# Patient Record
Sex: FEMALE | Race: WHITE | NOT HISPANIC OR LATINO | Employment: FULL TIME | URBAN - METROPOLITAN AREA
[De-identification: names, ages, dates, MRNs, and addresses within clinical notes are randomized per-mention and may not be internally consistent; named-entity substitution may affect disease eponyms.]

---

## 2017-02-10 ENCOUNTER — HOSPITAL ENCOUNTER (OUTPATIENT)
Dept: RADIOLOGY | Facility: MEDICAL CENTER | Age: 47
End: 2017-02-10
Attending: INTERNAL MEDICINE
Payer: COMMERCIAL

## 2017-02-10 DIAGNOSIS — Z13.9 SCREENING: ICD-10-CM

## 2017-02-10 PROCEDURE — G0202 SCR MAMMO BI INCL CAD: HCPCS

## 2017-03-17 ENCOUNTER — TELEPHONE (OUTPATIENT)
Dept: NEUROLOGY | Facility: MEDICAL CENTER | Age: 47
End: 2017-03-17

## 2017-03-17 NOTE — TELEPHONE ENCOUNTER
Pt is sending this message via [employer] e-mail. Please advise. Thank you. ADAM Rick,  I don't know who I should go through on this, but I need to get some Norco for my migraines.  I know Dr. Soria doesn't like me to use it as a rescue medicine but when my Imitrix doesn't work and I have to be at work I need to Norco to make it through the day.  This doesn't happen often but when I does I need it.  What should I do?  Please let me know?     Mari

## 2017-03-20 ENCOUNTER — TELEPHONE (OUTPATIENT)
Dept: NEUROLOGY | Facility: MEDICAL CENTER | Age: 47
End: 2017-03-20

## 2017-03-20 DIAGNOSIS — G43.111 INTRACTABLE MIGRAINE WITH AURA WITH STATUS MIGRAINOSUS: ICD-10-CM

## 2017-03-20 RX ORDER — HYDROCODONE BITARTRATE AND ACETAMINOPHEN 10; 325 MG/1; MG/1
1-2 TABLET ORAL
Qty: 5 TAB | Refills: 3 | Status: SHIPPED | OUTPATIENT
Start: 2017-03-20 | End: 2017-05-04 | Stop reason: SDUPTHER

## 2017-03-20 NOTE — TELEPHONE ENCOUNTER
Pt also called and is stating that she only takes half a tablet of the Norco. She has tried and failed Fioricet. It does not help the migraines at all when she is at work. She is also asking if she should increase the Topamax dose. She is currently taking 100 mg at bed time. Please advise. Thank you. ADAM

## 2017-03-21 NOTE — TELEPHONE ENCOUNTER
Norco 5# maximum per month is fine with me. YP    Called and spoke with pt. All information was given. She verbally understood and will  Rx. ADAM

## 2017-04-25 DIAGNOSIS — G43.119 INTRACTABLE MIGRAINE WITH AURA WITHOUT STATUS MIGRAINOSUS: ICD-10-CM

## 2017-04-25 RX ORDER — TOPIRAMATE 100 MG/1
100 TABLET, FILM COATED ORAL
Qty: 90 TAB | Refills: 1 | Status: SHIPPED | OUTPATIENT
Start: 2017-04-25 | End: 2017-12-17 | Stop reason: SDUPTHER

## 2017-04-25 NOTE — TELEPHONE ENCOUNTER
Was the patient seen in the last year in this department? Yes  6/8/17    Does patient have an active prescription for medications requested? Yes     Received Request Via: Pharmacy     No follow up appointment at this time.

## 2017-05-04 DIAGNOSIS — G43.111 INTRACTABLE MIGRAINE WITH AURA WITH STATUS MIGRAINOSUS: ICD-10-CM

## 2017-05-04 NOTE — TELEPHONE ENCOUNTER
Pt is calling and asking for a refill for the Norco 10/325 mg. Per ALEJANDRO there can be no refills on the Rx. It has to be written every month. She last had it written on 3/[id number] for # 5. She is in need of it to be written again and she will come to the office to pick it up. Please advise. Thank you. ADAM

## 2017-05-05 ENCOUNTER — TELEPHONE (OUTPATIENT)
Dept: NEUROLOGY | Facility: MEDICAL CENTER | Age: 47
End: 2017-05-05

## 2017-05-05 RX ORDER — HYDROCODONE BITARTRATE AND ACETAMINOPHEN 10; 325 MG/1; MG/1
1-2 TABLET ORAL
Qty: 5 TAB | Refills: 0 | Status: SHIPPED | OUTPATIENT
Start: 2017-05-05 | End: 2017-05-16

## 2017-05-16 ENCOUNTER — OFFICE VISIT (OUTPATIENT)
Dept: MEDICAL GROUP | Facility: MEDICAL CENTER | Age: 47
End: 2017-05-16
Payer: COMMERCIAL

## 2017-05-16 VITALS
SYSTOLIC BLOOD PRESSURE: 124 MMHG | DIASTOLIC BLOOD PRESSURE: 78 MMHG | RESPIRATION RATE: 16 BRPM | HEART RATE: 102 BPM | HEIGHT: 69 IN | WEIGHT: 150 LBS | BODY MASS INDEX: 22.22 KG/M2 | OXYGEN SATURATION: 94 % | TEMPERATURE: 98 F

## 2017-05-16 DIAGNOSIS — M25.511 PAIN IN JOINT OF RIGHT SHOULDER: ICD-10-CM

## 2017-05-16 DIAGNOSIS — G43.111 INTRACTABLE MIGRAINE WITH AURA WITH STATUS MIGRAINOSUS: ICD-10-CM

## 2017-05-16 PROCEDURE — 99213 OFFICE O/P EST LOW 20 MIN: CPT | Performed by: INTERNAL MEDICINE

## 2017-05-16 RX ORDER — HYDROCODONE BITARTRATE AND ACETAMINOPHEN 10; 325 MG/1; MG/1
1 TABLET ORAL
Qty: 5 TAB | Refills: 0 | Status: SHIPPED | OUTPATIENT
Start: 2017-05-16 | End: 2017-06-19 | Stop reason: SDUPTHER

## 2017-05-16 ASSESSMENT — PAIN SCALES - GENERAL: PAINLEVEL: 8=MODERATE-SEVERE PAIN

## 2017-05-16 NOTE — ASSESSMENT & PLAN NOTE
This patient reports that a couple of years ago she injured her right shoulder, she thinks her biceps tendon. Symptoms gradually resolved. She reports that recently she reinjured it when she was working out. She saw Dr. Garcia for this who has arranged for physical therapy. He however would not give her pain medication and told her to come to us. She is having quite a bit of discomfort. She is not on an anti-inflammatory medication. She does have Norco which she takes only for her migraine headaches. She generally only needs a half pill per day. She otherwise denies new complaints.

## 2017-05-16 NOTE — PROGRESS NOTES
Subjective:     Chief Complaint   Patient presents with   • Arm Pain     right arm/ pain medicine     Mari Dillon is a 46 y.o. female here today for pain medication for shoulder injury.    Pain in joint, shoulder region  This patient reports that a couple of years ago she injured her right shoulder, she thinks her biceps tendon. Symptoms gradually resolved. She reports that recently she reinjured it when she was working out. She saw Dr. Garcia for this who has arranged for physical therapy. He however would not give her pain medication and told her to come to us. She is having quite a bit of discomfort. She is not on an anti-inflammatory medication. She does have Norco which she takes only for her migraine headaches. She generally only needs a half pill per day. She otherwise denies new complaints.       Diagnoses of Intractable migraine with aura with status migrainosus and Pain in joint of right shoulder were pertinent to this visit.    Allergies: Review of patient's allergies indicates no known allergies.  Current medicines (including changes today)  Current Outpatient Prescriptions   Medication Sig Dispense Refill   • hydrocodone/acetaminophen (NORCO)  MG Tab Take 1 Tab by mouth 1 time daily as needed. 5 Tab 0   • topiramate (TOPAMAX) 100 MG Tab Take 1 Tab by mouth every bedtime. 90 Tab 1   • alprazolam (XANAX) 0.25 MG Tab Take 1 Tab by mouth at bedtime as needed for Sleep. 10 Tab 2   • duloxetine (CYMBALTA) 30 MG Cap DR Particles Take 1 Cap by mouth every day. 30 Cap 11   • sumatriptan (IMITREX) 100 MG tablet Take 1 Tab by mouth Once PRN for Migraine for up to 1 dose. 9 Tab 6   • Multiple Vitamins-Minerals (MULTIVITAMIN PO) Take  by mouth every day.     • levonorgestrel-ethinyl estradiol (INTROVALE) 0.15-0.03 MG per tablet Take 1 Tab by mouth every day.       No current facility-administered medications for this visit.       She  has a past medical history of Anxiety (6/9/2009). She also has no past  "medical history of Breast cancer ([facility]).  Health Maintenance: She was advised to get a TD vaccination but she refuses to get that today.  ROS    Patient denies significant change in strength, weight or appetite.  No significant lightheadedness or headaches. Migraine headaches are unchanged recently.  No change in vision, hearing, or swallowing.  No new dyspnea, coughing, chest pain, or palpitations.  No indigestion, abdominal pain, or change in bowel habits.  No change in urinating.  No new ankle swelling. Right shoulder pain as noted.       Objective:     PE:  /78 mmHg  Pulse 102  Temp(Src) 36.7 °C (98 °F)  Resp 16  Ht 1.753 m (5' 9\")  Wt 68.04 kg (150 lb)  BMI 22.14 kg/m2  SpO2 94%   Neck is supple without significant lymphadenopathy or masses.  Extremities are without significant edema, cyanosis or deformity. There is no significant tenderness to palpation over the shoulder but extreme motion in any direction causes discomfort. There is no crepitus. There is no obvious deformity.      Assessment and Plan:   The following treatment plan was discussed  1. Intractable migraine with aura with status migrainosus  hydrocodone/acetaminophen (NORCO)  MG Tab   2. Pain in joint of right shoulder      I gave her prescription for Narco 5 tablets, she thinks she only needs a half pill per day. I also advised that she start taking ibuprofen or Aleve in anti-inflammatory doses. we reviewed potential side effects. She will continue physical therapy as directed.        Followup: She will keep her next scheduled appoint with Dr. Prieto or otherwise contact us as needed sooner.           "

## 2017-05-16 NOTE — MR AVS SNAPSHOT
"        Mari Dillon   2017 8:20 AM   Office Visit   MRN: [id number]    Department:  [address] [place] [address]   Dept Phone:  [phone]    Description:  Female : 1970   Provider:  Brando Azevedo M.D.           Reason for Visit     Arm Pain right arm/ pain medicine      Allergies as of 2017     No Known Allergies      You were diagnosed with     Intractable migraine with aura with status migrainosus   [104695]       Pain in joint of right shoulder   [[id number]]   I gave her prescription for Narco 5 tablets, she thinks she only needs a half pill per day. I also advised that she start taking ibuprofen or Aleve in anti-infl      Vital Signs     Blood Pressure Pulse Temperature Respirations Height Weight    124/78 mmHg 102 36.7 °C (98 °F) 16 1.753 m (5' 9\") 68.04 kg (150 lb)    Body Mass Index Oxygen Saturation Smoking Status             22.14 kg/m2 94% Former Smoker         Basic Information     Date Of Birth Sex Race Ethnicity Preferred Language    1970 Female White Non- English      Problem List              ICD-10-CM Priority Class Noted - Resolved    Anxiety (Chronic) F41.9   2009 - Present    Pain in joint, shoulder region M25.519   6/15/2015 - Present    Intractable migraine with aura without status migrainosus G43.119   3/3/2016 - Present      Health Maintenance        Date Due Completion Dates    IMM DTaP/Tdap/Td Vaccine (1 - Tdap) 1989 ---    MAMMOGRAM 2/10/2018 2/10/2017, 2016, 2013, 2012, 2011, 8/10/2010, 8/10/2010, 2009, 2009    PAP SMEAR 10/13/2018 10/13/2015 (Done), 12/3/2012 (Done)    Override on 10/13/2015: Done    Override on 12/3/2012: Done (marycruz)            Current Immunizations     Influenza TIV (IM) 2015, 10/18/2013, 2012    Influenza Vaccine Quad Inj (Preserved) 10/24/2014  8:52 AM    Tetanus Vaccine 2012      Below and/or attached are the medications your provider expects you to take. Review all of your home medications " and newly ordered medications with your provider and/or pharmacist. Follow medication instructions as directed by your provider and/or pharmacist. Please keep your medication list with you and share with your provider. Update the information when medications are discontinued, doses are changed, or new medications (including over-the-counter products) are added; and carry medication information at all times in the event of emergency situations     Allergies:  No Known Allergies          Medications  Valid as of: May 16, 2017 -  8:56 AM    Generic Name Brand Name Tablet Size Instructions for use    ALPRAZolam (Tab) XANAX 0.25 MG Take 1 Tab by mouth at bedtime as needed for Sleep.        DULoxetine HCl (Cap DR Particles) CYMBALTA 30 MG Take 1 Cap by mouth every day.        Hydrocodone-Acetaminophen (Tab) NORCO  MG Take 1 Tab by mouth 1 time daily as needed.        Levonorgest-Eth Estrad 91-Day (Tab) SEASONALE 0.15-0.03 MG Take 1 Tab by mouth every day.        Multiple Vitamins-Minerals   Take  by mouth every day.        SUMAtriptan Succinate (Tab) IMITREX 100 MG Take 1 Tab by mouth Once PRN for Migraine for up to 1 dose.        Topiramate (Tab) TOPAMAX 100 MG Take 1 Tab by mouth every bedtime.        .                 Medicines prescribed today were sent to:     [id number] - [place] - [address]    [address] [place] [address]    Phone: [phone] Fax: [phone]    Open 24 Hours?: No      Medication refill instructions:       If your prescription bottle indicates you have medication refills left, it is not necessary to call your provider’s office. Please contact your pharmacy and they will refill your medication.    If your prescription bottle indicates you do not have any refills left, you may request refills at any time through one of the following ways: The online [employer] system (except Urgent Care), by calling your provider’s office, or by asking your pharmacy to contact your  provider’s office with a refill request. Medication refills are processed only during regular business hours and may not be available until the next business day. Your provider may request additional information or to have a follow-up visit with you prior to refilling your medication.   *Please Note: Medication refills are assigned a new Rx number when refilled electronically. Your pharmacy may indicate that no refills were authorized even though a new prescription for the same medication is available at the pharmacy. Please request the medicine by name with the pharmacy before contacting your provider for a refill.           [employer] Access Code: Activation code not generated  Current [id number] Status: Active

## 2017-06-07 DIAGNOSIS — F41.9 ANXIETY: Chronic | ICD-10-CM

## 2017-06-07 RX ORDER — ALPRAZOLAM 0.25 MG/1
0.25 TABLET ORAL NIGHTLY PRN
Qty: 10 TAB | Refills: 2 | Status: SHIPPED
Start: 2017-06-07 | End: 2017-08-28 | Stop reason: SDUPTHER

## 2017-06-13 ENCOUNTER — APPOINTMENT (OUTPATIENT)
Dept: MEDICAL GROUP | Facility: MEDICAL CENTER | Age: 47
End: 2017-06-13
Payer: COMMERCIAL

## 2017-06-19 ENCOUNTER — PATIENT MESSAGE (OUTPATIENT)
Dept: NEUROLOGY | Facility: MEDICAL CENTER | Age: 47
End: 2017-06-19

## 2017-06-19 DIAGNOSIS — G43.111 INTRACTABLE MIGRAINE WITH AURA WITH STATUS MIGRAINOSUS: ICD-10-CM

## 2017-06-19 RX ORDER — HYDROCODONE BITARTRATE AND ACETAMINOPHEN 10; 325 MG/1; MG/1
1 TABLET ORAL
Qty: 5 TAB | Refills: 0 | Status: SHIPPED | OUTPATIENT
Start: 2017-06-19 | End: 2017-06-23 | Stop reason: SDUPTHER

## 2017-06-23 DIAGNOSIS — G43.111 INTRACTABLE MIGRAINE WITH AURA WITH STATUS MIGRAINOSUS: ICD-10-CM

## 2017-06-23 RX ORDER — HYDROCODONE BITARTRATE AND ACETAMINOPHEN 10; 325 MG/1; MG/1
1 TABLET ORAL
Qty: 5 TAB | Refills: 0 | Status: SHIPPED | OUTPATIENT
Start: 2017-06-23 | End: 2017-07-11

## 2017-06-27 ENCOUNTER — OFFICE VISIT (OUTPATIENT)
Dept: URGENT CARE | Facility: CLINIC | Age: 47
End: 2017-06-27
Payer: COMMERCIAL

## 2017-06-27 VITALS
BODY MASS INDEX: 22.22 KG/M2 | WEIGHT: 150 LBS | TEMPERATURE: 97.7 F | SYSTOLIC BLOOD PRESSURE: 100 MMHG | DIASTOLIC BLOOD PRESSURE: 72 MMHG | RESPIRATION RATE: 17 BRPM | HEART RATE: 73 BPM | OXYGEN SATURATION: 100 % | HEIGHT: 69 IN

## 2017-06-27 DIAGNOSIS — S76.012A MUSCLE STRAIN OF LEFT HIP, INITIAL ENCOUNTER: ICD-10-CM

## 2017-06-27 PROCEDURE — 99999 PR NO CHARGE: CPT | Performed by: NURSE PRACTITIONER

## 2017-06-27 PROCEDURE — 99214 OFFICE O/P EST MOD 30 MIN: CPT | Performed by: NURSE PRACTITIONER

## 2017-06-27 RX ORDER — DICLOFENAC SODIUM 75 MG/1
75 TABLET, DELAYED RELEASE ORAL 2 TIMES DAILY
Qty: 30 TAB | Refills: 0 | Status: SHIPPED | OUTPATIENT
Start: 2017-06-27 | End: 2018-04-13

## 2017-06-27 RX ORDER — HYDROCODONE BITARTRATE AND ACETAMINOPHEN 5; 325 MG/1; MG/1
1-2 TABLET ORAL EVERY 6 HOURS PRN
Qty: 20 TAB | Refills: 0 | Status: SHIPPED | OUTPATIENT
Start: 2017-06-27 | End: 2017-07-11

## 2017-06-27 RX ORDER — IBUPROFEN 200 MG
800 TABLET ORAL EVERY 6 HOURS PRN
COMMUNITY
End: 2018-05-01

## 2017-06-27 RX ORDER — KETOROLAC TROMETHAMINE 30 MG/ML
30 INJECTION, SOLUTION INTRAMUSCULAR; INTRAVENOUS ONCE
Status: COMPLETED | OUTPATIENT
Start: 2017-06-27 | End: 2017-06-27

## 2017-06-27 RX ADMIN — KETOROLAC TROMETHAMINE 30 MG: 30 INJECTION, SOLUTION INTRAMUSCULAR; INTRAVENOUS at 08:50

## 2017-06-27 ASSESSMENT — ENCOUNTER SYMPTOMS
LEG PAIN: 1
CHILLS: 0
FEVER: 0

## 2017-06-27 NOTE — MR AVS SNAPSHOT
"        Mari Dillon   2017 8:15 AM   Office Visit   MRN: [id number]    Department:  [facility]   Dept Phone:  [phone]    Description:  Female : 1970   Provider:  Cathey J Hamman, A.P.N.           Reason for Visit     Leg Pain left leg, muscle and pain has increased, burning sensation, upper leg, hard to sit, just started last night, hard to sleep      Allergies as of 2017     No Known Allergies      You were diagnosed with     Muscle strain of left hip, initial encounter   [2331946]         Vital Signs     Blood Pressure Pulse Temperature Respirations Height Weight    100/72 mmHg 73 36.5 °C (97.7 °F) 17 1.753 m (5' 9.02\") 68.04 kg (150 lb)    Body Mass Index Oxygen Saturation Smoking Status             22.14 kg/m2 100% Former Smoker         Basic Information     Date Of Birth Sex Race Ethnicity Preferred Language    1970 Female White Non- English      Your appointments     2017  8:40 AM   Established Patient with Brandan Prieto M.D.   [facility] [address]    [address]  [address]  [facility] [address]   [phone]           You will be receiving a confirmation call a few days before your appointment from our automated call confirmation system.              Problem List              ICD-10-CM Priority Class Noted - Resolved    Anxiety (Chronic) F41.9   2009 - Present    Pain in joint, shoulder region M25.519   6/15/2015 - Present    Intractable migraine with aura without status migrainosus G43.119   3/3/2016 - Present      Health Maintenance        Date Due Completion Dates    IMM DTaP/Tdap/Td Vaccine (1 - Tdap) 1989 ---    MAMMOGRAM 2/10/2018 2/10/2017, 2016, 2013, 2012, 2011, 8/10/2010, 8/10/2010, 2009, 2009    PAP SMEAR 10/13/2018 10/13/2015 (Done), 12/3/2012 (Done)    Override on 10/13/2015: Done    Override on 12/3/2012: Done (marycruz)            Current Immunizations     Influenza TIV (IM) " 9/30/2015, 10/18/2013, 11/2/2012    Influenza Vaccine Quad Inj (Preserved) 10/24/2014  8:52 AM    Tetanus Vaccine 5/2/2012      Below and/or attached are the medications your provider expects you to take. Review all of your home medications and newly ordered medications with your provider and/or pharmacist. Follow medication instructions as directed by your provider and/or pharmacist. Please keep your medication list with you and share with your provider. Update the information when medications are discontinued, doses are changed, or new medications (including over-the-counter products) are added; and carry medication information at all times in the event of emergency situations     Allergies:  No Known Allergies          Medications  Valid as of: June 27, 2017 -  8:53 AM    Generic Name Brand Name Tablet Size Instructions for use    ALPRAZolam (Tab) XANAX 0.25 MG Take 1 Tab by mouth at bedtime as needed for Sleep.        Diclofenac Sodium (Tablet Delayed Response) VOLTAREN 75 MG Take 1 Tab by mouth 2 times a day.        DULoxetine HCl (Cap DR Particles) CYMBALTA 30 MG Take 1 Cap by mouth every day.        Hydrocodone-Acetaminophen (Tab) NORCO  MG Take 1 Tab by mouth 1 time daily as needed.        Hydrocodone-Acetaminophen (Tab) NORCO 5-325 MG Take 1-2 Tabs by mouth every 6 hours as needed.        Ibuprofen (Tab) MOTRIN 200 MG Take 800 mg by mouth every 6 hours as needed.        Levonorgest-Eth Estrad 91-Day (Tab) SEASONALE 0.15-0.03 MG Take 1 Tab by mouth every day.        Multiple Vitamins-Minerals   Take  by mouth every day.        SUMAtriptan Succinate (Tab) IMITREX 100 MG Take 1 Tab by mouth Once PRN for Migraine for up to 1 dose.        Topiramate (Tab) TOPAMAX 100 MG Take 1 Tab by mouth every bedtime.        .                 Medicines prescribed today were sent to:     [facility] #[id number] - [place] - [address]    [address] [place] [address]    Phone: [phone] Fax: [phone]       Open 24 Hours?: No      Medication refill instructions:       If your prescription bottle indicates you have medication refills left, it is not necessary to call your provider’s office. Please contact your pharmacy and they will refill your medication.    If your prescription bottle indicates you do not have any refills left, you may request refills at any time through one of the following ways: The online [employer] system (except Urgent Care), by calling your provider’s office, or by asking your pharmacy to contact your provider’s office with a refill request. Medication refills are processed only during regular business hours and may not be available until the next business day. Your provider may request additional information or to have a follow-up visit with you prior to refilling your medication.   *Please Note: Medication refills are assigned a new Rx number when refilled electronically. Your pharmacy may indicate that no refills were authorized even though a new prescription for the same medication is available at the pharmacy. Please request the medicine by name with the pharmacy before contacting your provider for a refill.           [employer] Access Code: Activation code not generated  Current [employer] Status: Active

## 2017-06-27 NOTE — PROGRESS NOTES
"Subjective:      Mari Dillon is a 47 y.o. female who presents with Leg Pain    Past Medical History   Diagnosis Date   • Anxiety 6/9/2009     Social History     Social History   • Marital Status:      Spouse Name: N/A   • Number of Children: N/A   • Years of Education: N/A     Occupational History   • Not on file.     Social History Main Topics   • Smoking status: Former Smoker -- 15 years     Quit date: 01/01/2005   • Smokeless tobacco: Never Used      Comment: 3-4 years ago   • Alcohol Use: No   • Drug Use: No   • Sexual Activity:     Partners: Male     Other Topics Concern   • Not on file     Social History Narrative    ** Merged History Encounter **          Family History   Problem Relation Age of Onset   • Hypertension Mother    • Diabetes Mother    • Cancer Maternal Grandmother      breast     Allergies: Review of patient's allergies indicates no known allergies.    This patient is a 47-year-old female who presents today with complaint of pain to the left hip. She states she woke up during the night with hip pain. She states that she had intercourse prior to this, and she believes that the pain is secondary to that. Patient denies any other strenuous activity yesterday or over the weekend. Patient denies any numbness, tingling, or weakness into her leg. States that movement of the left hip is painful.          Leg Pain  This is a new problem. The current episode started today. The problem occurs constantly. The problem has been unchanged. Pertinent negatives include no chills or fever. Exacerbated by: movement. She has tried nothing for the symptoms. The treatment provided no relief.       Review of Systems   Constitutional: Negative for fever and chills.   Musculoskeletal:        Left hip pain       All other systems reviewed and are negative      Objective:     /72 mmHg  Pulse 73  Temp(Src) 36.5 °C (97.7 °F)  Resp 17  Ht 1.753 m (5' 9.02\")  Wt 68.04 kg (150 lb)  BMI 22.14 kg/m2  SpO2 " 100%     Physical Exam   Constitutional: She is oriented to person, place, and time. She appears well-developed and well-nourished. No distress.   Musculoskeletal:        Left hip: She exhibits decreased range of motion and tenderness.        Legs:  Mild point tenderness over the left hip flexor. Pain is reproduced with lateral abduction and with hip flexion. There is no obvious swelling or deformity. There is no discoloration.   Neurological: She is alert and oriented to person, place, and time.   Skin: Skin is warm and dry. She is not diaphoretic.   Psychiatric: She has a normal mood and affect. Her behavior is normal.   Vitals reviewed.    Patient refused hip x-ray.          Assessment/Plan:     1. Muscle strain of left hip, initial encounter    - ketorolac (TORADOL) injection 30 mg; 1 mL by Intramuscular route Once.  - diclofenac EC (VOLTAREN) 75 MG Tablet Delayed Response; Take 1 Tab by mouth 2 times a day.  Dispense: 30 Tab; Refill: 0  - hydrocodone-acetaminophen (NORCO) 5-325 MG Tab per tablet; Take 1-2 Tabs by mouth every 6 hours as needed.  Dispense: 20 Tab; Refill: 0 patient's BMP and find no evidence of narcotic misuse. Clearly stated no driving or alcohol with this medication.  -rest  -ice intermittently  -follow up if symptosm persist or worsen

## 2017-07-07 ENCOUNTER — OFFICE VISIT (OUTPATIENT)
Dept: URGENT CARE | Facility: CLINIC | Age: 47
End: 2017-07-07
Payer: COMMERCIAL

## 2017-07-07 VITALS
TEMPERATURE: 98.8 F | OXYGEN SATURATION: 99 % | HEART RATE: 86 BPM | RESPIRATION RATE: 14 BRPM | BODY MASS INDEX: 22.07 KG/M2 | DIASTOLIC BLOOD PRESSURE: 70 MMHG | HEIGHT: 69 IN | WEIGHT: 149 LBS | SYSTOLIC BLOOD PRESSURE: 110 MMHG

## 2017-07-07 DIAGNOSIS — S76.012A HIP STRAIN, LEFT, INITIAL ENCOUNTER: ICD-10-CM

## 2017-07-07 PROCEDURE — 99214 OFFICE O/P EST MOD 30 MIN: CPT | Performed by: FAMILY MEDICINE

## 2017-07-07 ASSESSMENT — ENCOUNTER SYMPTOMS
NUMBNESS: 0
HIP PAIN: 1
FEVER: 0
WEAKNESS: 0

## 2017-07-08 NOTE — PATIENT INSTRUCTIONS
Hip Pain  Your hip is the joint between your upper legs and your lower pelvis. The bones, cartilage, tendons, and muscles of your hip joint perform a lot of work each day supporting your body weight and allowing you to move around.  Hip pain can range from a minor ache to severe pain in one or both of your hips. Pain may be felt on the inside of the hip joint near the groin, or the outside near the buttocks and upper thigh. You may have swelling or stiffness as well.   HOME CARE INSTRUCTIONS   · Take medicines only as directed by your health care provider.  · Apply ice to the injured area:  ¨ Put ice in a plastic bag.  ¨ Place a towel between your skin and the bag.  ¨ Leave the ice on for 15-20 minutes at a time, 3-4 times a day.  · Keep your leg raised (elevated) when possible to lessen swelling.  · Avoid activities that cause pain.  · Follow specific exercises as directed by your health care provider.  · Sleep with a pillow between your legs on your most comfortable side.  · Record how often you have hip pain, the location of the pain, and what it feels like.  SEEK MEDICAL CARE IF:   · You are unable to put weight on your leg.  · Your hip is red or swollen or very tender to touch.  · Your pain or swelling continues or worsens after 1 week.  · You have increasing difficulty walking.  · You have a fever.  SEEK IMMEDIATE MEDICAL CARE IF:   · You have fallen.  · You have a sudden increase in pain and swelling in your hip.  MAKE SURE YOU:   · Understand these instructions.  · Will watch your condition.  · Will get help right away if you are not doing well or get worse.     This information is not intended to replace advice given to you by your health care provider. Make sure you discuss any questions you have with your health care provider.     Document Released: 06/07/2011 Document Revised: 01/08/2016 Document Reviewed: 08/14/2014  [employer] Interactive Patient Education ©2016 [employer]

## 2017-07-08 NOTE — PROGRESS NOTES
"Subjective:      Mari Dillon is a 47 y.o. female who presents with Hip Pain            Hip Pain  This is a new problem. The current episode started 1 to 4 weeks ago. The problem occurs constantly. The problem has been gradually worsening. Pertinent negatives include no fever, numbness or weakness.       Review of Systems   Constitutional: Negative for fever.   Neurological: Negative for weakness and numbness.     No Known Allergies      Objective:     /70 mmHg  Pulse 86  Temp(Src) 37.1 °C (98.8 °F)  Resp 14  Ht 1.753 m (5' 9.02\")  Wt 67.586 kg (149 lb)  BMI 21.99 kg/m2  SpO2 99%     Physical Exam   Constitutional: She is oriented to person, place, and time. She appears well-developed and well-nourished. No distress.   Eyes: EOM are normal. Pupils are equal, round, and reactive to light.   Cardiovascular: Normal rate, regular rhythm, normal heart sounds and intact distal pulses.    Pulmonary/Chest: Effort normal and breath sounds normal. No respiratory distress.   Abdominal: Soft. Bowel sounds are normal. She exhibits no distension.   Musculoskeletal:        Left hip: She exhibits decreased range of motion (pain with hip flexion) and tenderness. She exhibits normal strength and no bony tenderness.   Neurological: She is alert and oriented to person, place, and time. She has normal reflexes.   Skin: Skin is warm and dry.   Psychiatric: She has a normal mood and affect. Her behavior is normal.               Assessment/Plan:     1. Hip strain, left, initial encounter  Use over-the-counter pain reliever, such as acetaminophen (Tylenol), ibuprofen (Advil, Motrin) or naproxen (Aleve) as needed; follow package directions for dosing. Regular stretching exercises.        "

## 2017-07-11 ENCOUNTER — OFFICE VISIT (OUTPATIENT)
Dept: MEDICAL GROUP | Facility: MEDICAL CENTER | Age: 47
End: 2017-07-11
Payer: COMMERCIAL

## 2017-07-11 VITALS
BODY MASS INDEX: 21.8 KG/M2 | DIASTOLIC BLOOD PRESSURE: 76 MMHG | HEIGHT: 69 IN | WEIGHT: 147.2 LBS | HEART RATE: 104 BPM | TEMPERATURE: 98.7 F | SYSTOLIC BLOOD PRESSURE: 116 MMHG | OXYGEN SATURATION: 94 % | RESPIRATION RATE: 16 BRPM

## 2017-07-11 DIAGNOSIS — G43.119 INTRACTABLE MIGRAINE WITH AURA WITHOUT STATUS MIGRAINOSUS: ICD-10-CM

## 2017-07-11 DIAGNOSIS — T14.8XXA MUSCLE STRAIN: ICD-10-CM

## 2017-07-11 DIAGNOSIS — F41.9 ANXIETY: Chronic | ICD-10-CM

## 2017-07-11 PROCEDURE — 99214 OFFICE O/P EST MOD 30 MIN: CPT | Performed by: INTERNAL MEDICINE

## 2017-07-11 ASSESSMENT — PATIENT HEALTH QUESTIONNAIRE - PHQ9: CLINICAL INTERPRETATION OF PHQ2 SCORE: 0

## 2017-07-11 NOTE — PROGRESS NOTES
CC: Follow-up multiple issues    HPI:   Mari presents today with the following.    1. Anxiety  Presents reporting her anxiety is doing fairly well. She is back together with her current relationship and they're doing well. She does examining some occasion however is more for episodic things with flying. Overall she reports she is doing well and is tolerating Cymbalta well without side effect.    2. Intractable migraine with aura without status migrainosus  Reports headaches are still somewhat of an issue but doing better on Topamax without side effect. She denies any blurred vision and slurred speech no other focal numbness or weakness.    3. Muscle strain  She was seen in urgent care recently for strain of her groin. She reports it is healing slowly taking anti-inflammatories without major stomach pain.      Patient Active Problem List    Diagnosis Date Noted   • Intractable migraine with aura without status migrainosus 03/03/2016   • Pain in joint, shoulder region 06/15/2015   • Anxiety 06/09/2009       Current Outpatient Prescriptions   Medication Sig Dispense Refill   • ibuprofen (MOTRIN) 200 MG Tab Take 800 mg by mouth every 6 hours as needed.     • diclofenac EC (VOLTAREN) 75 MG Tablet Delayed Response Take 1 Tab by mouth 2 times a day. 30 Tab 0   • alprazolam (XANAX) 0.25 MG Tab Take 1 Tab by mouth at bedtime as needed for Sleep. 10 Tab 2   • topiramate (TOPAMAX) 100 MG Tab Take 1 Tab by mouth every bedtime. 90 Tab 1   • duloxetine (CYMBALTA) 30 MG Cap DR Particles Take 1 Cap by mouth every day. 30 Cap 11   • sumatriptan (IMITREX) 100 MG tablet Take 1 Tab by mouth Once PRN for Migraine for up to 1 dose. 9 Tab 6   • Multiple Vitamins-Minerals (MULTIVITAMIN PO) Take  by mouth every day.     • levonorgestrel-ethinyl estradiol (INTROVALE) 0.15-0.03 MG per tablet Take 1 Tab by mouth every day.       No current facility-administered medications for this visit.         Allergies as of 07/11/2017   • (No Known  "Allergies)        ROS: As per HPI.    /76 mmHg  Pulse 104  Temp(Src) 37.1 °C (98.7 °F)  Resp 16  Ht 1.753 m (5' 9.02\")  Wt 66.769 kg (147 lb 3.2 oz)  BMI 21.73 kg/m2  SpO2 94%    Physical Exam:  Gen:         Alert and oriented, No apparent distress.  Neck:        No Lymphadenopathy or Bruits.  Lungs:     Clear to auscultation bilaterally  CV:          Regular rate and rhythm. No murmurs, rubs or gallops.               Ext:          No clubbing, cyanosis, edema.      Assessment and Plan.   47 y.o. female with the following issues.    1. Anxiety  Clinical stable continue current medications    2. Intractable migraine with aura without status migrainosus  Clinically stable no change in therapy    3. Muscle strain  Rest ice and anti-inflammatories if not improving will consider physical therapy.        "

## 2017-07-11 NOTE — MR AVS SNAPSHOT
"        Mari Dillon   2017 8:40 AM   Office Visit   MRN: [id number]    Department:  [address] [place] [address]   Dept Phone:  [phone]    Description:  Female : 1970   Provider:  Brandan Prieto M.D.           Reason for Visit     Follow-Up           Allergies as of 2017     No Known Allergies      Vital Signs     Blood Pressure Pulse Temperature Respirations Height Weight    116/76 mmHg 104 37.1 °C (98.7 °F) 16 1.753 m (5' 9.02\") 66.769 kg (147 lb 3.2 oz)    Body Mass Index Oxygen Saturation Smoking Status             21.73 kg/m2 94% Former Smoker         Basic Information     Date Of Birth Sex Race Ethnicity Preferred Language    1970 Female White Non- English      Problem List              ICD-10-CM Priority Class Noted - Resolved    Anxiety (Chronic) F41.9   2009 - Present    Pain in joint, shoulder region M25.519   6/15/2015 - Present    Intractable migraine with aura without status migrainosus G43.119   3/3/2016 - Present      Health Maintenance        Date Due Completion Dates    IMM DTaP/Tdap/Td Vaccine (1 - Tdap) 1989 ---    IMM INFLUENZA (1) 2017, 10/24/2014, 10/18/2013, 2012    MAMMOGRAM 2/10/2018 2/10/2017, 2016, 2013, 2012, 2011, 8/10/2010, 8/10/2010, 2009, 2009    PAP SMEAR 2020 (Done), 10/13/2015 (Done), 12/3/2012 (Done)    Override on 2017: Done    Override on 10/13/2015: Done    Override on 12/3/2012: Done (marycruz)            Current Immunizations     Influenza TIV (IM) 2015, 10/18/2013, 2012    Influenza Vaccine Quad Inj (Preserved) 10/24/2014  8:52 AM    Tetanus Vaccine 2012      Below and/or attached are the medications your provider expects you to take. Review all of your home medications and newly ordered medications with your provider and/or pharmacist. Follow medication instructions as directed by your provider and/or pharmacist. Please keep your medication list with you and " share with your provider. Update the information when medications are discontinued, doses are changed, or new medications (including over-the-counter products) are added; and carry medication information at all times in the event of emergency situations     Allergies:  No Known Allergies          Medications  Valid as of: July 11, 2017 -  8:58 AM    Generic Name Brand Name Tablet Size Instructions for use    ALPRAZolam (Tab) XANAX 0.25 MG Take 1 Tab by mouth at bedtime as needed for Sleep.        Diclofenac Sodium (Tablet Delayed Response) VOLTAREN 75 MG Take 1 Tab by mouth 2 times a day.        DULoxetine HCl (Cap DR Particles) CYMBALTA 30 MG Take 1 Cap by mouth every day.        Ibuprofen (Tab) MOTRIN 200 MG Take 800 mg by mouth every 6 hours as needed.        Levonorgest-Eth Estrad 91-Day (Tab) SEASONALE 0.15-0.03 MG Take 1 Tab by mouth every day.        Multiple Vitamins-Minerals   Take  by mouth every day.        SUMAtriptan Succinate (Tab) IMITREX 100 MG Take 1 Tab by mouth Once PRN for Migraine for up to 1 dose.        Topiramate (Tab) TOPAMAX 100 MG Take 1 Tab by mouth every bedtime.        .                 Medicines prescribed today were sent to:     [facility] #[id number] - [place] - [address]    [address] [place] [address]    Phone: [phone] Fax: [phone]    Open 24 Hours?: No      Medication refill instructions:       If your prescription bottle indicates you have medication refills left, it is not necessary to call your provider’s office. Please contact your pharmacy and they will refill your medication.    If your prescription bottle indicates you do not have any refills left, you may request refills at any time through one of the following ways: The online [employer] system (except Urgent Care), by calling your provider’s office, or by asking your pharmacy to contact your provider’s office with a refill request. Medication refills are processed only during regular business hours  and may not be available until the next business day. Your provider may request additional information or to have a follow-up visit with you prior to refilling your medication.   *Please Note: Medication refills are assigned a new Rx number when refilled electronically. Your pharmacy may indicate that no refills were authorized even though a new prescription for the same medication is available at the pharmacy. Please request the medicine by name with the pharmacy before contacting your provider for a refill.           [employer] Access Code: Activation code not generated  Current [employer] Status: Active

## 2017-08-09 ENCOUNTER — OFFICE VISIT (OUTPATIENT)
Dept: URGENT CARE | Facility: CLINIC | Age: 47
End: 2017-08-09
Payer: COMMERCIAL

## 2017-08-09 VITALS
BODY MASS INDEX: 21.85 KG/M2 | RESPIRATION RATE: 20 BRPM | HEART RATE: 68 BPM | WEIGHT: 148 LBS | SYSTOLIC BLOOD PRESSURE: 120 MMHG | OXYGEN SATURATION: 96 % | DIASTOLIC BLOOD PRESSURE: 80 MMHG | TEMPERATURE: 98 F

## 2017-08-09 DIAGNOSIS — H10.31 ACUTE BACTERIAL CONJUNCTIVITIS OF RIGHT EYE: Primary | ICD-10-CM

## 2017-08-09 PROCEDURE — 99214 OFFICE O/P EST MOD 30 MIN: CPT | Performed by: PHYSICIAN ASSISTANT

## 2017-08-09 RX ORDER — POLYMYXIN B SULFATE AND TRIMETHOPRIM 1; 10000 MG/ML; [USP'U]/ML
1 SOLUTION OPHTHALMIC EVERY 4 HOURS
Qty: 10 ML | Refills: 0 | Status: SHIPPED | OUTPATIENT
Start: 2017-08-09 | End: 2018-04-13

## 2017-08-09 NOTE — Clinical Note
August 9, 2017       Patient: Mari Dillon   YOB: 1970   Date of Visit: 8/9/2017         To Whom It May Concern:    It is my medical opinion that Mari Dillon may be excused from work for the dates of 8/9/17-8/11/17.      If you have any questions or concerns, please don't hesitate to call [phone]          Sincerely,          Channing Gonzales PA-C  Electronically Signed

## 2017-08-09 NOTE — PROGRESS NOTES
"Subjective:      Pt is a 47 y.o. female who presents with Eye Problem            Eye Problem   The right eye is affected. This is a new problem. The current episode started yesterday. The problem occurs constantly. The problem has been gradually worsening. The injury mechanism is unknown. The pain is at a severity of 2/10. The pain is mild. She has tried water for the symptoms. The treatment provided no relief.   Pt presents to the urgent care today with a CC of a watery, swollen, red right eye. Pt states this started this morning. Pt states the pain in the eye is 3/10, mostly feels like pressure and aching with redness and yellow matting. Admits to the eye feeling \"itchy\" and that vision is slightly blurred. Pt denies double vision. Pt denies CP, SOB, NVD, paresthesias, headaches, dizziness, hives, or joint pain. Pt has not taken any medications for this condition. The pt's medication list, problem list, and allergies have been evaluated and reviewed during today's visit.    PMH:  Past Medical History   Diagnosis Date   • Anxiety 2009       PSH:  Past Surgical History   Procedure Laterality Date   • Other       nose   • Appendectomy     • Pr  delivery only     • Shoulder arthroscopy w/ bicipital tenodesis repair Right 6/15/2015     Procedure: SHOULDER ARTHROSCOPY W/ BICIPITAL TENODESIS, SYNOVECTOMY;  Surgeon: Tristian Garcia M.D.;  Location: [facility];  Service:    • Clavicle distal excision Right 6/15/2015     Procedure: CLAVICLE DISTAL EXCISION;  Surgeon: Tristian Garcia M.D.;  Location: [facility];  Service:    • Shoulder decompression Right 6/15/2015     Procedure: SHOULDER DECOMPRESSION MINI INCISION ;  Surgeon: Tristian Garcia M.D.;  Location: [facility];  Service:        Fam Hx:    family history includes Cancer in her maternal grandmother; Diabetes in her mother; Hypertension in her mother.  Family Status   Relation Status Death Age "   • Mother Alive    • Father Alive        Soc HX:  Social History     Social History   • Marital Status:      Spouse Name: N/A   • Number of Children: N/A   • Years of Education: N/A     Occupational History   • Not on file.     Social History Main Topics   • Smoking status: Former Smoker -- 15 years     Quit date: 01/01/2005   • Smokeless tobacco: Never Used      Comment: 3-4 years ago   • Alcohol Use: No   • Drug Use: No   • Sexual Activity:     Partners: Male     Other Topics Concern   • Not on file     Social History Narrative    ** Merged History Encounter **              Medications:    Current outpatient prescriptions:   •  polymixin-trimethoprim (POLYTRIM) 67971-4.1 UNIT/ML-% Solution, Place 1 Drop in both eyes every 4 hours., Disp: 10 mL, Rfl: 0  •  ibuprofen (MOTRIN) 200 MG Tab, Take 800 mg by mouth every 6 hours as needed., Disp: , Rfl:   •  diclofenac EC (VOLTAREN) 75 MG Tablet Delayed Response, Take 1 Tab by mouth 2 times a day., Disp: 30 Tab, Rfl: 0  •  alprazolam (XANAX) 0.25 MG Tab, Take 1 Tab by mouth at bedtime as needed for Sleep., Disp: 10 Tab, Rfl: 2  •  topiramate (TOPAMAX) 100 MG Tab, Take 1 Tab by mouth every bedtime., Disp: 90 Tab, Rfl: 1  •  duloxetine (CYMBALTA) 30 MG Cap DR Particles, Take 1 Cap by mouth every day., Disp: 30 Cap, Rfl: 11  •  sumatriptan (IMITREX) 100 MG tablet, Take 1 Tab by mouth Once PRN for Migraine for up to 1 dose., Disp: 9 Tab, Rfl: 6  •  Multiple Vitamins-Minerals (MULTIVITAMIN PO), Take  by mouth every day., Disp: , Rfl:   •  levonorgestrel-ethinyl estradiol (INTROVALE) 0.15-0.03 MG per tablet, Take 1 Tab by mouth every day., Disp: , Rfl:       Allergies:  Review of patient's allergies indicates no known allergies.        ROS  Constitutional: Negative for fever, chills and malaise/fatigue.   HENT: Negative for congestion and sore throat.    Eyes: Positive for blurred vision, pain, discharge and redness. Negative for double vision and photophobia.    Respiratory: Negative for cough and shortness of breath.    Cardiovascular: Negative for chest pain and palpitations.   Gastrointestinal: Negative for nausea, vomiting, abdominal pain, diarrhea and constipation.   Genitourinary: Negative for dysuria and flank pain.   Musculoskeletal: Negative for joint pain and myalgias.   Skin: Negative for itching and rash.   Neurological: Negative for dizziness, tingling, weakness and headaches.   Endo/Heme/Allergies: Does not bruise/bleed easily.   Psychiatric/Behavioral: Negative for depression. The patient is not nervous/anxious.             Objective:     /80 mmHg  Pulse 68  Temp(Src) 36.7 °C (98 °F)  Resp 20  Wt 67.132 kg (148 lb)  SpO2 96%     Physical Exam      Constitutional: PT is oriented to person, place, and time. PT appears well-developed and well-nourished. No distress.   HENT:   Head: Normocephalic and atraumatic.   Nose: Nose normal.   Mouth/Throat: Oropharynx is clear and moist. No oropharyngeal exudate.   Eyes: EOM are normal. Conjunctiva on right is injected. Pupils are equal, round, and reactive to light. Right eye exhibits discharge. Left eye exhibits no discharge. No scleral icterus.   Neck: Normal range of motion. Neck supple. No thyromegaly present.   Cardiovascular: Normal rate, regular rhythm, normal heart sounds and intact distal pulses.  Exam reveals no gallop and no friction rub.    No murmur heard.  Pulmonary/Chest: Breath sounds normal. No respiratory distress. PT has no wheezes. PT has no rales. PT exhibits no tenderness.   Abdominal: Soft. Bowel sounds are normal. PT exhibits no distension and no mass. There is no tenderness. There is no rebound and no guarding.   Musculoskeletal: Normal range of motion. PT exhibits no edema and no tenderness.   Neurological: PT is alert and oriented to person, place, and time. No cranial nerve deficit.   Skin: Skin is warm and dry. No rash noted. No erythema.   Psychiatric: PT has a normal mood and  affect. PT behavior is normal. Judgment and thought content normal.          Assessment/Plan:     1. Acute bacterial conjunctivitis of right eye    - polymixin-trimethoprim (POLYTRIM) 90773-8.1 UNIT/ML-% Solution; Place 1 Drop in both eyes every 4 hours.  Dispense: 10 mL; Refill: 0    Rest, fluids encouraged.  Note given for work.  AVS with medical info given.  Pt was in full understanding and agreement with the plan.  Follow-up as needed if symptoms worsen or fail to improve.

## 2017-08-09 NOTE — PATIENT INSTRUCTIONS
Bacterial Conjunctivitis  Bacterial conjunctivitis, commonly called pink eye, is an inflammation of the clear membrane that covers the white part of the eye (conjunctiva). The inflammation can also happen on the underside of the eyelids. The blood vessels in the conjunctiva become inflamed, causing the eye to become red or pink. Bacterial conjunctivitis may spread easily from one eye to another and from person to person (contagious).   CAUSES   Bacterial conjunctivitis is caused by bacteria. The bacteria may come from your own skin, your upper respiratory tract, or from someone else with bacterial conjunctivitis.  SYMPTOMS   The normally white color of the eye or the underside of the eyelid is usually pink or red. The pink eye is usually associated with irritation, tearing, and some sensitivity to light. Bacterial conjunctivitis is often associated with a thick, yellowish discharge from the eye. The discharge may turn into a crust on the eyelids overnight, which causes your eyelids to stick together. If a discharge is present, there may also be some blurred vision in the affected eye.  DIAGNOSIS   Bacterial conjunctivitis is diagnosed by your caregiver through an eye exam and the symptoms that you report. Your caregiver looks for changes in the surface tissues of your eyes, which may point to the specific type of conjunctivitis. A sample of any discharge may be collected on a cotton-tip swab if you have a severe case of conjunctivitis, if your cornea is affected, or if you keep getting repeat infections that do not respond to treatment. The sample will be sent to a lab to see if the inflammation is caused by a bacterial infection and to see if the infection will respond to antibiotic medicines.  TREATMENT   · Bacterial conjunctivitis is treated with antibiotics. Antibiotic eyedrops are most often used. However, antibiotic ointments are also available. Antibiotics pills are sometimes used. Artificial tears or eye  washes may ease discomfort.  HOME CARE INSTRUCTIONS   · To ease discomfort, apply a cool, clean washcloth to your eye for 10-20 minutes, 3-4 times a day.  · Gently wipe away any drainage from your eye with a warm, wet washcloth or a cotton ball.  · Wash your hands often with soap and water. Use paper towels to dry your hands.  · Do not share towels or washcloths. This may spread the infection.  · Change or wash your pillowcase every day.  · You should not use eye makeup until the infection is gone.  · Do not operate machinery or drive if your vision is blurred.  · Stop using contact lenses. Ask your caregiver how to sterilize or replace your contacts before using them again. This depends on the type of contact lenses that you use.  · When applying medicine to the infected eye, do not touch the edge of your eyelid with the eyedrop bottle or ointment tube.  SEEK IMMEDIATE MEDICAL CARE IF:   · Your infection has not improved within 3 days after beginning treatment.  · You had yellow discharge from your eye and it returns.  · You have increased eye pain.  · Your eye redness is spreading.  · Your vision becomes blurred.  · You have a fever or persistent symptoms for more than 2-3 days.  · You have a fever and your symptoms suddenly get worse.  · You have facial pain, redness, or swelling.  MAKE SURE YOU:   · Understand these instructions.  · Will watch your condition.  · Will get help right away if you are not doing well or get worse.     This information is not intended to replace advice given to you by your health care provider. Make sure you discuss any questions you have with your health care provider.     Document Released: 12/18/2006 Document Revised: 01/08/2016 Document Reviewed: 05/20/2013  [employer] Interactive Patient Education ©2016 [employer]

## 2017-08-09 NOTE — MR AVS SNAPSHOT
Mari Dillon   2017 11:30 AM   Office Visit   MRN: [id number]    Department:  [facility]   Dept Phone:  [phone]    Description:  Female : 1970   Provider:  Channing Gonzales PA-C           Reason for Visit     Eye Problem Today reight redness and discharge      Allergies as of 2017     No Known Allergies      You were diagnosed with     Acute bacterial conjunctivitis of right eye   [[id number]]  -  Primary       Vital Signs     Blood Pressure Pulse Temperature Respirations Weight Oxygen Saturation    120/80 mmHg 68 36.7 °C (98 °F) 20 67.132 kg (148 lb) 96%    Smoking Status                   Former Smoker           Basic Information     Date Of Birth Sex Race Ethnicity Preferred Language    1970 Female White Non- English      Problem List              ICD-10-CM Priority Class Noted - Resolved    Anxiety (Chronic) F41.9   2009 - Present    Pain in joint, shoulder region M25.519   6/15/2015 - Present    Intractable migraine with aura without status migrainosus G43.119   3/3/2016 - Present      Health Maintenance        Date Due Completion Dates    IMM DTaP/Tdap/Td Vaccine (1 - Tdap) 1989 ---    IMM INFLUENZA (1) 2017, 10/24/2014, 10/18/2013, 2012    MAMMOGRAM 2/10/2018 2/10/2017, 2016, 2013, 2012, 2011, 8/10/2010, 8/10/2010, 2009, 2009    PAP SMEAR 2020 (Done), 10/13/2015 (Done), 12/3/2012 (Done)    Override on 2017: Done    Override on 10/13/2015: Done    Override on 12/3/2012: Done (marycruz)            Current Immunizations     Influenza TIV (IM) 2015, 10/18/2013, 2012    Influenza Vaccine Quad Inj (Preserved) 10/24/2014  8:52 AM    Tetanus Vaccine 2012      Below and/or attached are the medications your provider expects you to take. Review all of your home medications and newly ordered medications with your provider and/or pharmacist. Follow medication instructions as directed by  your provider and/or pharmacist. Please keep your medication list with you and share with your provider. Update the information when medications are discontinued, doses are changed, or new medications (including over-the-counter products) are added; and carry medication information at all times in the event of emergency situations     Allergies:  No Known Allergies          Medications  Valid as of: August 09, 2017 -  2:53 PM    Generic Name Brand Name Tablet Size Instructions for use    ALPRAZolam (Tab) XANAX 0.25 MG Take 1 Tab by mouth at bedtime as needed for Sleep.        Diclofenac Sodium (Tablet Delayed Response) VOLTAREN 75 MG Take 1 Tab by mouth 2 times a day.        DULoxetine HCl (Cap DR Particles) CYMBALTA 30 MG Take 1 Cap by mouth every day.        Ibuprofen (Tab) MOTRIN 200 MG Take 800 mg by mouth every 6 hours as needed.        Levonorgest-Eth Estrad 91-Day (Tab) SEASONALE 0.15-0.03 MG Take 1 Tab by mouth every day.        Multiple Vitamins-Minerals   Take  by mouth every day.        Polymyxin B-Trimethoprim (Solution) POLYTRIM 37451-5.1 UNIT/ML-% Place 1 Drop in both eyes every 4 hours.        SUMAtriptan Succinate (Tab) IMITREX 100 MG Take 1 Tab by mouth Once PRN for Migraine for up to 1 dose.        Topiramate (Tab) TOPAMAX 100 MG Take 1 Tab by mouth every bedtime.        .                 Medicines prescribed today were sent to:     [id number] - [place], [place] - [address]    [address] [place] [address]    Phone: [phone] Fax: [phone]    Open 24 Hours?: No      Medication refill instructions:       If your prescription bottle indicates you have medication refills left, it is not necessary to call your provider’s office. Please contact your pharmacy and they will refill your medication.    If your prescription bottle indicates you do not have any refills left, you may request refills at any time through one of the following ways: The online [employer] system (except Urgent  Care), by calling your provider’s office, or by asking your pharmacy to contact your provider’s office with a refill request. Medication refills are processed only during regular business hours and may not be available until the next business day. Your provider may request additional information or to have a follow-up visit with you prior to refilling your medication.   *Please Note: Medication refills are assigned a new Rx number when refilled electronically. Your pharmacy may indicate that no refills were authorized even though a new prescription for the same medication is available at the pharmacy. Please request the medicine by name with the pharmacy before contacting your provider for a refill.        Instructions    Bacterial Conjunctivitis  Bacterial conjunctivitis, commonly called pink eye, is an inflammation of the clear membrane that covers the white part of the eye (conjunctiva). The inflammation can also happen on the underside of the eyelids. The blood vessels in the conjunctiva become inflamed, causing the eye to become red or pink. Bacterial conjunctivitis may spread easily from one eye to another and from person to person (contagious).   CAUSES   Bacterial conjunctivitis is caused by bacteria. The bacteria may come from your own skin, your upper respiratory tract, or from someone else with bacterial conjunctivitis.  SYMPTOMS   The normally white color of the eye or the underside of the eyelid is usually pink or red. The pink eye is usually associated with irritation, tearing, and some sensitivity to light. Bacterial conjunctivitis is often associated with a thick, yellowish discharge from the eye. The discharge may turn into a crust on the eyelids overnight, which causes your eyelids to stick together. If a discharge is present, there may also be some blurred vision in the affected eye.  DIAGNOSIS   Bacterial conjunctivitis is diagnosed by your caregiver through an eye exam and the symptoms that you  report. Your caregiver looks for changes in the surface tissues of your eyes, which may point to the specific type of conjunctivitis. A sample of any discharge may be collected on a cotton-tip swab if you have a severe case of conjunctivitis, if your cornea is affected, or if you keep getting repeat infections that do not respond to treatment. The sample will be sent to a lab to see if the inflammation is caused by a bacterial infection and to see if the infection will respond to antibiotic medicines.  TREATMENT   · Bacterial conjunctivitis is treated with antibiotics. Antibiotic eyedrops are most often used. However, antibiotic ointments are also available. Antibiotics pills are sometimes used. Artificial tears or eye washes may ease discomfort.  HOME CARE INSTRUCTIONS   · To ease discomfort, apply a cool, clean washcloth to your eye for 10-20 minutes, 3-4 times a day.  · Gently wipe away any drainage from your eye with a warm, wet washcloth or a cotton ball.  · Wash your hands often with soap and water. Use paper towels to dry your hands.  · Do not share towels or washcloths. This may spread the infection.  · Change or wash your pillowcase every day.  · You should not use eye makeup until the infection is gone.  · Do not operate machinery or drive if your vision is blurred.  · Stop using contact lenses. Ask your caregiver how to sterilize or replace your contacts before using them again. This depends on the type of contact lenses that you use.  · When applying medicine to the infected eye, do not touch the edge of your eyelid with the eyedrop bottle or ointment tube.  SEEK IMMEDIATE MEDICAL CARE IF:   · Your infection has not improved within 3 days after beginning treatment.  · You had yellow discharge from your eye and it returns.  · You have increased eye pain.  · Your eye redness is spreading.  · Your vision becomes blurred.  · You have a fever or persistent symptoms for more than 2-3 days.  · You have a fever  and your symptoms suddenly get worse.  · You have facial pain, redness, or swelling.  MAKE SURE YOU:   · Understand these instructions.  · Will watch your condition.  · Will get help right away if you are not doing well or get worse.     This information is not intended to replace advice given to you by your health care provider. Make sure you discuss any questions you have with your health care provider.     Document Released: 12/18/2006 Document Revised: 01/08/2016 Document Reviewed: 05/20/2013  [employer] Interactive Patient Education ©2016 [employer]            [employer] Access Code: Activation code not generated  Current Project 2020 Status: Active

## 2017-08-31 DIAGNOSIS — F41.9 ANXIETY: Chronic | ICD-10-CM

## 2017-08-31 RX ORDER — ALPRAZOLAM 0.25 MG/1
0.25 TABLET ORAL NIGHTLY PRN
Qty: 10 TAB | Refills: 2 | OUTPATIENT
Start: 2017-08-31

## 2017-09-01 NOTE — TELEPHONE ENCOUNTER
Called spoke to patient informed she will need to make appt to see Dr. She hasn't been seen in over a year.

## 2017-09-26 ENCOUNTER — APPOINTMENT (OUTPATIENT)
Dept: MEDICAL GROUP | Facility: MEDICAL CENTER | Age: 47
End: 2017-09-26
Payer: COMMERCIAL

## 2017-10-11 ENCOUNTER — OFFICE VISIT (OUTPATIENT)
Dept: MEDICAL GROUP | Facility: MEDICAL CENTER | Age: 47
End: 2017-10-11
Payer: COMMERCIAL

## 2017-10-11 VITALS
OXYGEN SATURATION: 97 % | HEART RATE: 72 BPM | RESPIRATION RATE: 16 BRPM | SYSTOLIC BLOOD PRESSURE: 114 MMHG | TEMPERATURE: 98.7 F | DIASTOLIC BLOOD PRESSURE: 72 MMHG | WEIGHT: 150.3 LBS | BODY MASS INDEX: 22.26 KG/M2 | HEIGHT: 69 IN

## 2017-10-11 DIAGNOSIS — Z23 INFLUENZA VACCINE NEEDED: ICD-10-CM

## 2017-10-11 DIAGNOSIS — R10.32 LEFT INGUINAL PAIN: ICD-10-CM

## 2017-10-11 PROCEDURE — 90471 IMMUNIZATION ADMIN: CPT | Performed by: INTERNAL MEDICINE

## 2017-10-11 PROCEDURE — 90686 IIV4 VACC NO PRSV 0.5 ML IM: CPT | Performed by: INTERNAL MEDICINE

## 2017-10-11 PROCEDURE — 99213 OFFICE O/P EST LOW 20 MIN: CPT | Mod: 25 | Performed by: INTERNAL MEDICINE

## 2017-10-11 RX ORDER — TRAMADOL HYDROCHLORIDE 50 MG/1
50 TABLET ORAL EVERY 8 HOURS PRN
Qty: 20 TAB | Refills: 0 | Status: SHIPPED | OUTPATIENT
Start: 2017-10-11 | End: 2017-12-29

## 2017-10-11 RX ORDER — IBUPROFEN 800 MG/1
800 TABLET ORAL EVERY 8 HOURS PRN
Qty: 30 TAB | Refills: 0 | Status: SHIPPED | OUTPATIENT
Start: 2017-10-11 | End: 2017-10-18

## 2017-10-11 NOTE — PROGRESS NOTES
CC: Continued hip pain.    HPI:   Mari presents today with the following.    1. Influenza vaccine needed  Requesting vaccination    2. Left inguinal pain  Presents with an injury to her left groin thought to be muscle strain. It was improving gradually and one away only to be re-flared up 3 weeks ago. She denies specific injury and has been taking ibuprofen with only limited success in terms of controlling pain. No pain with urination no flank pain and no other associated symptoms. Painful with getting in and out of the car.      Patient Active Problem List    Diagnosis Date Noted   • Intractable migraine with aura without status migrainosus 03/03/2016   • Pain in joint, shoulder region 06/15/2015   • Anxiety 06/09/2009       Current Outpatient Prescriptions   Medication Sig Dispense Refill   • ibuprofen (MOTRIN) 800 MG Tab Take 1 Tab by mouth every 8 hours as needed for up to 7 days. 30 Tab 0   • tramadol (ULTRAM) 50 MG Tab Take 1 Tab by mouth every 8 hours as needed. 20 Tab 0   • alprazolam (XANAX) 0.25 MG Tab Take 1 Tab by mouth at bedtime as needed for Sleep. 10 Tab 2   • polymixin-trimethoprim (POLYTRIM) 34632-9.1 UNIT/ML-% Solution Place 1 Drop in both eyes every 4 hours. 10 mL 0   • ibuprofen (MOTRIN) 200 MG Tab Take 800 mg by mouth every 6 hours as needed.     • diclofenac EC (VOLTAREN) 75 MG Tablet Delayed Response Take 1 Tab by mouth 2 times a day. 30 Tab 0   • topiramate (TOPAMAX) 100 MG Tab Take 1 Tab by mouth every bedtime. 90 Tab 1   • duloxetine (CYMBALTA) 30 MG Cap DR Particles Take 1 Cap by mouth every day. 30 Cap 11   • sumatriptan (IMITREX) 100 MG tablet Take 1 Tab by mouth Once PRN for Migraine for up to 1 dose. 9 Tab 6   • Multiple Vitamins-Minerals (MULTIVITAMIN PO) Take  by mouth every day.     • levonorgestrel-ethinyl estradiol (INTROVALE) 0.15-0.03 MG per tablet Take 1 Tab by mouth every day.       No current facility-administered medications for this visit.          Allergies as of  "10/11/2017   • (No Known Allergies)        ROS: As per HPI.    /72   Pulse 72   Temp 37.1 °C (98.7 °F)   Resp 16   Ht 1.753 m (5' 9\")   Wt 68.2 kg (150 lb 4.8 oz)   SpO2 97%   BMI 22.20 kg/m²     Physical Exam:  Gen:         Alert and oriented, No apparent distress.  Neck:        No Lymphadenopathy or Bruits.  Lungs:     Clear to auscultation bilaterally  CV:          Regular rate and rhythm. No murmurs, rubs or gallops.               Ext:          No clubbing, cyanosis, edema.      Assessment and Plan.   47 y.o. female with the following issues.    1. Influenza vaccine needed    - INFLUENZA VACCINE QUAD INJ >3Y(PF)    2. Left inguinal pain  Believed to be muscular in nature but given the recurrence have written for x-rays to ensure not arthritic. Placing on anti-inflammatories recommending ice and refer to physical therapy. If showing arthritis may consider orthopedics.  - ibuprofen (MOTRIN) 800 MG Tab; Take 1 Tab by mouth every 8 hours as needed for up to 7 days.  Dispense: 30 Tab; Refill: 0  - REFERRAL TO PHYSICAL THERAPY Reason for Therapy: Eval/Treat/Report  - DX-HIP-COMPLETE - UNILATERAL 2+ LEFT      "

## 2017-12-12 ENCOUNTER — OFFICE VISIT (OUTPATIENT)
Dept: URGENT CARE | Facility: CLINIC | Age: 47
End: 2017-12-12
Payer: COMMERCIAL

## 2017-12-12 VITALS
RESPIRATION RATE: 20 BRPM | WEIGHT: 148 LBS | DIASTOLIC BLOOD PRESSURE: 80 MMHG | HEIGHT: 69 IN | OXYGEN SATURATION: 96 % | TEMPERATURE: 98 F | BODY MASS INDEX: 21.92 KG/M2 | SYSTOLIC BLOOD PRESSURE: 120 MMHG | HEART RATE: 78 BPM

## 2017-12-12 DIAGNOSIS — R19.7 DIARRHEA, UNSPECIFIED TYPE: ICD-10-CM

## 2017-12-12 DIAGNOSIS — R11.0 NAUSEA: ICD-10-CM

## 2017-12-12 PROCEDURE — 99214 OFFICE O/P EST MOD 30 MIN: CPT | Performed by: PHYSICIAN ASSISTANT

## 2017-12-12 RX ORDER — ONDANSETRON 4 MG/1
4 TABLET, FILM COATED ORAL EVERY 6 HOURS PRN
Qty: 20 TAB | Refills: 0 | Status: SHIPPED | OUTPATIENT
Start: 2017-12-12 | End: 2018-05-01

## 2017-12-12 ASSESSMENT — ENCOUNTER SYMPTOMS
NAUSEA: 1
HEADACHES: 0
PALPITATIONS: 0
SENSORY CHANGE: 0
WHEEZING: 0
SHORTNESS OF BREATH: 0
BLOATING: 0
DIZZINESS: 0
CHILLS: 0
SORE THROAT: 0
ARTHRALGIAS: 0
SPUTUM PRODUCTION: 0
FOCAL WEAKNESS: 0
VOMITING: 1
BACK PAIN: 0
FEVER: 0
SINUS PAIN: 0
FLATUS: 1
COUGH: 0
DIARRHEA: 1
WEIGHT LOSS: 1
SWEATS: 0
TINGLING: 0
MYALGIAS: 0
ABDOMINAL PAIN: 1
BLOOD IN STOOL: 0

## 2017-12-12 NOTE — LETTER
December 12, 2017         Patient: Mari Dillon   YOB: 1970   Date of Visit: 12/12/2017           To Whom it May Concern:    Mari Dillon was seen in my clinic on 12/12/2017. She may return to work on 12/14/17 if feeling better.    If you have any questions or concerns, please don't hesitate to call.        Sincerely,           Estefany Henry P.A.-C.  Electronically Signed

## 2017-12-13 NOTE — PROGRESS NOTES
Subjective:      Mari Dillon is a 47 y.o. female who presents with GI Problem (X 2 wks diarrhea)            Diarrhea    This is a new problem. Episode onset: over 2 weeks  The problem occurs 2 to 4 times per day. The problem has been unchanged. The stool consistency is described as watery. The patient states that diarrhea does not awaken her from sleep. Associated symptoms include abdominal pain (intermittent, generalized abdominal cramping ), increased flatus, vomiting (intermittent vomiting) and weight loss. Pertinent negatives include no arthralgias, bloating, chills, coughing, fever, headaches, myalgias, sweats or URI. Nothing aggravates the symptoms. There are no known risk factors (no recent antibiotic use, suspect food intake, recent travel, recent hospitalization, or ill contacts). She has tried bismuth subsalicylate for the symptoms. The treatment provided mild relief. There is no history of bowel resection, inflammatory bowel disease, irritable bowel syndrome, malabsorption, a recent abdominal surgery or short gut syndrome.   the patient states she recently increased her Cymbalta 3 weeks ago.   The patient is tolerating fluids.     Past Medical History:   Diagnosis Date   • Anxiety 2009     Past Surgical History:   Procedure Laterality Date   • SHOULDER ARTHROSCOPY W/ BICIPITAL TENODESIS REPAIR Right 6/15/2015    Procedure: SHOULDER ARTHROSCOPY W/ BICIPITAL TENODESIS, SYNOVECTOMY;  Surgeon: Tristian Garcia M.D.;  Location: [facility];  Service:    • CLAVICLE DISTAL EXCISION Right 6/15/2015    Procedure: CLAVICLE DISTAL EXCISION;  Surgeon: Tristian Garcia M.D.;  Location: [facility];  Service:    • SHOULDER DECOMPRESSION Right 6/15/2015    Procedure: SHOULDER DECOMPRESSION MINI INCISION ;  Surgeon: Tristian Garcia M.D.;  Location: [facility];  Service:    • OTHER      nose   • APPENDECTOMY     • PB  DELIVERY ONLY         Family History  "  Problem Relation Age of Onset   • Hypertension Mother    • Diabetes Mother    • Cancer Maternal Grandmother      breast       No Known Allergies    Medications, Allergies, and current problem list reviewed today in Epic;    Review of Systems   Constitutional: Positive for malaise/fatigue and weight loss. Negative for chills and fever.   HENT: Negative for congestion, ear discharge, ear pain, sinus pain and sore throat.    Respiratory: Negative for cough, sputum production, shortness of breath and wheezing.    Cardiovascular: Negative for chest pain, palpitations and leg swelling.   Gastrointestinal: Positive for abdominal pain (intermittent, generalized abdominal cramping ), diarrhea, flatus, nausea and vomiting (intermittent vomiting). Negative for bloating, blood in stool and melena.   Genitourinary: Negative for dysuria, frequency, hematuria and urgency.   Musculoskeletal: Negative for arthralgias, back pain and myalgias.   Neurological: Negative for dizziness, tingling, sensory change, focal weakness and headaches.     All other systems reviewed and are negative.        Objective:     /80   Pulse 78   Temp 36.7 °C (98 °F)   Resp 20   Ht 1.753 m (5' 9\")   Wt 67.1 kg (148 lb)   SpO2 96%   BMI 21.86 kg/m²      Physical Exam   Constitutional: She is oriented to person, place, and time. She appears well-developed and well-nourished. No distress.   HENT:   Head: Normocephalic and atraumatic.   Mouth/Throat: Uvula is midline, oropharynx is clear and moist and mucous membranes are normal.   Eyes: Conjunctivae are normal. No scleral icterus.   Neck: Neck supple.   Cardiovascular: Normal rate, regular rhythm and normal heart sounds.  Exam reveals no gallop and no friction rub.    No murmur heard.  Pulmonary/Chest: Effort normal and breath sounds normal. No respiratory distress. She has no wheezes. She has no rales.   Abdominal: Normal appearance and bowel sounds are normal. There is no hepatosplenomegaly. " There is generalized tenderness. There is no rigidity, no rebound, no guarding, no CVA tenderness, no tenderness at McBurney's point and negative Burgess's sign.   Lymphadenopathy:     She has no cervical adenopathy.   Neurological: She is alert and oriented to person, place, and time. No cranial nerve deficit.   Skin: Skin is warm and dry. No rash noted.   Psychiatric: She has a normal mood and affect. Her behavior is normal. Judgment and thought content normal.               Assessment/Plan:     1. Diarrhea, unspecified type  - unclear etiology   - CULTURE STOOL; Future  - COMPLETE O&P; Future  - CDIFF BY PCR; Future    Check stool studies.  Consider GI referral if negative and symptoms persist.  Encouraged BRAT diet.  Possible reaction to recent increase in Cymbalta.    2. Nausea  - ondansetron (ZOFRAN) 4 MG Tab tablet; Take 1 Tab by mouth every 6 hours as needed for Nausea/Vomiting.  Dispense: 20 Tab; Refill: 0    Encouraged fluids- Gatorade or pedialyte.     Differential diagnoses, Supportive care, and indications for immediate follow-up discussed with patient.   Instructed to return to clinic or nearest emergency department for any change in condition, further concerns, or worsening of symptoms.    The patient demonstrated a good understanding and agreed with the treatment plan.    Estefany Henry P.A.-C.

## 2017-12-17 DIAGNOSIS — G43.119 INTRACTABLE MIGRAINE WITH AURA WITHOUT STATUS MIGRAINOSUS: ICD-10-CM

## 2017-12-18 RX ORDER — TOPIRAMATE 100 MG/1
TABLET, FILM COATED ORAL
Qty: 90 TAB | Refills: 1 | Status: SHIPPED | OUTPATIENT
Start: 2017-12-18 | End: 2017-12-29 | Stop reason: SDUPTHER

## 2017-12-29 ENCOUNTER — OFFICE VISIT (OUTPATIENT)
Dept: NEUROLOGY | Facility: MEDICAL CENTER | Age: 47
End: 2017-12-29
Payer: COMMERCIAL

## 2017-12-29 VITALS
BODY MASS INDEX: 21.55 KG/M2 | DIASTOLIC BLOOD PRESSURE: 74 MMHG | OXYGEN SATURATION: 97 % | TEMPERATURE: 97.6 F | HEART RATE: 81 BPM | RESPIRATION RATE: 16 BRPM | HEIGHT: 69 IN | WEIGHT: 145.5 LBS | SYSTOLIC BLOOD PRESSURE: 118 MMHG

## 2017-12-29 DIAGNOSIS — G43.119 INTRACTABLE MIGRAINE WITH AURA WITHOUT STATUS MIGRAINOSUS: ICD-10-CM

## 2017-12-29 PROCEDURE — 99214 OFFICE O/P EST MOD 30 MIN: CPT | Performed by: PSYCHIATRY & NEUROLOGY

## 2017-12-29 RX ORDER — SUMATRIPTAN 100 MG/1
100 TABLET, FILM COATED ORAL
Qty: 9 TAB | Refills: 6 | Status: SHIPPED | OUTPATIENT
Start: 2017-12-29 | End: 2018-06-07 | Stop reason: SDUPTHER

## 2017-12-29 RX ORDER — TOPIRAMATE 100 MG/1
100 TABLET, FILM COATED ORAL DAILY
Qty: 90 TAB | Refills: 3 | Status: SHIPPED | OUTPATIENT
Start: 2017-12-29 | End: 2020-07-02 | Stop reason: SDUPTHER

## 2017-12-29 NOTE — PATIENT INSTRUCTIONS
IMPRESSION:    1. Migraine with visual aura--  responded to Topamax  2. Rebound Headache-- now in remission ( no pain pills)  3. Anxiety, Stress    PLAN/RECOMMENDATIONS:    A. Advise limit on rescue pain medicine-- - the less rescue medicine, the less likely to develop rebound headache  The goal is to limit all rescue medicine to less than 2# per week.   Will give Mari Imitrex 100mg for rescue to replace norco    B. Advise exercise regularly, avoid skipping meals, avoid sleep deprivation, avoid stress...avoid too much coffee/tea/soda ( one cup per day is the upper limit)    C. Continue preventive medicine, it would take two weeks( at least) to evaluate the effects of any preventive medicine-- please call us if any side effects, we could change to another preventive medicine on the phone      Continue Topamax 100mg before sleep   If headache remains bad, please call us    Please take folic acid 1mg daily in case of chance of pregnancy    Will see Mari in 12 months-- explain to the patient, minimum, we have to see her once per year to be capable to prescribe medication for her    SIGNATURE:  Pablo Soria      CC:  Brandan Prieto M.D.

## 2017-12-29 NOTE — PROGRESS NOTES
NEUROLOGY NOTE    Referring Physician  Brandan Prieto      CHIEF COMPLAINT:    Bad headache before topamax, now the headache improved with topamax    scintillating scotoma just started happen for months    Since last visit, headache improved with topamax  Only one headache once per week  Chief Complaint   Patient presents with   • Follow-Up     Migraines       PRESENT ILLNESS:   Bad headache before topamax, now the headache improved with topamax      Since last visit, headache improved with topamax  Only one headache once per week  Prior to topamax    scintillating scotoma just started happen for months    Headache  1. Location-- right temporal to fontal-- at times left temporal  2. Characteristics-- compression, throbbing  3. Associated--light intolerance, noise intolerance  4. Worsening-- stress, weather change  5. Relieving factors--   Rescue medicine 20#(Norco) per month for one year, excedrine, advil, tylenol did not help  6. Family History-N/A  7. Every other day- each headache lasted for one day or 2 days  8. Severity-- had to go home-- one day per week -- could not function  9. Sleep- sleep deprivated  10. Coffee intake-- not coffee addict    She started having headache 3 years ago--- on the process of divorce-- pre-menopausal-      PAST MEDICAL HISTORY:  Past Medical History:   asdfasdfads Date   • Anxiety 2009       PAST SURGICAL HISTORY:  Past Surgical History:   Procedure Laterality Date   • APPENDECTOMY     • CLAVICLE DISTAL EXCISION Right 6/15/2015    Procedure: CLAVICLE DISTAL EXCISION;  Surgeon: Tristian Garcia M.D.;  Location: SURGERY [facility];  Service:    • OTHER      nose   • PB  DELIVERY ONLY     • SHOULDER ARTHROSCOPY W/ BICIPITAL TENODESIS REPAIR Right 6/15/2015    Procedure: SHOULDER ARTHROSCOPY W/ BICIPITAL TENODESIS, SYNOVECTOMY;  Surgeon: Tristian Garcia M.D.;  Location: SURGERY [facility];  Service:    • SHOULDER DECOMPRESSION Right 6/15/2015     Procedure: SHOULDER DECOMPRESSION MINI INCISION ;  Surgeon: Tristian Garcia M.D.;  Location: SURGERY [facility];  Service:        FAMILY HISTORY:  Family History   Problem Relation Age of Onset   • Hypertension Mother    • Diabetes Mother    • Cancer Maternal Grandmother      breast       SOCIAL HISTORY:  Social History     Social History   • Marital status:      Spouse name: N/A   • Number of children: N/A   • Years of education: N/A     Occupational History   • Not on file.     Social History Main Topics   • Smoking status: Former Smoker     Years: 15.00     Quit date: 1/1/2005   • Smokeless tobacco: Never Used      Comment: 3-4 years ago   • Alcohol use No   • Drug use: No   • Sexual activity: Yes     Partners: Male     Other Topics Concern   • Not on file     Social History Narrative    ** Merged History Encounter **          ALLERGIES:  No Known Allergies  TOBHX  History   Smoking Status   • Former Smoker   • Years: 15.00   • Quit date: 1/1/2005   Smokeless Tobacco   • Never Used     Comment: 3-4 years ago     ALCHX  History   Alcohol Use No     DRUGHX  History   Drug Use No           MEDICATIONS:  Current Outpatient Prescriptions   Medication   • topiramate (TOPAMAX) 100 MG Tab   • duloxetine (CYMBALTA) 30 MG Cap DR Particles   • Multiple Vitamins-Minerals (MULTIVITAMIN PO)   • ondansetron (ZOFRAN) 4 MG Tab tablet   • alprazolam (XANAX) 0.25 MG Tab   • tramadol (ULTRAM) 50 MG Tab   • polymixin-trimethoprim (POLYTRIM) 85045-6.1 UNIT/ML-% Solution   • ibuprofen (MOTRIN) 200 MG Tab   • diclofenac EC (VOLTAREN) 75 MG Tablet Delayed Response   • sumatriptan (IMITREX) 100 MG tablet   • levonorgestrel-ethinyl estradiol (INTROVALE) 0.15-0.03 MG per tablet     No current facility-administered medications for this visit.        REVIEW OF SYSTEM:    Constitutional: Denies fevers, Denies weight changes   Eyes: Denies changes in vision, no eye pain   Ears/Nose/Throat/Mouth: Denies nasal congestion or sore  "throat   Cardiovascular: Denies chest pain or palpitations   Respiratory: Denies SOB.   Gastrointestinal/Hepatic: Denies abdominal pain, nausea, vomiting, diarrhea, constipation or GI bleeding   Genitourinary: Denies bladder dysfunction, dysuria or frequency   Musculoskeletal/Rheum: Denies joint pain and swelling   Skin/Breast: Denies rash, denies breast lumps or discharge   Neurological: Headache   Psychiatric: denies mood disorder   Endocrine: denies hx of diabetes or thyroid dysfunction   Heme/Oncology/Lymph Nodes: Denies enlarged lymph nodes, denies brusing or known bleeding disorder   Allergic/Immunologic: Denies hx of allergies         PHYSICAL AND NEUROLOGICAL EXMAINATIONS:  VITAL SIGNS: /74   Pulse 81   Temp 36.4 °C (97.6 °F)   Resp 16   Ht 1.753 m (5' 9\")   Wt 66 kg (145 lb 8 oz)   SpO2 97%   BMI 21.49 kg/m²   CURRENT WEIGHT:   BMI: Body mass index is 21.49 kg/m².  PREVIOUS WEIGHTS:  Wt Readings from Last 25 Encounters:   12/29/17 66 kg (145 lb 8 oz)   12/12/17 67.1 kg (148 lb)   10/11/17 68.2 kg (150 lb 4.8 oz)   08/09/17 67.1 kg (148 lb)   07/11/17 66.8 kg (147 lb 3.2 oz)   07/07/17 67.6 kg (149 lb)   06/27/17 68 kg (150 lb)   05/16/17 68 kg (150 lb)   08/29/16 65.3 kg (144 lb)   07/27/16 65.8 kg (145 lb)   07/21/16 65.8 kg (145 lb)   07/15/16 68.5 kg (151 lb)   06/08/16 68 kg (150 lb)   03/03/16 72.6 kg (160 lb)   02/12/16 73.5 kg (162 lb)   12/24/15 76.2 kg (168 lb)   12/04/15 76.2 kg (168 lb)   11/04/15 74.4 kg (164 lb)   10/17/15 72.6 kg (160 lb)   07/12/15 73.5 kg (162 lb)   06/09/15 72.6 kg (160 lb)   05/12/15 73 kg (161 lb)   09/08/14 70.8 kg (156 lb)   09/03/14 72.1 kg (159 lb)   04/29/14 71.2 kg (157 lb)       General appearance of patient: WDWN(+) NAD(+)    EYES  o Fundus : Papilledem(-) Exudates(-) Hemorrhage(-)  Nervous System  Orientation to time, place and person(+)  Memory normal(+)  Language: aphasia(-)  Knowledge: past(+) Current(+)  Attention(+)  Cranial Nerves  • Nerve 2: " intact  • Nerve 3,4,6: intact  • Nerve 5 : intact  • Nerve 7: intact  • Nerve 8: intact  • Nerve 9 & 10: intact  • Nerve 11: intact  • Nerve 12: intact  Muscle Power and muscle tone: symmetric, normal in upper and lower  Sensory System: Pin sensation intact(+)  Reflexes: symmetric throughout  Cerebellar Function FNP normal   Gait : Steady(+) TandemGait steady(+)  Heart and Vascular  Peripheral Vasucular system : Edema (-) Swelling(-)  RHB, Breathing sound clear  abdomen bowel sound normoactive  Extremities freely moveable  Joints no contracture           IMPRESSION:    1. Migraine with visual aura--  responded to Topamax  2. Rebound Headache-- now in remission ( no pain pills)  3. Anxiety, Stress    PLAN/RECOMMENDATIONS:    A. Advise limit on rescue pain medicine-- - the less rescue medicine, the less likely to develop rebound headache  The goal is to limit all rescue medicine to less than 2# per week.   Will give Mari Imitrex 100mg for rescue to replace norco    B. Advise exercise regularly, avoid skipping meals, avoid sleep deprivation, avoid stress...avoid too much coffee/tea/soda ( one cup per day is the upper limit)    C. Continue preventive medicine, it would take two weeks( at least) to evaluate the effects of any preventive medicine-- please call us if any side effects, we could change to another preventive medicine on the phone      Continue Topamax 100mg before sleep   If headache remains bad, please call us    Please take folic acid 1mg daily in case of chance of pregnancy    Will see Mari in 12 months-- explain to the patient, minimum, we have to see her once per year to be capable to prescribe medication for her    SIGNATURE:  Pablo Soria      CC:  Brandan Prieto M.D.

## 2018-02-13 ENCOUNTER — HOSPITAL ENCOUNTER (OUTPATIENT)
Dept: RADIOLOGY | Facility: MEDICAL CENTER | Age: 48
End: 2018-02-13
Attending: INTERNAL MEDICINE
Payer: COMMERCIAL

## 2018-02-13 DIAGNOSIS — Z12.31 SCREENING MAMMOGRAM, ENCOUNTER FOR: ICD-10-CM

## 2018-02-13 PROCEDURE — 77067 SCR MAMMO BI INCL CAD: CPT

## 2018-03-09 ENCOUNTER — APPOINTMENT (RX ONLY)
Dept: URBAN - METROPOLITAN AREA CLINIC 4 | Facility: CLINIC | Age: 48
Setting detail: DERMATOLOGY
End: 2018-03-09

## 2018-03-09 DIAGNOSIS — L82.1 OTHER SEBORRHEIC KERATOSIS: ICD-10-CM

## 2018-03-09 DIAGNOSIS — L81.4 OTHER MELANIN HYPERPIGMENTATION: ICD-10-CM

## 2018-03-09 DIAGNOSIS — D22 MELANOCYTIC NEVI: ICD-10-CM

## 2018-03-09 DIAGNOSIS — D18.0 HEMANGIOMA: ICD-10-CM

## 2018-03-09 PROBLEM — D22.5 MELANOCYTIC NEVI OF TRUNK: Status: ACTIVE | Noted: 2018-03-09

## 2018-03-09 PROBLEM — D22.72 MELANOCYTIC NEVI OF LEFT LOWER LIMB, INCLUDING HIP: Status: ACTIVE | Noted: 2018-03-09

## 2018-03-09 PROBLEM — D48.5 NEOPLASM OF UNCERTAIN BEHAVIOR OF SKIN: Status: ACTIVE | Noted: 2018-03-09

## 2018-03-09 PROBLEM — D22.71 MELANOCYTIC NEVI OF RIGHT LOWER LIMB, INCLUDING HIP: Status: ACTIVE | Noted: 2018-03-09

## 2018-03-09 PROBLEM — D22.62 MELANOCYTIC NEVI OF LEFT UPPER LIMB, INCLUDING SHOULDER: Status: ACTIVE | Noted: 2018-03-09

## 2018-03-09 PROBLEM — D22.61 MELANOCYTIC NEVI OF RIGHT UPPER LIMB, INCLUDING SHOULDER: Status: ACTIVE | Noted: 2018-03-09

## 2018-03-09 PROBLEM — D18.01 HEMANGIOMA OF SKIN AND SUBCUTANEOUS TISSUE: Status: ACTIVE | Noted: 2018-03-09

## 2018-03-09 PROCEDURE — ? COUNSELING

## 2018-03-09 PROCEDURE — ? LIQUID NITROGEN

## 2018-03-09 PROCEDURE — 99213 OFFICE O/P EST LOW 20 MIN: CPT | Mod: 25

## 2018-03-09 PROCEDURE — 11100: CPT

## 2018-03-09 PROCEDURE — ? BIOPSY BY SHAVE METHOD

## 2018-03-09 ASSESSMENT — LOCATION SIMPLE DESCRIPTION DERM
LOCATION SIMPLE: RIGHT UPPER BACK
LOCATION SIMPLE: UPPER BACK
LOCATION SIMPLE: RIGHT THIGH
LOCATION SIMPLE: LEFT PRETIBIAL REGION
LOCATION SIMPLE: RIGHT EYEBROW
LOCATION SIMPLE: RIGHT POPLITEAL SKIN
LOCATION SIMPLE: RIGHT BUTTOCK
LOCATION SIMPLE: LEFT POSTERIOR UPPER ARM
LOCATION SIMPLE: LEFT POPLITEAL SKIN
LOCATION SIMPLE: POSTERIOR NECK
LOCATION SIMPLE: RIGHT POSTERIOR UPPER ARM
LOCATION SIMPLE: LEFT ELBOW
LOCATION SIMPLE: LEFT FOREARM
LOCATION SIMPLE: RIGHT PRETIBIAL REGION
LOCATION SIMPLE: CHEST
LOCATION SIMPLE: RIGHT FOREARM
LOCATION SIMPLE: LEFT POSTERIOR THIGH
LOCATION SIMPLE: RIGHT POSTERIOR THIGH
LOCATION SIMPLE: ABDOMEN
LOCATION SIMPLE: RIGHT THIGH

## 2018-03-09 ASSESSMENT — LOCATION DETAILED DESCRIPTION DERM
LOCATION DETAILED: RIGHT BUTTOCK
LOCATION DETAILED: LEFT POPLITEAL SKIN
LOCATION DETAILED: INFERIOR THORACIC SPINE
LOCATION DETAILED: RIGHT CENTRAL EYEBROW
LOCATION DETAILED: LEFT MEDIAL SUPERIOR CHEST
LOCATION DETAILED: RIGHT DISTAL POSTERIOR THIGH
LOCATION DETAILED: RIGHT ANTERIOR PROXIMAL THIGH
LOCATION DETAILED: RIGHT POPLITEAL SKIN
LOCATION DETAILED: RIGHT PROXIMAL DORSAL FOREARM
LOCATION DETAILED: LEFT DISTAL DORSAL FOREARM
LOCATION DETAILED: LEFT DISTAL POSTERIOR UPPER ARM
LOCATION DETAILED: RIGHT PROXIMAL POSTERIOR UPPER ARM
LOCATION DETAILED: LEFT DISTAL POSTERIOR THIGH
LOCATION DETAILED: RIGHT DISTAL POSTERIOR UPPER ARM
LOCATION DETAILED: RIGHT PROXIMAL PRETIBIAL REGION
LOCATION DETAILED: RIGHT VENTRAL PROXIMAL FOREARM
LOCATION DETAILED: RIGHT MEDIAL SUPERIOR CHEST
LOCATION DETAILED: RIGHT MEDIAL TRAPEZIAL NECK
LOCATION DETAILED: LEFT VENTRAL LATERAL PROXIMAL FOREARM
LOCATION DETAILED: MIDDLE STERNUM
LOCATION DETAILED: LOWER STERNUM
LOCATION DETAILED: SUBXIPHOID
LOCATION DETAILED: RIGHT ANTERIOR DISTAL THIGH
LOCATION DETAILED: LEFT LATERAL ELBOW
LOCATION DETAILED: LEFT PROXIMAL PRETIBIAL REGION
LOCATION DETAILED: RIGHT SUPERIOR UPPER BACK
LOCATION DETAILED: RIGHT LATERAL SUPERIOR CHEST

## 2018-03-09 ASSESSMENT — LOCATION ZONE DERM
LOCATION ZONE: TRUNK
LOCATION ZONE: NECK
LOCATION ZONE: LEG
LOCATION ZONE: ARM
LOCATION ZONE: LEG
LOCATION ZONE: FACE

## 2018-03-09 NOTE — PROCEDURE: LIQUID NITROGEN
Duration Of Freeze Thaw-Cycle (Seconds): 0
Medical Necessity Clause: This procedure was medically necessary because the lesions that were treated were:
Bill Insurance (You Assume Risk Of Denial Or Audit By Selecting Yes): No
Detail Level: Detailed
Consent: The patient's consent was obtained including but not limited to risks of crusting, scabbing, blistering, scarring, darker or lighter pigmentary change, recurrence, incomplete removal and infection.
Post-Care Instructions: I reviewed with the patient in detail post-care instructions. Patient is to wear sunprotection, and avoid picking at any of the treated lesions. Pt may apply Vaseline to crusted or scabbing areas.
Medical Necessity Information: It is in your best interest to select a reason for this procedure from the list below. All of these items fulfill various CMS LCD requirements except the new and changing color options.

## 2018-04-13 ENCOUNTER — OFFICE VISIT (OUTPATIENT)
Dept: URGENT CARE | Facility: CLINIC | Age: 48
End: 2018-04-13
Payer: COMMERCIAL

## 2018-04-13 VITALS
BODY MASS INDEX: 22.51 KG/M2 | HEART RATE: 94 BPM | RESPIRATION RATE: 14 BRPM | WEIGHT: 152 LBS | DIASTOLIC BLOOD PRESSURE: 70 MMHG | OXYGEN SATURATION: 99 % | HEIGHT: 69 IN | SYSTOLIC BLOOD PRESSURE: 110 MMHG | TEMPERATURE: 98.7 F

## 2018-04-13 DIAGNOSIS — R10.32 SEVERE LEFT GROIN PAIN: ICD-10-CM

## 2018-04-13 DIAGNOSIS — S76.212A STRAIN OF GROIN, LEFT, INITIAL ENCOUNTER: ICD-10-CM

## 2018-04-13 PROCEDURE — 99214 OFFICE O/P EST MOD 30 MIN: CPT | Performed by: NURSE PRACTITIONER

## 2018-04-13 RX ORDER — HYDROCODONE BITARTRATE AND ACETAMINOPHEN 5; 325 MG/1; MG/1
1-2 TABLET ORAL EVERY 4 HOURS PRN
Qty: 12 TAB | Refills: 0 | Status: SHIPPED | OUTPATIENT
Start: 2018-04-13 | End: 2018-04-16

## 2018-04-13 RX ORDER — KETOROLAC TROMETHAMINE 30 MG/ML
60 INJECTION, SOLUTION INTRAMUSCULAR; INTRAVENOUS ONCE
Status: COMPLETED | OUTPATIENT
Start: 2018-04-13 | End: 2018-04-13

## 2018-04-13 RX ADMIN — KETOROLAC TROMETHAMINE 60 MG: 30 INJECTION, SOLUTION INTRAMUSCULAR; INTRAVENOUS at 11:06

## 2018-04-13 ASSESSMENT — ENCOUNTER SYMPTOMS
SHORTNESS OF BREATH: 0
ABDOMINAL PAIN: 0
MYALGIAS: 0
NUMBNESS: 0
CHILLS: 0
JOINT SWELLING: 0
SORE THROAT: 0
WEAKNESS: 0
VOMITING: 0
EYE PAIN: 0
DIZZINESS: 0
FEVER: 0
NAUSEA: 0

## 2018-04-13 ASSESSMENT — PAIN SCALES - GENERAL: PAINLEVEL: 9=SEVERE PAIN

## 2018-04-13 NOTE — PROGRESS NOTES
Subjective:     Mari Dillon is a 47 y.o. female who presents for Groin Injury  Patient presents clinic today with complaints of severe groin pain after she injured herself in the gym this morning. Patient previously 6 months ago for her left groin and has been rehabbing it since. Patient states that she had recovered up to this morning when she was running on the treadmill and felt her groin pull. Patient complaining of severe pain at 10 out of 10 unrelieved with NSAIDs and ice. Patient in the past has had physical therapy for her left groin strain.     Groin Injury   This is a new problem. The current episode started today (re-injured this morning ). The problem occurs constantly. The problem has been unchanged. Pertinent negatives include no abdominal pain, chest pain, chills, fever, joint swelling, myalgias, nausea, numbness, rash, sore throat, urinary symptoms, vomiting or weakness. Associated symptoms comments: Severe left groin pain . The symptoms are aggravated by walking, twisting, bending and standing. She has tried NSAIDs and ice for the symptoms. The treatment provided no relief.     Past Medical History:   Diagnosis Date   • Anxiety 2009     Past Surgical History:   Procedure Laterality Date   • SHOULDER ARTHROSCOPY W/ BICIPITAL TENODESIS REPAIR Right 6/15/2015    Procedure: SHOULDER ARTHROSCOPY W/ BICIPITAL TENODESIS, SYNOVECTOMY;  Surgeon: Tristian Garcia M.D.;  Location: [facility];  Service:    • CLAVICLE DISTAL EXCISION Right 6/15/2015    Procedure: CLAVICLE DISTAL EXCISION;  Surgeon: Tristian Garcia M.D.;  Location: [facility];  Service:    • SHOULDER DECOMPRESSION Right 6/15/2015    Procedure: SHOULDER DECOMPRESSION MINI INCISION ;  Surgeon: Tristian Garcia M.D.;  Location: [facility];  Service:    • OTHER      nose   • APPENDECTOMY     • PB  DELIVERY ONLY       Social History     Social History   • Marital status:   "    Spouse name: N/A   • Number of children: N/A   • Years of education: N/A     Occupational History   • Not on file.     Social History Main Topics   • Smoking status: Former Smoker     Years: 15.00     Quit date: 1/1/2005   • Smokeless tobacco: Never Used      Comment: 3-4 years ago   • Alcohol use No   • Drug use: No   • Sexual activity: Yes     Partners: Male     Other Topics Concern   • Not on file     Social History Narrative    ** Merged History Encounter **           Family History   Problem Relation Age of Onset   • Hypertension Mother    • Diabetes Mother    • Cancer Maternal Grandmother      breast    Review of Systems   Constitutional: Negative for chills and fever.   HENT: Negative for sore throat.    Eyes: Negative for pain.   Respiratory: Negative for shortness of breath.    Cardiovascular: Negative for chest pain.   Gastrointestinal: Negative for abdominal pain, nausea and vomiting.   Genitourinary: Negative for hematuria.   Musculoskeletal: Negative for joint swelling and myalgias.        Left groin pain     Skin: Negative for rash.   Neurological: Negative for dizziness, weakness and numbness.   No Known Allergies   Objective:   /70   Pulse 94   Temp 37.1 °C (98.7 °F)   Resp 14   Ht 1.753 m (5' 9\")   Wt 68.9 kg (152 lb)   LMP 03/26/2018   SpO2 99%   BMI 22.45 kg/m²   Physical Exam   Constitutional: She is oriented to person, place, and time. She appears well-developed and well-nourished. No distress.   HENT:   Head: Normocephalic and atraumatic.   Right Ear: Tympanic membrane normal.   Left Ear: Tympanic membrane normal.   Nose: Nose normal. Right sinus exhibits no maxillary sinus tenderness and no frontal sinus tenderness. Left sinus exhibits no maxillary sinus tenderness and no frontal sinus tenderness.   Mouth/Throat: Uvula is midline, oropharynx is clear and moist and mucous membranes are normal. No posterior oropharyngeal edema, posterior oropharyngeal erythema or tonsillar " abscesses. No tonsillar exudate.   Eyes: Conjunctivae and EOM are normal. Pupils are equal, round, and reactive to light. Right eye exhibits no discharge. Left eye exhibits no discharge.   Cardiovascular: Normal rate and regular rhythm.    No murmur heard.  Pulmonary/Chest: Effort normal and breath sounds normal. No respiratory distress.   Abdominal: Soft. She exhibits no distension. There is no tenderness.   Musculoskeletal:        Left hip: She exhibits tenderness.        Legs:  DROM with flexion abduction and adduction of hip due to pain. No swelling, no ecchymosis present. Gait analgic.   Neurological: She is alert and oriented to person, place, and time. She has normal reflexes. No sensory deficit.   Skin: Skin is warm, dry and intact.   Psychiatric: She has a normal mood and affect.         Assessment/Plan:   Assessment    1. Strain of groin, left, initial encounter  2. Severe left groin pain  - ketorolac (TORADOL) injection 60 mg; 2 mL by Intramuscular route Once.  - HYDROcodone-acetaminophen (NORCO) 5-325 MG Tab per tablet; Take 1-2 Tabs by mouth every four hours as needed for up to 3 days.  Dispense: 12 Tab; Refill: 0  - Consent for Opiate Prescription  - REFERRAL TO SPORTS MEDICINE    Patient significant amount of pain in exam room. Will provide small amount of pain medication per the patient's request.[employer] query was performed to review the . The patient appears appropriate to receive medication as prescribed.    Patient showing no concerns of abuse. Alternatives have been ineffective. Did discuss alternative treatments of ibuprofen, ice, stretching.Will place referral for follow-up for sports management further evaluation if symptoms not improved. Patient may benefit from physical therapy again for left groin strain.    Patient given precautionary s/sx that mandate immediate follow up and evaluation in the ED. Advised of risks of not doing so.    DDX, Supportive care, and indications for immediate  follow-up discussed with patient.    Instructed to return to clinic or nearest emergency department if we are not available for any change in condition, further concerns, or worsening of symptoms.    The patient demonstrated a good understanding and agreed with the treatment plan.

## 2018-04-27 ENCOUNTER — APPOINTMENT (OUTPATIENT)
Dept: MEDICAL GROUP | Facility: MEDICAL CENTER | Age: 48
End: 2018-04-27
Payer: COMMERCIAL

## 2018-05-01 ENCOUNTER — OFFICE VISIT (OUTPATIENT)
Dept: MEDICAL GROUP | Facility: MEDICAL CENTER | Age: 48
End: 2018-05-01
Payer: COMMERCIAL

## 2018-05-01 VITALS
WEIGHT: 151 LBS | DIASTOLIC BLOOD PRESSURE: 72 MMHG | OXYGEN SATURATION: 96 % | TEMPERATURE: 97 F | BODY MASS INDEX: 22.36 KG/M2 | SYSTOLIC BLOOD PRESSURE: 116 MMHG | HEIGHT: 69 IN | RESPIRATION RATE: 16 BRPM | HEART RATE: 83 BPM

## 2018-05-01 DIAGNOSIS — M25.562 ACUTE PAIN OF LEFT KNEE: ICD-10-CM

## 2018-05-01 DIAGNOSIS — F32.9 REACTIVE DEPRESSION: ICD-10-CM

## 2018-05-01 DIAGNOSIS — R45.851 SUICIDAL IDEATION: ICD-10-CM

## 2018-05-01 DIAGNOSIS — F41.9 ANXIETY: Chronic | ICD-10-CM

## 2018-05-01 PROCEDURE — 99215 OFFICE O/P EST HI 40 MIN: CPT | Performed by: INTERNAL MEDICINE

## 2018-05-01 RX ORDER — ALPRAZOLAM 0.25 MG/1
0.25 TABLET ORAL NIGHTLY PRN
Qty: 10 TAB | Refills: 0 | Status: SHIPPED | OUTPATIENT
Start: 2018-05-01 | End: 2018-05-09

## 2018-05-01 RX ORDER — DULOXETIN HYDROCHLORIDE 60 MG/1
60 CAPSULE, DELAYED RELEASE ORAL
Refills: 11 | COMMUNITY
Start: 2018-03-31 | End: 2020-07-02 | Stop reason: SDUPTHER

## 2018-05-01 NOTE — PROGRESS NOTES
"CC: Severe stress and anxiety.    HPI:   Mari presents today with the following.    1. Reactive depression/. Anxiety/Suicidal ideation  Presents reporting extreme stress.  She reports her marriage currently is going well.  The only concern is that her  is traveling frequently but does not have any specific concerns about infidelity.  She also reports work is quite stressful.  She is having depressive symptoms maintain on Cymbalta which she has tolerated well.  She reports she has been feeling this way for the last 2 weeks.  She is seeing a psychologist but felt unhelped by the last visit and was directed here for medications.  During the interview she does state that she may as well will jump off a building.  When pressed further she does not have active suicidal ideation or terms of a plan.        Patient Active Problem List    Diagnosis Date Noted   • Intractable migraine with aura without status migrainosus 03/03/2016   • Pain in joint, shoulder region 06/15/2015   • Anxiety 06/09/2009       Current Outpatient Prescriptions   Medication Sig Dispense Refill   • DULoxetine (CYMBALTA) 60 MG Cap DR Particles delayed-release capsule Take 60 mg by mouth every day. TAKE 1 CAP BY MOUTH EVERY DAY.  11   • ALPRAZolam (XANAX) 0.25 MG Tab Take 1 Tab by mouth at bedtime as needed for Anxiety for up to 10 days. 10 Tab 0   • diclofenac (SOLARAZE) 3 % gel Apply 1 g to affected area(s) 2 times a day. 100 g 6   • topiramate (TOPAMAX) 100 MG Tab Take 1 Tab by mouth every day. 90 Tab 3   • sumatriptan (IMITREX) 100 MG tablet Take 1 Tab by mouth Once PRN for Migraine for up to 1 dose. (Patient not taking: Reported on 5/1/2018) 9 Tab 6     No current facility-administered medications for this visit.          Allergies as of 05/01/2018   • (No Known Allergies)        ROS: Denies Chest pain, SOB, LE edema.    /72   Pulse 83   Temp 36.1 °C (97 °F)   Resp 16   Ht 1.753 m (5' 9\")   Wt 68.5 kg (151 lb)   SpO2 96%   BMI " 22.30 kg/m²     Physical Exam:  Gen:         Alert and oriented, No apparent distress.  Neck:        No Lymphadenopathy or Bruits.  Lungs:     Clear to auscultation bilaterally  CV:          Regular rate and rhythm. No murmurs, rubs or gallops.               Ext:          No clubbing, cyanosis, edema.      Assessment and Plan.   47 y.o. female with the following issues.    1. Reactive depression/. Anxiety/Suicidal ideation  No active suicidal ideation while in the office we did call her mother to inform her to keep an eye out for any particular behavior or suicidal ideation becomes more profound.  Have placed emergent referral in to psychiatry given 10 Xanax to have on hand at very low doses caution about side effects.  Will see back in 1 week to check on her progress.  Gave both her and her mother's suicide precautions with directions to go to the emergency room immediately if she is deemed an active threat to herself.  - REFERRAL TO PSYCHIATRY  - DULoxetine (CYMBALTA) 60 MG Cap DR Particles delayed-release capsule; Take 60 mg by mouth every day. TAKE 1 CAP BY MOUTH EVERY DAY.; Refill: 11  - ALPRAZolam (XANAX) 0.25 MG Tab; Take 1 Tab by mouth at bedtime as needed for Anxiety for up to 10 days.  Dispense: 10 Tab; Refill: 0    Over 40 minutes was spent with the patient of which over 50% of the time was spent with face-to-face patient education and care coordination.

## 2018-05-01 NOTE — LETTER
May 1, 2018        Mari Dillon      Please excuse work 5/1-5/4/18 may return as able.                       Brandan Prieto MD

## 2018-05-04 ENCOUNTER — TELEPHONE (OUTPATIENT)
Dept: MEDICAL GROUP | Facility: MEDICAL CENTER | Age: 48
End: 2018-05-04

## 2018-05-04 RX ORDER — HYDROXYZINE HYDROCHLORIDE 25 MG/1
25 TABLET, FILM COATED ORAL 3 TIMES DAILY PRN
Qty: 30 TAB | Refills: 0 | Status: SHIPPED | OUTPATIENT
Start: 2018-05-04 | End: 2018-06-11 | Stop reason: SDUPTHER

## 2018-05-04 NOTE — TELEPHONE ENCOUNTER
Spoke with patient and let her know medication was sent to the pharmacy per Dr. Prieto. She wanted to let you know that [facility] is not able to get her in until August. She is still insisting that you call her as well.

## 2018-05-04 NOTE — TELEPHONE ENCOUNTER
1. Caller Name: Mari                                         Call Back Number: [phone]      Patient approves a detailed voicemail message: N\A    Patient called and would like to talk to you over the phone. She did not want to tell me what she was calling about and is insisting on speaking with ASAP. She said you would know what it was about.

## 2018-05-09 ENCOUNTER — OFFICE VISIT (OUTPATIENT)
Dept: MEDICAL GROUP | Facility: MEDICAL CENTER | Age: 48
End: 2018-05-09
Payer: COMMERCIAL

## 2018-05-09 VITALS
HEIGHT: 69 IN | DIASTOLIC BLOOD PRESSURE: 76 MMHG | WEIGHT: 147 LBS | OXYGEN SATURATION: 98 % | HEART RATE: 105 BPM | BODY MASS INDEX: 21.77 KG/M2 | TEMPERATURE: 97.6 F | RESPIRATION RATE: 16 BRPM | SYSTOLIC BLOOD PRESSURE: 122 MMHG

## 2018-05-09 DIAGNOSIS — F41.9 ANXIETY: Chronic | ICD-10-CM

## 2018-05-09 PROCEDURE — 99213 OFFICE O/P EST LOW 20 MIN: CPT | Performed by: INTERNAL MEDICINE

## 2018-05-09 NOTE — PROGRESS NOTES
"CC: Follow-up anxiety    HPI:   Mari presents today with the following.    1. Anxiety  Patient was seen last week complaining of severe anxiety and possible suicidal ideation.  She was given a prescription of hydroxyzine.  She does have follow-up scheduled with psychiatry.  Denies further suicidal ideation her  is now back in town and she feels much more calm.      Patient Active Problem List    Diagnosis Date Noted   • Intractable migraine with aura without status migrainosus 03/03/2016   • Pain in joint, shoulder region 06/15/2015   • Anxiety 06/09/2009       Current Outpatient Prescriptions   Medication Sig Dispense Refill   • hydrOXYzine HCl (ATARAX) 25 MG Tab Take 1 Tab by mouth 3 times a day as needed for Anxiety. 30 Tab 0   • DULoxetine (CYMBALTA) 60 MG Cap DR Particles delayed-release capsule Take 60 mg by mouth every day. TAKE 1 CAP BY MOUTH EVERY DAY.  11   • diclofenac (SOLARAZE) 3 % gel Apply 1 g to affected area(s) 2 times a day. 100 g 6   • topiramate (TOPAMAX) 100 MG Tab Take 1 Tab by mouth every day. 90 Tab 3   • sumatriptan (IMITREX) 100 MG tablet Take 1 Tab by mouth Once PRN for Migraine for up to 1 dose. (Patient not taking: Reported on 5/1/2018) 9 Tab 6     No current facility-administered medications for this visit.          Allergies as of 05/09/2018   • (No Known Allergies)        ROS: Denies Chest pain, SOB, LE edema.    /76   Pulse (!) 105   Temp 36.4 °C (97.6 °F)   Resp 16   Ht 1.753 m (5' 9\")   Wt 66.7 kg (147 lb)   SpO2 98%   BMI 21.71 kg/m²     Physical Exam:  Gen:         Alert and oriented, No apparent distress.  Neck:        No Lymphadenopathy or Bruits.  Lungs:     Clear to auscultation bilaterally  CV:          Regular rate and rhythm. No murmurs, rubs or gallops.               Ext:          No clubbing, cyanosis, edema.      Assessment and Plan.   47 y.o. female with the following issues.    1. Anxiety  She will follow-up with psychiatry continue current " regimen.

## 2018-06-07 ENCOUNTER — OFFICE VISIT (OUTPATIENT)
Dept: NEUROLOGY | Facility: MEDICAL CENTER | Age: 48
End: 2018-06-07
Payer: COMMERCIAL

## 2018-06-07 VITALS
BODY MASS INDEX: 23.06 KG/M2 | HEART RATE: 109 BPM | HEIGHT: 69 IN | TEMPERATURE: 97.8 F | OXYGEN SATURATION: 98 % | WEIGHT: 155.7 LBS | DIASTOLIC BLOOD PRESSURE: 70 MMHG | RESPIRATION RATE: 16 BRPM | SYSTOLIC BLOOD PRESSURE: 118 MMHG

## 2018-06-07 DIAGNOSIS — G43.119 INTRACTABLE MIGRAINE WITH AURA WITHOUT STATUS MIGRAINOSUS: ICD-10-CM

## 2018-06-07 PROCEDURE — 99214 OFFICE O/P EST MOD 30 MIN: CPT | Performed by: PSYCHIATRY & NEUROLOGY

## 2018-06-07 RX ORDER — SUMATRIPTAN 100 MG/1
100 TABLET, FILM COATED ORAL
Qty: 9 TAB | Refills: 6 | Status: SHIPPED | OUTPATIENT
Start: 2018-06-07 | End: 2018-12-18

## 2018-06-07 RX ORDER — BUTALBITAL, ASPIRIN, AND CAFFEINE 50; 325; 40 MG/1; MG/1; MG/1
1 CAPSULE ORAL EVERY 6 HOURS PRN
Qty: 10 CAP | Refills: 6 | Status: SHIPPED | OUTPATIENT
Start: 2018-06-07 | End: 2018-07-07

## 2018-06-07 NOTE — PROGRESS NOTES
NEUROLOGY NOTE    Referring Physician  Brandan Prieto      CHIEF COMPLAINT:    The headache improved with topamax  Headache  scintillating scotoma just started happen for months    Since last visit, headache improved with topamax  Only one headache once per week  Chief Complaint   Patient presents with   • Follow-Up     Headaches       PRESENT ILLNESS:   Bad headache before topamax, now the headache improved with topamax      Since last visit, headache improved with topamax  Only one headache once per week  Prior to topamax    scintillating scotoma just started happen for months    Headache  1. Location-- right temporal to fontal-- at times left temporal  2. Characteristics-- compression, throbbing  3. Associated--light intolerance, noise intolerance  4. Worsening-- stress, weather change  5. Relieving factors--   Rescue medicine 20#(Norco) per month for one year, excedrine, advil, tylenol did not help  6. Family History-N/A  7. Every other day- each headache lasted for one day or 2 days  8. Severity-- had to go home-- one day per week -- could not function  9. Sleep- sleep deprivated  10. Coffee intake-- not coffee addict    She started having headache 3 years ago--- on the process of divorce-- pre-menopausal-      PAST MEDICAL HISTORY:  Past Medical History:   Diagnosis Date   • Anxiety 6/9/2009       PAST SURGICAL HISTORY:  Past Surgical History:   Procedure Laterality Date   • SHOULDER ARTHROSCOPY W/ BICIPITAL TENODESIS REPAIR Right 6/15/2015    Procedure: SHOULDER ARTHROSCOPY W/ BICIPITAL TENODESIS, SYNOVECTOMY;  Surgeon: Tristian Garcia M.D.;  Location: [facility];  Service:    • CLAVICLE DISTAL EXCISION Right 6/15/2015    Procedure: CLAVICLE DISTAL EXCISION;  Surgeon: Tristian Garcia M.D.;  Location: [facility];  Service:    • SHOULDER DECOMPRESSION Right 6/15/2015    Procedure: SHOULDER DECOMPRESSION MINI INCISION ;  Surgeon: Tristian Garcia M.D.;  Location: [facility];  Service:    • OTHER      nose   • APPENDECTOMY     • PB  DELIVERY ONLY         FAMILY HISTORY:  Family History   Problem Relation Age of Onset   • Hypertension Mother    • Diabetes Mother    • Cancer Maternal Grandmother      breast       SOCIAL HISTORY:  Social History     Social History   • Marital status:      Spouse name: N/A   • Number of children: N/A   • Years of education: N/A     Occupational History   • Not on file.     Social History Main Topics   • Smoking status: Former Smoker     Years: 15.00     Quit date: 2005   • Smokeless tobacco: Never Used      Comment: 3-4 years ago   • Alcohol use No   • Drug use: No   • Sexual activity: Yes     Partners: Male     Other Topics Concern   • Not on file     Social History Narrative    ** Merged History Encounter **          ALLERGIES:  No Known Allergies  TOBHX  History   Smoking Status   • Former Smoker   • Years: 15.00   • Quit date: 2005   Smokeless Tobacco   • Never Used     Comment: 3-4 years ago     ALCHX  History   Alcohol Use No     DRUGHX  History   Drug Use No           MEDICATIONS:  Current Outpatient Prescriptions   Medication   • DULoxetine (CYMBALTA) 60 MG Cap DR Particles delayed-release capsule   • topiramate (TOPAMAX) 100 MG Tab   • hydrOXYzine HCl (ATARAX) 25 MG Tab   • diclofenac (SOLARAZE) 3 % gel   • sumatriptan (IMITREX) 100 MG tablet     No current facility-administered medications for this visit.        REVIEW OF SYSTEM:    Constitutional: Denies fevers, Denies weight changes   Eyes: Denies changes in vision, no eye pain   Ears/Nose/Throat/Mouth: Denies nasal congestion or sore throat   Cardiovascular: Denies chest pain or palpitations   Respiratory: Denies SOB.   Gastrointestinal/Hepatic: Denies abdominal pain, nausea, vomiting, diarrhea, constipation or GI bleeding   Genitourinary: Denies bladder dysfunction, dysuria or frequency   Musculoskeletal/Rheum: Denies joint pain and swelling  "  Skin/Breast: Denies rash, denies breast lumps or discharge   Neurological: Headache   Psychiatric: denies mood disorder   Endocrine: denies hx of diabetes or thyroid dysfunction   Heme/Oncology/Lymph Nodes: Denies enlarged lymph nodes, denies brusing or known bleeding disorder   Allergic/Immunologic: Denies hx of allergies         PHYSICAL AND NEUROLOGICAL EXMAINATIONS:  VITAL SIGNS: /70   Pulse (!) 109   Temp 36.6 °C (97.8 °F)   Resp 16   Ht 1.753 m (5' 9\")   Wt 70.6 kg (155 lb 11.2 oz)   SpO2 98%   BMI 22.99 kg/m²   CURRENT WEIGHT:   BMI: Body mass index is 22.99 kg/m².  PREVIOUS WEIGHTS:  Wt Readings from Last 25 Encounters:   06/07/18 70.6 kg (155 lb 11.2 oz)   05/09/18 66.7 kg (147 lb)   05/01/18 68.5 kg (151 lb)   04/13/18 68.9 kg (152 lb)   12/29/17 66 kg (145 lb 8 oz)   12/12/17 67.1 kg (148 lb)   10/11/17 68.2 kg (150 lb 4.8 oz)   08/09/17 67.1 kg (148 lb)   07/11/17 66.8 kg (147 lb 3.2 oz)   07/07/17 67.6 kg (149 lb)   06/27/17 68 kg (150 lb)   05/16/17 68 kg (150 lb)   08/29/16 65.3 kg (144 lb)   07/27/16 65.8 kg (145 lb)   07/21/16 65.8 kg (145 lb)   07/15/16 68.5 kg (151 lb)   06/08/16 68 kg (150 lb)   03/03/16 72.6 kg (160 lb)   02/12/16 73.5 kg (162 lb)   12/24/15 76.2 kg (168 lb)   12/04/15 76.2 kg (168 lb)   11/04/15 74.4 kg (164 lb)   10/17/15 72.6 kg (160 lb)   07/12/15 73.5 kg (162 lb)   06/09/15 72.6 kg (160 lb)       General appearance of patient: WDWN(+) NAD(+)    EYES  o Fundus : Papilledem(-) Exudates(-) Hemorrhage(-)  Nervous System  Orientation to time, place and person(+)  Memory normal(+)  Language: aphasia(-)  Knowledge: past(+) Current(+)  Attention(+)  Cranial Nerves  • Nerve 2: intact  • Nerve 3,4,6: intact  • Nerve 5 : intact  • Nerve 7: intact  • Nerve 8: intact  • Nerve 9 & 10: intact  • Nerve 11: intact  • Nerve 12: intact  Muscle Power and muscle tone: symmetric, normal in upper and lower  Sensory System: Pin sensation intact(+)  Reflexes: symmetric " throughout  Cerebellar Function FNP normal   Gait : Steady(+) TandemGait steady(+)  Heart and Vascular  Peripheral Vasucular system : Edema (-) Swelling(-)  RHB, Breathing sound clear  abdomen bowel sound normoactive  Extremities freely moveable  Joints no contracture           IMPRESSION:    1. Migraine with visual aura--  responded to Topamax  2. Rebound Headache-- now in remission ( no more excessive pain pills)  3. Anxiety, Stress    PLAN/RECOMMENDATIONS:    A. Advise limit on rescue pain medicine-- - the less rescue medicine, the less likely to develop rebound headache  The goal is to limit all rescue medicine to less than 2# per week.   Imitrex 100mg with Maximum 9# per month  Fiorinal maximum 10# per month is also fine      B. Advise exercise regularly, avoid skipping meals, avoid sleep deprivation, avoid stress...avoid too much coffee/tea/soda ( one cup per day is the upper limit)    C. Continue preventive medicine, it would take two weeks( at least) to evaluate the effects of any preventive medicine-- please call us if any side effects, we could change to another preventive medicine on the phone      It is fine for the patient to try Topamax 200mg for her mood by her psychiatrist ( We used to give Mari Topamax 100mg Daily for headache prevention)      Please take folic acid 1mg daily in case of chance of pregnancy  Will see Mari in 6 months-- explain to the patient, minimum, we have to see her once per year to be capable to prescribe medication for her                SIGNATURE:  Pablo Soria      CC:  Brandan Prieto M.D.

## 2018-06-07 NOTE — PATIENT INSTRUCTIONS
IMPRESSION:    1. Migraine with visual aura--  responded to Topamax  2. Rebound Headache-- now in remission ( no more excessive pain pills)  3. Anxiety, Stress    PLAN/RECOMMENDATIONS:    A. Advise limit on rescue pain medicine-- - the less rescue medicine, the less likely to develop rebound headache  The goal is to limit all rescue medicine to less than 2# per week.   Imitrex 100mg with Maximum 9# per month  Fiorinal maximum 10# per month is also fine      B. Advise exercise regularly, avoid skipping meals, avoid sleep deprivation, avoid stress...avoid too much coffee/tea/soda ( one cup per day is the upper limit)    C. Continue preventive medicine, it would take two weeks( at least) to evaluate the effects of any preventive medicine-- please call us if any side effects, we could change to another preventive medicine on the phone      It is fine for the patient to try Topamax 200mg for her mood by her psychiatrist ( We used to give Mari Topamax 100mg Daily for headache prevention)      Please take folic acid 1mg daily in case of chance of pregnancy  Will see Mari in 6 months-- explain to the patient, minimum, we have to see her once per year to be capable to prescribe medication for her                SIGNATURE:  Pablo Soria      CC:  Brandan Prieto M.D.

## 2018-06-11 ENCOUNTER — TELEPHONE (OUTPATIENT)
Dept: NEUROLOGY | Facility: MEDICAL CENTER | Age: 48
End: 2018-06-11

## 2018-06-11 NOTE — TELEPHONE ENCOUNTER
[facility]   Phone Number: [phone]             Hi Dr. Soria,     that prescription that you gave me for my migraines does not work.  going on my headache for 10 days now not sure what to do.  need something stronger.  I cant  waste money on trying medicine to see if it works or not I don't have it.  I told you Norco works just limit the prescription like you did the other.  Its the only thing that works please advise.        Please advise    Sent via [employer]

## 2018-06-12 DIAGNOSIS — G43.019 INTRACTABLE MIGRAINE WITHOUT AURA AND WITHOUT STATUS MIGRAINOSUS: ICD-10-CM

## 2018-06-12 NOTE — TELEPHONE ENCOUNTER
Understanding norco works   But the new regulations made us unable to continue prescribing norco in this clinic   ( this clinic does not meet the requirements set up by the law-- check urine....etc)     apologize   I will refer the patient to a pain specialist in [facility]-- referral in the [facility]     If the patient would like to try Botox or ONB, feel free to call us in the future     Called left message to return call.

## 2018-06-14 ENCOUNTER — TELEPHONE (OUTPATIENT)
Dept: NEUROLOGY | Facility: MEDICAL CENTER | Age: 48
End: 2018-06-14

## 2018-08-15 ENCOUNTER — APPOINTMENT (OUTPATIENT)
Dept: RADIOLOGY | Facility: IMAGING CENTER | Age: 48
End: 2018-08-15
Attending: PHYSICIAN ASSISTANT
Payer: COMMERCIAL

## 2018-08-15 ENCOUNTER — OFFICE VISIT (OUTPATIENT)
Dept: URGENT CARE | Facility: CLINIC | Age: 48
End: 2018-08-15
Payer: COMMERCIAL

## 2018-08-15 VITALS
HEART RATE: 85 BPM | WEIGHT: 150 LBS | RESPIRATION RATE: 16 BRPM | HEIGHT: 69 IN | DIASTOLIC BLOOD PRESSURE: 74 MMHG | OXYGEN SATURATION: 98 % | TEMPERATURE: 97 F | SYSTOLIC BLOOD PRESSURE: 92 MMHG | BODY MASS INDEX: 22.22 KG/M2

## 2018-08-15 DIAGNOSIS — S63.502A SPRAIN OF LEFT WRIST, INITIAL ENCOUNTER: ICD-10-CM

## 2018-08-15 DIAGNOSIS — S69.91XA INJURY OF RIGHT WRIST, INITIAL ENCOUNTER: ICD-10-CM

## 2018-08-15 PROCEDURE — 73130 X-RAY EXAM OF HAND: CPT | Mod: TC,FY,LT | Performed by: PHYSICIAN ASSISTANT

## 2018-08-15 PROCEDURE — 99214 OFFICE O/P EST MOD 30 MIN: CPT | Performed by: PHYSICIAN ASSISTANT

## 2018-08-15 ASSESSMENT — ENCOUNTER SYMPTOMS
SENSORY CHANGE: 0
TREMORS: 0
PALPITATIONS: 0
SEIZURES: 0
TINGLING: 0
COUGH: 0
FOCAL WEAKNESS: 0
CHILLS: 0
DOUBLE VISION: 0
SPEECH CHANGE: 0
BLURRED VISION: 0
FEVER: 0
HEADACHES: 0
DIZZINESS: 0
SHORTNESS OF BREATH: 0
LOSS OF CONSCIOUSNESS: 0

## 2018-08-15 ASSESSMENT — PATIENT HEALTH QUESTIONNAIRE - PHQ9: CLINICAL INTERPRETATION OF PHQ2 SCORE: 0

## 2018-08-16 NOTE — PROGRESS NOTES
Subjective:      Mari Dillon is a 48 y.o. female who presents with Wrist Injury ( Fell down 08/06/18, left wrist became painful 08/12, and is not getting any better.)            Wrist Injury    The incident occurred more than 1 week ago. The incident occurred at home. The injury mechanism was a fall. Pain location: left hand/wrist. The pain does not radiate. The pain is moderate. The pain has been constant since the incident. Pertinent negatives include no chest pain or tingling. The symptoms are aggravated by movement. She has tried nothing for the symptoms.       Review of Systems   Constitutional: Negative for chills and fever.   Eyes: Negative for blurred vision and double vision.   Respiratory: Negative for cough and shortness of breath.    Cardiovascular: Negative for chest pain and palpitations.   Musculoskeletal:        Left hand/wrist pain   Skin: Negative for rash.   Neurological: Negative for dizziness, tingling, tremors, sensory change, speech change, focal weakness, seizures, loss of consciousness and headaches.   All other systems reviewed and are negative.      PMH:  has a past medical history of Anxiety (6/9/2009). She also has no past medical history of Breast cancer (HCC).  MEDS:   Current Outpatient Prescriptions:   •  DULoxetine (CYMBALTA) 60 MG Cap DR Particles delayed-release capsule, Take 60 mg by mouth every day. TAKE 1 CAP BY MOUTH EVERY DAY., Disp: , Rfl: 11  •  topiramate (TOPAMAX) 100 MG Tab, Take 1 Tab by mouth every day., Disp: 90 Tab, Rfl: 3  •  hydrOXYzine HCl (ATARAX) 25 MG Tab, Take 1 Tab by mouth 3 times a day as needed for Anxiety., Disp: 30 Tab, Rfl: 3  •  sumatriptan (IMITREX) 100 MG tablet, Take 1 Tab by mouth Once PRN for Migraine for up to 1 dose., Disp: 9 Tab, Rfl: 6  •  diclofenac (SOLARAZE) 3 % gel, Apply 1 g to affected area(s) 2 times a day., Disp: 100 g, Rfl: 6  ALLERGIES: No Known Allergies  SURGHX:   Past Surgical History:   Procedure Laterality Date   • SHOULDER  "ARTHROSCOPY W/ BICIPITAL TENODESIS REPAIR Right 6/15/2015    Procedure: SHOULDER ARTHROSCOPY W/ BICIPITAL TENODESIS, SYNOVECTOMY;  Surgeon: Tristian Garcia M.D.;  Location: [facility];  Service:    • CLAVICLE DISTAL EXCISION Right 6/15/2015    Procedure: CLAVICLE DISTAL EXCISION;  Surgeon: Tristian Garcia M.D.;  Location: [facility];  Service:    • SHOULDER DECOMPRESSION Right 6/15/2015    Procedure: SHOULDER DECOMPRESSION MINI INCISION ;  Surgeon: Tristian Garcia M.D.;  Location: [facility];  Service:    • OTHER      nose   • APPENDECTOMY     • PB  DELIVERY ONLY       SOCHX:  reports that she quit smoking about 13 years ago. She quit after 15.00 years of use. She has never used smokeless tobacco. She reports that she does not drink alcohol or use drugs.  FH: Family history was reviewed, no pertinent findings to report  Medications, Allergies, and current problem list reviewed today in Epic     Objective:     BP (!) 92/74   Pulse 85   Temp 36.1 °C (97 °F)   Resp 16   Ht 1.753 m (5' 9\")   Wt 68 kg (150 lb)   SpO2 98%   BMI 22.15 kg/m²      Physical Exam   Constitutional: She is oriented to person, place, and time. She appears well-developed and well-nourished.  Non-toxic appearance. She does not have a sickly appearance. She does not appear ill. No distress.   HENT:   Head: Normocephalic and atraumatic.   Right Ear: External ear normal.   Left Ear: External ear normal.   Eyes: Conjunctivae and EOM are normal.   Neck: Normal range of motion. Neck supple.   Cardiovascular: Normal rate, regular rhythm, normal heart sounds, intact distal pulses and normal pulses.    Pulmonary/Chest: Effort normal and breath sounds normal.   Musculoskeletal: Normal range of motion. She exhibits tenderness. She exhibits no edema or deformity.   PTP of the left wrist.   Neurological: She is alert and oriented to person, place, and time. She has normal reflexes. She " displays normal reflexes. She exhibits normal muscle tone. Coordination normal.   Skin: Skin is warm and dry. She is not diaphoretic.   Psychiatric: She has a normal mood and affect. Her behavior is normal. Judgment and thought content normal.   Vitals reviewed.              8/15/2018 6:28 PM    HISTORY/REASON FOR EXAM:  Pain/Deformity Following Trauma  Left hand/wrist pain at thumb x 1 week after foosh injury    TECHNIQUE/EXAM DESCRIPTION AND NUMBER OF VIEWS:  3 views of the LEFT hand.    COMPARISON: 5/8/2012    FINDINGS:    Diffuse osseous demineralization, limiting evaluation for nondisplaced fracture.    No acute fracture or dislocation.    No joint osteoarthritis.   Impression         No acute osseous abnormality.       Assessment/Plan:     1. Sprain of left wrist, initial encounter  DX-HAND 3+ LEFT    CANCELED: DX-HAND 3+ RIGHT   - RICE THERAPY  -SPLINT      Differential diagnosis, natural history, supportive care discussed. Follow-up with primary care provider within 7-10 days, emergency room precautions discussed.  Patient and/or family appears understanding of information.  Handout and review of patients diagnosis and treatment was discussed extensively.

## 2018-08-27 ENCOUNTER — OFFICE VISIT (OUTPATIENT)
Dept: URGENT CARE | Facility: CLINIC | Age: 48
End: 2018-08-27
Payer: COMMERCIAL

## 2018-08-27 VITALS
HEIGHT: 69 IN | DIASTOLIC BLOOD PRESSURE: 70 MMHG | TEMPERATURE: 98.9 F | SYSTOLIC BLOOD PRESSURE: 100 MMHG | WEIGHT: 150 LBS | OXYGEN SATURATION: 99 % | HEART RATE: 90 BPM | BODY MASS INDEX: 22.22 KG/M2 | RESPIRATION RATE: 16 BRPM

## 2018-08-27 DIAGNOSIS — M79.622 LEFT UPPER ARM PAIN: ICD-10-CM

## 2018-08-27 PROCEDURE — 99214 OFFICE O/P EST MOD 30 MIN: CPT | Performed by: NURSE PRACTITIONER

## 2018-08-27 RX ORDER — PREDNISONE 20 MG/1
TABLET ORAL
Qty: 10 TAB | Refills: 0 | Status: SHIPPED | OUTPATIENT
Start: 2018-08-27 | End: 2018-12-05

## 2018-08-27 ASSESSMENT — ENCOUNTER SYMPTOMS
TINGLING: 1
SENSORY CHANGE: 1
MYALGIAS: 1
BACK PAIN: 0
NECK PAIN: 0
WEAKNESS: 0

## 2018-08-27 NOTE — PROGRESS NOTES
Subjective:      aMri Dillon is a 48 y.o. female who presents with Arm Injury (x 3 weeks)            HPI This is a recurrent problem. 48 year old female with left arm pain since a fall at home 3 weeks ago. She believes this starts somewhere in upper arm and travels down to cause numbness and tingling in fingers and hand. She was seen and had left wrist x-ray'd with negative film. Pain did not start right after fall, started one week later and the upper arm pain within the past couple of weeks. Denies shoulder pain. She cannot pinpoint origin so history is vague.  Patient has no known allergies.  Current Outpatient Prescriptions on File Prior to Visit   Medication Sig Dispense Refill   • hydrOXYzine HCl (ATARAX) 25 MG Tab Take 1 Tab by mouth 3 times a day as needed for Anxiety. 30 Tab 3   • sumatriptan (IMITREX) 100 MG tablet Take 1 Tab by mouth Once PRN for Migraine for up to 1 dose. 9 Tab 6   • DULoxetine (CYMBALTA) 60 MG Cap DR Particles delayed-release capsule Take 60 mg by mouth every day. TAKE 1 CAP BY MOUTH EVERY DAY.  11   • diclofenac (SOLARAZE) 3 % gel Apply 1 g to affected area(s) 2 times a day. 100 g 6   • topiramate (TOPAMAX) 100 MG Tab Take 1 Tab by mouth every day. 90 Tab 3     No current facility-administered medications on file prior to visit.      Social History     Social History   • Marital status:      Spouse name: N/A   • Number of children: N/A   • Years of education: N/A     Occupational History   • Not on file.     Social History Main Topics   • Smoking status: Former Smoker     Years: 15.00     Quit date: 1/1/2005   • Smokeless tobacco: Never Used      Comment: 3-4 years ago   • Alcohol use No   • Drug use: No   • Sexual activity: Yes     Partners: Male     Other Topics Concern   • Not on file     Social History Narrative    ** Merged History Encounter **          family history includes Cancer in her maternal grandmother; Diabetes in her mother; Hypertension in her  "mother.      Review of Systems   Musculoskeletal: Positive for joint pain and myalgias. Negative for back pain and neck pain.   Neurological: Positive for tingling and sensory change. Negative for weakness.          Objective:     /70   Pulse 90   Temp 37.2 °C (98.9 °F)   Resp 16   Ht 1.753 m (5' 9\")   Wt 68 kg (150 lb)   SpO2 99%   BMI 22.15 kg/m²      Physical Exam   Constitutional: She is oriented to person, place, and time. She appears well-developed and well-nourished.   Musculoskeletal:        Left elbow: She exhibits normal range of motion, no swelling, no effusion and no deformity. Tenderness found.        Arms:  Neurological: She is alert and oriented to person, place, and time.   Skin: Skin is warm and dry.   Psychiatric: She has a normal mood and affect. Her behavior is normal. Thought content normal.   Nursing note and vitals reviewed.              Assessment/Plan:     1. Left upper arm pain  predniSONE (DELTASONE) 20 MG Tab     Sounds like an impingement of some sort.   Will trial on prednisone 40 mg daily for 5 days. Not to be used with nsaids.  Icing.  She will need follow up if not improving.  "

## 2018-10-19 ENCOUNTER — TELEPHONE (OUTPATIENT)
Dept: MEDICAL GROUP | Facility: LAB | Age: 48
End: 2018-10-19

## 2018-10-19 DIAGNOSIS — Z23 NEED FOR VACCINATION: Primary | ICD-10-CM

## 2018-10-19 NOTE — TELEPHONE ENCOUNTER
1. Caller Name: Mari Bryan is on the MA Schedule 10/22/18  for Flu vaccine/injection.    SPECIFIC Action To Be Taken: Orders pending, please sign.

## 2018-10-22 ENCOUNTER — NON-PROVIDER VISIT (OUTPATIENT)
Dept: MEDICAL GROUP | Facility: LAB | Age: 48
End: 2018-10-22
Payer: COMMERCIAL

## 2018-10-22 DIAGNOSIS — Z23 NEED FOR VACCINATION: ICD-10-CM

## 2018-10-22 PROCEDURE — 90471 IMMUNIZATION ADMIN: CPT | Performed by: INTERNAL MEDICINE

## 2018-10-22 PROCEDURE — 90686 IIV4 VACC NO PRSV 0.5 ML IM: CPT | Performed by: INTERNAL MEDICINE

## 2018-10-22 NOTE — PROGRESS NOTES
"Mari Dillon is a 48 y.o. female here for a non-provider visit for:   FLU    Reason for immunization: Annual Flu Vaccine  Immunization records indicate need for vaccine: Yes, confirmed with [facility]  Minimum interval has been met for this vaccine: Yes  ABN completed: Not Indicated    Order and dose verified by: QUINTEN  VIS Dated  8/7/15 was given to patient: Yes  All IAC Questionnaire questions were answered \"No.\"    Patient tolerated injection and no adverse effects were observed or reported: Yes    Pt scheduled for next dose in series: Not Indicated  "

## 2018-12-05 ENCOUNTER — OFFICE VISIT (OUTPATIENT)
Dept: MEDICAL GROUP | Facility: MEDICAL CENTER | Age: 48
End: 2018-12-05
Payer: COMMERCIAL

## 2018-12-05 VITALS
WEIGHT: 152 LBS | DIASTOLIC BLOOD PRESSURE: 70 MMHG | TEMPERATURE: 97.5 F | OXYGEN SATURATION: 97 % | HEIGHT: 69 IN | BODY MASS INDEX: 22.51 KG/M2 | HEART RATE: 96 BPM | SYSTOLIC BLOOD PRESSURE: 114 MMHG

## 2018-12-05 DIAGNOSIS — M25.511 ACUTE PAIN OF RIGHT SHOULDER: ICD-10-CM

## 2018-12-05 PROCEDURE — 99213 OFFICE O/P EST LOW 20 MIN: CPT | Performed by: INTERNAL MEDICINE

## 2018-12-05 RX ORDER — HYDROCODONE BITARTRATE AND ACETAMINOPHEN 10; 325 MG/1; MG/1
1 TABLET ORAL
Refills: 0 | Status: ON HOLD | COMMUNITY
Start: 2018-11-21 | End: 2021-06-18

## 2018-12-05 NOTE — PROGRESS NOTES
"CC: Shoulder pain    HPI:   Mari presents today with the following.    1. Acute pain of right shoulder  Presents complaining of several weeks of shoulder pain with planned arthroscopic procedure.  She is already on a pain contract with a pain specialist for her migraines.  She is asking for pain medication unsure of how to proceed.  Again surgery is in the next 20 days she is reporting eating 1 pill/day.      Patient Active Problem List    Diagnosis Date Noted   • Intractable migraine with aura without status migrainosus 03/03/2016   • Pain in joint, shoulder region 06/15/2015   • Anxiety 06/09/2009       Current Outpatient Prescriptions   Medication Sig Dispense Refill   • hydrOXYzine HCl (ATARAX) 25 MG Tab Take 1 Tab by mouth 3 times a day as needed for Anxiety. 30 Tab 3   • DULoxetine (CYMBALTA) 60 MG Cap DR Particles delayed-release capsule Take 60 mg by mouth every day. TAKE 1 CAP BY MOUTH EVERY DAY.  11   • diclofenac (SOLARAZE) 3 % gel Apply 1 g to affected area(s) 2 times a day. 100 g 6   • topiramate (TOPAMAX) 100 MG Tab Take 1 Tab by mouth every day. (Patient taking differently: Take 200 mg by mouth every day.) 90 Tab 3   • sumatriptan (IMITREX) 100 MG tablet Take 1 Tab by mouth Once PRN for Migraine for up to 1 dose. 9 Tab 6     No current facility-administered medications for this visit.          Allergies as of 12/05/2018   • (No Known Allergies)        Ra    /70 (BP Location: Right arm, Patient Position: Sitting)   Pulse 96   Temp 36.4 °C (97.5 °F)   Ht 1.753 m (5' 9\")   Wt 68.9 kg (152 lb)   SpO2 97%   BMI 22.45 kg/m²     Physical Exam:  Gen:         Alert and oriented, No apparent distress.  Neck:        No Lymphadenopathy or Bruits.  Lungs:     Clear to auscultation bilaterally  CV:          Regular rate and rhythm. No murmurs, rubs or gallops.               Ext:          No clubbing, cyanosis, edema.      Assessment and Plan.   48 y.o. female with the following issues.    1. Acute pain " of right shoulder  Informed her that she needs to receive pain medications for who ever she is on contract she will contact her pain specialist go from there.

## 2018-12-18 DIAGNOSIS — Z01.812 PRE-OPERATIVE LABORATORY EXAMINATION: ICD-10-CM

## 2018-12-18 NOTE — OR NURSING
"Reviewed \"preparing for your procedure\". Instructed to take pain meds DOS if needed. NPO orders per \"Preparing for your procedure\".    "

## 2018-12-24 ENCOUNTER — HOSPITAL ENCOUNTER (OUTPATIENT)
Facility: MEDICAL CENTER | Age: 48
End: 2018-12-24
Attending: ORTHOPAEDIC SURGERY | Admitting: ORTHOPAEDIC SURGERY
Payer: COMMERCIAL

## 2018-12-24 VITALS
TEMPERATURE: 97.7 F | OXYGEN SATURATION: 94 % | SYSTOLIC BLOOD PRESSURE: 116 MMHG | HEART RATE: 69 BPM | WEIGHT: 151.46 LBS | BODY MASS INDEX: 22.43 KG/M2 | DIASTOLIC BLOOD PRESSURE: 72 MMHG | RESPIRATION RATE: 16 BRPM | HEIGHT: 69 IN

## 2018-12-24 LAB
B-HCG FREE SERPL-ACNC: <5 MIU/ML
IHCGL IHCGL: NEGATIVE MIU/ML

## 2018-12-24 PROCEDURE — 160048 HCHG OR STATISTICAL LEVEL 1-5: Performed by: ORTHOPAEDIC SURGERY

## 2018-12-24 PROCEDURE — 700111 HCHG RX REV CODE 636 W/ 250 OVERRIDE (IP)

## 2018-12-24 PROCEDURE — 160036 HCHG PACU - EA ADDL 30 MINS PHASE I: Performed by: ORTHOPAEDIC SURGERY

## 2018-12-24 PROCEDURE — 501838 HCHG SUTURE GENERAL: Performed by: ORTHOPAEDIC SURGERY

## 2018-12-24 PROCEDURE — A9270 NON-COVERED ITEM OR SERVICE: HCPCS | Performed by: ANESTHESIOLOGY

## 2018-12-24 PROCEDURE — 502581 HCHG PACK, SHOULDER ARTHROSCOPY: Performed by: ORTHOPAEDIC SURGERY

## 2018-12-24 PROCEDURE — 160035 HCHG PACU - 1ST 60 MINS PHASE I: Performed by: ORTHOPAEDIC SURGERY

## 2018-12-24 PROCEDURE — 700102 HCHG RX REV CODE 250 W/ 637 OVERRIDE(OP): Performed by: ANESTHESIOLOGY

## 2018-12-24 PROCEDURE — A9270 NON-COVERED ITEM OR SERVICE: HCPCS

## 2018-12-24 PROCEDURE — 160009 HCHG ANES TIME/MIN: Performed by: ORTHOPAEDIC SURGERY

## 2018-12-24 PROCEDURE — 160029 HCHG SURGERY MINUTES - 1ST 30 MINS LEVEL 4: Performed by: ORTHOPAEDIC SURGERY

## 2018-12-24 PROCEDURE — 700102 HCHG RX REV CODE 250 W/ 637 OVERRIDE(OP)

## 2018-12-24 PROCEDURE — 160041 HCHG SURGERY MINUTES - EA ADDL 1 MIN LEVEL 4: Performed by: ORTHOPAEDIC SURGERY

## 2018-12-24 PROCEDURE — 500562 HCHG FIBERWIRE: Performed by: ORTHOPAEDIC SURGERY

## 2018-12-24 PROCEDURE — 500447 HCHG DRESSING, TEGADERM 8X12: Performed by: ORTHOPAEDIC SURGERY

## 2018-12-24 PROCEDURE — 501445 HCHG STAPLER, SKIN DISP: Performed by: ORTHOPAEDIC SURGERY

## 2018-12-24 PROCEDURE — 160046 HCHG PACU - 1ST 60 MINS PHASE II: Performed by: ORTHOPAEDIC SURGERY

## 2018-12-24 PROCEDURE — 700101 HCHG RX REV CODE 250

## 2018-12-24 PROCEDURE — 500144 HCHG CANNULA KIT, UNIV GREY-SHOULDER: Performed by: ORTHOPAEDIC SURGERY

## 2018-12-24 PROCEDURE — 84702 CHORIONIC GONADOTROPIN TEST: CPT

## 2018-12-24 PROCEDURE — 160002 HCHG RECOVERY MINUTES (STAT): Performed by: ORTHOPAEDIC SURGERY

## 2018-12-24 PROCEDURE — 160025 RECOVERY II MINUTES (STATS): Performed by: ORTHOPAEDIC SURGERY

## 2018-12-24 RX ORDER — KETOROLAC TROMETHAMINE 30 MG/ML
INJECTION, SOLUTION INTRAMUSCULAR; INTRAVENOUS
Status: DISCONTINUED | OUTPATIENT
Start: 2018-12-24 | End: 2018-12-24 | Stop reason: HOSPADM

## 2018-12-24 RX ORDER — MIDAZOLAM HYDROCHLORIDE 1 MG/ML
1 INJECTION INTRAMUSCULAR; INTRAVENOUS
Status: DISCONTINUED | OUTPATIENT
Start: 2018-12-24 | End: 2018-12-24 | Stop reason: HOSPADM

## 2018-12-24 RX ORDER — OXYCODONE HCL 5 MG/5 ML
5 SOLUTION, ORAL ORAL
Status: COMPLETED | OUTPATIENT
Start: 2018-12-24 | End: 2018-12-24

## 2018-12-24 RX ORDER — DIPHENHYDRAMINE HYDROCHLORIDE 50 MG/ML
12.5 INJECTION INTRAMUSCULAR; INTRAVENOUS
Status: DISCONTINUED | OUTPATIENT
Start: 2018-12-24 | End: 2018-12-24 | Stop reason: HOSPADM

## 2018-12-24 RX ORDER — HYDROMORPHONE HYDROCHLORIDE 1 MG/ML
0.2 INJECTION, SOLUTION INTRAMUSCULAR; INTRAVENOUS; SUBCUTANEOUS
Status: DISCONTINUED | OUTPATIENT
Start: 2018-12-24 | End: 2018-12-24 | Stop reason: HOSPADM

## 2018-12-24 RX ORDER — CELECOXIB 200 MG/1
400 CAPSULE ORAL ONCE
Status: COMPLETED | OUTPATIENT
Start: 2018-12-24 | End: 2018-12-24

## 2018-12-24 RX ORDER — HYDROMORPHONE HYDROCHLORIDE 1 MG/ML
0.1 INJECTION, SOLUTION INTRAMUSCULAR; INTRAVENOUS; SUBCUTANEOUS
Status: DISCONTINUED | OUTPATIENT
Start: 2018-12-24 | End: 2018-12-24 | Stop reason: HOSPADM

## 2018-12-24 RX ORDER — SODIUM CHLORIDE, SODIUM LACTATE, POTASSIUM CHLORIDE, CALCIUM CHLORIDE 600; 310; 30; 20 MG/100ML; MG/100ML; MG/100ML; MG/100ML
INJECTION, SOLUTION INTRAVENOUS CONTINUOUS
Status: DISCONTINUED | OUTPATIENT
Start: 2018-12-24 | End: 2018-12-24 | Stop reason: HOSPADM

## 2018-12-24 RX ORDER — GABAPENTIN 300 MG/1
300 CAPSULE ORAL ONCE
Status: COMPLETED | OUTPATIENT
Start: 2018-12-24 | End: 2018-12-24

## 2018-12-24 RX ORDER — SODIUM CHLORIDE, SODIUM LACTATE, POTASSIUM CHLORIDE, AND CALCIUM CHLORIDE .6; .31; .03; .02 G/100ML; G/100ML; G/100ML; G/100ML
500 INJECTION, SOLUTION INTRAVENOUS ONCE
Status: DISCONTINUED | OUTPATIENT
Start: 2018-12-24 | End: 2018-12-24 | Stop reason: HOSPADM

## 2018-12-24 RX ORDER — OXYCODONE HCL 5 MG/5 ML
SOLUTION, ORAL ORAL
Status: DISCONTINUED
Start: 2018-12-24 | End: 2018-12-24 | Stop reason: HOSPADM

## 2018-12-24 RX ORDER — BACITRACIN 50000 [IU]/1
INJECTION, POWDER, FOR SOLUTION INTRAMUSCULAR
Status: DISCONTINUED | OUTPATIENT
Start: 2018-12-24 | End: 2018-12-24 | Stop reason: HOSPADM

## 2018-12-24 RX ORDER — HYDROMORPHONE HYDROCHLORIDE 1 MG/ML
0.4 INJECTION, SOLUTION INTRAMUSCULAR; INTRAVENOUS; SUBCUTANEOUS
Status: DISCONTINUED | OUTPATIENT
Start: 2018-12-24 | End: 2018-12-24 | Stop reason: HOSPADM

## 2018-12-24 RX ORDER — MORPHINE SULFATE 10 MG/ML
INJECTION, SOLUTION INTRAMUSCULAR; INTRAVENOUS
Status: DISCONTINUED | OUTPATIENT
Start: 2018-12-24 | End: 2018-12-24 | Stop reason: HOSPADM

## 2018-12-24 RX ORDER — ROPIVACAINE HYDROCHLORIDE 5 MG/ML
INJECTION, SOLUTION EPIDURAL; INFILTRATION; PERINEURAL
Status: DISCONTINUED | OUTPATIENT
Start: 2018-12-24 | End: 2018-12-24 | Stop reason: HOSPADM

## 2018-12-24 RX ORDER — SODIUM CHLORIDE, SODIUM LACTATE, POTASSIUM CHLORIDE, CALCIUM CHLORIDE 600; 310; 30; 20 MG/100ML; MG/100ML; MG/100ML; MG/100ML
INJECTION, SOLUTION INTRAVENOUS ONCE
Status: COMPLETED | OUTPATIENT
Start: 2018-12-24 | End: 2018-12-24

## 2018-12-24 RX ORDER — MEPERIDINE HYDROCHLORIDE 25 MG/ML
12.5 INJECTION INTRAMUSCULAR; INTRAVENOUS; SUBCUTANEOUS
Status: DISCONTINUED | OUTPATIENT
Start: 2018-12-24 | End: 2018-12-24 | Stop reason: HOSPADM

## 2018-12-24 RX ORDER — OXYCODONE HCL 5 MG/5 ML
SOLUTION, ORAL ORAL
Status: COMPLETED
Start: 2018-12-24 | End: 2018-12-24

## 2018-12-24 RX ORDER — ACETAMINOPHEN 500 MG
1000 TABLET ORAL ONCE
Status: COMPLETED | OUTPATIENT
Start: 2018-12-24 | End: 2018-12-24

## 2018-12-24 RX ORDER — OXYCODONE HCL 5 MG/5 ML
10 SOLUTION, ORAL ORAL
Status: COMPLETED | OUTPATIENT
Start: 2018-12-24 | End: 2018-12-24

## 2018-12-24 RX ORDER — ONDANSETRON 2 MG/ML
4 INJECTION INTRAMUSCULAR; INTRAVENOUS
Status: DISCONTINUED | OUTPATIENT
Start: 2018-12-24 | End: 2018-12-24 | Stop reason: HOSPADM

## 2018-12-24 RX ADMIN — GABAPENTIN 300 MG: 300 CAPSULE ORAL at 07:59

## 2018-12-24 RX ADMIN — SODIUM CHLORIDE, SODIUM LACTATE, POTASSIUM CHLORIDE, CALCIUM CHLORIDE 1000 ML: 600; 310; 30; 20 INJECTION, SOLUTION INTRAVENOUS at 07:58

## 2018-12-24 RX ADMIN — Medication 10 MG: at 11:52

## 2018-12-24 RX ADMIN — OXYCODONE HYDROCHLORIDE 10 MG: 5 SOLUTION ORAL at 11:52

## 2018-12-24 RX ADMIN — ACETAMINOPHEN 1000 MG: 500 TABLET, COATED ORAL at 07:59

## 2018-12-24 RX ADMIN — FENTANYL CITRATE 50 MCG: 50 INJECTION, SOLUTION INTRAMUSCULAR; INTRAVENOUS at 11:52

## 2018-12-24 RX ADMIN — CELECOXIB 400 MG: 200 CAPSULE ORAL at 07:59

## 2018-12-24 ASSESSMENT — PAIN SCALES - GENERAL
PAINLEVEL_OUTOF10: 5
PAINLEVEL_OUTOF10: 4
PAINLEVEL_OUTOF10: 4
PAINLEVEL_OUTOF10: 6
PAINLEVEL_OUTOF10: 5
PAINLEVEL_OUTOF10: 0

## 2018-12-24 NOTE — OP REPORT
Date of Surgery:  12/24/2018    PREOPERATIVE DIAGNOSES   Left shoulder pain.  Left shoulder impingement.  Left shoulder acromioclavicular arthritis.  Left shoulder superior labral tear  Left shoulder adhesive capsulitis with preoperative marked limitations of range of motion     POSTOPERATIVE DIAGNOSES:  Left shoulder pain.  Left shoulder impingement.  Left shoulder acromioclavicular arthritis.  Left shoulder superior labral tear  Left shoulder adhesive capsulitis with preoperative marked limitations of range of motion  PROCEDURES:  Left shoulder arthroscopic debridement of the labrum.  Left shoulder arthroscopic partial synovectomy with partial lysis of adhesions.  Left shoulder open distal clavicle excision.  Left shoulder open subacromial decompression.  Left shoulder open biceps tenodesis.  Left shoulder manipulation    SURGEON:  Tristian Garcia MD.     ASSISTANT: Lynnette Sage     ANESTHESIA:  General.     ANESTHESIOLOGIST: Dr. Michael Driscoll        FINDINGS:  Stable subacromial decompression.  Stable distal clavicle excision.  Stable arthroscopic labral debridement.  Noncompetent insertional tendinopathy of the biceps on the superior glenoid.  Stable biceps tenodesis.  Stable manipulation of the left shoulder with marked improvement in flexion abduction and rotation     COMPLICATIONS:  None.       PROCEDURE IN DETAIL:  The patient was taken to the operating room where he    underwent general anesthetic and then was positioned on the beach-chair    apparatus.  All bony prominences padded.  Left upper extremity was isolated,    prepped using Hibiclens, alcohol, ChloraPrep, draped using sterile technique.     Perioperative antibiotics were given.  An appropriate time-out was    undertaken.  The spider was utilized for stabilization.  Surgery initiated    with instilling 30 mL of saline at the joint and then placement of a posterior   and an anterior portal under direct localization.     Diagnostic arthroscopy  showed intact articular surfaces.  Due to the marked arthrofibrosis was very difficult to obtain access to the joint.  2 posterior portals had to be created in the setting of the marked contracture and limitations of the shoulder mobility.     The labrum had significant degenerative changes and therefore was treated with   a shaver chondroplasty and Oratec/Arthrocare stabilization.The superior labrum had a deficiency in it and was hypermobile secondary to  insertional tendonopathy.  For this reason, a decision was undertaken to tenotomize the biceps intraarticularly.  This was done and stabilized with the shaver and Arthrocare.  Significant anterior capsular contractures were noted around the anterior and superior aspect of the shoulder.  This was mobilized with the ArthroCare.  The instruments were then removed.  The portals were closed using interrupted nylon and the shoulder addressed with a mini open approach.    Skin and subcutaneous tissue were incised.      Subperiosteal dissection of the distal clavicle was followed by resection of the distal 1 cm after elevation of the periosteum.  This was treated with hemostasis, Bovie, periarticular anesthetization and then the fascia over this repaired after distal clavicle    excision using 0 Monocryl.     The deltoid was split in line with the anterolateral corner and subacromial    bursa was identified and excised.  The subacromial decompression had been accomplished with the undersurface decompression of the CA ligament insertion and then an osteotomy of the acromion using an oscillating saw and hand rasp.  This was unusually difficult secondary to the marked contracture around the shoulder.  Very limited mobility and mobilization was able to be undertaken.    The rotator cuff was assessed and noted to be intact without evidence of tear.  The biceps tendon was addressed through the bicipital groove with incision and   creation of a nella hole proximally.  The  bicipital groove was burred as well.  The suture was passed over bone bridges anteriorly, stitched into the biceps   tendon with Mehran whipstitch and then subsequent repair of the biceps into    the keyhole with a tenodesis and external oversew with #1 FiberWire.  Following the intra-articular and extra-articular mobilization the subdeltoid bursal scar tissue was mobilized.  This still maintained a marked limitation of external rotation and abduction in the shoulder.  For this reason a gentle manipulation was undertaken with prominent give way of the scar tissue surrounding the shoulder.  This obtained full range of motion.  As previously noted the periarticular anesthetization had been undertaken and was repeated at this point.  Thorough irrigation was undertaken and anesthetization was undertaken using  ropivacaine, morphine and Toradol.  The patient was then placed into arm at    the side position and closure undertaken after hemostasis obtained.     The deltoid was repaired using 0 Monocryl.  The subcutaneous 0 and 3-0    Monocryl were utilized.  Steri-Strips were used on the skin.  The patient    placed into a sterile bandage, awoken from his anesthetic and taken to the    recovery room, tolerated the procedure very well with no signs of    complications.        ____________________________________     IVON ROSALES MD

## 2018-12-24 NOTE — DISCHARGE INSTRUCTIONS
ACTIVITY: Rest and take it easy for the first 24 hours.  A responsible adult is recommended to remain with you during that time.  It is normal to feel sleepy.  We encourage you to not do anything that requires balance, judgment or coordination.    MILD FLU-LIKE SYMPTOMS ARE NORMAL. YOU MAY EXPERIENCE GENERALIZED MUSCLE ACHES, THROAT IRRITATION, HEADACHE AND/OR SOME NAUSEA.    FOR 24 HOURS DO NOT:  Drive, operate machinery or run household appliances.  Drink beer or alcoholic beverages.   Make important decisions or sign legal documents.    SPECIAL INSTRUCTIONS: *     DC home on Percocet, aspirin, sling    Outpatient PT to begin in 2-4 days.    Follow up [facility] 10-14 days   . Call for Fevers, drainage, redness, shortness of breath, or increasing extremity swelling particularly in the calf.    Start Aspirin 325mg per day. Use for thirty days.   .Remove bandage in 5 days.  Replace sooner for drainage and notify office.     Wear LUZ (compressive stocking) for 2 weeks on both lower extremities.   Sling to left  upper extremity   Additional Information         **    DIET: To avoid nausea, slowly advance diet as tolerated, avoiding spicy or greasy foods for the first day.  Add more substantial food to your diet according to your physician's instructions.  Babies can be fed formula or breast milk as soon as they are hungry.  INCREASE FLUIDS AND FIBER TO AVOID CONSTIPATION.        FOLLOW-UP APPOINTMENT:  A follow-up appointment should be arranged with your doctor ; call to schedule.    You should CALL YOUR PHYSICIAN if you develop:  Fever greater than 101 degrees F.  Pain not relieved by medication, or persistent nausea or vomiting.  Excessive bleeding (blood soaking through dressing) or unexpected drainage from the wound.  Extreme redness or swelling around the incision site, drainage of pus or foul smelling drainage.  Inability to urinate or empty your bladder within 8 hours.  Problems with breathing or chest  pain.    You should call 911 if you develop problems with breathing or chest pain.  If you are unable to contact your doctor or surgical center, you should go to the nearest emergency room or urgent care center.  Physician's telephone #: *[phone]**    If any questions arise, call your doctor.  If your doctor is not available, please feel free to call the Surgical Center at [phone].  The Center is open Monday through Friday from 7AM to 7PM.  You can also call the HEALTH HOTLINE open 24 hours/day, 7 days/week and speak to a nurse at [phone], or toll free at [phone].    A registered nurse may call you a few days after your surgery to see how you are doing after your procedure.    MEDICATIONS: Resume taking daily medication.  Take prescribed pain medication with food.  If no medication is prescribed, you may take non-aspirin pain medication if needed.  PAIN MEDICATION CAN BE VERY CONSTIPATING.  Take a stool softener or laxative such as senokot, pericolace, or milk of magnesia if needed.    Prescription given for *patient has**.  Last pain medication given at *11:52 AM.  If your physician has prescribed pain medication that includes Acetaminophen (Tylenol), do not take additional Acetaminophen (Tylenol) while taking the prescribed medication.    Depression / Suicide Risk    As you are discharged from this [facility] facility, it is important to learn how to keep safe from harming yourself.    Recognize the warning signs:  · Abrupt changes in personality, positive or negative- including increase in energy   · Giving away possessions  · Change in eating patterns- significant weight changes-  positive or negative  · Change in sleeping patterns- unable to sleep or sleeping all the time   · Unwillingness or inability to communicate  · Depression  · Unusual sadness, discouragement and loneliness  · Talk of wanting to die  · Neglect of personal appearance   · Rebelliousness- reckless  behavior  · Withdrawal from people/activities they love  · Confusion- inability to concentrate     If you or a loved one observes any of these behaviors or has concerns about self-harm, here's what you can do:  · Talk about it- your feelings and reasons for harming yourself  · Remove any means that you might use to hurt yourself (examples: pills, rope, extension cords, firearm)  · Get professional help from the community (Mental Health, Substance Abuse, psychological counseling)  · Do not be alone:Call your Safe Contact- someone whom you trust who will be there for you.  · Call your local CRISIS HOTLINE 348-4812 or 919-360-8113  · Call your local Children's Mobile Crisis Response Team [place] (809) 926-8473 or www.[employer]  · Call the toll free National Suicide Prevention Hotlines   · National Suicide Prevention Lifeline 913-116-GYCL (9065)  · National Hope Line Network 800-SUICIDE (459-7896)

## 2018-12-24 NOTE — OR NURSING
1315- Pt tr to phase II, assist with dressing and short ambulation to recliner, steady gait.  at side,  7-up given, sleepy but eager to discharge.    1330- VSS, denies nausea and pain tolerable.    1340- instructions reviewed with understanding to  and Pt.  All questions answered.    1346- W/C with all belongings to car.

## 2018-12-24 NOTE — OR NURSING
Respirations easy.  Fingers warm, mobile and pink with brisk cap refill. Pain meds with good effect.  Denies nausea.  Sling on. HOB elevated. Dressing clean and dry.

## 2018-12-24 NOTE — OR NURSING
1159 report received from ERIC RUDOLPH. 1200 pt reports left shoulder pain 5/10, medicate for pain. See mar 1216 pt reports left shoulder pain 4/10 and tolerable. Pt resting on gurney with eyes closed, easy to rouse, back to rest.  Vss. 1218 reports given to ERIC RUDOLPH to resume care of this pt.

## 2018-12-28 ENCOUNTER — APPOINTMENT (OUTPATIENT)
Dept: NEUROLOGY | Facility: MEDICAL CENTER | Age: 48
End: 2018-12-28
Payer: COMMERCIAL

## 2019-01-05 ENCOUNTER — OFFICE VISIT (OUTPATIENT)
Dept: URGENT CARE | Facility: CLINIC | Age: 49
End: 2019-01-05
Payer: COMMERCIAL

## 2019-01-05 VITALS
RESPIRATION RATE: 16 BRPM | SYSTOLIC BLOOD PRESSURE: 118 MMHG | OXYGEN SATURATION: 96 % | TEMPERATURE: 98.6 F | DIASTOLIC BLOOD PRESSURE: 82 MMHG | HEIGHT: 69 IN | WEIGHT: 147 LBS | HEART RATE: 110 BPM | BODY MASS INDEX: 21.77 KG/M2

## 2019-01-05 DIAGNOSIS — R30.0 DYSURIA: ICD-10-CM

## 2019-01-05 DIAGNOSIS — N30.00 ACUTE CYSTITIS WITHOUT HEMATURIA: ICD-10-CM

## 2019-01-05 LAB
APPEARANCE UR: NORMAL
BILIRUB UR STRIP-MCNC: NEGATIVE MG/DL
COLOR UR AUTO: NORMAL
GLUCOSE UR STRIP.AUTO-MCNC: NEGATIVE MG/DL
KETONES UR STRIP.AUTO-MCNC: NEGATIVE MG/DL
LEUKOCYTE ESTERASE UR QL STRIP.AUTO: NORMAL
NITRITE UR QL STRIP.AUTO: NEGATIVE
PH UR STRIP.AUTO: 5.5 [PH] (ref 5–8)
PROT UR QL STRIP: 30 MG/DL
RBC UR QL AUTO: NORMAL
SP GR UR STRIP.AUTO: 1.03
UROBILINOGEN UR STRIP-MCNC: 0.2 MG/DL

## 2019-01-05 PROCEDURE — 81002 URINALYSIS NONAUTO W/O SCOPE: CPT | Performed by: PHYSICIAN ASSISTANT

## 2019-01-05 PROCEDURE — 99214 OFFICE O/P EST MOD 30 MIN: CPT | Performed by: PHYSICIAN ASSISTANT

## 2019-01-05 RX ORDER — NITROFURANTOIN 25; 75 MG/1; MG/1
100 CAPSULE ORAL EVERY 12 HOURS
Qty: 10 CAP | Refills: 0 | Status: SHIPPED | OUTPATIENT
Start: 2019-01-05 | End: 2019-01-10

## 2019-01-05 ASSESSMENT — ENCOUNTER SYMPTOMS
NAUSEA: 0
SHORTNESS OF BREATH: 0
MYALGIAS: 0
VOMITING: 0
FEVER: 0
CHILLS: 0
BACK PAIN: 0
FLANK PAIN: 0

## 2019-01-05 NOTE — PROGRESS NOTES
"Subjective:   Mari Dillon is a 48 y.o. female who presents for UTI (frequentcy, burning, x4days, taking pain pills for arm)        UTI   Pertinent negatives include no chills, fever, myalgias, nausea, rash or vomiting.     Notes last 3-4 d of dysuria, freq, urg, incomplete voiding, denies abd pain/back pain, notes has been on pain meds due to shoulder sx, denies fever/chills/nausea/vomiting, denies pMH of complicated UTI, PMH of pyelo.     Review of Systems   Constitutional: Negative for chills and fever.   Respiratory: Negative for shortness of breath.    Gastrointestinal: Negative for nausea and vomiting.   Genitourinary: Positive for dysuria, frequency and urgency. Negative for flank pain and hematuria.   Musculoskeletal: Negative for back pain and myalgias.   Skin: Negative for rash.     No Known Allergies   I have worn a mask for the entire encounter with this patient.    Objective:   /82 (BP Location: Right arm, Patient Position: Sitting, BP Cuff Size: Adult)   Pulse (!) 110   Temp 37 °C (98.6 °F) (Temporal)   Resp 16   Ht 1.753 m (5' 9\")   Wt 66.7 kg (147 lb)   SpO2 96%   BMI 21.71 kg/m²   Physical Exam   Constitutional: She is oriented to person, place, and time. She appears well-developed and well-nourished. No distress.   HENT:   Head: Normocephalic and atraumatic.   Right Ear: External ear normal.   Left Ear: External ear normal.   Nose: Nose normal.   Eyes: Conjunctivae and lids are normal. Right eye exhibits no discharge. Left eye exhibits no discharge. No scleral icterus.   Neck: Neck supple.   Pulmonary/Chest: Effort normal. No respiratory distress.   Abdominal: Soft. Normal appearance. There is no tenderness ( ). There is no rigidity, no guarding and no CVA tenderness.   Musculoskeletal: Normal range of motion.   Neurological: She is alert and oriented to person, place, and time. She is not disoriented.   Skin: Skin is warm and dry. She is not diaphoretic. No erythema. No pallor. "   Psychiatric: Her speech is normal and behavior is normal.   Nursing note and vitals reviewed.    POCT UA-   POC Color dark yellow  Negative Final   POC Appearance cloudy  Negative Final   POC Leukocyte Esterase small  Negative Final   POC Nitrites negative  Negative Final   POC Urobiligen 0.2  Negative (0.2) mg/dL Final   POC Protein 30  Negative mg/dL Final   POC Urine PH 5.5  5.0 - 8.0 Final   POC Blood large  Negative Final   POC Specific Gravity 1.030  <1.005 - >1.030 Final   POC Ketones negative  Negative mg/dL Final   POC Bilirubin negative  Negative mg/dL Final   POC Glucose negative  Negative mg/dL Final       Assessment/Plan:   1. Acute cystitis without hematuria  - POCT Urinalysis  - nitrofurantoin monohydr macro (MACROBID) 100 MG Cap; Take 1 Cap by mouth every 12 hours for 5 days.  Dispense: 10 Cap; Refill: 0    2. Dysuria  Supportive care is reviewed with patient/caregiver - recommend to push PO fluids and electrolytes, nsaids/tylenol, rest, full course of abx, probiotics w/ abx, azo/cran, observe for resolution  Return to clinic with lack of resolution or progression of symptoms.    Differential diagnosis, natural history, supportive care, and indications for immediate follow-up discussed.

## 2019-02-22 ENCOUNTER — HOSPITAL ENCOUNTER (OUTPATIENT)
Dept: RADIOLOGY | Facility: MEDICAL CENTER | Age: 49
End: 2019-02-22
Attending: INTERNAL MEDICINE
Payer: COMMERCIAL

## 2019-02-22 DIAGNOSIS — Z12.31 VISIT FOR SCREENING MAMMOGRAM: ICD-10-CM

## 2019-02-22 PROCEDURE — 77067 SCR MAMMO BI INCL CAD: CPT

## 2019-03-15 ENCOUNTER — APPOINTMENT (RX ONLY)
Dept: URBAN - METROPOLITAN AREA CLINIC 4 | Facility: CLINIC | Age: 49
Setting detail: DERMATOLOGY
End: 2019-03-15

## 2019-03-15 DIAGNOSIS — D22 MELANOCYTIC NEVI: ICD-10-CM

## 2019-03-15 DIAGNOSIS — L81.4 OTHER MELANIN HYPERPIGMENTATION: ICD-10-CM

## 2019-03-15 DIAGNOSIS — L73.8 OTHER SPECIFIED FOLLICULAR DISORDERS: ICD-10-CM

## 2019-03-15 DIAGNOSIS — D18.0 HEMANGIOMA: ICD-10-CM

## 2019-03-15 DIAGNOSIS — L82.1 OTHER SEBORRHEIC KERATOSIS: ICD-10-CM

## 2019-03-15 PROBLEM — D22.72 MELANOCYTIC NEVI OF LEFT LOWER LIMB, INCLUDING HIP: Status: ACTIVE | Noted: 2019-03-15

## 2019-03-15 PROBLEM — D22.61 MELANOCYTIC NEVI OF RIGHT UPPER LIMB, INCLUDING SHOULDER: Status: ACTIVE | Noted: 2019-03-15

## 2019-03-15 PROBLEM — D22.71 MELANOCYTIC NEVI OF RIGHT LOWER LIMB, INCLUDING HIP: Status: ACTIVE | Noted: 2019-03-15

## 2019-03-15 PROBLEM — D22.62 MELANOCYTIC NEVI OF LEFT UPPER LIMB, INCLUDING SHOULDER: Status: ACTIVE | Noted: 2019-03-15

## 2019-03-15 PROBLEM — D18.01 HEMANGIOMA OF SKIN AND SUBCUTANEOUS TISSUE: Status: ACTIVE | Noted: 2019-03-15

## 2019-03-15 PROBLEM — D22.5 MELANOCYTIC NEVI OF TRUNK: Status: ACTIVE | Noted: 2019-03-15

## 2019-03-15 PROCEDURE — 99214 OFFICE O/P EST MOD 30 MIN: CPT

## 2019-03-15 PROCEDURE — ? COUNSELING

## 2019-03-15 PROCEDURE — ? LIQUID NITROGEN

## 2019-03-15 ASSESSMENT — LOCATION DETAILED DESCRIPTION DERM
LOCATION DETAILED: LEFT LATERAL ELBOW
LOCATION DETAILED: LEFT DISTAL DORSAL FOREARM
LOCATION DETAILED: LEFT DISTAL POSTERIOR THIGH
LOCATION DETAILED: RIGHT ANTERIOR DISTAL THIGH
LOCATION DETAILED: LOWER STERNUM
LOCATION DETAILED: LEFT VENTRAL LATERAL PROXIMAL FOREARM
LOCATION DETAILED: LEFT MEDIAL BREAST 10-11:00 REGION
LOCATION DETAILED: LEFT CLAVICULAR NECK
LOCATION DETAILED: LEFT MEDIAL BUCCAL CHEEK
LOCATION DETAILED: RIGHT PROXIMAL POSTERIOR UPPER ARM
LOCATION DETAILED: LEFT POPLITEAL SKIN
LOCATION DETAILED: INFERIOR THORACIC SPINE
LOCATION DETAILED: LEFT DISTAL POSTERIOR UPPER ARM
LOCATION DETAILED: RIGHT LATERAL SUPERIOR CHEST
LOCATION DETAILED: LEFT PROXIMAL DORSAL FOREARM
LOCATION DETAILED: LEFT PROXIMAL PRETIBIAL REGION
LOCATION DETAILED: RIGHT DISTAL POSTERIOR UPPER ARM
LOCATION DETAILED: RIGHT INFERIOR LATERAL NECK
LOCATION DETAILED: EPIGASTRIC SKIN
LOCATION DETAILED: LEFT PROXIMAL CALF
LOCATION DETAILED: RIGHT DISTAL POSTERIOR THIGH
LOCATION DETAILED: LEFT MEDIAL SUPERIOR CHEST
LOCATION DETAILED: RIGHT PROXIMAL DORSAL FOREARM
LOCATION DETAILED: LEFT RADIAL DORSAL HAND
LOCATION DETAILED: LEFT LATERAL SUPERIOR CHEST
LOCATION DETAILED: RIGHT VENTRAL PROXIMAL FOREARM
LOCATION DETAILED: LEFT ANTERIOR SHOULDER
LOCATION DETAILED: RIGHT MEDIAL SUPERIOR CHEST
LOCATION DETAILED: RIGHT POPLITEAL SKIN
LOCATION DETAILED: MIDDLE STERNUM
LOCATION DETAILED: RIGHT SUPERIOR UPPER BACK
LOCATION DETAILED: RIGHT CENTRAL EYEBROW
LOCATION DETAILED: LEFT ULNAR DORSAL HAND
LOCATION DETAILED: SUBXIPHOID
LOCATION DETAILED: RIGHT PROXIMAL PRETIBIAL REGION

## 2019-03-15 ASSESSMENT — LOCATION SIMPLE DESCRIPTION DERM
LOCATION SIMPLE: RIGHT THIGH
LOCATION SIMPLE: LEFT POPLITEAL SKIN
LOCATION SIMPLE: LEFT PRETIBIAL REGION
LOCATION SIMPLE: RIGHT FOREARM
LOCATION SIMPLE: LEFT CHEEK
LOCATION SIMPLE: RIGHT ANTERIOR NECK
LOCATION SIMPLE: RIGHT PRETIBIAL REGION
LOCATION SIMPLE: UPPER BACK
LOCATION SIMPLE: LEFT ELBOW
LOCATION SIMPLE: CHEST
LOCATION SIMPLE: RIGHT POSTERIOR UPPER ARM
LOCATION SIMPLE: ABDOMEN
LOCATION SIMPLE: RIGHT POPLITEAL SKIN
LOCATION SIMPLE: LEFT ANTERIOR NECK
LOCATION SIMPLE: LEFT POSTERIOR THIGH
LOCATION SIMPLE: RIGHT UPPER BACK
LOCATION SIMPLE: LEFT POSTERIOR UPPER ARM
LOCATION SIMPLE: LEFT BREAST
LOCATION SIMPLE: RIGHT POSTERIOR THIGH
LOCATION SIMPLE: RIGHT EYEBROW
LOCATION SIMPLE: LEFT FOREARM
LOCATION SIMPLE: LEFT SHOULDER
LOCATION SIMPLE: LEFT HAND
LOCATION SIMPLE: LEFT CALF

## 2019-03-15 ASSESSMENT — LOCATION ZONE DERM
LOCATION ZONE: TRUNK
LOCATION ZONE: ARM
LOCATION ZONE: LEG
LOCATION ZONE: HAND
LOCATION ZONE: FACE
LOCATION ZONE: NECK

## 2019-03-15 NOTE — PROCEDURE: LIQUID NITROGEN
Include Z78.9 (Other Specified Conditions Influencing Health Status) As An Associated Diagnosis?: No
Duration Of Freeze Thaw-Cycle (Seconds): 0
Detail Level: Detailed
Medical Necessity Clause: This procedure was medically necessary because the lesions that were treated were:  If lesion does not resolve, bx is needed.
Medical Necessity Information: It is in your best interest to select a reason for this procedure from the list below. All of these items fulfill various CMS LCD requirements except the new and changing color options.
Post-Care Instructions: I reviewed with the patient in detail post-care instructions. Patient is to wear sunprotection, and avoid picking at any of the treated lesions. Pt may apply Vaseline to crusted or scabbing areas.
Consent: The patient's consent was obtained including but not limited to risks of crusting, scabbing, blistering, scarring, darker or lighter pigmentary change, recurrence, incomplete removal and infection.

## 2019-06-20 ENCOUNTER — APPOINTMENT (RX ONLY)
Dept: URBAN - METROPOLITAN AREA CLINIC 4 | Facility: CLINIC | Age: 49
Setting detail: DERMATOLOGY
End: 2019-06-20

## 2019-06-20 DIAGNOSIS — L57.0 ACTINIC KERATOSIS: ICD-10-CM

## 2019-06-20 PROCEDURE — 17000 DESTRUCT PREMALG LESION: CPT

## 2019-06-20 PROCEDURE — ? LIQUID NITROGEN

## 2019-06-20 PROCEDURE — ? COUNSELING

## 2019-06-20 ASSESSMENT — LOCATION SIMPLE DESCRIPTION DERM: LOCATION SIMPLE: CHEST

## 2019-06-20 ASSESSMENT — LOCATION DETAILED DESCRIPTION DERM: LOCATION DETAILED: RIGHT MEDIAL SUPERIOR CHEST

## 2019-06-20 ASSESSMENT — LOCATION ZONE DERM: LOCATION ZONE: TRUNK

## 2020-04-22 ENCOUNTER — APPOINTMENT (OUTPATIENT)
Dept: RADIOLOGY | Facility: MEDICAL CENTER | Age: 50
End: 2020-04-22
Attending: EMERGENCY MEDICINE
Payer: COMMERCIAL

## 2020-04-22 ENCOUNTER — HOSPITAL ENCOUNTER (EMERGENCY)
Facility: MEDICAL CENTER | Age: 50
End: 2020-04-22
Attending: EMERGENCY MEDICINE
Payer: COMMERCIAL

## 2020-04-22 VITALS
SYSTOLIC BLOOD PRESSURE: 124 MMHG | DIASTOLIC BLOOD PRESSURE: 73 MMHG | HEART RATE: 75 BPM | BODY MASS INDEX: 21.71 KG/M2 | TEMPERATURE: 96.6 F | HEIGHT: 69 IN | RESPIRATION RATE: 20 BRPM | OXYGEN SATURATION: 100 %

## 2020-04-22 DIAGNOSIS — N20.1 URETEROLITHIASIS: ICD-10-CM

## 2020-04-22 LAB
ALBUMIN SERPL BCP-MCNC: 4.3 G/DL (ref 3.2–4.9)
ALBUMIN/GLOB SERPL: 1.4 G/DL
ALP SERPL-CCNC: 65 U/L (ref 30–99)
ALT SERPL-CCNC: 8 U/L (ref 2–50)
ANION GAP SERPL CALC-SCNC: 19 MMOL/L (ref 7–16)
APPEARANCE UR: CLEAR
AST SERPL-CCNC: 18 U/L (ref 12–45)
BACTERIA #/AREA URNS HPF: ABNORMAL /HPF
BASOPHILS # BLD AUTO: 0.5 % (ref 0–1.8)
BASOPHILS # BLD: 0.05 K/UL (ref 0–0.12)
BILIRUB SERPL-MCNC: 0.7 MG/DL (ref 0.1–1.5)
BILIRUB UR QL STRIP.AUTO: NEGATIVE
BUN SERPL-MCNC: 15 MG/DL (ref 8–22)
CALCIUM SERPL-MCNC: 9.4 MG/DL (ref 8.4–10.2)
CHLORIDE SERPL-SCNC: 105 MMOL/L (ref 96–112)
CO2 SERPL-SCNC: 16 MMOL/L (ref 20–33)
COLOR UR: YELLOW
CREAT SERPL-MCNC: 1.05 MG/DL (ref 0.5–1.4)
EOSINOPHIL # BLD AUTO: 0.09 K/UL (ref 0–0.51)
EOSINOPHIL NFR BLD: 0.9 % (ref 0–6.9)
EPI CELLS #/AREA URNS HPF: ABNORMAL /HPF
ERYTHROCYTE [DISTWIDTH] IN BLOOD BY AUTOMATED COUNT: 44.1 FL (ref 35.9–50)
GLOBULIN SER CALC-MCNC: 3.1 G/DL (ref 1.9–3.5)
GLUCOSE SERPL-MCNC: 115 MG/DL (ref 65–99)
GLUCOSE UR STRIP.AUTO-MCNC: NEGATIVE MG/DL
HCG SERPL QL: NEGATIVE
HCT VFR BLD AUTO: 39.3 % (ref 37–47)
HGB BLD-MCNC: 13.1 G/DL (ref 12–16)
IMM GRANULOCYTES # BLD AUTO: 0.03 K/UL (ref 0–0.11)
IMM GRANULOCYTES NFR BLD AUTO: 0.3 % (ref 0–0.9)
KETONES UR STRIP.AUTO-MCNC: >=80 MG/DL
LEUKOCYTE ESTERASE UR QL STRIP.AUTO: NEGATIVE
LIPASE SERPL-CCNC: 29 U/L (ref 7–58)
LYMPHOCYTES # BLD AUTO: 1.68 K/UL (ref 1–4.8)
LYMPHOCYTES NFR BLD: 17.3 % (ref 22–41)
MCH RBC QN AUTO: 29.2 PG (ref 27–33)
MCHC RBC AUTO-ENTMCNC: 33.3 G/DL (ref 33.6–35)
MCV RBC AUTO: 87.5 FL (ref 81.4–97.8)
MICRO URNS: ABNORMAL
MONOCYTES # BLD AUTO: 1.05 K/UL (ref 0–0.85)
MONOCYTES NFR BLD AUTO: 10.8 % (ref 0–13.4)
MUCOUS THREADS #/AREA URNS HPF: ABNORMAL /HPF
NEUTROPHILS # BLD AUTO: 6.8 K/UL (ref 2–7.15)
NEUTROPHILS NFR BLD: 70.2 % (ref 44–72)
NITRITE UR QL STRIP.AUTO: NEGATIVE
NRBC # BLD AUTO: 0 K/UL
NRBC BLD-RTO: 0 /100 WBC
PH UR STRIP.AUTO: 7.5 [PH] (ref 5–8)
PLATELET # BLD AUTO: 354 K/UL (ref 164–446)
PMV BLD AUTO: 9.8 FL (ref 9–12.9)
POTASSIUM SERPL-SCNC: 3.6 MMOL/L (ref 3.6–5.5)
PROT SERPL-MCNC: 7.4 G/DL (ref 6–8.2)
PROT UR QL STRIP: NEGATIVE MG/DL
RBC # BLD AUTO: 4.49 M/UL (ref 4.2–5.4)
RBC # URNS HPF: ABNORMAL /HPF
RBC UR QL AUTO: ABNORMAL
SODIUM SERPL-SCNC: 140 MMOL/L (ref 135–145)
SP GR UR STRIP.AUTO: 1.02
WBC # BLD AUTO: 9.7 K/UL (ref 4.8–10.8)
WBC #/AREA URNS HPF: ABNORMAL /HPF

## 2020-04-22 PROCEDURE — 85025 COMPLETE CBC W/AUTO DIFF WBC: CPT

## 2020-04-22 PROCEDURE — 36415 COLL VENOUS BLD VENIPUNCTURE: CPT

## 2020-04-22 PROCEDURE — 83690 ASSAY OF LIPASE: CPT

## 2020-04-22 PROCEDURE — 81001 URINALYSIS AUTO W/SCOPE: CPT

## 2020-04-22 PROCEDURE — 700111 HCHG RX REV CODE 636 W/ 250 OVERRIDE (IP): Performed by: EMERGENCY MEDICINE

## 2020-04-22 PROCEDURE — 74176 CT ABD & PELVIS W/O CONTRAST: CPT

## 2020-04-22 PROCEDURE — 96374 THER/PROPH/DIAG INJ IV PUSH: CPT

## 2020-04-22 PROCEDURE — 96375 TX/PRO/DX INJ NEW DRUG ADDON: CPT

## 2020-04-22 PROCEDURE — 99284 EMERGENCY DEPT VISIT MOD MDM: CPT

## 2020-04-22 PROCEDURE — 84703 CHORIONIC GONADOTROPIN ASSAY: CPT

## 2020-04-22 PROCEDURE — 80053 COMPREHEN METABOLIC PANEL: CPT

## 2020-04-22 RX ORDER — IBUPROFEN 600 MG/1
600 TABLET ORAL
Qty: 20 TAB | Refills: 0 | Status: SHIPPED | OUTPATIENT
Start: 2020-04-22 | End: 2020-08-31

## 2020-04-22 RX ORDER — MORPHINE SULFATE 4 MG/ML
4 INJECTION, SOLUTION INTRAMUSCULAR; INTRAVENOUS ONCE
Status: COMPLETED | OUTPATIENT
Start: 2020-04-22 | End: 2020-04-22

## 2020-04-22 RX ORDER — KETOROLAC TROMETHAMINE 30 MG/ML
INJECTION, SOLUTION INTRAMUSCULAR; INTRAVENOUS
Status: DISCONTINUED
Start: 2020-04-22 | End: 2020-04-22 | Stop reason: HOSPADM

## 2020-04-22 RX ORDER — OXYCODONE HYDROCHLORIDE AND ACETAMINOPHEN 5; 325 MG/1; MG/1
1-2 TABLET ORAL EVERY 6 HOURS PRN
Qty: 15 TAB | Refills: 0 | Status: SHIPPED | OUTPATIENT
Start: 2020-04-22 | End: 2020-04-25

## 2020-04-22 RX ORDER — ONDANSETRON 2 MG/ML
4 INJECTION INTRAMUSCULAR; INTRAVENOUS ONCE
Status: COMPLETED | OUTPATIENT
Start: 2020-04-22 | End: 2020-04-22

## 2020-04-22 RX ORDER — KETOROLAC TROMETHAMINE 30 MG/ML
15 INJECTION, SOLUTION INTRAMUSCULAR; INTRAVENOUS ONCE
Status: COMPLETED | OUTPATIENT
Start: 2020-04-22 | End: 2020-04-22

## 2020-04-22 RX ORDER — ONDANSETRON 4 MG/1
4 TABLET, ORALLY DISINTEGRATING ORAL EVERY 8 HOURS PRN
Qty: 12 TAB | Refills: 0 | Status: SHIPPED | OUTPATIENT
Start: 2020-04-22 | End: 2020-06-03

## 2020-04-22 RX ADMIN — KETOROLAC TROMETHAMINE 15 MG: 30 INJECTION, SOLUTION INTRAMUSCULAR at 10:56

## 2020-04-22 RX ADMIN — MORPHINE SULFATE 4 MG: 4 INJECTION INTRAVENOUS at 12:40

## 2020-04-22 RX ADMIN — ONDANSETRON 4 MG: 2 INJECTION INTRAMUSCULAR; INTRAVENOUS at 10:56

## 2020-04-22 ASSESSMENT — PAIN DESCRIPTION - DESCRIPTORS: DESCRIPTORS: ACHING

## 2020-04-22 NOTE — ED TRIAGE NOTES
Chief Complaint   Patient presents with   • Flank Pain     left flank pain      pt complains of right flank pain, denies dysuria but complains of hesitancy and urgency. Pt states she has had direct contact with a  Positive covid pt.     Pt has not traveled   within 30 days internationally or domestic, or/ and  does not have fever or respiratory symptoms.

## 2020-04-22 NOTE — DISCHARGE INSTRUCTIONS
Ibuprofen for pain, take on full stomach.  Percocet or Vicodin for continued pain.  Zofran for nausea.  Plenty of fluids--goal is clear urine.  Return to ER for pain/nausea not controlled by meds, fever, or any turn for the worse.

## 2020-04-22 NOTE — ED NOTES
Pt appears to be uncomfortable, and complains of severe  flank pain, pt requesting to lie down. Charge RN made aware. Pt updated we will get her to a room as soon as possible. Ice pack provided, per pt request.

## 2020-04-22 NOTE — ED PROVIDER NOTES
"ED Provider Note    CHIEF COMPLAINT  Chief Complaint   Patient presents with   • Flank Pain     left flank pain       HPI  Mari Dillon is a 49 y.o. female who presents with a chief complaint to me of \"I need some help.\"  He is referring to the mount of pain that she is in.  She is had some off-and-on pain for the last 2 days but particular severe today.  It is in the left flank radiates around the left lower quadrant.  She denies any stool changes.  She denies any pain with urination but does feel like she has to go more often.  She is never had a kidney stone, but her dad had them and she wonders if this may be similar.  She is not had a fever.  No cough or cold symptoms.  No stool changes.  There is no other complaint.    PAST MEDICAL HISTORY  Past Medical History:   Diagnosis Date   • Anxiety 6/9/2009       FAMILY HISTORY  Family History   Problem Relation Age of Onset   • Hypertension Mother    • Diabetes Mother    • Cancer Maternal Grandmother         breast       SOCIAL HISTORY  Social History     Tobacco Use   • Smoking status: Former Smoker     Years: 15.00     Last attempt to quit: 1/1/2005     Years since quitting: 15.3   • Smokeless tobacco: Never Used   • Tobacco comment: 3-4 years ago   Substance Use Topics   • Alcohol use: No     Alcohol/week: 0.0 oz   • Drug use: Yes     Types: Inhaled     Comment: marijuana         SURGICAL HISTORY  Past Surgical History:   Procedure Laterality Date   • SHOULDER DECOMPRESSION ARTHROSCOPIC Right 12/24/2018    Procedure: SHOULDER DECOMPRESSION ARTHROSCOPIC - SUBACROMIAL;  Surgeon: Tristian Garcia M.D.;  Location: [facility];  Service: Orthopedics   • CLAVICLE DISTAL EXCISION Left 12/24/2018    Procedure: CLAVICLE DISTAL EXCISION;  Surgeon: Tristian Garcia M.D.;  Location: [facility];  Service: Orthopedics   • SHOULDER ARTHROSCOPY W/ BICIPITAL TENODESIS REPAIR Left 12/24/2018    Procedure: SHOULDER ARTHROSCOPY W/ BICIPITAL " "TENODESIS REPAIR - AND PACKER;  Surgeon: Tristian Garcia M.D.;  Location: [facility];  Service: Orthopedics   • SHOULDER MANIPULATION Left 2018    Procedure: SHOULDER MANIPULATION;  Surgeon: Tristian Garcia M.D.;  Location: [facility];  Service: Orthopedics   • SHOULDER ARTHROSCOPY W/ BICIPITAL TENODESIS REPAIR Right 6/15/2015    Procedure: SHOULDER ARTHROSCOPY W/ BICIPITAL TENODESIS, SYNOVECTOMY;  Surgeon: Tristian Garcia M.D.;  Location: [facility];  Service:    • CLAVICLE DISTAL EXCISION Right 6/15/2015    Procedure: CLAVICLE DISTAL EXCISION;  Surgeon: Tristian Garcia M.D.;  Location: [facility];  Service:    • SHOULDER DECOMPRESSION Right 6/15/2015    Procedure: SHOULDER DECOMPRESSION MINI INCISION ;  Surgeon: Tristian Garcia M.D.;  Location: [facility];  Service:    • OTHER      nose   • APPENDECTOMY     • PB  DELIVERY ONLY         CURRENT MEDICATIONS    I have reviewed the nurses notes and/or the list brought with the patient.    ALLERGIES  No Known Allergies    REVIEW OF SYSTEMS  See HPI for further details. Review of systems as above, otherwise all other systems are negative.     PHYSICAL EXAM  VITAL SIGNS: /63   Pulse 63   Temp 35.9 °C (96.6 °F) (Temporal)   Resp 20   Ht 1.753 m (5' 9\")   LMP 2020   SpO2 100%   BMI 21.71 kg/m²     Constitutional: Patient is moaning and writhing on the gurney.  HENT: Mucus membranes moist.  Oropharynx is clear.  Eyes: Pupils equally round.  No scleral icterus.   Neck: Full nontender range of motion.  Lymphatic: No cervical lymphadenopathy noted.   Cardiovascular: Regular heart rate and rhythm.  No murmurs, rubs, nor gallop appreciated.   Thorax & Lungs: Chest is nontender.  Lungs are clear to auscultation with good air movement bilaterally.  No wheeze, rhonchi, nor rales.   Abdomen: Soft, with no tenderness, rebound nor guarding.  No mass, pulsatile mass, " nor hepatosplenomegaly appreciated.  No CVA tenderness.  There is no rash.  Skin: No purpura nor petechia noted.  Extremities/Musculoskeletal: No sign of trauma.  Calves are nontender with no cords nor edema.  No Cady's sign.  Pulses are intact all around.   Neurologic: Alert & oriented.  Strength and sensation is intact all around.  Gait is normal.  Psychiatric: Normal affect appropriate for the clinical situation.    LABS  Labs Reviewed   CBC WITH DIFFERENTIAL - Abnormal; Notable for the following components:       Result Value    MCHC 33.3 (*)     Lymphocytes 17.30 (*)     Monos (Absolute) 1.05 (*)     All other components within normal limits   COMP METABOLIC PANEL - Abnormal; Notable for the following components:    Co2 16 (*)     Anion Gap 19.0 (*)     Glucose 115 (*)     All other components within normal limits   URINALYSIS,CULTURE IF INDICATED - Abnormal; Notable for the following components:    Ketones >=80 (*)     Occult Blood Trace (*)     All other components within normal limits   ESTIMATED GFR - Abnormal; Notable for the following components:    GFR If Non  56 (*)     All other components within normal limits   URINE MICROSCOPIC (W/UA) - Abnormal; Notable for the following components:    RBC 2-5 (*)     Bacteria Few (*)     All other components within normal limits   LIPASE   HCG QUAL SERUM         RADIOLOGY/PROCEDURES  I have reviewed the patient's film interpretations myself, and they are read out by the radiologist as:   CT-RENAL COLIC EVALUATION(A/P W/O)   Final Result         Left-sided hydronephrosis and hydroureter with apparent 2 mm distal left ureteral stone.      Bilateral nonobstructive renal stones.        .    MEDICAL RECORD  I have reviewed patient's medical record and pertinent results are listed above.    COURSE & MEDICAL DECISION MAKING  I have reviewed any medical record information, laboratory studies and radiographic results as noted above.  Patient presents with  acute colicky left flank pain.  This is a clinical appearance of a stone.  She is not had imaging previously.  Therefore obtain a CT scan which confirms a left-sided ureteral stone.  She has a few cells in her urine but no evidence of yessy infection, this is likely somewhat contaminant as was no nitrites or leukocyte Estrace.  There is no leukocytosis.  Urinalysis does show she is ketones but creatinine is normal.  Patient was given Zofran and Toradol.  Feeling better but her pain did recur, she is given a dose of morphine after this she is feeling better and was able to tolerate orals.  We will go ahead and discharge her home with follow-up with her personal doctor.  Given a urine strainer.  I talked her about return here should she have pain or nausea not controlled with medications, fever, or any turn for the worse.  Generic instructions on kidney stones.  In prescribing controlled substances to this patient, I certify that I have obtained and reviewed the medical history of Mari Dillon. I have also made a good cinthia effort to obtain applicable records from other providers who have treated the patient and records did not demonstrate any increased risk of substance abuse that would prevent me from prescribing controlled substances.     I have conducted a physical exam and documented it. I have reviewed Ms. Dillon’s prescription history as maintained by the [place] Prescription Monitoring Program.     I have assessed the patient’s risk for abuse, dependency, and addiction using the validated Opioid Risk Tool available at https://www.mdcalc.com/lnhjnu-risu-vzyz-ort-narcotic-abuse.     Given the above, I believe the benefits of controlled substance therapy outweigh the risks. The reasons for prescribing controlled substances include non-narcotic, oral analgesic alternatives have been inadequate for pain control. Accordingly, I have discussed the risk and benefits, treatment plan, and alternative therapies  with the patient.     Recommend ibuprofen with meals for pain.  Percocet should she have need for further pain medicine.  She is already on hydrocodone at home intermittently for headaches apparently.  And Zofran for nausea.    FINAL IMPRESSION  1. Ureterolithiasis           This dictation was created using voice recognition software.    Electronically signed by: Juan Jose Berkowitz M.D., 4/22/2020 10:54 AM

## 2020-06-03 ENCOUNTER — TELEMEDICINE (OUTPATIENT)
Dept: MEDICAL GROUP | Facility: MEDICAL CENTER | Age: 50
End: 2020-06-03
Payer: COMMERCIAL

## 2020-06-03 VITALS — RESPIRATION RATE: 14 BRPM

## 2020-06-03 DIAGNOSIS — F43.21 GRIEF: ICD-10-CM

## 2020-06-03 DIAGNOSIS — Z87.442 HISTORY OF KIDNEY STONES: ICD-10-CM

## 2020-06-03 DIAGNOSIS — R53.83 FATIGUE, UNSPECIFIED TYPE: ICD-10-CM

## 2020-06-03 PROCEDURE — 99213 OFFICE O/P EST LOW 20 MIN: CPT | Mod: 95,CR | Performed by: INTERNAL MEDICINE

## 2020-06-03 NOTE — PROGRESS NOTES
This encounter was conducted via Zoom meeting  Verbal consent was obtained. Patient's identity was verified.       CC: Depression, kidney stones, fatigue.                                                                                                                                      HPI:   Mari presents today with the following.    1. Grief  Presents reporting that she is going through divorce over the last 2 months and is currently stressed.  She is also fatigue.  Reports decreased motivation activity levels.  She is compliant with her Cymbalta.  She denies any suicidal ideation.    2. History of kidney stones  Patient was seen in ER for kidney stones 2 mm in size she reports pain resolved after 3 days.  No blood in her urine or persistent symptoms.    3. Fatigue, unspecified type  Is reporting excessive fatigue.  She sleeps roughly 12 hours/day.  She does feel rested when she wakes up but again is fatigued throughout the day does report it is time fairly well with her current brief episode.      Patient Active Problem List    Diagnosis Date Noted   • History of kidney stones 06/03/2020   • Intractable migraine with aura without status migrainosus 03/03/2016   • Pain in joint, shoulder region 06/15/2015   • Anxiety 06/09/2009       Current Outpatient Medications   Medication Sig Dispense Refill   • ibuprofen (MOTRIN) 600 MG Tab Take 1 Tab by mouth 3 times a day, with meals. 20 Tab 0   • HYDROcodone/acetaminophen (NORCO)  MG Tab TAKE 1 TABLET BY MOUTH DAILY AS NEEDED FOR 5 DAYS *G43.009* *11/21/18*  0   • hydrOXYzine HCl (ATARAX) 25 MG Tab Take 1 Tab by mouth 3 times a day as needed for Anxiety. 30 Tab 3   • DULoxetine (CYMBALTA) 60 MG Cap DR Particles delayed-release capsule Take 60 mg by mouth every day. TAKE 1 CAP BY MOUTH EVERY DAY.  11   • topiramate (TOPAMAX) 100 MG Tab Take 1 Tab by mouth every day. (Patient taking differently: Take 200 mg by mouth every day.) 90 Tab 3     No current  facility-administered medications for this visit.          Allergies as of 06/03/2020   • (No Known Allergies)        ROS:  Denies, chest pain, Shortness of breath, Edema.     Resp 14       Physical Exam:  Constitutional: Alert, no distress, well-groomed.  Skin: No rashes in visible areas.  Eye: Round. Conjunctiva clear, lNo icterus.   ENMT: Lips pink without lesions, good dentition, moist mucous membranes. Phonation normal.  Neck: No masses, no thyromegaly. Moves freely without pain.  CV: Pulse as reported by patient  Respiratory: Unlabored respiratory effort, no cough or audible wheeze  Psych: Alert and oriented x3, tearful        Assessment and Plan.   49 y.o. female with the following issues.    1. Grief  Discussion today about counseling she declines will see back in 1 month to check on her progress.  Have given her homework in terms of getting back into physical activity.    2. History of kidney stones  No further symptomatology contact office if she should have return of symptoms    3. Fatigue, unspecified type  Ending for blood work follow-up abnormalities  - TSH; Future  - CBC WITH DIFFERENTIAL; Future  - Basic Metabolic Panel; Future

## 2020-06-09 ENCOUNTER — HOSPITAL ENCOUNTER (OUTPATIENT)
Dept: RADIOLOGY | Facility: MEDICAL CENTER | Age: 50
End: 2020-06-09
Attending: INTERNAL MEDICINE
Payer: COMMERCIAL

## 2020-06-09 DIAGNOSIS — Z12.31 VISIT FOR SCREENING MAMMOGRAM: ICD-10-CM

## 2020-06-09 PROCEDURE — 77067 SCR MAMMO BI INCL CAD: CPT

## 2020-07-02 ENCOUNTER — TELEMEDICINE (OUTPATIENT)
Dept: MEDICAL GROUP | Facility: MEDICAL CENTER | Age: 50
End: 2020-07-02
Payer: COMMERCIAL

## 2020-07-02 VITALS — BODY MASS INDEX: 21.71 KG/M2 | TEMPERATURE: 97.2 F | RESPIRATION RATE: 12 BRPM | HEIGHT: 69 IN

## 2020-07-02 DIAGNOSIS — F32.9 REACTIVE DEPRESSION: ICD-10-CM

## 2020-07-02 DIAGNOSIS — G43.119 INTRACTABLE MIGRAINE WITH AURA WITHOUT STATUS MIGRAINOSUS: ICD-10-CM

## 2020-07-02 DIAGNOSIS — R50.9 FEVER, UNSPECIFIED FEVER CAUSE: ICD-10-CM

## 2020-07-02 PROCEDURE — 99214 OFFICE O/P EST MOD 30 MIN: CPT | Mod: 95,CR | Performed by: INTERNAL MEDICINE

## 2020-07-02 RX ORDER — HYDROXYZINE HYDROCHLORIDE 25 MG/1
25 TABLET, FILM COATED ORAL 3 TIMES DAILY PRN
Qty: 30 TAB | Refills: 3 | Status: SHIPPED | OUTPATIENT
Start: 2020-07-02 | End: 2020-08-31

## 2020-07-02 RX ORDER — TOPIRAMATE 100 MG/1
100 TABLET, FILM COATED ORAL DAILY
Qty: 90 TAB | Refills: 3 | Status: SHIPPED | OUTPATIENT
Start: 2020-07-02 | End: 2021-10-24

## 2020-07-02 RX ORDER — DULOXETIN HYDROCHLORIDE 60 MG/1
60 CAPSULE, DELAYED RELEASE ORAL
Qty: 90 CAP | Refills: 3 | Status: SHIPPED | OUTPATIENT
Start: 2020-07-02 | End: 2021-10-24

## 2020-07-02 NOTE — PROGRESS NOTES
This encounter was conducted via Zoom meeting  Verbal consent was obtained. Patient's identity was verified.                 CC: Follow-up mood, migraines, fever.                                                                                                                                      HPI:   Mari presents today with the following.    1. Reactive depression  Seen approximately 1 month ago going through divorce with severe grief.  She reports her mood is significantly improved she is doing quite well on multiple fronts.  Injuring herself she is spending time with friends.  No suicidal ideation    2. Intractable migraine with aura without status migrainosus  She is on Topamax was increased recently to 200 mg again medically she doing quite well and her migraines are almost nonexistent.    3. Fever, unspecified fever cause  Unfortunately she has developed a fever of over 100 with a very mild nonproductive cough son is also slightly febrile.  She is getting tested tomorrow and self isolating.      Patient Active Problem List    Diagnosis Date Noted   • History of kidney stones 06/03/2020   • Intractable migraine with aura without status migrainosus 03/03/2016   • Pain in joint, shoulder region 06/15/2015   • Anxiety 06/09/2009       Current Outpatient Medications   Medication Sig Dispense Refill   • DULoxetine (CYMBALTA) 60 MG Cap DR Particles delayed-release capsule Take 1 Cap by mouth every day. TAKE 1 CAP BY MOUTH EVERY DAY. 90 Cap 3   • hydrOXYzine HCl (ATARAX) 25 MG Tab Take 1 Tab by mouth 3 times a day as needed for Anxiety. 30 Tab 3   • topiramate (TOPAMAX) 100 MG Tab Take 1 Tab by mouth every day. 90 Tab 3   • ibuprofen (MOTRIN) 600 MG Tab Take 1 Tab by mouth 3 times a day, with meals. 20 Tab 0   • HYDROcodone/acetaminophen (NORCO)  MG Tab TAKE 1 TABLET BY MOUTH DAILY AS NEEDED FOR 5 DAYS *G43.009* *11/21/18*  0     No current facility-administered medications for this visit.          Allergies as  "of 07/02/2020   • (No Known Allergies)        ROS: Denies Chest pain, SOB, LE edema.    Temp 36.2 °C (97.2 °F)   Resp 12   Ht 1.753 m (5' 9\")   BMI 21.71 kg/m²       Physical Exam:  Constitutional: Alert, no distress, well-groomed.  Skin: No rashes in visible areas.  Eye: Round. Conjunctiva clear, lNo icterus.   ENMT: Lips pink without lesions, good dentition, moist mucous membranes. Phonation normal.  Neck: No masses, no thyromegaly. Moves freely without pain.  CV: Pulse as reported by patient  Respiratory: Unlabored respiratory effort, no cough or audible wheeze  Psych: Alert and oriented x3, normal affect and mood.      Assessment and Plan.   50 y.o. female with the following issues.    1. Reactive depression  Refilled medications doing quite well.  - DULoxetine (CYMBALTA) 60 MG Cap DR Particles delayed-release capsule; Take 1 Cap by mouth every day. TAKE 1 CAP BY MOUTH EVERY DAY.  Dispense: 90 Cap; Refill: 3  - hydrOXYzine HCl (ATARAX) 25 MG Tab; Take 1 Tab by mouth 3 times a day as needed for Anxiety.  Dispense: 30 Tab; Refill: 3    2. Intractable migraine with aura without status migrainosus  Decreasing Topamax to 100 mg nightly.  - topiramate (TOPAMAX) 100 MG Tab; Take 1 Tab by mouth every day.  Dispense: 90 Tab; Refill: 3    3. Fever, unspecified fever cause  She will get tested and guidance to the health department.  Discussions about self quarantining until results are available her symptoms are resolved.      "

## 2020-08-24 ENCOUNTER — OFFICE VISIT (OUTPATIENT)
Dept: URGENT CARE | Facility: CLINIC | Age: 50
End: 2020-08-24
Payer: COMMERCIAL

## 2020-08-24 ENCOUNTER — HOSPITAL ENCOUNTER (OUTPATIENT)
Facility: MEDICAL CENTER | Age: 50
End: 2020-08-24
Attending: PHYSICIAN ASSISTANT
Payer: COMMERCIAL

## 2020-08-24 VITALS
DIASTOLIC BLOOD PRESSURE: 65 MMHG | OXYGEN SATURATION: 99 % | HEIGHT: 69 IN | SYSTOLIC BLOOD PRESSURE: 100 MMHG | RESPIRATION RATE: 18 BRPM | HEART RATE: 100 BPM | WEIGHT: 138 LBS | BODY MASS INDEX: 20.44 KG/M2 | TEMPERATURE: 97.8 F

## 2020-08-24 DIAGNOSIS — L02.91 ABSCESS: ICD-10-CM

## 2020-08-24 PROCEDURE — 10061 I&D ABSCESS COMP/MULTIPLE: CPT | Performed by: PHYSICIAN ASSISTANT

## 2020-08-24 PROCEDURE — 87186 SC STD MICRODIL/AGAR DIL: CPT

## 2020-08-24 PROCEDURE — 87205 SMEAR GRAM STAIN: CPT

## 2020-08-24 PROCEDURE — 99000 SPECIMEN HANDLING OFFICE-LAB: CPT | Performed by: PHYSICIAN ASSISTANT

## 2020-08-24 PROCEDURE — 87077 CULTURE AEROBIC IDENTIFY: CPT

## 2020-08-24 PROCEDURE — 87070 CULTURE OTHR SPECIMN AEROBIC: CPT

## 2020-08-24 RX ORDER — SULFAMETHOXAZOLE AND TRIMETHOPRIM 800; 160 MG/1; MG/1
1 TABLET ORAL 2 TIMES DAILY
Qty: 14 TAB | Refills: 0 | Status: SHIPPED | OUTPATIENT
Start: 2020-08-24 | End: 2020-08-31

## 2020-08-24 ASSESSMENT — ENCOUNTER SYMPTOMS
SHORTNESS OF BREATH: 0
COUGH: 0
FOCAL WEAKNESS: 0
FEVER: 0
CHILLS: 0
SENSORY CHANGE: 0
VOMITING: 0
MYALGIAS: 0
TINGLING: 0
NAUSEA: 0

## 2020-08-24 ASSESSMENT — FIBROSIS 4 INDEX: FIB4 SCORE: 0.9

## 2020-08-25 LAB
GRAM STN SPEC: NORMAL
SIGNIFICANT IND 70042: NORMAL
SITE SITE: NORMAL
SOURCE SOURCE: NORMAL

## 2020-08-25 NOTE — PROGRESS NOTES
Subjective:      Mari Dillon is a 50 y.o. female who presents with Bug Bite (Bug bite on behind left lef, swollen and sore)            The patient is here with complaints of swelling and pain on left buttock. She states she noticed symptoms about 3 days ago. She has noticed gradual worsening in swelling and pain. No fever or chills. No nausea or vomiting.     Past Medical History:   Diagnosis Date   • Anxiety 2009         Past Surgical History:   Procedure Laterality Date   • SHOULDER DECOMPRESSION ARTHROSCOPIC Right 2018    Procedure: SHOULDER DECOMPRESSION ARTHROSCOPIC - SUBACROMIAL;  Surgeon: Tristian Garcia M.D.;  Location: [facility];  Service: Orthopedics   • CLAVICLE DISTAL EXCISION Left 2018    Procedure: CLAVICLE DISTAL EXCISION;  Surgeon: Tristian Garcia M.D.;  Location: [facility];  Service: Orthopedics   • SHOULDER ARTHROSCOPY W/ BICIPITAL TENODESIS REPAIR Left 2018    Procedure: SHOULDER ARTHROSCOPY W/ BICIPITAL TENODESIS REPAIR - AND PACKER;  Surgeon: Tristian Garcia M.D.;  Location: [facility];  Service: Orthopedics   • SHOULDER MANIPULATION Left 2018    Procedure: SHOULDER MANIPULATION;  Surgeon: Tristian Garcia M.D.;  Location: [facility];  Service: Orthopedics   • SHOULDER ARTHROSCOPY W/ BICIPITAL TENODESIS REPAIR Right 6/15/2015    Procedure: SHOULDER ARTHROSCOPY W/ BICIPITAL TENODESIS, SYNOVECTOMY;  Surgeon: Tristian Garcia M.D.;  Location: [facility];  Service:    • CLAVICLE DISTAL EXCISION Right 6/15/2015    Procedure: CLAVICLE DISTAL EXCISION;  Surgeon: Tristian Garcia M.D.;  Location: [facility];  Service:    • SHOULDER DECOMPRESSION Right 6/15/2015    Procedure: SHOULDER DECOMPRESSION MINI INCISION ;  Surgeon: Tristian Garcia M.D.;  Location: [facility];  Service:    • OTHER      nose   • APPENDECTOMY     • PB  DELIVERY ONLY    "      Family History   Problem Relation Age of Onset   • Hypertension Mother    • Diabetes Mother    • Cancer Maternal Grandmother         breast       .No Known Allergies    Medications, Allergies, and current problem list reviewed today in Epic    Review of Systems   Constitutional: Negative for chills, fever and malaise/fatigue.   Respiratory: Negative for cough and shortness of breath.    Gastrointestinal: Negative for nausea and vomiting.   Musculoskeletal: Negative for joint pain and myalgias.   Skin:        Abscess below left buttock   Neurological: Negative for tingling, sensory change and focal weakness.       All other systems reviewed and are negative.      Objective:     /65   Pulse 100   Temp 36.6 °C (97.8 °F)   Resp 18   Ht 1.753 m (5' 9\")   Wt 62.6 kg (138 lb)   SpO2 99%   BMI 20.38 kg/m²      Physical Exam  Constitutional:       General: She is not in acute distress.  HENT:      Head: Normocephalic and atraumatic.   Eyes:      Conjunctiva/sclera: Conjunctivae normal.   Cardiovascular:      Rate and Rhythm: Normal rate.   Pulmonary:      Effort: Pulmonary effort is normal. No respiratory distress.   Musculoskeletal: Normal range of motion.   Skin:         Neurological:      General: No focal deficit present.      Mental Status: She is alert and oriented to person, place, and time.   Psychiatric:         Mood and Affect: Mood normal.         Behavior: Behavior normal.         Thought Content: Thought content normal.         Judgment: Judgment normal.           Procedure: Incision and Drainage  -Risks, benefits, and alternatives discussed. Risks including infection, bleeding, nerve damage, and poor cosmetic outcome  -Sterile technique throughout  -Local anesthesia with 2% lidocaine with epinephrine  -Incision with #11 blade into fluctuant area with purulent material expressed  -Culture obtained and packaged for lab  -Cavity probed and any loculations bluntly taken down with " "hemostat  -Irrigated copiously with NS  -Packed with 1/4\" gauze  -Minimal bleeding with good hemostasis achieved  -The patient tolerated the procedure well           Assessment/Plan:        1. Abscess  sulfamethoxazole-trimethoprim (BACTRIM DS) 800-160 MG tablet    CULTURE WOUND W/ GRAM STAIN     I&D.  Wound care discussed.     Differential diagnoses, Supportive care, and indications for immediate follow-up discussed with patient.   Pathogenesis of diagnosis discussed including typical length and natural progression.   Instructed to return to clinic or nearest emergency department for any change in condition, further concerns, or worsening of symptoms.    The patient demonstrated a good understanding and agreed with the treatment plan.    Estefany Henry P.A.-C.      "

## 2020-08-26 ENCOUNTER — OFFICE VISIT (OUTPATIENT)
Dept: URGENT CARE | Facility: CLINIC | Age: 50
End: 2020-08-26
Payer: COMMERCIAL

## 2020-08-26 VITALS
DIASTOLIC BLOOD PRESSURE: 68 MMHG | SYSTOLIC BLOOD PRESSURE: 118 MMHG | TEMPERATURE: 98.7 F | BODY MASS INDEX: 20.73 KG/M2 | RESPIRATION RATE: 16 BRPM | WEIGHT: 140 LBS | HEIGHT: 69 IN | OXYGEN SATURATION: 100 % | HEART RATE: 97 BPM

## 2020-08-26 DIAGNOSIS — Z51.89 WOUND CHECK, ABSCESS: ICD-10-CM

## 2020-08-26 PROCEDURE — 99024 POSTOP FOLLOW-UP VISIT: CPT | Performed by: PHYSICIAN ASSISTANT

## 2020-08-26 ASSESSMENT — ENCOUNTER SYMPTOMS
CONSTITUTIONAL NEGATIVE: 1
RESPIRATORY NEGATIVE: 1
CARDIOVASCULAR NEGATIVE: 1

## 2020-08-26 ASSESSMENT — FIBROSIS 4 INDEX: FIB4 SCORE: 0.9

## 2020-08-26 ASSESSMENT — PAIN SCALES - GENERAL: PAINLEVEL: 8=MODERATE-SEVERE PAIN

## 2020-08-27 LAB
BACTERIA WND AEROBE CULT: ABNORMAL
BACTERIA WND AEROBE CULT: ABNORMAL
GRAM STN SPEC: ABNORMAL
SIGNIFICANT IND 70042: ABNORMAL
SITE SITE: ABNORMAL
SOURCE SOURCE: ABNORMAL

## 2020-08-27 NOTE — PROGRESS NOTES
Subjective:      Mari Dillon is a 50 y.o. female who presents with Wound Check (left buttocks since 21st of Aug)            Abscess left buttocks.  She reports no improvement.  She states she has not started her antibiotic yet.  Preliminary wound culture shows staph infection.  No sensitivity completed yet.    Wound Check  She was originally treated 2 to 3 days ago. Previous treatment included I&D of abscess and oral antibiotics. The maximum temperature noted was less than 100.4 F. The temperature was taken using an axillary reading. There has been colored discharge from the wound. The redness has not changed. The swelling has not changed. The pain has not changed. She has no difficulty moving the affected extremity or digit.       PMH:  has a past medical history of Anxiety (6/9/2009). She also has no past medical history of Breast cancer ([facility]).  MEDS:   Current Outpatient Medications:   •  sulfamethoxazole-trimethoprim (BACTRIM DS) 800-160 MG tablet, Take 1 Tab by mouth 2 times a day for 7 days., Disp: 14 Tab, Rfl: 0  •  DULoxetine (CYMBALTA) 60 MG Cap DR Particles delayed-release capsule, Take 1 Cap by mouth every day. TAKE 1 CAP BY MOUTH EVERY DAY., Disp: 90 Cap, Rfl: 3  •  hydrOXYzine HCl (ATARAX) 25 MG Tab, Take 1 Tab by mouth 3 times a day as needed for Anxiety. (Patient not taking: Reported on 8/24/2020), Disp: 30 Tab, Rfl: 3  •  topiramate (TOPAMAX) 100 MG Tab, Take 1 Tab by mouth every day., Disp: 90 Tab, Rfl: 3  •  ibuprofen (MOTRIN) 600 MG Tab, Take 1 Tab by mouth 3 times a day, with meals. (Patient not taking: Reported on 8/24/2020), Disp: 20 Tab, Rfl: 0  •  HYDROcodone/acetaminophen (NORCO)  MG Tab, TAKE 1 TABLET BY MOUTH DAILY AS NEEDED FOR 5 DAYS *G43.009* *11/21/18*, Disp: , Rfl: 0  ALLERGIES: No Known Allergies  SURGHX:   Past Surgical History:   Procedure Laterality Date   • SHOULDER DECOMPRESSION ARTHROSCOPIC Right 12/24/2018    Procedure: SHOULDER DECOMPRESSION ARTHROSCOPIC -  SUBACROMIAL;  Surgeon: Tristian Garcia M.D.;  Location: [facility];  Service: Orthopedics   • CLAVICLE DISTAL EXCISION Left 2018    Procedure: CLAVICLE DISTAL EXCISION;  Surgeon: Tristian Garcia M.D.;  Location: [facility];  Service: Orthopedics   • SHOULDER ARTHROSCOPY W/ BICIPITAL TENODESIS REPAIR Left 2018    Procedure: SHOULDER ARTHROSCOPY W/ BICIPITAL TENODESIS REPAIR - AND PACKER;  Surgeon: Tristian Garcia M.D.;  Location: [facility];  Service: Orthopedics   • SHOULDER MANIPULATION Left 2018    Procedure: SHOULDER MANIPULATION;  Surgeon: Tristian Garcia M.D.;  Location: [facility];  Service: Orthopedics   • SHOULDER ARTHROSCOPY W/ BICIPITAL TENODESIS REPAIR Right 6/15/2015    Procedure: SHOULDER ARTHROSCOPY W/ BICIPITAL TENODESIS, SYNOVECTOMY;  Surgeon: Tristian Garcia M.D.;  Location: [facility];  Service:    • CLAVICLE DISTAL EXCISION Right 6/15/2015    Procedure: CLAVICLE DISTAL EXCISION;  Surgeon: Tristian Garcia M.D.;  Location: [facility];  Service:    • SHOULDER DECOMPRESSION Right 6/15/2015    Procedure: SHOULDER DECOMPRESSION MINI INCISION ;  Surgeon: Tristian Garcia M.D.;  Location: [facility];  Service:    • OTHER      nose   • APPENDECTOMY     • PB  DELIVERY ONLY       SOCHX:  reports that she quit smoking about 15 years ago. She quit after 15.00 years of use. She has never used smokeless tobacco. She reports current drug use. Drug: Inhaled. She reports that she does not drink alcohol.  FH: family history includes Cancer in her maternal grandmother; Diabetes in her mother; Hypertension in her mother.    Review of Systems   Constitutional: Negative.    Respiratory: Negative.    Cardiovascular: Negative.    Skin:        Abscess wound check       Medications, Allergies, and current problem list reviewed today in Epic     Objective:     /68   Pulse 97  "  Temp 37.1 °C (98.7 °F)   Resp 16   Ht 1.753 m (5' 9\")   Wt 63.5 kg (140 lb)   SpO2 100%   BMI 20.67 kg/m²      Physical Exam  Vitals signs and nursing note reviewed.   Constitutional:       General: She is not in acute distress.     Appearance: She is well-developed. She is not diaphoretic.   Skin:     General: Skin is warm and dry.             Comments: Abscess with erythema, edema and tenderness noted.  Packing removed wound was flushed.  Some colored discharge noted.  It is a shallow wound therefore no further packing inserted.   Neurological:      Mental Status: She is alert and oriented to person, place, and time.   Psychiatric:         Behavior: Behavior normal.         Thought Content: Thought content normal.         Judgment: Judgment normal.                 Assessment/Plan:         1. Wound check, abscess       Wound check.  No interval improvement however patient has yet to start her antibiotics.  Preliminary wound culture shows a staph infection.  No sensitivity completed yet.  Follow-up in 2 days for wound check    Return to clinic or go to ED if symptoms worsen or persist. Indications for ED discussed at length. Patient voices understanding. Follow-up with your primary care provider in 3-5 days. Red flags discussed. All side effects of medication discussed including allergic response, GI upset, tendon injury, etc.    Please note that this dictation was created using voice recognition software. I have made every reasonable attempt to correct obvious errors, but I expect that there are errors of grammar and possibly content that I did not discover before finalizing the note.    "

## 2020-08-28 ENCOUNTER — TELEPHONE (OUTPATIENT)
Dept: URGENT CARE | Facility: CLINIC | Age: 50
End: 2020-08-28

## 2020-08-30 ENCOUNTER — SUPERVISING PHYSICIAN REVIEW (OUTPATIENT)
Dept: URGENT CARE | Facility: CLINIC | Age: 50
End: 2020-08-30

## 2020-08-30 NOTE — PROGRESS NOTES
Addendum 8/30/2020 .Encounter notes reviewed, I was not present to physically examine patient myself. No obvious and/or significant quality issues found solely from doing a chart review. CASSIDY Fontaine M.D.

## 2020-08-31 ENCOUNTER — OFFICE VISIT (OUTPATIENT)
Dept: URGENT CARE | Facility: CLINIC | Age: 50
End: 2020-08-31
Payer: COMMERCIAL

## 2020-08-31 VITALS
HEIGHT: 69 IN | HEART RATE: 88 BPM | SYSTOLIC BLOOD PRESSURE: 104 MMHG | DIASTOLIC BLOOD PRESSURE: 60 MMHG | RESPIRATION RATE: 16 BRPM | BODY MASS INDEX: 20.73 KG/M2 | OXYGEN SATURATION: 98 % | TEMPERATURE: 98.5 F | WEIGHT: 140 LBS

## 2020-08-31 DIAGNOSIS — L02.91 ABSCESS: ICD-10-CM

## 2020-08-31 PROCEDURE — 99024 POSTOP FOLLOW-UP VISIT: CPT | Performed by: PHYSICIAN ASSISTANT

## 2020-08-31 ASSESSMENT — ENCOUNTER SYMPTOMS
ROS SKIN COMMENTS: LEFT BUTTOCK ABSCESS
FEVER: 0
FATIGUE: 0
CHILLS: 0

## 2020-08-31 ASSESSMENT — FIBROSIS 4 INDEX: FIB4 SCORE: 0.9

## 2020-08-31 NOTE — PROGRESS NOTES
Subjective:   Mari Dillon is a 50 y.o. female who presents today with   Chief Complaint   Patient presents with   • Other     left buttocks, drainage and painful,Last visit: 08/26/20       Other  This is a new problem. The current episode started in the past 7 days. The problem occurs constantly. The problem has been unchanged. Pertinent negatives include no chills, fatigue or fever. Nothing aggravates the symptoms. Treatments tried: antibiotics. The treatment provided mild relief.     8/24/20 Patient seen and treated with Bactrim. Culture + for MRSA. Had I and D on 8/24 no packing placed on 8/26 as wound site was shallow.  Patient still has 4 days left of antibiotics at this time.  She is concerned about the wound site and wants someone else to have a look at it.  Patient states the area is very tender and constantly gives her discomfort to that area.  PMH:  has a past medical history of Anxiety (6/9/2009). She also has no past medical history of Breast cancer ([facility]).  MEDS:   Current Outpatient Medications:   •  sulfamethoxazole-trimethoprim (BACTRIM DS) 800-160 MG tablet, Take 1 Tab by mouth 2 times a day for 7 days., Disp: 14 Tab, Rfl: 0  •  DULoxetine (CYMBALTA) 60 MG Cap DR Particles delayed-release capsule, Take 1 Cap by mouth every day. TAKE 1 CAP BY MOUTH EVERY DAY., Disp: 90 Cap, Rfl: 3  •  topiramate (TOPAMAX) 100 MG Tab, Take 1 Tab by mouth every day., Disp: 90 Tab, Rfl: 3  •  HYDROcodone/acetaminophen (NORCO)  MG Tab, TAKE 1 TABLET BY MOUTH DAILY AS NEEDED FOR 5 DAYS *G43.009* *11/21/18*, Disp: , Rfl: 0  ALLERGIES: No Known Allergies  SURGHX:   Past Surgical History:   Procedure Laterality Date   • SHOULDER DECOMPRESSION ARTHROSCOPIC Right 12/24/2018    Procedure: SHOULDER DECOMPRESSION ARTHROSCOPIC - SUBACROMIAL;  Surgeon: Tristian Garcia M.D.;  Location: SURGERY [facility];  Service: Orthopedics   • CLAVICLE DISTAL EXCISION Left 12/24/2018    Procedure: CLAVICLE DISTAL EXCISION;   "Surgeon: Tristian Garcia M.D.;  Location: [facility];  Service: Orthopedics   • SHOULDER ARTHROSCOPY W/ BICIPITAL TENODESIS REPAIR Left 2018    Procedure: SHOULDER ARTHROSCOPY W/ BICIPITAL TENODESIS REPAIR - AND PACKER;  Surgeon: Tristian Garcia M.D.;  Location: [facility];  Service: Orthopedics   • SHOULDER MANIPULATION Left 2018    Procedure: SHOULDER MANIPULATION;  Surgeon: Tristian Garcia M.D.;  Location: [facility];  Service: Orthopedics   • SHOULDER ARTHROSCOPY W/ BICIPITAL TENODESIS REPAIR Right 6/15/2015    Procedure: SHOULDER ARTHROSCOPY W/ BICIPITAL TENODESIS, SYNOVECTOMY;  Surgeon: Tristian Garcia M.D.;  Location: [facility];  Service:    • CLAVICLE DISTAL EXCISION Right 6/15/2015    Procedure: CLAVICLE DISTAL EXCISION;  Surgeon: Tristian Garcia M.D.;  Location: [facility];  Service:    • SHOULDER DECOMPRESSION Right 6/15/2015    Procedure: SHOULDER DECOMPRESSION MINI INCISION ;  Surgeon: Tristian Garcia M.D.;  Location: [facility];  Service:    • OTHER      nose   • APPENDECTOMY     • PB  DELIVERY ONLY       SOCHX:  reports that she quit smoking about 15 years ago. She quit after 15.00 years of use. She has never used smokeless tobacco. She reports current drug use. Drug: Inhaled. She reports that she does not drink alcohol.  FH: Reviewed with patient, not pertinent to this visit.       Review of Systems   Constitutional: Negative for chills, fatigue and fever.   Skin:        Left buttock abscess   All other systems reviewed and are negative.       Objective:   /60 (BP Location: Right arm, Patient Position: Sitting, BP Cuff Size: Adult)   Pulse 88   Temp 36.9 °C (98.5 °F) (Temporal)   Resp 16   Ht 1.753 m (5' 9\")   Wt 63.5 kg (140 lb)   SpO2 98%   BMI 20.67 kg/m²   Physical Exam  Vitals signs and nursing note reviewed.   Constitutional:       General: She is not in " acute distress.     Appearance: Normal appearance. She is well-developed. She is not ill-appearing or toxic-appearing.   HENT:      Head: Normocephalic and atraumatic.      Right Ear: Hearing normal.      Left Ear: Hearing normal.   Eyes:      Pupils: Pupils are equal, round, and reactive to light.   Cardiovascular:      Rate and Rhythm: Normal rate and regular rhythm.      Heart sounds: Normal heart sounds.   Pulmonary:      Effort: Pulmonary effort is normal.      Breath sounds: Normal breath sounds.   Musculoskeletal:      Comments: Normal movement in all 4 extremities   Skin:     General: Skin is warm and dry.             Comments: Small 0.5 cm shallow wound site secondary to I and D on left buttock. Mild non-purulent discharge. No fluctuance or induration appreciated and no erythema to the surrounding area.    Neurological:      Mental Status: She is alert.      Coordination: Coordination normal.   Psychiatric:         Mood and Affect: Mood normal.        Area was cleaned with alcohol swab and saline bullets and dressing was replaced today. MA present for dressing change. No indication for need of repeat I and D.   Assessment/Plan:   Assessment    1. Abscess  - REFERRAL TO GENERAL SURGERY  General Surgery referral today for patient in case of recurrence or potential tunneling.   Differential diagnosis, natural history, supportive care, and indications for immediate follow-up discussed.  Finish out abx as prescribed.  Patient questions answered today related to MRSA and abx.   Patient given instructions and understanding of medications and treatment.    If not improving in 3-5 days, F/U with PCP or return to UC if symptoms worsen.    Patient agreeable to plan.      Please note that this dictation was created using voice recognition software. I have made every reasonable attempt to correct obvious errors, but I expect that there are errors of grammar and possibly content that I did not discover before finalizing  the note.    Ulisses Aguilar PA-C

## 2020-12-05 ENCOUNTER — OFFICE VISIT (OUTPATIENT)
Dept: URGENT CARE | Facility: CLINIC | Age: 50
End: 2020-12-05
Payer: COMMERCIAL

## 2020-12-05 VITALS
WEIGHT: 139 LBS | DIASTOLIC BLOOD PRESSURE: 80 MMHG | HEIGHT: 69 IN | HEART RATE: 103 BPM | SYSTOLIC BLOOD PRESSURE: 130 MMHG | OXYGEN SATURATION: 96 % | BODY MASS INDEX: 20.59 KG/M2 | RESPIRATION RATE: 12 BRPM | TEMPERATURE: 98.4 F

## 2020-12-05 DIAGNOSIS — J04.0 LARYNGITIS: ICD-10-CM

## 2020-12-05 DIAGNOSIS — J02.0 PHARYNGITIS DUE TO STREPTOCOCCUS SPECIES: ICD-10-CM

## 2020-12-05 LAB
INT CON NEG: NORMAL
INT CON POS: NORMAL
S PYO AG THROAT QL: NORMAL

## 2020-12-05 PROCEDURE — 87880 STREP A ASSAY W/OPTIC: CPT | Performed by: FAMILY MEDICINE

## 2020-12-05 PROCEDURE — 99204 OFFICE O/P NEW MOD 45 MIN: CPT | Performed by: FAMILY MEDICINE

## 2020-12-05 RX ORDER — NAPROXEN 500 MG/1
500 TABLET ORAL 2 TIMES DAILY WITH MEALS
Qty: 20 TAB | Refills: 0 | Status: SHIPPED | OUTPATIENT
Start: 2020-12-05 | End: 2020-12-15

## 2020-12-05 RX ORDER — AMOXICILLIN 500 MG/1
500 CAPSULE ORAL 2 TIMES DAILY
Qty: 20 CAP | Refills: 0 | Status: SHIPPED | OUTPATIENT
Start: 2020-12-05 | End: 2020-12-15

## 2020-12-05 ASSESSMENT — ENCOUNTER SYMPTOMS
SORE THROAT: 1
HOARSE VOICE: 1
MYALGIAS: 0
COUGH: 0
DIZZINESS: 0
SHORTNESS OF BREATH: 0
TROUBLE SWALLOWING: 1
CHILLS: 0
VOMITING: 0
SWOLLEN GLANDS: 0
NAUSEA: 0
FEVER: 0

## 2020-12-05 ASSESSMENT — FIBROSIS 4 INDEX: FIB4 SCORE: 0.9

## 2020-12-05 NOTE — PROGRESS NOTES
Subjective:   Mari Dillon is a 50 y.o. female who presents for Sore Throat (onset 6 days  loss of voice )        Pharyngitis   This is a new problem. The current episode started in the past 7 days. The problem has been unchanged. Neither side of throat is experiencing more pain than the other. There has been no fever. Associated symptoms include a hoarse voice and trouble swallowing (pain with swallowing). Pertinent negatives include no coughing, shortness of breath, swollen glands or vomiting. Associated symptoms comments: There has been community-wide COVID-19 exposure, the patient denies known direct COVID-19 exposure, patient defers SARS CoV2 testing  . She has had no exposure to strep. She has tried cool liquids for the symptoms. The treatment provided no relief.     PMH:  has a past medical history of Anxiety (6/9/2009). She also has no past medical history of Breast cancer ([facility]).  MEDS:   Current Outpatient Medications:   •  naproxen (NAPROSYN) 500 MG Tab, Take 1 Tab by mouth 2 times a day, with meals for 10 days., Disp: 20 Tab, Rfl: 0  •  DULoxetine (CYMBALTA) 60 MG Cap DR Particles delayed-release capsule, Take 1 Cap by mouth every day. TAKE 1 CAP BY MOUTH EVERY DAY., Disp: 90 Cap, Rfl: 3  •  topiramate (TOPAMAX) 100 MG Tab, Take 1 Tab by mouth every day., Disp: 90 Tab, Rfl: 3  •  HYDROcodone/acetaminophen (NORCO)  MG Tab, TAKE 1 TABLET BY MOUTH DAILY AS NEEDED FOR 5 DAYS *G43.009* *11/21/18*, Disp: , Rfl: 0  ALLERGIES: No Known Allergies  SURGHX:   Past Surgical History:   Procedure Laterality Date   • SHOULDER DECOMPRESSION ARTHROSCOPIC Right 12/24/2018    Procedure: SHOULDER DECOMPRESSION ARTHROSCOPIC - SUBACROMIAL;  Surgeon: Tristian Garcia M.D.;  Location: SURGERY [facility];  Service: Orthopedics   • CLAVICLE DISTAL EXCISION Left 12/24/2018    Procedure: CLAVICLE DISTAL EXCISION;  Surgeon: Tristian Garcia M.D.;  Location: SURGERY [facility];  Service: Orthopedics   •  SHOULDER ARTHROSCOPY W/ BICIPITAL TENODESIS REPAIR Left 2018    Procedure: SHOULDER ARTHROSCOPY W/ BICIPITAL TENODESIS REPAIR - AND PACKER;  Surgeon: Tristian Garcia M.D.;  Location: [facility];  Service: Orthopedics   • SHOULDER MANIPULATION Left 2018    Procedure: SHOULDER MANIPULATION;  Surgeon: Tristian Garcia M.D.;  Location: [facility];  Service: Orthopedics   • SHOULDER ARTHROSCOPY W/ BICIPITAL TENODESIS REPAIR Right 6/15/2015    Procedure: SHOULDER ARTHROSCOPY W/ BICIPITAL TENODESIS, SYNOVECTOMY;  Surgeon: Tristian Garcia M.D.;  Location: [facility];  Service:    • CLAVICLE DISTAL EXCISION Right 6/15/2015    Procedure: CLAVICLE DISTAL EXCISION;  Surgeon: Tristian Garcia M.D.;  Location: [facility];  Service:    • SHOULDER DECOMPRESSION Right 6/15/2015    Procedure: SHOULDER DECOMPRESSION MINI INCISION ;  Surgeon: Tristian Garcia M.D.;  Location: [facility];  Service:    • OTHER      nose   • APPENDECTOMY     • PB  DELIVERY ONLY       SOCHX:  reports that she quit smoking about 15 years ago. She quit after 15.00 years of use. She has never used smokeless tobacco. She reports current drug use. Drugs: Inhaled and Marijuana. She reports that she does not drink alcohol.  FH:   Family History   Problem Relation Age of Onset   • Hypertension Mother    • Diabetes Mother    • Cancer Maternal Grandmother         breast     Review of Systems   Constitutional: Negative for chills and fever.   HENT: Positive for hoarse voice, sore throat and trouble swallowing (pain with swallowing).    Respiratory: Negative for cough and shortness of breath.    Gastrointestinal: Negative for nausea and vomiting.   Musculoskeletal: Negative for myalgias.   Skin: Negative for rash.   Neurological: Negative for dizziness.   All other systems reviewed and are negative.       Objective:   /80   Pulse (!) 103   Temp 36.9 °C  "(98.4 °F) (Temporal)   Resp 12   Ht 1.753 m (5' 9\")   Wt 63 kg (139 lb)   SpO2 96%   BMI 20.53 kg/m²   Physical Exam  Vitals signs and nursing note reviewed.   Constitutional:       General: She is not in acute distress.     Appearance: She is well-developed.   HENT:      Head: Normocephalic and atraumatic.      Right Ear: External ear normal.      Left Ear: External ear normal.      Nose: Rhinorrhea present.      Mouth/Throat:      Mouth: Mucous membranes are moist.      Pharynx: Posterior oropharyngeal erythema present.   Eyes:      Conjunctiva/sclera: Conjunctivae normal.   Cardiovascular:      Rate and Rhythm: Normal rate.   Pulmonary:      Effort: Pulmonary effort is normal. No respiratory distress.      Breath sounds: Normal breath sounds. No wheezing or rhonchi.   Abdominal:      General: There is no distension.   Musculoskeletal: Normal range of motion.   Skin:     General: Skin is warm and dry.   Neurological:      General: No focal deficit present.      Mental Status: She is alert and oriented to person, place, and time. Mental status is at baseline.      Gait: Gait (gait at baseline) normal.   Psychiatric:         Judgment: Judgment normal.     POC rapid strept: positive      Assessment/Plan:   1. Pharyngitis, unspecified etiology  - naproxen (NAPROSYN) 500 MG Tab; Take 1 Tab by mouth 2 times a day, with meals for 10 days.  Dispense: 20 Tab; Refill: 0  - POCT Rapid Strep A    2. Laryngitis  - naproxen (NAPROSYN) 500 MG Tab; Take 1 Tab by mouth 2 times a day, with meals for 10 days.  Dispense: 20 Tab; Refill: 0      Discussed close monitoring, return precautions, and supportive measures of maintaining adequate fluid hydration and caloric intake, relative rest and symptom management as needed for pain and/or fever.    Differential diagnosis, natural history, supportive care, and indications for immediate follow-up discussed.     Advised the patient to follow-up with the primary care physician for " recheck, reevaluation, and consideration of further management.    Please note that this dictation was created using voice recognition software. I have worked with consultants from the vendor as well as technical experts from [facility] to optimize the interface. I have made every reasonable attempt to correct obvious errors, but I expect that there are errors of grammar and possibly content that I did not discover before finalizing the note.

## 2021-03-05 ENCOUNTER — APPOINTMENT (RX ONLY)
Dept: URBAN - METROPOLITAN AREA CLINIC 4 | Facility: CLINIC | Age: 51
Setting detail: DERMATOLOGY
End: 2021-03-05

## 2021-03-05 DIAGNOSIS — D22 MELANOCYTIC NEVI: ICD-10-CM

## 2021-03-05 DIAGNOSIS — L81.4 OTHER MELANIN HYPERPIGMENTATION: ICD-10-CM

## 2021-03-05 DIAGNOSIS — L82.1 OTHER SEBORRHEIC KERATOSIS: ICD-10-CM

## 2021-03-05 DIAGNOSIS — L57.0 ACTINIC KERATOSIS: ICD-10-CM

## 2021-03-05 DIAGNOSIS — D18.0 HEMANGIOMA: ICD-10-CM

## 2021-03-05 PROBLEM — D22.71 MELANOCYTIC NEVI OF RIGHT LOWER LIMB, INCLUDING HIP: Status: ACTIVE | Noted: 2021-03-05

## 2021-03-05 PROBLEM — D48.5 NEOPLASM OF UNCERTAIN BEHAVIOR OF SKIN: Status: ACTIVE | Noted: 2021-03-05

## 2021-03-05 PROBLEM — D22.5 MELANOCYTIC NEVI OF TRUNK: Status: ACTIVE | Noted: 2021-03-05

## 2021-03-05 PROBLEM — D18.01 HEMANGIOMA OF SKIN AND SUBCUTANEOUS TISSUE: Status: ACTIVE | Noted: 2021-03-05

## 2021-03-05 PROBLEM — D22.61 MELANOCYTIC NEVI OF RIGHT UPPER LIMB, INCLUDING SHOULDER: Status: ACTIVE | Noted: 2021-03-05

## 2021-03-05 PROBLEM — D22.72 MELANOCYTIC NEVI OF LEFT LOWER LIMB, INCLUDING HIP: Status: ACTIVE | Noted: 2021-03-05

## 2021-03-05 PROBLEM — D22.62 MELANOCYTIC NEVI OF LEFT UPPER LIMB, INCLUDING SHOULDER: Status: ACTIVE | Noted: 2021-03-05

## 2021-03-05 PROCEDURE — ? BIOPSY BY SHAVE METHOD

## 2021-03-05 PROCEDURE — 11102 TANGNTL BX SKIN SINGLE LES: CPT

## 2021-03-05 PROCEDURE — 99213 OFFICE O/P EST LOW 20 MIN: CPT | Mod: 25

## 2021-03-05 PROCEDURE — ? LIQUID NITROGEN

## 2021-03-05 PROCEDURE — ? COUNSELING

## 2021-03-05 ASSESSMENT — LOCATION DETAILED DESCRIPTION DERM
LOCATION DETAILED: RIGHT PROXIMAL DORSAL FOREARM
LOCATION DETAILED: RIGHT PROXIMAL POSTERIOR UPPER ARM
LOCATION DETAILED: INFERIOR THORACIC SPINE
LOCATION DETAILED: RIGHT DISTAL POSTERIOR THIGH
LOCATION DETAILED: MIDDLE STERNUM
LOCATION DETAILED: LEFT VENTRAL LATERAL PROXIMAL FOREARM
LOCATION DETAILED: RIGHT MEDIAL SUPERIOR CHEST
LOCATION DETAILED: LOWER STERNUM
LOCATION DETAILED: RIGHT CENTRAL EYEBROW
LOCATION DETAILED: LEFT PROXIMAL PRETIBIAL REGION
LOCATION DETAILED: RIGHT POPLITEAL SKIN
LOCATION DETAILED: RIGHT LATERAL SUPERIOR CHEST
LOCATION DETAILED: LEFT INFERIOR FOREHEAD
LOCATION DETAILED: LEFT DISTAL POSTERIOR UPPER ARM
LOCATION DETAILED: RIGHT RADIAL DORSAL HAND
LOCATION DETAILED: LEFT DISTAL DORSAL FOREARM
LOCATION DETAILED: RIGHT ANTERIOR DISTAL THIGH
LOCATION DETAILED: RIGHT SUPERIOR UPPER BACK
LOCATION DETAILED: RIGHT DISTAL POSTERIOR UPPER ARM
LOCATION DETAILED: LEFT LATERAL ELBOW
LOCATION DETAILED: RIGHT CENTRAL FRONTAL SCALP
LOCATION DETAILED: RIGHT VENTRAL PROXIMAL FOREARM
LOCATION DETAILED: RIGHT PROXIMAL PRETIBIAL REGION
LOCATION DETAILED: SUBXIPHOID
LOCATION DETAILED: LEFT POPLITEAL SKIN
LOCATION DETAILED: LEFT DISTAL POSTERIOR THIGH

## 2021-03-05 ASSESSMENT — LOCATION SIMPLE DESCRIPTION DERM
LOCATION SIMPLE: SCALP
LOCATION SIMPLE: LEFT POSTERIOR UPPER ARM
LOCATION SIMPLE: RIGHT POPLITEAL SKIN
LOCATION SIMPLE: CHEST
LOCATION SIMPLE: LEFT PRETIBIAL REGION
LOCATION SIMPLE: LEFT ELBOW
LOCATION SIMPLE: RIGHT HAND
LOCATION SIMPLE: LEFT POPLITEAL SKIN
LOCATION SIMPLE: RIGHT THIGH
LOCATION SIMPLE: UPPER BACK
LOCATION SIMPLE: RIGHT POSTERIOR THIGH
LOCATION SIMPLE: LEFT POSTERIOR THIGH
LOCATION SIMPLE: RIGHT POSTERIOR UPPER ARM
LOCATION SIMPLE: LEFT FOREHEAD
LOCATION SIMPLE: RIGHT EYEBROW
LOCATION SIMPLE: LEFT FOREARM
LOCATION SIMPLE: ABDOMEN
LOCATION SIMPLE: RIGHT PRETIBIAL REGION
LOCATION SIMPLE: RIGHT FOREARM
LOCATION SIMPLE: RIGHT UPPER BACK

## 2021-03-05 ASSESSMENT — LOCATION ZONE DERM
LOCATION ZONE: LEG
LOCATION ZONE: FACE
LOCATION ZONE: HAND
LOCATION ZONE: ARM
LOCATION ZONE: SCALP
LOCATION ZONE: TRUNK

## 2021-03-05 NOTE — PROCEDURE: LIQUID NITROGEN
Bill Insurance (You Assume Risk Of Denial Or Audit By Selecting Yes): No
Consent: The patient's consent was obtained including but not limited to risks of crusting, scabbing, blistering, scarring, darker or lighter pigmentary change, recurrence, incomplete removal and infection.
Detail Level: Detailed
Medical Necessity Information: It is in your best interest to select a reason for this procedure from the list below. All of these items fulfill various CMS LCD requirements except the new and changing color options.
Post-Care Instructions: I reviewed with the patient in detail post-care instructions. Patient is to wear sunprotection, and avoid picking at any of the treated lesions. Pt may apply Vaseline to crusted or scabbing areas.
Duration Of Freeze Thaw-Cycle (Seconds): 0
Medical Necessity Clause: This procedure was medically necessary because the lesions that were treated were:  If lesion does not resolve, bx is needed.

## 2021-03-11 ENCOUNTER — RX ONLY (OUTPATIENT)
Age: 51
Setting detail: RX ONLY
End: 2021-03-11

## 2021-03-11 RX ORDER — FLUOROURACIL 2 G/40G
CREAM TOPICAL
Qty: 1 | Refills: 1 | Status: ERX | COMMUNITY
Start: 2021-03-10

## 2021-04-08 ENCOUNTER — APPOINTMENT (RX ONLY)
Dept: URBAN - METROPOLITAN AREA CLINIC 4 | Facility: CLINIC | Age: 51
Setting detail: DERMATOLOGY
End: 2021-04-08

## 2021-04-08 DIAGNOSIS — Z09 ENCOUNTER FOR FOLLOW-UP EXAMINATION AFTER COMPLETED TREATMENT FOR CONDITIONS OTHER THAN MALIGNANT NEOPLASM: ICD-10-CM

## 2021-04-08 PROCEDURE — ? PATIENT SPECIFIC COUNSELING

## 2021-04-08 PROCEDURE — 99212 OFFICE O/P EST SF 10 MIN: CPT

## 2021-04-08 PROCEDURE — ? PHOTO-DOCUMENTATION

## 2021-04-08 PROCEDURE — ? MEDICATION COUNSELING

## 2021-04-08 ASSESSMENT — LOCATION SIMPLE DESCRIPTION DERM: LOCATION SIMPLE: CHEST

## 2021-04-08 ASSESSMENT — LOCATION ZONE DERM: LOCATION ZONE: TRUNK

## 2021-04-08 ASSESSMENT — LOCATION DETAILED DESCRIPTION DERM: LOCATION DETAILED: RIGHT MEDIAL SUPERIOR CHEST

## 2021-04-08 NOTE — PROCEDURE: MEDICATION COUNSELING
Wartpeel Pregnancy And Lactation Text: This medication is Pregnancy Category X and contraindicated in pregnancy and in women who may become pregnant. It is unknown if this medication is excreted in breast milk.
Mirvaso Pregnancy And Lactation Text: This medication has not been assigned a Pregnancy Risk Category. It is unknown if the medication is excreted in breast milk.
Finasteride Pregnancy And Lactation Text: This medication is absolutely contraindicated during pregnancy. It is unknown if it is excreted in breast milk.
Cimzia Pregnancy And Lactation Text: This medication crosses the placenta but can be considered safe in certain situations. Cimzia may be excreted in breast milk.
Stelara Pregnancy And Lactation Text: This medication is Pregnancy Category B and is considered safe during pregnancy. It is unknown if this medication is excreted in breast milk.
Propranolol Counseling:  I discussed with the patient the risks of propranolol including but not limited to low heart rate, low blood pressure, low blood sugar, restlessness and increased cold sensitivity. They should call the office if they experience any of these side effects.
Methotrexate Counseling:  Patient counseled regarding adverse effects of methotrexate including but not limited to nausea, vomiting, abnormalities in liver function tests. Patients may develop mouth sores, rash, diarrhea, and abnormalities in blood counts. The patient understands that monitoring is required including LFT's and blood counts.  There is a rare possibility of scarring of the liver and lung problems that can occur when taking methotrexate. Persistent nausea, loss of appetite, pale stools, dark urine, cough, and shortness of breath should be reported immediately. Patient advised to discontinue methotrexate treatment at least three months before attempting to become pregnant.  I discussed the need for folate supplements while taking methotrexate.  These supplements can decrease side effects during methotrexate treatment. The patient verbalized understanding of the proper use and possible adverse effects of methotrexate.  All of the patient's questions and concerns were addressed.
Minocycline Pregnancy And Lactation Text: This medication is Pregnancy Category D and not consider safe during pregnancy. It is also excreted in breast milk.
Taltz Counseling: I discussed with the patient the risks of ixekizumab including but not limited to immunosuppression, serious infections, worsening of inflammatory bowel disease and drug reactions.  The patient understands that monitoring is required including a PPD at baseline and must alert us or the primary physician if symptoms of infection or other concerning signs are noted.
Quinolones Counseling:  I discussed with the patient the risks of fluoroquinolones including but not limited to GI upset, allergic reaction, drug rash, diarrhea, dizziness, photosensitivity, yeast infections, liver function test abnormalities, tendonitis/tendon rupture.
Propranolol Pregnancy And Lactation Text: This medication is Pregnancy Category C and it isn't known if it is safe during pregnancy. It is excreted in breast milk.
Methotrexate Pregnancy And Lactation Text: This medication is Pregnancy Category X and is known to cause fetal harm. This medication is excreted in breast milk.
Terbinafine Counseling: Patient counseling regarding adverse effects of terbinafine including but not limited to headache, diarrhea, rash, upset stomach, liver function test abnormalities, itching, taste/smell disturbance, nausea, abdominal pain, and flatulence.  There is a rare possibility of liver failure that can occur when taking terbinafine.  The patient understands that a baseline LFT and kidney function test may be required. The patient verbalized understanding of the proper use and possible adverse effects of terbinafine.  All of the patient's questions and concerns were addressed.
Use Enhanced Medication Counseling?: No
Terbinafine Pregnancy And Lactation Text: This medication is Pregnancy Category B and is considered safe during pregnancy. It is also excreted in breast milk and breast feeding isn't recommended.
Picato Counseling:  I discussed with the patient the risks of Picato including but not limited to erythema, scaling, itching, weeping, crusting, and pain.
Calcipotriene Counseling:  I discussed with the patient the risks of calcipotriene including but not limited to erythema, scaling, itching, and irritation.
Cosentyx Counseling:  I discussed with the patient the risks of Cosentyx including but not limited to worsening of Crohn's disease, immunosuppression, allergic reactions and infections.  The patient understands that monitoring is required including a PPD at baseline and must alert us or the primary physician if symptoms of infection or other concerning signs are noted.
Zyclara Counseling:  I discussed with the patient the risks of imiquimod including but not limited to erythema, scaling, itching, weeping, crusting, and pain.  Patient understands that the inflammatory response to imiquimod is variable from person to person and was educated regarded proper titration schedule.  If flu-like symptoms develop, patient knows to discontinue the medication and contact us.
Prednisone Counseling:  I discussed with the patient the risks of prolonged use of prednisone including but not limited to weight gain, insomnia, osteoporosis, mood changes, diabetes, susceptibility to infection, glaucoma and high blood pressure.  In cases where prednisone use is prolonged, patients should be monitored with blood pressure checks, serum glucose levels and an eye exam.  Additionally, the patient may need to be placed on GI prophylaxis, PCP prophylaxis, and calcium and vitamin D supplementation and/or a bisphosphonate.  The patient verbalized understanding of the proper use and the possible adverse effects of prednisone.  All of the patient's questions and concerns were addressed.
Calcipotriene Pregnancy And Lactation Text: This medication has not been proven safe during pregnancy. It is unknown if this medication is excreted in breast milk.
Quinolones Pregnancy And Lactation Text: This medication is Pregnancy Category C and it isn't know if it is safe during pregnancy. It is also excreted in breast milk.
Picato Pregnancy And Lactation Text: This medication is Pregnancy Category C. It is unknown if this medication is excreted in breast milk.
Birth Control Pills Counseling: Birth Control Pill Counseling: I discussed with the patient the potential side effects of OCPs including but not limited to increased risk of stroke, heart attack, thrombophlebitis, deep venous thrombosis, hepatic adenomas, breast changes, GI upset, headaches, and depression.  The patient verbalized understanding of the proper use and possible adverse effects of OCPs. All of the patient's questions and concerns were addressed.
Taltz Pregnancy And Lactation Text: The risk during pregnancy and breastfeeding is uncertain with this medication.
Gabapentin Counseling: I discussed with the patient the risks of gabapentin including but not limited to dizziness, somnolence, fatigue and ataxia.
Gabapentin Pregnancy And Lactation Text: This medication is Pregnancy Category C and isn't considered safe during pregnancy. It is excreted in breast milk.
Azithromycin Counseling:  I discussed with the patient the risks of azithromycin including but not limited to GI upset, allergic reaction, drug rash, diarrhea, and yeast infections.
Protopic Counseling: Patient may experience a mild burning sensation during topical application. Protopic is not approved in children less than 2 years of age. There have been case reports of hematologic and skin malignancies in patients using topical calcineurin inhibitors although causality is questionable.
Prednisone Pregnancy And Lactation Text: This medication is Pregnancy Category C and it isn't know if it is safe during pregnancy. This medication is excreted in breast milk.
Tremfya Counseling: I discussed with the patient the risks of guselkumab including but not limited to immunosuppression, serious infections, worsening of inflammatory bowel disease and drug reactions.  The patient understands that monitoring is required including a PPD at baseline and must alert us or the primary physician if symptoms of infection or other concerning signs are noted.
Rifampin Counseling: I discussed with the patient the risks of rifampin including but not limited to liver damage, kidney damage, red-orange body fluids, nausea/vomiting and severe allergy.
Birth Control Pills Pregnancy And Lactation Text: This medication should be avoided if pregnant and for the first 30 days post-partum.
5-Fu Counseling: 5-Fluorouracil Counseling:  I discussed with the patient the risks of 5-fluorouracil including but not limited to erythema, scaling, itching, weeping, crusting, and pain.
Spironolactone Counseling: Patient advised regarding risks of diarrhea, abdominal pain, hyperkalemia, birth defects (for female patients), liver toxicity and renal toxicity. The patient may need blood work to monitor liver and kidney function and potassium levels while on therapy. The patient verbalized understanding of the proper use and possible adverse effects of spironolactone.  All of the patient's questions and concerns were addressed.
Glycopyrrolate Counseling:  I discussed with the patient the risks of glycopyrrolate including but not limited to skin rash, drowsiness, dry mouth, difficulty urinating, and blurred vision.
Dupixent Counseling: I discussed with the patient the risks of dupilumab including but not limited to eye infection and irritation, cold sores, injection site reactions, worsening of asthma, allergic reactions and increased risk of parasitic infection.  Live vaccines should be avoided while taking dupilumab. Dupilumab will also interact with certain medications such as warfarin and cyclosporine. The patient understands that monitoring is required and they must alert us or the primary physician if symptoms of infection or other concerning signs are noted.
Cimetidine Counseling:  I discussed with the patient the risks of Cimetidine including but not limited to gynecomastia, headache, diarrhea, nausea, drowsiness, arrhythmias, pancreatitis, skin rashes, psychosis, bone marrow suppression and kidney toxicity.
Dupixent Pregnancy And Lactation Text: This medication likely crosses the placenta but the risk for the fetus is uncertain. This medication is excreted in breast milk.
Protopic Pregnancy And Lactation Text: This medication is Pregnancy Category C. It is unknown if this medication is excreted in breast milk when applied topically.
Rifampin Pregnancy And Lactation Text: This medication is Pregnancy Category C and it isn't know if it is safe during pregnancy. It is also excreted in breast milk and should not be used if you are breast feeding.
Azithromycin Pregnancy And Lactation Text: This medication is considered safe during pregnancy and is also secreted in breast milk.
Opioid Counseling: I discussed with the patient the potential side effects of opioids including but not limited to addiction, altered mental status, and depression. I stressed avoiding alcohol, benzodiazepines, muscle relaxants and sleep aids unless specifically okayed by a physician. The patient verbalized understanding of the proper use and possible adverse effects of opioids. All of the patient's questions and concerns were addressed. They were instructed to flush the remaining pills down the toilet if they did not need them for pain.
Xeljanz Counseling: I discussed with the patient the risks of Xeljanz therapy including increased risk of infection, liver issues, headache, diarrhea, or cold symptoms. Live vaccines should be avoided. They were instructed to call if they have any problems.
Glycopyrrolate Pregnancy And Lactation Text: This medication is Pregnancy Category B and is considered safe during pregnancy. It is unknown if it is excreted breast milk.
Rhofade Counseling: Rhofade is a topical medication which can decrease superficial blood flow where applied. Side effects are uncommon and include stinging, redness and allergic reactions.
Hydroxychloroquine Counseling:  I discussed with the patient that a baseline ophthalmologic exam is needed at the start of therapy and every year thereafter while on therapy. A CBC may also be warranted for monitoring.  The side effects of this medication were discussed with the patient, including but not limited to agranulocytosis, aplastic anemia, seizures, rashes, retinopathy, and liver toxicity. Patient instructed to call the office should any adverse effect occur.  The patient verbalized understanding of the proper use and possible adverse effects of Plaquenil.  All the patient's questions and concerns were addressed.
Enbrel Counseling:  I discussed with the patient the risks of etanercept including but not limited to myelosuppression, immunosuppression, autoimmune hepatitis, demyelinating diseases, lymphoma, and infections.  The patient understands that monitoring is required including a PPD at baseline and must alert us or the primary physician if symptoms of infection or other concerning signs are noted.
Opioid Pregnancy And Lactation Text: These medications can lead to premature delivery and should be avoided during pregnancy. These medications are also present in breast milk in small amounts.
Doxepin Counseling:  Patient advised that the medication is sedating and not to drive a car after taking this medication. Patient informed of potential adverse effects including but not limited to dry mouth, urinary retention, and blurry vision.  The patient verbalized understanding of the proper use and possible adverse effects of doxepin.  All of the patient's questions and concerns were addressed.
Sarecycline Counseling: Patient advised regarding possible photosensitivity and discoloration of the teeth, skin, lips, tongue and gums.  Patient instructed to avoid sunlight, if possible.  When exposed to sunlight, patients should wear protective clothing, sunglasses, and sunscreen.  The patient was instructed to call the office immediately if the following severe adverse effects occur:  hearing changes, easy bruising/bleeding, severe headache, or vision changes.  The patient verbalized understanding of the proper use and possible adverse effects of sarecycline.  All of the patient's questions and concerns were addressed.
Bactrim Counseling:  I discussed with the patient the risks of sulfa antibiotics including but not limited to GI upset, allergic reaction, drug rash, diarrhea, dizziness, photosensitivity, and yeast infections.  Rarely, more serious reactions can occur including but not limited to aplastic anemia, agranulocytosis, methemoglobinemia, blood dyscrasias, liver or kidney failure, lung infiltrates or desquamative/blistering drug rashes.
Acitretin Counseling:  I discussed with the patient the risks of acitretin including but not limited to hair loss, dry lips/skin/eyes, liver damage, hyperlipidemia, depression/suicidal ideation, photosensitivity.  Serious rare side effects can include but are not limited to pancreatitis, pseudotumor cerebri, bony changes, clot formation/stroke/heart attack.  Patient understands that alcohol is contraindicated since it can result in liver toxicity and significantly prolong the elimination of the drug by many years.
Drysol Counseling:  I discussed with the patient the risks of drysol/aluminum chloride including but not limited to skin rash, itching, irritation, burning.
Spironolactone Pregnancy And Lactation Text: This medication can cause feminization of the male fetus and should be avoided during pregnancy. The active metabolite is also found in breast milk.
Bactrim Pregnancy And Lactation Text: This medication is Pregnancy Category D and is known to cause fetal risk.  It is also excreted in breast milk.
SSKI Counseling:  I discussed with the patient the risks of SSKI including but not limited to thyroid abnormalities, metallic taste, GI upset, fever, headache, acne, arthralgias, paraesthesias, lymphadenopathy, easy bleeding, arrhythmias, and allergic reaction.
Xelchadwickz Pregnancy And Lactation Text: This medication is Pregnancy Category D and is not considered safe during pregnancy.  The risk during breast feeding is also uncertain.
Acitretin Pregnancy And Lactation Text: This medication is Pregnancy Category X and should not be given to women who are pregnant or may become pregnant in the future. This medication is excreted in breast milk.
Drysol Pregnancy And Lactation Text: This medication is considered safe during pregnancy and breast feeding.
Doxepin Pregnancy And Lactation Text: This medication is Pregnancy Category C and it isn't known if it is safe during pregnancy. It is also excreted in breast milk and breast feeding isn't recommended.
Bexarotene Counseling:  I discussed with the patient the risks of bexarotene including but not limited to hair loss, dry lips/skin/eyes, liver abnormalities, hyperlipidemia, pancreatitis, depression/suicidal ideation, photosensitivity, drug rash/allergic reactions, hypothyroidism, anemia, leukopenia, infection, cataracts, and teratogenicity.  Patient understands that they will need regular blood tests to check lipid profile, liver function tests, white blood cell count, thyroid function tests and pregnancy test if applicable.
Humira Counseling:  I discussed with the patient the risks of adalimumab including but not limited to myelosuppression, immunosuppression, autoimmune hepatitis, demyelinating diseases, lymphoma, and serious infections.  The patient understands that monitoring is required including a PPD at baseline and must alert us or the primary physician if symptoms of infection or other concerning signs are noted.
Solaraze Counseling:  I discussed with the patient the risks of Solaraze including but not limited to erythema, scaling, itching, weeping, crusting, and pain.
Sski Pregnancy And Lactation Text: This medication is Pregnancy Category D and isn't considered safe during pregnancy. It is excreted in breast milk.
Elidel Counseling: Patient may experience a mild burning sensation during topical application. Elidel is not approved in children less than 2 years of age. There have been case reports of hematologic and skin malignancies in patients using topical calcineurin inhibitors although causality is questionable.
Tetracycline Counseling: Patient counseled regarding possible photosensitivity and increased risk for sunburn.  Patient instructed to avoid sunlight, if possible.  When exposed to sunlight, patients should wear protective clothing, sunglasses, and sunscreen.  The patient was instructed to call the office immediately if the following severe adverse effects occur:  hearing changes, easy bruising/bleeding, severe headache, or vision changes.  The patient verbalized understanding of the proper use and possible adverse effects of tetracycline.  All of the patient's questions and concerns were addressed. Patient understands to avoid pregnancy while on therapy due to potential birth defects.
Cephalexin Counseling: I counseled the patient regarding use of cephalexin as an antibiotic for prophylactic and/or therapeutic purposes. Cephalexin (commonly prescribed under brand name Keflex) is a cephalosporin antibiotic which is active against numerous classes of bacteria, including most skin bacteria. Side effects may include nausea, diarrhea, gastrointestinal upset, rash, hives, yeast infections, and in rare cases, hepatitis, kidney disease, seizures, fever, confusion, neurologic symptoms, and others. Patients with severe allergies to penicillin medications are cautioned that there is about a 10% incidence of cross-reactivity with cephalosporins. When possible, patients with penicillin allergies should use alternatives to cephalosporins for antibiotic therapy.
Infliximab Counseling:  I discussed with the patient the risks of infliximab including but not limited to myelosuppression, immunosuppression, autoimmune hepatitis, demyelinating diseases, lymphoma, and serious infections.  The patient understands that monitoring is required including a PPD at baseline and must alert us or the primary physician if symptoms of infection or other concerning signs are noted.
Hydroxychloroquine Pregnancy And Lactation Text: This medication has been shown to cause fetal harm but it isn't assigned a Pregnancy Risk Category. There are small amounts excreted in breast milk.
Xolair Counseling:  Patient informed of potential adverse effects including but not limited to fever, muscle aches, rash and allergic reactions.  The patient verbalized understanding of the proper use and possible adverse effects of Xolair.  All of the patient's questions and concerns were addressed.
Hydroxyzine Counseling: Patient advised that the medication is sedating and not to drive a car after taking this medication.  Patient informed of potential adverse effects including but not limited to dry mouth, urinary retention, and blurry vision.  The patient verbalized understanding of the proper use and possible adverse effects of hydroxyzine.  All of the patient's questions and concerns were addressed.
Arava Counseling:  Patient counseled regarding adverse effects of Arava including but not limited to nausea, vomiting, abnormalities in liver function tests. Patients may develop mouth sores, rash, diarrhea, and abnormalities in blood counts. The patient understands that monitoring is required including LFTs and blood counts.  There is a rare possibility of scarring of the liver and lung problems that can occur when taking methotrexate. Persistent nausea, loss of appetite, pale stools, dark urine, cough, and shortness of breath should be reported immediately. Patient advised to discontinue Arava treatment and consult with a physician prior to attempting conception. The patient will have to undergo a treatment to eliminate Arava from the body prior to conception.
Arava Pregnancy And Lactation Text: This medication is Pregnancy Category X and is absolutely contraindicated during pregnancy. It is unknown if it is excreted in breast milk.
Solaraze Pregnancy And Lactation Text: This medication is Pregnancy Category B and is considered safe. There is some data to suggest avoiding during the third trimester. It is unknown if this medication is excreted in breast milk.
Xolair Pregnancy And Lactation Text: This medication is Pregnancy Category B and is considered safe during pregnancy. This medication is excreted in breast milk.
Niacinamide Counseling: I recommended taking niacin or niacinamide, also know as vitamin B3, twice daily. Recent evidence suggests that taking vitamin B3 (500 mg twice daily) can reduce the risk of actinic keratoses and non-melanoma skin cancers. Side effects of vitamin B3 include flushing and headache.
Cephalexin Pregnancy And Lactation Text: This medication is Pregnancy Category B and considered safe during pregnancy.  It is also excreted in breast milk but can be used safely for shorter doses.
Bexarotene Pregnancy And Lactation Text: This medication is Pregnancy Category X and should not be given to women who are pregnant or may become pregnant. This medication should not be used if you are breast feeding.
Thalidomide Counseling: I discussed with the patient the risks of thalidomide including but not limited to birth defects, anxiety, weakness, chest pain, dizziness, cough and severe allergy.
Rituxan Counseling:  I discussed with the patient the risks of Rituxan infusions. Side effects can include infusion reactions, severe drug rashes including mucocutaneous reactions, reactivation of latent hepatitis and other infections and rarely progressive multifocal leukoencephalopathy.  All of the patient's questions and concerns were addressed.
Niacinamide Pregnancy And Lactation Text: These medications are considered safe during pregnancy.
Hydroxyzine Pregnancy And Lactation Text: This medication is not safe during pregnancy and should not be taken. It is also excreted in breast milk and breast feeding isn't recommended.
Isotretinoin Counseling: Patient should get monthly blood tests, not donate blood, not drive at night if vision affected, not share medication, and not undergo elective surgery for 6 months after tx completed. Side effects reviewed, pt to contact office should one occur.
Clindamycin Counseling: I counseled the patient regarding use of clindamycin as an antibiotic for prophylactic and/or therapeutic purposes. Clindamycin is active against numerous classes of bacteria, including skin bacteria. Side effects may include nausea, diarrhea, gastrointestinal upset, rash, hives, yeast infections, and in rare cases, colitis.
Eucrisa Counseling: Patient may experience a mild burning sensation during topical application. Eucrisa is not approved in children less than 2 years of age.
Ilumya Counseling: I discussed with the patient the risks of tildrakizumab including but not limited to immunosuppression, malignancy, posterior leukoencephalopathy syndrome, and serious infections.  The patient understands that monitoring is required including a PPD at baseline and must alert us or the primary physician if symptoms of infection or other concerning signs are noted.
Clofazimine Counseling:  I discussed with the patient the risks of clofazimine including but not limited to skin and eye pigmentation, liver damage, nausea/vomiting, gastrointestinal bleeding and allergy.
Eucrisa Pregnancy And Lactation Text: This medication has not been assigned a Pregnancy Risk Category but animal studies failed to show danger with the topical medication. It is unknown if the medication is excreted in breast milk.
Topical Retinoid counseling:  Patient advised to apply a pea-sized amount only at bedtime and wait 30 minutes after washing their face before applying.  If too drying, patient may add a non-comedogenic moisturizer. The patient verbalized understanding of the proper use and possible adverse effects of retinoids.  All of the patient's questions and concerns were addressed.
Rituxan Pregnancy And Lactation Text: This medication is Pregnancy Category C and it isn't know if it is safe during pregnancy. It is unknown if this medication is excreted in breast milk but similar antibodies are known to be excreted.
Nsaids Counseling: NSAID Counseling: I discussed with the patient that NSAIDs should be taken with food. Prolonged use of NSAIDs can result in the development of stomach ulcers.  Patient advised to stop taking NSAIDs if abdominal pain occurs.  The patient verbalized understanding of the proper use and possible adverse effects of NSAIDs.  All of the patient's questions and concerns were addressed.
Azathioprine Counseling:  I discussed with the patient the risks of azathioprine including but not limited to myelosuppression, immunosuppression, hepatotoxicity, lymphoma, and infections.  The patient understands that monitoring is required including baseline LFTs, Creatinine, possible TPMP genotyping and weekly CBCs for the first month and then every 2 weeks thereafter.  The patient verbalized understanding of the proper use and possible adverse effects of azathioprine.  All of the patient's questions and concerns were addressed.
Nsaids Pregnancy And Lactation Text: These medications are considered safe up to 30 weeks gestation. It is excreted in breast milk.
Siliq Counseling:  I discussed with the patient the risks of Siliq including but not limited to new or worsening depression, suicidal thoughts and behavior, immunosuppression, malignancy, posterior leukoencephalopathy syndrome, and serious infections.  The patient understands that monitoring is required including a PPD at baseline and must alert us or the primary physician if symptoms of infection or other concerning signs are noted. There is also a special program designed to monitor depression which is required with Siliq.
Albendazole Counseling:  I discussed with the patient the risks of albendazole including but not limited to cytopenia, kidney damage, nausea/vomiting and severe allergy.  The patient understands that this medication is being used in an off-label manner.
Fluconazole Counseling:  Patient counseled regarding adverse effects of fluconazole including but not limited to headache, diarrhea, nausea, upset stomach, liver function test abnormalities, taste disturbance, and stomach pain.  There is a rare possibility of liver failure that can occur when taking fluconazole.  The patient understands that monitoring of LFTs and kidney function test may be required, especially at baseline. The patient verbalized understanding of the proper use and possible adverse effects of fluconazole.  All of the patient's questions and concerns were addressed.
Clindamycin Pregnancy And Lactation Text: This medication can be used in pregnancy if certain situations. Clindamycin is also present in breast milk.
Isotretinoin Pregnancy And Lactation Text: This medication is Pregnancy Category X and is considered extremely dangerous during pregnancy. It is unknown if it is excreted in breast milk.
Doxycycline Counseling:  Patient counseled regarding possible photosensitivity and increased risk for sunburn.  Patient instructed to avoid sunlight, if possible.  When exposed to sunlight, patients should wear protective clothing, sunglasses, and sunscreen.  The patient was instructed to call the office immediately if the following severe adverse effects occur:  hearing changes, easy bruising/bleeding, severe headache, or vision changes.  The patient verbalized understanding of the proper use and possible adverse effects of doxycycline.  All of the patient's questions and concerns were addressed.
Azathioprine Pregnancy And Lactation Text: This medication is Pregnancy Category D and isn't considered safe during pregnancy. It is unknown if this medication is excreted in breast milk.
Hydroquinone Counseling:  Patient advised that medication may result in skin irritation, lightening (hypopigmentation), dryness, and burning.  In the event of skin irritation, the patient was advised to reduce the amount of the drug applied or use it less frequently.  Rarely, spots that are treated with hydroquinone can become darker (pseudoochronosis).  Should this occur, patient instructed to stop medication and call the office. The patient verbalized understanding of the proper use and possible adverse effects of hydroquinone.  All of the patient's questions and concerns were addressed.
High Dose Vitamin A Counseling: Side effects reviewed, pt to contact office should one occur.
Albendazole Pregnancy And Lactation Text: This medication is Pregnancy Category C and it isn't known if it is safe during pregnancy. It is also excreted in breast milk.
Colchicine Counseling:  Patient counseled regarding adverse effects including but not limited to stomach upset (nausea, vomiting, stomach pain, or diarrhea).  Patient instructed to limit alcohol consumption while taking this medication.  Colchicine may reduce blood counts especially with prolonged use.  The patient understands that monitoring of kidney function and blood counts may be required, especially at baseline. The patient verbalized understanding of the proper use and possible adverse effects of colchicine.  All of the patient's questions and concerns were addressed.
Tazorac Counseling:  Patient advised that medication is irritating and drying.  Patient may need to apply sparingly and wash off after an hour before eventually leaving it on overnight.  The patient verbalized understanding of the proper use and possible adverse effects of tazorac.  All of the patient's questions and concerns were addressed.
High Dose Vitamin A Pregnancy And Lactation Text: High dose vitamin A therapy is contraindicated during pregnancy and breast feeding.
Cellcept Counseling:  I discussed with the patient the risks of mycophenolate mofetil including but not limited to infection/immunosuppression, GI upset, hypokalemia, hypercholesterolemia, bone marrow suppression, lymphoproliferative disorders, malignancy, GI ulceration/bleed/perforation, colitis, interstitial lung disease, kidney failure, progressive multifocal leukoencephalopathy, and birth defects.  The patient understands that monitoring is required including a baseline creatinine and regular CBC testing. In addition, patient must alert us immediately if symptoms of infection or other concerning signs are noted.
Ivermectin Counseling:  Patient instructed to take medication on an empty stomach with a full glass of water.  Patient informed of potential adverse effects including but not limited to nausea, diarrhea, dizziness, itching, and swelling of the extremities or lymph nodes.  The patient verbalized understanding of the proper use and possible adverse effects of ivermectin.  All of the patient's questions and concerns were addressed.
Tazorac Pregnancy And Lactation Text: This medication is not safe during pregnancy. It is unknown if this medication is excreted in breast milk.
Doxycycline Pregnancy And Lactation Text: This medication is Pregnancy Category D and not consider safe during pregnancy. It is also excreted in breast milk but is considered safe for shorter treatment courses.
Odomzo Counseling- I discussed with the patient the risks of Odomzo including but not limited to nausea, vomiting, diarrhea, constipation, weight loss, changes in the sense of taste, decreased appetite, muscle spasms, and hair loss.  The patient verbalized understanding of the proper use and possible adverse effects of Odomzo.  All of the patient's questions and concerns were addressed.
Griseofulvin Counseling:  I discussed with the patient the risks of griseofulvin including but not limited to photosensitivity, cytopenia, liver damage, nausea/vomiting and severe allergy.  The patient understands that this medication is best absorbed when taken with a fatty meal (e.g., ice cream or french fries).
Topical Clindamycin Counseling: Patient counseled that this medication may cause skin irritation or allergic reactions.  In the event of skin irritation, the patient was advised to reduce the amount of the drug applied or use it less frequently.   The patient verbalized understanding of the proper use and possible adverse effects of clindamycin.  All of the patient's questions and concerns were addressed.
Dapsone Counseling: I discussed with the patient the risks of dapsone including but not limited to hemolytic anemia, agranulocytosis, rashes, methemoglobinemia, kidney failure, peripheral neuropathy, headaches, GI upset, and liver toxicity.  Patients who start dapsone require monitoring including baseline LFTs and weekly CBCs for the first month, then every month thereafter.  The patient verbalized understanding of the proper use and possible adverse effects of dapsone.  All of the patient's questions and concerns were addressed.
Simponi Counseling:  I discussed with the patient the risks of golimumab including but not limited to myelosuppression, immunosuppression, autoimmune hepatitis, demyelinating diseases, lymphoma, and serious infections.  The patient understands that monitoring is required including a PPD at baseline and must alert us or the primary physician if symptoms of infection or other concerning signs are noted.
Erythromycin Counseling:  I discussed with the patient the risks of erythromycin including but not limited to GI upset, allergic reaction, drug rash, diarrhea, increase in liver enzymes, and yeast infections.
Imiquimod Counseling:  I discussed with the patient the risks of imiquimod including but not limited to erythema, scaling, itching, weeping, crusting, and pain.  Patient understands that the inflammatory response to imiquimod is variable from person to person and was educated regarded proper titration schedule.  If flu-like symptoms develop, patient knows to discontinue the medication and contact us.
Otezla Counseling: The side effects of Otezla were discussed with the patient, including but not limited to worsening or new depression, weight loss, diarrhea, nausea, upper respiratory tract infection, and headache. Patient instructed to call the office should any adverse effect occur.  The patient verbalized understanding of the proper use and possible adverse effects of Otezla.  All the patient's questions and concerns were addressed.
Erythromycin Pregnancy And Lactation Text: This medication is Pregnancy Category B and is considered safe during pregnancy. It is also excreted in breast milk.
Cyclophosphamide Counseling:  I discussed with the patient the risks of cyclophosphamide including but not limited to hair loss, hormonal abnormalities, decreased fertility, abdominal pain, diarrhea, nausea and vomiting, bone marrow suppression and infection. The patient understands that monitoring is required while taking this medication.
Griseofulvin Pregnancy And Lactation Text: This medication is Pregnancy Category X and is known to cause serious birth defects. It is unknown if this medication is excreted in breast milk but breast feeding should be avoided.
Tranexamic Acid Counseling:  Patient advised of the small risk of bleeding problems with tranexamic acid. They were also instructed to call if they developed any nausea, vomiting or diarrhea. All of the patient's questions and concerns were addressed.
Itraconazole Counseling:  I discussed with the patient the risks of itraconazole including but not limited to liver damage, nausea/vomiting, neuropathy, and severe allergy.  The patient understands that this medication is best absorbed when taken with acidic beverages such as non-diet cola or ginger ale.  The patient understands that monitoring is required including baseline LFTs and repeat LFTs at intervals.  The patient understands that they are to contact us or the primary physician if concerning signs are noted.
Dapsone Pregnancy And Lactation Text: This medication is Pregnancy Category C and is not considered safe during pregnancy or breast feeding.
Topical Clindamycin Pregnancy And Lactation Text: This medication is Pregnancy Category B and is considered safe during pregnancy. It is unknown if it is excreted in breast milk.
Benzoyl Peroxide Counseling: Patient counseled that medicine may cause skin irritation and bleach clothing.  In the event of skin irritation, the patient was advised to reduce the amount of the drug applied or use it less frequently.   The patient verbalized understanding of the proper use and possible adverse effects of benzoyl peroxide.  All of the patient's questions and concerns were addressed.
Minoxidil Counseling: Minoxidil is a topical medication which can increase blood flow where it is applied. It is uncertain how this medication increases hair growth. Side effects are uncommon and include stinging and allergic reactions.
Detail Level: Simple
Metronidazole Counseling:  I discussed with the patient the risks of metronidazole including but not limited to seizures, nausea/vomiting, a metallic taste in the mouth, nausea/vomiting and severe allergy.
Skyrizi Counseling: I discussed with the patient the risks of risankizumab-rzaa including but not limited to immunosuppression, and serious infections.  The patient understands that monitoring is required including a PPD at baseline and must alert us or the primary physician if symptoms of infection or other concerning signs are noted.
Otezla Pregnancy And Lactation Text: This medication is Pregnancy Category C and it isn't known if it is safe during pregnancy. It is unknown if it is excreted in breast milk.
Topical Sulfur Applications Counseling: Topical Sulfur Counseling: Patient counseled that this medication may cause skin irritation or allergic reactions.  In the event of skin irritation, the patient was advised to reduce the amount of the drug applied or use it less frequently.   The patient verbalized understanding of the proper use and possible adverse effects of topical sulfur application.  All of the patient's questions and concerns were addressed.
Erivedge Counseling- I discussed with the patient the risks of Erivedge including but not limited to nausea, vomiting, diarrhea, constipation, weight loss, changes in the sense of taste, decreased appetite, muscle spasms, and hair loss.  The patient verbalized understanding of the proper use and possible adverse effects of Erivedge.  All of the patient's questions and concerns were addressed.
Cyclophosphamide Pregnancy And Lactation Text: This medication is Pregnancy Category D and it isn't considered safe during pregnancy. This medication is excreted in breast milk.
Metronidazole Pregnancy And Lactation Text: This medication is Pregnancy Category B and considered safe during pregnancy.  It is also excreted in breast milk.
Cyclosporine Counseling:  I discussed with the patient the risks of cyclosporine including but not limited to hypertension, gingival hyperplasia,myelosuppression, immunosuppression, liver damage, kidney damage, neurotoxicity, lymphoma, and serious infections. The patient understands that monitoring is required including baseline blood pressure, CBC, CMP, lipid panel and uric acid, and then 1-2 times monthly CMP and blood pressure.
Oxybutynin Counseling:  I discussed with the patient the risks of oxybutynin including but not limited to skin rash, drowsiness, dry mouth, difficulty urinating, and blurred vision.
Tranexamic Acid Pregnancy And Lactation Text: It is unknown if this medication is safe during pregnancy or breast feeding.
Valtrex Counseling: I discussed with the patient the risks of valacyclovir including but not limited to kidney damage, nausea, vomiting and severe allergy.  The patient understands that if the infection seems to be worsening or is not improving, they are to call.
Benzoyl Peroxide Pregnancy And Lactation Text: This medication is Pregnancy Category C. It is unknown if benzoyl peroxide is excreted in breast milk.
Topical Sulfur Applications Pregnancy And Lactation Text: This medication is Pregnancy Category C and has an unknown safety profile during pregnancy. It is unknown if this topical medication is excreted in breast milk.
Ketoconazole Counseling:   Patient counseled regarding improving absorption with orange juice.  Adverse effects include but are not limited to breast enlargement, headache, diarrhea, nausea, upset stomach, liver function test abnormalities, taste disturbance, and stomach pain.  There is a rare possibility of liver failure that can occur when taking ketoconazole. The patient understands that monitoring of LFTs may be required, especially at baseline. The patient verbalized understanding of the proper use and possible adverse effects of ketoconazole.  All of the patient's questions and concerns were addressed.
Carac Counseling:  I discussed with the patient the risks of Carac including but not limited to erythema, scaling, itching, weeping, crusting, and pain.
Cimzia Counseling:  I discussed with the patient the risks of Cimzia including but not limited to immunosuppression, allergic reactions and infections.  The patient understands that monitoring is required including a PPD at baseline and must alert us or the primary physician if symptoms of infection or other concerning signs are noted.
Ketoconazole Pregnancy And Lactation Text: This medication is Pregnancy Category C and it isn't know if it is safe during pregnancy. It is also excreted in breast milk and breast feeding isn't recommended.
Minocycline Counseling: Patient advised regarding possible photosensitivity and discoloration of the teeth, skin, lips, tongue and gums.  Patient instructed to avoid sunlight, if possible.  When exposed to sunlight, patients should wear protective clothing, sunglasses, and sunscreen.  The patient was instructed to call the office immediately if the following severe adverse effects occur:  hearing changes, easy bruising/bleeding, severe headache, or vision changes.  The patient verbalized understanding of the proper use and possible adverse effects of minocycline.  All of the patient's questions and concerns were addressed.
Mirvaso Counseling: Mirvaso is a topical medication which can decrease superficial blood flow where applied. Side effects are uncommon and include stinging, redness and allergic reactions.
Stelara Counseling:  I discussed with the patient the risks of ustekinumab including but not limited to immunosuppression, malignancy, posterior leukoencephalopathy syndrome, and serious infections.  The patient understands that monitoring is required including a PPD at baseline and must alert us or the primary physician if symptoms of infection or other concerning signs are noted.
Valtrex Pregnancy And Lactation Text: this medication is Pregnancy Category B and is considered safe during pregnancy. This medication is not directly found in breast milk but it's metabolite acyclovir is present.
Finasteride Male Counseling: Finasteride Counseling:  I discussed with the patient the risks of use of finasteride including but not limited to decreased libido, decreased ejaculate volume, gynecomastia, and depression. Women should not handle medication.  All of the patient's questions and concerns were addressed.
Wartpeel Counseling:  I discussed with the patient the risks of Wartpeel including but not limited to erythema, scaling, itching, weeping, crusting, and pain.

## 2021-05-22 ENCOUNTER — APPOINTMENT (OUTPATIENT)
Dept: RADIOLOGY | Facility: MEDICAL CENTER | Age: 51
DRG: 329 | End: 2021-05-22
Attending: EMERGENCY MEDICINE
Payer: COMMERCIAL

## 2021-05-22 ENCOUNTER — ANESTHESIA (OUTPATIENT)
Dept: SURGERY | Facility: MEDICAL CENTER | Age: 51
DRG: 329 | End: 2021-05-22
Payer: COMMERCIAL

## 2021-05-22 ENCOUNTER — HOSPITAL ENCOUNTER (INPATIENT)
Facility: MEDICAL CENTER | Age: 51
LOS: 30 days | DRG: 329 | End: 2021-06-21
Attending: EMERGENCY MEDICINE | Admitting: SURGERY
Payer: COMMERCIAL

## 2021-05-22 ENCOUNTER — ANESTHESIA EVENT (OUTPATIENT)
Dept: SURGERY | Facility: MEDICAL CENTER | Age: 51
DRG: 329 | End: 2021-05-22
Payer: COMMERCIAL

## 2021-05-22 DIAGNOSIS — K65.9 BACTERIAL PERITONITIS (HCC): ICD-10-CM

## 2021-05-22 DIAGNOSIS — K56.2 CECAL VOLVULUS (HCC): ICD-10-CM

## 2021-05-22 LAB
ALBUMIN SERPL BCP-MCNC: 4 G/DL (ref 3.2–4.9)
ALBUMIN/GLOB SERPL: 1.4 G/DL
ALP SERPL-CCNC: 60 U/L (ref 30–99)
ALT SERPL-CCNC: 9 U/L (ref 2–50)
ANION GAP SERPL CALC-SCNC: 12 MMOL/L (ref 7–16)
APPEARANCE UR: ABNORMAL
AST SERPL-CCNC: 14 U/L (ref 12–45)
BACTERIA #/AREA URNS HPF: ABNORMAL /HPF
BASOPHILS # BLD AUTO: 0.5 % (ref 0–1.8)
BASOPHILS # BLD: 0.04 K/UL (ref 0–0.12)
BILIRUB SERPL-MCNC: 0.3 MG/DL (ref 0.1–1.5)
BILIRUB UR QL STRIP.AUTO: NEGATIVE
BUN SERPL-MCNC: 10 MG/DL (ref 8–22)
CALCIUM SERPL-MCNC: 9 MG/DL (ref 8.4–10.2)
CHLORIDE SERPL-SCNC: 107 MMOL/L (ref 96–112)
CO2 SERPL-SCNC: 19 MMOL/L (ref 20–33)
COLOR UR: YELLOW
CREAT SERPL-MCNC: 0.81 MG/DL (ref 0.5–1.4)
EOSINOPHIL # BLD AUTO: 0.12 K/UL (ref 0–0.51)
EOSINOPHIL NFR BLD: 1.4 % (ref 0–6.9)
EPI CELLS #/AREA URNS HPF: ABNORMAL /HPF
ERYTHROCYTE [DISTWIDTH] IN BLOOD BY AUTOMATED COUNT: 45.4 FL (ref 35.9–50)
GLOBULIN SER CALC-MCNC: 2.8 G/DL (ref 1.9–3.5)
GLUCOSE SERPL-MCNC: 84 MG/DL (ref 65–99)
GLUCOSE UR STRIP.AUTO-MCNC: NEGATIVE MG/DL
HCG SERPL QL: NEGATIVE
HCT VFR BLD AUTO: 38.3 % (ref 37–47)
HGB BLD-MCNC: 12.5 G/DL (ref 12–16)
IMM GRANULOCYTES # BLD AUTO: 0.05 K/UL (ref 0–0.11)
IMM GRANULOCYTES NFR BLD AUTO: 0.6 % (ref 0–0.9)
KETONES UR STRIP.AUTO-MCNC: NEGATIVE MG/DL
LEUKOCYTE ESTERASE UR QL STRIP.AUTO: NEGATIVE
LIPASE SERPL-CCNC: 42 U/L (ref 7–58)
LYMPHOCYTES # BLD AUTO: 0.93 K/UL (ref 1–4.8)
LYMPHOCYTES NFR BLD: 11 % (ref 22–41)
MCH RBC QN AUTO: 29.3 PG (ref 27–33)
MCHC RBC AUTO-ENTMCNC: 32.6 G/DL (ref 33.6–35)
MCV RBC AUTO: 89.9 FL (ref 81.4–97.8)
MICRO URNS: ABNORMAL
MONOCYTES # BLD AUTO: 0.59 K/UL (ref 0–0.85)
MONOCYTES NFR BLD AUTO: 7 % (ref 0–13.4)
MUCOUS THREADS #/AREA URNS HPF: ABNORMAL /HPF
NEUTROPHILS # BLD AUTO: 6.69 K/UL (ref 2–7.15)
NEUTROPHILS NFR BLD: 79.5 % (ref 44–72)
NITRITE UR QL STRIP.AUTO: NEGATIVE
NRBC # BLD AUTO: 0 K/UL
NRBC BLD-RTO: 0 /100 WBC
PATHOLOGY CONSULT NOTE: NORMAL
PH UR STRIP.AUTO: 7 [PH] (ref 5–8)
PLATELET # BLD AUTO: 290 K/UL (ref 164–446)
PMV BLD AUTO: 9.6 FL (ref 9–12.9)
POTASSIUM SERPL-SCNC: 3.7 MMOL/L (ref 3.6–5.5)
PROT SERPL-MCNC: 6.8 G/DL (ref 6–8.2)
PROT UR QL STRIP: NEGATIVE MG/DL
RBC # BLD AUTO: 4.26 M/UL (ref 4.2–5.4)
RBC # URNS HPF: ABNORMAL /HPF
RBC UR QL AUTO: ABNORMAL
SARS-COV+SARS-COV-2 AG RESP QL IA.RAPID: NOTDETECTED
SODIUM SERPL-SCNC: 138 MMOL/L (ref 135–145)
SP GR UR STRIP.AUTO: 1.01
SPECIMEN SOURCE: NORMAL
WBC # BLD AUTO: 8.4 K/UL (ref 4.8–10.8)
WBC #/AREA URNS HPF: ABNORMAL /HPF

## 2021-05-22 PROCEDURE — 160031 HCHG SURGERY MINUTES - 1ST 30 MINS LEVEL 5: Performed by: SURGERY

## 2021-05-22 PROCEDURE — 501433 HCHG STAPLER, GIA MULTIFIRE 60/80: Performed by: SURGERY

## 2021-05-22 PROCEDURE — 94760 N-INVAS EAR/PLS OXIMETRY 1: CPT

## 2021-05-22 PROCEDURE — 84703 CHORIONIC GONADOTROPIN ASSAY: CPT

## 2021-05-22 PROCEDURE — 502704 HCHG DEVICE, LIGASURE IMPACT: Performed by: SURGERY

## 2021-05-22 PROCEDURE — 88307 TISSUE EXAM BY PATHOLOGIST: CPT

## 2021-05-22 PROCEDURE — 36415 COLL VENOUS BLD VENIPUNCTURE: CPT

## 2021-05-22 PROCEDURE — 160009 HCHG ANES TIME/MIN: Performed by: SURGERY

## 2021-05-22 PROCEDURE — 0DTH0ZZ RESECTION OF CECUM, OPEN APPROACH: ICD-10-PCS | Performed by: SURGERY

## 2021-05-22 PROCEDURE — 160035 HCHG PACU - 1ST 60 MINS PHASE I: Performed by: SURGERY

## 2021-05-22 PROCEDURE — 501452 HCHG STAPLES, GIA MULTIFIRE 60/80: Performed by: SURGERY

## 2021-05-22 PROCEDURE — 64488 TAP BLOCK BI INJECTION: CPT | Performed by: SURGERY

## 2021-05-22 PROCEDURE — 160048 HCHG OR STATISTICAL LEVEL 1-5: Performed by: SURGERY

## 2021-05-22 PROCEDURE — 700105 HCHG RX REV CODE 258: Performed by: SURGERY

## 2021-05-22 PROCEDURE — 81001 URINALYSIS AUTO W/SCOPE: CPT

## 2021-05-22 PROCEDURE — U0003 INFECTIOUS AGENT DETECTION BY NUCLEIC ACID (DNA OR RNA); SEVERE ACUTE RESPIRATORY SYNDROME CORONAVIRUS 2 (SARS-COV-2) (CORONAVIRUS DISEASE [COVID-19]), AMPLIFIED PROBE TECHNIQUE, MAKING USE OF HIGH THROUGHPUT TECHNOLOGIES AS DESCRIBED BY CMS-2020-01-R: HCPCS

## 2021-05-22 PROCEDURE — 700111 HCHG RX REV CODE 636 W/ 250 OVERRIDE (IP): Performed by: ANESTHESIOLOGY

## 2021-05-22 PROCEDURE — 770006 HCHG ROOM/CARE - MED/SURG/GYN SEMI*

## 2021-05-22 PROCEDURE — 160042 HCHG SURGERY MINUTES - EA ADDL 1 MIN LEVEL 5: Performed by: SURGERY

## 2021-05-22 PROCEDURE — 501838 HCHG SUTURE GENERAL: Performed by: SURGERY

## 2021-05-22 PROCEDURE — C9803 HOPD COVID-19 SPEC COLLECT: HCPCS | Performed by: EMERGENCY MEDICINE

## 2021-05-22 PROCEDURE — 83690 ASSAY OF LIPASE: CPT

## 2021-05-22 PROCEDURE — A9270 NON-COVERED ITEM OR SERVICE: HCPCS | Performed by: SURGERY

## 2021-05-22 PROCEDURE — 3E0M05Z INTRODUCTION OF ADHESION BARRIER INTO PERITONEAL CAVITY, OPEN APPROACH: ICD-10-PCS | Performed by: SURGERY

## 2021-05-22 PROCEDURE — 96375 TX/PRO/DX INJ NEW DRUG ADDON: CPT

## 2021-05-22 PROCEDURE — 96374 THER/PROPH/DIAG INJ IV PUSH: CPT

## 2021-05-22 PROCEDURE — 80053 COMPREHEN METABOLIC PANEL: CPT

## 2021-05-22 PROCEDURE — U0005 INFEC AGEN DETEC AMPLI PROBE: HCPCS

## 2021-05-22 PROCEDURE — 85025 COMPLETE CBC W/AUTO DIFF WBC: CPT

## 2021-05-22 PROCEDURE — 74177 CT ABD & PELVIS W/CONTRAST: CPT

## 2021-05-22 PROCEDURE — 87426 SARSCOV CORONAVIRUS AG IA: CPT

## 2021-05-22 PROCEDURE — 700101 HCHG RX REV CODE 250: Performed by: SURGERY

## 2021-05-22 PROCEDURE — 700111 HCHG RX REV CODE 636 W/ 250 OVERRIDE (IP): Performed by: EMERGENCY MEDICINE

## 2021-05-22 PROCEDURE — 160002 HCHG RECOVERY MINUTES (STAT): Performed by: SURGERY

## 2021-05-22 PROCEDURE — 700101 HCHG RX REV CODE 250: Performed by: ANESTHESIOLOGY

## 2021-05-22 PROCEDURE — 94669 MECHANICAL CHEST WALL OSCILL: CPT

## 2021-05-22 PROCEDURE — 700117 HCHG RX CONTRAST REV CODE 255: Performed by: EMERGENCY MEDICINE

## 2021-05-22 PROCEDURE — 99291 CRITICAL CARE FIRST HOUR: CPT

## 2021-05-22 PROCEDURE — 96376 TX/PRO/DX INJ SAME DRUG ADON: CPT

## 2021-05-22 PROCEDURE — 700111 HCHG RX REV CODE 636 W/ 250 OVERRIDE (IP): Performed by: SURGERY

## 2021-05-22 RX ORDER — OXYCODONE HCL 5 MG/5 ML
5 SOLUTION, ORAL ORAL
Status: DISCONTINUED | OUTPATIENT
Start: 2021-05-22 | End: 2021-05-22 | Stop reason: HOSPADM

## 2021-05-22 RX ORDER — SODIUM CHLORIDE, SODIUM LACTATE, POTASSIUM CHLORIDE, CALCIUM CHLORIDE 600; 310; 30; 20 MG/100ML; MG/100ML; MG/100ML; MG/100ML
INJECTION, SOLUTION INTRAVENOUS CONTINUOUS
Status: DISCONTINUED | OUTPATIENT
Start: 2021-05-22 | End: 2021-05-26

## 2021-05-22 RX ORDER — KETOROLAC TROMETHAMINE 30 MG/ML
30 INJECTION, SOLUTION INTRAMUSCULAR; INTRAVENOUS EVERY 6 HOURS
Status: DISPENSED | OUTPATIENT
Start: 2021-05-22 | End: 2021-05-25

## 2021-05-22 RX ORDER — ENEMA 19; 7 G/133ML; G/133ML
1 ENEMA RECTAL
Status: DISCONTINUED | OUTPATIENT
Start: 2021-05-22 | End: 2021-06-12

## 2021-05-22 RX ORDER — FAMOTIDINE 20 MG/1
20 TABLET, FILM COATED ORAL 2 TIMES DAILY
Status: DISCONTINUED | OUTPATIENT
Start: 2021-05-22 | End: 2021-06-11

## 2021-05-22 RX ORDER — KETOROLAC TROMETHAMINE 30 MG/ML
INJECTION, SOLUTION INTRAMUSCULAR; INTRAVENOUS PRN
Status: DISCONTINUED | OUTPATIENT
Start: 2021-05-22 | End: 2021-05-22 | Stop reason: SURG

## 2021-05-22 RX ORDER — ACETAMINOPHEN AND CODEINE PHOSPHATE 120; 12 MG/5ML; MG/5ML
1 SOLUTION ORAL DAILY
Status: ON HOLD | COMMUNITY
End: 2021-06-18

## 2021-05-22 RX ORDER — ONDANSETRON 2 MG/ML
4 INJECTION INTRAMUSCULAR; INTRAVENOUS EVERY 4 HOURS PRN
Status: DISCONTINUED | OUTPATIENT
Start: 2021-05-22 | End: 2021-06-21 | Stop reason: HOSPADM

## 2021-05-22 RX ORDER — HYDROMORPHONE HYDROCHLORIDE 1 MG/ML
0.4 INJECTION, SOLUTION INTRAMUSCULAR; INTRAVENOUS; SUBCUTANEOUS
Status: DISCONTINUED | OUTPATIENT
Start: 2021-05-22 | End: 2021-05-22 | Stop reason: HOSPADM

## 2021-05-22 RX ORDER — SUCCINYLCHOLINE/SOD CL,ISO/PF 200MG/10ML
SYRINGE (ML) INTRAVENOUS PRN
Status: DISCONTINUED | OUTPATIENT
Start: 2021-05-22 | End: 2021-05-22 | Stop reason: SURG

## 2021-05-22 RX ORDER — HYDRALAZINE HYDROCHLORIDE 25 MG/1
25 TABLET, FILM COATED ORAL
Status: ON HOLD | COMMUNITY
End: 2021-05-23 | Stop reason: CLARIF

## 2021-05-22 RX ORDER — ONDANSETRON 2 MG/ML
4 INJECTION INTRAMUSCULAR; INTRAVENOUS ONCE
Status: COMPLETED | OUTPATIENT
Start: 2021-05-22 | End: 2021-05-22

## 2021-05-22 RX ORDER — CEFOTETAN DISODIUM 2 G/20ML
INJECTION, POWDER, FOR SOLUTION INTRAMUSCULAR; INTRAVENOUS PRN
Status: DISCONTINUED | OUTPATIENT
Start: 2021-05-22 | End: 2021-05-22 | Stop reason: SURG

## 2021-05-22 RX ORDER — SODIUM CHLORIDE, SODIUM LACTATE, POTASSIUM CHLORIDE, CALCIUM CHLORIDE 600; 310; 30; 20 MG/100ML; MG/100ML; MG/100ML; MG/100ML
INJECTION, SOLUTION INTRAVENOUS CONTINUOUS
Status: ACTIVE | OUTPATIENT
Start: 2021-05-22 | End: 2021-05-22

## 2021-05-22 RX ORDER — ONDANSETRON 2 MG/ML
INJECTION INTRAMUSCULAR; INTRAVENOUS PRN
Status: DISCONTINUED | OUTPATIENT
Start: 2021-05-22 | End: 2021-05-22 | Stop reason: SURG

## 2021-05-22 RX ORDER — BUPIVACAINE HYDROCHLORIDE 2.5 MG/ML
INJECTION, SOLUTION EPIDURAL; INFILTRATION; INTRACAUDAL PRN
Status: DISCONTINUED | OUTPATIENT
Start: 2021-05-22 | End: 2021-05-22 | Stop reason: SURG

## 2021-05-22 RX ORDER — MIDAZOLAM HYDROCHLORIDE 1 MG/ML
INJECTION INTRAMUSCULAR; INTRAVENOUS PRN
Status: DISCONTINUED | OUTPATIENT
Start: 2021-05-22 | End: 2021-05-22 | Stop reason: SURG

## 2021-05-22 RX ORDER — PHENYLEPHRINE HCL IN 0.9% NACL 0.5 MG/5ML
SYRINGE (ML) INTRAVENOUS PRN
Status: DISCONTINUED | OUTPATIENT
Start: 2021-05-22 | End: 2021-05-22 | Stop reason: SURG

## 2021-05-22 RX ORDER — ONDANSETRON 2 MG/ML
4 INJECTION INTRAMUSCULAR; INTRAVENOUS
Status: DISCONTINUED | OUTPATIENT
Start: 2021-05-22 | End: 2021-05-22 | Stop reason: HOSPADM

## 2021-05-22 RX ORDER — HALOPERIDOL 5 MG/ML
1 INJECTION INTRAMUSCULAR
Status: DISCONTINUED | OUTPATIENT
Start: 2021-05-22 | End: 2021-05-22 | Stop reason: HOSPADM

## 2021-05-22 RX ORDER — MEPERIDINE HYDROCHLORIDE 25 MG/ML
12.5 INJECTION INTRAMUSCULAR; INTRAVENOUS; SUBCUTANEOUS
Status: DISCONTINUED | OUTPATIENT
Start: 2021-05-22 | End: 2021-05-22 | Stop reason: HOSPADM

## 2021-05-22 RX ORDER — OXYCODONE HYDROCHLORIDE 5 MG/1
5 TABLET ORAL
Status: DISCONTINUED | OUTPATIENT
Start: 2021-05-22 | End: 2021-05-28

## 2021-05-22 RX ORDER — ROCURONIUM BROMIDE 10 MG/ML
INJECTION, SOLUTION INTRAVENOUS PRN
Status: DISCONTINUED | OUTPATIENT
Start: 2021-05-22 | End: 2021-05-22 | Stop reason: SURG

## 2021-05-22 RX ORDER — HYDROMORPHONE HYDROCHLORIDE 1 MG/ML
.5-1 INJECTION, SOLUTION INTRAMUSCULAR; INTRAVENOUS; SUBCUTANEOUS
Status: COMPLETED | OUTPATIENT
Start: 2021-05-22 | End: 2021-05-25

## 2021-05-22 RX ORDER — HYDROMORPHONE HYDROCHLORIDE 2 MG/ML
INJECTION, SOLUTION INTRAMUSCULAR; INTRAVENOUS; SUBCUTANEOUS PRN
Status: DISCONTINUED | OUTPATIENT
Start: 2021-05-22 | End: 2021-05-22 | Stop reason: SURG

## 2021-05-22 RX ORDER — POLYETHYLENE GLYCOL 3350 17 G/17G
1 POWDER, FOR SOLUTION ORAL 2 TIMES DAILY
Status: DISCONTINUED | OUTPATIENT
Start: 2021-05-22 | End: 2021-06-16

## 2021-05-22 RX ORDER — DEXAMETHASONE SODIUM PHOSPHATE 4 MG/ML
INJECTION, SOLUTION INTRA-ARTICULAR; INTRALESIONAL; INTRAMUSCULAR; INTRAVENOUS; SOFT TISSUE PRN
Status: DISCONTINUED | OUTPATIENT
Start: 2021-05-22 | End: 2021-05-22 | Stop reason: SURG

## 2021-05-22 RX ORDER — SODIUM CHLORIDE, SODIUM LACTATE, POTASSIUM CHLORIDE, CALCIUM CHLORIDE 600; 310; 30; 20 MG/100ML; MG/100ML; MG/100ML; MG/100ML
INJECTION, SOLUTION INTRAVENOUS CONTINUOUS
Status: DISCONTINUED | OUTPATIENT
Start: 2021-05-22 | End: 2021-05-22 | Stop reason: HOSPADM

## 2021-05-22 RX ORDER — DIPHENHYDRAMINE HYDROCHLORIDE 50 MG/ML
12.5 INJECTION INTRAMUSCULAR; INTRAVENOUS
Status: DISCONTINUED | OUTPATIENT
Start: 2021-05-22 | End: 2021-05-22 | Stop reason: HOSPADM

## 2021-05-22 RX ORDER — HYDROMORPHONE HYDROCHLORIDE 1 MG/ML
0.5 INJECTION, SOLUTION INTRAMUSCULAR; INTRAVENOUS; SUBCUTANEOUS
Status: DISCONTINUED | OUTPATIENT
Start: 2021-05-22 | End: 2021-05-22 | Stop reason: HOSPADM

## 2021-05-22 RX ORDER — OXYCODONE HCL 5 MG/5 ML
10 SOLUTION, ORAL ORAL
Status: DISCONTINUED | OUTPATIENT
Start: 2021-05-22 | End: 2021-05-22 | Stop reason: HOSPADM

## 2021-05-22 RX ORDER — DOCUSATE SODIUM 100 MG/1
100 CAPSULE, LIQUID FILLED ORAL 2 TIMES DAILY
Status: DISCONTINUED | OUTPATIENT
Start: 2021-05-22 | End: 2021-06-16

## 2021-05-22 RX ORDER — HYDROMORPHONE HYDROCHLORIDE 1 MG/ML
0.2 INJECTION, SOLUTION INTRAMUSCULAR; INTRAVENOUS; SUBCUTANEOUS
Status: DISCONTINUED | OUTPATIENT
Start: 2021-05-22 | End: 2021-05-22 | Stop reason: HOSPADM

## 2021-05-22 RX ORDER — OXYCODONE HYDROCHLORIDE 10 MG/1
10 TABLET ORAL
Status: DISCONTINUED | OUTPATIENT
Start: 2021-05-22 | End: 2021-05-28

## 2021-05-22 RX ORDER — AMOXICILLIN 250 MG
1 CAPSULE ORAL NIGHTLY
Status: DISCONTINUED | OUTPATIENT
Start: 2021-05-22 | End: 2021-06-16

## 2021-05-22 RX ORDER — BISACODYL 10 MG
10 SUPPOSITORY, RECTAL RECTAL
Status: DISCONTINUED | OUTPATIENT
Start: 2021-05-22 | End: 2021-06-19

## 2021-05-22 RX ORDER — LIDOCAINE HYDROCHLORIDE 20 MG/ML
INJECTION, SOLUTION EPIDURAL; INFILTRATION; INTRACAUDAL; PERINEURAL PRN
Status: DISCONTINUED | OUTPATIENT
Start: 2021-05-22 | End: 2021-05-22 | Stop reason: SURG

## 2021-05-22 RX ORDER — MORPHINE SULFATE 10 MG/ML
4 INJECTION, SOLUTION INTRAMUSCULAR; INTRAVENOUS
Status: DISCONTINUED | OUTPATIENT
Start: 2021-05-22 | End: 2021-05-23

## 2021-05-22 RX ORDER — AMOXICILLIN 250 MG
1 CAPSULE ORAL
Status: DISCONTINUED | OUTPATIENT
Start: 2021-05-22 | End: 2021-06-19

## 2021-05-22 RX ORDER — IBUPROFEN 400 MG/1
800 TABLET ORAL 3 TIMES DAILY PRN
Status: DISCONTINUED | OUTPATIENT
Start: 2021-05-25 | End: 2021-06-01

## 2021-05-22 RX ADMIN — MIDAZOLAM HYDROCHLORIDE 2 MG: 1 INJECTION, SOLUTION INTRAMUSCULAR; INTRAVENOUS at 13:32

## 2021-05-22 RX ADMIN — Medication 100 MCG: at 13:37

## 2021-05-22 RX ADMIN — SODIUM CHLORIDE, POTASSIUM CHLORIDE, SODIUM LACTATE AND CALCIUM CHLORIDE: 600; 310; 30; 20 INJECTION, SOLUTION INTRAVENOUS at 14:09

## 2021-05-22 RX ADMIN — SODIUM CHLORIDE, POTASSIUM CHLORIDE, SODIUM LACTATE AND CALCIUM CHLORIDE: 600; 310; 30; 20 INJECTION, SOLUTION INTRAVENOUS at 13:32

## 2021-05-22 RX ADMIN — SODIUM CHLORIDE, POTASSIUM CHLORIDE, SODIUM LACTATE AND CALCIUM CHLORIDE: 600; 310; 30; 20 INJECTION, SOLUTION INTRAVENOUS at 13:15

## 2021-05-22 RX ADMIN — POVIDONE IODINE 15 ML: 100 SOLUTION TOPICAL at 13:15

## 2021-05-22 RX ADMIN — PROPOFOL 150 MG: 10 INJECTION, EMULSION INTRAVENOUS at 13:36

## 2021-05-22 RX ADMIN — DEXAMETHASONE SODIUM PHOSPHATE 2 MG: 4 INJECTION, SOLUTION INTRAMUSCULAR; INTRAVENOUS at 13:40

## 2021-05-22 RX ADMIN — IOHEXOL 100 ML: 350 INJECTION, SOLUTION INTRAVENOUS at 11:30

## 2021-05-22 RX ADMIN — HYDROMORPHONE HYDROCHLORIDE 0.5 MG: 1 INJECTION, SOLUTION INTRAMUSCULAR; INTRAVENOUS; SUBCUTANEOUS at 11:04

## 2021-05-22 RX ADMIN — ONDANSETRON 4 MG: 2 INJECTION INTRAMUSCULAR; INTRAVENOUS at 11:04

## 2021-05-22 RX ADMIN — SODIUM CHLORIDE, POTASSIUM CHLORIDE, SODIUM LACTATE AND CALCIUM CHLORIDE: 600; 310; 30; 20 INJECTION, SOLUTION INTRAVENOUS at 19:00

## 2021-05-22 RX ADMIN — LIDOCAINE HYDROCHLORIDE 60 MG: 20 INJECTION, SOLUTION EPIDURAL; INFILTRATION; INTRACAUDAL; PERINEURAL at 13:36

## 2021-05-22 RX ADMIN — SUGAMMADEX 200 MG: 100 INJECTION, SOLUTION INTRAVENOUS at 14:19

## 2021-05-22 RX ADMIN — MORPHINE SULFATE 4 MG: 10 INJECTION INTRAVENOUS at 20:57

## 2021-05-22 RX ADMIN — HYDROMORPHONE HYDROCHLORIDE 1 MG: 1 INJECTION, SOLUTION INTRAMUSCULAR; INTRAVENOUS; SUBCUTANEOUS at 12:08

## 2021-05-22 RX ADMIN — Medication 100 MG: at 13:36

## 2021-05-22 RX ADMIN — CEFOTETAN DISODIUM 2 G: 2 INJECTION, POWDER, FOR SOLUTION INTRAMUSCULAR; INTRAVENOUS at 13:32

## 2021-05-22 RX ADMIN — KETOROLAC TROMETHAMINE 30 MG: 30 INJECTION, SOLUTION INTRAMUSCULAR; INTRAVENOUS at 18:03

## 2021-05-22 RX ADMIN — BUPIVACAINE HYDROCHLORIDE 30 ML: 2.5 INJECTION, SOLUTION EPIDURAL; INFILTRATION; INTRACAUDAL; PERINEURAL at 13:40

## 2021-05-22 RX ADMIN — KETOROLAC TROMETHAMINE 30 MG: 30 INJECTION, SOLUTION INTRAMUSCULAR at 14:10

## 2021-05-22 RX ADMIN — HYDROMORPHONE HYDROCHLORIDE 0.5 MG: 2 INJECTION, SOLUTION INTRAMUSCULAR; INTRAVENOUS; SUBCUTANEOUS at 14:05

## 2021-05-22 RX ADMIN — BUPIVACAINE HYDROCHLORIDE 30 ML: 2.5 INJECTION, SOLUTION EPIDURAL; INFILTRATION; INTRACAUDAL; PERINEURAL at 13:42

## 2021-05-22 RX ADMIN — ONDANSETRON 4 MG: 2 INJECTION INTRAMUSCULAR; INTRAVENOUS at 14:05

## 2021-05-22 RX ADMIN — HYDROMORPHONE HYDROCHLORIDE 0.5 MG: 2 INJECTION, SOLUTION INTRAMUSCULAR; INTRAVENOUS; SUBCUTANEOUS at 13:32

## 2021-05-22 RX ADMIN — ROCURONIUM BROMIDE 50 MG: 10 INJECTION, SOLUTION INTRAVENOUS at 13:47

## 2021-05-22 RX ADMIN — DEXAMETHASONE SODIUM PHOSPHATE 8 MG: 4 INJECTION, SOLUTION INTRAMUSCULAR; INTRAVENOUS at 13:48

## 2021-05-22 RX ADMIN — DEXAMETHASONE SODIUM PHOSPHATE 2 MG: 4 INJECTION, SOLUTION INTRAMUSCULAR; INTRAVENOUS at 13:42

## 2021-05-22 RX ADMIN — MORPHINE SULFATE 4 MG: 10 INJECTION INTRAVENOUS at 17:49

## 2021-05-22 ASSESSMENT — COPD QUESTIONNAIRES
COPD SCREENING SCORE: 3
DO YOU EVER COUGH UP ANY MUCUS OR PHLEGM?: NO/ONLY WITH OCCASIONAL COLDS OR INFECTIONS
HAVE YOU SMOKED AT LEAST 100 CIGARETTES IN YOUR ENTIRE LIFE: YES
DURING THE PAST 4 WEEKS HOW MUCH DID YOU FEEL SHORT OF BREATH: NONE/LITTLE OF THE TIME

## 2021-05-22 ASSESSMENT — PAIN SCALES - GENERAL: PAIN_LEVEL: 0

## 2021-05-22 ASSESSMENT — LIFESTYLE VARIABLES
AVERAGE NUMBER OF DAYS PER WEEK YOU HAVE A DRINK CONTAINING ALCOHOL: 0
TOTAL SCORE: 0
HAVE YOU EVER FELT YOU SHOULD CUT DOWN ON YOUR DRINKING: NO
TOTAL SCORE: 0
EVER FELT BAD OR GUILTY ABOUT YOUR DRINKING: NO
EVER HAD A DRINK FIRST THING IN THE MORNING TO STEADY YOUR NERVES TO GET RID OF A HANGOVER: NO
TOTAL SCORE: 0
ON A TYPICAL DAY WHEN YOU DRINK ALCOHOL HOW MANY DRINKS DO YOU HAVE: 2
HAVE PEOPLE ANNOYED YOU BY CRITICIZING YOUR DRINKING: NO
CONSUMPTION TOTAL: POSITIVE
ALCOHOL_USE: YES
HOW MANY TIMES IN THE PAST YEAR HAVE YOU HAD 5 OR MORE DRINKS IN A DAY: 5

## 2021-05-22 ASSESSMENT — FIBROSIS 4 INDEX: FIB4 SCORE: 0.9

## 2021-05-22 ASSESSMENT — PATIENT HEALTH QUESTIONNAIRE - PHQ9
2. FEELING DOWN, DEPRESSED, IRRITABLE, OR HOPELESS: MORE THAN HALF THE DAYS
5. POOR APPETITE OR OVEREATING: MORE THAN HALF THE DAYS
7. TROUBLE CONCENTRATING ON THINGS, SUCH AS READING THE NEWSPAPER OR WATCHING TELEVISION: NOT AT ALL
8. MOVING OR SPEAKING SO SLOWLY THAT OTHER PEOPLE COULD HAVE NOTICED. OR THE OPPOSITE, BEING SO FIGETY OR RESTLESS THAT YOU HAVE BEEN MOVING AROUND A LOT MORE THAN USUAL: NOT AT ALL
SUM OF ALL RESPONSES TO PHQ9 QUESTIONS 1 AND 2: 4
9. THOUGHTS THAT YOU WOULD BE BETTER OFF DEAD, OR OF HURTING YOURSELF: NOT AT ALL
SUM OF ALL RESPONSES TO PHQ QUESTIONS 1-9: 12
4. FEELING TIRED OR HAVING LITTLE ENERGY: NEARLY EVERY DAY
3. TROUBLE FALLING OR STAYING ASLEEP OR SLEEPING TOO MUCH: SEVERAL DAYS
1. LITTLE INTEREST OR PLEASURE IN DOING THINGS: MORE THAN HALF THE DAYS
6. FEELING BAD ABOUT YOURSELF - OR THAT YOU ARE A FAILURE OR HAVE LET YOURSELF OR YOUR FAMILY DOWN: MORE THAN HALF THE DAYS

## 2021-05-22 ASSESSMENT — COGNITIVE AND FUNCTIONAL STATUS - GENERAL
SUGGESTED CMS G CODE MODIFIER DAILY ACTIVITY: CH
SUGGESTED CMS G CODE MODIFIER MOBILITY: CH
DAILY ACTIVITIY SCORE: 24
MOBILITY SCORE: 24

## 2021-05-22 ASSESSMENT — PAIN DESCRIPTION - PAIN TYPE
TYPE: SURGICAL PAIN
TYPE: SURGICAL PAIN
TYPE: ACUTE PAIN
TYPE: SURGICAL PAIN
TYPE: ACUTE PAIN
TYPE: ACUTE PAIN

## 2021-05-22 ASSESSMENT — PAIN DESCRIPTION - DESCRIPTORS: DESCRIPTORS: ACHING

## 2021-05-22 NOTE — ANESTHESIA POSTPROCEDURE EVALUATION
Patient: Mari Dillon    Procedure Summary     Date: 05/22/21 Room / Location:  OR  / SURGERY [facility]    Anesthesia Start: 1332 Anesthesia Stop: 1430    Procedures:       LAPAROTOMY, EXPLORATORY      HEMICOLECTOMY, RIGHT, SIDE TO SIDE ANASTOMOSIS (Abdomen) Diagnosis:     Surgeons: Maryam Wood M.D. Responsible Provider: Miguel Ángel Ennis M.D.    Anesthesia Type: general, peripheral nerve block ASA Status: 2 - Emergent          Final Anesthesia Type: general, peripheral nerve block  Last vitals  BP   Blood Pressure: 111/66, NIBP: 123/62    Temp   36.3 °C (97.3 °F)    Pulse   60   Resp   14    SpO2   98 %      Anesthesia Post Evaluation    Patient location during evaluation: PACU  Patient participation: complete - patient participated  Level of consciousness: awake and alert  Pain score: 0    Airway patency: patent  Anesthetic complications: no  Cardiovascular status: hemodynamically stable  Respiratory status: acceptable  Hydration status: euvolemic    PONV: none          No complications documented.     Nurse Pain Score: 0 (NPRS)

## 2021-05-22 NOTE — OR NURSING
1430: To PACU from OR via bed, sleeping, respirations spontaneous and non-labored via OPA. Abdo soft, wound vac dressing to portable device.  1432: Rouses spontaneously, denies pain  1445: Dozes unless stimulated  1450: More awake, O2 d/lissette  1500: No change.  1515: No change in surgical site assessment.Meets criteria to transfer to floor.

## 2021-05-22 NOTE — ASSESSMENT & PLAN NOTE
Acute abd pain  CT shows cecal volvulus  5/22 ex lap, r hemicolectomy  Await GI function  5/24 trend procalcitonin and CRP   Abd xray- ileus  5/25 procalcitonin and CRP increasing, check lactic acid add reglan  5/26 Labs improving. Passing flatus - start clears  5/27 - Stooled - will adv to full liquids  5/27 - thrombocytopenia, bandemia, PM CT with ascites no freeair  5/28 - Exploratory celiotomy, resection of ileocolic anastomosis with re-do  side-to-side ileocolic anastomosis.  6/3  Stooling, advance to regular diet  6/4 Small wound dehiscence - returned to OR for washout and reclosure  6/9 Repeat CT showed two fluid collections - IR drains placed  6/11 BE shows leak - Ex lap, drain  6/16 - plan CT in AM  6/21 - doing well, minimal drain output, normal WBC, awaiting placement  Remains on unasyn, diflucan

## 2021-05-22 NOTE — CONSULTS
DATE OF CONSULTATION:  2021     REFERRING PHYSICIAN:   Neno Osuna M.D.     CONSULTING PHYSICIAN:  Maryam Wood M.D.     REASON FOR CONSULTATION:  Cecal volvulus   I have been asked by  to see the patient in surgical consultation for evaluation of cecal volvulus.    HISTORY OF PRESENT ILLNESS: The patient is a 50 year-old White woman who presents to the Emergency Department with a 5 hour history of severe generalized abdominal pain. The pain is associated with constipation. The patient denies any recent or intercurrent illness. The patient has undergone C section and open appendectomy.      PAST MEDICAL HISTORY:  has a past medical history of Anxiety (2009).    PAST SURGICAL HISTORY:  has a past surgical history that includes other (); appendectomy ();  delivery only (); shoulder arthroscopy w/ bicipital tenodesis repair (Right, 6/15/2015); clavicle distal excision (Right, 6/15/2015); shoulder decompression (Right, 6/15/2015); shoulder decompression arthroscopic (Right, 2018); clavicle distal excision (Left, 2018); shoulder arthroscopy w/ bicipital tenodesis repair (Left, 2018); and shoulder manipulation (Left, 2018).     ALLERGIES: No Known Allergies     CURRENT MEDICATIONS:   Home Medications     Reviewed by Telma Izquierdo (Pharmacy Tech) on 21 at 1117  Med List Status: Complete   Medication Last Dose Status   DULoxetine (CYMBALTA) 60 MG Cap DR Particles delayed-release capsule 2021 Active   hydrALAZINE (APRESOLINE) 25 MG Tab 2021 Active   HYDROcodone/acetaminophen (NORCO)  MG Tab 2021 Active   norethindrone (SHIELA) 0.35 MG tablet 2021 Active   topiramate (TOPAMAX) 100 MG Tab 2021 Active                FAMILY HISTORY:   Family History   Problem Relation Age of Onset   • Hypertension Mother    • Diabetes Mother    • Cancer Maternal Grandmother         breast       SOCIAL HISTORY:   Social History     "    Tobacco Use   • Smoking status: Former Smoker     Years: 15.00     Quit date: 2005     Years since quittin.3   • Smokeless tobacco: Never Used   • Tobacco comment: 3-4 years ago   Vaping Use   • Vaping Use: Never used   Substance and Sexual Activity   • Alcohol use: No     Alcohol/week: 0.0 oz   • Drug use: Yes     Types: Inhaled, Marijuana     Comment: Marijuana daily   • Sexual activity: Yes     Partners: Male       REVIEW OF SYSTEMS: Comprehensive review of systems is negative with the exception of the aforementioned HPI, PMH, and PSH bullets in accordance with CMS guidelines.     PHYSICAL EXAMINATION:   General Appearance: The patient is a pleasant  woman in no critical distress.  VITAL SIGNS: /64   Pulse 82   Temp 36 °C (96.8 °F) (Temporal)   Resp 13   Ht 1.753 m (5' 9\")   Wt 63 kg (138 lb 14.2 oz)   SpO2 100%   HEAD AND NECK: Demonstrates symmetric, reactive pupils.  Nares and oropharynx are clear.   NECK: Supple.    CHEST:    Inspection: Unlabored respirations, no intercostal retractions, paradoxical motion, or accessory muscle use.     CARDIOVASCULAR:   Inspection: The skin is warm.   ABDOMEN:   Inspection: Abdominal inspection reveals old appendectomy scar.   Palpation: Palpation is remarkable for severe tenderness in the left upper quadrant region. Markedly distended.  EXTREMITIES:   Examination of the upper and lower extremities demonstrates No cyanosis, edema, or clubbing of the nails.  NEUROLOGIC:   Neurologic examination reveals no focal deficits noted.       LABORATORY VALUES:   Recent Labs     21  1046   WBC 8.4   RBC 4.26   HEMOGLOBIN 12.5   HEMATOCRIT 38.3   MCV 89.9   MCH 29.3   MCHC 32.6*   RDW 45.4   PLATELETCT 290   MPV 9.6     Recent Labs     21  1046   SODIUM 138   POTASSIUM 3.7   CHLORIDE 107   CO2 19*   GLUCOSE 84   BUN 10   CREATININE 0.81   CALCIUM 9.0     Recent Labs     21  1046   ASTSGOT 14   ALTSGPT 9   TBILIRUBIN 0.3   ALKPHOSPHAT 60 "   GLOBULIN 2.8            IMAGING:   CT-ABDOMEN-PELVIS WITH   Final Result         1.  Findings keeping with cecal volvulus.   2.  Mildly fluid distended distal small bowel.   3.  No free air.   4.  Small nonobstructing renal stones.   These findings were discussed with ESHA STERLING on 5/22/2021 11:45 AM.                      IMPRESSION AND PLAN:  Cecal volvulus (HCC)  Assessment & Plan  Acute abd pain  CT shows cecal volvulus  Plan ex lap    Procedure described as well as the risks including bleeding, infection, need for bowel resection and anesthetic risks. They understand and wish to proceed.           ____________________________________     Maryam Wood M.D.    DD: 5/22/2021  12:07 PM

## 2021-05-22 NOTE — ANESTHESIA TIME REPORT
Anesthesia Start and Stop Event Times     Date Time Event    5/22/2021 1230 Ready for Procedure     1332 Anesthesia Start     1430 Anesthesia Stop        Responsible Staff  05/22/21    Name Role Begin End    Miguel Ángel Ennis M.D. Anesth 1332 1430        Preop Diagnosis (Free Text):  Pre-op Diagnosis             Preop Diagnosis (Codes):    Post op Diagnosis  Cecal volvulus (HCC)  cecal volvulus with bowel obstruction    Premium Reason  E. Weekend    Comments:

## 2021-05-22 NOTE — ANESTHESIA PREPROCEDURE EVALUATION
Anxiety, abdominal pain with evidence of cecal volvulus. Prior GA without issues. Denies: MI/CHF/smoking/CVA/DM/CKD      Relevant Problems   NEURO   (positive) History of kidney stones      CARDIAC   (positive) Intractable migraine with aura without status migrainosus       Physical Exam    Airway   Mallampati: II  TM distance: >3 FB  Neck ROM: full       Cardiovascular - normal exam  Rhythm: regular  Rate: normal  (-) murmur     Dental - normal exam           Pulmonary - normal exam  Breath sounds clear to auscultation     Abdominal    Neurological - normal exam                 Anesthesia Plan    ASA 2- EMERGENT (cecal volvulus with compromised bowel)   ASA physical status emergent criteria: compromised vital organ, limb or tissue    Plan - general and peripheral nerve block     Peripheral nerve block will be post-op pain control  Airway plan will be ETT          Induction: intravenous    Postoperative Plan: Postoperative administration of opioids is intended.    Pertinent diagnostic labs and testing reviewed    Informed Consent:    Anesthetic plan and risks discussed with patient.    Use of blood products discussed with: patient whom consented to blood products.

## 2021-05-22 NOTE — OP REPORT
DATE OF SERVICE:  05/22/2021     PREOPERATIVE DIAGNOSIS:  Cecal volvulus.     POSTOPERATIVE DIAGNOSIS:  Cecal volvulus.     PROCEDURES:  Exploratory celiotomy and right hemicolectomy with a side-to-side   ileocolic anastomosis.     SURGEON:  Maryam Wood MD     ASSISTANT:  GLADYS Siegel     ANESTHESIA:  General endotracheal.     ANESTHESIOLOGIST:  Miguel Ángel Ennis MD     INDICATIONS:  The patient is a 50-year-old female who presented with severe   onset of abdominal pain this morning, she was brought to the emergency room.    She has had a history of constipation and issues with the GI tract.  She   underwent a CT scan that shows cecal volvulus of the colon measuring 10 cm,   being brought at this time urgently for exploration. The indications for a surgical assistant in this surgery were indicated due to complexity of the procedure. Their role included aiding in incision, retraction, holding devices  and closure of the wound.        FINDINGS:  A standard cecal volvulus with two twists along the mesentery.    This was derotated and a right hemicolectomy was performed.  A side-to-side   ileocolic anastomosis was completed as well.     DESCRIPTION OF PROCEDURE:  After the patient was identified and consented, she   was brought to the operating room and placed in supine position.  The patient   underwent general endotracheal anesthetic clearance.  The patient's abdomen   was prepped and draped in a sterile fashion.  Periumbilical incision was   carried out.  Upon entering the abdominal cavity, the aforementioned findings   were noted.  The bowel was eviscerated and derotated.  Once that was   completed, the bowel was transected at approximately 5 cm distal to the   terminal ileum and at the hepatic flexure with a NICKI stapler.  Mesentery was   taken down sequentially with LigaSure device.  Once that was completed, a   side-to-side ileocolic anastomosis was completed ____ started with staples and    reinforced with 3-0 silks.  Mesenteric rent was also closed with 3-0 silks.    Bowel was returned to abdominal cavity.  Abdomen was irrigated.  Hemostasis   was noted.  Seprafilm was placed.  A closing tray was used and the fascia was   closed with running #1 Vicryls.  Subcutaneous tissue was reapproximated with   3-0 Vicryl.  Skin was closed with running 4-0 Vicryl ____.  A Prevena dressing   was placed.  The patient was extubated and taken to recovery room in stable   condition.  All sponge and needle counts were correct.        ______________________________  MD ABBY ALTAMIRANO/BAYLEE/ERNA    DD:  05/22/2021 14:23  DT:  05/22/2021 15:55    Job#:  [id number]    CC:MD MADELINE Schwarz MD

## 2021-05-22 NOTE — PROGRESS NOTES
Pt arrived to floor, A&O x4, reports minimal, tolerable pain. Denies nausea. No signs of distress. Bed low and locked, call light in reach.

## 2021-05-22 NOTE — ED TRIAGE NOTES
"C/O acute onset of diffuse abdominal pain without episodic N/V/D recurring since approximately 0700 this AM.  She reports eating greasy pizza, and onion rings last night, and she consumed a bowel of cereals with milk this AM.  States a hx of appendectomy, and she still has her gallbladder.   Chief Complaint   Patient presents with   • Abdominal Pain     /76   Pulse 99   Temp 36 °C (96.8 °F) (Temporal)   Resp 20   Ht 1.753 m (5' 9\")   Wt 63 kg (138 lb 14.2 oz)   LMP 05/20/2021   SpO2 100%   BMI 20.51 kg/m²      "

## 2021-05-22 NOTE — ANESTHESIA PROCEDURE NOTES
Peripheral Block    Date/Time: 5/22/2021 1:38 PM  Performed by: Miguel Ángel Ennis M.D.  Authorized by: Miguel Ángel Ennis M.D.     Patient Location:  OR  Start Time:  5/22/2021 1:38 PM  End Time:  5/22/2021 1:43 PM  Reason for Block: at surgeon's request and post-op pain management ONLY    patient identified, IV checked, site marked, risks and benefits discussed, surgical consent, monitors and equipment checked, pre-op evaluation and timeout performed    Patient Position:  Supine  Prep: ChloraPrep    Monitoring:  Heart rate, continuous pulse ox and cardiac monitor  Block Region:  Trunk  Trunk - Block Type:  Rectus sheath plane block - TAP block    Laterality:  Bilateral  Procedures: ultrasound guided  Image captured, interpreted and electronically stored.  Local Infiltration:  Lidocaine  Strength:  1 %  Dose:  3 ml  Block Type:  Single-shot  Needle Length:  100mm  Needle Gauge:  21 G  Needle Localization:  Ultrasound guidance  Injection Assessment:  Negative aspiration for heme, incremental injection and local visualized surrounding nerve on ultrasound  Evidence of intravascular injection: No     US Guided Rectus Sheath Abdominis Plane/(TAP) Block   US probe placed at the anterior midclavicular line at level of umbilicus in an axial plane. Rectus Abdominus, External Oblique, Internal Oblique (IO) and Transversis Abdominis (TA) muscles identified.  Needle inserted anteromedial to probe in an in plane approach and advanced under direct visualization to posterior border of rectus sheath. After negative aspiration LA injected with ease and visualized  the rectus muscle from the posterior rectus sheath/fascia.

## 2021-05-22 NOTE — ED PROVIDER NOTES
ED Provider Note    CHIEF COMPLAINT  Chief Complaint   Patient presents with   • Abdominal Pain         HPI  Mari Dillon is a 50 y.o. female who presents to the ED with diffuse abdominal pain.  The patient started having diffuse abdominal pain at 7:00 this morning.  It sharp, severe, comes in waves, diffuse throughout the abdomen.  Patient also complains of bloatedness.  The patient states that she has been constipated over the last 2 weeks while she has been on her..  She took a Norco this morning without any improvement of the pain.  No nausea or vomiting.  She is passing gas.  She does have a history of previous appendectomy.    REVIEW OF SYSTEMS  See HPI for further details. All other systems are negative.     PAST MEDICAL HISTORY  Past Medical History:   Diagnosis Date   • Anxiety 2009       FAMILY HISTORY  Family History   Problem Relation Age of Onset   • Hypertension Mother    • Diabetes Mother    • Cancer Maternal Grandmother         breast       SOCIAL HISTORY  Social History     Socioeconomic History   • Marital status: Single     Spouse name: Not on file   • Number of children: Not on file   • Years of education: Not on file   • Highest education level: Not on file   Occupational History   • Not on file   Tobacco Use   • Smoking status: Former Smoker     Years: 15.00     Quit date: 2005     Years since quittin.3   • Smokeless tobacco: Never Used   • Tobacco comment: 3-4 years ago   Vaping Use   • Vaping Use: Never used   Substance and Sexual Activity   • Alcohol use: No     Alcohol/week: 0.0 oz   • Drug use: Yes     Types: Inhaled, Marijuana     Comment: Marijuana daily   • Sexual activity: Yes     Partners: Male   Other Topics Concern   • Not on file   Social History Narrative    ** Merged History Encounter **          Social Determinants of Health     Financial Resource Strain:    • Difficulty of Paying Living Expenses:    Food Insecurity:    • Worried About Running Out of Food in  the Last Year:    • Ran Out of Food in the Last Year:    Transportation Needs:    • Lack of Transportation (Medical):    • Lack of Transportation (Non-Medical):    Physical Activity:    • Days of Exercise per Week:    • Minutes of Exercise per Session:    Stress:    • Feeling of Stress :    Social Connections:    • Frequency of Communication with Friends and Family:    • Frequency of Social Gatherings with Friends and Family:    • Attends [employer] Services:    • Active Member of Clubs or Organizations:    • Attends Club or Organization Meetings:    • Marital Status:    Intimate Partner Violence:    • Fear of Current or Ex-Partner:    • Emotionally Abused:    • Physically Abused:    • Sexually Abused:        SURGICAL HISTORY  Past Surgical History:   Procedure Laterality Date   • SHOULDER DECOMPRESSION ARTHROSCOPIC Right 12/24/2018    Procedure: SHOULDER DECOMPRESSION ARTHROSCOPIC - SUBACROMIAL;  Surgeon: Tristian Garcia M.D.;  Location: [facility];  Service: Orthopedics   • CLAVICLE DISTAL EXCISION Left 12/24/2018    Procedure: CLAVICLE DISTAL EXCISION;  Surgeon: Tristian Garcia M.D.;  Location: [facility];  Service: Orthopedics   • SHOULDER ARTHROSCOPY W/ BICIPITAL TENODESIS REPAIR Left 12/24/2018    Procedure: SHOULDER ARTHROSCOPY W/ BICIPITAL TENODESIS REPAIR - AND PACKER;  Surgeon: Tristian Garcia M.D.;  Location: [facility];  Service: Orthopedics   • SHOULDER MANIPULATION Left 12/24/2018    Procedure: SHOULDER MANIPULATION;  Surgeon: Tristian Garcia M.D.;  Location: [facility];  Service: Orthopedics   • SHOULDER ARTHROSCOPY W/ BICIPITAL TENODESIS REPAIR Right 6/15/2015    Procedure: SHOULDER ARTHROSCOPY W/ BICIPITAL TENODESIS, SYNOVECTOMY;  Surgeon: Tristian Garcia M.D.;  Location: [facility];  Service:    • CLAVICLE DISTAL EXCISION Right 6/15/2015    Procedure: CLAVICLE DISTAL EXCISION;  Surgeon: Tristian Garcia M.D.;  Location:  [facility];  Service:    • SHOULDER DECOMPRESSION Right 6/15/2015    Procedure: SHOULDER DECOMPRESSION MINI INCISION ;  Surgeon: Tristian Garcia M.D.;  Location: SURGERY [facility];  Service:    • OTHER      nose   • APPENDECTOMY     • PB  DELIVERY ONLY         CURRENT MEDICATIONS  Home Medications     Reviewed by Christin Johnson R.N. (Registered Nurse) on 21 at 1404  Med List Status: Complete   Medication Last Dose Status   bisacodyl (DULCOLAX) suppository 10 mg  Active   bupivacaine 0.25% (SENSORCAINE-MARCAINE) pf injection  Active   cefoTEtan (CEFOTAN) injection  Active   dexamethasone (DECADRON) injection  Active   docusate sodium (COLACE) capsule 100 mg  Active   DULoxetine (CYMBALTA) 60 MG Cap DR Particles delayed-release capsule 2021 Active   enoxaparin (LOVENOX) inj 40 mg  Active   famotidine (PEPCID) injection 20 mg  Active   famotidine (PEPCID) tablet 20 mg  Active   fleet enema 133 mL  Active   hydrALAZINE (APRESOLINE) 25 MG Tab 2021 Active   HYDROcodone/acetaminophen (NORCO)  MG Tab 2021 Active   HYDROmorphone (DILAUDID) injection  Active   HYDROmorphone (Dilaudid) injection 0.5-1 mg  Active   ibuprofen (MOTRIN) tablet 800 mg  Active   ketorolac (TORADOL) injection 30 mg  Active   lactated ringers infusion  Active   lidocaine PF (XYLOCAINE-MPF) 2 % injection PF  Active   LR infusion  Active   magnesium hydroxide (MILK OF MAGNESIA) suspension 30 mL  Active   midazolam (Versed) 2 MG/2ML injection  Active   morphine (pf) 10 mg/mL injection 4 mg  Active   norethindrone (SHIELA) 0.35 MG tablet 2021 Active   ondansetron (ZOFRAN) syringe/vial injection 4 mg  Active   oxyCODONE immediate release (ROXICODONE) tablet 10 mg  Active   oxyCODONE immediate-release (ROXICODONE) tablet 5 mg  Active   Pharmacy Consult Request ...Pain Management Review 1 Each  Active   phenylephrine (ZAC-SYNEPHRINE) 100 mcg/mL inj (IV Push Syringe)  Active  "  polyethylene glycol/lytes (MIRALAX) PACKET 1 Packet  Active   propofol (DIPRIVAN) injection  Active   Respiratory Therapy Consult  Active   rocuronium (ZEMURON) injection  Active   senna-docusate (PERICOLACE or SENOKOT S) 8.6-50 MG per tablet 1 tablet  Active   senna-docusate (PERICOLACE or SENOKOT S) 8.6-50 MG per tablet 1 tablet  Active   succinylcholine injection  Active   topiramate (TOPAMAX) 100 MG Tab 5/21/2021 Active                ALLERGIES  No Known Allergies    PHYSICAL EXAM  VITAL SIGNS: /66   Pulse 60   Temp 36.3 °C (97.3 °F) (Temporal)   Resp 14   Ht 1.753 m (5' 9\")   Wt 63 kg (138 lb 14.2 oz)   LMP 05/20/2021   SpO2 98%   BMI 20.51 kg/m²   Constitutional: Well developed, Well nourished, moderate to severe distress, Non-toxic appearance.   HENT: Normocephalic, Atraumatic, Bilateral external ears normal, Oropharynx clear, No oral exudates, Nose normal.   Eyes: PERRLA, EOMI, Conjunctiva normal, No discharge.   Neck: Normal range of motion, No tenderness, Supple, No stridor.   Lymphatic: No lymphadenopathy noted.   Cardiovascular: Normal heart rate, Normal rhythm.   Thorax & Lungs: Normal breath sounds, No respiratory distress, No wheezing, No chest tenderness.  Hyperventilating  Abdomen: Diffuse distention and tenderness palpation, decreased bowel sounds throughout  Skin: Warm, Dry, No erythema, No rash.   Back: No tenderness, No CVA tenderness.   Extremities: Intact distal pulses, No edema, No tenderness.   Neurologic: Alert & oriented x 3, moves all extremities spontaneously.     RADIOLOGY/PROCEDURES  CT-ABDOMEN-PELVIS WITH   Final Result         1.  Findings keeping with cecal volvulus.   2.  Mildly fluid distended distal small bowel.   3.  No free air.   4.  Small nonobstructing renal stones.   These findings were discussed with ESHA STERLING on 5/22/2021 11:45 AM.                    Results for orders placed or performed during the hospital encounter of 05/22/21   CBC WITH " DIFFERENTIAL   Result Value Ref Range    WBC 8.4 4.8 - 10.8 K/uL    RBC 4.26 4.20 - 5.40 M/uL    Hemoglobin 12.5 12.0 - 16.0 g/dL    Hematocrit 38.3 37.0 - 47.0 %    MCV 89.9 81.4 - 97.8 fL    MCH 29.3 27.0 - 33.0 pg    MCHC 32.6 (L) 33.6 - 35.0 g/dL    RDW 45.4 35.9 - 50.0 fL    Platelet Count 290 164 - 446 K/uL    MPV 9.6 9.0 - 12.9 fL    Neutrophils-Polys 79.50 (H) 44.00 - 72.00 %    Lymphocytes 11.00 (L) 22.00 - 41.00 %    Monocytes 7.00 0.00 - 13.40 %    Eosinophils 1.40 0.00 - 6.90 %    Basophils 0.50 0.00 - 1.80 %    Immature Granulocytes 0.60 0.00 - 0.90 %    Nucleated RBC 0.00 /100 WBC    Neutrophils (Absolute) 6.69 2.00 - 7.15 K/uL    Lymphs (Absolute) 0.93 (L) 1.00 - 4.80 K/uL    Monos (Absolute) 0.59 0.00 - 0.85 K/uL    Eos (Absolute) 0.12 0.00 - 0.51 K/uL    Baso (Absolute) 0.04 0.00 - 0.12 K/uL    Immature Granulocytes (abs) 0.05 0.00 - 0.11 K/uL    NRBC (Absolute) 0.00 K/uL   COMP METABOLIC PANEL   Result Value Ref Range    Sodium 138 135 - 145 mmol/L    Potassium 3.7 3.6 - 5.5 mmol/L    Chloride 107 96 - 112 mmol/L    Co2 19 (L) 20 - 33 mmol/L    Anion Gap 12.0 7.0 - 16.0    Glucose 84 65 - 99 mg/dL    Bun 10 8 - 22 mg/dL    Creatinine 0.81 0.50 - 1.40 mg/dL    Calcium 9.0 8.4 - 10.2 mg/dL    AST(SGOT) 14 12 - 45 U/L    ALT(SGPT) 9 2 - 50 U/L    Alkaline Phosphatase 60 30 - 99 U/L    Total Bilirubin 0.3 0.1 - 1.5 mg/dL    Albumin 4.0 3.2 - 4.9 g/dL    Total Protein 6.8 6.0 - 8.2 g/dL    Globulin 2.8 1.9 - 3.5 g/dL    A-G Ratio 1.4 g/dL   LIPASE   Result Value Ref Range    Lipase 42 7 - 58 U/L   URINALYSIS (UA)    Specimen: Urine   Result Value Ref Range    Color Yellow     Character Hazy (A)     Specific Gravity 1.015 <1.035    Ph 7.0 5.0 - 8.0    Glucose Negative Negative mg/dL    Ketones Negative Negative mg/dL    Protein Negative Negative mg/dL    Bilirubin Negative Negative    Nitrite Negative Negative    Leukocyte Esterase Negative Negative    Occult Blood Large (A) Negative    Micro Urine Req  Microscopic    HCG QUAL SERUM   Result Value Ref Range    Beta-Hcg Qualitative Serum Negative Negative   URINE MICROSCOPIC (W/UA)   Result Value Ref Range    WBC 0-2 /hpf    RBC 2-5 (A) /hpf    Bacteria Moderate (A) None /hpf    Epithelial Cells Moderate (A) Few /hpf    Mucous Threads Moderate /hpf   ESTIMATED GFR   Result Value Ref Range    GFR If African American >60 >60 mL/min/1.73 m 2    GFR If Non African American >60 >60 mL/min/1.73 m 2   SARS-COV Antigen ARPIT: Collect dry nasal swab AND NP swab in VTM   Result Value Ref Range    SARS-CoV-2 Source NP Swab     SARS-COV ANTIGEN ARPIT NotDetected Not-Detected   SARS-CoV-2 PCR (24 hour In-House): Collect NP swab in VTM    Specimen: Respirate   Result Value Ref Range    SARS-CoV-2 Source NP Swab    Histology Request   Result Value Ref Range    Pathology Request Sent to Histo         COURSE & MEDICAL DECISION MAKING  Pertinent Labs & Imaging studies reviewed. (See chart for details)  Patient with diffuse abdominal pain and bloating, likely constipation versus  obstipation versus ileus versus intra-abdominal infection.  We will get a CT scan of the abdomen pelvis, check basic laboratory tests, give the patient some Dilaudid and Zofran.    CT scan shows a cecal volvulus, discussed the case with Dr. Wood who will take the patient to the operating room.    CRITICAL CARE  The very real possibilty of a deterioration of this patient's condition required the highest level of my preparedness for sudden, emergent intervention.  I provided critical care services, which included medication orders, frequent reevaluations of the patient's condition and response to treatment, ordering and reviewing test results, and discussing the case with various consultants.  The critical care time associated with the care of the patient was 35 minutes. Review chart for interventions. This time is exclusive of any other billable procedures.       FINAL IMPRESSION  1. Cecal volvulus (HCC)         Patient referred to primary care provider for blood pressure management    This dictation was created using voice recognition software. The accuracy of the dictation is limited to the abilities of the software. I expect there may be some errors of grammar and possibly content. The nursing notes were reviewed and certain aspects of this information were incorporated into this note.    Electronically signed by: Vinicio Osuna M.D., 5/22/2021 10:39 AM

## 2021-05-23 LAB
ALBUMIN SERPL BCP-MCNC: 3.3 G/DL (ref 3.2–4.9)
ALBUMIN/GLOB SERPL: 1.4 G/DL
ALP SERPL-CCNC: 48 U/L (ref 30–99)
ALT SERPL-CCNC: 10 U/L (ref 2–50)
ANION GAP SERPL CALC-SCNC: 13 MMOL/L (ref 7–16)
AST SERPL-CCNC: 15 U/L (ref 12–45)
BASOPHILS # BLD AUTO: 0 % (ref 0–1.8)
BASOPHILS # BLD: 0 K/UL (ref 0–0.12)
BILIRUB SERPL-MCNC: 0.5 MG/DL (ref 0.1–1.5)
BUN SERPL-MCNC: 10 MG/DL (ref 8–22)
CALCIUM SERPL-MCNC: 8.2 MG/DL (ref 8.4–10.2)
CHLORIDE SERPL-SCNC: 107 MMOL/L (ref 96–112)
CO2 SERPL-SCNC: 18 MMOL/L (ref 20–33)
CREAT SERPL-MCNC: 0.79 MG/DL (ref 0.5–1.4)
EOSINOPHIL # BLD AUTO: 0 K/UL (ref 0–0.51)
EOSINOPHIL NFR BLD: 0 % (ref 0–6.9)
ERYTHROCYTE [DISTWIDTH] IN BLOOD BY AUTOMATED COUNT: 46.1 FL (ref 35.9–50)
GLOBULIN SER CALC-MCNC: 2.4 G/DL (ref 1.9–3.5)
GLUCOSE SERPL-MCNC: 126 MG/DL (ref 65–99)
HCT VFR BLD AUTO: 34.8 % (ref 37–47)
HGB BLD-MCNC: 11.2 G/DL (ref 12–16)
LYMPHOCYTES # BLD AUTO: 0.32 K/UL (ref 1–4.8)
LYMPHOCYTES NFR BLD: 3 % (ref 22–41)
MANUAL DIFF BLD: NORMAL
MCH RBC QN AUTO: 28.6 PG (ref 27–33)
MCHC RBC AUTO-ENTMCNC: 32.2 G/DL (ref 33.6–35)
MCV RBC AUTO: 88.8 FL (ref 81.4–97.8)
MONOCYTES # BLD AUTO: 0.54 K/UL (ref 0–0.85)
MONOCYTES NFR BLD AUTO: 5 % (ref 0–13.4)
NEUTROPHILS # BLD AUTO: 9.84 K/UL (ref 2–7.15)
NEUTROPHILS NFR BLD: 87 % (ref 44–72)
NEUTS BAND NFR BLD MANUAL: 5 % (ref 0–10)
NRBC # BLD AUTO: 0 K/UL
NRBC BLD-RTO: 0 /100 WBC
PLATELET # BLD AUTO: 240 K/UL (ref 164–446)
PLATELET BLD QL SMEAR: NORMAL
PMV BLD AUTO: 10 FL (ref 9–12.9)
POTASSIUM SERPL-SCNC: 3.9 MMOL/L (ref 3.6–5.5)
PROT SERPL-MCNC: 5.7 G/DL (ref 6–8.2)
RBC # BLD AUTO: 3.92 M/UL (ref 4.2–5.4)
RBC BLD AUTO: NORMAL
SARS-COV-2 RNA RESP QL NAA+PROBE: NOTDETECTED
SODIUM SERPL-SCNC: 138 MMOL/L (ref 135–145)
SPECIMEN SOURCE: NORMAL
WBC # BLD AUTO: 10.7 K/UL (ref 4.8–10.8)

## 2021-05-23 PROCEDURE — 80053 COMPREHEN METABOLIC PANEL: CPT

## 2021-05-23 PROCEDURE — 36415 COLL VENOUS BLD VENIPUNCTURE: CPT

## 2021-05-23 PROCEDURE — 770006 HCHG ROOM/CARE - MED/SURG/GYN SEMI*

## 2021-05-23 PROCEDURE — 700111 HCHG RX REV CODE 636 W/ 250 OVERRIDE (IP): Performed by: SURGERY

## 2021-05-23 PROCEDURE — 700105 HCHG RX REV CODE 258: Performed by: SURGERY

## 2021-05-23 PROCEDURE — 85027 COMPLETE CBC AUTOMATED: CPT

## 2021-05-23 PROCEDURE — 700111 HCHG RX REV CODE 636 W/ 250 OVERRIDE (IP): Performed by: HOSPITALIST

## 2021-05-23 PROCEDURE — 85007 BL SMEAR W/DIFF WBC COUNT: CPT

## 2021-05-23 RX ORDER — MORPHINE SULFATE 4 MG/ML
4 INJECTION, SOLUTION INTRAMUSCULAR; INTRAVENOUS
Status: DISCONTINUED | OUTPATIENT
Start: 2021-05-23 | End: 2021-06-03

## 2021-05-23 RX ORDER — DIAZEPAM 5 MG/ML
5 INJECTION, SOLUTION INTRAMUSCULAR; INTRAVENOUS EVERY 6 HOURS PRN
Status: DISCONTINUED | OUTPATIENT
Start: 2021-05-23 | End: 2021-06-21 | Stop reason: HOSPADM

## 2021-05-23 RX ORDER — HYDROXYZINE HYDROCHLORIDE 25 MG/1
25 TABLET, FILM COATED ORAL 3 TIMES DAILY
Status: ON HOLD | COMMUNITY
End: 2021-06-18

## 2021-05-23 RX ADMIN — FAMOTIDINE 20 MG: 10 INJECTION INTRAVENOUS at 17:16

## 2021-05-23 RX ADMIN — MORPHINE SULFATE 4 MG: 4 INJECTION INTRAVENOUS at 08:47

## 2021-05-23 RX ADMIN — FAMOTIDINE 20 MG: 10 INJECTION INTRAVENOUS at 06:00

## 2021-05-23 RX ADMIN — KETOROLAC TROMETHAMINE 30 MG: 30 INJECTION, SOLUTION INTRAMUSCULAR; INTRAVENOUS at 00:21

## 2021-05-23 RX ADMIN — SODIUM CHLORIDE, POTASSIUM CHLORIDE, SODIUM LACTATE AND CALCIUM CHLORIDE: 600; 310; 30; 20 INJECTION, SOLUTION INTRAVENOUS at 02:49

## 2021-05-23 RX ADMIN — KETOROLAC TROMETHAMINE 30 MG: 30 INJECTION, SOLUTION INTRAMUSCULAR; INTRAVENOUS at 11:33

## 2021-05-23 RX ADMIN — KETOROLAC TROMETHAMINE 30 MG: 30 INJECTION, SOLUTION INTRAMUSCULAR; INTRAVENOUS at 23:54

## 2021-05-23 RX ADMIN — KETOROLAC TROMETHAMINE 30 MG: 30 INJECTION, SOLUTION INTRAMUSCULAR; INTRAVENOUS at 17:16

## 2021-05-23 RX ADMIN — DIAZEPAM 5 MG: 5 INJECTION, SOLUTION INTRAMUSCULAR; INTRAVENOUS at 11:13

## 2021-05-23 RX ADMIN — MORPHINE SULFATE 4 MG: 4 INJECTION INTRAVENOUS at 02:48

## 2021-05-23 RX ADMIN — MORPHINE SULFATE 4 MG: 4 INJECTION INTRAVENOUS at 20:50

## 2021-05-23 RX ADMIN — ENOXAPARIN SODIUM 40 MG: 40 INJECTION SUBCUTANEOUS at 05:54

## 2021-05-23 RX ADMIN — KETOROLAC TROMETHAMINE 30 MG: 30 INJECTION, SOLUTION INTRAMUSCULAR; INTRAVENOUS at 05:53

## 2021-05-23 RX ADMIN — SODIUM CHLORIDE, POTASSIUM CHLORIDE, SODIUM LACTATE AND CALCIUM CHLORIDE: 600; 310; 30; 20 INJECTION, SOLUTION INTRAVENOUS at 13:24

## 2021-05-23 RX ADMIN — MORPHINE SULFATE 4 MG: 4 INJECTION INTRAVENOUS at 15:42

## 2021-05-23 ASSESSMENT — PAIN DESCRIPTION - PAIN TYPE
TYPE: SURGICAL PAIN
TYPE: SURGICAL PAIN
TYPE: ACUTE PAIN
TYPE: SURGICAL PAIN
TYPE: SURGICAL PAIN

## 2021-05-23 ASSESSMENT — ENCOUNTER SYMPTOMS: ABDOMINAL PAIN: 1

## 2021-05-23 NOTE — CARE PLAN
The patient is Stable - Low risk of patient condition declining or worsening    Shift Goals  Clinical Goals: pain management    Progress made toward(s) clinical / shift goals:  pain controlled with scheduled and PRN meds    Patient is not progressing towards the following goals: anxiety

## 2021-05-23 NOTE — PROGRESS NOTES
Trauma / Surgical Daily Progress Note    Date of Service  5/23/2021    Chief Complaint  50 y.o. female admitted 5/22/2021 with cecal volvulus    Interval Events  SP R hemicolectomy. Pain issues. Will DC david. Await GI function.    Review of Systems  Review of Systems   Gastrointestinal: Positive for abdominal pain.        Vital Signs for last 24 hours  Temp:  [36 °C (96.8 °F)-37.1 °C (98.8 °F)] 37.1 °C (98.8 °F)  Pulse:  [60-99] 98  Resp:  [12-20] 16  BP: (101-122)/(45-91) 103/57  SpO2:  [96 %-100 %] 96 %    Hemodynamic parameters for last 24 hours       Respiratory Data     Respiration: 16, Pulse Oximetry: 96 %             Physical Exam  Physical Exam  Cardiovascular:      Rate and Rhythm: Normal rate.   Pulmonary:      Effort: Pulmonary effort is normal.   Abdominal:      Palpations: Abdomen is soft.      Tenderness: There is abdominal tenderness.      Comments: Provena in place   Genitourinary:     Comments: David in place  Musculoskeletal:         General: Normal range of motion.      Cervical back: Neck supple.   Skin:     General: Skin is warm.   Neurological:      General: No focal deficit present.      Mental Status: She is alert.         Laboratory  Recent Results (from the past 24 hour(s))   CBC WITH DIFFERENTIAL    Collection Time: 05/22/21 10:46 AM   Result Value Ref Range    WBC 8.4 4.8 - 10.8 K/uL    RBC 4.26 4.20 - 5.40 M/uL    Hemoglobin 12.5 12.0 - 16.0 g/dL    Hematocrit 38.3 37.0 - 47.0 %    MCV 89.9 81.4 - 97.8 fL    MCH 29.3 27.0 - 33.0 pg    MCHC 32.6 (L) 33.6 - 35.0 g/dL    RDW 45.4 35.9 - 50.0 fL    Platelet Count 290 164 - 446 K/uL    MPV 9.6 9.0 - 12.9 fL    Neutrophils-Polys 79.50 (H) 44.00 - 72.00 %    Lymphocytes 11.00 (L) 22.00 - 41.00 %    Monocytes 7.00 0.00 - 13.40 %    Eosinophils 1.40 0.00 - 6.90 %    Basophils 0.50 0.00 - 1.80 %    Immature Granulocytes 0.60 0.00 - 0.90 %    Nucleated RBC 0.00 /100 WBC    Neutrophils (Absolute) 6.69 2.00 - 7.15 K/uL    Lymphs (Absolute) 0.93 (L)  1.00 - 4.80 K/uL    Monos (Absolute) 0.59 0.00 - 0.85 K/uL    Eos (Absolute) 0.12 0.00 - 0.51 K/uL    Baso (Absolute) 0.04 0.00 - 0.12 K/uL    Immature Granulocytes (abs) 0.05 0.00 - 0.11 K/uL    NRBC (Absolute) 0.00 K/uL   COMP METABOLIC PANEL    Collection Time: 05/22/21 10:46 AM   Result Value Ref Range    Sodium 138 135 - 145 mmol/L    Potassium 3.7 3.6 - 5.5 mmol/L    Chloride 107 96 - 112 mmol/L    Co2 19 (L) 20 - 33 mmol/L    Anion Gap 12.0 7.0 - 16.0    Glucose 84 65 - 99 mg/dL    Bun 10 8 - 22 mg/dL    Creatinine 0.81 0.50 - 1.40 mg/dL    Calcium 9.0 8.4 - 10.2 mg/dL    AST(SGOT) 14 12 - 45 U/L    ALT(SGPT) 9 2 - 50 U/L    Alkaline Phosphatase 60 30 - 99 U/L    Total Bilirubin 0.3 0.1 - 1.5 mg/dL    Albumin 4.0 3.2 - 4.9 g/dL    Total Protein 6.8 6.0 - 8.2 g/dL    Globulin 2.8 1.9 - 3.5 g/dL    A-G Ratio 1.4 g/dL   LIPASE    Collection Time: 05/22/21 10:46 AM   Result Value Ref Range    Lipase 42 7 - 58 U/L   URINALYSIS (UA)    Collection Time: 05/22/21 10:46 AM    Specimen: Urine   Result Value Ref Range    Color Yellow     Character Hazy (A)     Specific Gravity 1.015 <1.035    Ph 7.0 5.0 - 8.0    Glucose Negative Negative mg/dL    Ketones Negative Negative mg/dL    Protein Negative Negative mg/dL    Bilirubin Negative Negative    Nitrite Negative Negative    Leukocyte Esterase Negative Negative    Occult Blood Large (A) Negative    Micro Urine Req Microscopic    HCG QUAL SERUM    Collection Time: 05/22/21 10:46 AM   Result Value Ref Range    Beta-Hcg Qualitative Serum Negative Negative   URINE MICROSCOPIC (W/UA)    Collection Time: 05/22/21 10:46 AM   Result Value Ref Range    WBC 0-2 /hpf    RBC 2-5 (A) /hpf    Bacteria Moderate (A) None /hpf    Epithelial Cells Moderate (A) Few /hpf    Mucous Threads Moderate /hpf   ESTIMATED GFR    Collection Time: 05/22/21 10:46 AM   Result Value Ref Range    GFR If African American >60 >60 mL/min/1.73 m 2    GFR If Non African American >60 >60 mL/min/1.73 m 2    SARS-COV Antigen ARPIT: Collect dry nasal swab AND NP swab in VTM    Collection Time: 05/22/21 12:11 PM   Result Value Ref Range    SARS-CoV-2 Source NP Swab     SARS-COV ANTIGEN ARPIT NotDetected Not-Detected   SARS-CoV-2 PCR (24 hour In-House): Collect NP swab in VTM    Collection Time: 05/22/21 12:11 PM    Specimen: Respirate   Result Value Ref Range    SARS-CoV-2 Source NP Swab    Histology Request    Collection Time: 05/22/21  2:43 PM   Result Value Ref Range    Pathology Request Sent to Histo    CBC with Differential: Tomorrow AM    Collection Time: 05/23/21  2:34 AM   Result Value Ref Range    WBC 10.7 4.8 - 10.8 K/uL    RBC 3.92 (L) 4.20 - 5.40 M/uL    Hemoglobin 11.2 (L) 12.0 - 16.0 g/dL    Hematocrit 34.8 (L) 37.0 - 47.0 %    MCV 88.8 81.4 - 97.8 fL    MCH 28.6 27.0 - 33.0 pg    MCHC 32.2 (L) 33.6 - 35.0 g/dL    RDW 46.1 35.9 - 50.0 fL    Platelet Count 240 164 - 446 K/uL    MPV 10.0 9.0 - 12.9 fL    Neutrophils-Polys 87.00 (H) 44.00 - 72.00 %    Lymphocytes 3.00 (L) 22.00 - 41.00 %    Monocytes 5.00 0.00 - 13.40 %    Eosinophils 0.00 0.00 - 6.90 %    Basophils 0.00 0.00 - 1.80 %    Nucleated RBC 0.00 /100 WBC    Neutrophils (Absolute) 9.84 (H) 2.00 - 7.15 K/uL    Lymphs (Absolute) 0.32 (L) 1.00 - 4.80 K/uL    Monos (Absolute) 0.54 0.00 - 0.85 K/uL    Eos (Absolute) 0.00 0.00 - 0.51 K/uL    Baso (Absolute) 0.00 0.00 - 0.12 K/uL    NRBC (Absolute) 0.00 K/uL   Comp Metabolic Panel (CMP): Tomorrow AM    Collection Time: 05/23/21  2:34 AM   Result Value Ref Range    Sodium 138 135 - 145 mmol/L    Potassium 3.9 3.6 - 5.5 mmol/L    Chloride 107 96 - 112 mmol/L    Co2 18 (L) 20 - 33 mmol/L    Anion Gap 13.0 7.0 - 16.0    Glucose 126 (H) 65 - 99 mg/dL    Bun 10 8 - 22 mg/dL    Creatinine 0.79 0.50 - 1.40 mg/dL    Calcium 8.2 (L) 8.4 - 10.2 mg/dL    AST(SGOT) 15 12 - 45 U/L    ALT(SGPT) 10 2 - 50 U/L    Alkaline Phosphatase 48 30 - 99 U/L    Total Bilirubin 0.5 0.1 - 1.5 mg/dL    Albumin 3.3 3.2 - 4.9 g/dL    Total  Protein 5.7 (L) 6.0 - 8.2 g/dL    Globulin 2.4 1.9 - 3.5 g/dL    A-G Ratio 1.4 g/dL   ESTIMATED GFR    Collection Time: 05/23/21  2:34 AM   Result Value Ref Range    GFR If African American >60 >60 mL/min/1.73 m 2    GFR If Non African American >60 >60 mL/min/1.73 m 2   DIFFERENTIAL MANUAL    Collection Time: 05/23/21  2:34 AM   Result Value Ref Range    Bands-Stabs 5.00 0.00 - 10.00 %    Manual Diff Status PERFORMED    PLATELET ESTIMATE    Collection Time: 05/23/21  2:34 AM   Result Value Ref Range    Plt Estimation Normal    MORPHOLOGY    Collection Time: 05/23/21  2:34 AM   Result Value Ref Range    RBC Morphology Normal        Fluids    Intake/Output Summary (Last 24 hours) at 5/23/2021 0750  Last data filed at 5/23/2021 0700  Gross per 24 hour   Intake 1627.5 ml   Output 700 ml   Net 927.5 ml       Core Measures & Quality Metrics  Labs reviewed and Medications reviewed  Young catheter: One or Two Days Post Surgery (Day of Surgery being Day 0)      DVT Prophylaxis: Enoxaparin (Lovenox)  DVT prophylaxis - mechanical: SCDs  Ulcer prophylaxis: Yes    Assessed for rehab: Patient returned to prior level of function, rehabilitation not indicated at this time    ALICIA Score  ETOH Screening    Assessment/Plan  Cecal volvulus (HCC)- (present on admission)  Assessment & Plan  Acute abd pain  CT shows cecal volvulus  5/22 ex lap, r hemicolectomy  Await GI function        Discussed patient condition with RN and Patient.  CRITICAL CARE TIME EXCLUDING PROCEDURES: 20  minutes

## 2021-05-23 NOTE — CARE PLAN
Problem: Pain - Standard  Goal: Alleviation of pain or a reduction in pain to the patient’s comfort goal  Outcome: Progressing     Problem: Depression  Goal: Patient and family/caregiver will verbalize accurate information about at least two of the possible causes of depression, three-four of the signs and symptoms of depression  Outcome: Progressing   The patient is Stable - Low risk of patient condition declining or worsening         Progress made toward(s) clinical / shift goals:  Medicated per MAR for pain; therapeutic communication for anxiousness.    Patient is not progressing towards the following goals: Pt tearful

## 2021-05-23 NOTE — PROGRESS NOTES
Pt A&O x4, no signs of distress. Pt tearful and upset about room mate. Encouraged pt to express feelings. Pt had 3 home meds brought to her last night: topiramate, norethindrone, hydroxyzine (taken into safe keeping). Pharmacy notified.

## 2021-05-23 NOTE — PROGRESS NOTES
Report received. Assessment complete.  AAOx4. Patient calls appropriately.    Pt c/o pain to ABD, 8/10. PRN morphine and scheduled Toradol given with good relief.     No further interventions needed at this time. Will continue to monitor.     Pt can be emotional at times. Pt has had some conflict with roommate related to the room temp.    Pt denies N/V, SOB, or chest pain.    BARKLEY, ambulatory 1x standby.    LBM 5/22    Young in place, draining clear mehreen urine.    Pt is NPO.     Wound vac drain in place. Draining sanguinous fluid.      Plan of care discussed with patient. Fall precautions in place. Belongings and call light within reach. Educated patient to call for assistance as needed. All needs met at this time.

## 2021-05-24 ENCOUNTER — APPOINTMENT (OUTPATIENT)
Dept: RADIOLOGY | Facility: MEDICAL CENTER | Age: 51
DRG: 329 | End: 2021-05-24
Attending: NURSE PRACTITIONER
Payer: COMMERCIAL

## 2021-05-24 LAB
ANION GAP SERPL CALC-SCNC: 13 MMOL/L (ref 7–16)
BASOPHILS # BLD AUTO: 0 % (ref 0–1.8)
BASOPHILS # BLD: 0 K/UL (ref 0–0.12)
BUN SERPL-MCNC: 13 MG/DL (ref 8–22)
CALCIUM SERPL-MCNC: 7.9 MG/DL (ref 8.4–10.2)
CHLORIDE SERPL-SCNC: 104 MMOL/L (ref 96–112)
CO2 SERPL-SCNC: 18 MMOL/L (ref 20–33)
CREAT SERPL-MCNC: 0.68 MG/DL (ref 0.5–1.4)
CRP SERPL HS-MCNC: 33 MG/DL (ref 0–0.75)
EKG IMPRESSION: NORMAL
EOSINOPHIL # BLD AUTO: 0 K/UL (ref 0–0.51)
EOSINOPHIL NFR BLD: 0 % (ref 0–6.9)
ERYTHROCYTE [DISTWIDTH] IN BLOOD BY AUTOMATED COUNT: 48.6 FL (ref 35.9–50)
GLUCOSE SERPL-MCNC: 92 MG/DL (ref 65–99)
HCT VFR BLD AUTO: 32.3 % (ref 37–47)
HGB BLD-MCNC: 10.5 G/DL (ref 12–16)
LYMPHOCYTES # BLD AUTO: 0.43 K/UL (ref 1–4.8)
LYMPHOCYTES NFR BLD: 4 % (ref 22–41)
MAGNESIUM SERPL-MCNC: 1.4 MG/DL (ref 1.5–2.5)
MANUAL DIFF BLD: NORMAL
MCH RBC QN AUTO: 29.6 PG (ref 27–33)
MCHC RBC AUTO-ENTMCNC: 32.5 G/DL (ref 33.6–35)
MCV RBC AUTO: 91 FL (ref 81.4–97.8)
MONOCYTES # BLD AUTO: 0.32 K/UL (ref 0–0.85)
MONOCYTES NFR BLD AUTO: 3 % (ref 0–13.4)
NEUTROPHILS # BLD AUTO: 9.95 K/UL (ref 2–7.15)
NEUTROPHILS NFR BLD: 88 % (ref 44–72)
NEUTS BAND NFR BLD MANUAL: 5 % (ref 0–10)
NRBC # BLD AUTO: 0 K/UL
NRBC BLD-RTO: 0 /100 WBC
PHOSPHATE SERPL-MCNC: 2.1 MG/DL (ref 2.5–4.5)
PLATELET # BLD AUTO: 176 K/UL (ref 164–446)
PMV BLD AUTO: 10.1 FL (ref 9–12.9)
POTASSIUM SERPL-SCNC: 3.6 MMOL/L (ref 3.6–5.5)
PROCALCITONIN SERPL-MCNC: 4.74 NG/ML
RBC # BLD AUTO: 3.55 M/UL (ref 4.2–5.4)
SODIUM SERPL-SCNC: 135 MMOL/L (ref 135–145)
WBC # BLD AUTO: 10.7 K/UL (ref 4.8–10.8)

## 2021-05-24 PROCEDURE — 700101 HCHG RX REV CODE 250: Performed by: SURGERY

## 2021-05-24 PROCEDURE — 86140 C-REACTIVE PROTEIN: CPT

## 2021-05-24 PROCEDURE — 85027 COMPLETE CBC AUTOMATED: CPT

## 2021-05-24 PROCEDURE — 85007 BL SMEAR W/DIFF WBC COUNT: CPT

## 2021-05-24 PROCEDURE — 93010 ELECTROCARDIOGRAM REPORT: CPT | Performed by: INTERNAL MEDICINE

## 2021-05-24 PROCEDURE — 93005 ELECTROCARDIOGRAM TRACING: CPT | Performed by: SURGERY

## 2021-05-24 PROCEDURE — 74018 RADEX ABDOMEN 1 VIEW: CPT

## 2021-05-24 PROCEDURE — 700102 HCHG RX REV CODE 250 W/ 637 OVERRIDE(OP): Performed by: SURGERY

## 2021-05-24 PROCEDURE — 83735 ASSAY OF MAGNESIUM: CPT

## 2021-05-24 PROCEDURE — 700105 HCHG RX REV CODE 258: Performed by: SURGERY

## 2021-05-24 PROCEDURE — 770006 HCHG ROOM/CARE - MED/SURG/GYN SEMI*

## 2021-05-24 PROCEDURE — A9270 NON-COVERED ITEM OR SERVICE: HCPCS | Performed by: SURGERY

## 2021-05-24 PROCEDURE — 700111 HCHG RX REV CODE 636 W/ 250 OVERRIDE (IP): Performed by: SURGERY

## 2021-05-24 PROCEDURE — 84100 ASSAY OF PHOSPHORUS: CPT

## 2021-05-24 PROCEDURE — 700111 HCHG RX REV CODE 636 W/ 250 OVERRIDE (IP): Performed by: HOSPITALIST

## 2021-05-24 PROCEDURE — 84145 PROCALCITONIN (PCT): CPT

## 2021-05-24 PROCEDURE — 36415 COLL VENOUS BLD VENIPUNCTURE: CPT

## 2021-05-24 PROCEDURE — 80048 BASIC METABOLIC PNL TOTAL CA: CPT

## 2021-05-24 RX ORDER — MAGNESIUM SULFATE HEPTAHYDRATE 40 MG/ML
4 INJECTION, SOLUTION INTRAVENOUS ONCE
Status: COMPLETED | OUTPATIENT
Start: 2021-05-24 | End: 2021-05-24

## 2021-05-24 RX ORDER — SODIUM CHLORIDE 9 MG/ML
INJECTION, SOLUTION INTRAVENOUS ONCE
Status: COMPLETED | OUTPATIENT
Start: 2021-05-24 | End: 2021-05-24

## 2021-05-24 RX ADMIN — POTASSIUM PHOSPHATE, MONOBASIC AND POTASSIUM PHOSPHATE, DIBASIC 30 MMOL: 224; 236 INJECTION, SOLUTION, CONCENTRATE INTRAVENOUS at 09:32

## 2021-05-24 RX ADMIN — KETOROLAC TROMETHAMINE 30 MG: 30 INJECTION, SOLUTION INTRAMUSCULAR; INTRAVENOUS at 17:51

## 2021-05-24 RX ADMIN — KETOROLAC TROMETHAMINE 30 MG: 30 INJECTION, SOLUTION INTRAMUSCULAR; INTRAVENOUS at 11:48

## 2021-05-24 RX ADMIN — MORPHINE SULFATE 4 MG: 4 INJECTION INTRAVENOUS at 18:47

## 2021-05-24 RX ADMIN — MORPHINE SULFATE 4 MG: 4 INJECTION INTRAVENOUS at 22:02

## 2021-05-24 RX ADMIN — MORPHINE SULFATE 4 MG: 4 INJECTION INTRAVENOUS at 12:22

## 2021-05-24 RX ADMIN — FAMOTIDINE 20 MG: 10 INJECTION INTRAVENOUS at 06:44

## 2021-05-24 RX ADMIN — MORPHINE SULFATE 4 MG: 4 INJECTION INTRAVENOUS at 02:14

## 2021-05-24 RX ADMIN — ENOXAPARIN SODIUM 40 MG: 40 INJECTION SUBCUTANEOUS at 06:44

## 2021-05-24 RX ADMIN — MORPHINE SULFATE 4 MG: 4 INJECTION INTRAVENOUS at 09:14

## 2021-05-24 RX ADMIN — SODIUM CHLORIDE, POTASSIUM CHLORIDE, SODIUM LACTATE AND CALCIUM CHLORIDE: 600; 310; 30; 20 INJECTION, SOLUTION INTRAVENOUS at 17:57

## 2021-05-24 RX ADMIN — SODIUM CHLORIDE, POTASSIUM CHLORIDE, SODIUM LACTATE AND CALCIUM CHLORIDE: 600; 310; 30; 20 INJECTION, SOLUTION INTRAVENOUS at 02:18

## 2021-05-24 RX ADMIN — DOCUSATE SODIUM 50 MG AND SENNOSIDES 8.6 MG 1 TABLET: 8.6; 5 TABLET, FILM COATED ORAL at 20:30

## 2021-05-24 RX ADMIN — MAGNESIUM SULFATE IN WATER 4 G: 40 INJECTION, SOLUTION INTRAVENOUS at 07:08

## 2021-05-24 RX ADMIN — KETOROLAC TROMETHAMINE 30 MG: 30 INJECTION, SOLUTION INTRAMUSCULAR; INTRAVENOUS at 23:34

## 2021-05-24 RX ADMIN — KETOROLAC TROMETHAMINE 30 MG: 30 INJECTION, SOLUTION INTRAMUSCULAR; INTRAVENOUS at 06:44

## 2021-05-24 RX ADMIN — MORPHINE SULFATE 4 MG: 4 INJECTION INTRAVENOUS at 05:19

## 2021-05-24 RX ADMIN — MORPHINE SULFATE 4 MG: 4 INJECTION INTRAVENOUS at 15:33

## 2021-05-24 RX ADMIN — SODIUM CHLORIDE: 9 INJECTION, SOLUTION INTRAVENOUS at 08:10

## 2021-05-24 RX ADMIN — FAMOTIDINE 20 MG: 10 INJECTION INTRAVENOUS at 17:51

## 2021-05-24 ASSESSMENT — PAIN DESCRIPTION - PAIN TYPE
TYPE: SURGICAL PAIN

## 2021-05-24 ASSESSMENT — ENCOUNTER SYMPTOMS
ABDOMINAL PAIN: 1
SHORTNESS OF BREATH: 0
ROS GI COMMENTS: PASSING SOME FLATUS

## 2021-05-24 NOTE — PROGRESS NOTES
Received report from night shift RN. Pt is awake and alert. No signs of distress. Pt denies any nausea. Pt denies any numbness or tingling. fall precautions in place. prevena in place to midline. Bed in lowest and locked position. Call light within reach. Pt educated to call when in need of assistance. Pt verbalized understanding.

## 2021-05-24 NOTE — DIETARY
"Nutrition services: Day 2 of admit.  Mari Dillon is a 50 y.o. female with admitting DX of Cecal volvulus.  Consult received for MST score of 2 per nutrition screen for unplanned weight loss of 2-13 lb x 3 months with poor PO intake.    Assessment:  Height: 175.3 cm (5' 9\")  Weight: 63 kg (138 lb 14.2 oz)  Body mass index is 20.51 kg/m²., BMI classification: Normal  Diet/Intake: NPO    Evaluation:   1. Pt is S/P right hemicolectomy on 5/22. Pt remains NPO pending return of GI function per Surgery note.  2. Pt states she was told she lost weight, but does not know her usual weight. Per chart review, weight on 12/5/20 = 63 kg, consistent with current weight.  3. Skin: abdominal incision, No pressure injuries noted per chart review.  4. Labs 5/24 include Phos 2.1 (L), Mg 1.4 (L)  5. Medications include Magnesium sulfate, Potassium phosphate.    Malnutrition Risk: Criteria not met.    Recommendations/Plan:  1. Diet advancement per MD.   2. Once diet advanced, encourage intake of meals >50%.  3. Document intake of all meals as % taken in ADL's to provide interdisciplinary communication across all shifts.   4. Monitor weight.  5. Nutrition rep will continue to see patient for ongoing meal and snack preferences.     RD following            "

## 2021-05-24 NOTE — CARE PLAN
The patient is Stable - Low risk of patient condition declining or worsening    Shift Goals  Clinical Goals: pt pain will be at a tolerable level  Patient Goals: Find a comfortably position     Progress made toward(s) clinical / shift goals:  Pt was able to find a comfortably position to sleep and required less pain medication at night than during the day    Patient is not progressing towards the following goals: Pt continues to have pain through out the night that is not at a tolerable level      Problem: Pain - Standard  Goal: Alleviation of pain or a reduction in pain to the patient’s comfort goal  Outcome: Not Progressing     Problem: Knowledge Deficit - Standard  Goal: Patient and family/care givers will demonstrate understanding of plan of care, disease process/condition, diagnostic tests and medications  Outcome: Progressing

## 2021-05-24 NOTE — CARE PLAN
Problem: Nutritional:  Goal: Achieve adequate nutritional intake  Description: Advance diet as tolerated. Patient will consume >50% of meals.  Outcome: Not Met

## 2021-05-24 NOTE — PROGRESS NOTES
Dr. Wood paged at 0611  Updates given to Dr. Wood at 0617 regarding pts increased HR, see flow sheets. New orders given.

## 2021-05-24 NOTE — CARE PLAN
The patient is Watcher - Medium risk of patient condition declining or worsening    Shift Goals  Clinical Goals: Pain control to tolerable level, maintain regular heart rate  Patient Goals: Pain control    Progress made toward(s) clinical / shift goals:  IV Morphine administered Q3 hours for pain at 9/10.  Patient is able to rest comfortably inbetween doses.        Patient is not progressing towards the following goals:  Maintenance fluids are running. HR remains above 120.      Problem: Pain - Standard  Goal: Alleviation of pain or a reduction in pain to the patient’s comfort goal  Outcome: Progressing  Flowsheets (Taken 5/24/2021 8003)  OB Pain Intervention: Medication - See MAR  Note: Medicating with IV Morphine Q3 hours for pain rated 9/10.

## 2021-05-24 NOTE — PROGRESS NOTES
2030: Received report from Day shift RN. Pt is laying in bed, A+OX4 , showing no signs of distress. Pt c/o 7/10 pain, see MAR. CMS intact. Pt aware of NPO status, ice chips only. Pt educated on POC. Wound vac in place. Call light and belongings at bedside and within reach. All questions answered at this time. Rounding in place

## 2021-05-24 NOTE — CARE PLAN
Problem: Knowledge Deficit - Standard  Goal: Patient and family/care givers will demonstrate understanding of plan of care, disease process/condition, diagnostic tests and medications  Outcome: Not Progressing   The patient is Watcher - Medium risk of patient condition declining or worsening    Shift Goals  Clinical Goals: pain management    Progress made toward(s) clinical / shift goals:  pain assessed and medicated per MAR. Educated on non pharmacologic pain management.    Patient is not progressing towards the following goals:       Problem: Knowledge Deficit - Standard  Goal: Patient and family/care givers will demonstrate understanding of plan of care, disease process/condition, diagnostic tests and medications  Outcome: Not Progressing

## 2021-05-24 NOTE — PROGRESS NOTES
Pt transferred to room 201-2.   Rating pain 8/10, Prevena wound vac in place to abdomen.    HR above 120 at rest.   IV mag complete, LR infusing.  K phosphate also running.

## 2021-05-24 NOTE — PROGRESS NOTES
Trauma / Surgical Daily Progress Note    Date of Service  5/24/2021    Chief Complaint  50 y.o. female admitted 5/22/2021 with cecal volvulus    Interval Events  5/22 Exploratory celiotomy and right hemicolectomy with a side-to-side   ileocolic anastomosis    POD #2 SP R hemicolectomy. Pain issues. Sinus tachycardia.  Increased distention.  Passing some flatus, no stool.   Await GI function. Procalcitonin and CRP pending, will trend.  Willl check abd xray    Review of Systems  Review of Systems   Respiratory: Negative for shortness of breath.    Cardiovascular: Negative for chest pain.   Gastrointestinal: Positive for abdominal pain.        Passing some flatus        Vital Signs for last 24 hours  Temp:  [36.8 °C (98.2 °F)-37.7 °C (99.8 °F)] 37.3 °C (99.1 °F)  Pulse:  [109-156] 124  Resp:  [16-20] 19  BP: (101-145)/(51-73) 113/65  SpO2:  [94 %-99 %] 96 %    Hemodynamic parameters for last 24 hours       Respiratory Data     Respiration: 19, Pulse Oximetry: 96 %             Physical Exam  Physical Exam  Cardiovascular:      Rate and Rhythm: Tachycardia present.   Pulmonary:      Effort: Pulmonary effort is normal.   Abdominal:      Palpations: Abdomen is soft.      Tenderness: There is abdominal tenderness.      Comments: Provena in place    Mod distention  Pain upon palpation, but not peritoneal   Genitourinary:     Comments: Young in place  Musculoskeletal:         General: Normal range of motion.      Cervical back: Neck supple.   Skin:     General: Skin is warm.   Neurological:      General: No focal deficit present.      Mental Status: She is alert.         Laboratory  Recent Results (from the past 24 hour(s))   Magnesium: Every Monday and Thursday AM    Collection Time: 05/24/21  3:06 AM   Result Value Ref Range    Magnesium 1.4 (L) 1.5 - 2.5 mg/dL   Phosphorus: Every Monday and Thursday AM    Collection Time: 05/24/21  3:06 AM   Result Value Ref Range    Phosphorus 2.1 (L) 2.5 - 4.5 mg/dL   CBC WITH  DIFFERENTIAL    Collection Time: 21  3:06 AM   Result Value Ref Range    WBC 10.7 4.8 - 10.8 K/uL    RBC 3.55 (L) 4.20 - 5.40 M/uL    Hemoglobin 10.5 (L) 12.0 - 16.0 g/dL    Hematocrit 32.3 (L) 37.0 - 47.0 %    MCV 91.0 81.4 - 97.8 fL    MCH 29.6 27.0 - 33.0 pg    MCHC 32.5 (L) 33.6 - 35.0 g/dL    RDW 48.6 35.9 - 50.0 fL    Platelet Count 176 164 - 446 K/uL    MPV 10.1 9.0 - 12.9 fL    Neutrophils-Polys 88.00 (H) 44.00 - 72.00 %    Lymphocytes 4.00 (L) 22.00 - 41.00 %    Monocytes 3.00 0.00 - 13.40 %    Eosinophils 0.00 0.00 - 6.90 %    Basophils 0.00 0.00 - 1.80 %    Nucleated RBC 0.00 /100 WBC    Neutrophils (Absolute) 9.95 (H) 2.00 - 7.15 K/uL    Lymphs (Absolute) 0.43 (L) 1.00 - 4.80 K/uL    Monos (Absolute) 0.32 0.00 - 0.85 K/uL    Eos (Absolute) 0.00 0.00 - 0.51 K/uL    Baso (Absolute) 0.00 0.00 - 0.12 K/uL    NRBC (Absolute) 0.00 K/uL   Basic Metabolic Panel    Collection Time: 21  3:06 AM   Result Value Ref Range    Sodium 135 135 - 145 mmol/L    Potassium 3.6 3.6 - 5.5 mmol/L    Chloride 104 96 - 112 mmol/L    Co2 18 (L) 20 - 33 mmol/L    Glucose 92 65 - 99 mg/dL    Bun 13 8 - 22 mg/dL    Creatinine 0.68 0.50 - 1.40 mg/dL    Calcium 7.9 (L) 8.4 - 10.2 mg/dL    Anion Gap 13.0 7.0 - 16.0   ESTIMATED GFR    Collection Time: 21  3:06 AM   Result Value Ref Range    GFR If African American >60 >60 mL/min/1.73 m 2    GFR If Non African American >60 >60 mL/min/1.73 m 2   DIFFERENTIAL MANUAL    Collection Time: 21  3:06 AM   Result Value Ref Range    Bands-Stabs 5.00 0.00 - 10.00 %    Manual Diff Status PERFORMED    EKG    Collection Time: 21  6:23 AM   Result Value Ref Range    Report       [facility]    Test Date:  2021  Pt Name:    HOANG EMERY                 Department: [facility] [place]  MRN:        [id number]                      Room:       [facility]  Gender:     Female                       Technician: [id number]  :        1970                   Requested By:NEVA KAY  Order #:     [id number]                    Reading MD: Frankie Duron MD    Measurements  Intervals                                Axis  Rate:       134                          P:          63  ND:         142                          QRS:        36  QRSD:       82                           T:          -51  QT:         319  QTc:        477    Interpretive Statements  Sinus tachycardia  Ventricular premature complex  Nonspecific repol abnormality, diffuse leads  No previous ECG available for comparison  Electronically Signed On 5- 8:33:48 PDT by Frankie Duron MD         Fluids    Intake/Output Summary (Last 24 hours) at 5/24/2021 1103  Last data filed at 5/24/2021 0900  Gross per 24 hour   Intake 2100 ml   Output 500 ml   Net 1600 ml       Core Measures & Quality Metrics  Labs reviewed and Medications reviewed  Young catheter: One or Two Days Post Surgery (Day of Surgery being Day 0)      DVT Prophylaxis: Enoxaparin (Lovenox)  DVT prophylaxis - mechanical: SCDs  Ulcer prophylaxis: Yes    Assessed for rehab: Patient returned to prior level of function, rehabilitation not indicated at this time    ALICIA Score  ETOH Screening    Assessment/Plan  Cecal volvulus (HCC)- (present on admission)  Assessment & Plan  Acute abd pain  CT shows cecal volvulus  5/22 ex lap, r hemicolectomy  Await GI function      Discussed patient condition with RN and Patient. Dr. Wood  CRITICAL CARE TIME EXCLUDING PROCEDURES: 20  minutes

## 2021-05-25 LAB
BASOPHILS # BLD AUTO: 0 % (ref 0–1.8)
BASOPHILS # BLD: 0 K/UL (ref 0–0.12)
CRP SERPL HS-MCNC: 40.74 MG/DL (ref 0–0.75)
EOSINOPHIL # BLD AUTO: 0 K/UL (ref 0–0.51)
EOSINOPHIL NFR BLD: 0 % (ref 0–6.9)
ERYTHROCYTE [DISTWIDTH] IN BLOOD BY AUTOMATED COUNT: 46.9 FL (ref 35.9–50)
HCT VFR BLD AUTO: 32.5 % (ref 37–47)
HGB BLD-MCNC: 10.4 G/DL (ref 12–16)
LACTATE BLD-SCNC: 1.8 MMOL/L (ref 0.5–2)
LACTATE BLD-SCNC: 2.2 MMOL/L (ref 0.5–2)
LYMPHOCYTES # BLD AUTO: 0.27 K/UL (ref 1–4.8)
LYMPHOCYTES NFR BLD: 4 % (ref 22–41)
MANUAL DIFF BLD: NORMAL
MCH RBC QN AUTO: 28.5 PG (ref 27–33)
MCHC RBC AUTO-ENTMCNC: 32 G/DL (ref 33.6–35)
MCV RBC AUTO: 89 FL (ref 81.4–97.8)
MONOCYTES # BLD AUTO: 0.2 K/UL (ref 0–0.85)
MONOCYTES NFR BLD AUTO: 3 % (ref 0–13.4)
NEUTROPHILS # BLD AUTO: 6.32 K/UL (ref 2–7.15)
NEUTROPHILS NFR BLD: 89 % (ref 44–72)
NEUTS BAND NFR BLD MANUAL: 4 % (ref 0–10)
NRBC # BLD AUTO: 0 K/UL
NRBC BLD-RTO: 0 /100 WBC
PLATELET # BLD AUTO: 128 K/UL (ref 164–446)
PLATELET BLD QL SMEAR: NORMAL
PMV BLD AUTO: 10.1 FL (ref 9–12.9)
PROCALCITONIN SERPL-MCNC: 8.39 NG/ML
RBC # BLD AUTO: 3.65 M/UL (ref 4.2–5.4)
RBC BLD AUTO: NORMAL
WBC # BLD AUTO: 6.8 K/UL (ref 4.8–10.8)

## 2021-05-25 PROCEDURE — 86140 C-REACTIVE PROTEIN: CPT

## 2021-05-25 PROCEDURE — 85027 COMPLETE CBC AUTOMATED: CPT

## 2021-05-25 PROCEDURE — 85007 BL SMEAR W/DIFF WBC COUNT: CPT

## 2021-05-25 PROCEDURE — 83605 ASSAY OF LACTIC ACID: CPT

## 2021-05-25 PROCEDURE — 700111 HCHG RX REV CODE 636 W/ 250 OVERRIDE (IP): Performed by: HOSPITALIST

## 2021-05-25 PROCEDURE — 700111 HCHG RX REV CODE 636 W/ 250 OVERRIDE (IP): Performed by: EMERGENCY MEDICINE

## 2021-05-25 PROCEDURE — 84145 PROCALCITONIN (PCT): CPT

## 2021-05-25 PROCEDURE — 700105 HCHG RX REV CODE 258: Performed by: SURGERY

## 2021-05-25 PROCEDURE — 36415 COLL VENOUS BLD VENIPUNCTURE: CPT

## 2021-05-25 PROCEDURE — 700111 HCHG RX REV CODE 636 W/ 250 OVERRIDE (IP): Performed by: SURGERY

## 2021-05-25 PROCEDURE — 770006 HCHG ROOM/CARE - MED/SURG/GYN SEMI*

## 2021-05-25 RX ORDER — SODIUM CHLORIDE 9 MG/ML
INJECTION, SOLUTION INTRAVENOUS ONCE
Status: COMPLETED | OUTPATIENT
Start: 2021-05-25 | End: 2021-05-25

## 2021-05-25 RX ORDER — LORAZEPAM 2 MG/ML
0.5 INJECTION INTRAMUSCULAR EVERY 4 HOURS PRN
Status: DISCONTINUED | OUTPATIENT
Start: 2021-05-25 | End: 2021-06-21 | Stop reason: HOSPADM

## 2021-05-25 RX ORDER — METOCLOPRAMIDE HYDROCHLORIDE 5 MG/ML
10 INJECTION INTRAMUSCULAR; INTRAVENOUS EVERY 6 HOURS
Status: DISCONTINUED | OUTPATIENT
Start: 2021-05-25 | End: 2021-05-28

## 2021-05-25 RX ADMIN — FAMOTIDINE 20 MG: 10 INJECTION INTRAVENOUS at 06:07

## 2021-05-25 RX ADMIN — ENOXAPARIN SODIUM 40 MG: 40 INJECTION SUBCUTANEOUS at 06:08

## 2021-05-25 RX ADMIN — METOCLOPRAMIDE 10 MG: 5 INJECTION, SOLUTION INTRAMUSCULAR; INTRAVENOUS at 12:12

## 2021-05-25 RX ADMIN — MORPHINE SULFATE 4 MG: 4 INJECTION INTRAVENOUS at 15:44

## 2021-05-25 RX ADMIN — KETOROLAC TROMETHAMINE 30 MG: 30 INJECTION, SOLUTION INTRAMUSCULAR; INTRAVENOUS at 06:08

## 2021-05-25 RX ADMIN — SODIUM CHLORIDE: 9 INJECTION, SOLUTION INTRAVENOUS at 08:56

## 2021-05-25 RX ADMIN — MORPHINE SULFATE 4 MG: 4 INJECTION INTRAVENOUS at 18:50

## 2021-05-25 RX ADMIN — SODIUM CHLORIDE, POTASSIUM CHLORIDE, SODIUM LACTATE AND CALCIUM CHLORIDE: 600; 310; 30; 20 INJECTION, SOLUTION INTRAVENOUS at 18:44

## 2021-05-25 RX ADMIN — METOCLOPRAMIDE 10 MG: 5 INJECTION, SOLUTION INTRAMUSCULAR; INTRAVENOUS at 18:23

## 2021-05-25 RX ADMIN — HYDROMORPHONE HYDROCHLORIDE 1 MG: 1 INJECTION, SOLUTION INTRAMUSCULAR; INTRAVENOUS; SUBCUTANEOUS at 09:04

## 2021-05-25 RX ADMIN — MORPHINE SULFATE 4 MG: 4 INJECTION INTRAVENOUS at 03:46

## 2021-05-25 RX ADMIN — MORPHINE SULFATE 4 MG: 4 INJECTION INTRAVENOUS at 12:36

## 2021-05-25 RX ADMIN — SODIUM CHLORIDE, POTASSIUM CHLORIDE, SODIUM LACTATE AND CALCIUM CHLORIDE: 600; 310; 30; 20 INJECTION, SOLUTION INTRAVENOUS at 06:07

## 2021-05-25 RX ADMIN — METOCLOPRAMIDE 10 MG: 5 INJECTION, SOLUTION INTRAMUSCULAR; INTRAVENOUS at 23:16

## 2021-05-25 RX ADMIN — MORPHINE SULFATE 4 MG: 4 INJECTION INTRAVENOUS at 21:57

## 2021-05-25 ASSESSMENT — ENCOUNTER SYMPTOMS
ROS GI COMMENTS: PASSING SOME FLATUS
ABDOMINAL PAIN: 1
SHORTNESS OF BREATH: 0

## 2021-05-25 ASSESSMENT — PAIN DESCRIPTION - PAIN TYPE
TYPE: SURGICAL PAIN

## 2021-05-25 NOTE — CARE PLAN
The patient is Watcher - Medium risk of patient condition declining or worsening    Shift Goals  Clinical Goals: Pain Control, maintain regular heart rate  Patient Goals: Pain Control    Progress made toward(s) clinical / shift goals:  Pain controlled w/ IV morphine and dilaudid as pt is strict NPO. 1L NS bolus given, pt's heart rate has improved from the 130s last night to 115s today.    Patient is not progressing towards the following goals: Pt ambulating in the halls 3x today but still no bowel movement. Abdomen is distended and pt is passing gas.       Problem: Knowledge Deficit - Standard  Goal: Patient and family/care givers will demonstrate understanding of plan of care, disease process/condition, diagnostic tests and medications  Outcome: Progressing     Problem: Pain - Standard  Goal: Alleviation of pain or a reduction in pain to the patient’s comfort goal  Outcome: Progressing

## 2021-05-25 NOTE — PROGRESS NOTES
Received report from day shift nurse.   Assumed pt care   Pt is A&Ox4, resting comfortably in bed.   Pt on r.a.. No signs of SOB/respiratory distress.   Labs noted, VSS.   Pt c/o 7/10 pain at this moment.   Assessment completed, prevena wound vac present to midline abdomen, dressing C/D/I. Continues to be tachycardic, appears to be anxious and diaphoretic. Bowel sounds normoactive in all four quadrants, patient states she has been passing gas.    Fall precautions in place.   Bed at lowest position.   Call light and personal belongings within reach. Continue to monitor

## 2021-05-25 NOTE — PROGRESS NOTES
Trauma / Surgical Daily Progress Note    Date of Service  5/25/2021    Chief Complaint  50 y.o. female admitted 5/22/2021 with cecal volvulus    Interval Events  5/22 Exploratory celiotomy and right hemicolectomy with a side-to-side   ileocolic anastomosis    POD #3 SP R hemicolectomy. Pain issues, but improving. Anxious. Still with sinus tachycardia.  Stable distention.  Passing some flatus, no stool.   Await GI function. Procalcitonin and CRP increasing, will continue to trend.   Abd xray ok, ileus. Will check lactic acid.  Needs to mobilize.     Review of Systems  Review of Systems   Respiratory: Negative for shortness of breath.    Cardiovascular: Negative for chest pain.   Gastrointestinal: Positive for abdominal pain.        Passing some flatus        Vital Signs for last 24 hours  Temp:  [36.2 °C (97.2 °F)-37.4 °C (99.4 °F)] 36.8 °C (98.3 °F)  Pulse:  [115-130] 115  Resp:  [16-17] 16  BP: ()/(54-66) 96/57  SpO2:  [93 %-99 %] 93 %    Hemodynamic parameters for last 24 hours       Respiratory Data     Respiration: 16, Pulse Oximetry: 93 %             Physical Exam  Physical Exam  Cardiovascular:      Rate and Rhythm: Tachycardia present.   Pulmonary:      Effort: Pulmonary effort is normal.   Abdominal:      Palpations: Abdomen is soft.      Tenderness: There is abdominal tenderness.      Comments: Provena in place    Mod distention  Pain upon palpation, but not peritoneal   Genitourinary:     Comments: Young in place  Musculoskeletal:         General: Normal range of motion.      Cervical back: Neck supple.   Skin:     General: Skin is warm.   Neurological:      General: No focal deficit present.      Mental Status: She is alert.         Laboratory  Recent Results (from the past 24 hour(s))   CBC WITH DIFFERENTIAL    Collection Time: 05/25/21  3:06 AM   Result Value Ref Range    WBC 6.8 4.8 - 10.8 K/uL    RBC 3.65 (L) 4.20 - 5.40 M/uL    Hemoglobin 10.4 (L) 12.0 - 16.0 g/dL    Hematocrit 32.5 (L) 37.0 -  47.0 %    MCV 89.0 81.4 - 97.8 fL    MCH 28.5 27.0 - 33.0 pg    MCHC 32.0 (L) 33.6 - 35.0 g/dL    RDW 46.9 35.9 - 50.0 fL    Platelet Count 128 (L) 164 - 446 K/uL    MPV 10.1 9.0 - 12.9 fL    Neutrophils-Polys 89.00 (H) 44.00 - 72.00 %    Lymphocytes 4.00 (L) 22.00 - 41.00 %    Monocytes 3.00 0.00 - 13.40 %    Eosinophils 0.00 0.00 - 6.90 %    Basophils 0.00 0.00 - 1.80 %    Nucleated RBC 0.00 /100 WBC    Neutrophils (Absolute) 6.32 2.00 - 7.15 K/uL    Lymphs (Absolute) 0.27 (L) 1.00 - 4.80 K/uL    Monos (Absolute) 0.20 0.00 - 0.85 K/uL    Eos (Absolute) 0.00 0.00 - 0.51 K/uL    Baso (Absolute) 0.00 0.00 - 0.12 K/uL    NRBC (Absolute) 0.00 K/uL   PROCALCITONIN    Collection Time: 05/25/21  3:06 AM   Result Value Ref Range    Procalcitonin 8.39 (H) <0.25 ng/mL   CRP QUANTITIVE (NON-CARDIAC)    Collection Time: 05/25/21  3:06 AM   Result Value Ref Range    Stat C-Reactive Protein 40.74 (H) 0.00 - 0.75 mg/dL   DIFFERENTIAL MANUAL    Collection Time: 05/25/21  3:06 AM   Result Value Ref Range    Bands-Stabs 4.00 0.00 - 10.00 %    Manual Diff Status PERFORMED    PLATELET ESTIMATE    Collection Time: 05/25/21  3:06 AM   Result Value Ref Range    Plt Estimation Decreased    MORPHOLOGY    Collection Time: 05/25/21  3:06 AM   Result Value Ref Range    RBC Morphology Normal        Fluids    Intake/Output Summary (Last 24 hours) at 5/25/2021 0655  Last data filed at 5/25/2021 0400  Gross per 24 hour   Intake 4518.64 ml   Output 700 ml   Net 3818.64 ml       Core Measures & Quality Metrics  Labs reviewed and Medications reviewed  Young catheter: One or Two Days Post Surgery (Day of Surgery being Day 0)      DVT Prophylaxis: Enoxaparin (Lovenox)  DVT prophylaxis - mechanical: SCDs  Ulcer prophylaxis: Yes    Assessed for rehab: Patient returned to prior level of function, rehabilitation not indicated at this time    ALICIA Score  ETOH Screening    Assessment/Plan  Cecal volvulus (HCC)- (present on admission)  Assessment &  Plan  Acute abd pain  CT shows cecal volvulus  5/22 ex lap, r hemicolectomy  Await GI function  5/24 trend procalcitonin and CRP   Abd xray- ileus  5/23 procalcitonin and CRP increasing, check lactic acid add reglan      Discussed patient condition with RN and Patient. Dr. Wood  CRITICAL CARE TIME EXCLUDING PROCEDURES: 20  minutes

## 2021-05-25 NOTE — CARE PLAN
The patient is Watcher - Medium risk of patient condition declining or worsening    Shift Goals  Clinical Goals: Pain Control, maintain regular heart rate  Patient Goals: Pain Control    Progress made toward(s) clinical / shift goals:  Pain has been fairly well controlled by morphine, sleeping well. Still tachycardic last heart rate was 121.    Patient is not progressing towards the following goals:

## 2021-05-26 LAB
ANION GAP SERPL CALC-SCNC: 14 MMOL/L (ref 7–16)
BASOPHILS # BLD AUTO: 0 % (ref 0–1.8)
BASOPHILS # BLD: 0 K/UL (ref 0–0.12)
BUN SERPL-MCNC: 14 MG/DL (ref 8–22)
CALCIUM SERPL-MCNC: 7.6 MG/DL (ref 8.4–10.2)
CHLORIDE SERPL-SCNC: 103 MMOL/L (ref 96–112)
CO2 SERPL-SCNC: 17 MMOL/L (ref 20–33)
CREAT SERPL-MCNC: 0.72 MG/DL (ref 0.5–1.4)
CRP SERPL HS-MCNC: 34.5 MG/DL (ref 0–0.75)
EOSINOPHIL # BLD AUTO: 0 K/UL (ref 0–0.51)
EOSINOPHIL NFR BLD: 0 % (ref 0–6.9)
ERYTHROCYTE [DISTWIDTH] IN BLOOD BY AUTOMATED COUNT: 47.6 FL (ref 35.9–50)
GLUCOSE SERPL-MCNC: 63 MG/DL (ref 65–99)
HCT VFR BLD AUTO: 29.4 % (ref 37–47)
HGB BLD-MCNC: 9.6 G/DL (ref 12–16)
LYMPHOCYTES # BLD AUTO: 0.21 K/UL (ref 1–4.8)
LYMPHOCYTES NFR BLD: 5 % (ref 22–41)
MANUAL DIFF BLD: NORMAL
MCH RBC QN AUTO: 28.7 PG (ref 27–33)
MCHC RBC AUTO-ENTMCNC: 32.7 G/DL (ref 33.6–35)
MCV RBC AUTO: 87.8 FL (ref 81.4–97.8)
MONOCYTES # BLD AUTO: 0.17 K/UL (ref 0–0.85)
MONOCYTES NFR BLD AUTO: 4 % (ref 0–13.4)
NEUTROPHILS # BLD AUTO: 3.82 K/UL (ref 2–7.15)
NEUTROPHILS NFR BLD: 86 % (ref 44–72)
NEUTS BAND NFR BLD MANUAL: 5 % (ref 0–10)
NRBC # BLD AUTO: 0.02 K/UL
NRBC BLD-RTO: 0.5 /100 WBC
PLATELET # BLD AUTO: 68 K/UL (ref 164–446)
PLATELET BLD QL SMEAR: NORMAL
PMV BLD AUTO: 9.8 FL (ref 9–12.9)
POTASSIUM SERPL-SCNC: 3.2 MMOL/L (ref 3.6–5.5)
PROCALCITONIN SERPL-MCNC: 6.66 NG/ML
RBC # BLD AUTO: 3.35 M/UL (ref 4.2–5.4)
RBC BLD AUTO: NORMAL
SODIUM SERPL-SCNC: 134 MMOL/L (ref 135–145)
WBC # BLD AUTO: 4.2 K/UL (ref 4.8–10.8)

## 2021-05-26 PROCEDURE — 770006 HCHG ROOM/CARE - MED/SURG/GYN SEMI*

## 2021-05-26 PROCEDURE — 700102 HCHG RX REV CODE 250 W/ 637 OVERRIDE(OP): Performed by: SURGERY

## 2021-05-26 PROCEDURE — 86140 C-REACTIVE PROTEIN: CPT

## 2021-05-26 PROCEDURE — 700111 HCHG RX REV CODE 636 W/ 250 OVERRIDE (IP): Performed by: SURGERY

## 2021-05-26 PROCEDURE — 80048 BASIC METABOLIC PNL TOTAL CA: CPT

## 2021-05-26 PROCEDURE — 85007 BL SMEAR W/DIFF WBC COUNT: CPT

## 2021-05-26 PROCEDURE — 85027 COMPLETE CBC AUTOMATED: CPT

## 2021-05-26 PROCEDURE — 36415 COLL VENOUS BLD VENIPUNCTURE: CPT

## 2021-05-26 PROCEDURE — A9270 NON-COVERED ITEM OR SERVICE: HCPCS | Performed by: SURGERY

## 2021-05-26 PROCEDURE — 700111 HCHG RX REV CODE 636 W/ 250 OVERRIDE (IP): Performed by: HOSPITALIST

## 2021-05-26 PROCEDURE — 700101 HCHG RX REV CODE 250: Performed by: SURGERY

## 2021-05-26 PROCEDURE — 84145 PROCALCITONIN (PCT): CPT

## 2021-05-26 RX ORDER — SODIUM CHLORIDE AND POTASSIUM CHLORIDE 150; 900 MG/100ML; MG/100ML
INJECTION, SOLUTION INTRAVENOUS CONTINUOUS
Status: DISCONTINUED | OUTPATIENT
Start: 2021-05-26 | End: 2021-05-27

## 2021-05-26 RX ADMIN — OXYCODONE HYDROCHLORIDE 10 MG: 10 TABLET ORAL at 17:31

## 2021-05-26 RX ADMIN — METOCLOPRAMIDE 10 MG: 5 INJECTION, SOLUTION INTRAMUSCULAR; INTRAVENOUS at 11:51

## 2021-05-26 RX ADMIN — MORPHINE SULFATE 4 MG: 4 INJECTION INTRAVENOUS at 06:07

## 2021-05-26 RX ADMIN — ENOXAPARIN SODIUM 40 MG: 40 INJECTION SUBCUTANEOUS at 06:07

## 2021-05-26 RX ADMIN — METOCLOPRAMIDE 10 MG: 5 INJECTION, SOLUTION INTRAMUSCULAR; INTRAVENOUS at 06:07

## 2021-05-26 RX ADMIN — MORPHINE SULFATE 4 MG: 4 INJECTION INTRAVENOUS at 01:20

## 2021-05-26 RX ADMIN — POTASSIUM CHLORIDE AND SODIUM CHLORIDE: 900; 150 INJECTION, SOLUTION INTRAVENOUS at 17:31

## 2021-05-26 RX ADMIN — FAMOTIDINE 20 MG: 20 TABLET ORAL at 17:18

## 2021-05-26 RX ADMIN — DOCUSATE SODIUM 100 MG: 100 CAPSULE, LIQUID FILLED ORAL at 17:18

## 2021-05-26 RX ADMIN — IBUPROFEN 800 MG: 400 TABLET ORAL at 17:18

## 2021-05-26 RX ADMIN — POTASSIUM CHLORIDE AND SODIUM CHLORIDE: 900; 150 INJECTION, SOLUTION INTRAVENOUS at 06:37

## 2021-05-26 RX ADMIN — MORPHINE SULFATE 4 MG: 4 INJECTION INTRAVENOUS at 15:25

## 2021-05-26 RX ADMIN — METOCLOPRAMIDE 10 MG: 5 INJECTION, SOLUTION INTRAMUSCULAR; INTRAVENOUS at 17:17

## 2021-05-26 RX ADMIN — POLYETHYLENE GLYCOL 3350 1 PACKET: 17 POWDER, FOR SOLUTION ORAL at 17:17

## 2021-05-26 RX ADMIN — FAMOTIDINE 20 MG: 10 INJECTION INTRAVENOUS at 06:07

## 2021-05-26 RX ADMIN — OXYCODONE HYDROCHLORIDE 10 MG: 10 TABLET ORAL at 11:51

## 2021-05-26 ASSESSMENT — ENCOUNTER SYMPTOMS
ABDOMINAL PAIN: 1
ROS GI COMMENTS: PASSING SOME FLATUS
SHORTNESS OF BREATH: 0

## 2021-05-26 ASSESSMENT — PAIN DESCRIPTION - PAIN TYPE
TYPE: SURGICAL PAIN
TYPE: ACUTE PAIN;SURGICAL PAIN
TYPE: SURGICAL PAIN

## 2021-05-26 NOTE — PROGRESS NOTES
Trauma / Surgical Daily Progress Note    Date of Service  5/26/2021    Chief Complaint  50 y.o. female admitted 5/22/2021 with cecal volvulus    Interval Events  5/22 Exploratory celiotomy and right hemicolectomy with a side-to-side   ileocolic anastomosis    POD #4 Labs look better. Passing flatus. Will start clears.     Review of Systems  Review of Systems   Respiratory: Negative for shortness of breath.    Cardiovascular: Negative for chest pain.   Gastrointestinal: Positive for abdominal pain.        Passing some flatus        Vital Signs for last 24 hours  Temp:  [37 °C (98.6 °F)-37.6 °C (99.6 °F)] 37.4 °C (99.4 °F)  Pulse:  [110-124] 117  Resp:  [16-18] 16  BP: ()/(60-66) 101/66  SpO2:  [90 %-94 %] 90 %    Hemodynamic parameters for last 24 hours       Respiratory Data     Respiration: 16, Pulse Oximetry: 90 %             Physical Exam  Physical Exam  Cardiovascular:      Rate and Rhythm: Tachycardia present.   Pulmonary:      Effort: Pulmonary effort is normal.   Abdominal:      General: There is no distension.      Palpations: Abdomen is soft.      Tenderness: There is abdominal tenderness.      Comments: Provena in place    Less distention      Genitourinary:     Comments: Young in place  Musculoskeletal:         General: Normal range of motion.      Cervical back: Neck supple.   Skin:     General: Skin is warm.   Neurological:      General: No focal deficit present.      Mental Status: She is alert.         Laboratory  Recent Results (from the past 24 hour(s))   LACTIC ACID    Collection Time: 05/25/21  7:22 AM   Result Value Ref Range    Lactic Acid 2.2 (H) 0.5 - 2.0 mmol/L   LACTIC ACID    Collection Time: 05/25/21 11:12 AM   Result Value Ref Range    Lactic Acid 1.8 0.5 - 2.0 mmol/L   CRP QUANTITIVE (NON-CARDIAC)    Collection Time: 05/26/21  4:03 AM   Result Value Ref Range    Stat C-Reactive Protein 34.50 (H) 0.00 - 0.75 mg/dL   PROCALCITONIN    Collection Time: 05/26/21  4:03 AM   Result Value  Ref Range    Procalcitonin 6.66 (H) <0.25 ng/mL   CBC WITH DIFFERENTIAL    Collection Time: 05/26/21  4:03 AM   Result Value Ref Range    WBC 4.2 (L) 4.8 - 10.8 K/uL    RBC 3.35 (L) 4.20 - 5.40 M/uL    Hemoglobin 9.6 (L) 12.0 - 16.0 g/dL    Hematocrit 29.4 (L) 37.0 - 47.0 %    MCV 87.8 81.4 - 97.8 fL    MCH 28.7 27.0 - 33.0 pg    MCHC 32.7 (L) 33.6 - 35.0 g/dL    RDW 47.6 35.9 - 50.0 fL    Platelet Count 68 (L) 164 - 446 K/uL    MPV 9.8 9.0 - 12.9 fL    Neutrophils-Polys 86.00 (H) 44.00 - 72.00 %    Lymphocytes 5.00 (L) 22.00 - 41.00 %    Monocytes 4.00 0.00 - 13.40 %    Eosinophils 0.00 0.00 - 6.90 %    Basophils 0.00 0.00 - 1.80 %    Nucleated RBC 0.50 /100 WBC    Neutrophils (Absolute) 3.82 2.00 - 7.15 K/uL    Lymphs (Absolute) 0.21 (L) 1.00 - 4.80 K/uL    Monos (Absolute) 0.17 0.00 - 0.85 K/uL    Eos (Absolute) 0.00 0.00 - 0.51 K/uL    Baso (Absolute) 0.00 0.00 - 0.12 K/uL    NRBC (Absolute) 0.02 K/uL   Basic Metabolic Panel    Collection Time: 05/26/21  4:03 AM   Result Value Ref Range    Sodium 134 (L) 135 - 145 mmol/L    Potassium 3.2 (L) 3.6 - 5.5 mmol/L    Chloride 103 96 - 112 mmol/L    Co2 17 (L) 20 - 33 mmol/L    Glucose 63 (L) 65 - 99 mg/dL    Bun 14 8 - 22 mg/dL    Creatinine 0.72 0.50 - 1.40 mg/dL    Calcium 7.6 (L) 8.4 - 10.2 mg/dL    Anion Gap 14.0 7.0 - 16.0   DIFFERENTIAL MANUAL    Collection Time: 05/26/21  4:03 AM   Result Value Ref Range    Bands-Stabs 5.00 0.00 - 10.00 %    Manual Diff Status PERFORMED    PLATELET ESTIMATE    Collection Time: 05/26/21  4:03 AM   Result Value Ref Range    Plt Estimation Decreased    MORPHOLOGY    Collection Time: 05/26/21  4:03 AM   Result Value Ref Range    RBC Morphology Normal    ESTIMATED GFR    Collection Time: 05/26/21  4:03 AM   Result Value Ref Range    GFR If African American >60 >60 mL/min/1.73 m 2    GFR If Non African American >60 >60 mL/min/1.73 m 2       Fluids    Intake/Output Summary (Last 24 hours) at 5/26/2021 0616  Last data filed at  5/26/2021 0321  Gross per 24 hour   Intake 2375 ml   Output --   Net 2375 ml       Core Measures & Quality Metrics  Labs reviewed and Medications reviewed  Young catheter: One or Two Days Post Surgery (Day of Surgery being Day 0)      DVT Prophylaxis: Enoxaparin (Lovenox)  DVT prophylaxis - mechanical: SCDs  Ulcer prophylaxis: Yes    Assessed for rehab: Patient returned to prior level of function, rehabilitation not indicated at this time    ALICIA Score  ETOH Screening    Assessment/Plan  Cecal volvulus (HCC)- (present on admission)  Assessment & Plan  Acute abd pain  CT shows cecal volvulus  5/22 ex lap, r hemicolectomy  Await GI function  5/24 trend procalcitonin and CRP   Abd xray- ileus  5/25 procalcitonin and CRP increasing, check lactic acid add reglan  5/26 Labs improving. Passing flatus - start clears        Discussed patient condition with RN and Patient.    CRITICAL CARE TIME EXCLUDING PROCEDURES: 20  minutes

## 2021-05-26 NOTE — DISCHARGE PLANNING
Care Transition Team Assessment  The information provided for this assessment was provided by pt and chart review. Pt confirmed the information on the facesheet to be accurate. Pt confirmed that PCP is Dr. Prieto. Pt lives in a one story house with her parents. Prior to admit pt independent with ADL's/IADL's. Pt's insurance covers medications. Pt used the [facility] pharmacy off of [facility]. Pt states to have transportation available upon d/c.     No known d/c needs at this time.       Information Source  Orientation Level: Oriented X4  Information Given By: Patient  Who is responsible for making decisions for patient? : Patient    Elopement Risk  Legal Hold: No  Ambulatory or Self Mobile in Wheelchair: Yes  Disoriented: No  Psychiatric Symptoms: None  History of Wandering: No  Elopement this Admit: No  Vocalizing Wanting to Leave: No  Displays Behaviors, Body Language Wanting to Leave: No-Not at Risk for Elopement  Elopement Risk: Not at Risk for Elopement    Interdisciplinary Discharge Planning  Patient or legal guardian wants to designate a caregiver: No    Discharge Preparedness  What is your plan after discharge?: Home with help  What are your discharge supports?: Parent, Other (comment)  Prior Functional Level: Independent with Activities of Daily Living, Independent with Medication Management  Difficulity with ADLs: None    Functional Assesment  Prior Functional Level: Independent with Activities of Daily Living, Independent with Medication Management    Finances  Financial Barriers to Discharge: No  Prescription Coverage: Yes    Vision / Hearing Impairment  Vision Impairment : Yes  Right Eye Vision: Impaired, Wears Glasses  Left Eye Vision: Impaired, Wears Glasses  Hearing Impairment : No    Domestic Abuse  Have you ever been the victim of abuse or violence?: No  Physical Abuse or Sexual Abuse: No  Verbal Abuse or Emotional Abuse: No  Possible Abuse/Neglect Reported to:: Not Applicable    Discharge Risks or  Barriers  Discharge risks or barriers?: No    Anticipated Discharge Information  Discharge Disposition: Discharged to home/self care (01)

## 2021-05-26 NOTE — CARE PLAN
The patient is Watcher - Medium risk of patient condition declining or worsening    Shift Goals  Clinical Goals: Pain control, maintain regular heart rate  Patient Goals: Pain control     Progress made toward(s) clinical / shift goals:  Patient has been sleeping comfortably, morphine appears to be working through the night. Last heart rate 124.    Patient is not progressing towards the following goals:      Problem: Pain - Standard  Goal: Alleviation of pain or a reduction in pain to the patient’s comfort goal  Outcome: Progressing

## 2021-05-26 NOTE — PROGRESS NOTES
Received report from day shift nurse.   Assumed pt care   Pt is A&Ox4, resting comfortably in bed.   Pt on r.a.. No signs of SOB/respiratory distress.   Labs noted, VSS.   Pt c/o 7/10 pain at this moment, medicated by previous shift RN.    Assessment completed, passing gas still no bowel movement. Abdomen is distended and firm, bowel sounds hypoactive in all four quadrants. Report from day shift RN saying pain not well controlled during day shift with current pain medication of morphine 4mg.    Fall precautions in place.   Bed at lowest position.   Call light and personal belongings within reach. Continue to monitor

## 2021-05-27 ENCOUNTER — APPOINTMENT (OUTPATIENT)
Dept: RADIOLOGY | Facility: MEDICAL CENTER | Age: 51
DRG: 329 | End: 2021-05-27
Attending: SURGERY
Payer: COMMERCIAL

## 2021-05-27 LAB
BASOPHILS # BLD AUTO: 0 % (ref 0–1.8)
BASOPHILS # BLD: 0 K/UL (ref 0–0.12)
CRP SERPL HS-MCNC: 23.74 MG/DL (ref 0–0.75)
DOHLE BOD BLD QL SMEAR: NORMAL
EOSINOPHIL # BLD AUTO: 0.08 K/UL (ref 0–0.51)
EOSINOPHIL NFR BLD: 1 % (ref 0–6.9)
ERYTHROCYTE [DISTWIDTH] IN BLOOD BY AUTOMATED COUNT: 49 FL (ref 35.9–50)
HCT VFR BLD AUTO: 29.8 % (ref 37–47)
HGB BLD-MCNC: 10 G/DL (ref 12–16)
LYMPHOCYTES # BLD AUTO: 0.32 K/UL (ref 1–4.8)
LYMPHOCYTES NFR BLD: 4 % (ref 22–41)
MAGNESIUM SERPL-MCNC: 2.2 MG/DL (ref 1.5–2.5)
MANUAL DIFF BLD: ABNORMAL
MCH RBC QN AUTO: 29.6 PG (ref 27–33)
MCHC RBC AUTO-ENTMCNC: 33.6 G/DL (ref 33.6–35)
MCV RBC AUTO: 88.2 FL (ref 81.4–97.8)
METAMYELOCYTES NFR BLD MANUAL: 7 %
MONOCYTES # BLD AUTO: 0.32 K/UL (ref 0–0.85)
MONOCYTES NFR BLD AUTO: 4 % (ref 0–13.4)
NEUTROPHILS # BLD AUTO: 6.72 K/UL (ref 2–7.15)
NEUTROPHILS NFR BLD: 63 % (ref 44–72)
NEUTS BAND NFR BLD MANUAL: 21 % (ref 0–10)
NRBC # BLD AUTO: 0.03 K/UL
NRBC BLD-RTO: 0.4 /100 WBC
PHOSPHATE SERPL-MCNC: 2.4 MG/DL (ref 2.5–4.5)
PLATELET # BLD AUTO: 22 K/UL (ref 164–446)
PLATELET BLD QL SMEAR: NORMAL
PLATELETS.RETICULATED NFR BLD AUTO: 7.7 K/UL (ref 0.6–13.1)
PMV BLD AUTO: 11.3 FL (ref 9–12.9)
PROCALCITONIN SERPL-MCNC: 4.83 NG/ML
RBC # BLD AUTO: 3.38 M/UL (ref 4.2–5.4)
RBC BLD AUTO: NORMAL
WBC # BLD AUTO: 8 K/UL (ref 4.8–10.8)

## 2021-05-27 PROCEDURE — 83735 ASSAY OF MAGNESIUM: CPT

## 2021-05-27 PROCEDURE — 84145 PROCALCITONIN (PCT): CPT

## 2021-05-27 PROCEDURE — A9270 NON-COVERED ITEM OR SERVICE: HCPCS | Performed by: SURGERY

## 2021-05-27 PROCEDURE — 74177 CT ABD & PELVIS W/CONTRAST: CPT

## 2021-05-27 PROCEDURE — 700111 HCHG RX REV CODE 636 W/ 250 OVERRIDE (IP): Performed by: HOSPITALIST

## 2021-05-27 PROCEDURE — 700117 HCHG RX CONTRAST REV CODE 255: Performed by: SURGERY

## 2021-05-27 PROCEDURE — 85027 COMPLETE CBC AUTOMATED: CPT

## 2021-05-27 PROCEDURE — 700102 HCHG RX REV CODE 250 W/ 637 OVERRIDE(OP): Performed by: SURGERY

## 2021-05-27 PROCEDURE — 85055 RETICULATED PLATELET ASSAY: CPT

## 2021-05-27 PROCEDURE — 700111 HCHG RX REV CODE 636 W/ 250 OVERRIDE (IP): Performed by: SURGERY

## 2021-05-27 PROCEDURE — 86140 C-REACTIVE PROTEIN: CPT

## 2021-05-27 PROCEDURE — 85007 BL SMEAR W/DIFF WBC COUNT: CPT

## 2021-05-27 PROCEDURE — 700101 HCHG RX REV CODE 250: Performed by: SURGERY

## 2021-05-27 PROCEDURE — 84100 ASSAY OF PHOSPHORUS: CPT

## 2021-05-27 PROCEDURE — 770006 HCHG ROOM/CARE - MED/SURG/GYN SEMI*

## 2021-05-27 PROCEDURE — 74018 RADEX ABDOMEN 1 VIEW: CPT

## 2021-05-27 RX ADMIN — FAMOTIDINE 20 MG: 20 TABLET ORAL at 05:07

## 2021-05-27 RX ADMIN — IOHEXOL 25 ML: 240 INJECTION, SOLUTION INTRATHECAL; INTRAVASCULAR; INTRAVENOUS; ORAL at 18:01

## 2021-05-27 RX ADMIN — IOHEXOL 100 ML: 350 INJECTION, SOLUTION INTRAVENOUS at 18:00

## 2021-05-27 RX ADMIN — METOCLOPRAMIDE 10 MG: 5 INJECTION, SOLUTION INTRAMUSCULAR; INTRAVENOUS at 20:14

## 2021-05-27 RX ADMIN — OXYCODONE HYDROCHLORIDE 10 MG: 10 TABLET ORAL at 21:37

## 2021-05-27 RX ADMIN — DOCUSATE SODIUM 100 MG: 100 CAPSULE, LIQUID FILLED ORAL at 05:08

## 2021-05-27 RX ADMIN — ENOXAPARIN SODIUM 40 MG: 40 INJECTION SUBCUTANEOUS at 05:07

## 2021-05-27 RX ADMIN — POTASSIUM CHLORIDE AND SODIUM CHLORIDE: 900; 150 INJECTION, SOLUTION INTRAVENOUS at 04:53

## 2021-05-27 RX ADMIN — ONDANSETRON 4 MG: 2 INJECTION INTRAMUSCULAR; INTRAVENOUS at 18:12

## 2021-05-27 RX ADMIN — OXYCODONE HYDROCHLORIDE 10 MG: 10 TABLET ORAL at 00:39

## 2021-05-27 RX ADMIN — OXYCODONE HYDROCHLORIDE 10 MG: 10 TABLET ORAL at 05:07

## 2021-05-27 RX ADMIN — DOCUSATE SODIUM 100 MG: 100 CAPSULE, LIQUID FILLED ORAL at 20:14

## 2021-05-27 RX ADMIN — DIAZEPAM 5 MG: 5 INJECTION, SOLUTION INTRAMUSCULAR; INTRAVENOUS at 20:15

## 2021-05-27 RX ADMIN — MORPHINE SULFATE 4 MG: 4 INJECTION INTRAVENOUS at 18:09

## 2021-05-27 RX ADMIN — IBUPROFEN 800 MG: 400 TABLET ORAL at 14:05

## 2021-05-27 RX ADMIN — FAMOTIDINE 20 MG: 20 TABLET ORAL at 20:15

## 2021-05-27 ASSESSMENT — ENCOUNTER SYMPTOMS
ROS GI COMMENTS: PASSING SOME FLATUS
ABDOMINAL PAIN: 1
SHORTNESS OF BREATH: 0

## 2021-05-27 ASSESSMENT — PATIENT HEALTH QUESTIONNAIRE - PHQ9
SUM OF ALL RESPONSES TO PHQ QUESTIONS 1-9: 13
1. LITTLE INTEREST OR PLEASURE IN DOING THINGS: MORE THAN HALF THE DAYS
8. MOVING OR SPEAKING SO SLOWLY THAT OTHER PEOPLE COULD HAVE NOTICED. OR THE OPPOSITE, BEING SO FIGETY OR RESTLESS THAT YOU HAVE BEEN MOVING AROUND A LOT MORE THAN USUAL: NOT AT ALL
SUM OF ALL RESPONSES TO PHQ9 QUESTIONS 1 AND 2: 4
5. POOR APPETITE OR OVEREATING: MORE THAN HALF THE DAYS
9. THOUGHTS THAT YOU WOULD BE BETTER OFF DEAD, OR OF HURTING YOURSELF: NOT AT ALL
3. TROUBLE FALLING OR STAYING ASLEEP OR SLEEPING TOO MUCH: MORE THAN HALF THE DAYS
6. FEELING BAD ABOUT YOURSELF - OR THAT YOU ARE A FAILURE OR HAVE LET YOURSELF OR YOUR FAMILY DOWN: MORE THAN HALF THE DAYS
4. FEELING TIRED OR HAVING LITTLE ENERGY: NEARLY EVERY DAY
7. TROUBLE CONCENTRATING ON THINGS, SUCH AS READING THE NEWSPAPER OR WATCHING TELEVISION: NOT AT ALL
2. FEELING DOWN, DEPRESSED, IRRITABLE, OR HOPELESS: MORE THAN HALF THE DAYS

## 2021-05-27 ASSESSMENT — PAIN SCALES - WONG BAKER: WONGBAKER_NUMERICALRESPONSE: DOESN'T HURT AT ALL

## 2021-05-27 ASSESSMENT — PAIN DESCRIPTION - PAIN TYPE
TYPE: ACUTE PAIN
TYPE: ACUTE PAIN

## 2021-05-27 NOTE — PROGRESS NOTES
Trauma / Surgical Daily Progress Note    Date of Service  5/27/2021  Chief Complaint  50 y.o. female admitted 5/22/2021 with cecal volvulus    Interval Events  5/22 Exploratory celiotomy and right hemicolectomy with a side-to-side   ileocolic anastomosis  5/28 Exploratory celiotomy, resection of ileocolic anastomosis with re-do  side-to-side ileocolic anastomosis    POD #6 Labs Normalized.  Passing flatus and stool. Tolerating full liquid diet. On Unasyn per ID. Will d/c IV pain medication, switch to PO.  Pt needs to mobilize.  Pt diuresing well.     Review of Systems  Review of Systems   Respiratory: Negative for shortness of breath.    Cardiovascular: Negative for chest pain.   Gastrointestinal: Positive for abdominal pain.        Passing some flatus        Vital Signs for last 24 hours  Temp:  [36.2 °C (97.1 °F)-37.1 °C (98.8 °F)] 37 °C (98.6 °F)  Pulse:  [] 89  Resp:  [17-18] 18  BP: (116-134)/(51-76) 130/51  SpO2:  [95 %-98 %] 95 %    Hemodynamic parameters for last 24 hours       Respiratory Data     Respiration: 18, Pulse Oximetry: 95 %        RUL Breath Sounds: Clear, RML Breath Sounds: Clear, RLL Breath Sounds: Clear, LUCITA Breath Sounds: Clear, LLL Breath Sounds: Clear    Physical Exam  Physical Exam  Cardiovascular:      Rate and Rhythm: Normal rate.      Pulses: Normal pulses.   Pulmonary:      Effort: Pulmonary effort is normal.   Abdominal:      General: There is no distension.      Palpations: Abdomen is soft.      Tenderness: There is abdominal tenderness.      Comments: Wound Vac in place    Less distention, abdomen softer      Genitourinary:     Comments: diuresing  Musculoskeletal:         General: Normal range of motion.      Cervical back: Neck supple.      Comments: Generalized edema   Skin:     General: Skin is warm and dry.   Neurological:      General: No focal deficit present.      Mental Status: She is alert.   Psychiatric:      Comments: Teary today         Laboratory  Recent Results  (from the past 24 hour(s))   POCT glucose device results    Collection Time: 06/02/21 11:49 AM   Result Value Ref Range    Glucose - Accu-Ck 87 65 - 99 mg/dL   BLOOD CULTURE    Collection Time: 06/02/21  1:07 PM    Specimen: Peripheral; Blood   Result Value Ref Range    Significant Indicator NEG     Source BLD     Site PERIPHERAL     Culture Result       No Growth  Note: Blood cultures are incubated for 5 days and  are monitored continuously.Positive blood cultures  are called to the RN and reported as soon as  they are identified.  Blood culture testing and Gram stain, if indicated, are  performed at [facility], [address] [place] [address].  Positive blood cultures are  sent to [facility], [address] [place] [address], for organism identification and  susceptibility testing.     BLOOD CULTURE    Collection Time: 06/02/21  5:30 PM    Specimen: Peripheral; Blood   Result Value Ref Range    Significant Indicator NEG     Source BLD     Site PERIPHERAL     Culture Result       No Growth  Note: Blood cultures are incubated for 5 days and  are monitored continuously.Positive blood cultures  are called to the RN and reported as soon as  they are identified.  Blood culture testing and Gram stain, if indicated, are  performed at [facility], [address] [place] [address].  Positive blood cultures are  sent to [facility], [address] [place] [address], for organism identification and  susceptibility testing.     POCT glucose device results    Collection Time: 06/02/21  5:31 PM   Result Value Ref Range    Glucose - Accu-Ck 105 (H) 65 - 99 mg/dL   POCT glucose device results    Collection Time: 06/02/21  9:48 PM   Result Value Ref Range    Glucose - Accu-Ck 109 (H) 65 - 99 mg/dL   Comp Metabolic Panel    Collection Time: 06/03/21  3:35 AM   Result Value Ref Range    Sodium 135 135 - 145 mmol/L    Potassium 3.5 (L) 3.6 -  5.5 mmol/L    Chloride 102 96 - 112 mmol/L    Co2 23 20 - 33 mmol/L    Anion Gap 10.0 7.0 - 16.0    Glucose 107 (H) 65 - 99 mg/dL    Bun 4 (L) 8 - 22 mg/dL    Creatinine 0.48 (L) 0.50 - 1.40 mg/dL    Calcium 7.1 (L) 8.4 - 10.2 mg/dL    AST(SGOT) 21 12 - 45 U/L    ALT(SGPT) 9 2 - 50 U/L    Alkaline Phosphatase 59 30 - 99 U/L    Total Bilirubin 0.4 0.1 - 1.5 mg/dL    Albumin 1.9 (L) 3.2 - 4.9 g/dL    Total Protein 4.4 (L) 6.0 - 8.2 g/dL    Globulin 2.5 1.9 - 3.5 g/dL    A-G Ratio 0.8 g/dL   IONIZED CALCIUM    Collection Time: 06/03/21  3:35 AM   Result Value Ref Range    Ionized Calcium 1.03 (L) 1.10 - 1.30 mmol/L   CBC WITH DIFFERENTIAL    Collection Time: 06/03/21  3:35 AM   Result Value Ref Range    WBC 7.8 4.8 - 10.8 K/uL    RBC 3.87 (L) 4.20 - 5.40 M/uL    Hemoglobin 11.2 (L) 12.0 - 16.0 g/dL    Hematocrit 34.4 (L) 37.0 - 47.0 %    MCV 88.9 81.4 - 97.8 fL    MCH 28.9 27.0 - 33.0 pg    MCHC 32.6 (L) 33.6 - 35.0 g/dL    RDW 49.4 35.9 - 50.0 fL    Platelet Count 165 164 - 446 K/uL    MPV 11.1 9.0 - 12.9 fL    Neutrophils-Polys 76.30 (H) 44.00 - 72.00 %    Lymphocytes 9.40 (L) 22.00 - 41.00 %    Monocytes 9.00 0.00 - 13.40 %    Eosinophils 1.20 0.00 - 6.90 %    Basophils 0.40 0.00 - 1.80 %    Immature Granulocytes 3.70 (H) 0.00 - 0.90 %    Nucleated RBC 0.40 /100 WBC    Neutrophils (Absolute) 5.92 2.00 - 7.15 K/uL    Lymphs (Absolute) 0.73 (L) 1.00 - 4.80 K/uL    Monos (Absolute) 0.70 0.00 - 0.85 K/uL    Eos (Absolute) 0.09 0.00 - 0.51 K/uL    Baso (Absolute) 0.03 0.00 - 0.12 K/uL    Immature Granulocytes (abs) 0.29 (H) 0.00 - 0.11 K/uL    NRBC (Absolute) 0.03 K/uL   MAGNESIUM    Collection Time: 06/03/21  3:35 AM   Result Value Ref Range    Magnesium 1.6 1.5 - 2.5 mg/dL   PHOSPHORUS    Collection Time: 06/03/21  3:35 AM   Result Value Ref Range    Phosphorus 2.6 2.5 - 4.5 mg/dL   ESTIMATED GFR    Collection Time: 06/03/21  3:35 AM   Result Value Ref Range    GFR If African American >60 >60 mL/min/1.73 m 2    GFR If  Non African American >60 >60 mL/min/1.73 m 2   POCT glucose device results    Collection Time: 06/03/21  3:35 AM   Result Value Ref Range    Glucose - Accu-Ck 107 (H) 65 - 99 mg/dL       Fluids    Intake/Output Summary (Last 24 hours) at 6/3/2021 0915  Last data filed at 6/3/2021 0500  Gross per 24 hour   Intake 2740 ml   Output --   Net 2740 ml       Core Measures & Quality Metrics  Labs reviewed and Medications reviewed  Young catheter: One or Two Days Post Surgery (Day of Surgery being Day 0)      DVT Prophylaxis: Enoxaparin (Lovenox)  DVT prophylaxis - mechanical: SCDs  Ulcer prophylaxis: Yes    Assessed for rehab: Patient returned to prior level of function, rehabilitation not indicated at this time    ALICIA Score  ETOH Screening    Assessment/Plan  * Bacterial peritonitis (HCC)  Assessment & Plan  6/2 Zosyn stopped.  ID Consult  6/3 Day #2 Unasyn per ID    Cecal volvulus (HCC)- (present on admission)  Assessment & Plan  Acute abd pain  CT shows cecal volvulus  5/22 ex lap, r hemicolectomy  Await GI function  5/24 trend procalcitonin and CRP   Abd xray- ileus  5/25 procalcitonin and CRP increasing, check lactic acid add reglan  5/26 Labs improving. Passing flatus - start clears  5/27 - Stooled - will adv to full liquids  5/27 - thrombocytopenia, bandemia, PM CT with ascites no freeair  5/28 - Exploratory celiotomy, resection of ileocolic anastomosis with re-do  side-to-side ileocolic anastomosis.  6/3  Stooling, advance to regular diet      Discussed patient condition with RN and Patient.  Dr. Wood  CRITICAL CARE TIME EXCLUDING PROCEDURES: 20  minutes

## 2021-05-27 NOTE — CARE PLAN
Problem: Knowledge Deficit - Standard  Goal: Patient and family/care givers will demonstrate understanding of plan of care, disease process/condition, diagnostic tests and medications  Outcome: Progressing     Problem: Pain - Standard  Goal: Alleviation of pain or a reduction in pain to the patient’s comfort goal  Outcome: Progressing     Problem: Depression  Goal: Patient and family/caregiver will verbalize accurate information about at least two of the possible causes of depression, three-four of the signs and symptoms of depression  Outcome: Progressing   The patient is Stable - Low risk of patient condition declining or worsening    Shift Goals  Clinical Goals: pain management and tolerate clear liquids  Patient Goals: pain control    Progress made toward(s) clinical / shift goals:  pain managed food tolerated    Patient is not progressing towards the following goals:

## 2021-05-27 NOTE — CARE PLAN
Problem: Nutritional:  Goal: Achieve adequate nutritional intake  Description: Advance diet as tolerated. Patient will consume >50% of meals.  Outcome: Progressing   PO intake % on Clear liquids yesterday. Diet advanced to Full liquids today. Encourage PO intake.

## 2021-05-27 NOTE — PROGRESS NOTES
Lab called with critical result of platelets at 22,000 at 0847. Critical lab result read back to Lab. Primary RNZachary notified.

## 2021-05-27 NOTE — CARE PLAN
The patient is Watcher - Medium risk of patient condition declining or worsening    Shift Goals  Clinical Goals: Pain control, tolerate PO, have a BM  Patient Goals: pain control    Progress made toward(s) clinical / shift goals:  Pt had 2 small loose Bms today. Tolerating clears and pain improving.       Problem: Knowledge Deficit - Standard  Goal: Patient and family/care givers will demonstrate understanding of plan of care, disease process/condition, diagnostic tests and medications  Outcome: Progressing     Problem: Pain - Standard  Goal: Alleviation of pain or a reduction in pain to the patient’s comfort goal  Outcome: Progressing

## 2021-05-28 ENCOUNTER — ANESTHESIA EVENT (OUTPATIENT)
Dept: SURGERY | Facility: MEDICAL CENTER | Age: 51
DRG: 329 | End: 2021-05-28
Payer: COMMERCIAL

## 2021-05-28 ENCOUNTER — APPOINTMENT (OUTPATIENT)
Dept: RADIOLOGY | Facility: MEDICAL CENTER | Age: 51
DRG: 329 | End: 2021-05-28
Attending: ANESTHESIOLOGY
Payer: COMMERCIAL

## 2021-05-28 ENCOUNTER — ANESTHESIA (OUTPATIENT)
Dept: SURGERY | Facility: MEDICAL CENTER | Age: 51
DRG: 329 | End: 2021-05-28
Payer: COMMERCIAL

## 2021-05-28 ENCOUNTER — APPOINTMENT (OUTPATIENT)
Dept: RADIOLOGY | Facility: MEDICAL CENTER | Age: 51
DRG: 329 | End: 2021-05-28
Attending: SURGERY
Payer: COMMERCIAL

## 2021-05-28 PROBLEM — D69.6 THROMBOCYTOPENIA (HCC): Status: ACTIVE | Noted: 2021-05-28

## 2021-05-28 PROBLEM — K65.9 BACTERIAL PERITONITIS (HCC): Status: ACTIVE | Noted: 2021-05-28

## 2021-05-28 LAB
ABO + RH BLD: NORMAL
ABO GROUP BLD: NORMAL
ALBUMIN SERPL BCP-MCNC: 1.7 G/DL (ref 3.2–4.9)
ALBUMIN SERPL BCP-MCNC: 1.8 G/DL (ref 3.2–4.9)
ALBUMIN/GLOB SERPL: 0.5 G/DL
ALBUMIN/GLOB SERPL: 0.8 G/DL
ALP SERPL-CCNC: 51 U/L (ref 30–99)
ALP SERPL-CCNC: 67 U/L (ref 30–99)
ALT SERPL-CCNC: 8 U/L (ref 2–50)
ALT SERPL-CCNC: 8 U/L (ref 2–50)
ANION GAP SERPL CALC-SCNC: 13 MMOL/L (ref 7–16)
ANION GAP SERPL CALC-SCNC: 14 MMOL/L (ref 7–16)
AST SERPL-CCNC: 22 U/L (ref 12–45)
AST SERPL-CCNC: 26 U/L (ref 12–45)
BARCODED ABORH UBTYP: 5100
BARCODED ABORH UBTYP: 6200
BARCODED PRD CODE UBPRD: NORMAL
BARCODED UNIT NUM UBUNT: NORMAL
BASOPHILS # BLD AUTO: 0 % (ref 0–1.8)
BASOPHILS # BLD AUTO: 0.4 % (ref 0–1.8)
BASOPHILS # BLD AUTO: 0.4 % (ref 0–1.8)
BASOPHILS # BLD AUTO: 0.6 % (ref 0–1.8)
BASOPHILS # BLD: 0 K/UL (ref 0–0.12)
BASOPHILS # BLD: 0.03 K/UL (ref 0–0.12)
BASOPHILS # BLD: 0.05 K/UL (ref 0–0.12)
BASOPHILS # BLD: 0.08 K/UL (ref 0–0.12)
BILIRUB SERPL-MCNC: 0.4 MG/DL (ref 0.1–1.5)
BILIRUB SERPL-MCNC: 0.5 MG/DL (ref 0.1–1.5)
BLD GP AB SCN SERPL QL: NORMAL
BUN SERPL-MCNC: 12 MG/DL (ref 8–22)
BUN SERPL-MCNC: 12 MG/DL (ref 8–22)
CALCIUM SERPL-MCNC: 7.1 MG/DL (ref 8.4–10.2)
CALCIUM SERPL-MCNC: 7.8 MG/DL (ref 8.4–10.2)
CHLORIDE SERPL-SCNC: 100 MMOL/L (ref 96–112)
CHLORIDE SERPL-SCNC: 98 MMOL/L (ref 96–112)
CO2 SERPL-SCNC: 18 MMOL/L (ref 20–33)
CO2 SERPL-SCNC: 20 MMOL/L (ref 20–33)
COMMENT 1642: NORMAL
COMPONENT P 8504P: NORMAL
COMPONENT P 8504P: NORMAL
COMPONENT R 8504R: NORMAL
CREAT SERPL-MCNC: 0.62 MG/DL (ref 0.5–1.4)
CREAT SERPL-MCNC: 0.75 MG/DL (ref 0.5–1.4)
CRP SERPL HS-MCNC: 24.76 MG/DL (ref 0–0.75)
DOHLE BOD BLD QL SMEAR: NORMAL
DOHLE BOD BLD QL SMEAR: NORMAL
EOSINOPHIL # BLD AUTO: 0 K/UL (ref 0–0.51)
EOSINOPHIL # BLD AUTO: 0.01 K/UL (ref 0–0.51)
EOSINOPHIL # BLD AUTO: 0.07 K/UL (ref 0–0.51)
EOSINOPHIL # BLD AUTO: 0.11 K/UL (ref 0–0.51)
EOSINOPHIL NFR BLD: 0 % (ref 0–6.9)
EOSINOPHIL NFR BLD: 0.1 % (ref 0–6.9)
EOSINOPHIL NFR BLD: 0.9 % (ref 0–6.9)
EOSINOPHIL NFR BLD: 1 % (ref 0–6.9)
ERYTHROCYTE [DISTWIDTH] IN BLOOD BY AUTOMATED COUNT: 46.3 FL (ref 35.9–50)
ERYTHROCYTE [DISTWIDTH] IN BLOOD BY AUTOMATED COUNT: 48.1 FL (ref 35.9–50)
ERYTHROCYTE [DISTWIDTH] IN BLOOD BY AUTOMATED COUNT: 48.5 FL (ref 35.9–50)
ERYTHROCYTE [DISTWIDTH] IN BLOOD BY AUTOMATED COUNT: 49.1 FL (ref 35.9–50)
GLOBULIN SER CALC-MCNC: 2.4 G/DL (ref 1.9–3.5)
GLOBULIN SER CALC-MCNC: 3.2 G/DL (ref 1.9–3.5)
GLUCOSE BLD-MCNC: 83 MG/DL (ref 65–99)
GLUCOSE SERPL-MCNC: 89 MG/DL (ref 65–99)
GLUCOSE SERPL-MCNC: 91 MG/DL (ref 65–99)
HCT VFR BLD AUTO: 23.5 % (ref 37–47)
HCT VFR BLD AUTO: 24.2 % (ref 37–47)
HCT VFR BLD AUTO: 24.6 % (ref 37–47)
HCT VFR BLD AUTO: 28.4 % (ref 37–47)
HGB BLD-MCNC: 7.7 G/DL (ref 12–16)
HGB BLD-MCNC: 8.2 G/DL (ref 12–16)
HGB BLD-MCNC: 8.3 G/DL (ref 12–16)
HGB BLD-MCNC: 9.5 G/DL (ref 12–16)
IMM GRANULOCYTES # BLD AUTO: 0.09 K/UL (ref 0–0.11)
IMM GRANULOCYTES # BLD AUTO: 0.14 K/UL (ref 0–0.11)
IMM GRANULOCYTES # BLD AUTO: 0.27 K/UL (ref 0–0.11)
IMM GRANULOCYTES NFR BLD AUTO: 1 % (ref 0–0.9)
IMM GRANULOCYTES NFR BLD AUTO: 1.1 % (ref 0–0.9)
IMM GRANULOCYTES NFR BLD AUTO: 1.9 % (ref 0–0.9)
LG PLATELETS BLD QL SMEAR: NORMAL
LYMPHOCYTES # BLD AUTO: 0.21 K/UL (ref 1–4.8)
LYMPHOCYTES # BLD AUTO: 0.21 K/UL (ref 1–4.8)
LYMPHOCYTES # BLD AUTO: 0.22 K/UL (ref 1–4.8)
LYMPHOCYTES # BLD AUTO: 0.4 K/UL (ref 1–4.8)
LYMPHOCYTES NFR BLD: 1.6 % (ref 22–41)
LYMPHOCYTES NFR BLD: 2 % (ref 22–41)
LYMPHOCYTES NFR BLD: 2.7 % (ref 22–41)
LYMPHOCYTES NFR BLD: 2.9 % (ref 22–41)
MAGNESIUM SERPL-MCNC: 1.9 MG/DL (ref 1.5–2.5)
MANUAL DIFF BLD: ABNORMAL
MCH RBC QN AUTO: 28.3 PG (ref 27–33)
MCH RBC QN AUTO: 28.6 PG (ref 27–33)
MCH RBC QN AUTO: 28.6 PG (ref 27–33)
MCH RBC QN AUTO: 29.4 PG (ref 27–33)
MCHC RBC AUTO-ENTMCNC: 32.8 G/DL (ref 33.6–35)
MCHC RBC AUTO-ENTMCNC: 33.3 G/DL (ref 33.6–35)
MCHC RBC AUTO-ENTMCNC: 33.5 G/DL (ref 33.6–35)
MCHC RBC AUTO-ENTMCNC: 34.3 G/DL (ref 33.6–35)
MCV RBC AUTO: 84.5 FL (ref 81.4–97.8)
MCV RBC AUTO: 85.7 FL (ref 81.4–97.8)
MCV RBC AUTO: 85.8 FL (ref 81.4–97.8)
MCV RBC AUTO: 87.4 FL (ref 81.4–97.8)
METAMYELOCYTES NFR BLD MANUAL: 3 %
MONOCYTES # BLD AUTO: 0.32 K/UL (ref 0–0.85)
MONOCYTES # BLD AUTO: 0.45 K/UL (ref 0–0.85)
MONOCYTES # BLD AUTO: 0.53 K/UL (ref 0–0.85)
MONOCYTES # BLD AUTO: 0.64 K/UL (ref 0–0.85)
MONOCYTES NFR BLD AUTO: 3 % (ref 0–13.4)
MONOCYTES NFR BLD AUTO: 4 % (ref 0–13.4)
MONOCYTES NFR BLD AUTO: 4.6 % (ref 0–13.4)
MONOCYTES NFR BLD AUTO: 5.5 % (ref 0–13.4)
NEUTROPHILS # BLD AUTO: 12.47 K/UL (ref 2–7.15)
NEUTROPHILS # BLD AUTO: 12.61 K/UL (ref 2–7.15)
NEUTROPHILS # BLD AUTO: 7.3 K/UL (ref 2–7.15)
NEUTROPHILS # BLD AUTO: 9.56 K/UL (ref 2–7.15)
NEUTROPHILS NFR BLD: 71 % (ref 44–72)
NEUTROPHILS NFR BLD: 89.4 % (ref 44–72)
NEUTROPHILS NFR BLD: 90 % (ref 44–72)
NEUTROPHILS NFR BLD: 92.9 % (ref 44–72)
NEUTS BAND NFR BLD MANUAL: 20 % (ref 0–10)
NRBC # BLD AUTO: 0 K/UL
NRBC BLD-RTO: 0 /100 WBC
PATHOLOGY CONSULT NOTE: NORMAL
PHOSPHATE SERPL-MCNC: 3.7 MG/DL (ref 2.5–4.5)
PLATELET # BLD AUTO: 12 K/UL (ref 164–446)
PLATELET # BLD AUTO: 44 K/UL (ref 164–446)
PLATELET # BLD AUTO: 52 K/UL (ref 164–446)
PLATELET # BLD AUTO: 73 K/UL (ref 164–446)
PLATELET BLD QL SMEAR: NORMAL
PLATELET BLD QL SMEAR: NORMAL
PMV BLD AUTO: 10.4 FL (ref 9–12.9)
PMV BLD AUTO: 10.7 FL (ref 9–12.9)
PMV BLD AUTO: 11 FL (ref 9–12.9)
POTASSIUM SERPL-SCNC: 2.8 MMOL/L (ref 3.6–5.5)
POTASSIUM SERPL-SCNC: 3.5 MMOL/L (ref 3.6–5.5)
PRODUCT TYPE UPROD: NORMAL
PROT SERPL-MCNC: 4.2 G/DL (ref 6–8.2)
PROT SERPL-MCNC: 4.9 G/DL (ref 6–8.2)
RBC # BLD AUTO: 2.69 M/UL (ref 4.2–5.4)
RBC # BLD AUTO: 2.82 M/UL (ref 4.2–5.4)
RBC # BLD AUTO: 2.87 M/UL (ref 4.2–5.4)
RBC # BLD AUTO: 3.36 M/UL (ref 4.2–5.4)
RBC BLD AUTO: NORMAL
RBC BLD AUTO: NORMAL
RH BLD: NORMAL
SODIUM SERPL-SCNC: 130 MMOL/L (ref 135–145)
SODIUM SERPL-SCNC: 133 MMOL/L (ref 135–145)
TOXIC GRANULES BLD QL SMEAR: NORMAL
TOXIC GRANULES BLD QL SMEAR: NORMAL
UNIT STATUS USTAT: NORMAL
WBC # BLD AUTO: 10.5 K/UL (ref 4.8–10.8)
WBC # BLD AUTO: 13.4 K/UL (ref 4.8–10.8)
WBC # BLD AUTO: 14 K/UL (ref 4.8–10.8)
WBC # BLD AUTO: 8.2 K/UL (ref 4.8–10.8)

## 2021-05-28 PROCEDURE — 700101 HCHG RX REV CODE 250: Performed by: ANESTHESIOLOGY

## 2021-05-28 PROCEDURE — 88307 TISSUE EXAM BY PATHOLOGIST: CPT

## 2021-05-28 PROCEDURE — 80053 COMPREHEN METABOLIC PANEL: CPT | Mod: 91

## 2021-05-28 PROCEDURE — 86022 PLATELET ANTIBODIES: CPT

## 2021-05-28 PROCEDURE — 83735 ASSAY OF MAGNESIUM: CPT

## 2021-05-28 PROCEDURE — 700105 HCHG RX REV CODE 258: Performed by: SURGERY

## 2021-05-28 PROCEDURE — 0DBB0ZZ EXCISION OF ILEUM, OPEN APPROACH: ICD-10-PCS | Performed by: SURGERY

## 2021-05-28 PROCEDURE — 93005 ELECTROCARDIOGRAM TRACING: CPT | Performed by: INTERNAL MEDICINE

## 2021-05-28 PROCEDURE — 87076 CULTURE ANAEROBE IDENT EACH: CPT | Mod: 91

## 2021-05-28 PROCEDURE — 700111 HCHG RX REV CODE 636 W/ 250 OVERRIDE (IP): Performed by: ANESTHESIOLOGY

## 2021-05-28 PROCEDURE — 86140 C-REACTIVE PROTEIN: CPT

## 2021-05-28 PROCEDURE — 160031 HCHG SURGERY MINUTES - 1ST 30 MINS LEVEL 5: Performed by: SURGERY

## 2021-05-28 PROCEDURE — 86923 COMPATIBILITY TEST ELECTRIC: CPT

## 2021-05-28 PROCEDURE — 700111 HCHG RX REV CODE 636 W/ 250 OVERRIDE (IP): Performed by: INTERNAL MEDICINE

## 2021-05-28 PROCEDURE — 86901 BLOOD TYPING SEROLOGIC RH(D): CPT

## 2021-05-28 PROCEDURE — 501433 HCHG STAPLER, GIA MULTIFIRE 60/80: Performed by: SURGERY

## 2021-05-28 PROCEDURE — 87186 SC STD MICRODIL/AGAR DIL: CPT

## 2021-05-28 PROCEDURE — 87205 SMEAR GRAM STAIN: CPT

## 2021-05-28 PROCEDURE — 85027 COMPLETE CBC AUTOMATED: CPT

## 2021-05-28 PROCEDURE — A9270 NON-COVERED ITEM OR SERVICE: HCPCS | Performed by: SURGERY

## 2021-05-28 PROCEDURE — 700101 HCHG RX REV CODE 250: Performed by: SURGERY

## 2021-05-28 PROCEDURE — 501452 HCHG STAPLES, GIA MULTIFIRE 60/80: Performed by: SURGERY

## 2021-05-28 PROCEDURE — 85007 BL SMEAR W/DIFF WBC COUNT: CPT

## 2021-05-28 PROCEDURE — 36430 TRANSFUSION BLD/BLD COMPNT: CPT

## 2021-05-28 PROCEDURE — 86900 BLOOD TYPING SEROLOGIC ABO: CPT

## 2021-05-28 PROCEDURE — 0DBK0ZZ EXCISION OF ASCENDING COLON, OPEN APPROACH: ICD-10-PCS | Performed by: SURGERY

## 2021-05-28 PROCEDURE — 87075 CULTR BACTERIA EXCEPT BLOOD: CPT

## 2021-05-28 PROCEDURE — 160009 HCHG ANES TIME/MIN: Performed by: SURGERY

## 2021-05-28 PROCEDURE — 700111 HCHG RX REV CODE 636 W/ 250 OVERRIDE (IP): Performed by: SURGERY

## 2021-05-28 PROCEDURE — 501838 HCHG SUTURE GENERAL: Performed by: SURGERY

## 2021-05-28 PROCEDURE — 700105 HCHG RX REV CODE 258: Performed by: ANESTHESIOLOGY

## 2021-05-28 PROCEDURE — 160048 HCHG OR STATISTICAL LEVEL 1-5: Performed by: SURGERY

## 2021-05-28 PROCEDURE — 85025 COMPLETE CBC W/AUTO DIFF WBC: CPT | Mod: 91

## 2021-05-28 PROCEDURE — 160035 HCHG PACU - 1ST 60 MINS PHASE I: Performed by: SURGERY

## 2021-05-28 PROCEDURE — C1765 ADHESION BARRIER: HCPCS | Performed by: SURGERY

## 2021-05-28 PROCEDURE — 700105 HCHG RX REV CODE 258: Performed by: INTERNAL MEDICINE

## 2021-05-28 PROCEDURE — 770022 HCHG ROOM/CARE - ICU (200)

## 2021-05-28 PROCEDURE — 87077 CULTURE AEROBIC IDENTIFY: CPT | Mod: 91

## 2021-05-28 PROCEDURE — P9034 PLATELETS, PHERESIS: HCPCS | Mod: 91

## 2021-05-28 PROCEDURE — 82962 GLUCOSE BLOOD TEST: CPT | Mod: 91

## 2021-05-28 PROCEDURE — 84100 ASSAY OF PHOSPHORUS: CPT

## 2021-05-28 PROCEDURE — 30233R1 TRANSFUSION OF NONAUTOLOGOUS PLATELETS INTO PERIPHERAL VEIN, PERCUTANEOUS APPROACH: ICD-10-PCS | Performed by: INTERNAL MEDICINE

## 2021-05-28 PROCEDURE — 502704 HCHG DEVICE, LIGASURE IMPACT: Performed by: SURGERY

## 2021-05-28 PROCEDURE — 99291 CRITICAL CARE FIRST HOUR: CPT | Performed by: INTERNAL MEDICINE

## 2021-05-28 PROCEDURE — 160042 HCHG SURGERY MINUTES - EA ADDL 1 MIN LEVEL 5: Performed by: SURGERY

## 2021-05-28 PROCEDURE — 86850 RBC ANTIBODY SCREEN: CPT

## 2021-05-28 PROCEDURE — 87070 CULTURE OTHR SPECIMN AEROBIC: CPT

## 2021-05-28 PROCEDURE — 160002 HCHG RECOVERY MINUTES (STAT): Performed by: SURGERY

## 2021-05-28 PROCEDURE — 700102 HCHG RX REV CODE 250 W/ 637 OVERRIDE(OP): Performed by: SURGERY

## 2021-05-28 RX ORDER — POTASSIUM CHLORIDE 14.9 MG/ML
20 INJECTION INTRAVENOUS ONCE
Status: COMPLETED | OUTPATIENT
Start: 2021-05-28 | End: 2021-05-28

## 2021-05-28 RX ORDER — SODIUM CHLORIDE, SODIUM LACTATE, POTASSIUM CHLORIDE, CALCIUM CHLORIDE 600; 310; 30; 20 MG/100ML; MG/100ML; MG/100ML; MG/100ML
INJECTION, SOLUTION INTRAVENOUS CONTINUOUS
Status: ACTIVE | OUTPATIENT
Start: 2021-05-28 | End: 2021-05-28

## 2021-05-28 RX ORDER — HALOPERIDOL 5 MG/ML
1 INJECTION INTRAMUSCULAR
Status: DISCONTINUED | OUTPATIENT
Start: 2021-05-28 | End: 2021-05-28 | Stop reason: HOSPADM

## 2021-05-28 RX ORDER — ONDANSETRON 2 MG/ML
INJECTION INTRAMUSCULAR; INTRAVENOUS PRN
Status: DISCONTINUED | OUTPATIENT
Start: 2021-05-28 | End: 2021-05-28 | Stop reason: SURG

## 2021-05-28 RX ORDER — ONDANSETRON 2 MG/ML
4 INJECTION INTRAMUSCULAR; INTRAVENOUS
Status: DISCONTINUED | OUTPATIENT
Start: 2021-05-28 | End: 2021-05-28 | Stop reason: HOSPADM

## 2021-05-28 RX ORDER — POTASSIUM CHLORIDE 29.8 MG/ML
INJECTION INTRAVENOUS PRN
Status: DISCONTINUED | OUTPATIENT
Start: 2021-05-28 | End: 2021-05-28 | Stop reason: SURG

## 2021-05-28 RX ORDER — CEFOTETAN DISODIUM 2 G/20ML
INJECTION, POWDER, FOR SOLUTION INTRAMUSCULAR; INTRAVENOUS PRN
Status: DISCONTINUED | OUTPATIENT
Start: 2021-05-28 | End: 2021-05-28 | Stop reason: SURG

## 2021-05-28 RX ORDER — POTASSIUM CHLORIDE 7.45 MG/ML
10 INJECTION INTRAVENOUS ONCE
Status: DISCONTINUED | OUTPATIENT
Start: 2021-05-28 | End: 2021-05-28

## 2021-05-28 RX ORDER — SODIUM CHLORIDE, SODIUM LACTATE, POTASSIUM CHLORIDE, CALCIUM CHLORIDE 600; 310; 30; 20 MG/100ML; MG/100ML; MG/100ML; MG/100ML
INJECTION, SOLUTION INTRAVENOUS CONTINUOUS
Status: DISCONTINUED | OUTPATIENT
Start: 2021-05-28 | End: 2021-05-30

## 2021-05-28 RX ORDER — SUCCINYLCHOLINE/SOD CL,ISO/PF 200MG/10ML
SYRINGE (ML) INTRAVENOUS PRN
Status: DISCONTINUED | OUTPATIENT
Start: 2021-05-28 | End: 2021-05-28 | Stop reason: SURG

## 2021-05-28 RX ORDER — DEXAMETHASONE SODIUM PHOSPHATE 4 MG/ML
INJECTION, SOLUTION INTRA-ARTICULAR; INTRALESIONAL; INTRAMUSCULAR; INTRAVENOUS; SOFT TISSUE PRN
Status: DISCONTINUED | OUTPATIENT
Start: 2021-05-28 | End: 2021-05-28 | Stop reason: SURG

## 2021-05-28 RX ORDER — HYDROMORPHONE HYDROCHLORIDE 1 MG/ML
0.4 INJECTION, SOLUTION INTRAMUSCULAR; INTRAVENOUS; SUBCUTANEOUS
Status: DISCONTINUED | OUTPATIENT
Start: 2021-05-28 | End: 2021-05-28 | Stop reason: HOSPADM

## 2021-05-28 RX ORDER — PHENYLEPHRINE HCL IN 0.9% NACL 0.5 MG/5ML
SYRINGE (ML) INTRAVENOUS PRN
Status: DISCONTINUED | OUTPATIENT
Start: 2021-05-28 | End: 2021-05-28 | Stop reason: SURG

## 2021-05-28 RX ORDER — SODIUM CHLORIDE, SODIUM LACTATE, POTASSIUM CHLORIDE, CALCIUM CHLORIDE 600; 310; 30; 20 MG/100ML; MG/100ML; MG/100ML; MG/100ML
INJECTION, SOLUTION INTRAVENOUS CONTINUOUS
Status: DISCONTINUED | OUTPATIENT
Start: 2021-05-28 | End: 2021-05-28 | Stop reason: HOSPADM

## 2021-05-28 RX ORDER — HYDROMORPHONE HYDROCHLORIDE 1 MG/ML
0.2 INJECTION, SOLUTION INTRAMUSCULAR; INTRAVENOUS; SUBCUTANEOUS
Status: DISCONTINUED | OUTPATIENT
Start: 2021-05-28 | End: 2021-05-28 | Stop reason: HOSPADM

## 2021-05-28 RX ORDER — ROCURONIUM BROMIDE 10 MG/ML
INJECTION, SOLUTION INTRAVENOUS PRN
Status: DISCONTINUED | OUTPATIENT
Start: 2021-05-28 | End: 2021-05-28 | Stop reason: SURG

## 2021-05-28 RX ORDER — MIDAZOLAM HYDROCHLORIDE 1 MG/ML
INJECTION INTRAMUSCULAR; INTRAVENOUS PRN
Status: DISCONTINUED | OUTPATIENT
Start: 2021-05-28 | End: 2021-05-28 | Stop reason: SURG

## 2021-05-28 RX ORDER — DEXTROSE MONOHYDRATE 25 G/50ML
50 INJECTION, SOLUTION INTRAVENOUS
Status: DISCONTINUED | OUTPATIENT
Start: 2021-05-28 | End: 2021-06-01

## 2021-05-28 RX ORDER — LIDOCAINE HYDROCHLORIDE 20 MG/ML
INJECTION, SOLUTION EPIDURAL; INFILTRATION; INTRACAUDAL; PERINEURAL PRN
Status: DISCONTINUED | OUTPATIENT
Start: 2021-05-28 | End: 2021-05-28 | Stop reason: SURG

## 2021-05-28 RX ORDER — MEPERIDINE HYDROCHLORIDE 25 MG/ML
12.5 INJECTION INTRAMUSCULAR; INTRAVENOUS; SUBCUTANEOUS
Status: DISCONTINUED | OUTPATIENT
Start: 2021-05-28 | End: 2021-05-28 | Stop reason: HOSPADM

## 2021-05-28 RX ORDER — HYDROMORPHONE HYDROCHLORIDE 1 MG/ML
0.1 INJECTION, SOLUTION INTRAMUSCULAR; INTRAVENOUS; SUBCUTANEOUS
Status: DISCONTINUED | OUTPATIENT
Start: 2021-05-28 | End: 2021-05-28 | Stop reason: HOSPADM

## 2021-05-28 RX ORDER — POTASSIUM CHLORIDE 7.45 MG/ML
10 INJECTION INTRAVENOUS
Status: DISPENSED | OUTPATIENT
Start: 2021-05-28 | End: 2021-05-28

## 2021-05-28 RX ORDER — SODIUM CHLORIDE, SODIUM GLUCONATE, SODIUM ACETATE, POTASSIUM CHLORIDE AND MAGNESIUM CHLORIDE 526; 502; 368; 37; 30 MG/100ML; MG/100ML; MG/100ML; MG/100ML; MG/100ML
INJECTION, SOLUTION INTRAVENOUS
Status: DISCONTINUED | OUTPATIENT
Start: 2021-05-28 | End: 2021-05-28 | Stop reason: SURG

## 2021-05-28 RX ORDER — HYDROMORPHONE HYDROCHLORIDE 1 MG/ML
1 INJECTION, SOLUTION INTRAMUSCULAR; INTRAVENOUS; SUBCUTANEOUS
Status: DISCONTINUED | OUTPATIENT
Start: 2021-05-28 | End: 2021-06-03

## 2021-05-28 RX ORDER — SODIUM CHLORIDE 9 MG/ML
INJECTION, SOLUTION INTRAVENOUS
Status: DISCONTINUED | OUTPATIENT
Start: 2021-05-28 | End: 2021-05-28 | Stop reason: SURG

## 2021-05-28 RX ORDER — METOPROLOL TARTRATE 1 MG/ML
5 INJECTION, SOLUTION INTRAVENOUS
Status: DISCONTINUED | OUTPATIENT
Start: 2021-05-28 | End: 2021-05-29

## 2021-05-28 RX ADMIN — FENTANYL CITRATE 50 MCG: 50 INJECTION, SOLUTION INTRAMUSCULAR; INTRAVENOUS at 09:52

## 2021-05-28 RX ADMIN — ROCURONIUM BROMIDE 20 MG: 10 INJECTION, SOLUTION INTRAVENOUS at 08:35

## 2021-05-28 RX ADMIN — SODIUM CHLORIDE: 9 INJECTION, SOLUTION INTRAVENOUS at 08:15

## 2021-05-28 RX ADMIN — FAMOTIDINE 20 MG: 10 INJECTION INTRAVENOUS at 17:10

## 2021-05-28 RX ADMIN — POVIDONE IODINE 15 ML: 100 SOLUTION TOPICAL at 07:30

## 2021-05-28 RX ADMIN — ONDANSETRON 4 MG: 2 INJECTION INTRAMUSCULAR; INTRAVENOUS at 09:03

## 2021-05-28 RX ADMIN — FENTANYL CITRATE 50 MCG: 50 INJECTION, SOLUTION INTRAMUSCULAR; INTRAVENOUS at 09:59

## 2021-05-28 RX ADMIN — FENTANYL CITRATE 100 MCG: 50 INJECTION, SOLUTION INTRAMUSCULAR; INTRAVENOUS at 08:19

## 2021-05-28 RX ADMIN — CEFOTETAN DISODIUM 2 G: 2 INJECTION, POWDER, FOR SOLUTION INTRAMUSCULAR; INTRAVENOUS at 08:19

## 2021-05-28 RX ADMIN — MIDAZOLAM HYDROCHLORIDE 1 MG: 1 INJECTION, SOLUTION INTRAMUSCULAR; INTRAVENOUS at 08:15

## 2021-05-28 RX ADMIN — DOCUSATE SODIUM 100 MG: 100 CAPSULE, LIQUID FILLED ORAL at 05:07

## 2021-05-28 RX ADMIN — POTASSIUM CHLORIDE 20 MEQ: 14.9 INJECTION, SOLUTION INTRAVENOUS at 12:25

## 2021-05-28 RX ADMIN — Medication 100 MCG: at 08:43

## 2021-05-28 RX ADMIN — SUGAMMADEX 200 MG: 100 INJECTION, SOLUTION INTRAVENOUS at 09:46

## 2021-05-28 RX ADMIN — POTASSIUM CHLORIDE 10 MEQ: 7.46 INJECTION, SOLUTION INTRAVENOUS at 07:45

## 2021-05-28 RX ADMIN — PIPERACILLIN AND TAZOBACTAM 3.38 G: 3; .375 INJECTION, POWDER, LYOPHILIZED, FOR SOLUTION INTRAVENOUS; PARENTERAL at 12:24

## 2021-05-28 RX ADMIN — PIPERACILLIN AND TAZOBACTAM 3.38 G: 3; .375 INJECTION, POWDER, LYOPHILIZED, FOR SOLUTION INTRAVENOUS; PARENTERAL at 22:31

## 2021-05-28 RX ADMIN — PIPERACILLIN AND TAZOBACTAM 3.38 G: 3; .375 INJECTION, POWDER, LYOPHILIZED, FOR SOLUTION INTRAVENOUS; PARENTERAL at 15:49

## 2021-05-28 RX ADMIN — Medication 200 MCG: at 08:21

## 2021-05-28 RX ADMIN — SODIUM CHLORIDE, POTASSIUM CHLORIDE, SODIUM LACTATE AND CALCIUM CHLORIDE: 600; 310; 30; 20 INJECTION, SOLUTION INTRAVENOUS at 12:25

## 2021-05-28 RX ADMIN — FAMOTIDINE 20 MG: 20 TABLET ORAL at 05:07

## 2021-05-28 RX ADMIN — MAGNESIUM HYDROXIDE 30 ML: 400 SUSPENSION ORAL at 05:07

## 2021-05-28 RX ADMIN — Medication 100 MCG: at 08:25

## 2021-05-28 RX ADMIN — HYDROMORPHONE HYDROCHLORIDE 1 MG: 1 INJECTION, SOLUTION INTRAMUSCULAR; INTRAVENOUS; SUBCUTANEOUS at 19:55

## 2021-05-28 RX ADMIN — SODIUM CHLORIDE, POTASSIUM CHLORIDE, SODIUM LACTATE AND CALCIUM CHLORIDE: 600; 310; 30; 20 INJECTION, SOLUTION INTRAVENOUS at 07:42

## 2021-05-28 RX ADMIN — POTASSIUM CHLORIDE 10 MEQ: 149 INJECTION, SOLUTION, CONCENTRATE INTRAVENOUS at 08:53

## 2021-05-28 RX ADMIN — SODIUM CHLORIDE, POTASSIUM CHLORIDE, SODIUM LACTATE AND CALCIUM CHLORIDE: 600; 310; 30; 20 INJECTION, SOLUTION INTRAVENOUS at 09:47

## 2021-05-28 RX ADMIN — HYDROMORPHONE HYDROCHLORIDE 1 MG: 1 INJECTION, SOLUTION INTRAMUSCULAR; INTRAVENOUS; SUBCUTANEOUS at 12:23

## 2021-05-28 RX ADMIN — Medication 200 MCG: at 08:29

## 2021-05-28 RX ADMIN — DEXAMETHASONE SODIUM PHOSPHATE 4 MG: 4 INJECTION, SOLUTION INTRAMUSCULAR; INTRAVENOUS at 08:25

## 2021-05-28 RX ADMIN — PROPOFOL 100 MG: 10 INJECTION, EMULSION INTRAVENOUS at 08:19

## 2021-05-28 RX ADMIN — OXYCODONE HYDROCHLORIDE 10 MG: 10 TABLET ORAL at 04:16

## 2021-05-28 RX ADMIN — SODIUM CHLORIDE, SODIUM GLUCONATE, SODIUM ACETATE, POTASSIUM CHLORIDE AND MAGNESIUM CHLORIDE: 526; 502; 368; 37; 30 INJECTION, SOLUTION INTRAVENOUS at 09:03

## 2021-05-28 RX ADMIN — Medication 100 MCG: at 09:04

## 2021-05-28 RX ADMIN — Medication 70 MG: at 08:19

## 2021-05-28 RX ADMIN — HYDROMORPHONE HYDROCHLORIDE 1 MG: 1 INJECTION, SOLUTION INTRAMUSCULAR; INTRAVENOUS; SUBCUTANEOUS at 23:05

## 2021-05-28 RX ADMIN — LIDOCAINE HYDROCHLORIDE 60 MG: 20 INJECTION, SOLUTION EPIDURAL; INFILTRATION; INTRACAUDAL; PERINEURAL at 08:19

## 2021-05-28 RX ADMIN — POTASSIUM CHLORIDE 10 MEQ: 149 INJECTION, SOLUTION, CONCENTRATE INTRAVENOUS at 07:50

## 2021-05-28 RX ADMIN — ROCURONIUM BROMIDE 10 MG: 10 INJECTION, SOLUTION INTRAVENOUS at 08:19

## 2021-05-28 ASSESSMENT — PAIN DESCRIPTION - PAIN TYPE
TYPE: ACUTE PAIN
TYPE: ACUTE PAIN
TYPE: ACUTE PAIN;SURGICAL PAIN
TYPE: ACUTE PAIN;SURGICAL PAIN
TYPE: ACUTE PAIN
TYPE: ACUTE PAIN;SURGICAL PAIN
TYPE: ACUTE PAIN;SURGICAL PAIN
TYPE: ACUTE PAIN
TYPE: ACUTE PAIN;SURGICAL PAIN
TYPE: ACUTE PAIN;SURGICAL PAIN
TYPE: ACUTE PAIN
TYPE: SURGICAL PAIN;ACUTE PAIN

## 2021-05-28 ASSESSMENT — PAIN SCALES - GENERAL: PAIN_LEVEL: 3

## 2021-05-28 ASSESSMENT — FIBROSIS 4 INDEX: FIB4 SCORE: 6.3

## 2021-05-28 ASSESSMENT — ENCOUNTER SYMPTOMS: ABDOMINAL PAIN: 1

## 2021-05-28 NOTE — ANESTHESIA PREPROCEDURE EVALUATION
49 yo s/p exlap w/increasing lactic acid, thrombocytopenia and increasing ascites concerning for anastamosis leak.    Relevant Problems   ANESTHESIA (within normal limits)      NEURO   (positive) History of kidney stones      CARDIAC   (positive) Intractable migraine with aura without status migrainosus      Other   (positive) Cecal volvulus (HCC)     Denies CAD, CP/SOB, HTN, DM, CVA, LUNG/LIVER/KIDNEY DZ, URI    Physical Exam    Airway   Mallampati: II  TM distance: >3 FB  Neck ROM: full       Cardiovascular   Rhythm: regular  Rate: abnormal     Dental - normal exam           Pulmonary   Breath sounds clear to auscultation     Abdominal     Comments: distended   Neurological - normal exam                 Anesthesia Plan    ASA 4 (acute peritonitis w/worsening labs including severe thrombocytopenia)- EMERGENT   ASA physical status emergent criteria: acute peritonitis    Plan - general       Airway plan will be ETT    (Unit of platelets infused prior to OR and one unit on hold.  Blood on hold.  Replace potassium.)      Induction: intravenous    Postoperative Plan: Postoperative administration of opioids is intended.    Pertinent diagnostic labs and testing reviewed    Informed Consent:    Anesthetic plan and risks discussed with patient.    Use of blood products discussed with: patient whom consented to blood products.

## 2021-05-28 NOTE — CARE PLAN
Problem: Knowledge Deficit - Standard  Goal: Patient and family/care givers will demonstrate understanding of plan of care, disease process/condition, diagnostic tests and medications  Outcome: Progressing     Problem: Pain - Standard  Goal: Alleviation of pain or a reduction in pain to the patient’s comfort goal  Outcome: Progressing     Problem: Depression  Goal: Patient and family/caregiver will verbalize accurate information about at least two of the possible causes of depression, three-four of the signs and symptoms of depression  Outcome: Progressing   The patient is Watcher - Medium risk of patient condition declining or worsening    Shift Goals  Clinical Goals: Pain management monitor digestive system  Patient Goals: pain control    Progress made toward(s) clinical / shift goals:  pain managed pt tolerated diet    Patient is not progressing towards the following goals:

## 2021-05-28 NOTE — ANESTHESIA TIME REPORT
Anesthesia Start and Stop Event Times     Date Time Event    5/28/2021 0736 Ready for Procedure     0814 Anesthesia Start     1002 Anesthesia Stop        Responsible Staff  05/28/21    Name Role Begin End    Lizzie Hernandez M.D. Anesth 0814 1002        Preop Diagnosis (Free Text):  Pre-op Diagnosis     EXPLORATORY LAPAROTOMY FOR BOWEL RESECTION         Preop Diagnosis (Codes):    Post op Diagnosis  Anastomotic leak of intestine      Premium Reason  Non-Premium    Comments:

## 2021-05-28 NOTE — PROGRESS NOTES
Skin not WDL. Surgical incision to abdomen with wound vac placement. All bony prominences intact. Patient encouraged to turn frequently.

## 2021-05-28 NOTE — ANESTHESIA PROCEDURE NOTES
Airway    Date/Time: 5/28/2021 8:20 AM  Performed by: Lizzie Hernandez M.D.  Authorized by: Lizzie Hernandez M.D.     Location:  OR  Urgency:  Elective  Indications for Airway Management:  Anesthesia      Spontaneous Ventilation: absent    Sedation Level:  Deep  Preoxygenated: Yes    Patient Position:  Sniffing  Mask Difficulty Assessment:  0 - not attempted  Final Airway Type:  Endotracheal airway  Final Endotracheal Airway:  ETT  Cuffed: Yes    Technique Used for Successful ETT Placement:  Direct laryngoscopy  Devices/Methods Used in Placement:  Cricoid pressure and intubating stylet    Insertion Site:  Oral  Blade Type:  Mor  Laryngoscope Blade/Videolaryngoscope Blade Size:  3  ETT Size (mm):  7.0  Measured from:  Teeth  ETT to Teeth (cm):  21  Placement Verified by: auscultation and capnometry    Cormack-Lehane Classification:  Grade I - full view of glottis  Number of Attempts at Approach:  1

## 2021-05-28 NOTE — ASSESSMENT & PLAN NOTE
5/22 anastamosis but developed bacterial peritonitis and repeat exlap on 5/28 with redo of anastomosis due to impending leak at staple line  ----  Wound care: Wound vac dc'ed 6/1, Wound care consult ordered  Abx: Zosyn day 5, difficult IV access, PICC order placed when inserted will remove central line. Plan for 14 day total abx given repeat exlap and complicated surgical course  Pain control: dc ivf, fentanyl, start ketorolac, cont dilaudid and morphine PRN  Other: S/p 1 unit PRBC 5/30, responded appropriately  Nutrition: NGT removed, Tolerating CLD, dc mIVF, change ISS to AC/QHS  Bowel: + BS, No stool

## 2021-05-28 NOTE — CARE PLAN
The patient is Watcher - Medium risk of patient condition declining or worsening    Shift Goals  Clinical Goals: Pain management, No nausea, hgb to remain stable  Patient Goals: Pain control, reposition self every 2 hrs    Progress made toward(s) clinical / shift goals:  Patient oxygen titrated down to 1L.    Patient is not progressing towards the following goals:      Problem: Early Mobilization - Post Surgery  Goal: Early mobilization post surgery  Outcome: Not Progressing     Problem: Wound/ / Incision Healing  Goal: Patient's wound/surgical incision will decrease in size and heals properly  Outcome: Not Progressing   Wound vac in place, C/D/I  Problem: Bowel Elimination - Post Surgical  Goal: Patient will resume regular bowel sounds and function with no discomfort or distention  Outcome: Not Progressing   BS absent, Dr. Wood aware. Patient denies nausea, NG to LIWS  Problem: Early Mobilization - Post Surgery  Goal: Early mobilization post surgery  Outcome: Not Progressing     Problem: Wound/ / Incision Healing  Goal: Patient's wound/surgical incision will decrease in size and heals properly  Outcome: Not Progressing   POD 1   Problem: Bowel Elimination - Post Surgical  Goal: Patient will resume regular bowel sounds and function with no discomfort or distention  Outcome: Not Progressing     Problem: Respiratory/Oxygenation Function Post-Surgical  Goal: Patient will achieve/maintain normal respiratory rate/effort  Outcome: Progressing   Patient encouraged to use IS Q 10x/ hr  Problem: Pain - Post Surgery  Goal: Alleviation or reduction of pain post surgery  Outcome: Progressing     Problem: Respiratory  Goal: Patient will achieve/maintain optimum respiratory ventilation and gas exchange  Outcome: Progressing

## 2021-05-28 NOTE — PROGRESS NOTES
Patient arrived from PACU on 10 L mask, no acute respiratory distress noted. NG in right nare to LIWS. Patient awakens to voice, A&O x4, moves all extremities. Young in place draining clear yellow. POC discussed, all safety measures in place including hourly rounding. Wound vac in place, dressing clean, dry, intact. Right IJ where previous attempts were made for central line placement assessed no hematoma noted.  All ICU monitoring in place.

## 2021-05-28 NOTE — ASSESSMENT & PLAN NOTE
From sepsis secondary to the bacterial peritonitis  Platelets remain low  HIT Ab panel negative  SCDs only

## 2021-05-28 NOTE — OP REPORT
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     POSTOPERATIVE DIAGNOSES:  Bacterial peritonitis with impending anastomotic   leak.     PROCEDURES:  Exploratory celiotomy, resection of ileocolic anastomosis with re-do  side-to-side ileocolic anastomosis.     SURGEON:  Maryam Wood MD     ASSISTANT:  GLADYS Siegel     ANESTHESIA:  General endotracheal.     ANESTHESIOLOGIST:  Lizzie Hernandez MD     INDICATIONS:  The patient is a 50-year-old female who presented 5 days ago   with a cecal volvulus.  She underwent a right hemicolectomy and a side-to-side   ileocolic anastomosis.  Over the last 36 hours, she has become increasingly   thrombocytopenic.  A CT scan done last night demonstrated ascites, but no   yessy leak.  She has had ongoing thrombocytopenia this morning.  She is being   returned to the operating room for exploration.The indications for a surgical assistant in this surgery were indicated due to complexity of the procedure. Their role included aiding in incision, retraction, holding devices   and closure of the wound.        FINDINGS:  Infected peritoneal fluid and mobilization anastomosis.  There was   no obvious leak; however, as it was mobilized, there was evidence that there   was impending separation of the staple line along the anastomosis.  In light   of this, it was resected and a new ileocolic anastomosis was performed in   side-to-side fashion.  Cultures were sent.     DESCRIPTION OF PROCEDURE:  After the patient was identified and consented, she   was brought to the operating room and placed in supine position.  The patient   underwent general endotracheal anesthetic clearance.  Her midline incision   was reopened.  Upon entering the abdominal cavity, the aforementioned findings   were noted.  The cultures were sent.  The bowel was eviscerated.  As the   bowel was being mobilized in the right  upper quadrant, it was evident that the   staple line was going to start  and most likely was feeding the   abdomen with bacteria. Because of this, it was elected to resect that and redo   the anastomosis.  It was transected proximally and distally with a NICKI   stapler.  Mesentery was taken down sequentially with LigaSure device.  A new   side-to-side ileocolic anastomosis was performed approximately just distal to   the mid transverse colon.  This was done in a side-to-side fashion with   staples for the ____ anastomosis as well as serosal anastomosis that was   reinforced with 3-0 silks.  Mesenteric rent was also closed with 3-0 silks.    The bowel was returned to abdominal cavity.  Abdomen was copiously irrigated.    Seprafilm was placed.  Incision was closed with #1 Vicryl for the fascia.    Skin was closed with staples.  The patient was extubated and taken to recovery   room in stable condition.  All sponge and needle counts were correct.        ______________________________  NEVA KAY MD    [facility]    DD:  05/28/2021 09:08  DT:  05/28/2021 09:58    Job#:  [id number]    CC:LOUIE TERAN MD

## 2021-05-28 NOTE — PROGRESS NOTES
0984 Patient to PACU from OR on hospital bed with side rails up for safety, accompanied by RN and anesthesia. Identity and allergies verified. Bed locked and in low position. OPA in place 6L 02 mask on, respirations spontaneous and unlabored. VSS. SCD's on and functioning appropriately. 2+ pulses BLE. Surgical dressing to midline abdomen CDI, Pravana wound vac in place. Young in place with clear yellow urine. Central line placed to left jugular, dressing CDI. Labs drawn off central line per dr. Hernandez. XRAY at bedside to confirm placement. NG tube to right nare, to suction.    1010 Patient resting comfortably denies pain or nausea    1025 Patient resting    1040 Patient denies pain or nausea.    1050 Patient transferred to ICU with RN. Report to helio mane

## 2021-05-28 NOTE — CONSULTS
Pulmonary Consultation    Date of consult: 2021    Referring Physician  Maryam Wood M.D.    Reason for Consultation  sepsis    History of Presenting Illness  50 y.o. female who presented 2021 with abdominal pain found to have a cecal volvulous.  She underwent a right hemicolectomy  and was doing well, passing flatus and taking po but then developed marked thrombocytopenia which went down to 12k. On  a CT scan was done that showed ascites. Underwent an exploratory lap and found to have Bacterial peritonitis with impending anastomotic   Leak. Due to the impending separation of the staple line, a new ileocolic anastamosis was performed.  Pt has a wound vac. Extubated without event and transferred to icu for monitoring.  She received two packs platelets and 20 meq of kcl  Repeat lytes done and replaced  1 l NC  On zosyn for the bacterial peritonitis    Code Status  Full Code    Review of Systems  Review of Systems   Constitutional: Positive for malaise/fatigue.   Gastrointestinal: Positive for abdominal pain.   Pt still quite sleepy as post op so unable to get full ROS    Past Medical History   has a past medical history of Anxiety (2009). She also has no past medical history of Breast cancer (HCC).    Surgical History   has a past surgical history that includes other (); appendectomy (); pr  delivery only (); shoulder arthroscopy w/ bicipital tenodesis repair (Right, 6/15/2015); clavicle distal excision (Right, 6/15/2015); shoulder decompression (Right, 6/15/2015); shoulder decompression arthroscopic (Right, 2018); clavicle distal excision (Left, 2018); shoulder arthroscopy w/ bicipital tenodesis repair (Left, 2018); shoulder manipulation (Left, 2018); pr exploratory of abdomen (2021); hemicolectomy, right (2021); and pr exploratory of abdomen (N/A, 2021).    Family History  family history includes Cancer in her maternal grandmother;  Diabetes in her mother; Hypertension in her mother.    Social History   reports that she quit smoking about 16 years ago. She quit after 15.00 years of use. She has never used smokeless tobacco. She reports current drug use. Drugs: Inhaled and Marijuana. She reports that she does not drink alcohol.    Medications  Home Medications     Reviewed by Piedad Taveras R.N. (Registered Nurse) on 05/28/21 at 0726  Med List Status: Complete   Medication Last Dose Status   bisacodyl (DULCOLAX) suppository 10 mg  Active   diazePAM (VALIUM) injection 5 mg  Active   docusate sodium (COLACE) capsule 100 mg  Active   DULoxetine (CYMBALTA) 60 MG Cap DR Particles delayed-release capsule 5/21/2021 Active   enoxaparin (LOVENOX) inj 40 mg  Active   famotidine (PEPCID) injection 20 mg  Active   famotidine (PEPCID) tablet 20 mg  Active   fleet enema 133 mL  Active   HYDROcodone/acetaminophen (NORCO)  MG Tab 5/22/2021 Active   hydrOXYzine HCl (ATARAX) 25 MG Tab  Active   ibuprofen (MOTRIN) tablet 800 mg  Active   lactated ringers infusion  Active   LORazepam (ATIVAN) injection 0.5 mg  Active   magnesium hydroxide (MILK OF MAGNESIA) suspension 30 mL  Active   metoclopramide (REGLAN) injection 10 mg  Active   morphine (pf) 4 mg/mL injection 4 mg  Active   norethindrone (SHIELA) 0.35 MG tablet 5/21/2021 Active   ondansetron (ZOFRAN) syringe/vial injection 4 mg  Active   oxyCODONE immediate release (ROXICODONE) tablet 10 mg  Active   oxyCODONE immediate-release (ROXICODONE) tablet 5 mg  Active   Pharmacy Consult Request ...Pain Management Review 1 Each  Active   polyethylene glycol/lytes (MIRALAX) PACKET 1 Packet  Active   potassium chloride (KCL) ivpb 10 mEq  Active   povidone-iodine (Betadine) 0.23% oral solution 15 mL  Active   Respiratory Therapy Consult  Active   senna-docusate (PERICOLACE or SENOKOT S) 8.6-50 MG per tablet 1 tablet  Active   senna-docusate (PERICOLACE or SENOKOT S) 8.6-50 MG per tablet 1 tablet  Active    topiramate (TOPAMAX) 100 MG Tab 5/21/2021 Active              Current Facility-Administered Medications   Medication Dose Route Frequency Provider Last Rate Last Admin   • piperacillin-tazobactam (ZOSYN) 3.375 g in  mL IVPB  3.375 g Intravenous Q8HRS Maryam Wood M.D. 25 mL/hr at 05/28/21 1549 3.375 g at 05/28/21 1549   • MD Alert...ICU Electrolyte Replacement per Pharmacy   Other PHARMACY TO DOSE Jeanette Nolasco M.D.       • HYDROmorphone (Dilaudid) injection 1 mg  1 mg Intravenous Q3HRS PRN Jeanette Nolasco M.D.   1 mg at 05/28/21 1223   • fentaNYL (SUBLIMAZE) injection 50 mcg  50 mcg Intravenous Q2HRS PRN Jeanette Nolasco M.D.       • lactated ringers infusion   Intravenous Continuous Jeanette Nolasco M.D. 75 mL/hr at 05/28/21 1225 New Bag at 05/28/21 1225   • Pharmacy Consult: HIT Monitoring   Other PRN Jeanette Nolasco M.D.       • insulin regular (HumuLIN R,NovoLIN R) injection  1-6 Units Subcutaneous Q6HRS Jeanette Nolasco M.D.        And   • glucose 4 g chewable tablet 16 g  16 g Oral Q15 MIN PRN Jeanette Nolasco M.D.        And   • dextrose 50% (D50W) injection 50 mL  50 mL Intravenous Q15 MIN PRN Jeanette Nolasco M.D.       • LORazepam (ATIVAN) injection 0.5 mg  0.5 mg Intravenous Q4HRS PRN Christina Herman, A.P.N.       • morphine (pf) 4 mg/mL injection 4 mg  4 mg Intravenous Q3HRS PRN Alda Doe M.D.   4 mg at 05/27/21 1809   • diazePAM (VALIUM) injection 5 mg  5 mg Intravenous Q6HRS PRN Maryam Wood M.D.   5 mg at 05/27/21 2015   • Respiratory Therapy Consult   Nebulization Continuous RT Maryam Wood M.D.       • Pharmacy Consult Request ...Pain Management Review 1 Each  1 Each Other PHARMACY TO DOSE Maryam Wood M.D.       • docusate sodium (COLACE) capsule 100 mg  100 mg Oral BID Maryam Wood M.D.   100 mg at 05/28/21 0507   • senna-docusate (PERICOLACE or SENOKOT S) 8.6-50 MG per tablet 1 tablet  1 tablet Oral Nightly  Maryam Wood M.D.   1 tablet at 05/24/21 2030   • senna-docusate (PERICOLACE or SENOKOT S) 8.6-50 MG per tablet 1 tablet  1 tablet Oral Q24HRS PRN Maryam Wood M.D.       • polyethylene glycol/lytes (MIRALAX) PACKET 1 Packet  1 Packet Oral BID Maryam Wood M.D.   1 Packet at 05/26/21 1717   • magnesium hydroxide (MILK OF MAGNESIA) suspension 30 mL  30 mL Oral DAILY Maryam Wood M.D.   30 mL at 05/28/21 0507   • bisacodyl (DULCOLAX) suppository 10 mg  10 mg Rectal Q24HRS PRN Maryam Wood M.D.       • fleet enema 133 mL  1 Each Rectal Once PRN Mrayam Wood M.D.       • ibuprofen (MOTRIN) tablet 800 mg  800 mg Oral TID PRN Maryam Wood M.D.   800 mg at 05/27/21 1405   • famotidine (PEPCID) tablet 20 mg  20 mg Oral BID Maryam Wood M.D.   20 mg at 05/28/21 0507    Or   • famotidine (PEPCID) injection 20 mg  20 mg Intravenous BID Maryam Wood M.D.   20 mg at 05/26/21 0607   • ondansetron (ZOFRAN) syringe/vial injection 4 mg  4 mg Intravenous Q4HRS PRN Maryam Wood M.D.   4 mg at 05/27/21 1812       Allergies  No Known Allergies    Vital Signs last 24 hours  Temp:  [36.1 °C (97 °F)-36.8 °C (98.2 °F)] 36.2 °C (97.2 °F)  Pulse:  [] 108  Resp:  [12-24] 16  BP: ()/() 128/69  SpO2:  [92 %-100 %] 98 %    Physical Exam  Physical Exam  Constitutional:       Appearance: She is ill-appearing.   HENT:      Mouth/Throat:      Mouth: Mucous membranes are moist.   Eyes:      Extraocular Movements: Extraocular movements intact.      Conjunctiva/sclera: Conjunctivae normal.      Pupils: Pupils are equal, round, and reactive to light.   Neck:      Comments: Right sided central line  Cardiovascular:      Rate and Rhythm: Normal rate.   Pulmonary:      Effort: Pulmonary effort is normal.      Breath sounds: Normal breath sounds.   Abdominal:      Palpations: Abdomen is soft.      Comments: Distended with wound vac   Musculoskeletal:      Right lower leg: No edema.      Left lower leg: No  edema.   Skin:     General: Skin is warm and dry.   Neurological:      General: No focal deficit present.      Mental Status: She is alert and oriented to person, place, and time.   Psychiatric:      Comments: Unable to assess         Fluids    Intake/Output Summary (Last 24 hours) at 5/28/2021 1653  Last data filed at 5/28/2021 1600  Gross per 24 hour   Intake 3445.83 ml   Output 1025 ml   Net 2420.83 ml       Laboratory  Recent Results (from the past 48 hour(s))   Magnesium: Every Monday and Thursday AM    Collection Time: 05/27/21  4:14 AM   Result Value Ref Range    Magnesium 2.2 1.5 - 2.5 mg/dL   Phosphorus: Every Monday and Thursday AM    Collection Time: 05/27/21  4:14 AM   Result Value Ref Range    Phosphorus 2.4 (L) 2.5 - 4.5 mg/dL   CRP QUANTITIVE (NON-CARDIAC)    Collection Time: 05/27/21  4:14 AM   Result Value Ref Range    Stat C-Reactive Protein 23.74 (H) 0.00 - 0.75 mg/dL   CBC WITH DIFFERENTIAL    Collection Time: 05/27/21  4:14 AM   Result Value Ref Range    WBC 8.0 4.8 - 10.8 K/uL    RBC 3.38 (L) 4.20 - 5.40 M/uL    Hemoglobin 10.0 (L) 12.0 - 16.0 g/dL    Hematocrit 29.8 (L) 37.0 - 47.0 %    MCV 88.2 81.4 - 97.8 fL    MCH 29.6 27.0 - 33.0 pg    MCHC 33.6 33.6 - 35.0 g/dL    RDW 49.0 35.9 - 50.0 fL    Platelet Count 22 (LL) 164 - 446 K/uL    MPV 11.3 9.0 - 12.9 fL    Neutrophils-Polys 63.00 44.00 - 72.00 %    Lymphocytes 4.00 (L) 22.00 - 41.00 %    Monocytes 4.00 0.00 - 13.40 %    Eosinophils 1.00 0.00 - 6.90 %    Basophils 0.00 0.00 - 1.80 %    Nucleated RBC 0.40 /100 WBC    Neutrophils (Absolute) 6.72 2.00 - 7.15 K/uL    Lymphs (Absolute) 0.32 (L) 1.00 - 4.80 K/uL    Monos (Absolute) 0.32 0.00 - 0.85 K/uL    Eos (Absolute) 0.08 0.00 - 0.51 K/uL    Baso (Absolute) 0.00 0.00 - 0.12 K/uL    NRBC (Absolute) 0.03 K/uL   PROCALCITONIN    Collection Time: 05/27/21  4:14 AM   Result Value Ref Range    Procalcitonin 4.83 (H) <0.25 ng/mL   PLATELET ESTIMATE    Collection Time: 05/27/21  4:14 AM   Result  Value Ref Range    Plt Estimation Marked Decrease    MORPHOLOGY    Collection Time: 05/27/21  4:14 AM   Result Value Ref Range    RBC Morphology Normal     Dohle Bodies Few    IMMATURE PLT FRACTION    Collection Time: 05/27/21  4:14 AM   Result Value Ref Range    Imm. Plt Fraction 7.7 0.6 - 13.1 K/uL   DIFFERENTIAL MANUAL    Collection Time: 05/27/21  4:14 AM   Result Value Ref Range    Bands-Stabs 21.00 (H) 0.00 - 10.00 %    Metamyelocytes 7.00 %    Manual Diff Status PERFORMED    ABO Rh Confirm    Collection Time: 05/27/21 11:14 AM   Result Value Ref Range    ABO Rh Confirm O POS    CRP QUANTITIVE (NON-CARDIAC)    Collection Time: 05/28/21  4:07 AM   Result Value Ref Range    Stat C-Reactive Protein 24.76 (H) 0.00 - 0.75 mg/dL   CBC WITH DIFFERENTIAL    Collection Time: 05/28/21  4:07 AM   Result Value Ref Range    WBC 8.2 4.8 - 10.8 K/uL    RBC 3.36 (L) 4.20 - 5.40 M/uL    Hemoglobin 9.5 (L) 12.0 - 16.0 g/dL    Hematocrit 28.4 (L) 37.0 - 47.0 %    MCV 84.5 81.4 - 97.8 fL    MCH 28.3 27.0 - 33.0 pg    MCHC 33.5 (L) 33.6 - 35.0 g/dL    RDW 46.3 35.9 - 50.0 fL    Platelet Count 12 (LL) 164 - 446 K/uL    Neutrophils-Polys 89.40 (H) 44.00 - 72.00 %    Lymphocytes 2.70 (L) 22.00 - 41.00 %    Monocytes 5.50 0.00 - 13.40 %    Eosinophils 0.90 0.00 - 6.90 %    Basophils 0.40 0.00 - 1.80 %    Immature Granulocytes 1.10 (H) 0.00 - 0.90 %    Nucleated RBC 0.00 /100 WBC    Neutrophils (Absolute) 7.30 (H) 2.00 - 7.15 K/uL    Lymphs (Absolute) 0.22 (L) 1.00 - 4.80 K/uL    Monos (Absolute) 0.45 0.00 - 0.85 K/uL    Eos (Absolute) 0.07 0.00 - 0.51 K/uL    Baso (Absolute) 0.03 0.00 - 0.12 K/uL    Immature Granulocytes (abs) 0.09 0.00 - 0.11 K/uL    NRBC (Absolute) 0.00 K/uL   Comp Metabolic Panel    Collection Time: 05/28/21  4:07 AM   Result Value Ref Range    Sodium 130 (L) 135 - 145 mmol/L    Potassium 2.8 (L) 3.6 - 5.5 mmol/L    Chloride 98 96 - 112 mmol/L    Co2 18 (L) 20 - 33 mmol/L    Anion Gap 14.0 7.0 - 16.0    Glucose 91  65 - 99 mg/dL    Bun 12 8 - 22 mg/dL    Creatinine 0.62 0.50 - 1.40 mg/dL    Calcium 7.8 (L) 8.4 - 10.2 mg/dL    AST(SGOT) 26 12 - 45 U/L    ALT(SGPT) 8 2 - 50 U/L    Alkaline Phosphatase 67 30 - 99 U/L    Total Bilirubin 0.5 0.1 - 1.5 mg/dL    Albumin 1.7 (L) 3.2 - 4.9 g/dL    Total Protein 4.9 (L) 6.0 - 8.2 g/dL    Globulin 3.2 1.9 - 3.5 g/dL    A-G Ratio 0.5 g/dL   COD - Adult (Type and Screen)    Collection Time: 05/28/21  4:07 AM   Result Value Ref Range    ABO Grouping Only O     Rh Grouping Only POS     Antibody Screen-Cod NEG     Component R       R99                 Red Cells, LR       [id number]   selected     05/28/21   07:43      Product Type R99     Dispense Status selected     Unit Number (Barcoded) [id number]     Product Code (Barcoded) V8806Z64     Blood Type (Barcoded) 5100     Component R       R66                 Red Blood Cells6    [id number]   selected     05/28/21   07:43      Product Type Red Blood Cells  LR Pheresis     Dispense Status selected     Unit Number (Barcoded) [id number]     Product Code (Barcoded) O0760L96     Blood Type (Barcoded) 5100    ESTIMATED GFR    Collection Time: 05/28/21  4:07 AM   Result Value Ref Range    GFR If African American >60 >60 mL/min/1.73 m 2    GFR If Non African American >60 >60 mL/min/1.73 m 2   PLATELETS REQUEST    Collection Time: 05/28/21  7:07 AM   Result Value Ref Range    Component P       PTP                 Plts,Pheresis       [id number]   issued       05/28/21   07:39      Product Type Platelets  Pheresis LR     Dispense Status issued     Unit Number (Barcoded) [id number]     Product Code (Barcoded) B7157Q28     Blood Type (Barcoded) 5100     Component P       P1                  Plt Pheresis        [id number]   issued       05/28/21   08:55      Product Type P1     Dispense Status issued     Unit Number (Barcoded) [id number]     Product Code (Barcoded) X7306P96     Blood Type (Barcoded) 6200    CULTURE WOUND W/ GRAM STAIN     Collection Time: 05/28/21  8:41 AM    Specimen: Tissue   Result Value Ref Range    Significant Indicator NEG     Source TISS     Site abdominal wound     Culture Result -     Gram Stain Result -    Histology Request    Collection Time: 05/28/21  9:41 AM   Result Value Ref Range    Pathology Request Sent to Histo    CBC WITH DIFFERENTIAL    Collection Time: 05/28/21 10:18 AM   Result Value Ref Range    WBC 10.5 4.8 - 10.8 K/uL    RBC 2.69 (L) 4.20 - 5.40 M/uL    Hemoglobin 7.7 (L) 12.0 - 16.0 g/dL    Hematocrit 23.5 (L) 37.0 - 47.0 %    MCV 87.4 81.4 - 97.8 fL    MCH 28.6 27.0 - 33.0 pg    MCHC 32.8 (L) 33.6 - 35.0 g/dL    RDW 49.1 35.9 - 50.0 fL    Platelet Count 73 (L) 164 - 446 K/uL    MPV 10.4 9.0 - 12.9 fL    Neutrophils-Polys 71.00 44.00 - 72.00 %    Lymphocytes 2.00 (L) 22.00 - 41.00 %    Monocytes 3.00 0.00 - 13.40 %    Eosinophils 1.00 0.00 - 6.90 %    Basophils 0.00 0.00 - 1.80 %    Nucleated RBC 0.00 /100 WBC    Neutrophils (Absolute) 9.56 (H) 2.00 - 7.15 K/uL    Lymphs (Absolute) 0.21 (L) 1.00 - 4.80 K/uL    Monos (Absolute) 0.32 0.00 - 0.85 K/uL    Eos (Absolute) 0.11 0.00 - 0.51 K/uL    Baso (Absolute) 0.00 0.00 - 0.12 K/uL    NRBC (Absolute) 0.00 K/uL   Comp Metabolic Panel    Collection Time: 05/28/21 10:18 AM   Result Value Ref Range    Sodium 133 (L) 135 - 145 mmol/L    Potassium 3.5 (L) 3.6 - 5.5 mmol/L    Chloride 100 96 - 112 mmol/L    Co2 20 20 - 33 mmol/L    Anion Gap 13.0 7.0 - 16.0    Glucose 89 65 - 99 mg/dL    Bun 12 8 - 22 mg/dL    Creatinine 0.75 0.50 - 1.40 mg/dL    Calcium 7.1 (L) 8.4 - 10.2 mg/dL    AST(SGOT) 22 12 - 45 U/L    ALT(SGPT) 8 2 - 50 U/L    Alkaline Phosphatase 51 30 - 99 U/L    Total Bilirubin 0.4 0.1 - 1.5 mg/dL    Albumin 1.8 (L) 3.2 - 4.9 g/dL    Total Protein 4.2 (L) 6.0 - 8.2 g/dL    Globulin 2.4 1.9 - 3.5 g/dL    A-G Ratio 0.8 g/dL   ESTIMATED GFR    Collection Time: 05/28/21 10:18 AM   Result Value Ref Range    GFR If African American >60 >60 mL/min/1.73 m 2     GFR If Non African American >60 >60 mL/min/1.73 m 2   DIFFERENTIAL MANUAL    Collection Time: 05/28/21 10:18 AM   Result Value Ref Range    Bands-Stabs 20.00 (H) 0.00 - 10.00 %    Metamyelocytes 3.00 %    Manual Diff Status PERFORMED    PLATELET ESTIMATE    Collection Time: 05/28/21 10:18 AM   Result Value Ref Range    Plt Estimation Decreased    MORPHOLOGY    Collection Time: 05/28/21 10:18 AM   Result Value Ref Range    RBC Morphology Normal     Toxic Gran Moderate     Dohle Bodies Few    MAGNESIUM    Collection Time: 05/28/21 10:18 AM   Result Value Ref Range    Magnesium 1.9 1.5 - 2.5 mg/dL   PHOSPHORUS    Collection Time: 05/28/21 10:18 AM   Result Value Ref Range    Phosphorus 3.7 2.5 - 4.5 mg/dL   POCT glucose device results    Collection Time: 05/28/21  4:12 PM   Result Value Ref Range    Glucose - Accu-Ck 83 65 - 99 mg/dL       Imaging  cxr personally viewed left IJ crosses midline, b/l infiltartes    Assessment/Plan  Cecal volvulus (HCC)- (present on admission)  Assessment & Plan  5/22 anastamosis but developed bacterial peritonitis and repeat exlap on 5/28 with redo of anastomosis due to impending leak at staple line  Wound vac  Trend CB q 8 * 4 following platelets  NPO  LR at 75 CC/hr    Thrombocytopenia (HCC)  Assessment & Plan  From sepsis secondary to the bacterial peritonitis  Will check platelets q 8  Check HIT Ab as she was on lovenox      Bacterial peritonitis (HCC)  Assessment & Plan  S/p exlap  Zosyn day 1  Follow up cultures      Discussed patient condition and risk of morbidity and/or mortality with Charge nurse / hot rounds and and DR. Wood.    The patient remains critically ill.  Critical care time = 33 minutes in directly providing and coordinating critical care and extensive data review.  No time overlap and excludes procedures.

## 2021-05-28 NOTE — ANESTHESIA PROCEDURE NOTES
Peripheral IV    Date/Time: 5/28/2021 7:45 AM  Performed by: Lizzie Hernandez M.D.  Authorized by: Lizzie Hernandez M.D.     Size:  20 G  Laterality:  Right  Local Anesthetic:  Lidocaine 1%  Site Prep:  Alcohol and chlorhexidine  Technique:  Direct puncture  Attempts:  1

## 2021-05-28 NOTE — ANESTHESIA PROCEDURE NOTES
Central Venous Line  Performed by: Lizzie Hernandez M.D.  Authorized by: Lizzie Hernandez M.D.     Start Time:  5/28/2021 9:10 AM  End Time:  5/28/2021 9:35 AM  Patient Location:  OR  Indication: central venous access        provider hand hygiene performed prior to central venous catheter insertion, all 5 sterile barriers used (gloves, gown, cap, mask, large sterile drape) during central venous catheter insertion and skin prep agent completely dried prior to procedure    Patient Position:  Trendelenburg  Laterality:  Left  Site:  Internal jugular  Prep:  Chlorhexidine  Number of Lumens:  Triple lumen  target vein identified, needle advanced into vein and blood aspirated and guidewire advanced into vein    Seldinger Technique?: Yes    Ultrasound-Guided: ultrasound-guided  Image captured, interpreted and electronically stored.  Sterile Gel and Probe Cover Used for Ultrasound?: Yes    Intravenous Verification: verified by ultrasound, venous blood return and chest x-ray pending    all ports aspirated, all ports flushed easily, guidewire was removed intact, biopatch was applied, line was sutured in place and dressing was applied    Events: patient tolerated procedure well with no complications     Initial attempt on right IJ.  Although able to access vein multiple times, was unable to thread catheter.

## 2021-05-28 NOTE — ASSESSMENT & PLAN NOTE
S/p exlap  Zosyn day 5, plan for 14 day course  Polymicrobial- B fragilis, actinomyces odontolyticus and enterecoccus faecalis  Wbc normal, no fever  DC central line once PICC placed

## 2021-05-28 NOTE — OR NURSING
0702 Patient allergies and NPO status verified. Patient verbalizes understanding of pain scale, expected course of stay and plan of care. Surgical site verified with patient. IV assessed for patency, sequentials placed on BLE.   0745 Pt ambulates to restroom with CNA present voids successful.   0755 Pt diaphoretic and pale post ambulation cool cloth provided.     0800 Per verbal MD order pt to receive 1 unit platelets stat, platelets given by 2 RN verification and MD present.       0813 Pt to surgery prior to VS re-check post blood administration, per MD will re-check once in OR suite.

## 2021-05-28 NOTE — ANESTHESIA POSTPROCEDURE EVALUATION
Patient: Mari Dillon    Procedure Summary     Date: 05/28/21 Room / Location:  OR  / SURGERY [facility]    Anesthesia Start: 0814 Anesthesia Stop: 1002    Procedure: LAPAROTOMY, EXPLORATORY- RESECTION OF ILEOCECAL ANASTATMOSIS. (N/A Abdomen) Diagnosis: (EXPLORATORY LAPAROTOMY FOR BOWEL RESECTION )    Surgeons: Maryam Wood M.D. Responsible Provider: Lizzie Hernandez M.D.    Anesthesia Type: general ASA Status: 4 - Emergent          Final Anesthesia Type: general  Last vitals  BP   Blood Pressure: 105/70    Temp   36.2 °C (97.2 °F)    Pulse   (!) 107   Resp   17    SpO2   99 %      Anesthesia Post Evaluation    Patient location during evaluation: PACU  Patient participation: complete - patient participated  Level of consciousness: awake  Pain score: 3    Airway patency: patent  Anesthetic complications: no  Cardiovascular status: adequate  Respiratory status: acceptable  Hydration status: acceptable    PONV: none          No complications documented.     Nurse Pain Score: 8 (NPRS)

## 2021-05-28 NOTE — PROGRESS NOTES
PT with thrombocytopenia yesterday  Was stooling and passing flatus.  CT finally completed last PM showed ascites (discussed with radiology) fluid with low density but unclear source  Ongoing thrombocytopenia this am  Plan ex lap to evaluate bowel.

## 2021-05-29 PROBLEM — I48.91 ATRIAL FIBRILLATION WITH RVR (HCC): Status: ACTIVE | Noted: 2021-05-29

## 2021-05-29 LAB
ALBUMIN SERPL BCP-MCNC: 1.7 G/DL (ref 3.2–4.9)
ALBUMIN SERPL BCP-MCNC: 1.9 G/DL (ref 3.2–4.9)
ALBUMIN/GLOB SERPL: 0.7 G/DL
ALBUMIN/GLOB SERPL: 0.7 G/DL
ALP SERPL-CCNC: 54 U/L (ref 30–99)
ALP SERPL-CCNC: 57 U/L (ref 30–99)
ALT SERPL-CCNC: 11 U/L (ref 2–50)
ALT SERPL-CCNC: 11 U/L (ref 2–50)
ANION GAP SERPL CALC-SCNC: 12 MMOL/L (ref 7–16)
ANION GAP SERPL CALC-SCNC: 13 MMOL/L (ref 7–16)
AST SERPL-CCNC: 31 U/L (ref 12–45)
AST SERPL-CCNC: 32 U/L (ref 12–45)
BASOPHILS # BLD AUTO: 0 % (ref 0–1.8)
BASOPHILS # BLD: 0 K/UL (ref 0–0.12)
BILIRUB SERPL-MCNC: 0.5 MG/DL (ref 0.1–1.5)
BILIRUB SERPL-MCNC: 0.5 MG/DL (ref 0.1–1.5)
BUN SERPL-MCNC: 12 MG/DL (ref 8–22)
BUN SERPL-MCNC: 14 MG/DL (ref 8–22)
CA-I SERPL-SCNC: 1.11 MMOL/L (ref 1.1–1.3)
CALCIUM SERPL-MCNC: 7.3 MG/DL (ref 8.4–10.2)
CALCIUM SERPL-MCNC: 7.5 MG/DL (ref 8.4–10.2)
CHLORIDE SERPL-SCNC: 104 MMOL/L (ref 96–112)
CHLORIDE SERPL-SCNC: 106 MMOL/L (ref 96–112)
CO2 SERPL-SCNC: 20 MMOL/L (ref 20–33)
CO2 SERPL-SCNC: 21 MMOL/L (ref 20–33)
CREAT SERPL-MCNC: 0.58 MG/DL (ref 0.5–1.4)
CREAT SERPL-MCNC: 0.61 MG/DL (ref 0.5–1.4)
DOHLE BOD BLD QL SMEAR: NORMAL
EKG IMPRESSION: NORMAL
EOSINOPHIL # BLD AUTO: 0 K/UL (ref 0–0.51)
EOSINOPHIL NFR BLD: 0 % (ref 0–6.9)
ERYTHROCYTE [DISTWIDTH] IN BLOOD BY AUTOMATED COUNT: 47.8 FL (ref 35.9–50)
GLOBULIN SER CALC-MCNC: 2.6 G/DL (ref 1.9–3.5)
GLOBULIN SER CALC-MCNC: 2.7 G/DL (ref 1.9–3.5)
GLUCOSE BLD-MCNC: 74 MG/DL (ref 65–99)
GLUCOSE BLD-MCNC: 79 MG/DL (ref 65–99)
GLUCOSE BLD-MCNC: 80 MG/DL (ref 65–99)
GLUCOSE BLD-MCNC: 87 MG/DL (ref 65–99)
GLUCOSE BLD-MCNC: 88 MG/DL (ref 65–99)
GLUCOSE SERPL-MCNC: 93 MG/DL (ref 65–99)
GLUCOSE SERPL-MCNC: 96 MG/DL (ref 65–99)
GRAM STN SPEC: NORMAL
HCT VFR BLD AUTO: 24 % (ref 37–47)
HGB BLD-MCNC: 8.2 G/DL (ref 12–16)
LACTATE BLD-SCNC: 1.4 MMOL/L (ref 0.5–2)
LYMPHOCYTES # BLD AUTO: 0.83 K/UL (ref 1–4.8)
LYMPHOCYTES NFR BLD: 7 % (ref 22–41)
MAGNESIUM SERPL-MCNC: 2.4 MG/DL (ref 1.5–2.5)
MANUAL DIFF BLD: ABNORMAL
MCH RBC QN AUTO: 29.4 PG (ref 27–33)
MCHC RBC AUTO-ENTMCNC: 34.2 G/DL (ref 33.6–35)
MCV RBC AUTO: 86 FL (ref 81.4–97.8)
MONOCYTES # BLD AUTO: 0.36 K/UL (ref 0–0.85)
MONOCYTES NFR BLD AUTO: 3 % (ref 0–13.4)
NEUTROPHILS # BLD AUTO: 10.71 K/UL (ref 2–7.15)
NEUTROPHILS NFR BLD: 62 % (ref 44–72)
NEUTS BAND NFR BLD MANUAL: 28 % (ref 0–10)
NRBC # BLD AUTO: 0 K/UL
NRBC BLD-RTO: 0 /100 WBC
PF4 HEPARIN CMPLX IGG SER-IMP: NEGATIVE
PF4 HEPARIN CMPLX IGG SERPL IA: 0.25 OD
PHOSPHATE SERPL-MCNC: 4.3 MG/DL (ref 2.5–4.5)
PLATELET # BLD AUTO: 37 K/UL (ref 164–446)
PLATELET BLD QL SMEAR: NORMAL
PLATELETS.RETICULATED NFR BLD AUTO: 8.2 K/UL (ref 0.6–13.1)
PMV BLD AUTO: 11 FL (ref 9–12.9)
POLYCHROMASIA BLD QL SMEAR: NORMAL
POTASSIUM SERPL-SCNC: 3.2 MMOL/L (ref 3.6–5.5)
POTASSIUM SERPL-SCNC: 3.6 MMOL/L (ref 3.6–5.5)
PROT SERPL-MCNC: 4.3 G/DL (ref 6–8.2)
PROT SERPL-MCNC: 4.6 G/DL (ref 6–8.2)
RBC # BLD AUTO: 2.79 M/UL (ref 4.2–5.4)
RBC BLD AUTO: PRESENT
SIGNIFICANT IND 70042: NORMAL
SITE SITE: NORMAL
SODIUM SERPL-SCNC: 137 MMOL/L (ref 135–145)
SODIUM SERPL-SCNC: 139 MMOL/L (ref 135–145)
SOURCE SOURCE: NORMAL
TOXIC GRANULES BLD QL SMEAR: NORMAL
WBC # BLD AUTO: 11.9 K/UL (ref 4.8–10.8)

## 2021-05-29 PROCEDURE — 700111 HCHG RX REV CODE 636 W/ 250 OVERRIDE (IP): Performed by: SURGERY

## 2021-05-29 PROCEDURE — 99291 CRITICAL CARE FIRST HOUR: CPT | Performed by: INTERNAL MEDICINE

## 2021-05-29 PROCEDURE — 85055 RETICULATED PLATELET ASSAY: CPT

## 2021-05-29 PROCEDURE — 700111 HCHG RX REV CODE 636 W/ 250 OVERRIDE (IP): Performed by: INTERNAL MEDICINE

## 2021-05-29 PROCEDURE — 85007 BL SMEAR W/DIFF WBC COUNT: CPT

## 2021-05-29 PROCEDURE — 82962 GLUCOSE BLOOD TEST: CPT | Mod: 91

## 2021-05-29 PROCEDURE — 36592 COLLECT BLOOD FROM PICC: CPT

## 2021-05-29 PROCEDURE — 85027 COMPLETE CBC AUTOMATED: CPT

## 2021-05-29 PROCEDURE — 80053 COMPREHEN METABOLIC PANEL: CPT

## 2021-05-29 PROCEDURE — 700105 HCHG RX REV CODE 258: Performed by: INTERNAL MEDICINE

## 2021-05-29 PROCEDURE — 82330 ASSAY OF CALCIUM: CPT

## 2021-05-29 PROCEDURE — 700105 HCHG RX REV CODE 258: Performed by: SURGERY

## 2021-05-29 PROCEDURE — 83605 ASSAY OF LACTIC ACID: CPT

## 2021-05-29 PROCEDURE — 770022 HCHG ROOM/CARE - ICU (200)

## 2021-05-29 PROCEDURE — 700101 HCHG RX REV CODE 250: Performed by: INTERNAL MEDICINE

## 2021-05-29 PROCEDURE — 93010 ELECTROCARDIOGRAM REPORT: CPT | Performed by: STUDENT IN AN ORGANIZED HEALTH CARE EDUCATION/TRAINING PROGRAM

## 2021-05-29 RX ORDER — POTASSIUM CHLORIDE 29.8 MG/ML
40 INJECTION INTRAVENOUS ONCE
Status: COMPLETED | OUTPATIENT
Start: 2021-05-29 | End: 2021-05-29

## 2021-05-29 RX ORDER — POTASSIUM CHLORIDE 14.9 MG/ML
20 INJECTION INTRAVENOUS ONCE
Status: COMPLETED | OUTPATIENT
Start: 2021-05-29 | End: 2021-05-29

## 2021-05-29 RX ORDER — POTASSIUM CHLORIDE 7.45 MG/ML
10 INJECTION INTRAVENOUS
Status: DISCONTINUED | OUTPATIENT
Start: 2021-05-29 | End: 2021-05-29

## 2021-05-29 RX ORDER — POTASSIUM CHLORIDE 14.9 MG/ML
20 INJECTION INTRAVENOUS ONCE
Status: DISCONTINUED | OUTPATIENT
Start: 2021-05-29 | End: 2021-05-29

## 2021-05-29 RX ORDER — METOPROLOL TARTRATE 1 MG/ML
5 INJECTION, SOLUTION INTRAVENOUS
Status: DISCONTINUED | OUTPATIENT
Start: 2021-05-29 | End: 2021-06-21 | Stop reason: HOSPADM

## 2021-05-29 RX ADMIN — FAMOTIDINE 20 MG: 10 INJECTION INTRAVENOUS at 05:16

## 2021-05-29 RX ADMIN — PIPERACILLIN AND TAZOBACTAM 3.38 G: 3; .375 INJECTION, POWDER, LYOPHILIZED, FOR SOLUTION INTRAVENOUS; PARENTERAL at 12:00

## 2021-05-29 RX ADMIN — PIPERACILLIN AND TAZOBACTAM 3.38 G: 3; .375 INJECTION, POWDER, LYOPHILIZED, FOR SOLUTION INTRAVENOUS; PARENTERAL at 05:17

## 2021-05-29 RX ADMIN — HYDROMORPHONE HYDROCHLORIDE 1 MG: 1 INJECTION, SOLUTION INTRAMUSCULAR; INTRAVENOUS; SUBCUTANEOUS at 05:38

## 2021-05-29 RX ADMIN — FAMOTIDINE 20 MG: 10 INJECTION INTRAVENOUS at 17:13

## 2021-05-29 RX ADMIN — PIPERACILLIN AND TAZOBACTAM 3.38 G: 3; .375 INJECTION, POWDER, LYOPHILIZED, FOR SOLUTION INTRAVENOUS; PARENTERAL at 21:33

## 2021-05-29 RX ADMIN — HYDROMORPHONE HYDROCHLORIDE 1 MG: 1 INJECTION, SOLUTION INTRAMUSCULAR; INTRAVENOUS; SUBCUTANEOUS at 12:00

## 2021-05-29 RX ADMIN — METOPROLOL TARTRATE 5 MG: 5 INJECTION, SOLUTION INTRAVENOUS at 00:03

## 2021-05-29 RX ADMIN — HYDROMORPHONE HYDROCHLORIDE 1 MG: 1 INJECTION, SOLUTION INTRAMUSCULAR; INTRAVENOUS; SUBCUTANEOUS at 23:27

## 2021-05-29 RX ADMIN — SODIUM CHLORIDE, POTASSIUM CHLORIDE, SODIUM LACTATE AND CALCIUM CHLORIDE: 600; 310; 30; 20 INJECTION, SOLUTION INTRAVENOUS at 12:03

## 2021-05-29 RX ADMIN — HYDROMORPHONE HYDROCHLORIDE 1 MG: 1 INJECTION, SOLUTION INTRAMUSCULAR; INTRAVENOUS; SUBCUTANEOUS at 20:04

## 2021-05-29 RX ADMIN — SODIUM CHLORIDE, POTASSIUM CHLORIDE, SODIUM LACTATE AND CALCIUM CHLORIDE: 600; 310; 30; 20 INJECTION, SOLUTION INTRAVENOUS at 00:59

## 2021-05-29 RX ADMIN — HYDROMORPHONE HYDROCHLORIDE 1 MG: 1 INJECTION, SOLUTION INTRAMUSCULAR; INTRAVENOUS; SUBCUTANEOUS at 08:41

## 2021-05-29 RX ADMIN — POTASSIUM CHLORIDE 20 MEQ: 14.9 INJECTION, SOLUTION INTRAVENOUS at 07:25

## 2021-05-29 RX ADMIN — POTASSIUM CHLORIDE 40 MEQ: 400 INJECTION, SOLUTION INTRAVENOUS at 01:49

## 2021-05-29 RX ADMIN — METOPROLOL TARTRATE 5 MG: 5 INJECTION, SOLUTION INTRAVENOUS at 14:12

## 2021-05-29 ASSESSMENT — COGNITIVE AND FUNCTIONAL STATUS - GENERAL
MOBILITY SCORE: 17
DRESSING REGULAR LOWER BODY CLOTHING: A LOT
SUGGESTED CMS G CODE MODIFIER MOBILITY: CK
WALKING IN HOSPITAL ROOM: A LITTLE
TURNING FROM BACK TO SIDE WHILE IN FLAT BAD: A LITTLE
HELP NEEDED FOR BATHING: A LOT
TOILETING: A LITTLE
DRESSING REGULAR UPPER BODY CLOTHING: A LITTLE
DAILY ACTIVITIY SCORE: 18
MOVING TO AND FROM BED TO CHAIR: A LITTLE
SUGGESTED CMS G CODE MODIFIER DAILY ACTIVITY: CK
STANDING UP FROM CHAIR USING ARMS: A LITTLE
CLIMB 3 TO 5 STEPS WITH RAILING: A LOT
MOVING FROM LYING ON BACK TO SITTING ON SIDE OF FLAT BED: A LITTLE

## 2021-05-29 ASSESSMENT — ENCOUNTER SYMPTOMS
MUSCULOSKELETAL NEGATIVE: 1
NEUROLOGICAL NEGATIVE: 1
RESPIRATORY NEGATIVE: 1
CARDIOVASCULAR NEGATIVE: 1
EYES NEGATIVE: 1
PSYCHIATRIC NEGATIVE: 1
ABDOMINAL PAIN: 1

## 2021-05-29 ASSESSMENT — PAIN DESCRIPTION - PAIN TYPE
TYPE: ACUTE PAIN;SURGICAL PAIN
TYPE: ACUTE PAIN
TYPE: ACUTE PAIN;SURGICAL PAIN
TYPE: ACUTE PAIN
TYPE: ACUTE PAIN;SURGICAL PAIN

## 2021-05-29 ASSESSMENT — FIBROSIS 4 INDEX: FIB4 SCORE: 12.63

## 2021-05-29 NOTE — PROGRESS NOTES
Patient appears to be in sinus tachycardia as of 0112. Discussed potassium and platelet levels with Dr. Jurado.

## 2021-05-29 NOTE — CARE PLAN
Problem: Nutritional:  Goal: Achieve adequate nutritional intake  Description: Advance diet as tolerated. Patient will consume >50% of meals.  Outcome: Not Met     Currently NPO, see RD note.

## 2021-05-29 NOTE — DIETARY
Nutrition Services: Update   Day 7 of admit.  Mari Dillon is a 50 y.o. female with admitting DX of Cecal volvulus     Revision of ileocolic anastomosis yesterday due to infection/separation of the staple line along the anastomosis. NPO pending return of bowel function. No flatus. Pt with good intake of clear liquids (5/26), good intake of full liquids (5/27).     Malnutrition Risk: increased risk due to multiple bowel surgeries, NPO on/off x1 week.     Recommendations/Plan:  1. Advance diet as medically able  2. High protein nutrition supplements as needed  3. Consider TPN if unable to advance diet next 48-72 hours   4. Monitor weight.    RD following

## 2021-05-29 NOTE — PROGRESS NOTES
Received report form Zara RUDOLPH. Pt awakens to voice. Has continued pain 6/10 previously medicated by prior shift. NG remains to LIWS. Pt denies any flatulence. Pt requesting to rinse mouth oral care provided. Pt ST at this time with PAC. Pt denies any SOB or palpitations. Dressing C/D/I remains to prevena wound vac. All lines and drips verified. Pt sat at edge of bed. Denies dizziness. Pt able to march in place next to bed. Pt OOB to chair at this time. Updated MD on overnight events. All safety measures in place. Call bell and bedside table within reach. Hourly rounding in place.

## 2021-05-29 NOTE — PROGRESS NOTES
LATE ENTRY    5/28/21  1900-Report from Estefany RUDOLPH. Plan reviewed and beside report given. Patient drowsy but responds to voice. Complaints of abdominal pain 6/10. Mouth swab given with supervision from RN.     2200-Bed bath given. Patient had brief episode of HR up to 150s. HR back to 1-teens with frequent PACs.     2330-Pt sustaining HR 130s-150s. EKG and labs completed, appears to be in a fib. Dr. Jurado notified.     5/29/21  0000-IV metoprolol given. HR a fib 1-teens to 120s.

## 2021-05-29 NOTE — CARE PLAN
Problem: Pain - Standard  Goal: Alleviation of pain or a reduction in pain to the patient’s comfort goal  Outcome: Not Progressing   Pt continues to have discomfort. Ice packs provided.   Problem: Bowel Elimination - Post Surgical  Goal: Patient will resume regular bowel sounds and function with no discomfort or distention  Outcome: Not Progressing   Denies passing gas. Remains absent to minimal BS activity  Problem: Fall Risk  Goal: Patient will remain free from falls  Outcome: Progressing   Pt uses call bell.   Problem: Respiratory/Oxygenation Function Post-Surgical  Goal: Patient will achieve/maintain normal respiratory rate/effort  Outcome: Progressing   Pt self motivated with IS. Remains on 1L NC  Problem: Early Mobilization - Post Surgery  Goal: Early mobilization post surgery  Outcome: Progressing   The patient is Watcher - Medium risk of patient condition declining or worsening    Shift Goals  Clinical Goals: Heart rate less than 120, remain in ST. Increase in BS  Patient Goals: OOB to chair    Progress made toward(s) clinical / shift goals:  Pt oob to chair twice. Pt ambulating in room. Pt on RA    Patient is not progressing towards the following goals:      Problem: Pain - Standard  Goal: Alleviation of pain or a reduction in pain to the patient’s comfort goal  Outcome: Not Progressing     Problem: Bowel Elimination - Post Surgical  Goal: Patient will resume regular bowel sounds and function with no discomfort or distention  Outcome: Not Progressing

## 2021-05-29 NOTE — CARE PLAN
Problem: Pain - Post Surgery  Goal: Alleviation or reduction of pain post surgery  Outcome: Progressing     Problem: Bowel Elimination - Post Surgical  Goal: Patient will resume regular bowel sounds and function with no discomfort or distention  Outcome: Progressing   The patient is Watcher - Medium risk of patient condition declining or worsening    Shift Goals  Clinical Goals: keep HR < 120  Patient Goals: rest    Progress made toward(s) clinical / shift goals:  patient given IV dilaudid for pain control. Given IV metoprolol to keep HR , 120    Patient is not progressing towards the following goals:

## 2021-05-29 NOTE — PROGRESS NOTES
Pulmonary Progress Note    Date of admission  5/22/2021    Chief Complaint  50 y.o. female admitted 5/22/2021 with abdominal pain    Hospital Course  50 y.o. female who presented 5/22/2021 with abdominal pain found to have a cecal volvulous.  She underwent a right hemicolectomy 5/22 and was doing well, passing flatus and taking po but then developed marked thrombocytopenia which went down to 12k. On 5/27 a CT scan was done that showed ascites. Underwent an exploratory lap and found to have Bacterial peritonitis with impending anastomotic   Leak. Due to the impending separation of the staple line, a new ileocolic anastamosis was performed.  Pt has a wound vac. Extubated without event and transferred to icu for monitoring.  She received two packs platelets and 20 meq of kcl  Repeat lytes done and replaced  1 l NC  On zosyn for the bacterial peritonitis    Interval Problem Update  Reviewed last 24 hour events:  Chart review from the past 24 hours includes imaging, laboratory studies, vital signs and notes available.  Pertinent data for today    Cardiac:  RVR overnight, lytes replaced  Pulm:  1-2 l Nc  Neuro:  intact  Heme:  Platelets 37 k  I/O:  16 liters positive  ID: zosyn awaiting cxs stillso far negative ; wbc down  GI/endo:   cc  Labs/Imaging:  reviewed  Lines:  Left IJ  Mobility:  OOB to chair  Symptoms:abd pain better      Review of Systems  Review of Systems   Constitutional: Positive for malaise/fatigue.   HENT: Negative.    Eyes: Negative.    Respiratory: Negative.    Cardiovascular: Negative.    Gastrointestinal: Positive for abdominal pain.   Genitourinary: Negative.    Musculoskeletal: Negative.    Skin: Negative.    Neurological: Negative.    Endo/Heme/Allergies: Negative.    Psychiatric/Behavioral: Negative.         Vital Signs for last 24 hours   Temp:  [35.7 °C (96.3 °F)-36.4 °C (97.6 °F)] 36.4 °C (97.6 °F)  Pulse:  [] 123  Resp:  [11-31] 20  BP: ()/() 131/59  SpO2:  [91 %-100  %] 96 %    Hemodynamic parameters for last 24 hours       Respiratory Information for the last 24 hours       Physical Exam   Physical Exam    Medications  Current Facility-Administered Medications   Medication Dose Route Frequency Provider Last Rate Last Admin   • piperacillin-tazobactam (ZOSYN) 3.375 g in  mL IVPB  3.375 g Intravenous Q8HRS Maryam Wood M.D. 25 mL/hr at 05/29/21 1200 3.375 g at 05/29/21 1200   • MD Alert...ICU Electrolyte Replacement per Pharmacy   Other PHARMACY TO DOSE Jeanette Nolasco M.D.       • HYDROmorphone (Dilaudid) injection 1 mg  1 mg Intravenous Q3HRS PRN Jeanette Nolasco M.D.   1 mg at 05/29/21 1200   • fentaNYL (SUBLIMAZE) injection 50 mcg  50 mcg Intravenous Q2HRS PRN Jeanette Nolasco M.D.       • lactated ringers infusion   Intravenous Continuous Jeanette Nolasco M.D. 75 mL/hr at 05/29/21 1203 New Bag at 05/29/21 1203   • Pharmacy Consult: HIT Monitoring   Other PRN Jeanette Nolasco M.D.       • insulin regular (HumuLIN R,NovoLIN R) injection  1-6 Units Subcutaneous Q6HRS Jeanette Nolasco M.D.        And   • glucose 4 g chewable tablet 16 g  16 g Oral Q15 MIN PRN Jeanette Nolasco M.D.        And   • dextrose 50% (D50W) injection 50 mL  50 mL Intravenous Q15 MIN PRN Jeanette Nolasco M.D.       • Metoprolol Tartrate (LOPRESSOR) injection 5 mg  5 mg Intravenous Q5 MIN PRN Juve Jurado D.O.   5 mg at 05/29/21 0003   • LORazepam (ATIVAN) injection 0.5 mg  0.5 mg Intravenous Q4HRS PRN Christina Herman, A.P.N.       • morphine (pf) 4 mg/mL injection 4 mg  4 mg Intravenous Q3HRS PRN Alda Doe M.D.   4 mg at 05/27/21 1809   • diazePAM (VALIUM) injection 5 mg  5 mg Intravenous Q6HRS PRN Maryam Wood M.D.   5 mg at 05/27/21 2015   • Respiratory Therapy Consult   Nebulization Continuous RT Maryam Wood M.D.       • Pharmacy Consult Request ...Pain Management Review 1 Each  1 Each Other PHARMACY TO DOSE Maryam Wood,  M.D.       • docusate sodium (COLACE) capsule 100 mg  100 mg Oral BID Maryam Wood M.D.   100 mg at 05/28/21 0507   • senna-docusate (PERICOLACE or SENOKOT S) 8.6-50 MG per tablet 1 tablet  1 tablet Oral Nightly Maryam Wood M.D.   1 tablet at 05/24/21 2030   • senna-docusate (PERICOLACE or SENOKOT S) 8.6-50 MG per tablet 1 tablet  1 tablet Oral Q24HRS PRN Maryam Wood M.D.       • polyethylene glycol/lytes (MIRALAX) PACKET 1 Packet  1 Packet Oral BID Maryam Wood M.D.   1 Packet at 05/26/21 1717   • magnesium hydroxide (MILK OF MAGNESIA) suspension 30 mL  30 mL Oral DAILY Maryam Wood M.D.   30 mL at 05/28/21 0507   • bisacodyl (DULCOLAX) suppository 10 mg  10 mg Rectal Q24HRS PRN Maryam Wood M.D.       • fleet enema 133 mL  1 Each Rectal Once PRN Maryam Wood M.D.       • ibuprofen (MOTRIN) tablet 800 mg  800 mg Oral TID PRN Maryam Wood M.D.   800 mg at 05/27/21 1405   • famotidine (PEPCID) tablet 20 mg  20 mg Oral BID Maryam Wood M.D.   20 mg at 05/28/21 0507    Or   • famotidine (PEPCID) injection 20 mg  20 mg Intravenous BID Maryam Wood M.D.   20 mg at 05/29/21 0516   • ondansetron (ZOFRAN) syringe/vial injection 4 mg  4 mg Intravenous Q4HRS PRN Maryam Wood M.D.   4 mg at 05/27/21 1812       Fluids    Intake/Output Summary (Last 24 hours) at 5/29/2021 1306  Last data filed at 5/29/2021 1200  Gross per 24 hour   Intake 2436.67 ml   Output 2490 ml   Net -53.33 ml       Laboratory          Recent Labs     05/27/21  0414 05/28/21  0407 05/28/21  1018 05/28/21  2329 05/29/21  0509   SODIUM  --    < > 133* 137 139   POTASSIUM  --    < > 3.5* 3.2* 3.6   CHLORIDE  --    < > 100 104 106   CO2  --    < > 20 20 21   BUN  --    < > 12 12 14   CREATININE  --    < > 0.75 0.58 0.61   MAGNESIUM 2.2  --  1.9 2.4  --    PHOSPHORUS 2.4*  --  3.7 4.3  --    CALCIUM  --    < > 7.1* 7.5* 7.3*    < > = values in this interval not displayed.     Recent Labs     05/28/21  1018 05/28/21  4160  05/29/21  0509   ALTSGPT 8 11 11   ASTSGOT 22 32 31   ALKPHOSPHAT 51 54 57   TBILIRUBIN 0.4 0.5 0.5   GLUCOSE 89 93 96     Recent Labs     05/28/21  1018 05/28/21  1018 05/28/21  1602 05/28/21 2217 05/28/21  2329 05/29/21  0509   WBC 10.5   < > 13.4* 14.0*  --  11.9*   NEUTSPOLYS 71.00   < > 92.90* 90.00*  --  62.00   LYMPHOCYTES 2.00*   < > 1.60* 2.90*  --  7.00*   MONOCYTES 3.00   < > 4.00 4.60  --  3.00   EOSINOPHILS 1.00   < > 0.10 0.00  --  0.00   BASOPHILS 0.00   < > 0.40 0.60  --  0.00   ASTSGOT 22  --   --   --  32 31   ALTSGPT 8  --   --   --  11 11   ALKPHOSPHAT 51  --   --   --  54 57   TBILIRUBIN 0.4  --   --   --  0.5 0.5    < > = values in this interval not displayed.     Recent Labs     05/28/21  1602 05/28/21 2217 05/29/21  0509   RBC 2.87* 2.82* 2.79*   HEMOGLOBIN 8.2* 8.3* 8.2*   HEMATOCRIT 24.6* 24.2* 24.0*   PLATELETCT 52* 44* 37*       Imaging  No new imaging    Assessment/Plan  Cecal volvulus (HCC)- (present on admission)  Assessment & Plan  5/22 anastamosis but developed bacterial peritonitis and repeat exlap on 5/28 with redo of anastomosis due to impending leak at staple line  Wound vac  Trend CB q 8 * 4 following platelets  NPO  LR at 75 CC/hr    Atrial fibrillation with RVR ([facility])  Assessment & Plan  Received metoprolol   Resolved now and is sinus tach but less than 110  monitor    Thrombocytopenia (HCC)  Assessment & Plan  From sepsis secondary to the bacterial peritonitis  Platelets at 37 k  Awaiting HIT ab      Bacterial peritonitis ([facility])  Assessment & Plan  S/p exlap  Zosyn day 2  Follow up cultures - so far negative  Wbc decreasing       VTE:  Contraindicated  Ulcer: H2 Antagonist  Lines: Central Line  Ongoing indication addressed    I have performed a physical exam and reviewed and updated ROS and Plan today (5/29/2021). In review of yesterday's note (5/28/2021), there are no changes except as documented above.     Discussed patient condition and risk of morbidity and/or mortality  with Charge nurse / hot rounds and Dr. Grier  The patient remains critically ill.  Critical care time = 32 minutes in directly providing and coordinating critical care and extensive data review.  No time overlap and excludes procedures.

## 2021-05-29 NOTE — PROGRESS NOTES
"Progress Note:  5/29/2021, 10:07 AM    S: No acute events.  Out of bed in chair.  Reports being more comfortable than yesterday but still weak and tired.  Denies fever.  Had episode of A. fib/RVR    O:  /65   Pulse 98   Temp 36.1 °C (96.9 °F) (Oral)   Resp (!) 21   Ht 1.753 m (5' 9\")   Wt 72.1 kg (158 lb 15.2 oz)   SpO2 97%     Fatigued appearing but NAD  Breathing is nonlabored  Abdomen soft, appropriately tender, Prevena wound management system in place      A:   Active Hospital Problems    Diagnosis    • Cecal volvulus (HCC) [K56.2]      Priority: High   • Bacterial peritonitis (HCC) [K65.9]    • Thrombocytopenia (HCC) [D69.6]      Progressing as expected    P:   -Continue all tubes and lines  -Awaiting return of bowel function  -Continue to mobilize/out of bed to chair  -Appreciate ICU management    Riki Grier M.D.  [facility]  [phone]    "

## 2021-05-29 NOTE — PROGRESS NOTES
Patient has remained sinus rhythm/sinus tachycardia for the rest of the night. Slept most of night but awoke this morning with increased abdominal pain. Medicated per mar.     KLAUDIA Nagy took critical lab result-platelets of 37. Will discuss with Dr. Price when she arrives to unit.

## 2021-05-30 LAB
ALBUMIN SERPL BCP-MCNC: 1.9 G/DL (ref 3.2–4.9)
ALBUMIN/GLOB SERPL: 0.7 G/DL
ALP SERPL-CCNC: 78 U/L (ref 30–99)
ALT SERPL-CCNC: 12 U/L (ref 2–50)
ANION GAP SERPL CALC-SCNC: 11 MMOL/L (ref 7–16)
AST SERPL-CCNC: 37 U/L (ref 12–45)
BASOPHILS # BLD AUTO: 0 % (ref 0–1.8)
BASOPHILS # BLD AUTO: 0 % (ref 0–1.8)
BASOPHILS # BLD: 0 K/UL (ref 0–0.12)
BASOPHILS # BLD: 0 K/UL (ref 0–0.12)
BILIRUB SERPL-MCNC: 0.4 MG/DL (ref 0.1–1.5)
BUN SERPL-MCNC: 19 MG/DL (ref 8–22)
CA-I SERPL-SCNC: 1.06 MMOL/L (ref 1.1–1.3)
CA-I SERPL-SCNC: 1.09 MMOL/L (ref 1.1–1.3)
CA-I SERPL-SCNC: 1.09 MMOL/L (ref 1.1–1.3)
CALCIUM SERPL-MCNC: 7.3 MG/DL (ref 8.4–10.2)
CHLORIDE SERPL-SCNC: 106 MMOL/L (ref 96–112)
CO2 SERPL-SCNC: 24 MMOL/L (ref 20–33)
CREAT SERPL-MCNC: 0.57 MG/DL (ref 0.5–1.4)
DOHLE BOD BLD QL SMEAR: NORMAL
DOHLE BOD BLD QL SMEAR: NORMAL
EOSINOPHIL # BLD AUTO: 0 K/UL (ref 0–0.51)
EOSINOPHIL # BLD AUTO: 0 K/UL (ref 0–0.51)
EOSINOPHIL NFR BLD: 0 % (ref 0–6.9)
EOSINOPHIL NFR BLD: 0 % (ref 0–6.9)
ERYTHROCYTE [DISTWIDTH] IN BLOOD BY AUTOMATED COUNT: 46.7 FL (ref 35.9–50)
ERYTHROCYTE [DISTWIDTH] IN BLOOD BY AUTOMATED COUNT: 48.8 FL (ref 35.9–50)
GLOBULIN SER CALC-MCNC: 2.7 G/DL (ref 1.9–3.5)
GLUCOSE BLD-MCNC: 144 MG/DL (ref 65–99)
GLUCOSE BLD-MCNC: 66 MG/DL (ref 65–99)
GLUCOSE BLD-MCNC: 78 MG/DL (ref 65–99)
GLUCOSE BLD-MCNC: 79 MG/DL (ref 65–99)
GLUCOSE BLD-MCNC: 92 MG/DL (ref 65–99)
GLUCOSE SERPL-MCNC: 81 MG/DL (ref 65–99)
HCT VFR BLD AUTO: 21.3 % (ref 37–47)
HCT VFR BLD AUTO: 25.7 % (ref 37–47)
HGB BLD-MCNC: 6.9 G/DL (ref 12–16)
HGB BLD-MCNC: 8.8 G/DL (ref 12–16)
LYMPHOCYTES # BLD AUTO: 0.58 K/UL (ref 1–4.8)
LYMPHOCYTES # BLD AUTO: 0.59 K/UL (ref 1–4.8)
LYMPHOCYTES NFR BLD: 5 % (ref 22–41)
LYMPHOCYTES NFR BLD: 6 % (ref 22–41)
MAGNESIUM SERPL-MCNC: 2.3 MG/DL (ref 1.5–2.5)
MANUAL DIFF BLD: NORMAL
MANUAL DIFF BLD: NORMAL
MCH RBC QN AUTO: 27.7 PG (ref 27–33)
MCH RBC QN AUTO: 28.9 PG (ref 27–33)
MCHC RBC AUTO-ENTMCNC: 32.4 G/DL (ref 33.6–35)
MCHC RBC AUTO-ENTMCNC: 34.2 G/DL (ref 33.6–35)
MCV RBC AUTO: 84.3 FL (ref 81.4–97.8)
MCV RBC AUTO: 85.5 FL (ref 81.4–97.8)
METAMYELOCYTES NFR BLD MANUAL: 1 %
METAMYELOCYTES NFR BLD MANUAL: 2 %
MONOCYTES # BLD AUTO: 0.23 K/UL (ref 0–0.85)
MONOCYTES # BLD AUTO: 0.58 K/UL (ref 0–0.85)
MONOCYTES NFR BLD AUTO: 2 % (ref 0–13.4)
MONOCYTES NFR BLD AUTO: 6 % (ref 0–13.4)
NEUTROPHILS # BLD AUTO: 10.65 K/UL (ref 2–7.15)
NEUTROPHILS # BLD AUTO: 8.35 K/UL (ref 2–7.15)
NEUTROPHILS NFR BLD: 86 % (ref 44–72)
NEUTROPHILS NFR BLD: 91 % (ref 44–72)
NEUTS BAND NFR BLD MANUAL: 1 % (ref 0–10)
NRBC # BLD AUTO: 0.04 K/UL
NRBC # BLD AUTO: 0.07 K/UL
NRBC BLD-RTO: 0.4 /100 WBC
NRBC BLD-RTO: 0.6 /100 WBC
PHOSPHATE SERPL-MCNC: 4.1 MG/DL (ref 2.5–4.5)
PLATELET # BLD AUTO: 49 K/UL (ref 164–446)
PLATELET # BLD AUTO: 62 K/UL (ref 164–446)
PLATELET BLD QL SMEAR: NORMAL
PLATELET BLD QL SMEAR: NORMAL
PLATELETS.RETICULATED NFR BLD AUTO: 9.8 K/UL (ref 0.6–13.1)
PMV BLD AUTO: 11.1 FL (ref 9–12.9)
PMV BLD AUTO: 11.3 FL (ref 9–12.9)
POLYCHROMASIA BLD QL SMEAR: NORMAL
POLYCHROMASIA BLD QL SMEAR: NORMAL
POTASSIUM SERPL-SCNC: 3.1 MMOL/L (ref 3.6–5.5)
POTASSIUM SERPL-SCNC: 3.6 MMOL/L (ref 3.6–5.5)
POTASSIUM SERPL-SCNC: 3.6 MMOL/L (ref 3.6–5.5)
POTASSIUM SERPL-SCNC: 3.9 MMOL/L (ref 3.6–5.5)
PROT SERPL-MCNC: 4.6 G/DL (ref 6–8.2)
RBC # BLD AUTO: 2.49 M/UL (ref 4.2–5.4)
RBC # BLD AUTO: 3.05 M/UL (ref 4.2–5.4)
RBC BLD AUTO: PRESENT
RBC BLD AUTO: PRESENT
SODIUM SERPL-SCNC: 141 MMOL/L (ref 135–145)
TOXIC GRANULES BLD QL SMEAR: SLIGHT
TOXIC GRANULES BLD QL SMEAR: SLIGHT
WBC # BLD AUTO: 11.7 K/UL (ref 4.8–10.8)
WBC # BLD AUTO: 9.6 K/UL (ref 4.8–10.8)

## 2021-05-30 PROCEDURE — 84100 ASSAY OF PHOSPHORUS: CPT

## 2021-05-30 PROCEDURE — 86923 COMPATIBILITY TEST ELECTRIC: CPT

## 2021-05-30 PROCEDURE — 700105 HCHG RX REV CODE 258: Performed by: SURGERY

## 2021-05-30 PROCEDURE — 85027 COMPLETE CBC AUTOMATED: CPT | Mod: 91

## 2021-05-30 PROCEDURE — 30233N1 TRANSFUSION OF NONAUTOLOGOUS RED BLOOD CELLS INTO PERIPHERAL VEIN, PERCUTANEOUS APPROACH: ICD-10-PCS | Performed by: INTERNAL MEDICINE

## 2021-05-30 PROCEDURE — 83735 ASSAY OF MAGNESIUM: CPT

## 2021-05-30 PROCEDURE — 80053 COMPREHEN METABOLIC PANEL: CPT

## 2021-05-30 PROCEDURE — 82330 ASSAY OF CALCIUM: CPT | Mod: 91

## 2021-05-30 PROCEDURE — 700111 HCHG RX REV CODE 636 W/ 250 OVERRIDE (IP): Performed by: SURGERY

## 2021-05-30 PROCEDURE — 700101 HCHG RX REV CODE 250: Performed by: INTERNAL MEDICINE

## 2021-05-30 PROCEDURE — 700105 HCHG RX REV CODE 258: Performed by: INTERNAL MEDICINE

## 2021-05-30 PROCEDURE — 85007 BL SMEAR W/DIFF WBC COUNT: CPT

## 2021-05-30 PROCEDURE — 82962 GLUCOSE BLOOD TEST: CPT | Mod: 91

## 2021-05-30 PROCEDURE — 99291 CRITICAL CARE FIRST HOUR: CPT | Performed by: INTERNAL MEDICINE

## 2021-05-30 PROCEDURE — 84132 ASSAY OF SERUM POTASSIUM: CPT | Mod: 91

## 2021-05-30 PROCEDURE — 700111 HCHG RX REV CODE 636 W/ 250 OVERRIDE (IP): Performed by: INTERNAL MEDICINE

## 2021-05-30 PROCEDURE — 36430 TRANSFUSION BLD/BLD COMPNT: CPT

## 2021-05-30 PROCEDURE — P9016 RBC LEUKOCYTES REDUCED: HCPCS

## 2021-05-30 PROCEDURE — 770022 HCHG ROOM/CARE - ICU (200)

## 2021-05-30 PROCEDURE — 85055 RETICULATED PLATELET ASSAY: CPT

## 2021-05-30 RX ORDER — SODIUM CHLORIDE 9 MG/ML
INJECTION, SOLUTION INTRAVENOUS CONTINUOUS
Status: ACTIVE | OUTPATIENT
Start: 2021-05-30 | End: 2021-05-30

## 2021-05-30 RX ORDER — POTASSIUM CHLORIDE 29.8 MG/ML
40 INJECTION INTRAVENOUS ONCE
Status: COMPLETED | OUTPATIENT
Start: 2021-05-30 | End: 2021-05-30

## 2021-05-30 RX ORDER — POTASSIUM CHLORIDE 14.9 MG/ML
20 INJECTION INTRAVENOUS ONCE
Status: COMPLETED | OUTPATIENT
Start: 2021-05-30 | End: 2021-05-30

## 2021-05-30 RX ORDER — DEXTROSE AND SODIUM CHLORIDE 5; .9 G/100ML; G/100ML
INJECTION, SOLUTION INTRAVENOUS CONTINUOUS
Status: DISCONTINUED | OUTPATIENT
Start: 2021-05-30 | End: 2021-06-01

## 2021-05-30 RX ADMIN — POTASSIUM CHLORIDE 40 MEQ: 400 INJECTION, SOLUTION INTRAVENOUS at 08:58

## 2021-05-30 RX ADMIN — POTASSIUM CHLORIDE 20 MEQ: 14.9 INJECTION, SOLUTION INTRAVENOUS at 13:11

## 2021-05-30 RX ADMIN — SODIUM CHLORIDE, POTASSIUM CHLORIDE, SODIUM LACTATE AND CALCIUM CHLORIDE 1000 ML: 600; 310; 30; 20 INJECTION, SOLUTION INTRAVENOUS at 02:41

## 2021-05-30 RX ADMIN — DEXTROSE AND SODIUM CHLORIDE: 5; 900 INJECTION, SOLUTION INTRAVENOUS at 12:51

## 2021-05-30 RX ADMIN — ONDANSETRON 4 MG: 2 INJECTION INTRAMUSCULAR; INTRAVENOUS at 21:43

## 2021-05-30 RX ADMIN — HYDROMORPHONE HYDROCHLORIDE 1 MG: 1 INJECTION, SOLUTION INTRAMUSCULAR; INTRAVENOUS; SUBCUTANEOUS at 06:43

## 2021-05-30 RX ADMIN — PIPERACILLIN AND TAZOBACTAM 3.38 G: 3; .375 INJECTION, POWDER, LYOPHILIZED, FOR SOLUTION INTRAVENOUS; PARENTERAL at 05:07

## 2021-05-30 RX ADMIN — HYDROMORPHONE HYDROCHLORIDE 1 MG: 1 INJECTION, SOLUTION INTRAMUSCULAR; INTRAVENOUS; SUBCUTANEOUS at 02:40

## 2021-05-30 RX ADMIN — POTASSIUM CHLORIDE 40 MEQ: 400 INJECTION, SOLUTION INTRAVENOUS at 17:48

## 2021-05-30 RX ADMIN — DEXTROSE MONOHYDRATE 50 ML: 25 INJECTION, SOLUTION INTRAVENOUS at 00:39

## 2021-05-30 RX ADMIN — PIPERACILLIN AND TAZOBACTAM 3.38 G: 3; .375 INJECTION, POWDER, LYOPHILIZED, FOR SOLUTION INTRAVENOUS; PARENTERAL at 20:33

## 2021-05-30 RX ADMIN — SODIUM CHLORIDE: 9 INJECTION, SOLUTION INTRAVENOUS at 11:36

## 2021-05-30 RX ADMIN — HYDROMORPHONE HYDROCHLORIDE 1 MG: 1 INJECTION, SOLUTION INTRAMUSCULAR; INTRAVENOUS; SUBCUTANEOUS at 17:37

## 2021-05-30 RX ADMIN — FAMOTIDINE 20 MG: 10 INJECTION INTRAVENOUS at 17:45

## 2021-05-30 RX ADMIN — HYDROMORPHONE HYDROCHLORIDE 1 MG: 1 INJECTION, SOLUTION INTRAMUSCULAR; INTRAVENOUS; SUBCUTANEOUS at 13:41

## 2021-05-30 RX ADMIN — PIPERACILLIN AND TAZOBACTAM 3.38 G: 3; .375 INJECTION, POWDER, LYOPHILIZED, FOR SOLUTION INTRAVENOUS; PARENTERAL at 13:00

## 2021-05-30 RX ADMIN — HYDROMORPHONE HYDROCHLORIDE 1 MG: 1 INJECTION, SOLUTION INTRAMUSCULAR; INTRAVENOUS; SUBCUTANEOUS at 21:15

## 2021-05-30 RX ADMIN — FAMOTIDINE 20 MG: 10 INJECTION INTRAVENOUS at 05:07

## 2021-05-30 RX ADMIN — HYDROMORPHONE HYDROCHLORIDE 1 MG: 1 INJECTION, SOLUTION INTRAMUSCULAR; INTRAVENOUS; SUBCUTANEOUS at 09:52

## 2021-05-30 RX ADMIN — CALCIUM GLUCONATE 1 G: 98 INJECTION, SOLUTION INTRAVENOUS at 17:43

## 2021-05-30 RX ADMIN — CALCIUM GLUCONATE 1 G: 98 INJECTION, SOLUTION INTRAVENOUS at 13:06

## 2021-05-30 ASSESSMENT — PAIN DESCRIPTION - PAIN TYPE
TYPE: ACUTE PAIN;SURGICAL PAIN
TYPE: ACUTE PAIN;SURGICAL PAIN
TYPE: ACUTE PAIN
TYPE: ACUTE PAIN;SURGICAL PAIN
TYPE: ACUTE PAIN;SURGICAL PAIN
TYPE: ACUTE PAIN

## 2021-05-30 ASSESSMENT — ENCOUNTER SYMPTOMS
MUSCULOSKELETAL NEGATIVE: 1
RESPIRATORY NEGATIVE: 1
EYES NEGATIVE: 1
ABDOMINAL PAIN: 1
NEUROLOGICAL NEGATIVE: 1
PSYCHIATRIC NEGATIVE: 1
CARDIOVASCULAR NEGATIVE: 1

## 2021-05-30 NOTE — PROGRESS NOTES
"Progress Note:  5/30/2021, 8:43 AM    S: No acute events.  In bed.  Still no flatus or bowel movement. (Recorded BM in computer was in error).  Thrombocytopenia improving, but hemoglobin down to 6.9.  Transfusion ordered by ICU team.  Pain is improving.  Tachycardia has resolved    O:  /77   Pulse 89   Temp 36.8 °C (98.2 °F) (Temporal)   Resp 16   Ht 1.753 m (5' 9\")   Wt 72.3 kg (159 lb 6.3 oz)   SpO2 98%     NAD, awake, alert  Breathing is nonlabored  Abdomen is appropriately tender with Faith in place, functioning      A:   Active Hospital Problems    Diagnosis    • Cecal volvulus (HCC) [K56.2]      Priority: High   • Atrial fibrillation with RVR (HCC) [I48.91]    • Bacterial peritonitis (HCC) [K65.9]    • Thrombocytopenia (HCC) [D69.6]          P:   -Slow improvement as anticipated.  Still awaiting return of bowel function  -Management of anemia per ICU team  -No indication for acute surgical intervention at this time  -We will defer decision for transfer to floor to ICU service, but would hesitate to do that on the day of transfusion.  Would like to see notable improvement prior    Riki Grier M.D.  [facility]  [phone]    "

## 2021-05-30 NOTE — PROGRESS NOTES
Jameson from Lab called with critical result of Platelets 49 at 0734. Critical lab result read back to Jameson.   Dr. Price notified of critical lab result at 0735.  Critical lab result read back by Dr. Price.

## 2021-05-30 NOTE — PROGRESS NOTES
Pulmonary Progress Note    Date of admission  5/22/2021    Chief Complaint  50 y.o. female admitted 5/22/2021 with abdominal pain    Hospital Course  50 y.o. female who presented 5/22/2021 with abdominal pain found to have a cecal volvulous.  She underwent a right hemicolectomy 5/22 and was doing well, passing flatus and taking po but then developed marked thrombocytopenia which went down to 12k. On 5/27 a CT scan was done that showed ascites. Underwent an exploratory lap and found to have Bacterial peritonitis with impending anastomotic   Leak. Due to the impending separation of the staple line, a new ileocolic anastamosis was performed.  Pt has a wound vac. Extubated without event and transferred to icu for monitoring.  She received two packs platelets and 20 meq of kcl in the OR    1 PRBC 5/30/21. HIT panel negative     On zosyn for the bacterial peritonitis (awaiting sensitivity actinomyces odontolyticus and enterecoccus faecalis)    Interval Problem Update  Reviewed last 24 hour events:  Chart review from the past 24 hours includes imaging, laboratory studies, vital signs and notes available.  Pertinent data for today    Cardiac:   No events lytes replaced  Pulm:  3 l Nc  Neuro:  intact  Heme:  Platelets 49 k (HIT panel negative) and hbg 6.9 -- no bleed one unit of blood today  I/O:   2008/2190  ID: zosyn day 3  Cultures actinomyces odontolyticus and enterecoccus faecalis - wbc normal no fever  GI/endo:  NG to clamp; no stool; NG output 30 ml  Labs/Imaging:  reviewed  Lines:  Left IJ  Mobility:  OOB to chair  Symptoms:abd pain better      Review of Systems  Review of Systems   Constitutional: Positive for malaise/fatigue.   HENT: Negative.    Eyes: Negative.    Respiratory: Negative.    Cardiovascular: Negative.    Gastrointestinal: Positive for abdominal pain.   Genitourinary: Negative.    Musculoskeletal: Negative.    Skin: Negative.    Neurological: Negative.    Endo/Heme/Allergies: Negative.     Psychiatric/Behavioral: Negative.         Vital Signs for last 24 hours   Temp:  [36 °C (96.8 °F)-37.1 °C (98.7 °F)] 37.1 °C (98.7 °F)  Pulse:  [] 79  Resp:  [11-26] 13  BP: (108-135)/(63-99) 133/73  SpO2:  [44 %-100 %] 99 %     Physical Exam   Physical Exam  Constitutional:       Appearance: Normal appearance.   HENT:      Head: Normocephalic and atraumatic.      Mouth/Throat:      Mouth: Mucous membranes are moist.   Eyes:      Pupils: Pupils are equal, round, and reactive to light.   Cardiovascular:      Rate and Rhythm: Normal rate.   Pulmonary:      Effort: Pulmonary effort is normal.      Breath sounds: Normal breath sounds.   Abdominal:      General: Bowel sounds are normal.      Palpations: Abdomen is soft.      Comments: Wound vac   Musculoskeletal:         General: Normal range of motion.   Skin:     General: Skin is warm and dry.   Neurological:      General: No focal deficit present.      Mental Status: She is alert and oriented to person, place, and time.   Psychiatric:         Mood and Affect: Mood normal.         Behavior: Behavior normal.         Thought Content: Thought content normal.         Judgment: Judgment normal.         Medications  Current Facility-Administered Medications   Medication Dose Route Frequency Provider Last Rate Last Admin   • NS infusion   Intravenous Continuous Jeanette Nolasco M.D.   Stopped at 05/30/21 1200   • D5 NS infusion   Intravenous Continuous Jeanette Nolasco M.D.       • Metoprolol Tartrate (LOPRESSOR) injection 5 mg  5 mg Intravenous Q5 MIN PRN Jeanette Nolasco M.D.       • piperacillin-tazobactam (ZOSYN) 3.375 g in  mL IVPB  3.375 g Intravenous Q8HRS Maryam Wood M.D.   Stopped at 05/30/21 0907   • MD Alert...ICU Electrolyte Replacement per Pharmacy   Other PHARMACY TO DOSE Jeanette Nolasco M.D.       • HYDROmorphone (Dilaudid) injection 1 mg  1 mg Intravenous Q3HRS PRN Jeanette Nolasco M.D.   1 mg at 05/30/21 0952   •  fentaNYL (SUBLIMAZE) injection 50 mcg  50 mcg Intravenous Q2HRS PRN Jeanette Nolasco M.D.       • Pharmacy Consult: HIT Monitoring   Other PRN Jeanette Nolasco M.D.       • insulin regular (HumuLIN R,NovoLIN R) injection  1-6 Units Subcutaneous Q6HRS Jeanette Nolasco M.D.        And   • glucose 4 g chewable tablet 16 g  16 g Oral Q15 MIN PRN Jeanette Nolasco M.D.        And   • dextrose 50% (D50W) injection 50 mL  50 mL Intravenous Q15 MIN PRN Jeanette Nolasco M.D.   50 mL at 05/30/21 0039   • LORazepam (ATIVAN) injection 0.5 mg  0.5 mg Intravenous Q4HRS PRN Christina Herman, A.P.N.       • morphine (pf) 4 mg/mL injection 4 mg  4 mg Intravenous Q3HRS PRN Alda Doe M.D.   4 mg at 05/27/21 1809   • diazePAM (VALIUM) injection 5 mg  5 mg Intravenous Q6HRS PRN Maryam Wood M.D.   5 mg at 05/27/21 2015   • Respiratory Therapy Consult   Nebulization Continuous RT Maryam Wood M.D.       • Pharmacy Consult Request ...Pain Management Review 1 Each  1 Each Other PHARMACY TO DOSE Maryam Wood M.D.       • docusate sodium (COLACE) capsule 100 mg  100 mg Oral BID Maryam Wood M.D.   100 mg at 05/28/21 0507   • senna-docusate (PERICOLACE or SENOKOT S) 8.6-50 MG per tablet 1 tablet  1 tablet Oral Nightly Maryam Wood M.D.   1 tablet at 05/24/21 2030   • senna-docusate (PERICOLACE or SENOKOT S) 8.6-50 MG per tablet 1 tablet  1 tablet Oral Q24HRS PRN Maryam Wood M.D.       • polyethylene glycol/lytes (MIRALAX) PACKET 1 Packet  1 Packet Oral BID Maryam Wood M.D.   1 Packet at 05/26/21 1717   • magnesium hydroxide (MILK OF MAGNESIA) suspension 30 mL  30 mL Oral DAILY Maryam Wood M.D.   30 mL at 05/28/21 0507   • bisacodyl (DULCOLAX) suppository 10 mg  10 mg Rectal Q24HRS PRN Maryam Wood M.D.       • fleet enema 133 mL  1 Each Rectal Once PRN Maryam Wood M.D.       • ibuprofen (MOTRIN) tablet 800 mg  800 mg Oral TID PRN Maryam Wood M.D.   800 mg at 05/27/21  1405   • famotidine (PEPCID) tablet 20 mg  20 mg Oral BID Maryam Wood M.D.   20 mg at 05/28/21 0507    Or   • famotidine (PEPCID) injection 20 mg  20 mg Intravenous BID Maryam Wood M.D.   20 mg at 05/30/21 0507   • ondansetron (ZOFRAN) syringe/vial injection 4 mg  4 mg Intravenous Q4HRS PRN Maryam Wood M.D.   4 mg at 05/27/21 1812       Fluids    Intake/Output Summary (Last 24 hours) at 5/30/2021 1231  Last data filed at 5/30/2021 1200  Gross per 24 hour   Intake 2091.75 ml   Output 1890 ml   Net 201.75 ml       Laboratory          Recent Labs     05/28/21  1018 05/28/21  1018 05/28/21 2329 05/29/21  0509 05/30/21  0605   SODIUM 133*   < > 137 139 141   POTASSIUM 3.5*   < > 3.2* 3.6 3.1*   CHLORIDE 100   < > 104 106 106   CO2 20   < > 20 21 24   BUN 12   < > 12 14 19   CREATININE 0.75   < > 0.58 0.61 0.57   MAGNESIUM 1.9  --  2.4  --  2.3   PHOSPHORUS 3.7  --  4.3  --  4.1   CALCIUM 7.1*   < > 7.5* 7.3* 7.3*    < > = values in this interval not displayed.     Recent Labs     05/28/21 2329 05/29/21 0509 05/30/21  0605   ALTSGPT 11 11 12   ASTSGOT 32 31 37   ALKPHOSPHAT 54 57 78   TBILIRUBIN 0.5 0.5 0.4   GLUCOSE 93 96 81     Recent Labs     05/28/21  1018 05/28/21  2217 05/28/21 2329 05/29/21  0509 05/30/21  0605   WBC  --  14.0*  --  11.9* 9.6   NEUTSPOLYS  --  90.00*  --  62.00 86.00*   LYMPHOCYTES  --  2.90*  --  7.00* 6.00*   MONOCYTES  --  4.60  --  3.00 6.00   EOSINOPHILS  --  0.00  --  0.00 0.00   BASOPHILS  --  0.60  --  0.00 0.00   ASTSGOT   < >  --  32 31 37   ALTSGPT   < >  --  11 11 12   ALKPHOSPHAT   < >  --  54 57 78   TBILIRUBIN   < >  --  0.5 0.5 0.4    < > = values in this interval not displayed.     Recent Labs     05/28/21  2217 05/29/21  0509 05/30/21  0605   RBC 2.82* 2.79* 2.49*   HEMOGLOBIN 8.3* 8.2* 6.9*   HEMATOCRIT 24.2* 24.0* 21.3*   PLATELETCT 44* 37* 49*       Imaging  No new imaging    Assessment/Plan  Cecal volvulus (HCC)- (present on admission)  Assessment & Plan  5/22  anastamosis but developed bacterial peritonitis and repeat exlap on 5/28 with redo of anastomosis due to impending leak at staple line  Wound vac  Required 1 unit RBC today 5/30  NPO, NG clamped  Change to D5 NS as FS 78  + BS  No stool    Atrial fibrillation with RVR (HCC)  Assessment & Plan  Episode 5/29 -- no further episodes    Thrombocytopenia (HCC)  Assessment & Plan  From sepsis secondary to the bacterial peritonitis  Platelets at 49k  HIT Ab panel negative      Bacterial peritonitis ([facility])  Assessment & Plan  S/p exlap  Zosyn day 3   Follow up sensitivities - actinomyces odontolyticus and enterecoccus faecalis  Wbc normal, no fever     Will stay in ICU step down status    VTE:  Contraindicated  Ulcer: H2 Antagonist  Lines: Central Line  Ongoing indication addressed    I have performed a physical exam and reviewed and updated ROS and Plan today (5/30/2021). In review of yesterday's note (5/29/2021), there are no changes except as documented above.     Discussed patient condition and risk of morbidity and/or mortality with Charge nurse / hot rounds and Dr. Grier  The patient remains critically ill.  Critical care time = 31 minutes in directly providing and coordinating critical care and extensive data review.  No time overlap and excludes procedures.

## 2021-05-30 NOTE — PROGRESS NOTES
Patient up in recliner with son at side. In no apparent distress. Decides that she is ready to go back to bed. Has been up in the recliner for a while. Up from chair and pivoted, tolerated well.  Swishing ice water around in mouth and spitting it out. NG remains to low intermittent suction. Young is patent of clear, yellow urine. No SOB just mild dyspnea on exertion. Provena wound vac in place. Dsg. Is dry and intact.

## 2021-05-30 NOTE — CARE PLAN
The patient is Watcher - Medium risk of patient condition declining or worsening    Shift Goals  Clinical Goals: Better control of pain.  Patient Goals: OOB to chair    Progress made toward(s) clinical / shift goals:  Patient is not progressing towards the following goals:

## 2021-05-30 NOTE — PROGRESS NOTES
PRBC's completed, replacing electrolytes per MD orders, Pt resting calmly in no acute distress, pain managed with IV Dilaudid see MAR. Pt on 1 L NC, BP and VS stable, HR SR/, Wound VAC in place and intact, Closely Monitoring Pt.

## 2021-05-30 NOTE — CARE PLAN
Problem: Knowledge Deficit - Standard  Goal: Patient and family/care givers will demonstrate understanding of plan of care, disease process/condition, diagnostic tests and medications  5/30/2021 0415 by Amirah Rizo R.N.  Outcome: Progressing  5/30/2021 0412 by Amirah Rizo R.N.  Outcome: Progressing     Problem: Depression  Goal: Patient and family/caregiver will verbalize accurate information about at least two of the possible causes of depression, three-four of the signs and symptoms of depression  5/30/2021 0415 by Amirah Rizo R.N.  Outcome: Progressing  5/30/2021 0412 by Amirah Rizo R.N.  Outcome: Progressing     Problem: Fall Risk  Goal: Patient will remain free from falls  5/30/2021 0415 by Amirah Rizo R.N.  Outcome: Progressing  5/30/2021 0412 by Amirah Rizo R.N.  Outcome: Progressing     Problem: Skin Integrity  Goal: Skin integrity is maintained or improved  5/30/2021 0415 by Amirah Rizo R.N.  Outcome: Progressing  5/30/2021 0412 by Amirah Rizo R.N.  Outcome: Progressing     Problem: Respiratory/Oxygenation Function Post-Surgical  Goal: Patient will achieve/maintain normal respiratory rate/effort  5/30/2021 0415 by Amirah Rizo R.N.  Outcome: Progressing  5/30/2021 0412 by Amirah Rizo R.N.  Outcome: Progressing     Problem: Early Mobilization - Post Surgery  Goal: Early mobilization post surgery  5/30/2021 0415 by Amirah Rizo R.N.  Outcome: Progressing  5/30/2021 0412 by Amirah Rizo R.N.  Outcome: Progressing     Problem: Pain - Post Surgery  Goal: Alleviation or reduction of pain post surgery  5/30/2021 0415 by MORGAN GamingN.  Outcome: Progressing  5/30/2021 0412 by Amirah Rizo R.N.  Outcome: Progressing     Problem: Wound/ / Incision Healing  Goal: Patient's wound/surgical incision will decrease in size and heals properly  5/30/2021 0415 by MORGAN GamingN.  Outcome: Progressing  5/30/2021 0412 by MORGAN GamingN.  Outcome: Progressing      Problem: Bowel Elimination - Post Surgical  Goal: Patient will resume regular bowel sounds and function with no discomfort or distention  5/30/2021 0415 by Amirah Rizo R.N.  Outcome: Progressing  5/30/2021 0412 by Amirah Rizo R.N.  Outcome: Progressing     Problem: Respiratory  Goal: Patient will achieve/maintain optimum respiratory ventilation and gas exchange  5/30/2021 0415 by Amirah Rizo R.N.  Outcome: Progressing  5/30/2021 0412 by Amirah Rizo R.N.  Outcome: Progressing   The patient is Watcher - Medium risk of patient condition declining or worsening    Shift Goals  Clinical Goals: Better control of pain.  Patient Goals: OOB to chair    Progress made toward(s) clinical / shift goals:      Patient is not progressing towards the following goals:

## 2021-05-30 NOTE — CARE PLAN
The patient is Watcher - Medium risk of patient condition declining or worsening    Shift Goals  Clinical Goals: Better control of pain.  Patient Goals: OOB to chair    Progress made toward(s) clinical / shift goals: Yes, pain level well managed, 02 at 1 L NC, VSS.    Patient is not progressing towards the following goals:

## 2021-05-30 NOTE — PROGRESS NOTES
Refilling once - Please schedule a follow up appt.   Slept well in between administration of pain medications. Patient would always wake up easily and always asked for her pain medication. I encouraged her to slow down on her ice water swish and swallow because I was afraid that she was swallowing more than she realized.  States understanding. Output adequate.

## 2021-05-31 LAB
ALBUMIN SERPL BCP-MCNC: 2 G/DL (ref 3.2–4.9)
ALBUMIN/GLOB SERPL: 0.7 G/DL
ALP SERPL-CCNC: 51 U/L (ref 30–99)
ALT SERPL-CCNC: 11 U/L (ref 2–50)
ANION GAP SERPL CALC-SCNC: 8 MMOL/L (ref 7–16)
AST SERPL-CCNC: 28 U/L (ref 12–45)
BACTERIA SPEC ANAEROBE CULT: ABNORMAL
BACTERIA WND AEROBE CULT: ABNORMAL
BASOPHILS # BLD AUTO: 0 % (ref 0–1.8)
BASOPHILS # BLD: 0 K/UL (ref 0–0.12)
BILIRUB SERPL-MCNC: 0.5 MG/DL (ref 0.1–1.5)
BUN SERPL-MCNC: 15 MG/DL (ref 8–22)
CA-I SERPL-SCNC: 1.07 MMOL/L (ref 1.1–1.3)
CALCIUM SERPL-MCNC: 7.4 MG/DL (ref 8.4–10.2)
CHLORIDE SERPL-SCNC: 111 MMOL/L (ref 96–112)
CO2 SERPL-SCNC: 24 MMOL/L (ref 20–33)
CREAT SERPL-MCNC: 0.52 MG/DL (ref 0.5–1.4)
DOHLE BOD BLD QL SMEAR: NORMAL
EOSINOPHIL # BLD AUTO: 0 K/UL (ref 0–0.51)
EOSINOPHIL NFR BLD: 0 % (ref 0–6.9)
ERYTHROCYTE [DISTWIDTH] IN BLOOD BY AUTOMATED COUNT: 49.1 FL (ref 35.9–50)
GLOBULIN SER CALC-MCNC: 2.7 G/DL (ref 1.9–3.5)
GLUCOSE BLD-MCNC: 107 MG/DL (ref 65–99)
GLUCOSE BLD-MCNC: 113 MG/DL (ref 65–99)
GLUCOSE BLD-MCNC: 114 MG/DL (ref 65–99)
GLUCOSE BLD-MCNC: 115 MG/DL (ref 65–99)
GLUCOSE SERPL-MCNC: 126 MG/DL (ref 65–99)
GRAM STN SPEC: ABNORMAL
HCT VFR BLD AUTO: 24.3 % (ref 37–47)
HCT VFR BLD AUTO: 24.3 % (ref 37–47)
HGB BLD-MCNC: 8 G/DL (ref 12–16)
HGB BLD-MCNC: 8.1 G/DL (ref 12–16)
LYMPHOCYTES # BLD AUTO: 1.01 K/UL (ref 1–4.8)
LYMPHOCYTES NFR BLD: 8 % (ref 22–41)
MAGNESIUM SERPL-MCNC: 2 MG/DL (ref 1.5–2.5)
MANUAL DIFF BLD: NORMAL
MCH RBC QN AUTO: 29.1 PG (ref 27–33)
MCHC RBC AUTO-ENTMCNC: 32.9 G/DL (ref 33.6–35)
MCV RBC AUTO: 88.4 FL (ref 81.4–97.8)
METAMYELOCYTES NFR BLD MANUAL: 2 %
MONOCYTES # BLD AUTO: 0.38 K/UL (ref 0–0.85)
MONOCYTES NFR BLD AUTO: 3 % (ref 0–13.4)
MYELOCYTES NFR BLD MANUAL: 1 %
NEUTROPHILS # BLD AUTO: 10.84 K/UL (ref 2–7.15)
NEUTROPHILS NFR BLD: 86 % (ref 44–72)
NRBC # BLD AUTO: 0.05 K/UL
NRBC BLD-RTO: 0.4 /100 WBC
PHOSPHATE SERPL-MCNC: 4.3 MG/DL (ref 2.5–4.5)
PLATELET # BLD AUTO: 77 K/UL (ref 164–446)
PLATELET BLD QL SMEAR: NORMAL
PMV BLD AUTO: 11.6 FL (ref 9–12.9)
POLYCHROMASIA BLD QL SMEAR: NORMAL
POTASSIUM SERPL-SCNC: 3.9 MMOL/L (ref 3.6–5.5)
PROT SERPL-MCNC: 4.7 G/DL (ref 6–8.2)
RBC # BLD AUTO: 2.75 M/UL (ref 4.2–5.4)
RBC BLD AUTO: PRESENT
SIGNIFICANT IND 70042: ABNORMAL
SIGNIFICANT IND 70042: ABNORMAL
SITE SITE: ABNORMAL
SITE SITE: ABNORMAL
SODIUM SERPL-SCNC: 143 MMOL/L (ref 135–145)
SOURCE SOURCE: ABNORMAL
SOURCE SOURCE: ABNORMAL
TOXIC GRANULES BLD QL SMEAR: SLIGHT
WBC # BLD AUTO: 12.6 K/UL (ref 4.8–10.8)

## 2021-05-31 PROCEDURE — 700105 HCHG RX REV CODE 258: Performed by: SURGERY

## 2021-05-31 PROCEDURE — 84100 ASSAY OF PHOSPHORUS: CPT

## 2021-05-31 PROCEDURE — 700111 HCHG RX REV CODE 636 W/ 250 OVERRIDE (IP): Performed by: INTERNAL MEDICINE

## 2021-05-31 PROCEDURE — A9270 NON-COVERED ITEM OR SERVICE: HCPCS | Performed by: SURGERY

## 2021-05-31 PROCEDURE — 82962 GLUCOSE BLOOD TEST: CPT

## 2021-05-31 PROCEDURE — 700102 HCHG RX REV CODE 250 W/ 637 OVERRIDE(OP): Performed by: SURGERY

## 2021-05-31 PROCEDURE — 85027 COMPLETE CBC AUTOMATED: CPT

## 2021-05-31 PROCEDURE — 83735 ASSAY OF MAGNESIUM: CPT

## 2021-05-31 PROCEDURE — 99233 SBSQ HOSP IP/OBS HIGH 50: CPT | Performed by: INTERNAL MEDICINE

## 2021-05-31 PROCEDURE — 700105 HCHG RX REV CODE 258: Performed by: INTERNAL MEDICINE

## 2021-05-31 PROCEDURE — 80053 COMPREHEN METABOLIC PANEL: CPT

## 2021-05-31 PROCEDURE — 770006 HCHG ROOM/CARE - MED/SURG/GYN SEMI*

## 2021-05-31 PROCEDURE — 85018 HEMOGLOBIN: CPT

## 2021-05-31 PROCEDURE — 700111 HCHG RX REV CODE 636 W/ 250 OVERRIDE (IP): Performed by: SURGERY

## 2021-05-31 PROCEDURE — 85007 BL SMEAR W/DIFF WBC COUNT: CPT

## 2021-05-31 PROCEDURE — 82330 ASSAY OF CALCIUM: CPT

## 2021-05-31 PROCEDURE — 85014 HEMATOCRIT: CPT

## 2021-05-31 RX ADMIN — HYDROMORPHONE HYDROCHLORIDE 1 MG: 1 INJECTION, SOLUTION INTRAMUSCULAR; INTRAVENOUS; SUBCUTANEOUS at 09:04

## 2021-05-31 RX ADMIN — PIPERACILLIN AND TAZOBACTAM 3.38 G: 3; .375 INJECTION, POWDER, LYOPHILIZED, FOR SOLUTION INTRAVENOUS; PARENTERAL at 05:25

## 2021-05-31 RX ADMIN — HYDROMORPHONE HYDROCHLORIDE 1 MG: 1 INJECTION, SOLUTION INTRAMUSCULAR; INTRAVENOUS; SUBCUTANEOUS at 17:30

## 2021-05-31 RX ADMIN — DOCUSATE SODIUM 50 MG AND SENNOSIDES 8.6 MG 1 TABLET: 8.6; 5 TABLET, FILM COATED ORAL at 21:00

## 2021-05-31 RX ADMIN — HYDROMORPHONE HYDROCHLORIDE 1 MG: 1 INJECTION, SOLUTION INTRAMUSCULAR; INTRAVENOUS; SUBCUTANEOUS at 21:39

## 2021-05-31 RX ADMIN — DEXTROSE AND SODIUM CHLORIDE: 5; 900 INJECTION, SOLUTION INTRAVENOUS at 17:22

## 2021-05-31 RX ADMIN — POLYETHYLENE GLYCOL 3350 1 PACKET: 17 POWDER, FOR SOLUTION ORAL at 17:31

## 2021-05-31 RX ADMIN — ONDANSETRON 4 MG: 2 INJECTION INTRAMUSCULAR; INTRAVENOUS at 22:01

## 2021-05-31 RX ADMIN — FAMOTIDINE 20 MG: 20 TABLET ORAL at 17:31

## 2021-05-31 RX ADMIN — FAMOTIDINE 20 MG: 10 INJECTION INTRAVENOUS at 05:26

## 2021-05-31 RX ADMIN — CALCIUM GLUCONATE 1 G: 98 INJECTION, SOLUTION INTRAVENOUS at 13:34

## 2021-05-31 RX ADMIN — HYDROMORPHONE HYDROCHLORIDE 1 MG: 1 INJECTION, SOLUTION INTRAMUSCULAR; INTRAVENOUS; SUBCUTANEOUS at 05:25

## 2021-05-31 RX ADMIN — PIPERACILLIN AND TAZOBACTAM 3.38 G: 3; .375 INJECTION, POWDER, LYOPHILIZED, FOR SOLUTION INTRAVENOUS; PARENTERAL at 21:44

## 2021-05-31 RX ADMIN — DOCUSATE SODIUM 100 MG: 100 CAPSULE, LIQUID FILLED ORAL at 17:31

## 2021-05-31 RX ADMIN — HYDROMORPHONE HYDROCHLORIDE 1 MG: 1 INJECTION, SOLUTION INTRAMUSCULAR; INTRAVENOUS; SUBCUTANEOUS at 01:34

## 2021-05-31 RX ADMIN — PIPERACILLIN AND TAZOBACTAM 3.38 G: 3; .375 INJECTION, POWDER, LYOPHILIZED, FOR SOLUTION INTRAVENOUS; PARENTERAL at 13:34

## 2021-05-31 RX ADMIN — IBUPROFEN 800 MG: 400 TABLET ORAL at 14:41

## 2021-05-31 RX ADMIN — HYDROMORPHONE HYDROCHLORIDE 1 MG: 1 INJECTION, SOLUTION INTRAMUSCULAR; INTRAVENOUS; SUBCUTANEOUS at 13:06

## 2021-05-31 RX ADMIN — DEXTROSE AND SODIUM CHLORIDE 1000 ML: 5; 900 INJECTION, SOLUTION INTRAVENOUS at 01:41

## 2021-05-31 ASSESSMENT — PAIN DESCRIPTION - PAIN TYPE
TYPE: SURGICAL PAIN
TYPE: ACUTE PAIN;SURGICAL PAIN
TYPE: ACUTE PAIN;SURGICAL PAIN
TYPE: SURGICAL PAIN
TYPE: SURGICAL PAIN

## 2021-05-31 ASSESSMENT — ENCOUNTER SYMPTOMS
EYES NEGATIVE: 1
PSYCHIATRIC NEGATIVE: 1
RESPIRATORY NEGATIVE: 1
CARDIOVASCULAR NEGATIVE: 1
MUSCULOSKELETAL NEGATIVE: 1
NEUROLOGICAL NEGATIVE: 1
ABDOMINAL PAIN: 1

## 2021-05-31 NOTE — PROGRESS NOTES
Encouraged patient to be up walking today. She has belched some during the night and states that she has passed a little gas. Extremely faint bowel sounds heard in the upper quadrants.

## 2021-05-31 NOTE — PROGRESS NOTES
Report given to Amirah RUDOLPH, Pt is sitting up in chair, Pt states she is comfortable but drowsy. VSS, Call light placed within reach.

## 2021-05-31 NOTE — CARE PLAN
Problem: Knowledge Deficit - Standard  Goal: Patient and family/care givers will demonstrate understanding of plan of care, disease process/condition, diagnostic tests and medications  Outcome: Progressing  Note: Poc to be reviewed with pt daily.  Pt encouraged to call with any questions/concerns.  Pt will continue to vu and participate in POC     Problem: Pain - Standard  Goal: Alleviation of pain or a reduction in pain to the patient’s comfort goal  Outcome: Not Met  Note: Pt encouraged to call with any pain management concerns.  See mar for medication management.  Pt will advance towards increased mobilization with pain management.     Problem: Depression  Goal: Patient and family/caregiver will verbalize accurate information about at least two of the possible causes of depression, three-four of the signs and symptoms of depression  Note: Pt encouraged to verbalize concerns, ways to alleviate anxiety, increased involvement in ADL's, up oob, to advance towards goal of discharge home.     Problem: Fall Risk  Goal: Patient will remain free from falls  Outcome: Progressing  Note:  Pt encouraged to call for asst oob/chair.      Problem: Respiratory/Oxygenation Function Post-Surgical  Goal: Patient will achieve/maintain normal respiratory rate/effort  Outcome: Progressing     Problem: Early Mobilization - Post Surgery  Goal: Early mobilization post surgery  Outcome: Progressing     Problem: Pain - Post Surgery  Goal: Alleviation or reduction of pain post surgery  Outcome: Not Met     Problem: Bowel Elimination - Post Surgical  Goal: Patient will resume regular bowel sounds and function with no discomfort or distention  Outcome: Not Progressing   The patient is     Shift Goals  Clinical Goals: up to chair, decreased pain  Patient Goals: decreased pain    Progress made toward(s) clinical / shift goals:      Patient is not progressing towards the following goals:      Problem: Pain - Standard  Goal: Alleviation of pain  or a reduction in pain to the patient’s comfort goal  Outcome: Not Met  Note: Pt encouraged to call with any pain management concerns.  See mar for medication management.  Pt will advance towards increased mobilization with pain management.     Problem: Pain - Post Surgery  Goal: Alleviation or reduction of pain post surgery  Outcome: Not Met     Problem: Bowel Elimination - Post Surgical  Goal: Patient will resume regular bowel sounds and function with no discomfort or distention  Outcome: Not Progressing

## 2021-05-31 NOTE — PROGRESS NOTES
Critical Care Progress Note    Date of admission  5/22/2021    Chief Complaint  50 y.o. female admitted 5/22/2021 with abdominal pain    Hospital Course  50 y.o. female who presented 5/22/2021 with abdominal pain found to have a cecal volvulous.  She underwent a right hemicolectomy 5/22 and was doing well, passing flatus and taking po but then developed marked thrombocytopenia which went down to 12k. On 5/27 a CT scan was done that showed ascites. Underwent an exploratory lap and found to have Bacterial peritonitis with impending anastomotic   Leak. Due to the impending separation of the staple line, a new ileocolic anastamosis was performed.  Pt has a wound vac. Extubated without event and transferred to icu for monitoring.  She received two packs platelets and 20 meq of kcl in the OR    1 PRBC 5/30/21. HIT panel negative     On zosyn for the bacterial peritonitis (awaiting sensitivity actinomyces odontolyticus and enterecoccus faecalis)    Interval Problem Update  Reviewed last 24 hour events:  Chart review from the past 24 hours includes imaging, laboratory studies, vital signs and notes available.  Pertinent data for today    First day rounding on patient    Cardiac:   No events  Pulm:  RA  Neuro:  intact  Heme:  Platelets 77 (HIT panel negative) and hbg 8.0 -- no bleed one unit of blood 5/30  I/O:   +16L net, UOP 1500cc  ID: zosyn day 4, cultures growing B. [place], E faecalis, Actinomyces odontolyticus, remains afebrile  GI/endo:  NG removed; no stool; NG output 30 ml  Labs/Imaging:  reviewed  Lines:  Left IJ  Mobility:  OOB to chair  Symptoms:abd pain better    Review of Systems  Review of Systems   Constitutional: Positive for malaise/fatigue.   HENT: Negative.    Eyes: Negative.    Respiratory: Negative.    Cardiovascular: Negative.    Gastrointestinal: Positive for abdominal pain.   Genitourinary: Negative.    Musculoskeletal: Negative.    Skin: Negative.    Neurological: Negative.    Endo/Heme/Allergies:  Negative.    Psychiatric/Behavioral: Negative.       Vital Signs for last 24 hours   Temp:  [36.1 °C (97 °F)-37.4 °C (99.3 °F)] 36.6 °C (97.8 °F)  Pulse:  [68-99] 68  Resp:  [11-32] 30  BP: ()/(58-83) 93/58  SpO2:  [90 %-100 %] 97 %     Physical Exam   Physical Exam  Constitutional:       Appearance: Normal appearance.   HENT:      Head: Normocephalic and atraumatic.      Mouth/Throat:      Mouth: Mucous membranes are moist.   Eyes:      Pupils: Pupils are equal, round, and reactive to light.   Cardiovascular:      Rate and Rhythm: Normal rate.   Pulmonary:      Effort: Pulmonary effort is normal.      Breath sounds: Normal breath sounds.   Abdominal:      General: Bowel sounds are normal.      Palpations: Abdomen is soft.      Comments: Wound vac   Musculoskeletal:         General: Normal range of motion.   Skin:     General: Skin is warm and dry.   Neurological:      General: No focal deficit present.      Mental Status: She is alert and oriented to person, place, and time.   Psychiatric:         Mood and Affect: Mood normal.         Behavior: Behavior normal.         Thought Content: Thought content normal.         Judgment: Judgment normal.       Medications  Current Facility-Administered Medications   Medication Dose Route Frequency Provider Last Rate Last Admin   • D5 NS infusion   Intravenous Continuous Jeanette Nolasco M.D. 75 mL/hr at 05/31/21 0141 1,000 mL at 05/31/21 0141   • Metoprolol Tartrate (LOPRESSOR) injection 5 mg  5 mg Intravenous Q5 MIN PRN Jeanette Nolasco M.D.       • piperacillin-tazobactam (ZOSYN) 3.375 g in  mL IVPB  3.375 g Intravenous Q8HRS Maryam Wood M.D. 25 mL/hr at 05/31/21 1334 3.375 g at 05/31/21 1334   • HYDROmorphone (Dilaudid) injection 1 mg  1 mg Intravenous Q3HRS PRNIKKI Nolasco M.D.   1 mg at 05/31/21 1306   • fentaNYL (SUBLIMAZE) injection 50 mcg  50 mcg Intravenous Q2HRS PRN Jeanette Nolasco M.D.       • insulin regular (HumuLIN  R,NovoLIN R) injection  1-6 Units Subcutaneous Q6HRS Jeanette Nolasco M.D.        And   • glucose 4 g chewable tablet 16 g  16 g Oral Q15 MIN PRN Jeanette Nolasco M.D.        And   • dextrose 50% (D50W) injection 50 mL  50 mL Intravenous Q15 MIN PRN Jeanette Nolasco M.D.   50 mL at 05/30/21 0039   • LORazepam (ATIVAN) injection 0.5 mg  0.5 mg Intravenous Q4HRS PRN Christina Herman A.P.N.       • morphine (pf) 4 mg/mL injection 4 mg  4 mg Intravenous Q3HRS PRN Alda Doe M.D.   4 mg at 05/27/21 1809   • diazePAM (VALIUM) injection 5 mg  5 mg Intravenous Q6HRS PRN Maryam Wood M.D.   5 mg at 05/27/21 2015   • Respiratory Therapy Consult   Nebulization Continuous RT Maryam Wood M.D.       • Pharmacy Consult Request ...Pain Management Review 1 Each  1 Each Other PHARMACY TO DOSE Maryam Wood M.D.       • docusate sodium (COLACE) capsule 100 mg  100 mg Oral BID Maryam Wood M.D.   100 mg at 05/28/21 0507   • senna-docusate (PERICOLACE or SENOKOT S) 8.6-50 MG per tablet 1 tablet  1 tablet Oral Nightly Maryam Wood M.D.   1 tablet at 05/24/21 2030   • senna-docusate (PERICOLACE or SENOKOT S) 8.6-50 MG per tablet 1 tablet  1 tablet Oral Q24HRS PRN Maryam Wood M.D.       • polyethylene glycol/lytes (MIRALAX) PACKET 1 Packet  1 Packet Oral BID Maryam Wood M.D.   1 Packet at 05/26/21 1717   • magnesium hydroxide (MILK OF MAGNESIA) suspension 30 mL  30 mL Oral DAILY Maryam Wood M.D.   30 mL at 05/28/21 0507   • bisacodyl (DULCOLAX) suppository 10 mg  10 mg Rectal Q24HRS PRN Maryam Wood M.D.       • fleet enema 133 mL  1 Each Rectal Once PRN Maryam Wood M.D.       • ibuprofen (MOTRIN) tablet 800 mg  800 mg Oral TID PRN Maryam Wood M.D.   800 mg at 05/27/21 1405   • famotidine (PEPCID) tablet 20 mg  20 mg Oral BID Maryam Wood M.D.   20 mg at 05/28/21 0507    Or   • famotidine (PEPCID) injection 20 mg  20 mg Intravenous BID Maryam Wood M.D.   20 mg at  05/31/21 0526   • ondansetron (ZOFRAN) syringe/vial injection 4 mg  4 mg Intravenous Q4HRS PRN Maryam Wood M.D.   4 mg at 05/30/21 2143       Fluids    Intake/Output Summary (Last 24 hours) at 5/31/2021 1436  Last data filed at 5/31/2021 1400  Gross per 24 hour   Intake 2671.25 ml   Output 1500 ml   Net 1171.25 ml       Laboratory          Recent Labs     05/28/21 2329 05/28/21 2329 05/29/21 0509 05/29/21 0509 05/30/21 0605 05/30/21  1200 05/30/21 1600 05/30/21 2125 05/31/21  0540   SODIUM 137   < > 139  --  141  --   --   --  143   POTASSIUM 3.2*   < > 3.6   < > 3.1*   < > 3.6 3.9 3.9   CHLORIDE 104   < > 106  --  106  --   --   --  111   CO2 20   < > 21  --  24  --   --   --  24   BUN 12   < > 14  --  19  --   --   --  15   CREATININE 0.58   < > 0.61  --  0.57  --   --   --  0.52   MAGNESIUM 2.4  --   --   --  2.3  --   --   --  2.0   PHOSPHORUS 4.3  --   --   --  4.1  --   --   --  4.3   CALCIUM 7.5*   < > 7.3*  --  7.3*  --   --   --  7.4*    < > = values in this interval not displayed.     Recent Labs     05/29/21 0509 05/30/21 0605 05/31/21  0540   ALTSGPT 11 12 11   ASTSGOT 31 37 28   ALKPHOSPHAT 57 78 51   TBILIRUBIN 0.5 0.4 0.5   GLUCOSE 96 81 126*     Recent Labs     05/29/21 0509 05/29/21 0509 05/30/21 0605 05/30/21 1600 05/31/21  0540   WBC 11.9*   < > 9.6 11.7* 12.6*   NEUTSPOLYS 62.00   < > 86.00* 91.00* 86.00*   LYMPHOCYTES 7.00*   < > 6.00* 5.00* 8.00*   MONOCYTES 3.00   < > 6.00 2.00 3.00   EOSINOPHILS 0.00   < > 0.00 0.00 0.00   BASOPHILS 0.00   < > 0.00 0.00 0.00   ASTSGOT 31  --  37  --  28   ALTSGPT 11  --  12  --  11   ALKPHOSPHAT 57  --  78  --  51   TBILIRUBIN 0.5  --  0.4  --  0.5    < > = values in this interval not displayed.     Recent Labs     05/30/21  0605 05/30/21  1600 05/31/21  0540   RBC 2.49* 3.05* 2.75*   HEMOGLOBIN 6.9* 8.8* 8.0*   HEMATOCRIT 21.3* 25.7* 24.3*   PLATELETCT 49* 62* 77*       Imaging  No new imaging    Assessment/Plan    * Cecal volvulus (HCC)-  (present on admission)  Assessment & Plan  5/22 anastamosis but developed bacterial peritonitis and repeat exlap on 5/28 with redo of anastomosis due to impending leak at staple line  Wound vac  S/p 1 unit PRBC 5/30, responded appropriately  NGT removed  Tolerating CLD, cont D5NS mIVF  + BS  No stool    Bacterial peritonitis (HCC)  Assessment & Plan  S/p exlap  Zosyn day 4  Polymicrobial- B fragilis, actinomyces odontolyticus and enterecoccus faecalis  Wbc normal, no fever  DC central line, anticipate need for PICC    Thrombocytopenia (HCC)  Assessment & Plan  From sepsis secondary to the bacterial peritonitis  Platelets remain low  HIT Ab panel negative  SCDs only    Atrial fibrillation with RVR (HCC)  Assessment & Plan  Episode 5/29, post-operative, resolved     Transfer to general medical floor    VTE:  Contraindicated  Ulcer: H2 Antagonist  Lines: Central Line  ordered for removal    I have performed a physical exam and reviewed and updated ROS and Plan today (5/31/2021). In review of yesterday's note (5/30/2021), there are no changes except as documented above.     Discussed patient condition and risk of morbidity and/or mortality with Family, RN, Therapies, Charge nurse / hot rounds and Patient and Dr. Grier    This note was generated using voice recognition software which has a chance of producing errors of grammar and content.  I have made every reasonable attempt to find and correct any errors, but it should be expected that some may not be found prior to finalization of this note.  __________  Efrain Ya MD  Pulmonary and Critical Care Medicine  [facility]

## 2021-05-31 NOTE — PROGRESS NOTES
Patient up in recliner and assisted back to bed with some difficulty. Fairly drowsy, she shuffles when she walks. Encouraged her to be up tomorrow walking in the halls. States understanding. NG remains clamped. Does c/o a little nausea. Output is adequate. Skin remains taut and pale.

## 2021-05-31 NOTE — CARE PLAN
Problem: Knowledge Deficit - Standard  Goal: Patient and family/care givers will demonstrate understanding of plan of care, disease process/condition, diagnostic tests and medications  Outcome: Progressing     Problem: Pain - Standard  Goal: Alleviation of pain or a reduction in pain to the patient’s comfort goal  Outcome: Progressing     Problem: Depression  Goal: Patient and family/caregiver will verbalize accurate information about at least two of the possible causes of depression, three-four of the signs and symptoms of depression  Outcome: Progressing     Problem: Fall Risk  Goal: Patient will remain free from falls  Outcome: Progressing     Problem: Skin Integrity  Goal: Skin integrity is maintained or improved  Outcome: Progressing     Problem: Respiratory/Oxygenation Function Post-Surgical  Goal: Patient will achieve/maintain normal respiratory rate/effort  Outcome: Progressing     Problem: Early Mobilization - Post Surgery  Goal: Early mobilization post surgery  Outcome: Progressing     Problem: Pain - Post Surgery  Goal: Alleviation or reduction of pain post surgery  Outcome: Progressing     Problem: Wound/ / Incision Healing  Goal: Patient's wound/surgical incision will decrease in size and heals properly  Outcome: Progressing     Problem: Bowel Elimination - Post Surgical  Goal: Patient will resume regular bowel sounds and function with no discomfort or distention  Outcome: Progressing     Problem: Respiratory  Goal: Patient will achieve/maintain optimum respiratory ventilation and gas exchange  Outcome: Progressing   The patient is Watcher - Medium risk of patient condition declining or worsening    Shift Goals  Clinical Goals:  (Up walking in [place].)  Patient Goals: OOB to chair    Progress made toward(s) clinical / shift goals:  Patient is not progressing towards the following goals:

## 2021-05-31 NOTE — PROGRESS NOTES
"[id number] - report from Amirah RUDOLPH.  Pt resting comfortably in bed.  A&O x 4, anxious with statements of \"I can't do this anymore\".  Pt vu wanting this pain to go away, wanting to be outside enjoying activities. Encouragement for ADL's, up oob to chair, IS use, for advancement towards these goals. BARKLEY, repositions self in bed.  Fall precautions in effect.  Pt calls approp for asst.  C/o ongoing surgical pain, see mar for medication interventions. Denies other pain, sob, dizziness, nausea. See flowsheet for assess.  poc reviewed with pt, pt vu, all questions answered.  Pt remains npo, water at bs for strict swish and spit only, pt vu. Tele = sr,  Vss.      1400 - pt transferred to  213-1 with all belongings.  Report to Joslyn RUDOLPH who assumes care.  "

## 2021-05-31 NOTE — CARE PLAN
Problem: Nutritional:  Goal: Achieve adequate nutritional intake  Description: Advance diet as tolerated. Patient will consume >50% of meals.  Outcome: Not Progressing     Pt continues to be NPO. NPO x 3 days. Faint bowel sounds noted by nursing. If pt is not able to tolerate diet within next 24 hours, MD may want to consider initiation of TPN. RD will follow.

## 2021-05-31 NOTE — PROGRESS NOTES
Pt transferred from ICU to room 213-1. Report received from KLAUDIA Waller. Pt alert and in no acute distress. Rating abd pain 3/10. Young discontinued, ambulating to the bathroom to void. Tolerating small amounts of clear liquid diet. No BM. VSS on 1L O2. Educated pt on unit protocols and call light.

## 2021-05-31 NOTE — PROGRESS NOTES
"Progress Note:  5/31/2021, 10:00 AM    S: No acute events.  Minimal NG tube output.  No nausea.  No BM or flatus yet but she feels like it is close.  Pain controlled.  Normal sinus rhythm.  Reasonable response to transfusion    O:  /77   Pulse 83   Temp 36.1 °C (97 °F) (Temporal)   Resp 16   Ht 1.753 m (5' 9\")   Wt 72.3 kg (159 lb 6.3 oz)   SpO2 95%     NAD, awake, alert  Breathing is nonlabored  Abdomen is soft, appropriately tender, Prevena in place      A:   Active Hospital Problems    Diagnosis    • Cecal volvulus (HCC) [K56.2]      Priority: High   • Atrial fibrillation with RVR (HCC) [I48.91]    • Bacterial peritonitis (HCC) [K65.9]    • Thrombocytopenia (HCC) [D69.6]          P:   -Minimal NG tube output.  Remove tube.  Discussed with nurse  -Okay for sips with clears as tolerated  -Ambulate  -Okay with transfer to telemetry from surgical standpoint on hospitalist service per ICU recommendation    Riki Grier M.D.  [facility]  [phone]    "

## 2021-06-01 ENCOUNTER — APPOINTMENT (OUTPATIENT)
Dept: RADIOLOGY | Facility: MEDICAL CENTER | Age: 51
DRG: 329 | End: 2021-06-01
Attending: INTERNAL MEDICINE
Payer: COMMERCIAL

## 2021-06-01 PROBLEM — I48.91 ATRIAL FIBRILLATION WITH RVR (HCC): Status: RESOLVED | Noted: 2021-05-29 | Resolved: 2021-06-01

## 2021-06-01 LAB
ALBUMIN SERPL BCP-MCNC: 2 G/DL (ref 3.2–4.9)
ALBUMIN/GLOB SERPL: 0.7 G/DL
ALP SERPL-CCNC: 75 U/L (ref 30–99)
ALT SERPL-CCNC: 11 U/L (ref 2–50)
ANION GAP SERPL CALC-SCNC: 10 MMOL/L (ref 7–16)
AST SERPL-CCNC: 26 U/L (ref 12–45)
BASOPHILS # BLD AUTO: 0 % (ref 0–1.8)
BASOPHILS # BLD: 0 K/UL (ref 0–0.12)
BILIRUB SERPL-MCNC: 0.6 MG/DL (ref 0.1–1.5)
BUN SERPL-MCNC: 10 MG/DL (ref 8–22)
CA-I SERPL-SCNC: 1.08 MMOL/L (ref 1.1–1.3)
CALCIUM SERPL-MCNC: 7.3 MG/DL (ref 8.4–10.2)
CHLORIDE SERPL-SCNC: 110 MMOL/L (ref 96–112)
CO2 SERPL-SCNC: 23 MMOL/L (ref 20–33)
CREAT SERPL-MCNC: 0.47 MG/DL (ref 0.5–1.4)
EOSINOPHIL # BLD AUTO: 0.16 K/UL (ref 0–0.51)
EOSINOPHIL NFR BLD: 1 % (ref 0–6.9)
ERYTHROCYTE [DISTWIDTH] IN BLOOD BY AUTOMATED COUNT: 51.2 FL (ref 35.9–50)
GLOBULIN SER CALC-MCNC: 2.7 G/DL (ref 1.9–3.5)
GLUCOSE BLD-MCNC: 110 MG/DL (ref 65–99)
GLUCOSE BLD-MCNC: 112 MG/DL (ref 65–99)
GLUCOSE BLD-MCNC: 77 MG/DL (ref 65–99)
GLUCOSE BLD-MCNC: 81 MG/DL (ref 65–99)
GLUCOSE SERPL-MCNC: 111 MG/DL (ref 65–99)
HCT VFR BLD AUTO: 24 % (ref 37–47)
HGB BLD-MCNC: 7.8 G/DL (ref 12–16)
LYMPHOCYTES # BLD AUTO: 1.55 K/UL (ref 1–4.8)
LYMPHOCYTES NFR BLD: 10 % (ref 22–41)
MANUAL DIFF BLD: NORMAL
MCH RBC QN AUTO: 29.1 PG (ref 27–33)
MCHC RBC AUTO-ENTMCNC: 32.5 G/DL (ref 33.6–35)
MCV RBC AUTO: 89.6 FL (ref 81.4–97.8)
METAMYELOCYTES NFR BLD MANUAL: 2 %
MONOCYTES # BLD AUTO: 1.09 K/UL (ref 0–0.85)
MONOCYTES NFR BLD AUTO: 7 % (ref 0–13.4)
NEUTROPHILS # BLD AUTO: 12.4 K/UL (ref 2–7.15)
NEUTROPHILS NFR BLD: 79 % (ref 44–72)
NEUTS BAND NFR BLD MANUAL: 1 % (ref 0–10)
NRBC # BLD AUTO: 0.03 K/UL
NRBC BLD-RTO: 0.2 /100 WBC
PLATELET # BLD AUTO: 113 K/UL (ref 164–446)
PLATELET BLD QL SMEAR: NORMAL
PMV BLD AUTO: 11.4 FL (ref 9–12.9)
POTASSIUM SERPL-SCNC: 3.4 MMOL/L (ref 3.6–5.5)
PROT SERPL-MCNC: 4.7 G/DL (ref 6–8.2)
RBC # BLD AUTO: 2.68 M/UL (ref 4.2–5.4)
RBC BLD AUTO: NORMAL
SODIUM SERPL-SCNC: 143 MMOL/L (ref 135–145)
WBC # BLD AUTO: 15.5 K/UL (ref 4.8–10.8)

## 2021-06-01 PROCEDURE — 700111 HCHG RX REV CODE 636 W/ 250 OVERRIDE (IP): Performed by: SURGERY

## 2021-06-01 PROCEDURE — 97605 NEG PRS WND THER DME<=50SQCM: CPT

## 2021-06-01 PROCEDURE — C1751 CATH, INF, PER/CENT/MIDLINE: HCPCS

## 2021-06-01 PROCEDURE — 82330 ASSAY OF CALCIUM: CPT

## 2021-06-01 PROCEDURE — 700105 HCHG RX REV CODE 258: Performed by: INTERNAL MEDICINE

## 2021-06-01 PROCEDURE — 80053 COMPREHEN METABOLIC PANEL: CPT

## 2021-06-01 PROCEDURE — 700111 HCHG RX REV CODE 636 W/ 250 OVERRIDE (IP): Performed by: INTERNAL MEDICINE

## 2021-06-01 PROCEDURE — 85007 BL SMEAR W/DIFF WBC COUNT: CPT

## 2021-06-01 PROCEDURE — 770006 HCHG ROOM/CARE - MED/SURG/GYN SEMI*

## 2021-06-01 PROCEDURE — A9270 NON-COVERED ITEM OR SERVICE: HCPCS | Performed by: SURGERY

## 2021-06-01 PROCEDURE — A9270 NON-COVERED ITEM OR SERVICE: HCPCS | Performed by: INTERNAL MEDICINE

## 2021-06-01 PROCEDURE — 05HB33Z INSERTION OF INFUSION DEVICE INTO RIGHT BASILIC VEIN, PERCUTANEOUS APPROACH: ICD-10-PCS | Performed by: INTERNAL MEDICINE

## 2021-06-01 PROCEDURE — 99233 SBSQ HOSP IP/OBS HIGH 50: CPT | Performed by: INTERNAL MEDICINE

## 2021-06-01 PROCEDURE — 82962 GLUCOSE BLOOD TEST: CPT | Mod: 91

## 2021-06-01 PROCEDURE — 700105 HCHG RX REV CODE 258: Performed by: SURGERY

## 2021-06-01 PROCEDURE — B54MZZA ULTRASONOGRAPHY OF RIGHT UPPER EXTREMITY VEINS, GUIDANCE: ICD-10-PCS | Performed by: INTERNAL MEDICINE

## 2021-06-01 PROCEDURE — 85027 COMPLETE CBC AUTOMATED: CPT

## 2021-06-01 PROCEDURE — 700111 HCHG RX REV CODE 636 W/ 250 OVERRIDE (IP): Performed by: HOSPITALIST

## 2021-06-01 PROCEDURE — 700102 HCHG RX REV CODE 250 W/ 637 OVERRIDE(OP): Performed by: SURGERY

## 2021-06-01 PROCEDURE — 700102 HCHG RX REV CODE 250 W/ 637 OVERRIDE(OP): Performed by: INTERNAL MEDICINE

## 2021-06-01 RX ORDER — KETOROLAC TROMETHAMINE 30 MG/ML
15 INJECTION, SOLUTION INTRAMUSCULAR; INTRAVENOUS EVERY 6 HOURS PRN
Status: DISCONTINUED | OUTPATIENT
Start: 2021-06-01 | End: 2021-06-03

## 2021-06-01 RX ORDER — DEXTROSE MONOHYDRATE 25 G/50ML
50 INJECTION, SOLUTION INTRAVENOUS
Status: DISCONTINUED | OUTPATIENT
Start: 2021-06-01 | End: 2021-06-21 | Stop reason: HOSPADM

## 2021-06-01 RX ORDER — POTASSIUM CHLORIDE 20 MEQ/1
40 TABLET, EXTENDED RELEASE ORAL ONCE
Status: COMPLETED | OUTPATIENT
Start: 2021-06-01 | End: 2021-06-01

## 2021-06-01 RX ADMIN — HYDROMORPHONE HYDROCHLORIDE 1 MG: 1 INJECTION, SOLUTION INTRAMUSCULAR; INTRAVENOUS; SUBCUTANEOUS at 13:38

## 2021-06-01 RX ADMIN — CALCIUM GLUCONATE 1 G: 98 INJECTION, SOLUTION INTRAVENOUS at 13:29

## 2021-06-01 RX ADMIN — HYDROMORPHONE HYDROCHLORIDE 1 MG: 1 INJECTION, SOLUTION INTRAMUSCULAR; INTRAVENOUS; SUBCUTANEOUS at 00:50

## 2021-06-01 RX ADMIN — DOCUSATE SODIUM 100 MG: 100 CAPSULE, LIQUID FILLED ORAL at 18:05

## 2021-06-01 RX ADMIN — DOCUSATE SODIUM 100 MG: 100 CAPSULE, LIQUID FILLED ORAL at 08:12

## 2021-06-01 RX ADMIN — ONDANSETRON 4 MG: 2 INJECTION INTRAMUSCULAR; INTRAVENOUS at 03:47

## 2021-06-01 RX ADMIN — PIPERACILLIN AND TAZOBACTAM 3.38 G: 3; .375 INJECTION, POWDER, LYOPHILIZED, FOR SOLUTION INTRAVENOUS; PARENTERAL at 20:26

## 2021-06-01 RX ADMIN — FAMOTIDINE 20 MG: 20 TABLET ORAL at 08:12

## 2021-06-01 RX ADMIN — HYDROMORPHONE HYDROCHLORIDE 1 MG: 1 INJECTION, SOLUTION INTRAMUSCULAR; INTRAVENOUS; SUBCUTANEOUS at 03:54

## 2021-06-01 RX ADMIN — ONDANSETRON 4 MG: 2 INJECTION INTRAMUSCULAR; INTRAVENOUS at 13:38

## 2021-06-01 RX ADMIN — MORPHINE SULFATE 4 MG: 4 INJECTION INTRAVENOUS at 15:51

## 2021-06-01 RX ADMIN — MORPHINE SULFATE 4 MG: 4 INJECTION INTRAVENOUS at 06:27

## 2021-06-01 RX ADMIN — POLYETHYLENE GLYCOL 3350 1 PACKET: 17 POWDER, FOR SOLUTION ORAL at 08:13

## 2021-06-01 RX ADMIN — FAMOTIDINE 20 MG: 20 TABLET ORAL at 18:05

## 2021-06-01 RX ADMIN — MAGNESIUM HYDROXIDE 30 ML: 400 SUSPENSION ORAL at 08:09

## 2021-06-01 RX ADMIN — IBUPROFEN 800 MG: 400 TABLET ORAL at 02:28

## 2021-06-01 RX ADMIN — PIPERACILLIN AND TAZOBACTAM 3.38 G: 3; .375 INJECTION, POWDER, LYOPHILIZED, FOR SOLUTION INTRAVENOUS; PARENTERAL at 13:29

## 2021-06-01 RX ADMIN — MORPHINE SULFATE 4 MG: 4 INJECTION INTRAVENOUS at 11:30

## 2021-06-01 RX ADMIN — MORPHINE SULFATE 4 MG: 4 INJECTION INTRAVENOUS at 22:10

## 2021-06-01 RX ADMIN — KETOROLAC TROMETHAMINE 15 MG: 30 INJECTION, SOLUTION INTRAMUSCULAR; INTRAVENOUS at 18:04

## 2021-06-01 RX ADMIN — PIPERACILLIN AND TAZOBACTAM 3.38 G: 3; .375 INJECTION, POWDER, LYOPHILIZED, FOR SOLUTION INTRAVENOUS; PARENTERAL at 06:16

## 2021-06-01 RX ADMIN — HYDROMORPHONE HYDROCHLORIDE 1 MG: 1 INJECTION, SOLUTION INTRAMUSCULAR; INTRAVENOUS; SUBCUTANEOUS at 08:09

## 2021-06-01 RX ADMIN — ONDANSETRON 4 MG: 2 INJECTION INTRAMUSCULAR; INTRAVENOUS at 18:05

## 2021-06-01 RX ADMIN — POTASSIUM CHLORIDE 40 MEQ: 1500 TABLET, EXTENDED RELEASE ORAL at 11:29

## 2021-06-01 RX ADMIN — POLYETHYLENE GLYCOL 3350 1 PACKET: 17 POWDER, FOR SOLUTION ORAL at 18:05

## 2021-06-01 ASSESSMENT — ENCOUNTER SYMPTOMS
PSYCHIATRIC NEGATIVE: 1
EYES NEGATIVE: 1
NEUROLOGICAL NEGATIVE: 1
RESPIRATORY NEGATIVE: 1
ABDOMINAL PAIN: 1
CARDIOVASCULAR NEGATIVE: 1
MUSCULOSKELETAL NEGATIVE: 1

## 2021-06-01 ASSESSMENT — PAIN DESCRIPTION - PAIN TYPE
TYPE: ACUTE PAIN
TYPE: SURGICAL PAIN

## 2021-06-01 NOTE — PROCEDURES
Vascular Access Team    Date of Insertion: 06/01/2021  Internal length: 9  External Length: 0  Vein Occupancy %: 28  Reason for Midline: IV ACCESS/REMOVAL OF CL  Labs: WBC 15.5, , BUN 10, Cr 0.47, GFR >60    Orders confirmed, vessel patency confirmed with ultrasound. Risks and benefits of procedure explained to patient and education regarding line associated bloodstream infections provided. Questions answered.     Power Midline placed in RUE per licensed provider order with ultrasound guidance. 4 Fr, 1 lumen Power Midline placed in BASILIC vein after 1 attempt(s). 2 mL of 1% lidocaine injected intradermally, 21 gauge microintroducer needle and modified Seldinger technique used. 9 cm catheter inserted with good blood return. Secured at 0 cm marker. Each lumen flushed without resistance with 10 mL 0.9% normal saline. Midline secured with Biopatch and Tegaderm.     Midline placement is confirmed by nurse using ultrasound and ability to flush and draw blood. Midline is appropriate for use at this time.  Patient tolerated procedure well, without complications.  No X-ray is needed for placement confirmation.  Patient condition relayed to unit RN or ordering physician via this post procedure note in the EMR.     Ultrasound images uploaded to PACS and viewable in the EMR - yes  Ultrasound imaged printed and placed in paper chart - no     BARD Power Midline ref # [id number], Lot # [id number], Expiration Date 2022-05-31

## 2021-06-01 NOTE — PROGRESS NOTES
Hospital Medicine Progress Note    Date of admission  5/22/2021    Chief Complaint  50 y.o. female admitted 5/22/2021 with abdominal pain    Hospital Course  50 y.o. female who presented 5/22/2021 with abdominal pain found to have a cecal volvulous.  She underwent a right hemicolectomy 5/22 and was doing well, passing flatus and taking po but then developed marked thrombocytopenia which went down to 12k. On 5/27 a CT scan was done that showed ascites. Underwent an exploratory lap and found to have Bacterial peritonitis with impending anastomotic   Leak. Due to the impending separation of the staple line, a new ileocolic anastamosis was performed.  Pt has a wound vac. Extubated without event and transferred to icu for monitoring.  She received two packs platelets and 20 meq of kcl in the OR    1 PRBC 5/30/21. HIT panel negative     On zosyn for the bacterial peritonitis (awaiting sensitivity actinomyces odontolyticus and enterecoccus faecalis)    Interval Problem Update  Reviewed last 24 hour events:  Chart review from the past 24 hours includes imaging, laboratory studies, vital signs and notes available.  Pertinent data for today    Transferred to floor yesterday  Wound care today at bedside by Dr Grier, Prevena removed, some leakage from incision, noted underlying fat necrosis    Cardiac:   No events  Pulm:  2LPM  Neuro:  intact  Heme:  Platelets 113 (HIT panel negative) and hbg 7.8 -- no bleed one unit of blood 5/30  I/O:   +18L net, UOP 430cc  ID: zosyn day 5, cultures growing B. [place], E faecalis, Actinomyces odontolyticus, remains afebrile  GI/endo:  NG removed; no stool; NG output 30 ml  Labs/Imaging:  reviewed  Lines:  Left IJ  Mobility:  OOB to chair  Symptoms: abd pain after wound change    Review of Systems  Review of Systems   Constitutional: Positive for malaise/fatigue.   HENT: Negative.    Eyes: Negative.    Respiratory: Negative.    Cardiovascular: Negative.    Gastrointestinal: Positive for  abdominal pain.   Genitourinary: Negative.    Musculoskeletal: Negative.    Skin: Negative.    Neurological: Negative.    Endo/Heme/Allergies: Negative.    Psychiatric/Behavioral: Negative.       Vital Signs for last 24 hours   Temp:  [36.3 °C (97.3 °F)-36.6 °C (97.8 °F)] 36.3 °C (97.3 °F)  Pulse:  [68-87] 86  Resp:  [14-30] 16  BP: ()/(58-80) 132/78  SpO2:  [91 %-98 %] 95 %     Physical Exam   Physical Exam  Constitutional:       General: She is in acute distress (wound care change).      Appearance: Normal appearance.   HENT:      Head: Normocephalic and atraumatic.      Mouth/Throat:      Mouth: Mucous membranes are moist.   Eyes:      Pupils: Pupils are equal, round, and reactive to light.   Cardiovascular:      Rate and Rhythm: Normal rate.   Pulmonary:      Effort: Pulmonary effort is normal.      Breath sounds: Normal breath sounds.   Abdominal:      General: Bowel sounds are normal.      Palpations: Abdomen is soft.      Comments: Wound vac removed  Midline staples   Musculoskeletal:         General: Normal range of motion.   Skin:     General: Skin is warm and dry.      Comments: anasarca   Neurological:      General: No focal deficit present.      Mental Status: She is alert and oriented to person, place, and time.   Psychiatric:         Mood and Affect: Mood normal.         Behavior: Behavior normal.         Thought Content: Thought content normal.         Judgment: Judgment normal.       Medications  Current Facility-Administered Medications   Medication Dose Route Frequency Provider Last Rate Last Admin   • potassium chloride SA (Kdur) tablet 40 mEq  40 mEq Oral Once Efrain Ya M.D.       • calcium GLUConate 1 g in  mL IVPB  1 g Intravenous Once Efrain Ya M.D.       • ketorolac (TORADOL) injection 15 mg  15 mg Intravenous Q6HRS PRN Efrain Ya M.D.       • insulin regular (HumuLIN R,NovoLIN R) injection  2-9 Units Subcutaneous 4X/DAY ACHS Efrain Ya M.D.         And   • glucose 4 g chewable tablet 16 g  16 g Oral Q15 MIN PRN Efrian Ya M.D.        And   • dextrose 50% (D50W) injection 50 mL  50 mL Intravenous Q15 MIN PRN Efrain Ya M.D.       • Metoprolol Tartrate (LOPRESSOR) injection 5 mg  5 mg Intravenous Q5 MIN PRN Jeanette Nolasco M.D.       • piperacillin-tazobactam (ZOSYN) 3.375 g in  mL IVPB  3.375 g Intravenous Q8HRS Maryam Wood M.D. 25 mL/hr at 06/01/21 0616 3.375 g at 06/01/21 0616   • HYDROmorphone (Dilaudid) injection 1 mg  1 mg Intravenous Q3HRS PRN Jeanette Nolasco M.D.   1 mg at 06/01/21 0809   • LORazepam (ATIVAN) injection 0.5 mg  0.5 mg Intravenous Q4HRS PRN Christina Herman, A.P.NDarlyn       • morphine (pf) 4 mg/mL injection 4 mg  4 mg Intravenous Q3HRS PRN Alda Doe M.D.   4 mg at 06/01/21 0627   • diazePAM (VALIUM) injection 5 mg  5 mg Intravenous Q6HRS PRN Maryam Wood M.D.   5 mg at 05/27/21 2015   • Respiratory Therapy Consult   Nebulization Continuous RT Maryam Wood M.D.       • Pharmacy Consult Request ...Pain Management Review 1 Each  1 Each Other PHARMACY TO DOSE Maryam Wood M.D.       • docusate sodium (COLACE) capsule 100 mg  100 mg Oral BID Maryam Wood M.D.   100 mg at 06/01/21 0812   • senna-docusate (PERICOLACE or SENOKOT S) 8.6-50 MG per tablet 1 tablet  1 tablet Oral Nightly Maryam Wood M.D.   1 tablet at 05/31/21 2100   • senna-docusate (PERICOLACE or SENOKOT S) 8.6-50 MG per tablet 1 tablet  1 tablet Oral Q24HRS PRN Maryam Wood M.D.       • polyethylene glycol/lytes (MIRALAX) PACKET 1 Packet  1 Packet Oral BID Maryam Wood M.D.   1 Packet at 06/01/21 0813   • magnesium hydroxide (MILK OF MAGNESIA) suspension 30 mL  30 mL Oral DAILY Maryam Wood M.D.   30 mL at 06/01/21 0809   • bisacodyl (DULCOLAX) suppository 10 mg  10 mg Rectal Q24HRS PRN Maryam Wood M.D.       • fleet enema 133 mL  1 Each Rectal Once PRN Maryam Wood M.D.       • famotidine (PEPCID)  tablet 20 mg  20 mg Oral BID Maryam Wood M.D.   20 mg at 06/01/21 0812    Or   • famotidine (PEPCID) injection 20 mg  20 mg Intravenous BID Maryam Wood M.D.   20 mg at 05/31/21 0526   • ondansetron (ZOFRAN) syringe/vial injection 4 mg  4 mg Intravenous Q4HRS PRN Maryam Wood M.D.   4 mg at 06/01/21 0347       Fluids    Intake/Output Summary (Last 24 hours) at 6/1/2021 1108  Last data filed at 6/1/2021 0700  Gross per 24 hour   Intake 2163.33 ml   Output 195 ml   Net 1968.33 ml       Laboratory          Recent Labs     05/30/21  0605 05/30/21  1200 05/30/21  2125 05/31/21  0540 06/01/21  0257   SODIUM 141  --   --  143 143   POTASSIUM 3.1*   < > 3.9 3.9 3.4*   CHLORIDE 106  --   --  111 110   CO2 24  --   --  24 23   BUN 19  --   --  15 10   CREATININE 0.57  --   --  0.52 0.47*   MAGNESIUM 2.3  --   --  2.0  --    PHOSPHORUS 4.1  --   --  4.3  --    CALCIUM 7.3*  --   --  7.4* 7.3*    < > = values in this interval not displayed.     Recent Labs     05/30/21  0605 05/31/21  0540 06/01/21  0257   ALTSGPT 12 11 11   ASTSGOT 37 28 26   ALKPHOSPHAT 78 51 75   TBILIRUBIN 0.4 0.5 0.6   GLUCOSE 81 126* 111*     Recent Labs     05/30/21  0605 05/30/21  0605 05/30/21  1600 05/31/21  0540 06/01/21  0257   WBC 9.6   < > 11.7* 12.6* 15.5*   NEUTSPOLYS 86.00*   < > 91.00* 86.00* 79.00*   LYMPHOCYTES 6.00*   < > 5.00* 8.00* 10.00*   MONOCYTES 6.00   < > 2.00 3.00 7.00   EOSINOPHILS 0.00   < > 0.00 0.00 1.00   BASOPHILS 0.00   < > 0.00 0.00 0.00   ASTSGOT 37  --   --  28 26   ALTSGPT 12  --   --  11 11   ALKPHOSPHAT 78  --   --  51 75   TBILIRUBIN 0.4  --   --  0.5 0.6    < > = values in this interval not displayed.     Recent Labs     05/30/21  1600 05/30/21  1600 05/31/21  0540 05/31/21  1520 06/01/21  0257   RBC 3.05*  --  2.75*  --  2.68*   HEMOGLOBIN 8.8*   < > 8.0* 8.1* 7.8*   HEMATOCRIT 25.7*   < > 24.3* 24.3* 24.0*   PLATELETCT 62*  --  77*  --  113*    < > = values in this interval not displayed.        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 Plan for 14 day total abx given repeat exlap and complicated surgical course  Pain control: dc ivf, fentanyl, start ketorolac, cont dilaudid and morphine PRN  Other: S/p 1 unit PRBC 5/30, responded appropriately  Nutrition: NGT removed, Tolerating CLD, dc mIVF, change ISS to AC/QHS  Bowel: + BS, No stool    Bacterial peritonitis (HCC)  Assessment & Plan  S/p exlap  Zosyn day 5, plan for 14 day course  Polymicrobial- B fragilis, actinomyces odontolyticus and enterecoccus faecalis  Wbc normal, no fever  DC central line once PICC placed    Thrombocytopenia (HCC)  Assessment & Plan  From sepsis secondary to the bacterial peritonitis  Platelets remain low  HIT Ab panel negative  SCDs only     VTE:  Contraindicated  Ulcer: H2 Antagonist  Lines: Central Line  ordered for removal once PICC placed    I have performed a physical exam and reviewed and updated ROS and Plan today (6/1/2021). In review of yesterday's note (5/31/2021), there are no changes except as documented above.     Discussed patient condition and risk of morbidity and/or mortality with RN and Patient and Dr. Grier    This note was generated using voice recognition software which has a chance of producing errors of grammar and content.  I have made every reasonable attempt to find and correct any errors, but it should be expected that some may not be found prior to finalization of this note.  __________  Efrain Ya MD  Pulmonary and Critical Care Medicine  [facility]

## 2021-06-01 NOTE — CARE PLAN
Problem: Nutritional:  Goal: Achieve adequate nutritional intake  Description: Advance diet as tolerated. Patient will consume >50% of meals.  Outcome: Progressing

## 2021-06-01 NOTE — PROGRESS NOTES
2 RN skin check.  Pt has prevena wound vac to abd. Dressing is dry and intact with small old drainage.  Skin otherwise intact.

## 2021-06-01 NOTE — PROGRESS NOTES
"Progress Note:  6/1/2021, 11:00 AM    S: No acute events.  Transferred from ICU to floor.  Feeling better today.  Passing gas and tolerating sips of clear liquids.  NG tube removed.  Total body edema and difficult IV access.  Medicine team planning to place PICC line and remove central line.    Prevena was still functioning, but there was more than expected output in it.  It was removed and there was leakage from the incision.  Several staples removed due to underlying fat necrosis.  No evidence of infection or fascial dehiscence    O:  /78   Pulse 71   Temp 36.4 °C (97.6 °F) (Oral)   Resp 16   Ht 1.753 m (5' 9\")   Wt 72.3 kg (159 lb 6.3 oz)   SpO2 92%     NAD, awake and alert  Breathing is nonlabored  Abdomen is soft, appropriately tender.  Faith removed.  See above      A:   Active Hospital Problems    Diagnosis    • Cecal volvulus (HCC) [K56.2]      Priority: High   • Atrial fibrillation with RVR (HCC) [I48.91]    • Bacterial peritonitis (HCC) [K65.9]    • Thrombocytopenia (HCC) [D69.6]          P:   -Wet-to-dry packing for now initiated to midline wound  -Recommend wound care consult  -DC IV fluids.  Will defer to medicine for management of this  -Appreciate hospitalist staying on board after transfer out of ICU  -Continue to await full return of bowel function.  If the patient has a bowel movement can advance diet as tolerated  -Ambulate    Riki Grier M.D.  [facility]  [phone]    "

## 2021-06-01 NOTE — PROGRESS NOTES
Pt c/o nausea and abd pain throughout shift.  Pt medicated with dilaudid throughout shift as well as one dose of ibuprofen and 1 dose of morphine. Ice packs were also utilized during shift.  Pt's dressing dry and intact with small old drainage. Small serosanguineous drainage to prevena.  Pt has 1+ edema to BUE and BLE.  Pt tolerating RA.  No BM this shift but pt has +bowel sounds, +flatus, and is burping.  Pt ambulating to restroom with x1 with FWW.  BGs this shift were 112 in the evening and 110 this morning.

## 2021-06-01 NOTE — DIETARY
Nutrition Services: Update   Day 10 of admit.  Mari Dillon is a 50 y.o. female with admitting DX of Cecal volvulus (HCC) [K56.2]    Pt is currently on clear liquid diet. Recorded PO was 0 at breakfast this morning. Pt tolerating sips of clears per Surgeon's progress note. Plan is to advance diet after pt has a BM.    GI: LBM on 5/27/21    Malnutrition Risk: criteria not met. Risk noted due  NPO/clear liquid diet x 4 days.    Recommendations/Plan:  1. Advance diet beyond clears when medically feasible   2. Encourage intake of meals  3. Document intake of all meals as % taken in ADL's to provide interdisciplinary communication across all shifts.   4. Monitor weight.  5. Nutrition rep will continue to see patient for ongoing meal and snack preferences.  6. Obtain supplement order per RD as needed.    RD following

## 2021-06-02 ENCOUNTER — APPOINTMENT (OUTPATIENT)
Dept: RADIOLOGY | Facility: MEDICAL CENTER | Age: 51
DRG: 329 | End: 2021-06-02
Attending: INTERNAL MEDICINE
Payer: COMMERCIAL

## 2021-06-02 PROBLEM — R60.9 EDEMA: Status: ACTIVE | Noted: 2021-06-02

## 2021-06-02 LAB
ALBUMIN SERPL BCP-MCNC: 2.2 G/DL (ref 3.2–4.9)
ALBUMIN/GLOB SERPL: 0.7 G/DL
ALP SERPL-CCNC: 84 U/L (ref 30–99)
ALT SERPL-CCNC: 10 U/L (ref 2–50)
ANION GAP SERPL CALC-SCNC: 9 MMOL/L (ref 7–16)
AST SERPL-CCNC: 27 U/L (ref 12–45)
BASOPHILS # BLD AUTO: 0.4 % (ref 0–1.8)
BASOPHILS # BLD: 0.05 K/UL (ref 0–0.12)
BILIRUB SERPL-MCNC: 0.6 MG/DL (ref 0.1–1.5)
BUN SERPL-MCNC: 8 MG/DL (ref 8–22)
CA-I SERPL-SCNC: 1.05 MMOL/L (ref 1.1–1.3)
CALCIUM SERPL-MCNC: 7.5 MG/DL (ref 8.4–10.2)
CHLORIDE SERPL-SCNC: 103 MMOL/L (ref 96–112)
CO2 SERPL-SCNC: 22 MMOL/L (ref 20–33)
CREAT SERPL-MCNC: 0.49 MG/DL (ref 0.5–1.4)
EOSINOPHIL # BLD AUTO: 0.2 K/UL (ref 0–0.51)
EOSINOPHIL NFR BLD: 1.5 % (ref 0–6.9)
ERYTHROCYTE [DISTWIDTH] IN BLOOD BY AUTOMATED COUNT: 49.9 FL (ref 35.9–50)
GLOBULIN SER CALC-MCNC: 3 G/DL (ref 1.9–3.5)
GLUCOSE BLD-MCNC: 105 MG/DL (ref 65–99)
GLUCOSE BLD-MCNC: 109 MG/DL (ref 65–99)
GLUCOSE BLD-MCNC: 77 MG/DL (ref 65–99)
GLUCOSE BLD-MCNC: 87 MG/DL (ref 65–99)
GLUCOSE SERPL-MCNC: 81 MG/DL (ref 65–99)
HCT VFR BLD AUTO: 26.8 % (ref 37–47)
HGB BLD-MCNC: 8.7 G/DL (ref 12–16)
IMM GRANULOCYTES # BLD AUTO: 0.65 K/UL (ref 0–0.11)
IMM GRANULOCYTES NFR BLD AUTO: 4.8 % (ref 0–0.9)
LYMPHOCYTES # BLD AUTO: 1.31 K/UL (ref 1–4.8)
LYMPHOCYTES NFR BLD: 9.6 % (ref 22–41)
MCH RBC QN AUTO: 29.2 PG (ref 27–33)
MCHC RBC AUTO-ENTMCNC: 32.5 G/DL (ref 33.6–35)
MCV RBC AUTO: 89.9 FL (ref 81.4–97.8)
MONOCYTES # BLD AUTO: 1.1 K/UL (ref 0–0.85)
MONOCYTES NFR BLD AUTO: 8 % (ref 0–13.4)
NEUTROPHILS # BLD AUTO: 10.37 K/UL (ref 2–7.15)
NEUTROPHILS NFR BLD: 75.7 % (ref 44–72)
NRBC # BLD AUTO: 0.04 K/UL
NRBC BLD-RTO: 0.3 /100 WBC
PLATELET # BLD AUTO: 177 K/UL (ref 164–446)
PMV BLD AUTO: 11.2 FL (ref 9–12.9)
POTASSIUM SERPL-SCNC: 3.7 MMOL/L (ref 3.6–5.5)
PROT SERPL-MCNC: 5.2 G/DL (ref 6–8.2)
RBC # BLD AUTO: 2.98 M/UL (ref 4.2–5.4)
SODIUM SERPL-SCNC: 134 MMOL/L (ref 135–145)
WBC # BLD AUTO: 13.7 K/UL (ref 4.8–10.8)

## 2021-06-02 PROCEDURE — 80053 COMPREHEN METABOLIC PANEL: CPT

## 2021-06-02 PROCEDURE — 700102 HCHG RX REV CODE 250 W/ 637 OVERRIDE(OP): Performed by: SURGERY

## 2021-06-02 PROCEDURE — 97162 PT EVAL MOD COMPLEX 30 MIN: CPT

## 2021-06-02 PROCEDURE — 700111 HCHG RX REV CODE 636 W/ 250 OVERRIDE (IP): Performed by: INTERNAL MEDICINE

## 2021-06-02 PROCEDURE — A9270 NON-COVERED ITEM OR SERVICE: HCPCS | Performed by: SURGERY

## 2021-06-02 PROCEDURE — 87040 BLOOD CULTURE FOR BACTERIA: CPT

## 2021-06-02 PROCEDURE — 700111 HCHG RX REV CODE 636 W/ 250 OVERRIDE (IP): Performed by: HOSPITALIST

## 2021-06-02 PROCEDURE — 700111 HCHG RX REV CODE 636 W/ 250 OVERRIDE (IP): Performed by: SURGERY

## 2021-06-02 PROCEDURE — 700105 HCHG RX REV CODE 258: Performed by: INTERNAL MEDICINE

## 2021-06-02 PROCEDURE — 82330 ASSAY OF CALCIUM: CPT

## 2021-06-02 PROCEDURE — 36415 COLL VENOUS BLD VENIPUNCTURE: CPT

## 2021-06-02 PROCEDURE — 99233 SBSQ HOSP IP/OBS HIGH 50: CPT | Performed by: INTERNAL MEDICINE

## 2021-06-02 PROCEDURE — 82962 GLUCOSE BLOOD TEST: CPT | Mod: 91

## 2021-06-02 PROCEDURE — 700105 HCHG RX REV CODE 258: Performed by: SURGERY

## 2021-06-02 PROCEDURE — 85025 COMPLETE CBC W/AUTO DIFF WBC: CPT

## 2021-06-02 PROCEDURE — 770006 HCHG ROOM/CARE - MED/SURG/GYN SEMI*

## 2021-06-02 PROCEDURE — 93970 EXTREMITY STUDY: CPT

## 2021-06-02 RX ADMIN — FAMOTIDINE 20 MG: 20 TABLET ORAL at 04:41

## 2021-06-02 RX ADMIN — AMPICILLIN SODIUM AND SULBACTAM SODIUM 3 G: 2; 1 INJECTION, POWDER, FOR SOLUTION INTRAMUSCULAR; INTRAVENOUS at 18:34

## 2021-06-02 RX ADMIN — ENOXAPARIN SODIUM 40 MG: 40 INJECTION SUBCUTANEOUS at 14:27

## 2021-06-02 RX ADMIN — MORPHINE SULFATE 4 MG: 4 INJECTION INTRAVENOUS at 12:31

## 2021-06-02 RX ADMIN — HYDROMORPHONE HYDROCHLORIDE 1 MG: 1 INJECTION, SOLUTION INTRAMUSCULAR; INTRAVENOUS; SUBCUTANEOUS at 21:48

## 2021-06-02 RX ADMIN — ONDANSETRON 4 MG: 2 INJECTION INTRAMUSCULAR; INTRAVENOUS at 08:04

## 2021-06-02 RX ADMIN — AMPICILLIN SODIUM AND SULBACTAM SODIUM 3 G: 2; 1 INJECTION, POWDER, FOR SOLUTION INTRAMUSCULAR; INTRAVENOUS at 14:28

## 2021-06-02 RX ADMIN — KETOROLAC TROMETHAMINE 15 MG: 30 INJECTION, SOLUTION INTRAMUSCULAR; INTRAVENOUS at 14:27

## 2021-06-02 RX ADMIN — PIPERACILLIN AND TAZOBACTAM 3.38 G: 3; .375 INJECTION, POWDER, LYOPHILIZED, FOR SOLUTION INTRAVENOUS; PARENTERAL at 04:43

## 2021-06-02 RX ADMIN — KETOROLAC TROMETHAMINE 15 MG: 30 INJECTION, SOLUTION INTRAMUSCULAR; INTRAVENOUS at 08:04

## 2021-06-02 RX ADMIN — HYDROMORPHONE HYDROCHLORIDE 1 MG: 1 INJECTION, SOLUTION INTRAMUSCULAR; INTRAVENOUS; SUBCUTANEOUS at 17:18

## 2021-06-02 RX ADMIN — HYDROMORPHONE HYDROCHLORIDE 1 MG: 1 INJECTION, SOLUTION INTRAMUSCULAR; INTRAVENOUS; SUBCUTANEOUS at 02:59

## 2021-06-02 RX ADMIN — HYDROMORPHONE HYDROCHLORIDE 1 MG: 1 INJECTION, SOLUTION INTRAMUSCULAR; INTRAVENOUS; SUBCUTANEOUS at 09:57

## 2021-06-02 RX ADMIN — MORPHINE SULFATE 4 MG: 4 INJECTION INTRAVENOUS at 04:42

## 2021-06-02 RX ADMIN — FAMOTIDINE 20 MG: 20 TABLET ORAL at 17:18

## 2021-06-02 ASSESSMENT — ENCOUNTER SYMPTOMS
SHORTNESS OF BREATH: 0
FEVER: 0
VOMITING: 0
ABDOMINAL PAIN: 1
DIZZINESS: 0
HEADACHES: 1
CONSTIPATION: 0

## 2021-06-02 ASSESSMENT — COGNITIVE AND FUNCTIONAL STATUS - GENERAL
SUGGESTED CMS G CODE MODIFIER MOBILITY: CH
MOBILITY SCORE: 24

## 2021-06-02 ASSESSMENT — GAIT ASSESSMENTS
DISTANCE (FEET): 300
DEVIATION: STEP TO
GAIT LEVEL OF ASSIST: SUPERVISED

## 2021-06-02 ASSESSMENT — PAIN DESCRIPTION - PAIN TYPE
TYPE: SURGICAL PAIN

## 2021-06-02 NOTE — CARE PLAN
The patient is Watcher - Medium risk of patient condition declining or worsening    Shift Goals  Clinical Goals: pain control, BM  Patient Goals: pain control    Progress made toward(s) clinical / shift goals:  Pt now passing gas but no BM today.     Patient is not progressing towards the following goals: Difficulty controlling pain. Utilizing IV morphine and dilaudid.    Wound vac and staples removed at bedside today by Dr. Grier. Moderate amount of serosanguineous drainage. ABD pad placed. Wound care consulted.

## 2021-06-02 NOTE — CARE PLAN
Problem: Pain - Standard  Goal: Alleviation of pain or a reduction in pain to the patient’s comfort goal  Outcome: Progressing  Flowsheets (Taken 6/2/2021 0134)  Non Verbal Scale:   Calm   Sleeping   Unlabored Breathing  Note: Pt educated to call for pain medication when needed.    The patient is Watcher - Medium risk of patient condition declining or worsening    Shift Goals  Clinical Goals: Pain control  Patient Goals: pain control    Progress made toward(s) clinical / shift goals:  Morphine given per MAR.     Patient is not progressing towards the following goals:

## 2021-06-02 NOTE — CARE PLAN
The patient is Watcher - Medium risk of patient condition declining or worsening    Shift Goals  Clinical Goals: ambulate in halls, tolerate diet, pain control  Patient Goals: pain control, sleep     Progress made toward(s) clinical / shift goals:  Pt ambulated in the halls this shift w/ PT and ambulating to the bathroom frequently. Pt had 3 Bms overnight and one this am. Abd less distended. Diet advanced to full liquid.       Problem: Pain - Standard  Goal: Alleviation of pain or a reduction in pain to the patient’s comfort goal  Outcome: Progressing   Pain controlled w/ IV morphine, dilaudid and toradol.     Problem: Bowel Elimination - Post Surgical  Goal: Patient will resume regular bowel sounds and function with no discomfort or distention  Outcome: Progressing

## 2021-06-02 NOTE — CARE PLAN
Problem: Nutritional:  Goal: Achieve adequate nutritional intake  Description: Advance diet as tolerated. Patient will consume >50% of meals.  Outcome: Progressing     Diet advanced to full liquids this morning. PO intake of clear liquids has been good at %. RD will continue to follow.

## 2021-06-02 NOTE — THERAPY
Missed Therapy     Patient Name: Mari Dillon  Age:  50 y.o., Sex:  female  Medical Record #: [id number]  Today's Date: 6/2/2021    Discussed missed therapy with RN       06/02/21 3439   Interdisciplinary Plan of Care Collaboration   Collaboration Comments Attempted OT eval, pt in bedside procedure at this time.  Will attempt back another time as able.

## 2021-06-02 NOTE — ASSESSMENT & PLAN NOTE
- s/p ileocecal resection/redo 5/28/21 and washout 6/4/2, drain placement on 6/9 by IR. Developed anastomotic leak seen on barium enema 6/11 and went to the OR - ileostomy could not be performed due to inflammation of the bowel. Drain was placed and abdomen washed out.  - Wound cultures initially with Bacteroides, Actinomyces, E faecalis. Was on Unasyn and Diflucan.  Culture from when pt began having purulent discharge on 6/8 with bacteroides, culture from IR drain placement 6/9 with bacteroides/candida albicans and krusei.  Diflucan changed to Micafungin.  CT AP 6/17/21 showed improved fluid collections  Per surgery, f/u in 2 weeks with Dr. Wood  - Per ID - Continue IV Unasyn & IV micafungin, f/u recs  Will Continue to monitor drain output  Diet per surgeon, continue clear liquids

## 2021-06-02 NOTE — PROGRESS NOTES
"Progress Note:  6/2/2021, 10:55 AM    S: No acute events.  Had 3BM.  VAC placed to midline wound. Still weak and tired but wants    O:  /55   Pulse 97   Temp 36.7 °C (98.1 °F) (Oral)   Resp 18   Ht 1.753 m (5' 9\")   Wt 72.3 kg (159 lb 6.3 oz)   SpO2 95%     NAD, awake, alert but fatigued  Breathing nonlabored  Abdomen soft, appropriately tender, nondistended. VAC in place        A:   Active Hospital Problems    Diagnosis    • Cecal volvulus (HCC) [K56.2]      Priority: High   • Bacterial peritonitis (HCC) [K65.9]    • Thrombocytopenia (HCC) [D69.6]          P:   -Full liquids ok, advance as tolerated  -Needs to ambulate  -Appreciate Wound care management of VAC    Riki Grier M.D.  [facility]  [phone]    "

## 2021-06-02 NOTE — WOUND TEAM
[facility]  Inpatient Services  Initial Wound and Skin Care Evaluation    Admission Date: 5/22/2021     Last order of IP CONSULT TO WOUND CARE was found on 6/1/2021 from Hospital Encounter on 5/22/2021     HPI, PMH, SH: Reviewed    Past Surgical History:   Procedure Laterality Date   • PB EXPLORATORY OF ABDOMEN N/A 5/28/2021    Procedure: LAPAROTOMY, EXPLORATORY- RESECTION OF ILEOCECAL ANASTATMOSIS.;  Surgeon: Maryam Wood M.D.;  Location: [facility];  Service: General   • PB EXPLORATORY OF ABDOMEN  5/22/2021    Procedure: LAPAROTOMY, EXPLORATORY;  Surgeon: Maryam Wood M.D.;  Location: [facility];  Service: General   • HEMICOLECTOMY, RIGHT  5/22/2021    Procedure: HEMICOLECTOMY, RIGHT, SIDE TO SIDE ANASTOMOSIS;  Surgeon: Maryam Wood M.D.;  Location: [facility];  Service: General   • SHOULDER DECOMPRESSION ARTHROSCOPIC Right 12/24/2018    Procedure: SHOULDER DECOMPRESSION ARTHROSCOPIC - SUBACROMIAL;  Surgeon: Tristian Garcia M.D.;  Location: [facility];  Service: Orthopedics   • CLAVICLE DISTAL EXCISION Left 12/24/2018    Procedure: CLAVICLE DISTAL EXCISION;  Surgeon: Tristian Garcia M.D.;  Location: [facility];  Service: Orthopedics   • SHOULDER ARTHROSCOPY W/ BICIPITAL TENODESIS REPAIR Left 12/24/2018    Procedure: SHOULDER ARTHROSCOPY W/ BICIPITAL TENODESIS REPAIR - AND PACKER;  Surgeon: Tristian Garcia M.D.;  Location: [facility];  Service: Orthopedics   • SHOULDER MANIPULATION Left 12/24/2018    Procedure: SHOULDER MANIPULATION;  Surgeon: Tristian Garcia M.D.;  Location: [facility];  Service: Orthopedics   • SHOULDER ARTHROSCOPY W/ BICIPITAL TENODESIS REPAIR Right 6/15/2015    Procedure: SHOULDER ARTHROSCOPY W/ BICIPITAL TENODESIS, SYNOVECTOMY;  Surgeon: Tristian Garcia M.D.;  Location: [facility];  Service:    • CLAVICLE DISTAL EXCISION Right 6/15/2015    Procedure: CLAVICLE  "DISTAL EXCISION;  Surgeon: Tristian Garcia M.D.;  Location: [facility];  Service:    • SHOULDER DECOMPRESSION Right 6/15/2015    Procedure: SHOULDER DECOMPRESSION MINI INCISION ;  Surgeon: Tristian Garcia M.D.;  Location: [facility];  Service:    • OTHER      nose   • APPENDECTOMY     • PB  DELIVERY ONLY       Social History     Tobacco Use   • Smoking status: Former Smoker     Years: 15.00     Quit date: 2005     Years since quittin.4   • Smokeless tobacco: Never Used   • Tobacco comment: 3-4 years ago   Substance Use Topics   • Alcohol use: No     Alcohol/week: 0.0 oz     Chief Complaint   Patient presents with   • Abdominal Pain     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 Per Dr Grier, \" Prevena was still functioning, but there was more than expected output in it.  It was removed and there was leakage from the incision.  Several staples removed due to underlying fat necrosis.  No evidence of infection or fascial dehiscence\"  WOUND ASSESSMENT/LDA  Wound 21 Full Thickness Wound Abdomen surgical (Active)      21 1800   Site Assessment Red;Tan    Periwound Assessment Intact    Margins Defined edges    Closure Secondary intention    Drainage Amount Moderate    Drainage Description Serosanguineous    Treatments Cleansed    Wound Cleansing Normal Saline Irrigation    Periwound Protectant Skin Protectant Wipes to Periwound;Drape    Dressing Cleansing/Solutions Not Applicable    Dressing Options Wound Vac    Dressing Changed Changed    Dressing Status Intact    Dressing Change/Treatment Frequency Tuesday, Thursday, Saturday, and As Needed    NEXT Dressing Change/Treatment Date 21    NEXT Weekly Photo (Inpatient Only) 21    Non-staged Wound Description Full thickness    Wound Length (cm) 11 " cm 06/01/21 1800   Wound Width (cm) 2.5 cm 06/01/21 1800   Wound Depth (cm) 2.5 cm 06/01/21 1800   Wound Surface Area (cm^2) 27.5 cm^2 06/01/21 1800   Wound Volume (cm^3) 68.75 cm^3 06/01/21 1800   Shape linear    Wound Odor None    Exposed Structures Sutures    Number of days: 10         Negative Pressure Wound Therapy 06/01/21 Surgical Abdomen Midline (Active)   Vacuum Serial Number [id number]    NPWT Pump Mode / Pressure Setting Continuous;125 mmHg    Dressing Type Small;Black Foam (Regular)    Number of Foam Pieces Used 2    Canister Changed Yes    NEXT Dressing Change/Treatment Date 06/03/21        Vascular:    GEE:   No results found.    Lab Values:    Lab Results   Component Value Date/Time    WBC 15.5 (H) 06/01/2021 02:57 AM    RBC 2.68 (L) 06/01/2021 02:57 AM    HEMOGLOBIN 7.8 (L) 06/01/2021 02:57 AM    HEMATOCRIT 24.0 (L) 06/01/2021 02:57 AM    CREACTPROT 24.76 (H) 05/28/2021 04:07 AM        Culture Results show:  Recent Results (from the past 720 hour(s))   CULTURE WOUND W/ GRAM STAIN    Collection Time: 05/28/21  8:41 AM    Specimen: Tissue   Result Value Ref Range    Significant Indicator POS (POS)     Source TISS     Site abdominal wound     Culture Result - (A)     Gram Stain Result       Few WBCs.  Few Gram positive cocci.  Few Gram positive rods.  Moderate Gram negative rods.      Culture Result Actinomyces odontolyticus  Moderate growth   (A)     Culture Result Enterococcus faecalis  Light growth   (A)        Susceptibility    Enterococcus faecalis - KOBY     Daptomycin 2 Sensitive mcg/mL     Gent Synergy <=500 Sensitive mcg/mL     Ampicillin <=2 Sensitive mcg/mL     Vancomycin 1 Sensitive mcg/mL     Penicillin 2 Sensitive mcg/mL       Pain Level/Medicated:  Premedicate by primary RN with discomfort with drsg application  Pt has some emesis.    INTERVENTIONS BY WOUND TEAM:  Chart and images reviewed. Discussed with bedside RN. This RN in to assess patient. Performed standard wound care which includes  appropriate positioning, dressing removal and non-selective debridement.   Preparation for Dressing removal: Dressing soaked with NS  Cleansed with:  NS and gauze.  Sharp debridement: NA  Jenn wound: Cleansed with NS, Prepped with No Sting skin prep and Drape  Primary Dressing: one piece of black foam into wound bed.   Secondary (Outer) Dressing: Sealed with drape. Applied button and TRAC pad. Initiated NPWT @ 125 mmHg continuous with no leaking.     Interdisciplinary consultation: Patient, Bedside RN     EVALUATION / RATIONALE FOR TREATMENT:  Most Recent Date:  6/1: Pt has full thickness surgical wound to abd. Applied wound vac because NPWT encourages granulation tissue growth, maintains optimal moisture needed for wound healing, and promotes angiogenesis.     Goals: Steady decrease in wound area and depth weekly.    WOUND TEAM PLAN OF CARE ([X] for frequency of wound follow up,):   Nursing to follow orders written for wound care. Contact wound team if area fails to progress, deteriorates or with any questions/concerns  Dressing changes by wound team:                   Follow up 3 times weekly:                NPWT change 3 times weekly:  x   Follow up 1-2 times weekly:      Follow up Bi-Monthly:                   Follow up as needed:     Other (explain):     NURSING PLAN OF CARE ORDERS (X):  Dressing changes: See Dressing Care orders:x  Skin care: See Skin Care orders:  RN Prevention Protocol: present  Rectal tube care: See Rectal Tube Care orders:   Other orders:    RSKIN:   CURRENTLY IN PLACE (X), APPLIED THIS VISIT (A), ORDERED (O):   Q shift Agustín:  X  Q shift pressure point assessments:  X    Surface/Positioning   Pressure redistribution mattress  x          Low Airloss          Bariatric foam      Bariatric DIDIER     Waffle cushion        Waffle Overlay          Reposition q 2 hours   x   TAPs Turning system     Z Og Pillow     Offloading/Redistribution  Sacral Mepilex (Silicone dressing)   x  Heel Mepilex  (Silicone dressing)         Heel float boots (Prevalon boot)             Float Heels off Bed with Pillows           Respiratory RA  Silicone O2 tubing  Has available       Gray Foam Ear protectors     Cannula fixation Device (Tender )          High flow offloading Clip    Elastic head band offloading device      Anchorfast                                                         Trach with Optifoam split foam             Containment/Moisture Prevention  not assessed  Rectal tube or BMS    Purwick/Condom Cath        Young Catheter    Barrier wipes           Barrier paste       Antifungal tx      Interdry        Mobilization not assessed     Up to chair        Ambulate      PT/OT      Nutrition     Dietician        Diabetes Education      PO  x   TF     TPN     NPO   # days     Other        Anticipated discharge plans: will need VAC, supplies (small [employer] preferred) and drsg changes  LTACH:        SNF/Rehab:                  Home Health Care:           Outpatient Wound Center:            Self/Family Care:        Other:

## 2021-06-02 NOTE — PROGRESS NOTES
[facility] Medicine Daily Progress Note    Date of Service  6/2/2021    Chief Complaint  50 y.o. female admitted 5/22/2021 with abd pain.    Hospital Course  51 yo woman who presented with abd pain and CT showed cecal volvulus. She underwent exploratory celiotomy and right hemicolectomy with ileocolic anastomosis with Dr. Wood 5/22/21. She developed thrombocytopenia and ascites and returned for ex lap with resection of ileocolic anastomosis and wound vac placement 5/28/21. She was admitted to ICU, started on zosyn for bacterial peritonitis and given platelet transfusions. HIT panel was negative. Abd cultures grew actinomyces, enterococcus, bacteroides.     Interval Problem Update  She is tolerating her clears, hoping for chocolate milk. She is having BM. Pain if fairly controlled. She has ambulated in her room. She has swelling in her arms, no pain    Consultants/Specialty  Critical care  Surgery  ID    Code Status  Full Code    Disposition  PTOT, ID recs  Has wound vac    Review of Systems  Review of Systems   Constitutional: Negative for fever.   Respiratory: Negative for shortness of breath.    Cardiovascular: Negative for chest pain.   Gastrointestinal: Positive for abdominal pain. Negative for constipation and vomiting.   Neurological: Positive for headaches. Negative for dizziness.   All other systems reviewed and are negative.       Physical Exam  Temp:  [36.2 °C (97.1 °F)-36.8 °C (98.3 °F)] 36.2 °C (97.1 °F)  Pulse:  [] 118  Resp:  [17-18] 17  BP: (117-134)/(55-77) 134/76  SpO2:  [93 %-98 %] 98 %    Physical Exam  Vitals and nursing note reviewed.   Constitutional:       General: She is not in acute distress.     Appearance: She is not toxic-appearing.   HENT:      Head: Normocephalic.      Mouth/Throat:      Mouth: Mucous membranes are moist.   Eyes:      General:         Right eye: No discharge.         Left eye: No discharge.   Cardiovascular:      Rate and Rhythm: Normal rate and regular rhythm.    Pulmonary:      Effort: Pulmonary effort is normal. No respiratory distress.      Breath sounds: No wheezing or rales.   Abdominal:      General: There is distension.      Tenderness: There is abdominal tenderness (mild diffuse). There is no guarding or rebound.      Comments: Wound vac centrally   Musculoskeletal:         General: Swelling (right and left UE) present.      Cervical back: Neck supple.   Skin:     General: Skin is warm and dry.   Neurological:      General: No focal deficit present.      Mental Status: She is alert.      Comments: AOx4   Psychiatric:         Mood and Affect: Mood normal.         Behavior: Behavior normal.         Fluids    Intake/Output Summary (Last 24 hours) at 6/2/2021 1321  Last data filed at 6/2/2021 1000  Gross per 24 hour   Intake 2720 ml   Output --   Net 2720 ml       Laboratory  Recent Labs     05/31/21  0540 05/31/21  0540 05/31/21  1520 06/01/21  0257 06/02/21  0455   WBC 12.6*  --   --  15.5* 13.7*   RBC 2.75*  --   --  2.68* 2.98*   HEMOGLOBIN 8.0*   < > 8.1* 7.8* 8.7*   HEMATOCRIT 24.3*   < > 24.3* 24.0* 26.8*   MCV 88.4  --   --  89.6 89.9   MCH 29.1  --   --  29.1 29.2   MCHC 32.9*  --   --  32.5* 32.5*   RDW 49.1  --   --  51.2* 49.9   PLATELETCT 77*  --   --  113* 177   MPV 11.6  --   --  11.4 11.2    < > = values in this interval not displayed.     Recent Labs     05/31/21  0540 06/01/21  0257 06/02/21  0455   SODIUM 143 143 134*   POTASSIUM 3.9 3.4* 3.7   CHLORIDE 111 110 103   CO2 24 23 22   GLUCOSE 126* 111* 81   BUN 15 10 8   CREATININE 0.52 0.47* 0.49*   CALCIUM 7.4* 7.3* 7.5*                   Imaging  IR-MIDLINE CATHETER INSERTION WO GUIDANCE > AGE 3   Final Result                  Ultrasound-guided midline placement performed by qualified nursing staff    as above.          DX-CHEST-FOR LINE PLACEMENT Perform procedure in: Other(comment f6 below): (PACU)   Final Result      1.  Left IJ central line tip high in position located above the superior vena  cava are within the left brachiocephalic vein.      2.  Patchy bilateral infiltrates.      3.  NG tube tip extends into the stomach.      CT-ABDOMEN-PELVIS WITH   Final Result      1.  Moderate to large amount of ascites within the abdomen and pelvis some areas of which are loculated in nature. There are also punctate areas of gas within those areas of ascites as well as free intraperitoneal air. This may be related to recent    surgical procedure however the degree of volume of ascites would be unusual for normal postoperative course. Consideration should be given for leakage of bowel content or other process.      2.  Diffuse colonic distention.      3.  Bibasilar atelectasis and pleural effusions.      4.  5 mm left renal pelvic stone which results in mild hydronephrosis above that level.      5.  Moderate right-sided hydronephrosis extending to the level of the upper sacrum. No ureteral stone. The ureter is not identified below that level.      6.  This was discussed with NEVA KAY at 6:44 PM on 5/27/2021.      [id number] VIEW   Final Result      Gaseous distended small bowel and colon is similar to prior, likely ileus.      [id number] VIEW   Final Result         Gaseous distention of the small bowel and colon, likely postoperative ileus.      CT-ABDOMEN-PELVIS WITH   Final Result         1.  Findings keeping with cecal volvulus.   2.  Mildly fluid distended distal small bowel.   3.  No free air.   4.  Small nonobstructing renal stones.   These findings were discussed with ESHA STERLING on 5/22/2021 11:45 AM.                  US-EXTREMITY VENOUS UPPER BILAT    (Results Pending)        Assessment/Plan  * Bacterial peritonitis (HCC)  Assessment & Plan  Abd cultures grew actinomyces, enterococcus, bacteroides.   Currently on zosyn  Wound vac in place  Surgery following  I consulted Dr. Castillo with ID    Edema  Assessment & Plan  Of right and left UE  Check doppler to r/o DVT    Thrombocytopenia  (HCC)  Assessment & Plan  S/p transfusions  HIT AB negative  Due to sepsis   Recovered, level now normalized    Cecal volvulus (HCC)- (present on admission)  Assessment & Plan  S/p resection, complicated by leak  Surgery following  On full liquid diet  Having BMs       VTE prophylaxis: lovenox

## 2021-06-02 NOTE — CONSULTS
ID consult from Darrel  Patient not in room at time of rounds  Abx stopped  S/p volvulus with necrosis at surgical site  Has actinomycetes in wound-start Unasyn  Will try to again tomorrow

## 2021-06-02 NOTE — DISCHARGE PLANNING
"Anticipated Discharge Disposition:   TBD, possibly LTAC or home with IV antibiotics and wound care    Action:   Chart review complete.     Discussed patient's plan of care and plans for discharge during rounds.   Per MD, patient has a wound vac and ID has been consulted. PT/OT pending at this time.     Per PT note, \"Anticipate that the patient will have no further physical therapy needs after discharge from the hospital.\" OT evaluation pending. MD aware.     ID consult pending at this time. KLAUDIA GILES will continue to follow and assist and will collaborate with the IDT.     Barriers to Discharge:   OT evaluation   ID consult/recommendations  Underinsured   LTAC vs Home     Plan:   Hospital care management will continue to assist with discharge planning needs.     "

## 2021-06-02 NOTE — THERAPY
Physical Therapy   Initial Evaluation     Patient Name: Mari Dillon  Age:  50 y.o., Sex:  female  Medical Record #: [id number]  Today's Date: 6/2/2021          Assessment  Patient is 50 y.o. female with a diagnosis of cecal volvulu.Pt lives at home with her parents and is active.Pt is safe with bed mob,transfers and ambulation she has no equipment needs.No acute PT needs is safe to ambulate with nursing       Plan    Recommend Physical Therapy for Evaluation only      06/02/21 1400   Total Time Spent   Total Time Spent (Mins) 25   Charge Group   PT Evaluation PT Evaluation Mod   Initial Contact Note    Initial Contact Note Order Received and Verified, Physical Therapy Evaluation in Progress with Full Report to Follow.   Pain 0 - 10 Group   Pain Rating Scale (NPRS) 4   Therapist Pain Assessment 4   Prior Living Situation   Prior Services None   Housing / Facility 1 Story House   Steps Into Home 2   Steps In Home 0   Equipment Owned None   Lives with - Patient's Self Care Capacity Parents   Prior Level of Functional Mobility   Bed Mobility Independent   Transfer Status Independent   Ambulation Independent   Distance Ambulation (Feet)   (community amb)   Assistive Devices Used None   Stairs Independent   History of Falls   History of Falls No   Cognition    Cognition / Consciousness WDL   Level of Consciousness Alert   Passive ROM Lower Body   Passive ROM Lower Body WDL   Active ROM Lower Body    Active ROM Lower Body  WDL   Strength Lower Body   Lower Body Strength  X   Comments general weakness   Sensation Lower Body   Lower Extremity Sensation   WDL   Coordination Lower Body    Coordination Lower Body  WDL   Balance Assessment   Sitting Balance (Static) Good   Sitting Balance (Dynamic) Good   Standing Balance (Static) Fair +   Standing Balance (Dynamic) Fair +   Weight Shift Sitting Good   Weight Shift Standing Good   Gait Analysis   Gait Level Of Assist Supervised   Assistive Device None   Distance (Feet) 300    # of Times Distance was Traveled 1   Deviation Step To   # of Stairs Climbed 0   Weight Bearing Status full   Bed Mobility    Supine to Sit Modified Independent   Sit to Supine Modified Independent   Scooting Modified Independent   Functional Mobility   Sit to Stand Supervised   Bed, Chair, Wheelchair Transfer Supervised   Transfer Method Stand Step   How much difficulty does the patient currently have...   Turning over in bed (including adjusting bedclothes, sheets and blankets)? 4   Sitting down on and standing up from a chair with arms (e.g., wheelchair, bedside commode, etc.) 4   Moving from lying on back to sitting on the side of the bed? 4   How much help from another person does the patient currently need...   Moving to and from a bed to a chair (including a wheelchair)? 4   Need to walk in a hospital room? 4   Climbing 3-5 steps with a railing? 4   6 clicks Mobility Score 24   Activity Tolerance   Sitting Edge of Bed 10   Standing 10   Patient / Family Goals    Patient / Family Goal #1 Home   Anticipated Discharge Equipment and Recommendations   DC Equipment Recommendations None   Discharge Recommendations Anticipate that the patient will have no further physical therapy needs after discharge from the hospital   Interdisciplinary Plan of Care Collaboration   IDT Collaboration with  Nursing   Session Information   Date / Session Number  6/2   Priority 0       DC Equipment Recommendations: (P) None  Discharge Recommendations: (P) Anticipate that the patient will have no further physical therapy needs after discharge from the hospital

## 2021-06-03 ENCOUNTER — APPOINTMENT (OUTPATIENT)
Dept: RADIOLOGY | Facility: MEDICAL CENTER | Age: 51
DRG: 329 | End: 2021-06-03
Attending: INTERNAL MEDICINE
Payer: COMMERCIAL

## 2021-06-03 PROBLEM — I82.A11 DVT OF AXILLARY VEIN, ACUTE RIGHT (HCC): Status: ACTIVE | Noted: 2021-06-03

## 2021-06-03 PROBLEM — R60.9 EDEMA: Status: RESOLVED | Noted: 2021-06-02 | Resolved: 2021-06-03

## 2021-06-03 LAB
ALBUMIN SERPL BCP-MCNC: 1.9 G/DL (ref 3.2–4.9)
ALBUMIN/GLOB SERPL: 0.8 G/DL
ALP SERPL-CCNC: 59 U/L (ref 30–99)
ALT SERPL-CCNC: 9 U/L (ref 2–50)
ANION GAP SERPL CALC-SCNC: 10 MMOL/L (ref 7–16)
APTT PPP: 33.8 SEC (ref 24.7–36)
AST SERPL-CCNC: 21 U/L (ref 12–45)
BASOPHILS # BLD AUTO: 0.4 % (ref 0–1.8)
BASOPHILS # BLD: 0.03 K/UL (ref 0–0.12)
BILIRUB SERPL-MCNC: 0.4 MG/DL (ref 0.1–1.5)
BUN SERPL-MCNC: 4 MG/DL (ref 8–22)
CA-I SERPL-SCNC: 1.03 MMOL/L (ref 1.1–1.3)
CALCIUM SERPL-MCNC: 7.1 MG/DL (ref 8.4–10.2)
CHLORIDE SERPL-SCNC: 102 MMOL/L (ref 96–112)
CO2 SERPL-SCNC: 23 MMOL/L (ref 20–33)
CREAT SERPL-MCNC: 0.48 MG/DL (ref 0.5–1.4)
EOSINOPHIL # BLD AUTO: 0.09 K/UL (ref 0–0.51)
EOSINOPHIL NFR BLD: 1.2 % (ref 0–6.9)
ERYTHROCYTE [DISTWIDTH] IN BLOOD BY AUTOMATED COUNT: 49.4 FL (ref 35.9–50)
GLOBULIN SER CALC-MCNC: 2.5 G/DL (ref 1.9–3.5)
GLUCOSE BLD-MCNC: 103 MG/DL (ref 65–99)
GLUCOSE BLD-MCNC: 107 MG/DL (ref 65–99)
GLUCOSE SERPL-MCNC: 107 MG/DL (ref 65–99)
HCT VFR BLD AUTO: 34.4 % (ref 37–47)
HGB BLD-MCNC: 11.2 G/DL (ref 12–16)
IMM GRANULOCYTES # BLD AUTO: 0.29 K/UL (ref 0–0.11)
IMM GRANULOCYTES NFR BLD AUTO: 3.7 % (ref 0–0.9)
INR PPP: 1.23 (ref 0.87–1.13)
LYMPHOCYTES # BLD AUTO: 0.73 K/UL (ref 1–4.8)
LYMPHOCYTES NFR BLD: 9.4 % (ref 22–41)
MAGNESIUM SERPL-MCNC: 1.6 MG/DL (ref 1.5–2.5)
MCH RBC QN AUTO: 28.9 PG (ref 27–33)
MCHC RBC AUTO-ENTMCNC: 32.6 G/DL (ref 33.6–35)
MCV RBC AUTO: 88.9 FL (ref 81.4–97.8)
MONOCYTES # BLD AUTO: 0.7 K/UL (ref 0–0.85)
MONOCYTES NFR BLD AUTO: 9 % (ref 0–13.4)
NEUTROPHILS # BLD AUTO: 5.92 K/UL (ref 2–7.15)
NEUTROPHILS NFR BLD: 76.3 % (ref 44–72)
NRBC # BLD AUTO: 0.03 K/UL
NRBC BLD-RTO: 0.4 /100 WBC
PHOSPHATE SERPL-MCNC: 2.6 MG/DL (ref 2.5–4.5)
PLATELET # BLD AUTO: 165 K/UL (ref 164–446)
PMV BLD AUTO: 11.1 FL (ref 9–12.9)
POTASSIUM SERPL-SCNC: 3.5 MMOL/L (ref 3.6–5.5)
PROT SERPL-MCNC: 4.4 G/DL (ref 6–8.2)
PROTHROMBIN TIME: 15.2 SEC (ref 12–14.6)
RBC # BLD AUTO: 3.87 M/UL (ref 4.2–5.4)
SODIUM SERPL-SCNC: 135 MMOL/L (ref 135–145)
UFH PPP CHRO-ACNC: 0.11 IU/ML
WBC # BLD AUTO: 7.8 K/UL (ref 4.8–10.8)

## 2021-06-03 PROCEDURE — 80053 COMPREHEN METABOLIC PANEL: CPT

## 2021-06-03 PROCEDURE — 700101 HCHG RX REV CODE 250: Performed by: INTERNAL MEDICINE

## 2021-06-03 PROCEDURE — 36415 COLL VENOUS BLD VENIPUNCTURE: CPT

## 2021-06-03 PROCEDURE — 84100 ASSAY OF PHOSPHORUS: CPT

## 2021-06-03 PROCEDURE — 700102 HCHG RX REV CODE 250 W/ 637 OVERRIDE(OP): Performed by: SURGERY

## 2021-06-03 PROCEDURE — 99233 SBSQ HOSP IP/OBS HIGH 50: CPT | Performed by: INTERNAL MEDICINE

## 2021-06-03 PROCEDURE — 82962 GLUCOSE BLOOD TEST: CPT

## 2021-06-03 PROCEDURE — 770006 HCHG ROOM/CARE - MED/SURG/GYN SEMI*

## 2021-06-03 PROCEDURE — 700117 HCHG RX CONTRAST REV CODE 255: Performed by: INTERNAL MEDICINE

## 2021-06-03 PROCEDURE — 700111 HCHG RX REV CODE 636 W/ 250 OVERRIDE (IP): Performed by: INTERNAL MEDICINE

## 2021-06-03 PROCEDURE — 700105 HCHG RX REV CODE 258: Performed by: INTERNAL MEDICINE

## 2021-06-03 PROCEDURE — A9270 NON-COVERED ITEM OR SERVICE: HCPCS | Performed by: INTERNAL MEDICINE

## 2021-06-03 PROCEDURE — A9270 NON-COVERED ITEM OR SERVICE: HCPCS | Performed by: NURSE PRACTITIONER

## 2021-06-03 PROCEDURE — 99223 1ST HOSP IP/OBS HIGH 75: CPT | Performed by: INTERNAL MEDICINE

## 2021-06-03 PROCEDURE — 700102 HCHG RX REV CODE 250 W/ 637 OVERRIDE(OP): Performed by: NURSE PRACTITIONER

## 2021-06-03 PROCEDURE — 85025 COMPLETE CBC W/AUTO DIFF WBC: CPT

## 2021-06-03 PROCEDURE — A9270 NON-COVERED ITEM OR SERVICE: HCPCS | Performed by: SURGERY

## 2021-06-03 PROCEDURE — 85730 THROMBOPLASTIN TIME PARTIAL: CPT

## 2021-06-03 PROCEDURE — 82330 ASSAY OF CALCIUM: CPT

## 2021-06-03 PROCEDURE — 83735 ASSAY OF MAGNESIUM: CPT

## 2021-06-03 PROCEDURE — 85610 PROTHROMBIN TIME: CPT

## 2021-06-03 PROCEDURE — 85520 HEPARIN ASSAY: CPT

## 2021-06-03 PROCEDURE — 97606 NEG PRS WND THER DME>50 SQCM: CPT

## 2021-06-03 PROCEDURE — 74177 CT ABD & PELVIS W/CONTRAST: CPT

## 2021-06-03 PROCEDURE — 700102 HCHG RX REV CODE 250 W/ 637 OVERRIDE(OP): Performed by: INTERNAL MEDICINE

## 2021-06-03 PROCEDURE — 700111 HCHG RX REV CODE 636 W/ 250 OVERRIDE (IP): Performed by: NURSE PRACTITIONER

## 2021-06-03 RX ORDER — OXYCODONE HYDROCHLORIDE 5 MG/1
5 TABLET ORAL EVERY 4 HOURS PRN
Status: DISCONTINUED | OUTPATIENT
Start: 2021-06-03 | End: 2021-06-03

## 2021-06-03 RX ORDER — HEPARIN SODIUM 1000 [USP'U]/ML
40 INJECTION, SOLUTION INTRAVENOUS; SUBCUTANEOUS PRN
Status: DISCONTINUED | OUTPATIENT
Start: 2021-06-03 | End: 2021-06-03

## 2021-06-03 RX ORDER — HEPARIN SODIUM 5000 [USP'U]/100ML
0-30 INJECTION, SOLUTION INTRAVENOUS CONTINUOUS
Status: DISCONTINUED | OUTPATIENT
Start: 2021-06-03 | End: 2021-06-07

## 2021-06-03 RX ORDER — OXYCODONE HYDROCHLORIDE 5 MG/1
5-10 TABLET ORAL
Status: DISCONTINUED | OUTPATIENT
Start: 2021-06-03 | End: 2021-06-05

## 2021-06-03 RX ORDER — OXYCODONE HYDROCHLORIDE 5 MG/1
5-10 TABLET ORAL EVERY 4 HOURS PRN
Status: DISCONTINUED | OUTPATIENT
Start: 2021-06-03 | End: 2021-06-03

## 2021-06-03 RX ORDER — HEPARIN SODIUM 1000 [USP'U]/ML
40 INJECTION, SOLUTION INTRAVENOUS; SUBCUTANEOUS PRN
Status: DISCONTINUED | OUTPATIENT
Start: 2021-06-03 | End: 2021-06-07

## 2021-06-03 RX ORDER — POTASSIUM CHLORIDE 20 MEQ/1
40 TABLET, EXTENDED RELEASE ORAL ONCE
Status: COMPLETED | OUTPATIENT
Start: 2021-06-03 | End: 2021-06-03

## 2021-06-03 RX ORDER — SODIUM HYPOCHLORITE 1.25 MG/ML
SOLUTION TOPICAL 2 TIMES DAILY
Status: DISCONTINUED | OUTPATIENT
Start: 2021-06-03 | End: 2021-06-06

## 2021-06-03 RX ORDER — MORPHINE SULFATE 4 MG/ML
2 INJECTION, SOLUTION INTRAMUSCULAR; INTRAVENOUS EVERY 4 HOURS PRN
Status: DISCONTINUED | OUTPATIENT
Start: 2021-06-03 | End: 2021-06-21 | Stop reason: HOSPADM

## 2021-06-03 RX ORDER — HEPARIN SODIUM 5000 [USP'U]/100ML
0-30 INJECTION, SOLUTION INTRAVENOUS CONTINUOUS
Status: DISCONTINUED | OUTPATIENT
Start: 2021-06-03 | End: 2021-06-03

## 2021-06-03 RX ADMIN — IOHEXOL 100 ML: 350 INJECTION, SOLUTION INTRAVENOUS at 17:05

## 2021-06-03 RX ADMIN — KETOROLAC TROMETHAMINE 15 MG: 30 INJECTION, SOLUTION INTRAMUSCULAR; INTRAVENOUS at 03:23

## 2021-06-03 RX ADMIN — ENOXAPARIN SODIUM 40 MG: 40 INJECTION SUBCUTANEOUS at 11:50

## 2021-06-03 RX ADMIN — MORPHINE SULFATE 2 MG: 4 INJECTION INTRAVENOUS at 20:33

## 2021-06-03 RX ADMIN — POTASSIUM CHLORIDE 40 MEQ: 1500 TABLET, EXTENDED RELEASE ORAL at 08:09

## 2021-06-03 RX ADMIN — FAMOTIDINE 20 MG: 20 TABLET ORAL at 05:03

## 2021-06-03 RX ADMIN — OXYCODONE HYDROCHLORIDE 10 MG: 5 TABLET ORAL at 14:20

## 2021-06-03 RX ADMIN — OXYCODONE HYDROCHLORIDE 5 MG: 5 TABLET ORAL at 11:26

## 2021-06-03 RX ADMIN — OXYCODONE HYDROCHLORIDE 10 MG: 5 TABLET ORAL at 22:22

## 2021-06-03 RX ADMIN — ENOXAPARIN SODIUM 40 MG: 40 INJECTION SUBCUTANEOUS at 05:04

## 2021-06-03 RX ADMIN — MORPHINE SULFATE 2 MG: 4 INJECTION INTRAVENOUS at 15:44

## 2021-06-03 RX ADMIN — DAKIN'S SOLUTION 0.125% (QUARTER STRENGTH) 1 ML: 0.12 SOLUTION at 22:20

## 2021-06-03 RX ADMIN — FAMOTIDINE 20 MG: 20 TABLET ORAL at 18:24

## 2021-06-03 RX ADMIN — AMPICILLIN SODIUM AND SULBACTAM SODIUM 3 G: 2; 1 INJECTION, POWDER, FOR SOLUTION INTRAMUSCULAR; INTRAVENOUS at 18:26

## 2021-06-03 RX ADMIN — HYDROMORPHONE HYDROCHLORIDE 1 MG: 1 INJECTION, SOLUTION INTRAMUSCULAR; INTRAVENOUS; SUBCUTANEOUS at 08:08

## 2021-06-03 RX ADMIN — AMPICILLIN SODIUM AND SULBACTAM SODIUM 3 G: 2; 1 INJECTION, POWDER, FOR SOLUTION INTRAMUSCULAR; INTRAVENOUS at 11:49

## 2021-06-03 RX ADMIN — AMPICILLIN SODIUM AND SULBACTAM SODIUM 3 G: 2; 1 INJECTION, POWDER, FOR SOLUTION INTRAMUSCULAR; INTRAVENOUS at 05:02

## 2021-06-03 RX ADMIN — LORAZEPAM 0.5 MG: 2 INJECTION INTRAMUSCULAR; INTRAVENOUS at 00:16

## 2021-06-03 RX ADMIN — AMPICILLIN SODIUM AND SULBACTAM SODIUM 3 G: 2; 1 INJECTION, POWDER, FOR SOLUTION INTRAMUSCULAR; INTRAVENOUS at 00:18

## 2021-06-03 RX ADMIN — OXYCODONE HYDROCHLORIDE 10 MG: 5 TABLET ORAL at 18:24

## 2021-06-03 RX ADMIN — HEPARIN SODIUM 12.18 UNITS/KG/HR: 5000 INJECTION, SOLUTION INTRAVENOUS at 22:16

## 2021-06-03 ASSESSMENT — ENCOUNTER SYMPTOMS
HEADACHES: 1
DIZZINESS: 0
SHORTNESS OF BREATH: 0
CONSTIPATION: 0
ABDOMINAL PAIN: 1
FEVER: 0
ROS GI COMMENTS: PASSING SOME FLATUS
VOMITING: 0

## 2021-06-03 ASSESSMENT — COGNITIVE AND FUNCTIONAL STATUS - GENERAL
DAILY ACTIVITIY SCORE: 21
MOBILITY SCORE: 19
CLIMB 3 TO 5 STEPS WITH RAILING: A LITTLE
DRESSING REGULAR LOWER BODY CLOTHING: A LITTLE
TOILETING: A LITTLE
HELP NEEDED FOR BATHING: A LITTLE
WALKING IN HOSPITAL ROOM: A LITTLE
MOVING FROM LYING ON BACK TO SITTING ON SIDE OF FLAT BED: A LITTLE
STANDING UP FROM CHAIR USING ARMS: A LITTLE
SUGGESTED CMS G CODE MODIFIER DAILY ACTIVITY: CJ
MOVING TO AND FROM BED TO CHAIR: A LITTLE
SUGGESTED CMS G CODE MODIFIER MOBILITY: CK

## 2021-06-03 ASSESSMENT — PAIN DESCRIPTION - PAIN TYPE
TYPE: SURGICAL PAIN
TYPE: ACUTE PAIN;SURGICAL PAIN
TYPE: ACUTE PAIN;SURGICAL PAIN
TYPE: SURGICAL PAIN

## 2021-06-03 ASSESSMENT — FIBROSIS 4 INDEX: FIB4 SCORE: 2.12

## 2021-06-03 NOTE — CARE PLAN
Problem: Pain - Standard  Goal: Alleviation of pain or a reduction in pain to the patient’s comfort goal  Outcome: Not Progressing  Note: Pain medication given to pt frequently.    The patient is Stable - Low risk of patient condition declining or worsening    Shift Goals  Clinical Goals: Pain control  Patient Goals: pain control, sleep     Progress made toward(s) clinical / shift goals:  Pain medication given when pt calls.     Patient is not progressing towards the following goals:      Problem: Pain - Standard  Goal: Alleviation of pain or a reduction in pain to the patient’s comfort goal  Outcome: Not Progressing  Note: Pain medication given to pt frequently.

## 2021-06-03 NOTE — PROGRESS NOTES
Assumed care of patient at 0700hrs, bedside report from NOC RN. Updated on POC. Patient currently A & O x 4; on RA, pt reports pain 6/10; medicated per MAR. Assessment completed, wound vac in place. Call light within reach. Whiteboard updated. Fall precautions in place. Bed locked and in lowest position. All questions answered. Hourly rounding in place.

## 2021-06-03 NOTE — ASSESSMENT & PLAN NOTE
Associated with midline, small  Continue on lovenox, transition to oral Eliquis when no interventions planned

## 2021-06-03 NOTE — DISCHARGE PLANNING
Anticipated Discharge Disposition:   Home with [facility] wound vac and HH     Action:   Chart review complete.     Discussed patient's plan of care and plans for discharge during rounds.   Per MD, patient will not have any PT/OT needs. RN CM to collect choice for HH and will begin the process of ordering a wound vac.     1210: RN ITSHA met with patient at bedside to collect HH choice. Patient is agreeable and gave choice for 1- [facility], 2- [facility].     1220: Choice form faxed to [facility]    Barriers to Discharge:   HH acceptance   Wound vac forms/Wound vac delivery   Medical Clearance    Plan:   Follow up with [facility] and [facility]   Hospital care management will continue to assist with discharge planning needs.

## 2021-06-03 NOTE — WOUND TEAM
[facility]  Inpatient Services  Initial Wound and Skin Care Evaluation    Admission Date: 5/22/2021     Last order of IP CONSULT TO WOUND CARE was found on 6/1/2021 from Hospital Encounter on 5/22/2021     HPI, PMH, SH: Reviewed    Past Surgical History:   Procedure Laterality Date   • PB EXPLORATORY OF ABDOMEN N/A 5/28/2021    Procedure: LAPAROTOMY, EXPLORATORY- RESECTION OF ILEOCECAL ANASTATMOSIS.;  Surgeon: Maryam Wood M.D.;  Location: [facility];  Service: General   • PB EXPLORATORY OF ABDOMEN  5/22/2021    Procedure: LAPAROTOMY, EXPLORATORY;  Surgeon: Maryam Wood M.D.;  Location: [facility];  Service: General   • HEMICOLECTOMY, RIGHT  5/22/2021    Procedure: HEMICOLECTOMY, RIGHT, SIDE TO SIDE ANASTOMOSIS;  Surgeon: Maryam Wood M.D.;  Location: [facility];  Service: General   • SHOULDER DECOMPRESSION ARTHROSCOPIC Right 12/24/2018    Procedure: SHOULDER DECOMPRESSION ARTHROSCOPIC - SUBACROMIAL;  Surgeon: Tristian Garcia M.D.;  Location: [facility];  Service: Orthopedics   • CLAVICLE DISTAL EXCISION Left 12/24/2018    Procedure: CLAVICLE DISTAL EXCISION;  Surgeon: Tristian Garcia M.D.;  Location: [facility];  Service: Orthopedics   • SHOULDER ARTHROSCOPY W/ BICIPITAL TENODESIS REPAIR Left 12/24/2018    Procedure: SHOULDER ARTHROSCOPY W/ BICIPITAL TENODESIS REPAIR - AND PACKER;  Surgeon: Tristian Garcia M.D.;  Location: [facility];  Service: Orthopedics   • SHOULDER MANIPULATION Left 12/24/2018    Procedure: SHOULDER MANIPULATION;  Surgeon: Tristian Garcia M.D.;  Location: [facility];  Service: Orthopedics   • SHOULDER ARTHROSCOPY W/ BICIPITAL TENODESIS REPAIR Right 6/15/2015    Procedure: SHOULDER ARTHROSCOPY W/ BICIPITAL TENODESIS, SYNOVECTOMY;  Surgeon: Tristian Garcia M.D.;  Location: [facility];  Service:    • CLAVICLE DISTAL EXCISION Right 6/15/2015    Procedure: CLAVICLE  "DISTAL EXCISION;  Surgeon: Tristian Garcia M.D.;  Location: SURGERY [facility];  Service:    • SHOULDER DECOMPRESSION Right 6/15/2015    Procedure: SHOULDER DECOMPRESSION MINI INCISION ;  Surgeon: Tristian Garcia M.D.;  Location: SURGERY [facility];  Service:    • OTHER      nose   • APPENDECTOMY     • PB  DELIVERY ONLY       Social History     Tobacco Use   • Smoking status: Former Smoker     Years: 15.00     Quit date: 2005     Years since quittin.4   • Smokeless tobacco: Never Used   • Tobacco comment: 3-4 years ago   Substance Use Topics   • Alcohol use: No     Alcohol/week: 0.0 oz     Chief Complaint   Patient presents with   • Abdominal Pain     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 Per Dr Grier, \" Prevena was still functioning, but there was more than expected output in it.  It was removed and there was leakage from the incision.  Several staples removed due to underlying fat necrosis.  No evidence of infection or fascial dehiscence\"    :  Wound VAC blocking,  Dressing removed and found to have copious oily purulent drainage to proximal tract.   Dr. Glass and Dr. Grier updated.      WOUND ASSESSMENT/LDA      Wound 21 Full Thickness Wound Abdomen surgical (Active)   Wound Image     21 1620   Site Assessment Pink;Red;Yellow    Periwound Assessment Intact    Margins Attached edges    Closure Secondary intention    Drainage Amount Copious    Drainage Description Foul purulent;Tan    Treatments Cleansed;Site care    Wound Cleansing Normal Saline Irrigation    Periwound Protectant Skin Protectant Wipes to Periwound    Dressing Cleansing/Solutions 1/4 Strength Dakin's Solution    Dressing Options Moist Roll Gauze;Island Dressing    Dressing Changed New    Dressing Status Clean;Dry;Intact    Dressing " Change/Treatment Frequency Every Shift, and As Needed    NEXT Dressing Change/Treatment Date 06/03/21    NEXT Weekly Photo (Inpatient Only) 06/10/21    Non-staged Wound Description Full thickness    Wound Length (cm) 11 cm    Wound Width (cm) 2.5 cm    Wound Depth (cm) 2.5 cm    Wound Surface Area (cm^2) 27.5 cm^2    Wound Volume (cm^3) 68.75 cm^3    Shape linear    Wound Odor None    Exposed Structures Adipose;Muscle    WOUND NURSE ONLY - Time Spent with Patient (mins) 60      Vascular:    GEE:   No results found.    Lab Values:    Lab Results   Component Value Date/Time    WBC 7.8 06/03/2021 03:35 AM    RBC 3.87 (L) 06/03/2021 03:35 AM    HEMOGLOBIN 11.2 (L) 06/03/2021 03:35 AM    HEMATOCRIT 34.4 (L) 06/03/2021 03:35 AM    CREACTPROT 24.76 (H) 05/28/2021 04:07 AM        Culture Results show:  Recent Results (from the past 720 hour(s))   CULTURE WOUND W/ GRAM STAIN    Collection Time: 05/28/21  8:41 AM    Specimen: Tissue   Result Value Ref Range    Significant Indicator POS (POS)     Source TISS     Site abdominal wound     Culture Result - (A)     Gram Stain Result       Few WBCs.  Few Gram positive cocci.  Few Gram positive rods.  Moderate Gram negative rods.      Culture Result Actinomyces odontolyticus  Moderate growth   (A)     Culture Result Enterococcus faecalis  Light growth   (A)        Susceptibility    Enterococcus faecalis - KOBY     Daptomycin 2 Sensitive mcg/mL     Gent Synergy <=500 Sensitive mcg/mL     Ampicillin <=2 Sensitive mcg/mL     Vancomycin 1 Sensitive mcg/mL     Penicillin 2 Sensitive mcg/mL       Pain Level/Medicated:  Premedicate by primary RN with discomfort with drsg application  Pt has some emesis.    INTERVENTIONS BY WOUND TEAM:  Chart and images reviewed. Discussed with bedside RN. This RN in to assess patient. Performed standard wound care which includes appropriate positioning, dressing removal and non-selective debridement.     Preparation for Dressing removal: Dressing saturated  with drainage   Cleansed with:  NS and gauze.  Sharp debridement: NA  Jenn wound: Cleansed with NS, Prepped with No Sting skin prep.   Primary Dressing: roll gauze moistened with NS to fill wound bed.    Secondary (Outer) Dressing: island dressing     Interdisciplinary consultation: Patient, Bedside RN, Wound RN René,  Coa agreed to to update Dr. Grier     EVALUATION / RATIONALE FOR TREATMENT:  Most Recent Date:  06/03/21:  Patient with large/copious amounts of purulent oily drainage from proximal tract.  [facility] ordered and CT order per MD.  Wound team to follow up on Saturday for possible VAC re-pplacement.     6/1: Pt has full thickness surgical wound to abd. Applied wound vac because NPWT encourages granulation tissue growth, maintains optimal moisture needed for wound healing, and promotes angiogenesis.     Goals: Steady decrease in wound area and depth weekly.    WOUND TEAM PLAN OF CARE ([X] for frequency of wound follow up,):   Nursing to follow orders written for wound care. Contact wound team if area fails to progress, deteriorates or with any questions/concerns  Dressing changes by wound team:                   Follow up 3 times weekly:                NPWT change 3 times weekly:  Follow up Saturday    Follow up 1-2 times weekly:      Follow up Bi-Monthly:                   Follow up as needed:     Other (explain):     NURSING PLAN OF CARE ORDERS (X):  Dressing changes: See Dressing Care orders:x  Skin care: See Skin Care orders:  RN Prevention Protocol: present  Rectal tube care: See Rectal Tube Care orders:   Other orders:    RSKIN:   CURRENTLY IN PLACE (X), APPLIED THIS VISIT (A), ORDERED (O):   Q shift Agustín:  X  Q shift pressure point assessments:  X    Surface/Positioning   Pressure redistribution mattress  x          Low Airloss          Bariatric foam      Bariatric DIDIER     Waffle cushion        Waffle Overlay          Reposition q 2 hours   x   TAPs Turning system     Z Og Pillow          Offloading/Redistribution  Sacral Mepilex (Silicone dressing)   x  Heel Mepilex (Silicone dressing)         Heel float boots (Prevalon boot)             Float Heels off Bed with Pillows           Respiratory RA  Silicone O2 tubing  Has available       Gray Foam Ear protectors     Cannula fixation Device (Tender )          High flow offloading Clip    Elastic head band offloading device      Anchorfast                                                         Trach with Optifoam split foam             Containment/Moisture Prevention  not assessed  Rectal tube or BMS    Purwick/Condom Cath        Young Catheter    Barrier wipes           Barrier paste       Antifungal tx      Interdry        Mobilization not assessed     Up to chair        Ambulate      PT/OT      Nutrition     Dietician        Diabetes Education      PO  x   TF     TPN     NPO   # days     Other        Anticipated discharge plans: will need VAC, supplies (small [employer] preferred) and drsg changes  LTACH:        SNF/Rehab:                  Home Health Care:           Outpatient Wound Center:            Self/Family Care:        Other:

## 2021-06-03 NOTE — ASSESSMENT & PLAN NOTE
6/2 Zosyn stopped.  ID Consult  Diflucan and  Unasyn per ID  Plan to continue until 6/26 6/8 CT showed two abscesses  6/9 IR drains placed

## 2021-06-03 NOTE — PROGRESS NOTES
Trauma / Surgical Daily Progress Note    Date of Service  6/3/2021    Chief Complaint  50 y.o. female admitted 5/22/2021 with cecal volvulus    Interval Events  5/22 Exploratory celiotomy and right hemicolectomy with a side-to-side   ileocolic anastomosis  5/28 Exploratory celiotomy, resection of ileocolic anastomosis with re-do  side-to-side ileocolic anastomosis    POD #6 Labs Normalized.  Passing flatus and stool. Tolerating full liquid diet. On Unasyn per ID. Will d/c IV pain medication, switch to PO.  Pt needs to mobilize.  Pt diuresing well.     Review of Systems  Review of Systems   Respiratory: Negative for shortness of breath.    Cardiovascular: Negative for chest pain.   Gastrointestinal: Positive for abdominal pain.        Passing some flatus        Vital Signs for last 24 hours  Temp:  [36.2 °C (97.1 °F)-37.1 °C (98.8 °F)] 37 °C (98.6 °F)  Pulse:  [] 89  Resp:  [17-18] 18  BP: (116-134)/(51-76) 130/51  SpO2:  [95 %-98 %] 95 %    Hemodynamic parameters for last 24 hours       Respiratory Data     Respiration: 18, Pulse Oximetry: 95 %        RUL Breath Sounds: Clear, RML Breath Sounds: Clear, RLL Breath Sounds: Clear, LUCITA Breath Sounds: Clear, LLL Breath Sounds: Clear    Physical Exam  Physical Exam  Cardiovascular:      Rate and Rhythm: Normal rate.      Pulses: Normal pulses.   Pulmonary:      Effort: Pulmonary effort is normal.   Abdominal:      General: There is no distension.      Palpations: Abdomen is soft.      Tenderness: There is abdominal tenderness.      Comments: Wound Vac in place    Less distention, abdomen softer      Genitourinary:     Comments: diuresing  Musculoskeletal:         General: Normal range of motion.      Cervical back: Neck supple.      Comments: Generalized edema   Skin:     General: Skin is warm and dry.   Neurological:      General: No focal deficit present.      Mental Status: She is alert.   Psychiatric:      Comments: Teary today         Laboratory  Recent Results  (from the past 24 hour(s))   POCT glucose device results    Collection Time: 06/02/21 11:49 AM   Result Value Ref Range    Glucose - Accu-Ck 87 65 - 99 mg/dL   BLOOD CULTURE    Collection Time: 06/02/21  1:07 PM    Specimen: Peripheral; Blood   Result Value Ref Range    Significant Indicator NEG     Source BLD     Site PERIPHERAL     Culture Result       No Growth  Note: Blood cultures are incubated for 5 days and  are monitored continuously.Positive blood cultures  are called to the RN and reported as soon as  they are identified.  Blood culture testing and Gram stain, if indicated, are  performed at [facility], [address] [place] [address].  Positive blood cultures are  sent to [facility], [address] [place] [address], for organism identification and  susceptibility testing.     BLOOD CULTURE    Collection Time: 06/02/21  5:30 PM    Specimen: Peripheral; Blood   Result Value Ref Range    Significant Indicator NEG     Source BLD     Site PERIPHERAL     Culture Result       No Growth  Note: Blood cultures are incubated for 5 days and  are monitored continuously.Positive blood cultures  are called to the RN and reported as soon as  they are identified.  Blood culture testing and Gram stain, if indicated, are  performed at [facility], [address] [place] [address].  Positive blood cultures are  sent to [facility], [address] [place] [address], for organism identification and  susceptibility testing.     POCT glucose device results    Collection Time: 06/02/21  5:31 PM   Result Value Ref Range    Glucose - Accu-Ck 105 (H) 65 - 99 mg/dL   POCT glucose device results    Collection Time: 06/02/21  9:48 PM   Result Value Ref Range    Glucose - Accu-Ck 109 (H) 65 - 99 mg/dL   Comp Metabolic Panel    Collection Time: 06/03/21  3:35 AM   Result Value Ref Range    Sodium 135 135 - 145 mmol/L    Potassium 3.5 (L) 3.6 -  5.5 mmol/L    Chloride 102 96 - 112 mmol/L    Co2 23 20 - 33 mmol/L    Anion Gap 10.0 7.0 - 16.0    Glucose 107 (H) 65 - 99 mg/dL    Bun 4 (L) 8 - 22 mg/dL    Creatinine 0.48 (L) 0.50 - 1.40 mg/dL    Calcium 7.1 (L) 8.4 - 10.2 mg/dL    AST(SGOT) 21 12 - 45 U/L    ALT(SGPT) 9 2 - 50 U/L    Alkaline Phosphatase 59 30 - 99 U/L    Total Bilirubin 0.4 0.1 - 1.5 mg/dL    Albumin 1.9 (L) 3.2 - 4.9 g/dL    Total Protein 4.4 (L) 6.0 - 8.2 g/dL    Globulin 2.5 1.9 - 3.5 g/dL    A-G Ratio 0.8 g/dL   IONIZED CALCIUM    Collection Time: 06/03/21  3:35 AM   Result Value Ref Range    Ionized Calcium 1.03 (L) 1.10 - 1.30 mmol/L   CBC WITH DIFFERENTIAL    Collection Time: 06/03/21  3:35 AM   Result Value Ref Range    WBC 7.8 4.8 - 10.8 K/uL    RBC 3.87 (L) 4.20 - 5.40 M/uL    Hemoglobin 11.2 (L) 12.0 - 16.0 g/dL    Hematocrit 34.4 (L) 37.0 - 47.0 %    MCV 88.9 81.4 - 97.8 fL    MCH 28.9 27.0 - 33.0 pg    MCHC 32.6 (L) 33.6 - 35.0 g/dL    RDW 49.4 35.9 - 50.0 fL    Platelet Count 165 164 - 446 K/uL    MPV 11.1 9.0 - 12.9 fL    Neutrophils-Polys 76.30 (H) 44.00 - 72.00 %    Lymphocytes 9.40 (L) 22.00 - 41.00 %    Monocytes 9.00 0.00 - 13.40 %    Eosinophils 1.20 0.00 - 6.90 %    Basophils 0.40 0.00 - 1.80 %    Immature Granulocytes 3.70 (H) 0.00 - 0.90 %    Nucleated RBC 0.40 /100 WBC    Neutrophils (Absolute) 5.92 2.00 - 7.15 K/uL    Lymphs (Absolute) 0.73 (L) 1.00 - 4.80 K/uL    Monos (Absolute) 0.70 0.00 - 0.85 K/uL    Eos (Absolute) 0.09 0.00 - 0.51 K/uL    Baso (Absolute) 0.03 0.00 - 0.12 K/uL    Immature Granulocytes (abs) 0.29 (H) 0.00 - 0.11 K/uL    NRBC (Absolute) 0.03 K/uL   MAGNESIUM    Collection Time: 06/03/21  3:35 AM   Result Value Ref Range    Magnesium 1.6 1.5 - 2.5 mg/dL   PHOSPHORUS    Collection Time: 06/03/21  3:35 AM   Result Value Ref Range    Phosphorus 2.6 2.5 - 4.5 mg/dL   ESTIMATED GFR    Collection Time: 06/03/21  3:35 AM   Result Value Ref Range    GFR If African American >60 >60 mL/min/1.73 m 2    GFR If  Non African American >60 >60 mL/min/1.73 m 2   POCT glucose device results    Collection Time: 06/03/21  3:35 AM   Result Value Ref Range    Glucose - Accu-Ck 107 (H) 65 - 99 mg/dL       Fluids    Intake/Output Summary (Last 24 hours) at 6/3/2021 0914  Last data filed at 6/3/2021 0500  Gross per 24 hour   Intake 2740 ml   Output --   Net 2740 ml       Core Measures & Quality Metrics  Labs reviewed and Medications reviewed  Young catheter: One or Two Days Post Surgery (Day of Surgery being Day 0)      DVT Prophylaxis: Enoxaparin (Lovenox)  DVT prophylaxis - mechanical: SCDs  Ulcer prophylaxis: Yes    Assessed for rehab: Patient returned to prior level of function, rehabilitation not indicated at this time    ALICIA Score  ETOH Screening    Assessment/Plan  * Bacterial peritonitis (HCC)  Assessment & Plan  6/2 Zosyn stopped.  ID Consult  6/3 Day #2 Unasyn per ID    Cecal volvulus (HCC)- (present on admission)  Assessment & Plan  Acute abd pain  CT shows cecal volvulus  5/22 ex lap, r hemicolectomy  Await GI function  5/24 trend procalcitonin and CRP   Abd xray- ileus  5/25 procalcitonin and CRP increasing, check lactic acid add reglan  5/26 Labs improving. Passing flatus - start clears  5/27 - Stooled - will adv to full liquids  5/27 - thrombocytopenia, bandemia, PM CT with ascites no freeair  5/28 - Exploratory celiotomy, resection of ileocolic anastomosis with re-do  side-to-side ileocolic anastomosis.  6/3  Stooling, advance to regular diet      Discussed patient condition with RN and Patient.  Dr. Wood  CRITICAL CARE TIME EXCLUDING PROCEDURES: 20  minutes

## 2021-06-03 NOTE — FACE TO FACE
Face to Face Note  -  Durable Medical Equipment    Olga Rojo M.D. - NPI: [id number]  I certify that this patient is under my care and that they have had a durable medical equipment(DME)face to face encounter by myself that meets the physician DME face-to-face encounter requirements with this patient on:    Date of encounter:   Patient:                    MRN:                       YOB: 2021  Mari Dillon  [id number]  1970     The encounter with the patient was in whole, or in part, for the following medical condition, which is the primary reason for durable medical equipment:  Wound Care    I certify that, based on my findings, the following durable medical equipment is medically necessary:  Wound Vac.    HOME O2 Saturation Measurements:(Values must be present for Home Oxygen orders)         ,     ,         My Clinical findings support the need for the above equipment due to:  Other - abdominal wound    Supporting Symptoms: abdominal wound     ------------------------------------------------------------------------------------------------------------------    Face to Face Supporting Documentation - Home Health    The encounter with this patient was in whole or in part the primary reason for home health admission.    Date of encounter:   Patient:                    MRN:                       YOB: 2021  Mari Dillon  [id number]  1970     Home health to see patient for:  Skilled Nursing care for assessment, interventions & education, Wound Care, Physical Therapy evaluation and treatment and Occupational therapy evaluation and treatment    Skilled need for:  Comment: midline management, wound vac management, PTOT    Skilled nursing interventions to include:  Home IV infusion therapy and Wound Care    Homebound evidenced status by:  Needs the assistance of another person in order to leave the home. Leaving home must require a considerable and taxing effort.  There must exist a normal inability to leave the home.    Community Physician to provide follow up care: Brandan Prieto M.D.     Optional Interventions    Wound information & treatment:    Home Infusion Therapy orders:    Line/Drain/Airway:    I certify the face to face encounter for this home care referral meets the CMS requirements and the encounter/clinical assessment with the patient was, in whole, or in part, for the medical condition(s) listed above, which is the primary reason for home health care. Based on my clinical findings: the service(s) are medically necessary, support the need for home health care, and the homebound criteria are met.  I certify that this patient has had a face to face encounter by myself.  Olga Rojo M.D. - NPI: [id number]    *Debility, frailty and advanced age in the absence of an acute deterioration or exacerbation of a condition do not qualify a patient for home health.

## 2021-06-03 NOTE — PROGRESS NOTES
[facility] Medicine Daily Progress Note    Date of Service  6/3/2021    Chief Complaint  50 y.o. female admitted 5/22/2021 with abd pain.    Hospital Course  49 yo woman who presented with abd pain and CT showed cecal volvulus. She underwent exploratory celiotomy and right hemicolectomy with ileocolic anastomosis with Dr. Wood 5/22/21. She developed thrombocytopenia and ascites and returned for ex lap with resection of ileocolic anastomosis and wound vac placement 5/28/21. She was admitted to ICU, started on zosyn for bacterial peritonitis and given platelet transfusions. HIT panel was negative. Abd cultures grew actinomyces, enterococcus, bacteroides. Doppler showed catheter related thrombus in R arm and started on anticoagulation.    Interval Problem Update  Discussed doppler results with patient, started on therapeutic lovenox for clot  She feels tired, pain is fairly controlled  Mom updated over phone    Addendum: Increase wound vac output. CT showed multiple fluid collections. Discussed with Dr. Wood and will plan for washout tomorrow. Will change lovenox to heparin infusion for now  Consultants/Specialty  Critical care  Surgery  ID    Code Status  Full Code    Disposition  HH and wound vac ordered  ID recs    Review of Systems  Review of Systems   Constitutional: Positive for malaise/fatigue. Negative for fever.   Respiratory: Negative for shortness of breath.    Cardiovascular: Negative for chest pain.   Gastrointestinal: Positive for abdominal pain. Negative for constipation and vomiting.   Neurological: Positive for headaches. Negative for dizziness.   All other systems reviewed and are negative.       Physical Exam  Temp:  [36.4 °C (97.6 °F)-37.1 °C (98.8 °F)] 36.4 °C (97.6 °F)  Pulse:  [89-97] 92  Resp:  [17-18] 18  BP: (116-133)/(51-69) 133/69  SpO2:  [95 %-98 %] 96 %    Physical Exam  Vitals and nursing note reviewed.   Constitutional:       General: She is not in acute distress.     Appearance: She is  not toxic-appearing.   HENT:      Head: Normocephalic.      Mouth/Throat:      Mouth: Mucous membranes are moist.   Eyes:      General:         Right eye: No discharge.         Left eye: No discharge.   Cardiovascular:      Rate and Rhythm: Normal rate and regular rhythm.   Pulmonary:      Effort: Pulmonary effort is normal. No respiratory distress.      Breath sounds: No wheezing or rales.   Abdominal:      General: There is distension.      Tenderness: There is abdominal tenderness (mild diffuse). There is no guarding or rebound.      Comments: Wound vac and surgical incision centrally   Musculoskeletal:         General: Swelling (right and left UE) present.      Cervical back: Neck supple.   Skin:     General: Skin is warm and dry.   Neurological:      General: No focal deficit present.      Mental Status: She is alert.      Comments: AOx4   Psychiatric:         Mood and Affect: Mood normal.         Behavior: Behavior normal.         Fluids    Intake/Output Summary (Last 24 hours) at 6/3/2021 1259  Last data filed at 6/3/2021 1200  Gross per 24 hour   Intake 3270 ml   Output --   Net 3270 ml       Laboratory  Recent Labs     06/01/21  0257 06/02/21  0455 06/03/21  0335   WBC 15.5* 13.7* 7.8   RBC 2.68* 2.98* 3.87*   HEMOGLOBIN 7.8* 8.7* 11.2*   HEMATOCRIT 24.0* 26.8* 34.4*   MCV 89.6 89.9 88.9   MCH 29.1 29.2 28.9   MCHC 32.5* 32.5* 32.6*   RDW 51.2* 49.9 49.4   PLATELETCT 113* 177 165   MPV 11.4 11.2 11.1     Recent Labs     06/01/21  0257 06/02/21  0455 06/03/21  0335   SODIUM 143 134* 135   POTASSIUM 3.4* 3.7 3.5*   CHLORIDE 110 103 102   CO2 23 22 23   GLUCOSE 111* 81 107*   BUN 10 8 4*   CREATININE 0.47* 0.49* 0.48*   CALCIUM 7.3* 7.5* 7.1*                   Imaging  US-EXTREMITY VENOUS UPPER BILAT   Final Result      IR-MIDLINE CATHETER INSERTION WO GUIDANCE > AGE 3   Final Result                  Ultrasound-guided midline placement performed by qualified nursing staff    as above.          DX-CHEST-FOR  LINE PLACEMENT Perform procedure in: Other(comment f6 below): (PACU)   Final Result      1.  Left IJ central line tip high in position located above the superior vena cava are within the left brachiocephalic vein.      2.  Patchy bilateral infiltrates.      3.  NG tube tip extends into the stomach.      CT-ABDOMEN-PELVIS WITH   Final Result      1.  Moderate to large amount of ascites within the abdomen and pelvis some areas of which are loculated in nature. There are also punctate areas of gas within those areas of ascites as well as free intraperitoneal air. This may be related to recent    surgical procedure however the degree of volume of ascites would be unusual for normal postoperative course. Consideration should be given for leakage of bowel content or other process.      2.  Diffuse colonic distention.      3.  Bibasilar atelectasis and pleural effusions.      4.  5 mm left renal pelvic stone which results in mild hydronephrosis above that level.      5.  Moderate right-sided hydronephrosis extending to the level of the upper sacrum. No ureteral stone. The ureter is not identified below that level.      6.  This was discussed with NEVA KAY at 6:44 PM on 5/27/2021.      [id number] VIEW   Final Result      Gaseous distended small bowel and colon is similar to prior, likely ileus.      [id number] VIEW   Final Result         Gaseous distention of the small bowel and colon, likely postoperative ileus.      CT-ABDOMEN-PELVIS WITH   Final Result         1.  Findings keeping with cecal volvulus.   2.  Mildly fluid distended distal small bowel.   3.  No free air.   4.  Small nonobstructing renal stones.   These findings were discussed with ESHA STERLING on 5/22/2021 11:45 AM.                       Assessment/Plan  * Bacterial peritonitis (HCC)  Assessment & Plan  Abd cultures grew actinomyces, enterococcus, bacteroides.   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

## 2021-06-03 NOTE — CONSULTS
"DATE OF SERVICE:  2021     INFECTIOUS DISEASE CONSULTATION     CONSULTING PHYSICIAN:  Dr. Joseline Rojo     REASON FOR CONSULTATION:  Perforated bowel with feculent peritonitis.     HISTORY OF PRESENT ILLNESS:  This is a 50-year-old female who was originally   admitted to the hospital on 2021 due to increasing abdominal pain.  She   was found to have a cecal volvulus.  She underwent a right hemicolectomy on   2021 and was initially doing well, but then developed a marked   thrombocytopenia, abdominal ascites and CT scan was done showing fluid collections and free air concerning   for \"yessy leak.\" She went to the OR for exploratory laparotomy.  She   was found to have necrosis at the ileocolonic anastomosis site.  The bowel was   eviscerated and redo side-to-side ileocolic anastomosis was done.  Since her   surgery, she continues to have persistent abdominal pain, but is now   tolerating some p.o.  She does have complaints of nausea and vomiting.  She has   been afebrile.  She was given Zosyn from 2021 to 2021.  Infectious   Disease is consulted for antibiotic recommendations and management.  Wound   cultures were polymicrobial and included actinomyces.     REVIEW OF SYSTEMS:  Positive for persistent abdominal pain, generalized   malaise.  States not currently having any nausea or vomiting.  All other   systems reviewed and are negative.     ALLERGIES:  SHE IS ALLERGIC TO ALMONDS.  She denies any antibiotic allergies.     PAST MEDICAL HISTORY:  Appendectomy, anxiety, prior , shoulder   surgeries.     FAMILY HISTORY:  Cancer, diabetes, hypertension.     SOCIAL HISTORY:  She smoked for about 20 years, but quit 15 years ago.  She   does use inhaled marijuana.  She does not drink alcohol or use IV drugs.     PHYSICAL EXAMINATION:    GENERAL:  She is an ill-appearing female, but in no acute distress, pleasant,   cooperative.  She has been afebrile.  VITAL SIGNS:  Current temperature is " 98.6, blood pressure 130/51, pulse of 89,   respiratory rate 18, oxygen saturation 95% on room air.  She is 5 feet 9   inches, weighs 72 kilos.  HEENT:  Normocephalic, atraumatic.  Pupils equal, round, react to light.    Extraocular movements intact.  She has no conjunctivitis.  She has anicteric   sclerae.  Oropharynx is clear.  She has good dentition.  NECK:  Supple.  There is no JVD or stridor.  CARDIOVASCULAR:  Regular rate and rhythm, unable to auscultate murmurs, rubs   or gallops.  CHEST:  Clear to auscultation bilaterally, unlabored.  There is no wheezing or   rhonchi.  ABDOMEN:  Soft.  Unable to examine due to abdominal pain.  She does have   generalized tenderness.  Midline staples are intact.  EXTREMITIES:  Show no cyanosis or clubbing.  She does have edema.  NEUROLOGIC:  She is awake, alert, oriented.  Speech is fluent without   dysarthria.  Cranial nerves are intact.  She is moving all extremities.  PSYCHIATRIC:  Mood is normal.  Affect is normal.  Behavior is normal.     CURRENT LABORATORY DATA:  White blood cell count 15.5 on 06/01/2021, now 7.8,   H and H 11.2 and 34.4, platelets 165.  Sodium 134, potassium 3.5, chloride   102, bicarbonate 23, glucose 107, BUN 4, creatinine 0.48. AST 21, ALT 9,   alkaline phosphatase 59, total bilirubin 0.4.  Urinalysis on 05/22/2021 is   negative except for blood, it is contaminated with epithelial cells.    C-reactive protein remains elevated at 24.7.  Pathology from 05/28/2021 shows   transmural acute inflammation and necrosis near the anastomotic site with   transmural defects acute serositis, focal acute inflammation and fat necrosis   of the omentum.  Pathology from 05/22/2021 showed the right hemicolectomy with   ischemic changes consistent with volvulus.  No malignancy was found.  No   blood cultures were done at admission.  Abdominal wound cultures from   05/28/2021 are showing Actinomyces odontolyticus, Enterococcus faecalis,   Bacteroides fragilis and  Bacteroides ovatus.  Blood cultures done on   06/02/2021 are negative.     DIAGNOSTIC DATA:  CT scan done on 05/22/2021 showed cecal volvulus, mildly   distended distal small bowel, no free air, nonobstructing kidney stones.  CT   scan done on 05/27/2021 showed new bibasilar atelectasis with small effusions,   normal spleen, moderate right-sided hydronephrosis, multifocal ascites with   free air.  Colon was diffusely dilated with a large amount of free fluid and   gas in the pelvis.  EKG:  QTc of 420.     ASSESSMENT AND PLAN:  A 50-year-old female admitted to the hospital on   05/22/2021 due to abdominal pain from cecal volvulus with associated ischemic   bowel.  She is status post right hemicolectomy with the sudden development of   severe thrombocytopenia, abdominal distention, pain and ascites.  CT scan   showed free air as well as multiple fluid collections.  She underwent   exploratory laparotomy and was found to have feculent peritonitis with   necrosis near the anastomotic site with bowel leakage.  She is status post   redo surgery.  Since her surgery, she has been afebrile.  Her leukocytosis has   decreased.  Her thrombocytopenia has improved consistent with resolving   sepsis; however, she does continue to have some abdominal pain.  Her wound   cultures not surprisingly are polymicrobial.  She should continue on   antibiotics.  As there is no resistant gram negatives, isolated, switch to Unasyn.    She does have the finding of actinomyces, which will   require a more prolonged course of antimicrobial therapy.  Her diet is being   advanced, but she still continues to have abdominal pain, nausea and   occasional vomiting, so she is not a candidate for transition to oral iron   therapy at this time.  I have ordered repeat blood cultures to verify there is   no bacteremia.  Anticipate, she will likely switch to oral antibiotics at   discharge.  However, she may require a short course of IV therapy prior to    transition to oral depending on clinical course.  Further recommendations per   culture results and clinical course.  Discussed with hospitalist.     Thank you, we will follow with you.        ______________________________  MD RUPA Tillman/AGNES    DD:  06/03/2021 10:55  DT:  06/03/2021 14:24    Job#:  [id number]

## 2021-06-03 NOTE — DISCHARGE PLANNING
Received Choice form at [phone]  Agency/Facility Name: [facility]  Referral sent per Choice form @ [phone]

## 2021-06-03 NOTE — THERAPY
Missed Therapy     Patient Name: Mari Dillon  Age:  50 y.o., Sex:  female  Medical Record #: [id number]  Today's Date: 6/3/2021    Discussed missed therapy with RN       06/03/21 2055   Interdisciplinary Plan of Care Collaboration   Collaboration Comments OT eval attempted, pt crying out in severe pain, just medicated and will need to have wound vac change soon once pain meds take effect.  Pt doesn't appear she will tolerate OT eval today, will defer and attempt again 6/4.

## 2021-06-03 NOTE — CARE PLAN
The patient is Watcher - Medium risk of patient condition declining or worsening    Shift Goals  Clinical Goals: pain control  Patient Goals: pain control    Progress made toward(s) clinical / shift goals:  patient is able to reach comfort goal with current pain medications; pt is able to verbalize plan of care and patient denies depression at this time.   Problem: Knowledge Deficit - Standard  Goal: Patient and family/care givers will demonstrate understanding of plan of care, disease process/condition, diagnostic tests and medications  Outcome: Progressing     Problem: Pain - Standard  Goal: Alleviation of pain or a reduction in pain to the patient’s comfort goal  Outcome: Progressing     Problem: Depression  Goal: Patient and family/caregiver will verbalize accurate information about at least two of the possible causes of depression, three-four of the signs and symptoms of depression  Outcome: Progressing       Patient is not progressing towards the following goals:

## 2021-06-04 ENCOUNTER — ANESTHESIA (OUTPATIENT)
Dept: SURGERY | Facility: MEDICAL CENTER | Age: 51
DRG: 329 | End: 2021-06-04
Payer: COMMERCIAL

## 2021-06-04 ENCOUNTER — ANESTHESIA EVENT (OUTPATIENT)
Dept: SURGERY | Facility: MEDICAL CENTER | Age: 51
DRG: 329 | End: 2021-06-04
Payer: COMMERCIAL

## 2021-06-04 LAB
ALBUMIN SERPL BCP-MCNC: 2 G/DL (ref 3.2–4.9)
ALBUMIN/GLOB SERPL: 0.7 G/DL
ALP SERPL-CCNC: 62 U/L (ref 30–99)
ALT SERPL-CCNC: 10 U/L (ref 2–50)
ANION GAP SERPL CALC-SCNC: 9 MMOL/L (ref 7–16)
AST SERPL-CCNC: 21 U/L (ref 12–45)
BASOPHILS # BLD AUTO: 0.3 % (ref 0–1.8)
BASOPHILS # BLD: 0.04 K/UL (ref 0–0.12)
BILIRUB SERPL-MCNC: 0.4 MG/DL (ref 0.1–1.5)
BUN SERPL-MCNC: 3 MG/DL (ref 8–22)
CA-I SERPL-SCNC: 1.12 MMOL/L (ref 1.1–1.3)
CALCIUM SERPL-MCNC: 7.7 MG/DL (ref 8.4–10.2)
CHLORIDE SERPL-SCNC: 101 MMOL/L (ref 96–112)
CO2 SERPL-SCNC: 23 MMOL/L (ref 20–33)
CREAT SERPL-MCNC: 0.46 MG/DL (ref 0.5–1.4)
EOSINOPHIL # BLD AUTO: 0.13 K/UL (ref 0–0.51)
EOSINOPHIL NFR BLD: 1 % (ref 0–6.9)
ERYTHROCYTE [DISTWIDTH] IN BLOOD BY AUTOMATED COUNT: 50.7 FL (ref 35.9–50)
GLOBULIN SER CALC-MCNC: 2.8 G/DL (ref 1.9–3.5)
GLUCOSE SERPL-MCNC: 94 MG/DL (ref 65–99)
HCT VFR BLD AUTO: 25.6 % (ref 37–47)
HGB BLD-MCNC: 8.3 G/DL (ref 12–16)
IMM GRANULOCYTES # BLD AUTO: 0.53 K/UL (ref 0–0.11)
IMM GRANULOCYTES NFR BLD AUTO: 4 % (ref 0–0.9)
LYMPHOCYTES # BLD AUTO: 1.26 K/UL (ref 1–4.8)
LYMPHOCYTES NFR BLD: 9.4 % (ref 22–41)
MCH RBC QN AUTO: 28.9 PG (ref 27–33)
MCHC RBC AUTO-ENTMCNC: 32.4 G/DL (ref 33.6–35)
MCV RBC AUTO: 89.2 FL (ref 81.4–97.8)
MONOCYTES # BLD AUTO: 1.12 K/UL (ref 0–0.85)
MONOCYTES NFR BLD AUTO: 8.4 % (ref 0–13.4)
NEUTROPHILS # BLD AUTO: 10.28 K/UL (ref 2–7.15)
NEUTROPHILS NFR BLD: 76.9 % (ref 44–72)
NRBC # BLD AUTO: 0.03 K/UL
NRBC BLD-RTO: 0.2 /100 WBC
PLATELET # BLD AUTO: 292 K/UL (ref 164–446)
PMV BLD AUTO: 10.7 FL (ref 9–12.9)
POTASSIUM SERPL-SCNC: 4.1 MMOL/L (ref 3.6–5.5)
PROT SERPL-MCNC: 4.8 G/DL (ref 6–8.2)
RBC # BLD AUTO: 2.87 M/UL (ref 4.2–5.4)
SODIUM SERPL-SCNC: 133 MMOL/L (ref 135–145)
UFH PPP CHRO-ACNC: 0.24 IU/ML
UFH PPP CHRO-ACNC: <0.1 IU/ML
WBC # BLD AUTO: 13.4 K/UL (ref 4.8–10.8)

## 2021-06-04 PROCEDURE — 160009 HCHG ANES TIME/MIN: Performed by: SURGERY

## 2021-06-04 PROCEDURE — 700111 HCHG RX REV CODE 636 W/ 250 OVERRIDE (IP): Performed by: INTERNAL MEDICINE

## 2021-06-04 PROCEDURE — 160035 HCHG PACU - 1ST 60 MINS PHASE I: Performed by: SURGERY

## 2021-06-04 PROCEDURE — 80053 COMPREHEN METABOLIC PANEL: CPT

## 2021-06-04 PROCEDURE — 0W9G0ZZ DRAINAGE OF PERITONEAL CAVITY, OPEN APPROACH: ICD-10-PCS | Performed by: SURGERY

## 2021-06-04 PROCEDURE — 700101 HCHG RX REV CODE 250: Performed by: SURGERY

## 2021-06-04 PROCEDURE — 700105 HCHG RX REV CODE 258: Performed by: INTERNAL MEDICINE

## 2021-06-04 PROCEDURE — 700101 HCHG RX REV CODE 250: Performed by: ANESTHESIOLOGY

## 2021-06-04 PROCEDURE — 700102 HCHG RX REV CODE 250 W/ 637 OVERRIDE(OP): Performed by: SURGERY

## 2021-06-04 PROCEDURE — 700111 HCHG RX REV CODE 636 W/ 250 OVERRIDE (IP): Performed by: ANESTHESIOLOGY

## 2021-06-04 PROCEDURE — A9270 NON-COVERED ITEM OR SERVICE: HCPCS | Performed by: SURGERY

## 2021-06-04 PROCEDURE — 700111 HCHG RX REV CODE 636 W/ 250 OVERRIDE (IP): Performed by: SURGERY

## 2021-06-04 PROCEDURE — 700105 HCHG RX REV CODE 258: Performed by: SURGERY

## 2021-06-04 PROCEDURE — 501838 HCHG SUTURE GENERAL: Performed by: SURGERY

## 2021-06-04 PROCEDURE — 160002 HCHG RECOVERY MINUTES (STAT): Performed by: SURGERY

## 2021-06-04 PROCEDURE — 99233 SBSQ HOSP IP/OBS HIGH 50: CPT | Performed by: INTERNAL MEDICINE

## 2021-06-04 PROCEDURE — 770006 HCHG ROOM/CARE - MED/SURG/GYN SEMI*

## 2021-06-04 PROCEDURE — 160031 HCHG SURGERY MINUTES - 1ST 30 MINS LEVEL 5: Performed by: SURGERY

## 2021-06-04 PROCEDURE — 82330 ASSAY OF CALCIUM: CPT

## 2021-06-04 PROCEDURE — 85520 HEPARIN ASSAY: CPT

## 2021-06-04 PROCEDURE — 3E0M05Z INTRODUCTION OF ADHESION BARRIER INTO PERITONEAL CAVITY, OPEN APPROACH: ICD-10-PCS | Performed by: SURGERY

## 2021-06-04 PROCEDURE — 700102 HCHG RX REV CODE 250 W/ 637 OVERRIDE(OP): Performed by: INTERNAL MEDICINE

## 2021-06-04 PROCEDURE — 700105 HCHG RX REV CODE 258: Performed by: ANESTHESIOLOGY

## 2021-06-04 PROCEDURE — 302699 HCHG ABDOMINAL BINDER: Performed by: SURGERY

## 2021-06-04 PROCEDURE — 160042 HCHG SURGERY MINUTES - EA ADDL 1 MIN LEVEL 5: Performed by: SURGERY

## 2021-06-04 PROCEDURE — 700111 HCHG RX REV CODE 636 W/ 250 OVERRIDE (IP): Performed by: NURSE PRACTITIONER

## 2021-06-04 PROCEDURE — 85025 COMPLETE CBC W/AUTO DIFF WBC: CPT

## 2021-06-04 PROCEDURE — 160048 HCHG OR STATISTICAL LEVEL 1-5: Performed by: SURGERY

## 2021-06-04 PROCEDURE — A9270 NON-COVERED ITEM OR SERVICE: HCPCS | Performed by: INTERNAL MEDICINE

## 2021-06-04 PROCEDURE — 160036 HCHG PACU - EA ADDL 30 MINS PHASE I: Performed by: SURGERY

## 2021-06-04 PROCEDURE — 36415 COLL VENOUS BLD VENIPUNCTURE: CPT

## 2021-06-04 RX ORDER — HALOPERIDOL 5 MG/ML
1 INJECTION INTRAMUSCULAR
Status: DISCONTINUED | OUTPATIENT
Start: 2021-06-04 | End: 2021-06-04 | Stop reason: HOSPADM

## 2021-06-04 RX ORDER — HYDROMORPHONE HYDROCHLORIDE 1 MG/ML
0.4 INJECTION, SOLUTION INTRAMUSCULAR; INTRAVENOUS; SUBCUTANEOUS
Status: DISCONTINUED | OUTPATIENT
Start: 2021-06-04 | End: 2021-06-04 | Stop reason: HOSPADM

## 2021-06-04 RX ORDER — SODIUM CHLORIDE, SODIUM LACTATE, POTASSIUM CHLORIDE, CALCIUM CHLORIDE 600; 310; 30; 20 MG/100ML; MG/100ML; MG/100ML; MG/100ML
INJECTION, SOLUTION INTRAVENOUS CONTINUOUS
Status: DISCONTINUED | OUTPATIENT
Start: 2021-06-04 | End: 2021-06-04 | Stop reason: HOSPADM

## 2021-06-04 RX ORDER — HYDROMORPHONE HYDROCHLORIDE 2 MG/ML
INJECTION, SOLUTION INTRAMUSCULAR; INTRAVENOUS; SUBCUTANEOUS PRN
Status: DISCONTINUED | OUTPATIENT
Start: 2021-06-04 | End: 2021-06-04 | Stop reason: SURG

## 2021-06-04 RX ORDER — HYDROMORPHONE HYDROCHLORIDE 1 MG/ML
0.2 INJECTION, SOLUTION INTRAMUSCULAR; INTRAVENOUS; SUBCUTANEOUS
Status: DISCONTINUED | OUTPATIENT
Start: 2021-06-04 | End: 2021-06-04 | Stop reason: HOSPADM

## 2021-06-04 RX ORDER — OXYCODONE HCL 5 MG/5 ML
10 SOLUTION, ORAL ORAL
Status: DISCONTINUED | OUTPATIENT
Start: 2021-06-04 | End: 2021-06-04 | Stop reason: HOSPADM

## 2021-06-04 RX ORDER — CEFOTETAN DISODIUM 2 G/20ML
INJECTION, POWDER, FOR SOLUTION INTRAMUSCULAR; INTRAVENOUS PRN
Status: DISCONTINUED | OUTPATIENT
Start: 2021-06-04 | End: 2021-06-04 | Stop reason: SURG

## 2021-06-04 RX ORDER — MEPERIDINE HYDROCHLORIDE 25 MG/ML
12.5 INJECTION INTRAMUSCULAR; INTRAVENOUS; SUBCUTANEOUS
Status: DISCONTINUED | OUTPATIENT
Start: 2021-06-04 | End: 2021-06-04 | Stop reason: HOSPADM

## 2021-06-04 RX ORDER — HYDROMORPHONE HYDROCHLORIDE 1 MG/ML
0.1 INJECTION, SOLUTION INTRAMUSCULAR; INTRAVENOUS; SUBCUTANEOUS
Status: DISCONTINUED | OUTPATIENT
Start: 2021-06-04 | End: 2021-06-04 | Stop reason: HOSPADM

## 2021-06-04 RX ORDER — ONDANSETRON 2 MG/ML
4 INJECTION INTRAMUSCULAR; INTRAVENOUS
Status: DISCONTINUED | OUTPATIENT
Start: 2021-06-04 | End: 2021-06-04 | Stop reason: HOSPADM

## 2021-06-04 RX ORDER — SODIUM CHLORIDE, SODIUM LACTATE, POTASSIUM CHLORIDE, CALCIUM CHLORIDE 600; 310; 30; 20 MG/100ML; MG/100ML; MG/100ML; MG/100ML
INJECTION, SOLUTION INTRAVENOUS CONTINUOUS
Status: DISCONTINUED | OUTPATIENT
Start: 2021-06-04 | End: 2021-06-04

## 2021-06-04 RX ORDER — DIPHENHYDRAMINE HYDROCHLORIDE 50 MG/ML
12.5 INJECTION INTRAMUSCULAR; INTRAVENOUS
Status: DISCONTINUED | OUTPATIENT
Start: 2021-06-04 | End: 2021-06-04 | Stop reason: HOSPADM

## 2021-06-04 RX ORDER — SODIUM CHLORIDE, SODIUM LACTATE, POTASSIUM CHLORIDE, CALCIUM CHLORIDE 600; 310; 30; 20 MG/100ML; MG/100ML; MG/100ML; MG/100ML
INJECTION, SOLUTION INTRAVENOUS
Status: DISCONTINUED | OUTPATIENT
Start: 2021-06-04 | End: 2021-06-04 | Stop reason: SURG

## 2021-06-04 RX ORDER — ROCURONIUM BROMIDE 10 MG/ML
INJECTION, SOLUTION INTRAVENOUS PRN
Status: DISCONTINUED | OUTPATIENT
Start: 2021-06-04 | End: 2021-06-04 | Stop reason: SURG

## 2021-06-04 RX ORDER — LIDOCAINE HYDROCHLORIDE 20 MG/ML
INJECTION, SOLUTION EPIDURAL; INFILTRATION; INTRACAUDAL; PERINEURAL PRN
Status: DISCONTINUED | OUTPATIENT
Start: 2021-06-04 | End: 2021-06-04 | Stop reason: SURG

## 2021-06-04 RX ORDER — OXYCODONE HCL 5 MG/5 ML
5 SOLUTION, ORAL ORAL
Status: DISCONTINUED | OUTPATIENT
Start: 2021-06-04 | End: 2021-06-04 | Stop reason: HOSPADM

## 2021-06-04 RX ADMIN — OXYCODONE HYDROCHLORIDE 10 MG: 5 TABLET ORAL at 20:34

## 2021-06-04 RX ADMIN — ROCURONIUM BROMIDE 40 MG: 10 INJECTION, SOLUTION INTRAVENOUS at 07:10

## 2021-06-04 RX ADMIN — POVIDONE IODINE 15 ML: 100 SOLUTION TOPICAL at 07:02

## 2021-06-04 RX ADMIN — LORAZEPAM 0.5 MG: 2 INJECTION INTRAMUSCULAR; INTRAVENOUS at 12:17

## 2021-06-04 RX ADMIN — PROPOFOL 200 MG: 10 INJECTION, EMULSION INTRAVENOUS at 07:10

## 2021-06-04 RX ADMIN — LIDOCAINE HYDROCHLORIDE 100 MG: 20 INJECTION, SOLUTION EPIDURAL; INFILTRATION; INTRACAUDAL; PERINEURAL at 07:10

## 2021-06-04 RX ADMIN — SODIUM CHLORIDE, POTASSIUM CHLORIDE, SODIUM LACTATE AND CALCIUM CHLORIDE: 600; 310; 30; 20 INJECTION, SOLUTION INTRAVENOUS at 07:02

## 2021-06-04 RX ADMIN — AMPICILLIN SODIUM AND SULBACTAM SODIUM 3 G: 2; 1 INJECTION, POWDER, FOR SOLUTION INTRAMUSCULAR; INTRAVENOUS at 12:12

## 2021-06-04 RX ADMIN — HEPARIN SODIUM 18 UNITS/KG/HR: 5000 INJECTION, SOLUTION INTRAVENOUS at 12:55

## 2021-06-04 RX ADMIN — HYDROMORPHONE HYDROCHLORIDE 0.4 MG: 1 INJECTION, SOLUTION INTRAMUSCULAR; INTRAVENOUS; SUBCUTANEOUS at 08:18

## 2021-06-04 RX ADMIN — DOCUSATE SODIUM 100 MG: 100 CAPSULE, LIQUID FILLED ORAL at 17:27

## 2021-06-04 RX ADMIN — FENTANYL CITRATE 50 MCG: 50 INJECTION, SOLUTION INTRAMUSCULAR; INTRAVENOUS at 08:05

## 2021-06-04 RX ADMIN — CEFOTETAN DISODIUM 2 G: 2 INJECTION, POWDER, FOR SOLUTION INTRAMUSCULAR; INTRAVENOUS at 07:10

## 2021-06-04 RX ADMIN — HYDROMORPHONE HYDROCHLORIDE 0.4 MG: 1 INJECTION, SOLUTION INTRAMUSCULAR; INTRAVENOUS; SUBCUTANEOUS at 08:25

## 2021-06-04 RX ADMIN — HYDROMORPHONE HYDROCHLORIDE 0.2 MG: 1 INJECTION, SOLUTION INTRAMUSCULAR; INTRAVENOUS; SUBCUTANEOUS at 08:31

## 2021-06-04 RX ADMIN — SODIUM CHLORIDE, POTASSIUM CHLORIDE, SODIUM LACTATE AND CALCIUM CHLORIDE: 600; 310; 30; 20 INJECTION, SOLUTION INTRAVENOUS at 07:06

## 2021-06-04 RX ADMIN — HYDROMORPHONE HYDROCHLORIDE 0.5 MCG: 2 INJECTION, SOLUTION INTRAMUSCULAR; INTRAVENOUS; SUBCUTANEOUS at 07:27

## 2021-06-04 RX ADMIN — AMPICILLIN SODIUM AND SULBACTAM SODIUM 3 G: 2; 1 INJECTION, POWDER, FOR SOLUTION INTRAMUSCULAR; INTRAVENOUS at 01:18

## 2021-06-04 RX ADMIN — AMPICILLIN SODIUM AND SULBACTAM SODIUM 3 G: 2; 1 INJECTION, POWDER, FOR SOLUTION INTRAMUSCULAR; INTRAVENOUS at 17:28

## 2021-06-04 RX ADMIN — FAMOTIDINE 20 MG: 20 TABLET ORAL at 17:27

## 2021-06-04 RX ADMIN — FENTANYL CITRATE 50 MCG: 50 INJECTION, SOLUTION INTRAMUSCULAR; INTRAVENOUS at 07:59

## 2021-06-04 RX ADMIN — AMPICILLIN SODIUM AND SULBACTAM SODIUM 3 G: 2; 1 INJECTION, POWDER, FOR SOLUTION INTRAMUSCULAR; INTRAVENOUS at 05:39

## 2021-06-04 RX ADMIN — OXYCODONE HYDROCHLORIDE 10 MG: 5 TABLET ORAL at 17:27

## 2021-06-04 RX ADMIN — OXYCODONE HYDROCHLORIDE 10 MG: 5 TABLET ORAL at 04:41

## 2021-06-04 RX ADMIN — FAMOTIDINE 20 MG: 10 INJECTION INTRAVENOUS at 05:39

## 2021-06-04 RX ADMIN — OXYCODONE HYDROCHLORIDE 10 MG: 5 TABLET ORAL at 01:18

## 2021-06-04 RX ADMIN — OXYCODONE HYDROCHLORIDE 10 MG: 5 TABLET ORAL at 10:58

## 2021-06-04 RX ADMIN — OXYCODONE HYDROCHLORIDE 10 MG: 5 TABLET ORAL at 14:20

## 2021-06-04 ASSESSMENT — PAIN DESCRIPTION - PAIN TYPE
TYPE: ACUTE PAIN;SURGICAL PAIN
TYPE: ACUTE PAIN;SURGICAL PAIN
TYPE: SURGICAL PAIN
TYPE: SURGICAL PAIN
TYPE: ACUTE PAIN;SURGICAL PAIN
TYPE: ACUTE PAIN;SURGICAL PAIN
TYPE: SURGICAL PAIN;ACUTE PAIN
TYPE: SURGICAL PAIN;ACUTE PAIN
TYPE: ACUTE PAIN;SURGICAL PAIN
TYPE: SURGICAL PAIN;ACUTE PAIN
TYPE: SURGICAL PAIN
TYPE: SURGICAL PAIN;ACUTE PAIN

## 2021-06-04 ASSESSMENT — COGNITIVE AND FUNCTIONAL STATUS - GENERAL
CLIMB 3 TO 5 STEPS WITH RAILING: A LITTLE
MOVING FROM LYING ON BACK TO SITTING ON SIDE OF FLAT BED: A LOT
SUGGESTED CMS G CODE MODIFIER MOBILITY: CK
STANDING UP FROM CHAIR USING ARMS: A LITTLE
MOBILITY SCORE: 17
DRESSING REGULAR LOWER BODY CLOTHING: A LOT
WALKING IN HOSPITAL ROOM: A LITTLE
DAILY ACTIVITIY SCORE: 19
HELP NEEDED FOR BATHING: A LITTLE
SUGGESTED CMS G CODE MODIFIER DAILY ACTIVITY: CK
DRESSING REGULAR UPPER BODY CLOTHING: A LITTLE
TOILETING: A LITTLE
TURNING FROM BACK TO SIDE WHILE IN FLAT BAD: A LITTLE
MOVING TO AND FROM BED TO CHAIR: A LITTLE

## 2021-06-04 ASSESSMENT — ENCOUNTER SYMPTOMS
VOMITING: 0
NAUSEA: 0
DIARRHEA: 0
DIZZINESS: 0
CONSTIPATION: 0
CHILLS: 0
SHORTNESS OF BREATH: 0
ABDOMINAL PAIN: 1
FEVER: 0

## 2021-06-04 NOTE — PROGRESS NOTES
[facility] Medicine Daily Progress Note    Date of Service  6/4/2021    Chief Complaint  50 y.o. female admitted 5/22/2021 with abd pain.    Hospital Course  51 yo woman who presented with abd pain and CT showed cecal volvulus. She underwent exploratory celiotomy and right hemicolectomy with ileocolic anastomosis with Dr. Wood 5/22/21. She developed thrombocytopenia and ascites and returned for ex lap with resection of ileocolic anastomosis and wound vac placement 5/28/21. She was admitted to ICU, started on zosyn for bacterial peritonitis and given platelet transfusions. HIT panel was negative. Abd cultures grew actinomyces, enterococcus, bacteroides. Doppler showed catheter related thrombus in R arm and started on anticoagulation.    Interval Problem Update  She had washout this morning  She has abd pain and feels tired    Consultants/Specialty  Critical care  Surgery  ID    Code Status  Full Code    Disposition  HH and wound vac ordered  ID recs    Review of Systems  Review of Systems   Constitutional: Positive for malaise/fatigue. Negative for fever.   Respiratory: Negative for shortness of breath.    Cardiovascular: Negative for chest pain.   Gastrointestinal: Positive for abdominal pain. Negative for constipation and vomiting.   Neurological: Negative for dizziness.   All other systems reviewed and are negative.       Physical Exam  Temp:  [36.6 °C (97.8 °F)-37.3 °C (99.1 °F)] 36.6 °C (97.8 °F)  Pulse:  [] 79  Resp:  [18-20] 20  BP: (112-150)/(55-79) 124/73  SpO2:  [91 %-100 %] 98 %    Physical Exam  Vitals and nursing note reviewed.   Constitutional:       General: She is not in acute distress.     Appearance: She is not toxic-appearing.   HENT:      Head: Normocephalic.      Mouth/Throat:      Mouth: Mucous membranes are moist.   Eyes:      General:         Right eye: No discharge.         Left eye: No discharge.   Cardiovascular:      Rate and Rhythm: Normal rate and regular rhythm.   Pulmonary:       Effort: Pulmonary effort is normal. No respiratory distress.      Breath sounds: No wheezing or rales.   Abdominal:      General: There is distension.      Tenderness: There is abdominal tenderness (diffuse). There is no guarding or rebound.      Comments: Wound vac and surgical incision centrally   Musculoskeletal:         General: Swelling (right and left UE) present.      Cervical back: Neck supple.   Skin:     General: Skin is warm and dry.   Neurological:      General: No focal deficit present.      Mental Status: She is alert.      Comments: AOx4   Psychiatric:         Mood and Affect: Mood normal.         Behavior: Behavior normal.         Fluids    Intake/Output Summary (Last 24 hours) at 6/4/2021 1329  Last data filed at 6/4/2021 1255  Gross per 24 hour   Intake 2889.93 ml   Output 3770 ml   Net -880.07 ml       Laboratory  Recent Labs     06/02/21 0455 06/03/21 0335 06/04/21  0348   WBC 13.7* 7.8 13.4*   RBC 2.98* 3.87* 2.87*   HEMOGLOBIN 8.7* 11.2* 8.3*   HEMATOCRIT 26.8* 34.4* 25.6*   MCV 89.9 88.9 89.2   MCH 29.2 28.9 28.9   MCHC 32.5* 32.6* 32.4*   RDW 49.9 49.4 50.7*   PLATELETCT 177 165 292   MPV 11.2 11.1 10.7     Recent Labs     06/02/21 0455 06/03/21  0335 06/04/21  0348   SODIUM 134* 135 133*   POTASSIUM 3.7 3.5* 4.1   CHLORIDE 103 102 101   CO2 22 23 23   GLUCOSE 81 107* 94   BUN 8 4* 3*   CREATININE 0.49* 0.48* 0.46*   CALCIUM 7.5* 7.1* 7.7*     Recent Labs     06/03/21 2125   APTT 33.8   INR 1.23*               Imaging  CT-ABDOMEN-PELVIS WITH   Final Result      1.  Multiple rim enhancing peritoneal fluid collection suspicious for abscess      2.  These include the rectouterine space measuring 6.6 x 7.2 x 5.1 cm, right lower quadrant measuring 13.8 x 4.2 x 6.5 cm, left subphrenic space measuring 8.4 x 4.6 x 2.7 cm, and contiguous loculated fluid within the right and left paracolic gutter      3.  Interval right colectomy      4.  Small-moderate-sized bilateral pleural effusion and  atelectasis      US-EXTREMITY VENOUS UPPER BILAT   Final Result      IR-MIDLINE CATHETER INSERTION WO GUIDANCE > AGE 3   Final Result                  Ultrasound-guided midline placement performed by qualified nursing staff    as above.          DX-CHEST-FOR LINE PLACEMENT Perform procedure in: Other(comment f6 below): (PACU)   Final Result      1.  Left IJ central line tip high in position located above the superior vena cava are within the left brachiocephalic vein.      2.  Patchy bilateral infiltrates.      3.  NG tube tip extends into the stomach.      CT-ABDOMEN-PELVIS WITH   Final Result      1.  Moderate to large amount of ascites within the abdomen and pelvis some areas of which are loculated in nature. There are also punctate areas of gas within those areas of ascites as well as free intraperitoneal air. This may be related to recent    surgical procedure however the degree of volume of ascites would be unusual for normal postoperative course. Consideration should be given for leakage of bowel content or other process.      2.  Diffuse colonic distention.      3.  Bibasilar atelectasis and pleural effusions.      4.  5 mm left renal pelvic stone which results in mild hydronephrosis above that level.      5.  Moderate right-sided hydronephrosis extending to the level of the upper sacrum. No ureteral stone. The ureter is not identified below that level.      6.  This was discussed with NEVA KAY at 6:44 PM on 5/27/2021.      [id number] VIEW   Final Result      Gaseous distended small bowel and colon is similar to prior, likely ileus.      [id number] VIEW   Final Result         Gaseous distention of the small bowel and colon, likely postoperative ileus.      CT-ABDOMEN-PELVIS WITH   Final Result         1.  Findings keeping with cecal volvulus.   2.  Mildly fluid distended distal small bowel.   3.  No free air.   4.  Small nonobstructing renal stones.   These findings were discussed with ESHA ROSE  HALLIE on 5/22/2021 11:45 AM.                       Assessment/Plan  * Bacterial peritonitis (HCC)  Assessment & Plan  Abd cultures grew actinomyces, enterococcus, bacteroides.   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

## 2021-06-04 NOTE — ANESTHESIA PROCEDURE NOTES
Airway    Date/Time: 6/4/2021 7:11 AM  Performed by: Tobey Gansert, M.D.  Authorized by: Tobey Gansert, M.D.     Location:  OR  Urgency:  Elective  Indications for Airway Management:  Anesthesia      Spontaneous Ventilation: absent    Sedation Level:  Deep  Preoxygenated: Yes    Patient Position:  Sniffing  Final Airway Type:  Endotracheal airway  Final Endotracheal Airway:  ETT  Cuffed: Yes    Technique Used for Successful ETT Placement:  Direct laryngoscopy    Insertion Site:  Oral  Blade Type:  Mor  Laryngoscope Blade/Videolaryngoscope Blade Size:  3  ETT Size (mm):  7.0  Measured from:  Teeth  ETT to Teeth (cm):  22  Placement Verified by: auscultation and capnometry    Cormack-Lehane Classification:  Grade I - full view of glottis  Number of Attempts at Approach:  1

## 2021-06-04 NOTE — ANESTHESIA TIME REPORT
Anesthesia Start and Stop Event Times     Date Time Event    6/4/2021 0650 Ready for Procedure     0707 Anesthesia Start     0740 Anesthesia Stop        Responsible Staff  06/04/21    Name Role Begin End    Tobey Gansert, M.D. Anesth 0707 0740        Preop Diagnosis (Free Text):  Pre-op Diagnosis     interabdominal abscess        Preop Diagnosis (Codes):  Diagnosis Information     Diagnosis Code(s): Postprocedural intraabdominal abscess [T81.43XA]        Post op Diagnosis  Abdominal wall abscess      Premium Reason  Non-Premium    Comments:

## 2021-06-04 NOTE — CARE PLAN
The patient is Watcher - Medium risk of patient condition declining or worsening    Shift Goals  Clinical Goals: Pain control to patient approved level  Patient Goals: pain control    Progress made toward(s) clinical / shift goals:  Patient tolerating pain levels with prescribed medications - see MAR.  Patient seeming lighthearted today and feeling more positive following washout and new VAC placement. Patient weaned to room air.   Problem: Pain - Standard  Goal: Alleviation of pain or a reduction in pain to the patient’s comfort goal  Outcome: Progressing     Problem: Depression  Goal: Patient and family/caregiver will verbalize accurate information about at least two of the possible causes of depression, three-four of the signs and symptoms of depression  Outcome: Progressing     Problem: Respiratory/Oxygenation Function Post-Surgical  Goal: Patient will achieve/maintain normal respiratory rate/effort  Outcome: Met     Problem: Wound/ / Incision Healing  Goal: Patient's wound/surgical incision will decrease in size and heals properly  Outcome: Progressing       Patient is not progressing towards the following goals:

## 2021-06-04 NOTE — PROGRESS NOTES
Received report from day shift nurse.   Assumed pt care  Pt is A&Ox4, resting comfortably in bed.   Pt on r.a.. No signs of SOB/respiratory distress.   Labs noted, VSS.   Prn  Morphine 2mg and Oxycodone 10mg given per MAR for c/o pain to midline abdomen   Dressing change completed per order, foul-smelling drainage coming out from the wound while changing dressing  Assessment completed; purewick in place   Fall precautions in place.   Bed at lowest position.   Call light and personal belongings within reach.   Continue to monitor

## 2021-06-04 NOTE — DISCHARGE PLANNING
Anticipated Discharge Disposition:   Home with [facility]anae DUKES and [facility] wound vac    Action:     0958: RN CM received a call from Leticia with [facility]. Per Leticia, the wound vac has been approved. Leticia just needs confirmation on which  agency will be performing dressing changes. KLAUDIA GILES to call Janae to ask about acceptance.     1003: KLAUDIA GILES called [facility]. This patient has been accepted into their services. Leticia from [facility] to be notified.     1120: KLAUDIA GILES called Leticia to inform her that [facility] will be doing wound care for patient.     Barriers to Discharge:   Medical Clearance  Wound vac delivery     Plan:   Hospital care management will continue to assist with discharge planning needs.

## 2021-06-04 NOTE — PROGRESS NOTES
Infectious Disease Progress Note    Author: Elvis Eckert M.D. Date & Time of service: 2021  9:32 AM    Chief Complaint:  Follow-up for intra-abdominal abscesses    Interval History:   patient is afebrile, white count 13.4.  Patient had worsening abdominal pain yesterday, noted purulent output during wound VAC change, repeat CT with multiple intra-abdominal abscesses, taken back to the OR this morning.    Labs Reviewed and Medications Reviewed.    Review of Systems:  Review of Systems   Constitutional: Positive for malaise/fatigue. Negative for chills and fever.   Cardiovascular: Negative for chest pain.   Gastrointestinal: Positive for abdominal pain. Negative for diarrhea, nausea and vomiting.   Skin: Negative for itching and rash.   All other systems reviewed and are negative.      Hemodynamics:  Temp (24hrs), Av.8 °C (98.2 °F), Min:36.4 °C (97.6 °F), Max:37.3 °C (99.1 °F)  Temperature: 36.6 °C (97.8 °F)  Pulse  Av.9  Min: 60  Max: 156   Blood Pressure: 130/79       Physical Exam:  Physical Exam  Vitals and nursing note reviewed.   Constitutional:       General: She is not in acute distress.     Appearance: She is ill-appearing. She is not toxic-appearing or diaphoretic.   HENT:      Head: Normocephalic and atraumatic.      Mouth/Throat:      Pharynx: No oropharyngeal exudate.   Eyes:      General: No scleral icterus.        Right eye: No discharge.         Left eye: No discharge.      Conjunctiva/sclera: Conjunctivae normal.   Cardiovascular:      Rate and Rhythm: Normal rate.      Heart sounds: No murmur heard.     Pulmonary:      Effort: Pulmonary effort is normal. No respiratory distress.      Breath sounds: No stridor.   Abdominal:      Tenderness: There is abdominal tenderness.      Comments: Abdominal binder, midline wound VAC   Musculoskeletal:         General: No swelling or tenderness.      Cervical back: No rigidity.   Skin:     Findings: No erythema or rash.   Neurological:       General: No focal deficit present.      Mental Status: She is alert and oriented to person, place, and time.      Comments: Drowsy but arousable, just returned from surgery   Psychiatric:         Mood and Affect: Mood normal.         Behavior: Behavior normal.      Comments: Extremely pleasant         Meds:    Current Facility-Administered Medications:   •  morphine injection  •  dakins 0.125% (1/4 strength)  •  oxyCODONE immediate-release  •  heparin  •  heparin  •  ampicillin-sulbactam (UNASYN) IV  •  [DISCONTINUED] insulin regular **AND** [CANCELED] POC blood glucose manual result **AND** NOTIFY MD and PharmD **AND** glucose **AND** dextrose 50%  •  Metoprolol Tartrate  •  LORazepam  •  diazePAM  •  Respiratory Therapy Consult  •  Pharmacy Consult Request  •  docusate sodium  •  senna-docusate  •  senna-docusate  •  polyethylene glycol/lytes  •  magnesium hydroxide  •  bisacodyl  •  fleet  •  famotidine **OR** famotidine  •  ondansetron    Labs:  Recent Labs     06/02/21 0455 06/03/21 0335 06/04/21 0348   WBC 13.7* 7.8 13.4*   RBC 2.98* 3.87* 2.87*   HEMOGLOBIN 8.7* 11.2* 8.3*   HEMATOCRIT 26.8* 34.4* 25.6*   MCV 89.9 88.9 89.2   MCH 29.2 28.9 28.9   RDW 49.9 49.4 50.7*   PLATELETCT 177 165 292   MPV 11.2 11.1 10.7   NEUTSPOLYS 75.70* 76.30* 76.90*   LYMPHOCYTES 9.60* 9.40* 9.40*   MONOCYTES 8.00 9.00 8.40   EOSINOPHILS 1.50 1.20 1.00   BASOPHILS 0.40 0.40 0.30     Recent Labs     06/02/21 0455 06/03/21 0335 06/04/21 0348   SODIUM 134* 135 133*   POTASSIUM 3.7 3.5* 4.1   CHLORIDE 103 102 101   CO2 22 23 23   GLUCOSE 81 107* 94   BUN 8 4* 3*     Recent Labs     06/02/21 0455 06/03/21 0335 06/04/21 0348   ALBUMIN 2.2* 1.9* 2.0*   TBILIRUBIN 0.6 0.4 0.4   ALKPHOSPHAT 84 59 62   TOTPROTEIN 5.2* 4.4* 4.8*   ALTSGPT 10 9 10   ASTSGOT 27 21 21   CREATININE 0.49* 0.48* 0.46*       Imaging:  CT-ABDOMEN-PELVIS WITH    Result Date: 6/3/2021  6/3/2021 4:55 PM HISTORY/REASON FOR EXAM:  Abdominal abscess/infection  suspected; wound dehiscense, r/o abscess. Postoperative. Recent volvulus with right hemicolectomy TECHNIQUE/EXAM DESCRIPTION:  CT scan of the abdomen and pelvis with contrast. Contrast-enhanced helical scanning was obtained from the diaphragmatic domes through the pubic symphysis following the bolus administration of nonionic contrast without complication. 100 mL of Omnipaque 350 nonionic contrast was administered without complication. Low dose optimization technique was utilized for this CT exam including automated exposure control and adjustment of the mA and/or kV according to patient size. COMPARISON: CT abdomen and pelvis 5/27/2021 FINDINGS: Osseous structures: There is bilateral atelectasis and there are small to moderate-sized bilateral pleural effusion. Lungs: Clear ABDOMEN: There is peritoneal fluid present posterior to the abdominal wall and in both paracolic gutter. This has some degree of loculation especially in the left paracolic gutter and could indicate infected fluid. Additionally, in the right lower quadrant there is a loculated fluid collection present measuring approximately 13.8 x 4.2 x 6.5 cm and this connects to the peritoneal fluid in the right paracolic gutter. There is loculated rim-enhancing fluid in the left subphrenic space measuring 8.4 cm in length and approximately 4.6 x 2.7 cm transversely. This connects to the fluid within the left paracolic gutter. There are recent postoperative changes with skin staple present and intraperitoneal air and fluid. LIVER: is normal in appearance. GALLBLADDER and BILIARY SYSTEM Gallbladder and biliary system are normal by CT assessment SPLEEN: Normal in appearance.. PANCREAS: Normal in appearance. The splenic vein and portal vein enhance normally. ADRENAL glands: Normal in appearance. RIGHT kidney: There is a nonobstructive 1 to 2 mm right medial lower pole calculus. LEFT kidney: Normal in appearance. There is no hydronephrosis. BOWEL and MESENTERY:  Their has been right hemicolectomy. AORTA and VASCULATURE: is normal in appearance. Pelvis: In the recto uterine space there is a loculated fluid collection measuring 6.6 x 7.2 x 5.1 cm consistent with an abscess. Uterus and adnexa appear normal.     1.  Multiple rim enhancing peritoneal fluid collection suspicious for abscess 2.  These include the rectouterine space measuring 6.6 x 7.2 x 5.1 cm, right lower quadrant measuring 13.8 x 4.2 x 6.5 cm, left subphrenic space measuring 8.4 x 4.6 x 2.7 cm, and contiguous loculated fluid within the right and left paracolic gutter 3.  Interval right colectomy 4.  Small-moderate-sized bilateral pleural effusion and atelectasis    CT-ABDOMEN-PELVIS WITH    Result Date: 5/27/2021 5/27/2021 5:48 PM HISTORY/REASON FOR EXAM: Diffuse abdominal pain and distention. TECHNIQUE/EXAM DESCRIPTION: CT scan of the abdomen and pelvis with contrast. Contrast-enhanced helical scanning was obtained from the diaphragmatic domes through the pubic symphysis following the bolus administration of 100 mL of Omnipaque 350 nonionic contrast without complication. Low dose optimization technique was utilized for this CT exam including automated exposure control and adjustment of the mA and/or kV according to patient size. COMPARISON: 5/22/2021 FINDINGS: CT Abdomen: There is new bibasilar atelectasis and small bilateral pleural effusions, left greater than right. There is fatty change of the liver. There is a small amount of free intraperitoneal air tracking along the surface of the liver. Gallbladder is distended with dense material present within the gallbladder. The spleen is normal. There is moderate right-sided hydronephrosis extending to the level of the upper sacrum. The right ureter is not identified below that level. No definite ureteral stone on the right. There is left ureteral pelvic junction stone which measures 5 mm in size and results in minimal left renal pelvic dilatation. The adrenal  glands are normal. The pancreas is normal. The aorta is normal in caliber. No evidence of retroperitoneal adenopathy. CT Pelvis: There is multifocal ascites seen which is likely loculated in nature. There are punctate areas of free air seen within those areas of ascites. There are surgical changes involving the cecum. The colon is diffusely dilated. There is a large amount of free fluid which contains pockets of gas within the pelvis. There is a recent midline incision.     1.  Moderate to large amount of ascites within the abdomen and pelvis some areas of which are loculated in nature. There are also punctate areas of gas within those areas of ascites as well as free intraperitoneal air. This may be related to recent surgical procedure however the degree of volume of ascites would be unusual for normal postoperative course. Consideration should be given for leakage of bowel content or other process. 2.  Diffuse colonic distention. 3.  Bibasilar atelectasis and pleural effusions. 4.  5 mm left renal pelvic stone which results in mild hydronephrosis above that level. 5.  Moderate right-sided hydronephrosis extending to the level of the upper sacrum. No ureteral stone. The ureter is not identified below that level. 6.  This was discussed with NEVA KAY at 6:44 PM on 5/27/2021.    CT-ABDOMEN-PELVIS WITH    Result Date: 5/22/2021 5/22/2021 11:19 AM HISTORY/REASON FOR EXAM:  Abdominal distension; IV contrast only. TECHNIQUE/EXAM DESCRIPTION:   CT scan of the abdomen and pelvis with contrast. Contrast-enhanced helical scanning was obtained from the diaphragmatic domes through the pubic symphysis following the bolus administration of nonionic contrast without complication. 100 mL of Omnipaque 350 nonionic contrast was administered without complication. Low dose optimization technique was utilized for this CT exam including automated exposure control and adjustment of the mA and/or kV according to patient size.  COMPARISON: 4/22/2020 FINDINGS: Chest Base: Lung bases are clear. Liver:  Normal. Gallbladder: Normal Biliary tract: Nondilated. Pancreas: Normal. Spleen: Normal. Adrenals: Normal. Kidneys and Collecting Systems:  Cannot exclude a punctate nonobstructing stone in the lower right renal collecting system. There is a small nonobstructing left renal stone measuring about 5 mm. Gastrointestinal tract:  The cecum is distended and displaced into the left upper quadrant. The ascending colon proximally is narrowed in the midline and there is a somewhat twisted appearance.   Mildly fluid distended distal small bowel without significant small bowel obstruction. The appendix is nonvisualized. Peritoneum: No free air or free fluid. Reproductive organs:  2.3 cm left adnexal hypodense lesion is indeterminate, possibly a dominant follicle/small cyst in a menstruating patient. Correlate clinically. Probable intramural fibroid in the fundus. Bladder:  Normal. Vessels:  Normal caliber. Lymph  Nodes:  No lymphadenopathy. Abdominal wall: Within normal limits. Bones:  No acute or aggressive abnormality.     1.  Findings keeping with cecal volvulus. 2.  Mildly fluid distended distal small bowel. 3.  No free air. 4.  Small nonobstructing renal stones. These findings were discussed with ESHA STERLING on 5/22/2021 11:45 AM.     [id number] VIEW    Result Date: 5/27/2021 5/27/2021 7:01 AM HISTORY/REASON FOR EXAM:  Distention. TECHNIQUE/EXAM DESCRIPTION AND NUMBER OF VIEWS:  1 view(s) of the abdomen. COMPARISON: 5/24/2021 FINDINGS: Gaseous distended  small bowel and colon is similar to prior study. There is some stool within the right colon. There is no significant interval change. No suspicious calcifications. No acute osseous abnormality.     Gaseous distended small bowel and colon is similar to prior, likely ileus.    [id number] VIEW    Result Date: 5/24/2021 5/24/2021 11:28 AM HISTORY/REASON FOR EXAM:  Distention; s/p right  hemicolectomy Lower abdominal pain s/p right hemicolectomy 05/22/21 TECHNIQUE/EXAM DESCRIPTION AND NUMBER OF VIEWS:  1 view(s) of the abdomen. COMPARISON: 5/22/2021 FINDINGS: Gaseous distention of the small bowel and colon No portal venous gas or pneumatosis. No definite free intraperitoneal air but evaluation is limited on supine radiograph.     Gaseous distention of the small bowel and colon, likely postoperative ileus.    DX-CHEST-FOR LINE PLACEMENT Perform procedure in: Other(comment f6 below): (PACU)    Result Date: 5/28/2021 5/28/2021 10:10 AM HISTORY/REASON FOR EXAM:  Central line placement. TECHNIQUE/EXAM DESCRIPTION AND NUMBER OF VIEWS: Single AP view of the chest. COMPARISON: None FINDINGS: There is a left IJ central line with the tip high in position above the superior vena cava within the area of the left brachiocephalic vein. No pneumothorax. NG tube extends into the stomach. There are patchy bilateral pulmonary infiltrates. The heart is mildly enlarged. There is no pleural effusion.     1.  Left IJ central line tip high in position located above the superior vena cava are within the left brachiocephalic vein. 2.  Patchy bilateral infiltrates. 3.  NG tube tip extends into the stomach.    US-EXTREMITY VENOUS UPPER BILAT    Result Date: 6/2/2021   Upper Extremity  Venous Duplex Report  Vascular Laboratory  CONCLUSIONS  Small thrombus seen around the catheter line at the RIGHT axillary vein and  proximal basilic vein.  No left upper extremity superficial and deep venous thrombosis.  HOANG EMERY  Exam Date:     06/02/2021 14:29  Room #:     Inpatient  Priority:     Routine  Ht (in):             Wt (lb):  Ordering Physician:        DEIDRA SNIDER  Referring Physician:       [id number], CAO  Sonographer:               Sharlene Morrell RVT,                             [facility]  Study Type:                Complete Bilateral  Technical Quality:         Fair  Age:    50    Gender:     F   MRN:    [id number]  :    1970      BSA:  Indications:     Edema  CPT Codes:       58776  ICD Codes:       782.3  History:         Swelling. No prior study  Limitations:  PROCEDURES:  Bilateral upper extremity venous duplex imaging.  The following venous structures were evaluated: internal jugular,  subclavian, axillary, brachial, cephalic and basilic veins.  FINDINGS:  Right upper extremity.  Small thrombus seen around the central line at the axillary vein and  proximal basilic vein.  All other veins of the right upper extremity demonstrate normal flow  dynamics with no evidence of deep vein thrombosis.  Left upper extremity.  Limited visualization of the internal jugular vein due to bandages.  All other veins of the left upper extremity demonstrate normal flow  dynamics with no evidence of deep vein thrombosis.  Doug Rossi MD  (Electronically Signed)  Final Date:      2021                   18:16    IR-MIDLINE CATHETER INSERTION WO GUIDANCE > AGE 3    Result Date: 2021  HISTORY/REASON FOR EXAM:  Midline Placement   TECHNIQUE/EXAM DESCRIPTION AND NUMBER OF VIEWS: Midline insertion with ultrasound guidance.  FINDINGS: Midline insertion with Ultrasound Guidance was performed by qualified nursing staff without the assistance of a Radiologist. Midline positioning as measured by RN or as appropriate length of catheter selected.              Ultrasound-guided midline placement performed by qualified nursing staff as above.       Micro:  Results     Procedure Component Value Units Date/Time    BLOOD CULTURE [[id number]] Collected: 21 1730    Order Status: Completed Specimen: Blood from Peripheral Updated: 21 0714     Significant Indicator NEG     Source BLD     Site PERIPHERAL     Culture Result No Growth  Note: Blood cultures are incubated for 5 days and  are monitored continuously.Positive blood cultures  are called to the RN and reported as soon as  they are identified.  Blood culture  "testing and Gram stain, if indicated, are  performed at [facility], [address] [place] [address].  Positive blood cultures are  sent to [facility], [address] [place] [address], for organism identification and  susceptibility testing.      Narrative:      Per Hospital Policy: Only change Specimen Src: to \"Line\" if  specified by physician order.  Left Wrist    BLOOD CULTURE [[id number]] Collected: 06/02/21 1307    Order Status: Completed Specimen: Blood from Peripheral Updated: 06/03/21 0714     Significant Indicator NEG     Source BLD     Site PERIPHERAL     Culture Result No Growth  Note: Blood cultures are incubated for 5 days and  are monitored continuously.Positive blood cultures  are called to the RN and reported as soon as  they are identified.  Blood culture testing and Gram stain, if indicated, are  performed at [facility], [address] [place] [address].  Positive blood cultures are  sent to [facility], [address] [place] [address], for organism identification and  susceptibility testing.      Narrative:      Per Hospital Policy: Only change Specimen Src: to \"Line\" if  specified by physician order.  RN verified order for line draw    Anaerobic Culture [[id number]]  (Abnormal) Collected: 05/28/21 0841    Order Status: Completed Specimen: Tissue Updated: 05/31/21 1201     Significant Indicator POS     Source TISS     Site abdominal wound     Culture Result Growth noted after further incubation, see below for  organism identification.        Bacteroides fragilis  Heavy growth        Bacteroides sp.  Moderate growth  Bacteroides ovatus group.      Narrative:      Surgery Specimen    CULTURE WOUND W/ GRAM STAIN [[id number]]  (Abnormal)  (Susceptibility) Collected: 05/28/21 0841    Order Status: Completed Specimen: Tissue Updated: 05/31/21 1201     Significant Indicator POS     Source TISS     Site abdominal " wound     Culture Result -     Gram Stain Result Few WBCs.  Few Gram positive cocci.  Few Gram positive rods.  Moderate Gram negative rods.       Culture Result Actinomyces odontolyticus  Moderate growth        Enterococcus faecalis  Light growth      Narrative:      Surgery Specimen    Susceptibility     Enterococcus faecalis (2)     Antibiotic Interpretation Microscan Method Status    Daptomycin Sensitive 2 mcg/mL KOBY Final    Gent Synergy Sensitive <=500 mcg/mL KOBY Final    Ampicillin Sensitive <=2 mcg/mL KOBY Final    Vancomycin Sensitive 1 mcg/mL KOBY Final    Penicillin Sensitive 2 mcg/mL KOBY Final                   GRAM STAIN [[id number]] Collected: 05/28/21 0841    Order Status: Completed Specimen: Tissue Updated: 05/29/21 0627     Significant Indicator .     Source TISS     Site abdominal wound     Gram Stain Result Few WBCs.  Few Gram positive cocci.  Few Gram positive rods.  Moderate Gram negative rods.      Narrative:      Surgery Specimen          Assessment/Hospital course:  This is a very pleasant 50-year-old female patient, originally admitted on 5/22/2021 with cecal volvulus for which he underwent a right hemicolectomy on 5/22.  This was complicated by intra-abdominal abscesses and free air concerning for leak.  She was taken to the OR for exploratory laparotomy and found to have necrosis of the ileocolonic anastomosis site, bowel is eviscerated and redo side-to-side ileocolic anastomosis was done.  Further course as below    Pertinent Diagnoses:  Sepsis  Intra-abdominal abscesses  Feculent peritonitis  Cecal volvulus, sequelae    Plan:  -OR cultures from 5/28 growing Bacteroides, Actinomyces, E faecalis  -Patient had worsening abdominal pain and a repeat CT scan obtained 6/3 showed multiple rim-enhancing peritoneal fluid collections several quite large and loculated.  Patient was taken back to the OR this morning for exploratory laparotomy  -Will follow operative note and cultures.  Continue IV Unasyn  3 g every 6 hours for now    Discussed with internal medicine, Dr. Rojo    ID will follow.  Please call with questions.

## 2021-06-04 NOTE — PROGRESS NOTES
Pt with wound dehiscence  CT to evaluate for abscess showed multiple fluid collections  Plan washout and reclosure

## 2021-06-04 NOTE — OR NURSING
0739 Pt arrived from OR post LAPAROTOMY, EXPLORATORY, WITH WASH OUT, report received. Pt breathing unlabored. Dressing CDI.     0830 Pt Son updated on progress, POC and ETA for return to unit.     0845 Pt states pain is controled and tolerable, denies nausea. Pt tolerating PO fluids. Report to Jennifer RUDOLPH.    0880 Pt taken to room by transport in stable condition on 3 lpm O2 via oxymask with portable tank @ full psi

## 2021-06-04 NOTE — PROGRESS NOTES
Patient arrived to unit from OR following washout around 0900. Patient rated pain at 5/10 and requested to rest. Wound VAC at 150 mmHG continuous. Purewick in place.     Pain increased throughout shift, and Oxy given for relief. Patient refused ambulating in halls, stated will ambulate later in day with son.

## 2021-06-04 NOTE — ANESTHESIA PREPROCEDURE EVALUATION
Relevant Problems   NEURO   (positive) History of kidney stones      CARDIAC   (positive) DVT of axillary vein, acute right (HCC)   (positive) Intractable migraine with aura without status migrainosus      Other   (positive) Bacterial peritonitis (HCC)   (positive) Cecal volvulus (HCC)       Physical Exam    Airway   Mallampati: II  TM distance: >3 FB  Neck ROM: full       Cardiovascular - normal exam  Rhythm: regular  Rate: normal  (-) murmur     Dental - normal exam           Pulmonary - normal exam  Breath sounds clear to auscultation     Abdominal    Neurological - normal exam                 Anesthesia Plan    ASA 2- EMERGENT   ASA physical status emergent criteria: acutely contaminated wound or identified infection source    Plan - general       Airway plan will be ETT          Induction: intravenous    Postoperative Plan: Postoperative administration of opioids is intended.    Pertinent diagnostic labs and testing reviewed    Informed Consent:    Anesthetic plan and risks discussed with patient.    Use of blood products discussed with: patient whom consented to blood products.

## 2021-06-04 NOTE — OP REPORT
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     PROCEDURES:  Exploratory celiotomy, wound VAC placement, area of less than 100   cm2, and washout of abdominal cavity.     SURGEON:  Mrayam Wood MD     ASSISTANT:  GLADYS Siegel     ANESTHESIA:  General endotracheal.     ANESTHESIOLOGIST:  Tobey B. Gansert, MD     INDICATIONS:  The patient is a 50-year-old female who presented on 05/22 with   a cecal volvulus.  She underwent a right hemicolectomy.  On postoperative day   #5, she started having increasing white count and concerns for possible leak.    She was taken back to the operating room, was found to have peritonitis with   an impending anastomotic dehiscence.  This was revised and a redo   ileocolostomy was performed.  Subsequently, she was doing well and started   having increasing diet and GI function.  However, yesterday she had a large   volume of drainage from her upper wound with possible fascial dehiscence.  CT   scan showed intra-abdominal abscesses.  She is being returned to the operating   room for abdominal washout and re-closure of abdominal wall.The indications for a surgical assistant in this surgery were indicated due to complexity of the procedure. Their role included aiding in incision, retraction, holding devices  and closure of the wound.      FINDINGS:  Loosening of the fascia at the superior aspect of her closure, but   not a yessy dehiscence.  The abdomen was washed out and the pockets that were   noted on the CT scan were washed out.  The anastomosis was not evaluated as it   was scarred around it and there was no evidence of a leak.     DESCRIPTION OF PROCEDURE:  After the patient was  identified and consented, she   was brought to the operating room and placed in supine position.  The patient   underwent general endotracheal anesthetic clearance.  The patient's abdomen   was prepped and draped in sterile fashion.  The previous incision was   re-opened.  Aforementioned findings were noted.  The pockets that were noted   on the CT scan were mobilized.  The abdomen was copiously irrigated with   normal saline until clear.  Again, the anastomosis was not evaluated given the   fact that the area around the anastomosis was well healed.  A Seprafilm was   then placed.  Incision was closed with running #1 Vicryl for the fascia with   interrupted 0 Vicryls as internal retention sutures.  The wound was left open   and a wound VAC was placed.  The patient was extubated and taken to recovery   room in stable condition.  All sponge and needle counts were correct.        ______________________________  NEVA KAY MD    [facility]    DD:  06/04/2021 07:38  DT:  06/04/2021 08:11    Job#:  [id number]

## 2021-06-04 NOTE — ANESTHESIA POSTPROCEDURE EVALUATION
Patient: Mari Dillon    Procedure Summary     Date: 06/04/21 Room / Location: [place] [id number] / SURGERY [facility]    Anesthesia Start: 0707 Anesthesia Stop: 0740    Procedure: LAPAROTOMY, EXPLORATORY, WITH WASH OUT (N/A Abdomen) Diagnosis:       Postprocedural intraabdominal abscess      (interabdominal abscess)    Surgeons: Maryam Wood M.D. Responsible Provider: Tobey Gansert, M.D.    Anesthesia Type: general ASA Status: 2 - Emergent          Final Anesthesia Type: general  Last vitals  BP   Blood Pressure: 114/66    Temp   36.9 °C (98.5 °F)    Pulse   (!) 106   Resp   18    SpO2   98 %      Anesthesia Post Evaluation    Patient location during evaluation: PACU  Patient participation: complete - patient participated  Level of consciousness: awake and alert    Airway patency: patent  Anesthetic complications: no  Cardiovascular status: hemodynamically stable  Respiratory status: acceptable  Hydration status: euvolemic    PONV: none          No complications documented.     Nurse Pain Score: 8 (NPRS)

## 2021-06-04 NOTE — OR NURSING
Patient allergies and NPO status verified. Patient verbalizes understanding of pain scale, expected course of stay and plan of care. Surgical site verified with patient. IV assessed for patency, sequentials placed on BLE.

## 2021-06-04 NOTE — PROGRESS NOTES
2215:Baseline Anti-Xa, aPTT and PT/INR drawn   Initiated Heparin at 12.179 units/kg/hr; 18 mL/hr per pharmacy    0023: Paused Heparin drip (6 hours before surgery) per order   Pt will have surgery today at 0710 today.  Pt on NPO diet

## 2021-06-05 LAB
ANION GAP SERPL CALC-SCNC: 13 MMOL/L (ref 7–16)
BASOPHILS # BLD AUTO: 0.3 % (ref 0–1.8)
BASOPHILS # BLD: 0.05 K/UL (ref 0–0.12)
BUN SERPL-MCNC: 4 MG/DL (ref 8–22)
CA-I SERPL-SCNC: 1.06 MMOL/L (ref 1.1–1.3)
CALCIUM SERPL-MCNC: 7.7 MG/DL (ref 8.4–10.2)
CHLORIDE SERPL-SCNC: 102 MMOL/L (ref 96–112)
CO2 SERPL-SCNC: 22 MMOL/L (ref 20–33)
CREAT SERPL-MCNC: 0.59 MG/DL (ref 0.5–1.4)
EOSINOPHIL # BLD AUTO: 0.08 K/UL (ref 0–0.51)
EOSINOPHIL NFR BLD: 0.5 % (ref 0–6.9)
ERYTHROCYTE [DISTWIDTH] IN BLOOD BY AUTOMATED COUNT: 50.4 FL (ref 35.9–50)
GLUCOSE SERPL-MCNC: 116 MG/DL (ref 65–99)
HCT VFR BLD AUTO: 25.9 % (ref 37–47)
HGB BLD-MCNC: 8.2 G/DL (ref 12–16)
IMM GRANULOCYTES # BLD AUTO: 0.37 K/UL (ref 0–0.11)
IMM GRANULOCYTES NFR BLD AUTO: 2.3 % (ref 0–0.9)
LYMPHOCYTES # BLD AUTO: 1.67 K/UL (ref 1–4.8)
LYMPHOCYTES NFR BLD: 10.3 % (ref 22–41)
MCH RBC QN AUTO: 28.5 PG (ref 27–33)
MCHC RBC AUTO-ENTMCNC: 31.7 G/DL (ref 33.6–35)
MCV RBC AUTO: 89.9 FL (ref 81.4–97.8)
MONOCYTES # BLD AUTO: 1.38 K/UL (ref 0–0.85)
MONOCYTES NFR BLD AUTO: 8.5 % (ref 0–13.4)
NEUTROPHILS # BLD AUTO: 12.71 K/UL (ref 2–7.15)
NEUTROPHILS NFR BLD: 78.1 % (ref 44–72)
NRBC # BLD AUTO: 0.03 K/UL
NRBC BLD-RTO: 0.2 /100 WBC
PLATELET # BLD AUTO: 357 K/UL (ref 164–446)
PMV BLD AUTO: 10.3 FL (ref 9–12.9)
POTASSIUM SERPL-SCNC: 4.1 MMOL/L (ref 3.6–5.5)
RBC # BLD AUTO: 2.88 M/UL (ref 4.2–5.4)
SODIUM SERPL-SCNC: 137 MMOL/L (ref 135–145)
UFH PPP CHRO-ACNC: 0.29 IU/ML
UFH PPP CHRO-ACNC: 0.42 IU/ML
UFH PPP CHRO-ACNC: 0.56 IU/ML
WBC # BLD AUTO: 16.3 K/UL (ref 4.8–10.8)

## 2021-06-05 PROCEDURE — 700111 HCHG RX REV CODE 636 W/ 250 OVERRIDE (IP): Performed by: SURGERY

## 2021-06-05 PROCEDURE — 97165 OT EVAL LOW COMPLEX 30 MIN: CPT

## 2021-06-05 PROCEDURE — 700111 HCHG RX REV CODE 636 W/ 250 OVERRIDE (IP): Performed by: NURSE PRACTITIONER

## 2021-06-05 PROCEDURE — 36415 COLL VENOUS BLD VENIPUNCTURE: CPT

## 2021-06-05 PROCEDURE — 80048 BASIC METABOLIC PNL TOTAL CA: CPT

## 2021-06-05 PROCEDURE — 85025 COMPLETE CBC W/AUTO DIFF WBC: CPT

## 2021-06-05 PROCEDURE — 770006 HCHG ROOM/CARE - MED/SURG/GYN SEMI*

## 2021-06-05 PROCEDURE — 700105 HCHG RX REV CODE 258: Performed by: INTERNAL MEDICINE

## 2021-06-05 PROCEDURE — 99233 SBSQ HOSP IP/OBS HIGH 50: CPT | Performed by: INTERNAL MEDICINE

## 2021-06-05 PROCEDURE — 700102 HCHG RX REV CODE 250 W/ 637 OVERRIDE(OP): Performed by: INTERNAL MEDICINE

## 2021-06-05 PROCEDURE — 700102 HCHG RX REV CODE 250 W/ 637 OVERRIDE(OP): Performed by: SURGERY

## 2021-06-05 PROCEDURE — 700111 HCHG RX REV CODE 636 W/ 250 OVERRIDE (IP): Performed by: INTERNAL MEDICINE

## 2021-06-05 PROCEDURE — 82330 ASSAY OF CALCIUM: CPT

## 2021-06-05 PROCEDURE — A9270 NON-COVERED ITEM OR SERVICE: HCPCS | Performed by: INTERNAL MEDICINE

## 2021-06-05 PROCEDURE — 85520 HEPARIN ASSAY: CPT | Mod: 91

## 2021-06-05 PROCEDURE — A9270 NON-COVERED ITEM OR SERVICE: HCPCS | Performed by: SURGERY

## 2021-06-05 RX ORDER — OXYCODONE HYDROCHLORIDE 10 MG/1
10 TABLET ORAL EVERY 4 HOURS PRN
Status: DISCONTINUED | OUTPATIENT
Start: 2021-06-05 | End: 2021-06-21 | Stop reason: HOSPADM

## 2021-06-05 RX ORDER — FLUCONAZOLE 200 MG/1
400 TABLET ORAL DAILY
Status: COMPLETED | OUTPATIENT
Start: 2021-06-05 | End: 2021-06-11

## 2021-06-05 RX ADMIN — OXYCODONE HYDROCHLORIDE 15 MG: 10 TABLET ORAL at 12:15

## 2021-06-05 RX ADMIN — FAMOTIDINE 20 MG: 20 TABLET ORAL at 17:48

## 2021-06-05 RX ADMIN — HEPARIN SODIUM 3000 UNITS: 1000 INJECTION, SOLUTION INTRAVENOUS; SUBCUTANEOUS at 04:37

## 2021-06-05 RX ADMIN — MORPHINE SULFATE 2 MG: 4 INJECTION INTRAVENOUS at 05:30

## 2021-06-05 RX ADMIN — HEPARIN SODIUM 22 UNITS/KG/HR: 5000 INJECTION, SOLUTION INTRAVENOUS at 12:19

## 2021-06-05 RX ADMIN — OXYCODONE HYDROCHLORIDE 15 MG: 10 TABLET ORAL at 16:13

## 2021-06-05 RX ADMIN — OXYCODONE HYDROCHLORIDE 10 MG: 5 TABLET ORAL at 01:46

## 2021-06-05 RX ADMIN — AMPICILLIN SODIUM AND SULBACTAM SODIUM 3 G: 2; 1 INJECTION, POWDER, FOR SOLUTION INTRAMUSCULAR; INTRAVENOUS at 05:29

## 2021-06-05 RX ADMIN — FAMOTIDINE 20 MG: 20 TABLET ORAL at 05:30

## 2021-06-05 RX ADMIN — HEPARIN SODIUM 22 UNITS/KG/HR: 5000 INJECTION, SOLUTION INTRAVENOUS at 05:29

## 2021-06-05 RX ADMIN — AMPICILLIN SODIUM AND SULBACTAM SODIUM 3 G: 2; 1 INJECTION, POWDER, FOR SOLUTION INTRAMUSCULAR; INTRAVENOUS at 12:15

## 2021-06-05 RX ADMIN — DOCUSATE SODIUM 100 MG: 100 CAPSULE, LIQUID FILLED ORAL at 17:48

## 2021-06-05 RX ADMIN — LORAZEPAM 0.5 MG: 2 INJECTION INTRAMUSCULAR; INTRAVENOUS at 22:00

## 2021-06-05 RX ADMIN — OXYCODONE HYDROCHLORIDE 10 MG: 5 TABLET ORAL at 04:38

## 2021-06-05 RX ADMIN — MORPHINE SULFATE 2 MG: 4 INJECTION INTRAVENOUS at 00:30

## 2021-06-05 RX ADMIN — AMPICILLIN SODIUM AND SULBACTAM SODIUM 3 G: 2; 1 INJECTION, POWDER, FOR SOLUTION INTRAMUSCULAR; INTRAVENOUS at 23:56

## 2021-06-05 RX ADMIN — OXYCODONE HYDROCHLORIDE 15 MG: 10 TABLET ORAL at 20:14

## 2021-06-05 RX ADMIN — DIAZEPAM 5 MG: 5 INJECTION, SOLUTION INTRAMUSCULAR; INTRAVENOUS at 00:54

## 2021-06-05 RX ADMIN — FLUCONAZOLE 400 MG: 200 TABLET ORAL at 08:28

## 2021-06-05 RX ADMIN — AMPICILLIN SODIUM AND SULBACTAM SODIUM 3 G: 2; 1 INJECTION, POWDER, FOR SOLUTION INTRAMUSCULAR; INTRAVENOUS at 17:47

## 2021-06-05 RX ADMIN — LORAZEPAM 0.5 MG: 2 INJECTION INTRAMUSCULAR; INTRAVENOUS at 10:26

## 2021-06-05 RX ADMIN — AMPICILLIN SODIUM AND SULBACTAM SODIUM 3 G: 2; 1 INJECTION, POWDER, FOR SOLUTION INTRAMUSCULAR; INTRAVENOUS at 01:11

## 2021-06-05 RX ADMIN — HEPARIN SODIUM 22 UNITS/KG/HR: 5000 INJECTION, SOLUTION INTRAVENOUS at 19:01

## 2021-06-05 RX ADMIN — OXYCODONE HYDROCHLORIDE 10 MG: 5 TABLET ORAL at 08:07

## 2021-06-05 ASSESSMENT — ENCOUNTER SYMPTOMS
DIZZINESS: 0
VOMITING: 0
CONSTIPATION: 1
SHORTNESS OF BREATH: 0
ABDOMINAL PAIN: 1
FEVER: 0
NERVOUS/ANXIOUS: 1
ROS GI COMMENTS: PASSING SOME FLATUS
WEAKNESS: 1

## 2021-06-05 ASSESSMENT — PAIN DESCRIPTION - PAIN TYPE
TYPE: ACUTE PAIN
TYPE: ACUTE PAIN
TYPE: ACUTE PAIN;SURGICAL PAIN

## 2021-06-05 ASSESSMENT — COGNITIVE AND FUNCTIONAL STATUS - GENERAL
HELP NEEDED FOR BATHING: A LITTLE
DAILY ACTIVITIY SCORE: 22
SUGGESTED CMS G CODE MODIFIER DAILY ACTIVITY: CJ
DRESSING REGULAR LOWER BODY CLOTHING: A LITTLE

## 2021-06-05 ASSESSMENT — ACTIVITIES OF DAILY LIVING (ADL): TOILETING: INDEPENDENT

## 2021-06-05 NOTE — CARE PLAN
Problem: Pain - Standard  Goal: Alleviation of pain or a reduction in pain to the patient’s comfort goal  Outcome: Progressing  Flowsheets (Taken 6/5/2021 0306)  OB Pain Intervention: Rest  Note: Prn Oxycodone 10mg given around the clock to manage surgical pain; prn Valium 5mg also given for muscle spasm   Will continue to monitor      Problem: Discharge Barriers/Planning  Goal: Patient's continuum of care needs are met  Outcome: Progressing  Note: Planning to discharge home with home health - wound vac has been delivered to bedside      The patient is Watcher - Medium risk of patient condition declining or worsening    Shift Goals: manage surgical pain - abdomen   Clinical Goals: Will keep surgical pain under control   Patient Goals: pain control    Progress made toward(s) clinical / shift goals:  prn Oxycodone 10mg, Valium 5mg and Morphine 2mg given per MAR; increased oral intake for hydration

## 2021-06-05 NOTE — PROGRESS NOTES
Received report from day shift nurse.   Assumed pt care .   Pt is A&Ox4, resting comfortably in bed.   Pt on r.a.. No signs of SOB/respiratory distress.   Labs noted, VSS.  Prn Oxycodone 10mg given per MAR for c/o surgical pain/ midline abd   Assessment completed, abdominal binder with wound vac to midline abd, wound vac suction at 150mmHg, sanguinous drainage seen  Fall precautions in place.   Bed at lowest position.   Call light and personal belongings within reach.   Continue to monitor

## 2021-06-05 NOTE — CARE PLAN
Problem: Nutritional:  Goal: Achieve adequate nutritional intake  Description: Advance diet as tolerated. Patient will consume >50% of meals.  Outcome: Met     PO intake generally %, weight stable. RD to follow weekly, available PRN.

## 2021-06-05 NOTE — THERAPY
Occupational Therapy   Initial Evaluation     Patient Name: Mari Dillon  Age:  50 y.o., Sex:  female  Medical Record #: [id number]  Today's Date: 6/5/2021     Precautions  Comments: (P) wound vac-abd    Assessment  Patient is 50 y.o. female with a diagnosis of cecal volvulus; s/p R hemicolectomy 5/22/21, s/p lapy/washout 6/4/21. Wound vac in place. Pt A&Ox4, motivated for activity. A bit impulsive to EOB/STS initially due to urgency; shows good balance with HHA/Sup, IV pole. Self-cares with Sup,set-up; except Cinthya for underwear. Abd pain limits activity tolerance post session; BTB with Mod Indep. No further acute OT needs; although OT available for d/c needs (plan is HH). Reviewed home safety during ADL's. Lives with parents who are able to assist when needed.          Plan  Recommend Occupational Therapy for Evaluation only for the following treatments:    DC Equipment Recommendations: (P) None  Discharge Recommendations: (P) Recommend home health for continued occupational therapy services      06/05/21 1245   Prior Living Situation   Prior Services None   Housing / Facility 1 Story House   Steps Into Home 2   Steps In Home 0   Bathroom Set up Walk In Shower;Shower Chair   Equipment Owned Tub / Shower Seat;Single Point Cane;Hand Held Shower;Reacher   Lives with - Patient's Self Care Capacity Parents   Comments son in town as well   Prior Level of ADL Function   Self Feeding Independent   Grooming / Hygiene Independent   Bathing Independent   Dressing Independent   Toileting Independent   Prior Level of IADL Function   Medication Management Independent   Laundry Independent   Kitchen Mobility Independent   Finances Independent   Home Management Independent   Shopping Independent   Prior Level Of Mobility Independent Without Device in Home   Driving / Transportation Driving Independent   Occupation (Pre-Hospital Vocational) Employed Full Time   Leisure Interests Hobbies;Family   Balance Assessment   Sitting  Balance (Static) Good   Sitting Balance (Dynamic) Fair +   Standing Balance (Static) Good   Standing Balance (Dynamic) Fair +   Weight Shift Sitting Good   Weight Shift Standing Good   Bed Mobility    Supine to Sit Modified Independent   Sit to Supine Modified Independent   Scooting Modified Independent   ADL Assessment   Eating Independent   Grooming Supervision   Bathing Supervision  (sponge bath while seated)   Upper Body Dressing Supervision   Lower Body Dressing Minimal Assist  (educ re: drsg using reacher)   Toileting Supervision   Functional Mobility   Sit to Stand Modified Independent   Bed, Chair, Wheelchair Transfer Supervised   Toilet Transfers Supervised   Transfer Method Stand Pivot   Interdisciplinary Plan of Care Collaboration   Collaboration Comments Aware of visit. Sammie being dc'd to encourage pt to be up to br with nrsg staff for toileting.

## 2021-06-05 NOTE — CARE PLAN
The patient is Watcher - Medium risk of patient condition declining or worsening    Shift Goals  Clinical Goals: Pain control with increased ambulation  Patient Goals: Walk, take shower    Progress made toward(s) clinical / shift goals:  Patient's pain is controlled, and was addressed by physician with an increased dosage.      Patient is not progressing towards the following goals: Patient has fear/anxiety related to ambulating. She does not want to disrupt the healing or her abdomen.       Problem: Pain - Standard  Goal: Alleviation of pain or a reduction in pain to the patient’s comfort goal  Outcome: Progressing  Note: Pain dosage has been increased from 10mg to 15mg to address consistent high levels of pain.     Problem: Fall Risk  Goal: Patient will remain free from falls  Outcome: Progressing  Note: Fall precautions are in place.

## 2021-06-05 NOTE — PROGRESS NOTES
Trauma / Surgical Daily Progress Note    Date of Service  6/5/2021    Chief Complaint  50 y.o. female admitted 5/22/2021 with cecal volvulus    Interval Events  5/22 Exploratory celiotomy and right hemicolectomy with a side-to-side   ileocolic anastomosis  5/28 Exploratory celiotomy, resection of ileocolic anastomosis with re-do  side-to-side ileocolic anastomosis  6/4 Ex celiotomy washout    Doing ok. Tolerating liquids. Needs to mobilize. On Unasyn per ID.       Review of Systems  Review of Systems   Gastrointestinal: Positive for abdominal pain.        Passing some flatus        Vital Signs for last 24 hours  Temp:  [36.4 °C (97.6 °F)-37.3 °C (99.1 °F)] 36.7 °C (98 °F)  Pulse:  [] 96  Resp:  [18-20] 18  BP: (113-150)/(55-79) 113/63  SpO2:  [92 %-100 %] 96 %    Hemodynamic parameters for last 24 hours       Respiratory Data     Respiration: 18, Pulse Oximetry: 96 %        RUL Breath Sounds: Clear, RML Breath Sounds: Clear, RLL Breath Sounds: Diminished, LUCITA Breath Sounds: Clear, LLL Breath Sounds: Diminished    Physical Exam  Physical Exam  Cardiovascular:      Rate and Rhythm: Normal rate.      Pulses: Normal pulses.   Pulmonary:      Effort: Pulmonary effort is normal.   Abdominal:      General: There is no distension.      Palpations: Abdomen is soft.      Tenderness: There is abdominal tenderness.      Comments: Wound Vac in place    Less distention, abdomen softer      Genitourinary:     Comments: diuresing  Musculoskeletal:         General: Normal range of motion.      Cervical back: Neck supple.      Comments: Generalized edema   Skin:     General: Skin is warm and dry.   Neurological:      General: No focal deficit present.      Mental Status: She is alert.   Psychiatric:      Comments: Teary today         Laboratory  Recent Results (from the past 24 hour(s))   Heparin Xa (Unfractionated)    Collection Time: 06/04/21  9:44 AM   Result Value Ref Range    Heparin Xa (UFH) <0.10 IU/mL   Heparin Xa  (Unfractionated)    Collection Time: 06/04/21  7:18 PM   Result Value Ref Range    Heparin Xa (UFH) 0.24 IU/mL   Heparin Xa (Unfractionated)    Collection Time: 06/05/21  1:32 AM   Result Value Ref Range    Heparin Xa (UFH) 0.29 IU/mL   CBC WITH DIFFERENTIAL    Collection Time: 06/05/21  1:32 AM   Result Value Ref Range    WBC 16.3 (H) 4.8 - 10.8 K/uL    RBC 2.88 (L) 4.20 - 5.40 M/uL    Hemoglobin 8.2 (L) 12.0 - 16.0 g/dL    Hematocrit 25.9 (L) 37.0 - 47.0 %    MCV 89.9 81.4 - 97.8 fL    MCH 28.5 27.0 - 33.0 pg    MCHC 31.7 (L) 33.6 - 35.0 g/dL    RDW 50.4 (H) 35.9 - 50.0 fL    Platelet Count 357 164 - 446 K/uL    MPV 10.3 9.0 - 12.9 fL    Neutrophils-Polys 78.10 (H) 44.00 - 72.00 %    Lymphocytes 10.30 (L) 22.00 - 41.00 %    Monocytes 8.50 0.00 - 13.40 %    Eosinophils 0.50 0.00 - 6.90 %    Basophils 0.30 0.00 - 1.80 %    Immature Granulocytes 2.30 (H) 0.00 - 0.90 %    Nucleated RBC 0.20 /100 WBC    Neutrophils (Absolute) 12.71 (H) 2.00 - 7.15 K/uL    Lymphs (Absolute) 1.67 1.00 - 4.80 K/uL    Monos (Absolute) 1.38 (H) 0.00 - 0.85 K/uL    Eos (Absolute) 0.08 0.00 - 0.51 K/uL    Baso (Absolute) 0.05 0.00 - 0.12 K/uL    Immature Granulocytes (abs) 0.37 (H) 0.00 - 0.11 K/uL    NRBC (Absolute) 0.03 K/uL   Basic Metabolic Panel    Collection Time: 06/05/21  1:32 AM   Result Value Ref Range    Sodium 137 135 - 145 mmol/L    Potassium 4.1 3.6 - 5.5 mmol/L    Chloride 102 96 - 112 mmol/L    Co2 22 20 - 33 mmol/L    Glucose 116 (H) 65 - 99 mg/dL    Bun 4 (L) 8 - 22 mg/dL    Creatinine 0.59 0.50 - 1.40 mg/dL    Calcium 7.7 (L) 8.4 - 10.2 mg/dL    Anion Gap 13.0 7.0 - 16.0   ESTIMATED GFR    Collection Time: 06/05/21  1:32 AM   Result Value Ref Range    GFR If African American >60 >60 mL/min/1.73 m 2    GFR If Non African American >60 >60 mL/min/1.73 m 2       Fluids    Intake/Output Summary (Last 24 hours) at 6/5/2021 0722  Last data filed at 6/4/2021 1800  Gross per 24 hour   Intake 1401.83 ml   Output 1920 ml   Net -518.17  ml       Core Measures & Quality Metrics  Labs reviewed and Medications reviewed  Young catheter: No Young      DVT Prophylaxis: Enoxaparin (Lovenox)  DVT prophylaxis - mechanical: SCDs  Ulcer prophylaxis: Yes  Antibiotics: Treating active infection/contamination beyond 24 hours perioperative coverage  Assessed for rehab: Patient returned to prior level of function, rehabilitation not indicated at this time    ALICIA Score  ETOH Screening    Assessment/Plan  * Bacterial peritonitis (HCC)  Assessment & Plan  6/2 Zosyn stopped.  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 Passing flatus - start clears  5/27 - Stooled - will adv to full liquids  5/27 - thrombocytopenia, bandemia, PM CT with ascites no freeair  5/28 - Exploratory celiotomy, resection of ileocolic anastomosis with re-do  side-to-side ileocolic anastomosis.  6/3  Stooling, advance to regular diet  6/4 Small wound dehiscence - returned to OR for washout and reclosure    DVT of axillary vein, acute right (HCC)  Assessment & Plan  Noted on US  On heparin gtt       Discussed patient condition with RN and Patient.    CRITICAL CARE TIME EXCLUDING PROCEDURES: 20  minutes

## 2021-06-05 NOTE — PROGRESS NOTES
Patient is tearful in bed, focus on short-term goals of ambulating in hallway and to toilets and pain control.   Advancing to general diet.   Wound vac is in place and heparin drip.     1755: Patient ambulating to toilet, not using purewick. States too tired and painful to ambulate in halls, after encouragement to ambulate. Oxy increased to 15mg  Patient is increasingly tired throughout day.

## 2021-06-05 NOTE — PROGRESS NOTES
"Brief ID note:    -Operative note reviewed. \"Loosening of the fascia at the superior aspect of her closure, but   not a yessy dehiscence.  The abdomen was washed out and the pockets that were   noted on the CT scan were washed out.  The anastomosis was not evaluated as it   was scarred around it and there was no evidence of a leak.\"  -No additional cultures.  Continue IV Unasyn for now.  Will add empiric fluconazole 400 mg daily  "

## 2021-06-05 NOTE — PROGRESS NOTES
[facility] Medicine Daily Progress Note    Date of Service  6/5/2021    Chief Complaint  50 y.o. female admitted 5/22/2021 with abd pain.    Hospital Course  51 yo woman who presented with abd pain and CT showed cecal volvulus. She underwent exploratory celiotomy and right hemicolectomy with ileocolic anastomosis with Dr. Wood 5/22/21. She developed thrombocytopenia and ascites and returned for ex lap with resection of ileocolic anastomosis and wound vac placement 5/28/21. She was admitted to ICU, started on zosyn for bacterial peritonitis and given platelet transfusions. HIT panel was negative. Abd cultures grew actinomyces, enterococcus, bacteroides. Doppler showed catheter related thrombus in R arm and started on anticoagulation.    Interval Problem Update  WBC 16  She complains of uncontrolled abd pain. Morphine IV helps but not oxycodone. Has not had BM yet  Has trouble sleeping at night, feeling anxious    Consultants/Specialty  Critical care  Surgery  ID    Code Status  Full Code    Disposition  HH and wound vac ordered  ID recs  PTOT reeval    Review of Systems  Review of Systems   Constitutional: Positive for malaise/fatigue. Negative for fever.   Respiratory: Negative for shortness of breath.    Cardiovascular: Negative for chest pain.   Gastrointestinal: Positive for abdominal pain and constipation. Negative for vomiting.   Neurological: Positive for weakness. Negative for dizziness.   Psychiatric/Behavioral: The patient is nervous/anxious.    All other systems reviewed and are negative.       Physical Exam  Temp:  [36.4 °C (97.6 °F)-37.1 °C (98.7 °F)] 36.7 °C (98 °F)  Pulse:  [] 96  Resp:  [18-20] 18  BP: (113-124)/(60-73) 113/63  SpO2:  [92 %-99 %] 96 %    Physical Exam  Vitals and nursing note reviewed.   Constitutional:       Appearance: She is ill-appearing. She is not toxic-appearing.   HENT:      Head: Normocephalic.      Mouth/Throat:      Mouth: Mucous membranes are moist.   Eyes:       General:         Right eye: No discharge.         Left eye: No discharge.   Cardiovascular:      Rate and Rhythm: Normal rate and regular rhythm.   Pulmonary:      Effort: Pulmonary effort is normal. No respiratory distress.      Breath sounds: No wheezing or rales.   Abdominal:      General: There is distension.      Tenderness: There is abdominal tenderness (diffuse). There is no guarding or rebound.      Comments: Wound vac and surgical incision centrally   Musculoskeletal:         General: Swelling (right and left UE) present.      Cervical back: Neck supple.   Skin:     General: Skin is warm and dry.   Neurological:      General: No focal deficit present.      Mental Status: She is alert.      Comments: AOx4         Fluids    Intake/Output Summary (Last 24 hours) at 6/5/2021 1020  Last data filed at 6/5/2021 0800  Gross per 24 hour   Intake 680 ml   Output 2700 ml   Net -2020 ml       Laboratory  Recent Labs     06/03/21  0335 06/04/21  0348 06/05/21  0132   WBC 7.8 13.4* 16.3*   RBC 3.87* 2.87* 2.88*   HEMOGLOBIN 11.2* 8.3* 8.2*   HEMATOCRIT 34.4* 25.6* 25.9*   MCV 88.9 89.2 89.9   MCH 28.9 28.9 28.5   MCHC 32.6* 32.4* 31.7*   RDW 49.4 50.7* 50.4*   PLATELETCT 165 292 357   MPV 11.1 10.7 10.3     Recent Labs     06/03/21  0335 06/04/21  0348 06/05/21  0132   SODIUM 135 133* 137   POTASSIUM 3.5* 4.1 4.1   CHLORIDE 102 101 102   CO2 23 23 22   GLUCOSE 107* 94 116*   BUN 4* 3* 4*   CREATININE 0.48* 0.46* 0.59   CALCIUM 7.1* 7.7* 7.7*     Recent Labs     06/03/21 2125   APTT 33.8   INR 1.23*               Imaging  CT-ABDOMEN-PELVIS WITH   Final Result      1.  Multiple rim enhancing peritoneal fluid collection suspicious for abscess      2.  These include the rectouterine space measuring 6.6 x 7.2 x 5.1 cm, right lower quadrant measuring 13.8 x 4.2 x 6.5 cm, left subphrenic space measuring 8.4 x 4.6 x 2.7 cm, and contiguous loculated fluid within the right and left paracolic gutter      3.  Interval right  colectomy      4.  Small-moderate-sized bilateral pleural effusion and atelectasis      US-EXTREMITY VENOUS UPPER BILAT   Final Result      IR-MIDLINE CATHETER INSERTION WO GUIDANCE > AGE 3   Final Result                  Ultrasound-guided midline placement performed by qualified nursing staff    as above.          DX-CHEST-FOR LINE PLACEMENT Perform procedure in: Other(comment f6 below): (PACU)   Final Result      1.  Left IJ central line tip high in position located above the superior vena cava are within the left brachiocephalic vein.      2.  Patchy bilateral infiltrates.      3.  NG tube tip extends into the stomach.      CT-ABDOMEN-PELVIS WITH   Final Result      1.  Moderate to large amount of ascites within the abdomen and pelvis some areas of which are loculated in nature. There are also punctate areas of gas within those areas of ascites as well as free intraperitoneal air. This may be related to recent    surgical procedure however the degree of volume of ascites would be unusual for normal postoperative course. Consideration should be given for leakage of bowel content or other process.      2.  Diffuse colonic distention.      3.  Bibasilar atelectasis and pleural effusions.      4.  5 mm left renal pelvic stone which results in mild hydronephrosis above that level.      5.  Moderate right-sided hydronephrosis extending to the level of the upper sacrum. No ureteral stone. The ureter is not identified below that level.      6.  This was discussed with NEVA KAY at 6:44 PM on 5/27/2021.      [id number] VIEW   Final Result      Gaseous distended small bowel and colon is similar to prior, likely ileus.      [id number] VIEW   Final Result         Gaseous distention of the small bowel and colon, likely postoperative ileus.      CT-ABDOMEN-PELVIS WITH   Final Result         1.  Findings keeping with cecal volvulus.   2.  Mildly fluid distended distal small bowel.   3.  No free air.   4.  Small  nonobstructing renal stones.   These findings were discussed with ESHA STERLING on 5/22/2021 11:45 AM.                       Assessment/Plan  * Bacterial peritonitis (HCC)  Assessment & Plan  Abd cultures grew actinomyces, enterococcus, bacteroides.   Wound vac in place  Surgery following, had ileocecal resection 5/28/21 and washout 6/4/21  ID consulted, on unasyn and fluconazole  Blood cultures NGTD  Pain management - increase oxycodone dose, morphine PRN    DVT of axillary vein, acute right (HCC)  Assessment & Plan  Associated with midline  Start on therapeutic heparin for now until no further procedures planned  Ok to continue using midline    Thrombocytopenia (HCC)  Assessment & Plan  S/p transfusions  HIT AB negative  Due to sepsis   Recovered, level now normalized    Cecal volvulus (HCC)- (present on admission)  Assessment & Plan  S/p resection, complicated by leak  Surgery following  Diet per surgery       VTE prophylaxis: lovenox

## 2021-06-06 PROBLEM — D64.9 ANEMIA: Status: ACTIVE | Noted: 2021-06-06

## 2021-06-06 LAB
ALBUMIN SERPL BCP-MCNC: 2.2 G/DL (ref 3.2–4.9)
ALBUMIN/GLOB SERPL: 0.8 G/DL
ALP SERPL-CCNC: 90 U/L (ref 30–99)
ALT SERPL-CCNC: 10 U/L (ref 2–50)
ANION GAP SERPL CALC-SCNC: 9 MMOL/L (ref 7–16)
AST SERPL-CCNC: 21 U/L (ref 12–45)
BASOPHILS # BLD AUTO: 0.3 % (ref 0–1.8)
BASOPHILS # BLD: 0.07 K/UL (ref 0–0.12)
BILIRUB SERPL-MCNC: 0.3 MG/DL (ref 0.1–1.5)
BUN SERPL-MCNC: 3 MG/DL (ref 8–22)
CALCIUM SERPL-MCNC: 7.9 MG/DL (ref 8.4–10.2)
CHLORIDE SERPL-SCNC: 97 MMOL/L (ref 96–112)
CO2 SERPL-SCNC: 25 MMOL/L (ref 20–33)
CREAT SERPL-MCNC: 0.58 MG/DL (ref 0.5–1.4)
EOSINOPHIL # BLD AUTO: 0.17 K/UL (ref 0–0.51)
EOSINOPHIL NFR BLD: 0.7 % (ref 0–6.9)
ERYTHROCYTE [DISTWIDTH] IN BLOOD BY AUTOMATED COUNT: 50.2 FL (ref 35.9–50)
GLOBULIN SER CALC-MCNC: 2.8 G/DL (ref 1.9–3.5)
GLUCOSE SERPL-MCNC: 114 MG/DL (ref 65–99)
HCT VFR BLD AUTO: 24.4 % (ref 37–47)
HGB BLD-MCNC: 7.8 G/DL (ref 12–16)
HGB BLD-MCNC: 9.2 G/DL (ref 12–16)
IMM GRANULOCYTES # BLD AUTO: 0.53 K/UL (ref 0–0.11)
IMM GRANULOCYTES NFR BLD AUTO: 2.3 % (ref 0–0.9)
LYMPHOCYTES # BLD AUTO: 1.9 K/UL (ref 1–4.8)
LYMPHOCYTES NFR BLD: 8.3 % (ref 22–41)
MAGNESIUM SERPL-MCNC: 1.6 MG/DL (ref 1.5–2.5)
MCH RBC QN AUTO: 28.6 PG (ref 27–33)
MCHC RBC AUTO-ENTMCNC: 32 G/DL (ref 33.6–35)
MCV RBC AUTO: 89.4 FL (ref 81.4–97.8)
MONOCYTES # BLD AUTO: 1.86 K/UL (ref 0–0.85)
MONOCYTES NFR BLD AUTO: 8.1 % (ref 0–13.4)
NEUTROPHILS # BLD AUTO: 18.31 K/UL (ref 2–7.15)
NEUTROPHILS NFR BLD: 80.3 % (ref 44–72)
NRBC # BLD AUTO: 0.03 K/UL
NRBC BLD-RTO: 0.1 /100 WBC
PHOSPHATE SERPL-MCNC: 4.2 MG/DL (ref 2.5–4.5)
PLATELET # BLD AUTO: 396 K/UL (ref 164–446)
PMV BLD AUTO: 10.2 FL (ref 9–12.9)
POTASSIUM SERPL-SCNC: 4.1 MMOL/L (ref 3.6–5.5)
PROT SERPL-MCNC: 5 G/DL (ref 6–8.2)
RBC # BLD AUTO: 2.73 M/UL (ref 4.2–5.4)
SODIUM SERPL-SCNC: 131 MMOL/L (ref 135–145)
UFH PPP CHRO-ACNC: 0.28 IU/ML
UFH PPP CHRO-ACNC: 0.29 IU/ML
UFH PPP CHRO-ACNC: 0.52 IU/ML
UFH PPP CHRO-ACNC: <0.1 IU/ML
WBC # BLD AUTO: 22.8 K/UL (ref 4.8–10.8)

## 2021-06-06 PROCEDURE — 80053 COMPREHEN METABOLIC PANEL: CPT

## 2021-06-06 PROCEDURE — 84100 ASSAY OF PHOSPHORUS: CPT

## 2021-06-06 PROCEDURE — 700102 HCHG RX REV CODE 250 W/ 637 OVERRIDE(OP): Performed by: SURGERY

## 2021-06-06 PROCEDURE — 85018 HEMOGLOBIN: CPT

## 2021-06-06 PROCEDURE — 85025 COMPLETE CBC W/AUTO DIFF WBC: CPT

## 2021-06-06 PROCEDURE — 85520 HEPARIN ASSAY: CPT

## 2021-06-06 PROCEDURE — A9270 NON-COVERED ITEM OR SERVICE: HCPCS | Performed by: INTERNAL MEDICINE

## 2021-06-06 PROCEDURE — 700111 HCHG RX REV CODE 636 W/ 250 OVERRIDE (IP): Performed by: INTERNAL MEDICINE

## 2021-06-06 PROCEDURE — 99233 SBSQ HOSP IP/OBS HIGH 50: CPT | Performed by: INTERNAL MEDICINE

## 2021-06-06 PROCEDURE — 700111 HCHG RX REV CODE 636 W/ 250 OVERRIDE (IP): Performed by: NURSE PRACTITIONER

## 2021-06-06 PROCEDURE — 700102 HCHG RX REV CODE 250 W/ 637 OVERRIDE(OP): Performed by: INTERNAL MEDICINE

## 2021-06-06 PROCEDURE — 83735 ASSAY OF MAGNESIUM: CPT

## 2021-06-06 PROCEDURE — 36415 COLL VENOUS BLD VENIPUNCTURE: CPT

## 2021-06-06 PROCEDURE — 700105 HCHG RX REV CODE 258: Performed by: INTERNAL MEDICINE

## 2021-06-06 PROCEDURE — 770006 HCHG ROOM/CARE - MED/SURG/GYN SEMI*

## 2021-06-06 PROCEDURE — A9270 NON-COVERED ITEM OR SERVICE: HCPCS | Performed by: SURGERY

## 2021-06-06 RX ADMIN — OXYCODONE HYDROCHLORIDE 15 MG: 10 TABLET ORAL at 10:28

## 2021-06-06 RX ADMIN — OXYCODONE HYDROCHLORIDE 10 MG: 10 TABLET ORAL at 22:44

## 2021-06-06 RX ADMIN — MORPHINE SULFATE 2 MG: 4 INJECTION INTRAVENOUS at 02:32

## 2021-06-06 RX ADMIN — OXYCODONE HYDROCHLORIDE 15 MG: 10 TABLET ORAL at 05:18

## 2021-06-06 RX ADMIN — AMPICILLIN SODIUM AND SULBACTAM SODIUM 3 G: 2; 1 INJECTION, POWDER, FOR SOLUTION INTRAMUSCULAR; INTRAVENOUS at 17:50

## 2021-06-06 RX ADMIN — OXYCODONE HYDROCHLORIDE 15 MG: 10 TABLET ORAL at 14:42

## 2021-06-06 RX ADMIN — AMPICILLIN SODIUM AND SULBACTAM SODIUM 3 G: 2; 1 INJECTION, POWDER, FOR SOLUTION INTRAMUSCULAR; INTRAVENOUS at 12:49

## 2021-06-06 RX ADMIN — HEPARIN SODIUM 3000 UNITS: 1000 INJECTION, SOLUTION INTRAVENOUS; SUBCUTANEOUS at 16:49

## 2021-06-06 RX ADMIN — AMPICILLIN SODIUM AND SULBACTAM SODIUM 3 G: 2; 1 INJECTION, POWDER, FOR SOLUTION INTRAMUSCULAR; INTRAVENOUS at 05:19

## 2021-06-06 RX ADMIN — HEPARIN SODIUM 3000 UNITS: 1000 INJECTION, SOLUTION INTRAVENOUS; SUBCUTANEOUS at 06:33

## 2021-06-06 RX ADMIN — OXYCODONE HYDROCHLORIDE 10 MG: 10 TABLET ORAL at 08:48

## 2021-06-06 RX ADMIN — HEPARIN SODIUM 24 UNITS/KG/HR: 5000 INJECTION, SOLUTION INTRAVENOUS at 12:50

## 2021-06-06 RX ADMIN — FLUCONAZOLE 400 MG: 200 TABLET ORAL at 05:18

## 2021-06-06 RX ADMIN — FAMOTIDINE 20 MG: 20 TABLET ORAL at 17:50

## 2021-06-06 RX ADMIN — OXYCODONE HYDROCHLORIDE 15 MG: 10 TABLET ORAL at 00:54

## 2021-06-06 RX ADMIN — LORAZEPAM 0.5 MG: 2 INJECTION INTRAMUSCULAR; INTRAVENOUS at 19:29

## 2021-06-06 RX ADMIN — FAMOTIDINE 20 MG: 20 TABLET ORAL at 05:19

## 2021-06-06 RX ADMIN — DOCUSATE SODIUM 100 MG: 100 CAPSULE, LIQUID FILLED ORAL at 17:49

## 2021-06-06 RX ADMIN — OXYCODONE HYDROCHLORIDE 10 MG: 10 TABLET ORAL at 17:49

## 2021-06-06 RX ADMIN — HEPARIN SODIUM 26 UNITS/KG/HR: 5000 INJECTION, SOLUTION INTRAVENOUS at 16:50

## 2021-06-06 RX ADMIN — DOCUSATE SODIUM 100 MG: 100 CAPSULE, LIQUID FILLED ORAL at 05:18

## 2021-06-06 RX ADMIN — POLYETHYLENE GLYCOL 3350 1 PACKET: 17 POWDER, FOR SOLUTION ORAL at 17:50

## 2021-06-06 RX ADMIN — OXYCODONE HYDROCHLORIDE 15 MG: 10 TABLET ORAL at 19:42

## 2021-06-06 RX ADMIN — OXYCODONE HYDROCHLORIDE 10 MG: 10 TABLET ORAL at 12:49

## 2021-06-06 ASSESSMENT — PAIN DESCRIPTION - PAIN TYPE
TYPE: ACUTE PAIN

## 2021-06-06 ASSESSMENT — ENCOUNTER SYMPTOMS
ABDOMINAL PAIN: 1
VOMITING: 0
WEAKNESS: 1
CONSTIPATION: 1
DIARRHEA: 0
FEVER: 0
ROS GI COMMENTS: PASSING SOME FLATUS
SHORTNESS OF BREATH: 0
BLOOD IN STOOL: 0
DIZZINESS: 0

## 2021-06-06 NOTE — PROGRESS NOTES
[facility] Medicine Daily Progress Note    Date of Service  6/6/2021    Chief Complaint  50 y.o. female admitted 5/22/2021 with abd pain.    Hospital Course  49 yo woman who presented with abd pain and CT showed cecal volvulus. She underwent exploratory celiotomy and right hemicolectomy with ileocolic anastomosis with Dr. Wood 5/22/21. She developed thrombocytopenia and ascites and returned for ex lap with resection of ileocolic anastomosis and wound vac placement 5/28/21. She was admitted to ICU, started on zosyn for bacterial peritonitis and given platelet transfusions. HIT panel was negative. Abd cultures grew actinomyces, enterococcus, bacteroides. Doppler showed catheter related thrombus in R arm and started on anticoagulation.    Interval Problem Update  WBC 22  Hgb 7.8  She says she's having irregular mentruation, has not been heavy bleeding  Her abd pain still severe but better controlled today  Eating well and having BMs. No melena or BRB seen.    Consultants/Specialty  Critical care  Surgery  ID    Code Status  Full Code    Disposition  HH and wound vac ordered  ID recs  PTOT reeval    Review of Systems  Review of Systems   Constitutional: Positive for malaise/fatigue. Negative for fever.   Respiratory: Negative for shortness of breath.    Cardiovascular: Negative for chest pain.   Gastrointestinal: Positive for abdominal pain and constipation. Negative for blood in stool, diarrhea, melena and vomiting.   Neurological: Positive for weakness. Negative for dizziness.   All other systems reviewed and are negative.       Physical Exam  Temp:  [36.6 °C (97.9 °F)-36.8 °C (98.3 °F)] 36.8 °C (98.2 °F)  Pulse:  [101-112] 112  Resp:  [16-20] 16  BP: (118-129)/(68-76) 118/68  SpO2:  [92 %-97 %] 93 %    Physical Exam  Vitals and nursing note reviewed.   Constitutional:       Appearance: She is ill-appearing. She is not toxic-appearing.   HENT:      Head: Normocephalic.      Mouth/Throat:      Mouth: Mucous membranes  are moist.   Eyes:      General:         Right eye: No discharge.         Left eye: No discharge.   Cardiovascular:      Rate and Rhythm: Normal rate and regular rhythm.   Pulmonary:      Effort: Pulmonary effort is normal. No respiratory distress.      Breath sounds: No wheezing or rales.   Abdominal:      Tenderness: There is abdominal tenderness (diffuse). There is no guarding or rebound.      Comments: Wound vac and surgical incision centrally   Musculoskeletal:         General: No swelling.      Cervical back: Neck supple.   Skin:     General: Skin is warm and dry.   Neurological:      General: No focal deficit present.      Mental Status: She is alert.      Comments: AOx4         Fluids    Intake/Output Summary (Last 24 hours) at 6/6/2021 1006  Last data filed at 6/6/2021 0520  Gross per 24 hour   Intake 360 ml   Output 2050 ml   Net -1690 ml       Laboratory  Recent Labs     06/04/21 0348 06/05/21  0132 06/06/21  0501   WBC 13.4* 16.3* 22.8*   RBC 2.87* 2.88* 2.73*   HEMOGLOBIN 8.3* 8.2* 7.8*   HEMATOCRIT 25.6* 25.9* 24.4*   MCV 89.2 89.9 89.4   MCH 28.9 28.5 28.6   MCHC 32.4* 31.7* 32.0*   RDW 50.7* 50.4* 50.2*   PLATELETCT 292 357 396   MPV 10.7 10.3 10.2     Recent Labs     06/04/21 0348 06/05/21  0132 06/06/21  0501   SODIUM 133* 137 131*   POTASSIUM 4.1 4.1 4.1   CHLORIDE 101 102 97   CO2 23 22 25   GLUCOSE 94 116* 114*   BUN 3* 4* 3*   CREATININE 0.46* 0.59 0.58   CALCIUM 7.7* 7.7* 7.9*     Recent Labs     06/03/21  2125   APTT 33.8   INR 1.23*               Imaging  CT-ABDOMEN-PELVIS WITH   Final Result      1.  Multiple rim enhancing peritoneal fluid collection suspicious for abscess      2.  These include the rectouterine space measuring 6.6 x 7.2 x 5.1 cm, right lower quadrant measuring 13.8 x 4.2 x 6.5 cm, left subphrenic space measuring 8.4 x 4.6 x 2.7 cm, and contiguous loculated fluid within the right and left paracolic gutter      3.  Interval right colectomy      4.  Small-moderate-sized  bilateral pleural effusion and atelectasis      US-EXTREMITY VENOUS UPPER BILAT   Final Result      IR-MIDLINE CATHETER INSERTION WO GUIDANCE > AGE 3   Final Result                  Ultrasound-guided midline placement performed by qualified nursing staff    as above.          DX-CHEST-FOR LINE PLACEMENT Perform procedure in: Other(comment f6 below): (PACU)   Final Result      1.  Left IJ central line tip high in position located above the superior vena cava are within the left brachiocephalic vein.      2.  Patchy bilateral infiltrates.      3.  NG tube tip extends into the stomach.      CT-ABDOMEN-PELVIS WITH   Final Result      1.  Moderate to large amount of ascites within the abdomen and pelvis some areas of which are loculated in nature. There are also punctate areas of gas within those areas of ascites as well as free intraperitoneal air. This may be related to recent    surgical procedure however the degree of volume of ascites would be unusual for normal postoperative course. Consideration should be given for leakage of bowel content or other process.      2.  Diffuse colonic distention.      3.  Bibasilar atelectasis and pleural effusions.      4.  5 mm left renal pelvic stone which results in mild hydronephrosis above that level.      5.  Moderate right-sided hydronephrosis extending to the level of the upper sacrum. No ureteral stone. The ureter is not identified below that level.      6.  This was discussed with NEVA KAY at 6:44 PM on 5/27/2021.      [id number] VIEW   Final Result      Gaseous distended small bowel and colon is similar to prior, likely ileus.      [id number] VIEW   Final Result         Gaseous distention of the small bowel and colon, likely postoperative ileus.      CT-ABDOMEN-PELVIS WITH   Final Result         1.  Findings keeping with cecal volvulus.   2.  Mildly fluid distended distal small bowel.   3.  No free air.   4.  Small nonobstructing renal stones.   These findings  were discussed with ESHA STERLING on 5/22/2021 11:45 AM.                       Assessment/Plan  * Bacterial peritonitis (HCC)  Assessment & Plan  Abd cultures grew actinomyces, enterococcus, bacteroides.   Wound vac in place  Surgery following, had ileocecal resection 5/28/21 and washout 6/4/21  ID consulted, on unasyn and fluconazole  Blood cultures NGTD  Pain management - increase oxycodone dose, morphine PRN    Anemia  Assessment & Plan  Hgb slowly decreasing  No signs of GI bleed  Recheck Hgb this afternoon  May need to stop heparin if anemia worsens  Transfuse if Hgb <7    DVT of axillary vein, acute right (HCC)  Assessment & Plan  Associated with midline  Start on therapeutic heparin for now until no further procedures planned  Ok to continue using midline    Thrombocytopenia (HCC)  Assessment & Plan  S/p transfusions  HIT AB negative  Due to sepsis   Recovered, level now normalized    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

## 2021-06-06 NOTE — PROGRESS NOTES
Trauma / Surgical Daily Progress Note    Date of Service  6/6/2021    Chief Complaint  50 y.o. female admitted 5/22/2021 with cecal volvulus    Interval Events  5/22 Exploratory celiotomy and right hemicolectomy with a side-to-side   ileocolic anastomosis  5/28 Exploratory celiotomy, resection of ileocolic anastomosis with re-do  side-to-side ileocolic anastomosis  6/4 Ex celiotomy washout    Feeling better. Tolerating diet. Stooling. On Unasyn per ID.   ? Stopping heparin gtt v lovenox. Work on DC planning.    Review of Systems  Review of Systems   Gastrointestinal: Positive for abdominal pain.        Passing some flatus        Vital Signs for last 24 hours  Temp:  [36.6 °C (97.9 °F)-36.8 °C (98.3 °F)] 36.8 °C (98.2 °F)  Pulse:  [111-112] 112  Resp:  [16] 16  BP: (118-129)/(68-76) 118/68  SpO2:  [92 %-93 %] 93 %    Hemodynamic parameters for last 24 hours       Respiratory Data     Respiration: 16, Pulse Oximetry: 93 %        RUL Breath Sounds: Clear, RML Breath Sounds: Clear, RLL Breath Sounds: Diminished, LUCITA Breath Sounds: Clear, LLL Breath Sounds: Diminished    Physical Exam  Physical Exam  Cardiovascular:      Rate and Rhythm: Normal rate.      Pulses: Normal pulses.   Pulmonary:      Effort: Pulmonary effort is normal.   Abdominal:      General: There is no distension.      Palpations: Abdomen is soft.      Tenderness: There is abdominal tenderness.      Comments: Wound Vac in place    Less distention, abdomen softer      Genitourinary:     Comments: diuresing  Musculoskeletal:         General: Normal range of motion.      Cervical back: Neck supple.      Comments: Generalized edema   Skin:     General: Skin is warm and dry.   Neurological:      General: No focal deficit present.      Mental Status: She is alert.   Psychiatric:      Comments: Teary today         Laboratory  Recent Results (from the past 24 hour(s))   Heparin Xa (Unfractionated)    Collection Time: 06/05/21  5:20 PM   Result Value Ref Range     Heparin Xa (UFH) 0.42 IU/mL   CBC WITH DIFFERENTIAL    Collection Time: 06/06/21  5:01 AM   Result Value Ref Range    WBC 22.8 (H) 4.8 - 10.8 K/uL    RBC 2.73 (L) 4.20 - 5.40 M/uL    Hemoglobin 7.8 (L) 12.0 - 16.0 g/dL    Hematocrit 24.4 (L) 37.0 - 47.0 %    MCV 89.4 81.4 - 97.8 fL    MCH 28.6 27.0 - 33.0 pg    MCHC 32.0 (L) 33.6 - 35.0 g/dL    RDW 50.2 (H) 35.9 - 50.0 fL    Platelet Count 396 164 - 446 K/uL    MPV 10.2 9.0 - 12.9 fL    Neutrophils-Polys 80.30 (H) 44.00 - 72.00 %    Lymphocytes 8.30 (L) 22.00 - 41.00 %    Monocytes 8.10 0.00 - 13.40 %    Eosinophils 0.70 0.00 - 6.90 %    Basophils 0.30 0.00 - 1.80 %    Immature Granulocytes 2.30 (H) 0.00 - 0.90 %    Nucleated RBC 0.10 /100 WBC    Neutrophils (Absolute) 18.31 (H) 2.00 - 7.15 K/uL    Lymphs (Absolute) 1.90 1.00 - 4.80 K/uL    Monos (Absolute) 1.86 (H) 0.00 - 0.85 K/uL    Eos (Absolute) 0.17 0.00 - 0.51 K/uL    Baso (Absolute) 0.07 0.00 - 0.12 K/uL    Immature Granulocytes (abs) 0.53 (H) 0.00 - 0.11 K/uL    NRBC (Absolute) 0.03 K/uL   Comp Metabolic Panel    Collection Time: 06/06/21  5:01 AM   Result Value Ref Range    Sodium 131 (L) 135 - 145 mmol/L    Potassium 4.1 3.6 - 5.5 mmol/L    Chloride 97 96 - 112 mmol/L    Co2 25 20 - 33 mmol/L    Anion Gap 9.0 7.0 - 16.0    Glucose 114 (H) 65 - 99 mg/dL    Bun 3 (L) 8 - 22 mg/dL    Creatinine 0.58 0.50 - 1.40 mg/dL    Calcium 7.9 (L) 8.4 - 10.2 mg/dL    AST(SGOT) 21 12 - 45 U/L    ALT(SGPT) 10 2 - 50 U/L    Alkaline Phosphatase 90 30 - 99 U/L    Total Bilirubin 0.3 0.1 - 1.5 mg/dL    Albumin 2.2 (L) 3.2 - 4.9 g/dL    Total Protein 5.0 (L) 6.0 - 8.2 g/dL    Globulin 2.8 1.9 - 3.5 g/dL    A-G Ratio 0.8 g/dL   MAGNESIUM    Collection Time: 06/06/21  5:01 AM   Result Value Ref Range    Magnesium 1.6 1.5 - 2.5 mg/dL   PHOSPHORUS    Collection Time: 06/06/21  5:01 AM   Result Value Ref Range    Phosphorus 4.2 2.5 - 4.5 mg/dL   Heparin Xa (Unfractionated)    Collection Time: 06/06/21  5:01 AM   Result Value Ref  Range    Heparin Xa (UFH) 0.29 IU/mL   ESTIMATED GFR    Collection Time: 06/06/21  5:01 AM   Result Value Ref Range    GFR If African American >60 >60 mL/min/1.73 m 2    GFR If Non African American >60 >60 mL/min/1.73 m 2       Fluids    Intake/Output Summary (Last 24 hours) at 6/6/2021 1142  Last data filed at 6/6/2021 0520  Gross per 24 hour   Intake 360 ml   Output 2050 ml   Net -1690 ml       Core Measures & Quality Metrics  Labs reviewed and Medications reviewed  Young catheter: No Young      DVT Prophylaxis: Enoxaparin (Lovenox)  DVT prophylaxis - mechanical: SCDs  Ulcer prophylaxis: Yes  Antibiotics: Treating active infection/contamination beyond 24 hours perioperative coverage  Assessed for rehab: Patient returned to prior level of function, rehabilitation not indicated at this time    ALICIA Score  ETOH Screening    Assessment/Plan  * Bacterial peritonitis (HCC)  Assessment & Plan  6/2 Zosyn stopped.  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 Passing flatus - start clears  5/27 - Stooled - will adv to full liquids  5/27 - thrombocytopenia, bandemia, PM CT with ascites no freeair  5/28 - Exploratory celiotomy, resection of ileocolic anastomosis with re-do  side-to-side ileocolic anastomosis.  6/3  Stooling, advance to regular diet  6/4 Small wound dehiscence - returned to OR for washout and reclosure    DVT of axillary vein, acute right (HCC)  Assessment & Plan  Noted on US  On heparin gtt       Discussed patient condition with RN and Patient.    CRITICAL CARE TIME EXCLUDING PROCEDURES: 20  minutes

## 2021-06-06 NOTE — ASSESSMENT & PLAN NOTE
- Hgb slowly trending down, not indicative of acute bleed but likely due to ACD and return trips to the OR.  - Iron low, but suppressed TIBC as well, likely some ABLA with anemia of acute illness as well  - No IV iron for now given her active infection  - Transfuse if Hgb <7  - Hgb fluctuating but stable  - Continue Lovenox for DVT as Hgb without large acute drops suggesting active bleed

## 2021-06-06 NOTE — CARE PLAN
The patient is Watcher - Medium risk of patient condition declining or worsening    Shift Goals  Clinical Goals: Pain control, increased ambulation  Patient Goals: Rest, pain control    Progress made toward(s) clinical / shift goals:  Pain controlled throughout shift, patient encouraged to ambulate to the restroom    Patient is not progressing towards the following goals:      Problem: Knowledge Deficit - Standard  Goal: Patient and family/care givers will demonstrate understanding of plan of care, disease process/condition, diagnostic tests and medications  6/5/2021 2114 by Marco Taylor R.N.  Outcome: Progressing  6/5/2021 2112 by MORGAN TripathiN.  Outcome: Progressing     Problem: Pain - Standard  Goal: Alleviation of pain or a reduction in pain to the patient’s comfort goal  6/5/2021 2114 by Marco Taylor R.N.  Outcome: Progressing  6/5/2021 2112 by MORGAN TripathiN.  Outcome: Progressing     Problem: Depression  Goal: Patient and family/caregiver will verbalize accurate information about at least two of the possible causes of depression, three-four of the signs and symptoms of depression  6/5/2021 2114 by Marco Taylor R.N.  Outcome: Progressing  6/5/2021 2112 by Marco Taylor R.N.  Outcome: Progressing     Problem: Fall Risk  Goal: Patient will remain free from falls  6/5/2021 2114 by Marco Taylor R.N.  Outcome: Progressing  6/5/2021 2112 by Marco Taylor RDarlynN.  Outcome: Progressing     Problem: Skin Integrity  Goal: Skin integrity is maintained or improved  6/5/2021 2114 by Marco Taylor R.N.  Outcome: Progressing  6/5/2021 2112 by MORGAN TripathiN.  Outcome: Progressing     Problem: Early Mobilization - Post Surgery  Goal: Early mobilization post surgery  6/5/2021 2114 by Marco Taylor R.N.  Outcome: Progressing  6/5/2021 2112 by MORGAN TripathiN.  Outcome: Progressing     Problem: Pain - Post Surgery  Goal: Alleviation or reduction of pain post surgery  6/5/2021 2114 by Marco SMITH  MARIA G Taylor  Outcome: Progressing  6/5/2021 2112 by Marco Taylor R.N.  Outcome: Progressing     Problem: Wound/ / Incision Healing  Goal: Patient's wound/surgical incision will decrease in size and heals properly  6/5/2021 2114 by Marco Taylor R.N.  Outcome: Progressing  6/5/2021 2112 by Marco Taylor R.N.  Outcome: Progressing     Problem: Bowel Elimination - Post Surgical  Goal: Patient will resume regular bowel sounds and function with no discomfort or distention  6/5/2021 2114 by Marco Taylor R.N.  Outcome: Progressing  6/5/2021 2112 by Marco Taylor R.N.  Outcome: Progressing     Problem: Respiratory  Goal: Patient will achieve/maintain optimum respiratory ventilation and gas exchange  6/5/2021 2114 by Marco Taylor R.N.  Outcome: Progressing  6/5/2021 2112 by Marco Taylor R.N.  Outcome: Progressing     Problem: Discharge Barriers/Planning  Goal: Patient's continuum of care needs are met  6/5/2021 2114 by Marco Taylor R.N.  Outcome: Progressing  6/5/2021 2112 by Marco Taylor R.N.  Outcome: Progressing

## 2021-06-07 LAB
ALBUMIN SERPL BCP-MCNC: 2.1 G/DL (ref 3.2–4.9)
ALBUMIN/GLOB SERPL: 0.7 G/DL
ALP SERPL-CCNC: 82 U/L (ref 30–99)
ALT SERPL-CCNC: 9 U/L (ref 2–50)
ANION GAP SERPL CALC-SCNC: 9 MMOL/L (ref 7–16)
APTT PPP: 185 SEC (ref 24.7–36)
AST SERPL-CCNC: 19 U/L (ref 12–45)
BACTERIA BLD CULT: NORMAL
BACTERIA BLD CULT: NORMAL
BASOPHILS # BLD AUTO: 0.4 % (ref 0–1.8)
BASOPHILS # BLD: 0.08 K/UL (ref 0–0.12)
BILIRUB SERPL-MCNC: 0.3 MG/DL (ref 0.1–1.5)
BUN SERPL-MCNC: 3 MG/DL (ref 8–22)
CALCIUM SERPL-MCNC: 8.1 MG/DL (ref 8.4–10.2)
CHLORIDE SERPL-SCNC: 97 MMOL/L (ref 96–112)
CO2 SERPL-SCNC: 25 MMOL/L (ref 20–33)
CREAT SERPL-MCNC: 0.6 MG/DL (ref 0.5–1.4)
EOSINOPHIL # BLD AUTO: 0.16 K/UL (ref 0–0.51)
EOSINOPHIL NFR BLD: 0.7 % (ref 0–6.9)
ERYTHROCYTE [DISTWIDTH] IN BLOOD BY AUTOMATED COUNT: 50.4 FL (ref 35.9–50)
GLOBULIN SER CALC-MCNC: 3.1 G/DL (ref 1.9–3.5)
GLUCOSE SERPL-MCNC: 131 MG/DL (ref 65–99)
HCT VFR BLD AUTO: 23.7 % (ref 37–47)
HGB BLD-MCNC: 7.5 G/DL (ref 12–16)
IMM GRANULOCYTES # BLD AUTO: 0.52 K/UL (ref 0–0.11)
IMM GRANULOCYTES NFR BLD AUTO: 2.4 % (ref 0–0.9)
INR PPP: 1.17 (ref 0.87–1.13)
LYMPHOCYTES # BLD AUTO: 1.75 K/UL (ref 1–4.8)
LYMPHOCYTES NFR BLD: 8.2 % (ref 22–41)
MAGNESIUM SERPL-MCNC: 1.7 MG/DL (ref 1.5–2.5)
MCH RBC QN AUTO: 28.6 PG (ref 27–33)
MCHC RBC AUTO-ENTMCNC: 31.6 G/DL (ref 33.6–35)
MCV RBC AUTO: 90.5 FL (ref 81.4–97.8)
MONOCYTES # BLD AUTO: 1.69 K/UL (ref 0–0.85)
MONOCYTES NFR BLD AUTO: 7.9 % (ref 0–13.4)
NEUTROPHILS # BLD AUTO: 17.21 K/UL (ref 2–7.15)
NEUTROPHILS NFR BLD: 80.4 % (ref 44–72)
NRBC # BLD AUTO: 0.03 K/UL
NRBC BLD-RTO: 0.1 /100 WBC
PHOSPHATE SERPL-MCNC: 4.6 MG/DL (ref 2.5–4.5)
PLATELET # BLD AUTO: 409 K/UL (ref 164–446)
PMV BLD AUTO: 10 FL (ref 9–12.9)
POTASSIUM SERPL-SCNC: 3.9 MMOL/L (ref 3.6–5.5)
PROT SERPL-MCNC: 5.2 G/DL (ref 6–8.2)
PROTHROMBIN TIME: 14.6 SEC (ref 12–14.6)
RBC # BLD AUTO: 2.62 M/UL (ref 4.2–5.4)
SIGNIFICANT IND 70042: NORMAL
SIGNIFICANT IND 70042: NORMAL
SITE SITE: NORMAL
SITE SITE: NORMAL
SODIUM SERPL-SCNC: 131 MMOL/L (ref 135–145)
SOURCE SOURCE: NORMAL
SOURCE SOURCE: NORMAL
UFH PPP CHRO-ACNC: 0.41 IU/ML
WBC # BLD AUTO: 21.4 K/UL (ref 4.8–10.8)

## 2021-06-07 PROCEDURE — 80053 COMPREHEN METABOLIC PANEL: CPT

## 2021-06-07 PROCEDURE — A9270 NON-COVERED ITEM OR SERVICE: HCPCS | Performed by: INTERNAL MEDICINE

## 2021-06-07 PROCEDURE — 36415 COLL VENOUS BLD VENIPUNCTURE: CPT

## 2021-06-07 PROCEDURE — 700111 HCHG RX REV CODE 636 W/ 250 OVERRIDE (IP): Performed by: NURSE PRACTITIONER

## 2021-06-07 PROCEDURE — 97605 NEG PRS WND THER DME<=50SQCM: CPT

## 2021-06-07 PROCEDURE — 84100 ASSAY OF PHOSPHORUS: CPT

## 2021-06-07 PROCEDURE — 85610 PROTHROMBIN TIME: CPT

## 2021-06-07 PROCEDURE — 700102 HCHG RX REV CODE 250 W/ 637 OVERRIDE(OP): Performed by: INTERNAL MEDICINE

## 2021-06-07 PROCEDURE — 770006 HCHG ROOM/CARE - MED/SURG/GYN SEMI*

## 2021-06-07 PROCEDURE — 700111 HCHG RX REV CODE 636 W/ 250 OVERRIDE (IP): Performed by: INTERNAL MEDICINE

## 2021-06-07 PROCEDURE — 83735 ASSAY OF MAGNESIUM: CPT

## 2021-06-07 PROCEDURE — 99233 SBSQ HOSP IP/OBS HIGH 50: CPT | Performed by: INTERNAL MEDICINE

## 2021-06-07 PROCEDURE — 85520 HEPARIN ASSAY: CPT

## 2021-06-07 PROCEDURE — A9270 NON-COVERED ITEM OR SERVICE: HCPCS | Performed by: SURGERY

## 2021-06-07 PROCEDURE — 700102 HCHG RX REV CODE 250 W/ 637 OVERRIDE(OP): Performed by: SURGERY

## 2021-06-07 PROCEDURE — 85730 THROMBOPLASTIN TIME PARTIAL: CPT

## 2021-06-07 PROCEDURE — 700105 HCHG RX REV CODE 258: Performed by: INTERNAL MEDICINE

## 2021-06-07 PROCEDURE — 85025 COMPLETE CBC W/AUTO DIFF WBC: CPT

## 2021-06-07 PROCEDURE — 99232 SBSQ HOSP IP/OBS MODERATE 35: CPT | Performed by: INTERNAL MEDICINE

## 2021-06-07 RX ADMIN — FAMOTIDINE 20 MG: 20 TABLET ORAL at 18:07

## 2021-06-07 RX ADMIN — OXYCODONE HYDROCHLORIDE 15 MG: 10 TABLET ORAL at 19:44

## 2021-06-07 RX ADMIN — OXYCODONE HYDROCHLORIDE 10 MG: 10 TABLET ORAL at 02:00

## 2021-06-07 RX ADMIN — AMPICILLIN SODIUM AND SULBACTAM SODIUM 3 G: 2; 1 INJECTION, POWDER, FOR SOLUTION INTRAMUSCULAR; INTRAVENOUS at 11:07

## 2021-06-07 RX ADMIN — HEPARIN SODIUM 26 UNITS/KG/HR: 5000 INJECTION, SOLUTION INTRAVENOUS at 02:35

## 2021-06-07 RX ADMIN — OXYCODONE HYDROCHLORIDE 10 MG: 10 TABLET ORAL at 15:58

## 2021-06-07 RX ADMIN — AMPICILLIN SODIUM AND SULBACTAM SODIUM 3 G: 2; 1 INJECTION, POWDER, FOR SOLUTION INTRAMUSCULAR; INTRAVENOUS at 23:43

## 2021-06-07 RX ADMIN — FLUCONAZOLE 400 MG: 200 TABLET ORAL at 04:02

## 2021-06-07 RX ADMIN — ENOXAPARIN SODIUM 80 MG: 80 INJECTION SUBCUTANEOUS at 20:33

## 2021-06-07 RX ADMIN — POLYETHYLENE GLYCOL 3350 1 PACKET: 17 POWDER, FOR SOLUTION ORAL at 04:03

## 2021-06-07 RX ADMIN — LORAZEPAM 0.5 MG: 2 INJECTION INTRAMUSCULAR; INTRAVENOUS at 07:51

## 2021-06-07 RX ADMIN — OXYCODONE HYDROCHLORIDE 15 MG: 10 TABLET ORAL at 04:02

## 2021-06-07 RX ADMIN — AMPICILLIN SODIUM AND SULBACTAM SODIUM 3 G: 2; 1 INJECTION, POWDER, FOR SOLUTION INTRAMUSCULAR; INTRAVENOUS at 04:02

## 2021-06-07 RX ADMIN — OXYCODONE HYDROCHLORIDE 10 MG: 10 TABLET ORAL at 06:30

## 2021-06-07 RX ADMIN — ENOXAPARIN SODIUM 80 MG: 80 INJECTION SUBCUTANEOUS at 08:56

## 2021-06-07 RX ADMIN — FAMOTIDINE 20 MG: 20 TABLET ORAL at 04:02

## 2021-06-07 RX ADMIN — DOCUSATE SODIUM 100 MG: 100 CAPSULE, LIQUID FILLED ORAL at 04:02

## 2021-06-07 RX ADMIN — OXYCODONE HYDROCHLORIDE 15 MG: 10 TABLET ORAL at 13:31

## 2021-06-07 RX ADMIN — OXYCODONE HYDROCHLORIDE 10 MG: 10 TABLET ORAL at 11:08

## 2021-06-07 RX ADMIN — MAGNESIUM HYDROXIDE 30 ML: 400 SUSPENSION ORAL at 04:02

## 2021-06-07 RX ADMIN — AMPICILLIN SODIUM AND SULBACTAM SODIUM 3 G: 2; 1 INJECTION, POWDER, FOR SOLUTION INTRAMUSCULAR; INTRAVENOUS at 00:49

## 2021-06-07 RX ADMIN — OXYCODONE HYDROCHLORIDE 10 MG: 10 TABLET ORAL at 23:43

## 2021-06-07 RX ADMIN — OXYCODONE HYDROCHLORIDE 15 MG: 10 TABLET ORAL at 08:56

## 2021-06-07 RX ADMIN — AMPICILLIN SODIUM AND SULBACTAM SODIUM 3 G: 2; 1 INJECTION, POWDER, FOR SOLUTION INTRAMUSCULAR; INTRAVENOUS at 18:07

## 2021-06-07 RX ADMIN — OXYCODONE HYDROCHLORIDE 15 MG: 10 TABLET ORAL at 00:01

## 2021-06-07 RX ADMIN — MORPHINE SULFATE 2 MG: 4 INJECTION INTRAVENOUS at 18:28

## 2021-06-07 ASSESSMENT — ENCOUNTER SYMPTOMS
FEVER: 0
MYALGIAS: 1
SHORTNESS OF BREATH: 0
DIARRHEA: 0
VOMITING: 0
NAUSEA: 0
SPUTUM PRODUCTION: 0
CONSTIPATION: 0
DIZZINESS: 0
ROS GI COMMENTS: PASSING SOME FLATUS
WEAKNESS: 1
ABDOMINAL PAIN: 1
COUGH: 0
CHILLS: 0

## 2021-06-07 ASSESSMENT — PAIN DESCRIPTION - PAIN TYPE
TYPE: ACUTE PAIN;SURGICAL PAIN
TYPE: ACUTE PAIN
TYPE: ACUTE PAIN;SURGICAL PAIN
TYPE: SURGICAL PAIN;ACUTE PAIN
TYPE: ACUTE PAIN;SURGICAL PAIN
TYPE: ACUTE PAIN;SURGICAL PAIN
TYPE: ACUTE PAIN
TYPE: ACUTE PAIN;SURGICAL PAIN
TYPE: ACUTE PAIN

## 2021-06-07 NOTE — CARE PLAN
Problem: Pain - Standard  Goal: Alleviation of pain or a reduction in pain to the patient’s comfort goal  Outcome: Progressing     Problem: Fall Risk  Goal: Patient will remain free from falls  Outcome: Progressing     Problem: Early Mobilization - Post Surgery  Goal: Early mobilization post surgery  Outcome: Progressing     Problem: Bowel Elimination - Post Surgical  Goal: Patient will resume regular bowel sounds and function with no discomfort or distention  Outcome: Progressing     The patient is Watcher - Medium risk of patient condition declining or worsening    Shift Goals  Clinical Goals: Ambulate at least once this shift  Patient Goals: sleep well    Progress made toward(s) clinical / shift goals:  Pt is progressing, pt ambulated this morning with staff assistance    Patient is not progressing towards the following goals:N/A

## 2021-06-07 NOTE — PROGRESS NOTES
"Assumed care of pt at 0715. Pt is alert and oriented, on room air, c/o abdominal surgical site pain 5/10 at this time. Cold pack provided. Pt states she is very anxious, requesting Ativan, medicated per MAR. Pt states her goal is to \"get up and walk today\", she states she would like to discharge home but is aware her WBC are still elevated. Assessment complete. Heparin gtt discontinued, midline wound vac observed working appropriately, abdominal binder observed CDI and in place, pt denies further needs at this time. Bed locked in lowest position, personal belongings and call light within reach.  "

## 2021-06-07 NOTE — PROGRESS NOTES
Infectious Disease Progress Note    Author: Karly Obrien M.D. Date & Time of service: 2021  8:45 AM      Chief Complaint:  Follow-up for intra-abdominal abscesses     Interval History:   patient is afebrile, white count 13.4.  Patient had worsening abdominal pain yesterday, noted purulent output during wound VAC change, repeat CT with multiple intra-abdominal abscesses, taken back to the OR this morning.     Review of Systems:  Review of Systems   Constitutional: Positive for malaise/fatigue. Negative for chills and fever.   Respiratory: Negative for cough, sputum production and shortness of breath.    Cardiovascular: Negative for chest pain.   Gastrointestinal: Positive for abdominal pain. Negative for constipation, diarrhea, nausea and vomiting.       Hemodynamics:  Temp (24hrs), Av.9 °C (98.5 °F), Min:36.7 °C (98 °F), Max:37.2 °C (98.9 °F)  Temperature: 37.2 °C (98.9 °F)  Pulse  Av.1  Min: 60  Max: 156   Blood Pressure: 116/62       Physical Exam:  Physical Exam  Constitutional:       Appearance: Normal appearance.   Cardiovascular:      Rate and Rhythm: Tachycardia present.      Heart sounds: Normal heart sounds.   Pulmonary:      Effort: Pulmonary effort is normal.      Breath sounds: Normal breath sounds.   Abdominal:      General: Abdomen is flat. There is no distension.      Palpations: Abdomen is soft.      Tenderness: There is no guarding.      Comments: Binder and wound VAC in place.  No obvious infection surrounding tissues.  No significant tenderness to palpation.   Musculoskeletal:      Right lower leg: No edema.      Left lower leg: No edema.   Skin:     General: Skin is warm and dry.   Neurological:      General: No focal deficit present.      Mental Status: She is alert and oriented to person, place, and time.   Psychiatric:         Mood and Affect: Mood normal.         Behavior: Behavior normal.         Meds:    Current Facility-Administered Medications:   •  enoxaparin  (LOVENOX) injection  •  fluconazole  •  oxyCODONE immediate-release  •  oxyCODONE immediate-release  •  morphine injection  •  ampicillin-sulbactam (UNASYN) IV  •  [DISCONTINUED] insulin regular **AND** [CANCELED] POC blood glucose manual result **AND** NOTIFY MD and PharmD **AND** glucose **AND** dextrose 50%  •  Metoprolol Tartrate  •  LORazepam  •  diazePAM  •  Respiratory Therapy Consult  •  Pharmacy Consult Request  •  docusate sodium  •  senna-docusate  •  senna-docusate  •  polyethylene glycol/lytes  •  magnesium hydroxide  •  bisacodyl  •  fleet  •  famotidine **OR** famotidine  •  ondansetron    Labs:  Recent Labs     06/05/21 0132 06/05/21 0132 06/06/21  0501 06/06/21  1243 06/07/21  0157   WBC 16.3*  --  22.8*  --  21.4*   RBC 2.88*  --  2.73*  --  2.62*   HEMOGLOBIN 8.2*   < > 7.8* 9.2* 7.5*   HEMATOCRIT 25.9*  --  24.4*  --  23.7*   MCV 89.9  --  89.4  --  90.5   MCH 28.5  --  28.6  --  28.6   RDW 50.4*  --  50.2*  --  50.4*   PLATELETCT 357  --  396  --  409   MPV 10.3  --  10.2  --  10.0   NEUTSPOLYS 78.10*  --  80.30*  --  80.40*   LYMPHOCYTES 10.30*  --  8.30*  --  8.20*   MONOCYTES 8.50  --  8.10  --  7.90   EOSINOPHILS 0.50  --  0.70  --  0.70   BASOPHILS 0.30  --  0.30  --  0.40    < > = values in this interval not displayed.     Recent Labs     06/05/21 0132 06/06/21  0501 06/07/21  0157   SODIUM 137 131* 131*   POTASSIUM 4.1 4.1 3.9   CHLORIDE 102 97 97   CO2 22 25 25   GLUCOSE 116* 114* 131*   BUN 4* 3* 3*     Recent Labs     06/05/21 0132 06/06/21  0501 06/07/21  0157   ALBUMIN  --  2.2* 2.1*   TBILIRUBIN  --  0.3 0.3   ALKPHOSPHAT  --  90 82   TOTPROTEIN  --  5.0* 5.2*   ALTSGPT  --  10 9   ASTSGOT  --  21 19   CREATININE 0.59 0.58 0.60       Imaging:  CT-ABDOMEN-PELVIS WITH    Result Date: 6/3/2021  6/3/2021 4:55 PM HISTORY/REASON FOR EXAM:  Abdominal abscess/infection suspected; wound dehiscense, r/o abscess. Postoperative. Recent volvulus with right hemicolectomy TECHNIQUE/EXAM  DESCRIPTION:  CT scan of the abdomen and pelvis with contrast. Contrast-enhanced helical scanning was obtained from the diaphragmatic domes through the pubic symphysis following the bolus administration of nonionic contrast without complication. 100 mL of Omnipaque 350 nonionic contrast was administered without complication. Low dose optimization technique was utilized for this CT exam including automated exposure control and adjustment of the mA and/or kV according to patient size. COMPARISON: CT abdomen and pelvis 5/27/2021 FINDINGS: Osseous structures: There is bilateral atelectasis and there are small to moderate-sized bilateral pleural effusion. Lungs: Clear ABDOMEN: There is peritoneal fluid present posterior to the abdominal wall and in both paracolic gutter. This has some degree of loculation especially in the left paracolic gutter and could indicate infected fluid. Additionally, in the right lower quadrant there is a loculated fluid collection present measuring approximately 13.8 x 4.2 x 6.5 cm and this connects to the peritoneal fluid in the right paracolic gutter. There is loculated rim-enhancing fluid in the left subphrenic space measuring 8.4 cm in length and approximately 4.6 x 2.7 cm transversely. This connects to the fluid within the left paracolic gutter. There are recent postoperative changes with skin staple present and intraperitoneal air and fluid. LIVER: is normal in appearance. GALLBLADDER and BILIARY SYSTEM Gallbladder and biliary system are normal by CT assessment SPLEEN: Normal in appearance.. PANCREAS: Normal in appearance. The splenic vein and portal vein enhance normally. ADRENAL glands: Normal in appearance. RIGHT kidney: There is a nonobstructive 1 to 2 mm right medial lower pole calculus. LEFT kidney: Normal in appearance. There is no hydronephrosis. BOWEL and MESENTERY: Their has been right hemicolectomy. AORTA and VASCULATURE: is normal in appearance. Pelvis: In the recto uterine  space there is a loculated fluid collection measuring 6.6 x 7.2 x 5.1 cm consistent with an abscess. Uterus and adnexa appear normal.     1.  Multiple rim enhancing peritoneal fluid collection suspicious for abscess 2.  These include the rectouterine space measuring 6.6 x 7.2 x 5.1 cm, right lower quadrant measuring 13.8 x 4.2 x 6.5 cm, left subphrenic space measuring 8.4 x 4.6 x 2.7 cm, and contiguous loculated fluid within the right and left paracolic gutter 3.  Interval right colectomy 4.  Small-moderate-sized bilateral pleural effusion and atelectasis    CT-ABDOMEN-PELVIS WITH    Result Date: 5/27/2021 5/27/2021 5:48 PM HISTORY/REASON FOR EXAM: Diffuse abdominal pain and distention. TECHNIQUE/EXAM DESCRIPTION: CT scan of the abdomen and pelvis with contrast. Contrast-enhanced helical scanning was obtained from the diaphragmatic domes through the pubic symphysis following the bolus administration of 100 mL of Omnipaque 350 nonionic contrast without complication. Low dose optimization technique was utilized for this CT exam including automated exposure control and adjustment of the mA and/or kV according to patient size. COMPARISON: 5/22/2021 FINDINGS: CT Abdomen: There is new bibasilar atelectasis and small bilateral pleural effusions, left greater than right. There is fatty change of the liver. There is a small amount of free intraperitoneal air tracking along the surface of the liver. Gallbladder is distended with dense material present within the gallbladder. The spleen is normal. There is moderate right-sided hydronephrosis extending to the level of the upper sacrum. The right ureter is not identified below that level. No definite ureteral stone on the right. There is left ureteral pelvic junction stone which measures 5 mm in size and results in minimal left renal pelvic dilatation. The adrenal glands are normal. The pancreas is normal. The aorta is normal in caliber. No evidence of retroperitoneal  adenopathy. CT Pelvis: There is multifocal ascites seen which is likely loculated in nature. There are punctate areas of free air seen within those areas of ascites. There are surgical changes involving the cecum. The colon is diffusely dilated. There is a large amount of free fluid which contains pockets of gas within the pelvis. There is a recent midline incision.     1.  Moderate to large amount of ascites within the abdomen and pelvis some areas of which are loculated in nature. There are also punctate areas of gas within those areas of ascites as well as free intraperitoneal air. This may be related to recent surgical procedure however the degree of volume of ascites would be unusual for normal postoperative course. Consideration should be given for leakage of bowel content or other process. 2.  Diffuse colonic distention. 3.  Bibasilar atelectasis and pleural effusions. 4.  5 mm left renal pelvic stone which results in mild hydronephrosis above that level. 5.  Moderate right-sided hydronephrosis extending to the level of the upper sacrum. No ureteral stone. The ureter is not identified below that level. 6.  This was discussed with NEVA KAY at 6:44 PM on 5/27/2021.    CT-ABDOMEN-PELVIS WITH    Result Date: 5/22/2021 5/22/2021 11:19 AM HISTORY/REASON FOR EXAM:  Abdominal distension; IV contrast only. TECHNIQUE/EXAM DESCRIPTION:   CT scan of the abdomen and pelvis with contrast. Contrast-enhanced helical scanning was obtained from the diaphragmatic domes through the pubic symphysis following the bolus administration of nonionic contrast without complication. 100 mL of Omnipaque 350 nonionic contrast was administered without complication. Low dose optimization technique was utilized for this CT exam including automated exposure control and adjustment of the mA and/or kV according to patient size. COMPARISON: 4/22/2020 FINDINGS: Chest Base: Lung bases are clear. Liver:  Normal. Gallbladder: Normal Biliary  tract: Nondilated. Pancreas: Normal. Spleen: Normal. Adrenals: Normal. Kidneys and Collecting Systems:  Cannot exclude a punctate nonobstructing stone in the lower right renal collecting system. There is a small nonobstructing left renal stone measuring about 5 mm. Gastrointestinal tract:  The cecum is distended and displaced into the left upper quadrant. The ascending colon proximally is narrowed in the midline and there is a somewhat twisted appearance.   Mildly fluid distended distal small bowel without significant small bowel obstruction. The appendix is nonvisualized. Peritoneum: No free air or free fluid. Reproductive organs:  2.3 cm left adnexal hypodense lesion is indeterminate, possibly a dominant follicle/small cyst in a menstruating patient. Correlate clinically. Probable intramural fibroid in the fundus. Bladder:  Normal. Vessels:  Normal caliber. Lymph  Nodes:  No lymphadenopathy. Abdominal wall: Within normal limits. Bones:  No acute or aggressive abnormality.     1.  Findings keeping with cecal volvulus. 2.  Mildly fluid distended distal small bowel. 3.  No free air. 4.  Small nonobstructing renal stones. These findings were discussed with ESHA STERLING on 5/22/2021 11:45 AM.     [id number] VIEW    Result Date: 5/27/2021 5/27/2021 7:01 AM HISTORY/REASON FOR EXAM:  Distention. TECHNIQUE/EXAM DESCRIPTION AND NUMBER OF VIEWS:  1 view(s) of the abdomen. COMPARISON: 5/24/2021 FINDINGS: Gaseous distended  small bowel and colon is similar to prior study. There is some stool within the right colon. There is no significant interval change. No suspicious calcifications. No acute osseous abnormality.     Gaseous distended small bowel and colon is similar to prior, likely ileus.    [id number] VIEW    Result Date: 5/24/2021 5/24/2021 11:28 AM HISTORY/REASON FOR EXAM:  Distention; s/p right hemicolectomy Lower abdominal pain s/p right hemicolectomy 05/22/21 TECHNIQUE/EXAM DESCRIPTION AND NUMBER OF VIEWS:   1 view(s) of the abdomen. COMPARISON: 2021 FINDINGS: Gaseous distention of the small bowel and colon No portal venous gas or pneumatosis. No definite free intraperitoneal air but evaluation is limited on supine radiograph.     Gaseous distention of the small bowel and colon, likely postoperative ileus.    DX-CHEST-FOR LINE PLACEMENT Perform procedure in: Other(comment f6 below): (PACU)    Result Date: 2021 10:10 AM HISTORY/REASON FOR EXAM:  Central line placement. TECHNIQUE/EXAM DESCRIPTION AND NUMBER OF VIEWS: Single AP view of the chest. COMPARISON: None FINDINGS: There is a left IJ central line with the tip high in position above the superior vena cava within the area of the left brachiocephalic vein. No pneumothorax. NG tube extends into the stomach. There are patchy bilateral pulmonary infiltrates. The heart is mildly enlarged. There is no pleural effusion.     1.  Left IJ central line tip high in position located above the superior vena cava are within the left brachiocephalic vein. 2.  Patchy bilateral infiltrates. 3.  NG tube tip extends into the stomach.    US-EXTREMITY VENOUS UPPER BILAT    Result Date: 2021   Upper Extremity  Venous Duplex Report  Vascular Laboratory  CONCLUSIONS  Small thrombus seen around the catheter line at the RIGHT axillary vein and  proximal basilic vein.  No left upper extremity superficial and deep venous thrombosis.  HOANG EMERY  Exam Date:     2021 14:29  Room #:     Inpatient  Priority:     Routine  Ht (in):             Wt (lb):  Ordering Physician:        DEIDRA SNIDER  Referring Physician:       [id number], CAO  Sonographer:               Sharlene Morrell RVT, RDMS  Study Type:                Complete Bilateral  Technical Quality:         Fair  Age:    50    Gender:     F  MRN:    [id number]  :    1970      BSA:  Indications:     Edema  CPT Codes:       19998  ICD Codes:        782.3  History:         Swelling. No prior study  Limitations:  PROCEDURES:  Bilateral upper extremity venous duplex imaging.  The following venous structures were evaluated: internal jugular,  subclavian, axillary, brachial, cephalic and basilic veins.  FINDINGS:  Right upper extremity.  Small thrombus seen around the central line at the axillary vein and  proximal basilic vein.  All other veins of the right upper extremity demonstrate normal flow  dynamics with no evidence of deep vein thrombosis.  Left upper extremity.  Limited visualization of the internal jugular vein due to bandages.  All other veins of the left upper extremity demonstrate normal flow  dynamics with no evidence of deep vein thrombosis.  Doug Rossi MD  (Electronically Signed)  Final Date:      02 June 2021                   18:16    IR-MIDLINE CATHETER INSERTION WO GUIDANCE > AGE 3    Result Date: 6/1/2021  HISTORY/REASON FOR EXAM:  Midline Placement   TECHNIQUE/EXAM DESCRIPTION AND NUMBER OF VIEWS: Midline insertion with ultrasound guidance.  FINDINGS: Midline insertion with Ultrasound Guidance was performed by qualified nursing staff without the assistance of a Radiologist. Midline positioning as measured by RN or as appropriate length of catheter selected.              Ultrasound-guided midline placement performed by qualified nursing staff as above.       Micro:  Results     Procedure Component Value Units Date/Time    BLOOD CULTURE [[id number]] Collected: 06/02/21 1730    Order Status: Completed Specimen: Blood from Peripheral Updated: 06/03/21 0714     Significant Indicator NEG     Source BLD     Site PERIPHERAL     Culture Result No Growth  Note: Blood cultures are incubated for 5 days and  are monitored continuously.Positive blood cultures  are called to the RN and reported as soon as  they are identified.  Blood culture testing and Gram stain, if indicated, are  performed at [facility], [address]  "[place], [place].  Positive blood cultures are  sent to [facility], [address] [place] [address], for organism identification and  susceptibility testing.      Narrative:      Per Hospital Policy: Only change Specimen Src: to \"Line\" if  specified by physician order.  Left Wrist    BLOOD CULTURE [[id number]] Collected: 06/02/21 1307    Order Status: Completed Specimen: Blood from Peripheral Updated: 06/03/21 0714     Significant Indicator NEG     Source BLD     Site PERIPHERAL     Culture Result No Growth  Note: Blood cultures are incubated for 5 days and  are monitored continuously.Positive blood cultures  are called to the RN and reported as soon as  they are identified.  Blood culture testing and Gram stain, if indicated, are  performed at [facility], [address] [place] [address].  Positive blood cultures are  sent to [facility], [address] [place] [address], for organism identification and  susceptibility testing.      Narrative:      Per Hospital Policy: Only change Specimen Src: to \"Line\" if  specified by physician order.  RN verified order for line draw    Anaerobic Culture [[id number]]  (Abnormal) Collected: 05/28/21 0841    Order Status: Completed Specimen: Tissue Updated: 05/31/21 1201     Significant Indicator POS     Source TISS     Site abdominal wound     Culture Result Growth noted after further incubation, see below for  organism identification.        Bacteroides fragilis  Heavy growth        Bacteroides sp.  Moderate growth  Bacteroides ovatus group.      Narrative:      Surgery Specimen    CULTURE WOUND W/ GRAM STAIN [[id number]]  (Abnormal)  (Susceptibility) Collected: 05/28/21 0841    Order Status: Completed Specimen: Tissue Updated: 05/31/21 1201     Significant Indicator POS     Source TISS     Site abdominal wound     Culture Result -     Gram Stain Result Few WBCs.  Few Gram positive cocci.  Few Gram positive " rods.  Moderate Gram negative rods.       Culture Result Actinomyces odontolyticus  Moderate growth        Enterococcus faecalis  Light growth      Narrative:      Surgery Specimen    Susceptibility     Enterococcus faecalis (2)     Antibiotic Interpretation Microscan Method Status    Daptomycin Sensitive 2 mcg/mL KOBY Final    Gent Synergy Sensitive <=500 mcg/mL KOBY Final    Ampicillin Sensitive <=2 mcg/mL KOBY Final    Vancomycin Sensitive 1 mcg/mL KOBY Final    Penicillin Sensitive 2 mcg/mL KOBY Final                         Assessment:  Active Hospital Problems    Diagnosis    • *Bacterial peritonitis (HCC) [K65.9]    • Anemia [D64.9]    • DVT of axillary vein, acute right (HCC) [I82.A11]    • Thrombocytopenia (HCC) [D69.6]    • 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   Studies reviewed  Micro reviewed    Patient doing well overall and states she ambulated twice this morning with normal bowel movement.  She has some abdominal pain with ambulation but otherwise minimal.  Patient continued on antibiotics as below.    Assessment/Hospital course:  This is a very pleasant 50-year-old female patient, originally admitted on 5/22/2021 with cecal volvulus for which he underwent a right hemicolectomy on 5/22.  This was complicated by intra-abdominal abscesses and free air concerning for leak.  She was taken to the OR for exploratory laparotomy and found to have necrosis of the ileocolonic anastomosis site, bowel is eviscerated and redo side-to-side ileocolic anastomosis was done.    Patient went back to the OR on 6/4 due to drainage from wound and concern for fascial dehiscence.  Per op note there is loosening of the fascia but no yessy dehiscence.  Abdomen is washed out including pockets noted on last CT.  Anastomosis was not evaluated as was scarred and no evidence of leak.     Pertinent Diagnoses:  Sepsis,  improved  Intra-abdominal abscesses  Feculent peritonitis  Cecal volvulus, sequelae  Leukocytosis, secondary to surgery     Plan:  -OR cultures from 5/28 growing Bacteroides, Actinomyces, E faecalis  -Patient had worsening abdominal pain and a repeat CT scan obtained 6/3 showed multiple rim-enhancing peritoneal fluid collections several quite large and loculated.  Patient was taken back to the OR for exploratory laparotomy on 6/4 as discussed above, no new cultures were obtained      --- Continue IV Unasyn 3 g every 6 hours and fluconazole 400 mg daily for now.  If the patient is discharged to home will transition to oral antibiotics.  She grew actinomyces from the abdominal cavity.  This organism typically requires a longer course once it is established causing tissue or bowel invasion often seen as a mass/abscess or fistula.  However this is an early culture and she has source control.  The goal will therefore be to prevent future established infection so will plan on a several week antibiotic course w/ duration based on repeat imaging.     Discussed with internal medicine, Dr. Rojo.  ID will follow.  Please call with questions.

## 2021-06-07 NOTE — PROGRESS NOTES
Trauma / Surgical Daily Progress Note    Date of Service  6/7/2021    Chief Complaint  50 y.o. female admitted 5/22/2021 with cecal volvulus    Interval Events  5/22 Exploratory celiotomy and right hemicolectomy with a side-to-side   ileocolic anastomosis  5/28 Exploratory celiotomy, resection of ileocolic anastomosis with re-do  side-to-side ileocolic anastomosis  6/4 Ex celiotomy washout    Still feeling better. More energy.  Tolerating diet. Stooling. WBC down some. On Unasyn per ID.   Heparin gtt changed to lovenox. Work on DC planning.    Review of Systems  Review of Systems   Gastrointestinal: Positive for abdominal pain.        Passing some flatus        Vital Signs for last 24 hours  Temp:  [36.7 °C (98 °F)-37.2 °C (98.9 °F)] 37.2 °C (98.9 °F)  Pulse:  [100-110] 103  Resp:  [16-18] 18  BP: (110-116)/(59-75) 116/62  SpO2:  [90 %-94 %] 90 %    Hemodynamic parameters for last 24 hours       Respiratory Data     Respiration: 18, Pulse Oximetry: 90 %     Work Of Breathing / Effort: Shallow  RUL Breath Sounds: Clear, RML Breath Sounds: Clear, RLL Breath Sounds: Diminished, LUCITA Breath Sounds: Clear, LLL Breath Sounds: Diminished    Physical Exam  Physical Exam  Cardiovascular:      Rate and Rhythm: Normal rate.      Pulses: Normal pulses.   Pulmonary:      Effort: Pulmonary effort is normal.   Abdominal:      General: There is no distension.      Palpations: Abdomen is soft.      Tenderness: There is abdominal tenderness.      Comments: Wound Vac in place    Less distention, abdomen softer      Genitourinary:     Comments: diuresing  Musculoskeletal:         General: Normal range of motion.      Cervical back: Neck supple.      Comments: Generalized edema   Skin:     General: Skin is warm and dry.   Neurological:      General: No focal deficit present.      Mental Status: She is alert.   Psychiatric:      Comments: Teary today         Laboratory  Recent Results (from the past 24 hour(s))   HGB    Collection Time:  06/06/21 12:43 PM   Result Value Ref Range    Hemoglobin 9.2 (L) 12.0 - 16.0 g/dL   Heparin Xa (Unfractionated)    Collection Time: 06/06/21 12:50 PM   Result Value Ref Range    Heparin Xa (UFH) <0.10 IU/mL   Heparin Xa (Unfractionated)    Collection Time: 06/06/21  3:06 PM   Result Value Ref Range    Heparin Xa (UFH) 0.28 IU/mL   Heparin Xa (Unfractionated)    Collection Time: 06/06/21  7:46 PM   Result Value Ref Range    Heparin Xa (UFH) 0.52 IU/mL   aPTT    Collection Time: 06/07/21  1:57 AM   Result Value Ref Range    APTT 185.0 (HH) 24.7 - 36.0 sec   Prothrombin Time    Collection Time: 06/07/21  1:57 AM   Result Value Ref Range    PT 14.6 12.0 - 14.6 sec    INR 1.17 (H) 0.87 - 1.13   Heparin Xa (Unfractionated)    Collection Time: 06/07/21  1:57 AM   Result Value Ref Range    Heparin Xa (UFH) 0.41 IU/mL   CBC WITH DIFFERENTIAL    Collection Time: 06/07/21  1:57 AM   Result Value Ref Range    WBC 21.4 (H) 4.8 - 10.8 K/uL    RBC 2.62 (L) 4.20 - 5.40 M/uL    Hemoglobin 7.5 (L) 12.0 - 16.0 g/dL    Hematocrit 23.7 (L) 37.0 - 47.0 %    MCV 90.5 81.4 - 97.8 fL    MCH 28.6 27.0 - 33.0 pg    MCHC 31.6 (L) 33.6 - 35.0 g/dL    RDW 50.4 (H) 35.9 - 50.0 fL    Platelet Count 409 164 - 446 K/uL    MPV 10.0 9.0 - 12.9 fL    Neutrophils-Polys 80.40 (H) 44.00 - 72.00 %    Lymphocytes 8.20 (L) 22.00 - 41.00 %    Monocytes 7.90 0.00 - 13.40 %    Eosinophils 0.70 0.00 - 6.90 %    Basophils 0.40 0.00 - 1.80 %    Immature Granulocytes 2.40 (H) 0.00 - 0.90 %    Nucleated RBC 0.10 /100 WBC    Neutrophils (Absolute) 17.21 (H) 2.00 - 7.15 K/uL    Lymphs (Absolute) 1.75 1.00 - 4.80 K/uL    Monos (Absolute) 1.69 (H) 0.00 - 0.85 K/uL    Eos (Absolute) 0.16 0.00 - 0.51 K/uL    Baso (Absolute) 0.08 0.00 - 0.12 K/uL    Immature Granulocytes (abs) 0.52 (H) 0.00 - 0.11 K/uL    NRBC (Absolute) 0.03 K/uL   Comp Metabolic Panel    Collection Time: 06/07/21  1:57 AM   Result Value Ref Range    Sodium 131 (L) 135 - 145 mmol/L    Potassium 3.9 3.6 -  5.5 mmol/L    Chloride 97 96 - 112 mmol/L    Co2 25 20 - 33 mmol/L    Anion Gap 9.0 7.0 - 16.0    Glucose 131 (H) 65 - 99 mg/dL    Bun 3 (L) 8 - 22 mg/dL    Creatinine 0.60 0.50 - 1.40 mg/dL    Calcium 8.1 (L) 8.4 - 10.2 mg/dL    AST(SGOT) 19 12 - 45 U/L    ALT(SGPT) 9 2 - 50 U/L    Alkaline Phosphatase 82 30 - 99 U/L    Total Bilirubin 0.3 0.1 - 1.5 mg/dL    Albumin 2.1 (L) 3.2 - 4.9 g/dL    Total Protein 5.2 (L) 6.0 - 8.2 g/dL    Globulin 3.1 1.9 - 3.5 g/dL    A-G Ratio 0.7 g/dL   MAGNESIUM    Collection Time: 06/07/21  1:57 AM   Result Value Ref Range    Magnesium 1.7 1.5 - 2.5 mg/dL   PHOSPHORUS    Collection Time: 06/07/21  1:57 AM   Result Value Ref Range    Phosphorus 4.6 (H) 2.5 - 4.5 mg/dL   ESTIMATED GFR    Collection Time: 06/07/21  1:57 AM   Result Value Ref Range    GFR If African American >60 >60 mL/min/1.73 m 2    GFR If Non African American >60 >60 mL/min/1.73 m 2       Fluids    Intake/Output Summary (Last 24 hours) at 6/7/2021 0834  Last data filed at 6/7/2021 0800  Gross per 24 hour   Intake 515 ml   Output 700 ml   Net -185 ml       Core Measures & Quality Metrics  Labs reviewed and Medications reviewed  Young catheter: No Young      DVT Prophylaxis: Enoxaparin (Lovenox)  DVT prophylaxis - mechanical: SCDs  Ulcer prophylaxis: Yes  Antibiotics: Treating active infection/contamination beyond 24 hours perioperative coverage  Assessed for rehab: Patient returned to prior level of function, rehabilitation not indicated at this time    ALICIA Score  ETOH Screening    Assessment/Plan  * Bacterial peritonitis (HCC)  Assessment & Plan  6/2 Zosyn stopped.  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 Passing flatus - start clears  5/27 - Stooled - will adv to full liquids  5/27 - thrombocytopenia, bandemia, PM CT with ascites no freeair  5/28 - Exploratory celiotomy, resection of ileocolic anastomosis with re-do  side-to-side ileocolic anastomosis.  6/3  Stooling, advance to regular diet  6/4 Small wound dehiscence - returned to OR for washout and reclosure    DVT of axillary vein, acute right (HCC)  Assessment & Plan  Noted on US  On heparin gtt       Discussed patient condition with RN and Patient.    CRITICAL CARE TIME EXCLUDING PROCEDURES: 20  minutes

## 2021-06-07 NOTE — CARE PLAN
Problem: Pain - Standard  Goal: Alleviation of pain or a reduction in pain to the patient’s comfort goal  Outcome: Progressing     Problem: Skin Integrity  Goal: Skin integrity is maintained or improved  Outcome: Progressing     Problem: Wound/ / Incision Healing  Goal: Patient's wound/surgical incision will decrease in size and heals properly  Outcome: Progressing   The patient is Stable - Low risk of patient condition declining or worsening    Shift Goals  Clinical Goals: pain control  Patient Goals: sleep well

## 2021-06-07 NOTE — PROGRESS NOTES
[facility] Medicine Daily Progress Note    Date of Service  6/7/2021    Chief Complaint  50 y.o. female admitted 5/22/2021 with abd pain.    Hospital Course  49 yo woman who presented with abd pain and CT showed cecal volvulus. She underwent exploratory celiotomy and right hemicolectomy with ileocolic anastomosis with Dr. Wood 5/22/21. She developed thrombocytopenia and ascites and returned for ex lap with resection of ileocolic anastomosis and wound vac placement 5/28/21. She was admitted to ICU, started on zosyn for bacterial peritonitis and given platelet transfusions. HIT panel was negative. Abd cultures grew actinomyces, enterococcus, bacteroides. Doppler showed catheter related thrombus in R arm and started on anticoagulation. She had additional washout in the OR with Dr. Wood 6/4/21 and remains on unasyn and fluconazole. Wound vac remains in place.    Interval Problem Update  WBC 21  Her pain is fairly controlled  She is still ambulating fairly well. Eating well    Consultants/Specialty  Critical care  Surgery  ID    Code Status  Full Code    Disposition  HH and wound vac has been set up  Will need DOAC and anticoagulation clinic    Review of Systems  Review of Systems   Constitutional: Positive for malaise/fatigue. Negative for fever.   Respiratory: Negative for shortness of breath.    Cardiovascular: Negative for chest pain.   Gastrointestinal: Positive for abdominal pain. Negative for constipation and vomiting.   Musculoskeletal: Positive for myalgias.   Neurological: Positive for weakness. Negative for dizziness.   All other systems reviewed and are negative.       Physical Exam  Temp:  [36.7 °C (98 °F)-37.2 °C (98.9 °F)] 36.9 °C (98.4 °F)  Pulse:  [] 98  Resp:  [16-20] 20  BP: (116-117)/(48-62) 117/48  SpO2:  [90 %-94 %] 92 %    Physical Exam  Vitals and nursing note reviewed.   Constitutional:       Appearance: She is ill-appearing. She is not toxic-appearing.      Comments: fatigued   HENT:       Head: Normocephalic.      Mouth/Throat:      Mouth: Mucous membranes are moist.   Eyes:      General:         Right eye: No discharge.         Left eye: No discharge.   Cardiovascular:      Rate and Rhythm: Normal rate and regular rhythm.   Pulmonary:      Effort: Pulmonary effort is normal. No respiratory distress.      Breath sounds: No wheezing or rales.   Abdominal:      Tenderness: There is abdominal tenderness (diffuse). There is no guarding or rebound.      Comments: Wound vac and surgical incision centrally   Musculoskeletal:         General: No swelling.      Cervical back: Neck supple.   Skin:     General: Skin is warm and dry.   Neurological:      General: No focal deficit present.      Mental Status: She is alert.      Comments: AOx4         Fluids    Intake/Output Summary (Last 24 hours) at 6/7/2021 1341  Last data filed at 6/7/2021 1015  Gross per 24 hour   Intake 395 ml   Output 700 ml   Net -305 ml       Laboratory  Recent Labs     06/05/21  0132 06/05/21  0132 06/06/21  0501 06/06/21  1243 06/07/21  0157   WBC 16.3*  --  22.8*  --  21.4*   RBC 2.88*  --  2.73*  --  2.62*   HEMOGLOBIN 8.2*   < > 7.8* 9.2* 7.5*   HEMATOCRIT 25.9*  --  24.4*  --  23.7*   MCV 89.9  --  89.4  --  90.5   MCH 28.5  --  28.6  --  28.6   MCHC 31.7*  --  32.0*  --  31.6*   RDW 50.4*  --  50.2*  --  50.4*   PLATELETCT 357  --  396  --  409   MPV 10.3  --  10.2  --  10.0    < > = values in this interval not displayed.     Recent Labs     06/05/21  0132 06/06/21  0501 06/07/21  0157   SODIUM 137 131* 131*   POTASSIUM 4.1 4.1 3.9   CHLORIDE 102 97 97   CO2 22 25 25   GLUCOSE 116* 114* 131*   BUN 4* 3* 3*   CREATININE 0.59 0.58 0.60   CALCIUM 7.7* 7.9* 8.1*     Recent Labs     06/07/21  0157   APTT 185.0*   INR 1.17*               Imaging  CT-ABDOMEN-PELVIS WITH   Final Result      1.  Multiple rim enhancing peritoneal fluid collection suspicious for abscess      2.  These include the rectouterine space measuring 6.6 x 7.2 x 5.1  cm, right lower quadrant measuring 13.8 x 4.2 x 6.5 cm, left subphrenic space measuring 8.4 x 4.6 x 2.7 cm, and contiguous loculated fluid within the right and left paracolic gutter      3.  Interval right colectomy      4.  Small-moderate-sized bilateral pleural effusion and atelectasis      US-EXTREMITY VENOUS UPPER BILAT   Final Result      IR-MIDLINE CATHETER INSERTION WO GUIDANCE > AGE 3   Final Result                  Ultrasound-guided midline placement performed by qualified nursing staff    as above.          DX-CHEST-FOR LINE PLACEMENT Perform procedure in: Other(comment f6 below): (PACU)   Final Result      1.  Left IJ central line tip high in position located above the superior vena cava are within the left brachiocephalic vein.      2.  Patchy bilateral infiltrates.      3.  NG tube tip extends into the stomach.      CT-ABDOMEN-PELVIS WITH   Final Result      1.  Moderate to large amount of ascites within the abdomen and pelvis some areas of which are loculated in nature. There are also punctate areas of gas within those areas of ascites as well as free intraperitoneal air. This may be related to recent    surgical procedure however the degree of volume of ascites would be unusual for normal postoperative course. Consideration should be given for leakage of bowel content or other process.      2.  Diffuse colonic distention.      3.  Bibasilar atelectasis and pleural effusions.      4.  5 mm left renal pelvic stone which results in mild hydronephrosis above that level.      5.  Moderate right-sided hydronephrosis extending to the level of the upper sacrum. No ureteral stone. The ureter is not identified below that level.      6.  This was discussed with NEVA KAY at 6:44 PM on 5/27/2021.      [id number] VIEW   Final Result      Gaseous distended small bowel and colon is similar to prior, likely ileus.      [id number] VIEW   Final Result         Gaseous distention of the small bowel and colon,  likely postoperative ileus.      CT-ABDOMEN-PELVIS WITH   Final Result         1.  Findings keeping with cecal volvulus.   2.  Mildly fluid distended distal small bowel.   3.  No free air.   4.  Small nonobstructing renal stones.   These findings were discussed with ESHA STERLING on 5/22/2021 11:45 AM.                       Assessment/Plan  * Bacterial peritonitis (HCC)  Assessment & Plan  Abd cultures grew actinomyces, enterococcus, bacteroides.   Wound vac in place  Surgery following, had ileocecal resection 5/28/21 and washout 6/4/21  ID consulted, on unasyn and fluconazole  Blood cultures NGTD  Pain management - increase oxycodone dose, morphine PRN  Leukocytosis remains high, continue to monitor    Anemia  Assessment & Plan  Hgb decreased  No signs of GI bleed. She says she had been menstruating, perimenopausal  Monitor Hgb level. May need to stop anticoagulation if anemia worsens  Transfuse if Hgb <7    DVT of axillary vein, acute right (HCC)  Assessment & Plan  Associated with midline, small  Continue on lovenox, transition to oral if no other procedures planned  Ok to continue using midline    Thrombocytopenia (HCC)  Assessment & Plan  S/p transfusions  HIT AB negative  Due to sepsis   Recovered, level now normalized    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

## 2021-06-08 ENCOUNTER — APPOINTMENT (OUTPATIENT)
Dept: RADIOLOGY | Facility: MEDICAL CENTER | Age: 51
DRG: 329 | End: 2021-06-08
Attending: FAMILY MEDICINE
Payer: COMMERCIAL

## 2021-06-08 LAB
ALBUMIN SERPL BCP-MCNC: 2.3 G/DL (ref 3.2–4.9)
ALBUMIN/GLOB SERPL: 0.6 G/DL
ALP SERPL-CCNC: 126 U/L (ref 30–99)
ALT SERPL-CCNC: 10 U/L (ref 2–50)
ANION GAP SERPL CALC-SCNC: 10 MMOL/L (ref 7–16)
AST SERPL-CCNC: 21 U/L (ref 12–45)
BASOPHILS # BLD AUTO: 0.5 % (ref 0–1.8)
BASOPHILS # BLD: 0.07 K/UL (ref 0–0.12)
BILIRUB SERPL-MCNC: 0.3 MG/DL (ref 0.1–1.5)
BUN SERPL-MCNC: 3 MG/DL (ref 8–22)
CALCIUM SERPL-MCNC: 8.2 MG/DL (ref 8.4–10.2)
CHLORIDE SERPL-SCNC: 100 MMOL/L (ref 96–112)
CO2 SERPL-SCNC: 25 MMOL/L (ref 20–33)
CREAT SERPL-MCNC: 0.61 MG/DL (ref 0.5–1.4)
EOSINOPHIL # BLD AUTO: 0.22 K/UL (ref 0–0.51)
EOSINOPHIL NFR BLD: 1.5 % (ref 0–6.9)
ERYTHROCYTE [DISTWIDTH] IN BLOOD BY AUTOMATED COUNT: 52 FL (ref 35.9–50)
GLOBULIN SER CALC-MCNC: 3.6 G/DL (ref 1.9–3.5)
GLUCOSE SERPL-MCNC: 106 MG/DL (ref 65–99)
HCT VFR BLD AUTO: 27.5 % (ref 37–47)
HGB BLD-MCNC: 8.4 G/DL (ref 12–16)
IMM GRANULOCYTES # BLD AUTO: 0.34 K/UL (ref 0–0.11)
IMM GRANULOCYTES NFR BLD AUTO: 2.3 % (ref 0–0.9)
LYMPHOCYTES # BLD AUTO: 1.55 K/UL (ref 1–4.8)
LYMPHOCYTES NFR BLD: 10.5 % (ref 22–41)
MCH RBC QN AUTO: 28.9 PG (ref 27–33)
MCHC RBC AUTO-ENTMCNC: 30.5 G/DL (ref 33.6–35)
MCV RBC AUTO: 94.5 FL (ref 81.4–97.8)
MONOCYTES # BLD AUTO: 2.01 K/UL (ref 0–0.85)
MONOCYTES NFR BLD AUTO: 13.7 % (ref 0–13.4)
NEUTROPHILS # BLD AUTO: 10.53 K/UL (ref 2–7.15)
NEUTROPHILS NFR BLD: 71.5 % (ref 44–72)
NRBC # BLD AUTO: 0.03 K/UL
NRBC BLD-RTO: 0.2 /100 WBC
PLATELET # BLD AUTO: 433 K/UL (ref 164–446)
PMV BLD AUTO: 9.9 FL (ref 9–12.9)
POTASSIUM SERPL-SCNC: 4.1 MMOL/L (ref 3.6–5.5)
PROT SERPL-MCNC: 5.9 G/DL (ref 6–8.2)
RBC # BLD AUTO: 2.91 M/UL (ref 4.2–5.4)
SODIUM SERPL-SCNC: 135 MMOL/L (ref 135–145)
WBC # BLD AUTO: 14.7 K/UL (ref 4.8–10.8)

## 2021-06-08 PROCEDURE — 770006 HCHG ROOM/CARE - MED/SURG/GYN SEMI*

## 2021-06-08 PROCEDURE — A9270 NON-COVERED ITEM OR SERVICE: HCPCS | Performed by: INTERNAL MEDICINE

## 2021-06-08 PROCEDURE — 700111 HCHG RX REV CODE 636 W/ 250 OVERRIDE (IP): Performed by: SURGERY

## 2021-06-08 PROCEDURE — 700111 HCHG RX REV CODE 636 W/ 250 OVERRIDE (IP): Performed by: INTERNAL MEDICINE

## 2021-06-08 PROCEDURE — 36415 COLL VENOUS BLD VENIPUNCTURE: CPT

## 2021-06-08 PROCEDURE — 87070 CULTURE OTHR SPECIMN AEROBIC: CPT

## 2021-06-08 PROCEDURE — 700117 HCHG RX CONTRAST REV CODE 255: Performed by: FAMILY MEDICINE

## 2021-06-08 PROCEDURE — 80053 COMPREHEN METABOLIC PANEL: CPT

## 2021-06-08 PROCEDURE — 700111 HCHG RX REV CODE 636 W/ 250 OVERRIDE (IP): Performed by: NURSE PRACTITIONER

## 2021-06-08 PROCEDURE — 700102 HCHG RX REV CODE 250 W/ 637 OVERRIDE(OP): Performed by: SURGERY

## 2021-06-08 PROCEDURE — 74177 CT ABD & PELVIS W/CONTRAST: CPT

## 2021-06-08 PROCEDURE — 99232 SBSQ HOSP IP/OBS MODERATE 35: CPT | Performed by: FAMILY MEDICINE

## 2021-06-08 PROCEDURE — 700105 HCHG RX REV CODE 258: Performed by: INTERNAL MEDICINE

## 2021-06-08 PROCEDURE — 700111 HCHG RX REV CODE 636 W/ 250 OVERRIDE (IP): Performed by: FAMILY MEDICINE

## 2021-06-08 PROCEDURE — 87205 SMEAR GRAM STAIN: CPT

## 2021-06-08 PROCEDURE — 99233 SBSQ HOSP IP/OBS HIGH 50: CPT | Performed by: INTERNAL MEDICINE

## 2021-06-08 PROCEDURE — A9270 NON-COVERED ITEM OR SERVICE: HCPCS | Performed by: SURGERY

## 2021-06-08 PROCEDURE — 700101 HCHG RX REV CODE 250: Performed by: FAMILY MEDICINE

## 2021-06-08 PROCEDURE — 85025 COMPLETE CBC W/AUTO DIFF WBC: CPT

## 2021-06-08 PROCEDURE — 700102 HCHG RX REV CODE 250 W/ 637 OVERRIDE(OP): Performed by: INTERNAL MEDICINE

## 2021-06-08 PROCEDURE — 87077 CULTURE AEROBIC IDENTIFY: CPT

## 2021-06-08 RX ORDER — DULOXETIN HYDROCHLORIDE 30 MG/1
60 CAPSULE, DELAYED RELEASE ORAL
Status: DISCONTINUED | OUTPATIENT
Start: 2021-06-09 | End: 2021-06-21 | Stop reason: HOSPADM

## 2021-06-08 RX ORDER — TOPIRAMATE 100 MG/1
100 TABLET, FILM COATED ORAL
Status: DISCONTINUED | OUTPATIENT
Start: 2021-06-08 | End: 2021-06-21 | Stop reason: HOSPADM

## 2021-06-08 RX ADMIN — MORPHINE SULFATE 2 MG: 4 INJECTION INTRAVENOUS at 22:07

## 2021-06-08 RX ADMIN — ENOXAPARIN SODIUM 80 MG: 80 INJECTION SUBCUTANEOUS at 17:33

## 2021-06-08 RX ADMIN — FLUCONAZOLE 400 MG: 200 TABLET ORAL at 05:37

## 2021-06-08 RX ADMIN — TOPIRAMATE 100 MG: 100 TABLET, FILM COATED ORAL at 20:41

## 2021-06-08 RX ADMIN — ENOXAPARIN SODIUM 80 MG: 80 INJECTION SUBCUTANEOUS at 05:37

## 2021-06-08 RX ADMIN — OXYCODONE HYDROCHLORIDE 15 MG: 10 TABLET ORAL at 20:42

## 2021-06-08 RX ADMIN — AMPICILLIN SODIUM AND SULBACTAM SODIUM 3 G: 2; 1 INJECTION, POWDER, FOR SOLUTION INTRAMUSCULAR; INTRAVENOUS at 23:36

## 2021-06-08 RX ADMIN — FAMOTIDINE 20 MG: 20 TABLET ORAL at 05:37

## 2021-06-08 RX ADMIN — AMPICILLIN SODIUM AND SULBACTAM SODIUM 3 G: 2; 1 INJECTION, POWDER, FOR SOLUTION INTRAMUSCULAR; INTRAVENOUS at 12:34

## 2021-06-08 RX ADMIN — AMPICILLIN SODIUM AND SULBACTAM SODIUM 3 G: 2; 1 INJECTION, POWDER, FOR SOLUTION INTRAMUSCULAR; INTRAVENOUS at 05:37

## 2021-06-08 RX ADMIN — MORPHINE SULFATE 2 MG: 4 INJECTION INTRAVENOUS at 13:24

## 2021-06-08 RX ADMIN — LORAZEPAM 0.5 MG: 2 INJECTION INTRAMUSCULAR; INTRAVENOUS at 12:47

## 2021-06-08 RX ADMIN — IOHEXOL 100 ML: 350 INJECTION, SOLUTION INTRAVENOUS at 16:29

## 2021-06-08 RX ADMIN — FAMOTIDINE 20 MG: 20 TABLET ORAL at 17:29

## 2021-06-08 RX ADMIN — LORAZEPAM 0.5 MG: 2 INJECTION INTRAMUSCULAR; INTRAVENOUS at 02:11

## 2021-06-08 RX ADMIN — ONDANSETRON 4 MG: 2 INJECTION INTRAMUSCULAR; INTRAVENOUS at 16:01

## 2021-06-08 RX ADMIN — POLYETHYLENE GLYCOL 3350 1 PACKET: 17 POWDER, FOR SOLUTION ORAL at 17:29

## 2021-06-08 RX ADMIN — IOHEXOL 25 ML: 240 INJECTION, SOLUTION INTRATHECAL; INTRAVASCULAR; INTRAVENOUS; ORAL at 16:29

## 2021-06-08 RX ADMIN — LORAZEPAM 0.5 MG: 2 INJECTION INTRAMUSCULAR; INTRAVENOUS at 23:30

## 2021-06-08 RX ADMIN — OXYCODONE HYDROCHLORIDE 15 MG: 10 TABLET ORAL at 10:59

## 2021-06-08 RX ADMIN — DOCUSATE SODIUM 100 MG: 100 CAPSULE, LIQUID FILLED ORAL at 17:28

## 2021-06-08 RX ADMIN — DOCUSATE SODIUM 50 MG AND SENNOSIDES 8.6 MG 1 TABLET: 8.6; 5 TABLET, FILM COATED ORAL at 20:41

## 2021-06-08 RX ADMIN — AMPICILLIN SODIUM AND SULBACTAM SODIUM 3 G: 2; 1 INJECTION, POWDER, FOR SOLUTION INTRAMUSCULAR; INTRAVENOUS at 17:28

## 2021-06-08 RX ADMIN — OXYCODONE HYDROCHLORIDE 15 MG: 10 TABLET ORAL at 15:57

## 2021-06-08 RX ADMIN — OXYCODONE HYDROCHLORIDE 10 MG: 10 TABLET ORAL at 05:37

## 2021-06-08 ASSESSMENT — ENCOUNTER SYMPTOMS
DIARRHEA: 0
DIZZINESS: 0
SHORTNESS OF BREATH: 0
WEAKNESS: 1
ROS GI COMMENTS: PASSING SOME FLATUS
ABDOMINAL PAIN: 1
MYALGIAS: 1
CHILLS: 0
COUGH: 0
CONSTIPATION: 0
FEVER: 0
VOMITING: 0
NAUSEA: 0

## 2021-06-08 ASSESSMENT — PAIN DESCRIPTION - PAIN TYPE
TYPE: SURGICAL PAIN
TYPE: ACUTE PAIN
TYPE: ACUTE PAIN;SURGICAL PAIN
TYPE: SURGICAL PAIN
TYPE: SURGICAL PAIN
TYPE: ACUTE PAIN;SURGICAL PAIN
TYPE: ACUTE PAIN

## 2021-06-08 NOTE — PROGRESS NOTES
Called to pt bedside by Josephine with wound care for non functioning wound vac and copious discharge from the wound - pt with a significant amount of purulent, brown discharge from entirety of the wound - lower pole with more serous drainage, upper pole with significant purulence and light brown coloration of discharge.  Wound vac not replaced due to the significant output - discussed with Dr Wood, we are pursuing reimaging with CT. Discussed with CT department, pt does not currently have an acute abdomen, will given typical protocol for oral contrast to ensure good visualization of the small bowel and colon, but the film will be read as stat. Repeat culture ordered.

## 2021-06-08 NOTE — PROGRESS NOTES
Called [facility] pharmacy to check copay on Elquis.  Copay is $40 per month and patient states that is acceptable.

## 2021-06-08 NOTE — WOUND TEAM
[facility]  Inpatient Services  Initial Wound and Skin Care Evaluation    Admission Date: 5/22/2021     Last order of IP CONSULT TO WOUND CARE was found on 6/1/2021 from Hospital Encounter on 5/22/2021     HPI, PMH, SH: Reviewed    Past Surgical History:   Procedure Laterality Date   • PB EXPLORATORY OF ABDOMEN N/A 6/4/2021    Procedure: LAPAROTOMY, EXPLORATORY, WITH WASH OUT;  Surgeon: Maryam Wood M.D.;  Location: [facility];  Service: General   • PB EXPLORATORY OF ABDOMEN N/A 5/28/2021    Procedure: LAPAROTOMY, EXPLORATORY- RESECTION OF ILEOCECAL ANASTATMOSIS.;  Surgeon: Maryam Wood M.D.;  Location: [facility];  Service: General   • PB EXPLORATORY OF ABDOMEN  5/22/2021    Procedure: LAPAROTOMY, EXPLORATORY;  Surgeon: Maryam Wood M.D.;  Location: [facility];  Service: General   • HEMICOLECTOMY, RIGHT  5/22/2021    Procedure: HEMICOLECTOMY, RIGHT, SIDE TO SIDE ANASTOMOSIS;  Surgeon: Maryam Wood M.D.;  Location: [facility];  Service: General   • SHOULDER DECOMPRESSION ARTHROSCOPIC Right 12/24/2018    Procedure: SHOULDER DECOMPRESSION ARTHROSCOPIC - SUBACROMIAL;  Surgeon: Tristian Garcia M.D.;  Location: [facility];  Service: Orthopedics   • CLAVICLE DISTAL EXCISION Left 12/24/2018    Procedure: CLAVICLE DISTAL EXCISION;  Surgeon: Tristian Garcia M.D.;  Location: [facility];  Service: Orthopedics   • SHOULDER ARTHROSCOPY W/ BICIPITAL TENODESIS REPAIR Left 12/24/2018    Procedure: SHOULDER ARTHROSCOPY W/ BICIPITAL TENODESIS REPAIR - AND PACKER;  Surgeon: Tristian Garcia M.D.;  Location: [facility];  Service: Orthopedics   • SHOULDER MANIPULATION Left 12/24/2018    Procedure: SHOULDER MANIPULATION;  Surgeon: Tristian Garcia M.D.;  Location: [facility];  Service: Orthopedics   • SHOULDER ARTHROSCOPY W/ BICIPITAL TENODESIS REPAIR Right 6/15/2015    Procedure: SHOULDER ARTHROSCOPY W/  "BICIPITAL TENODESIS, SYNOVECTOMY;  Surgeon: Tristian Garcia M.D.;  Location: SURGERY [facility];  Service:    • CLAVICLE DISTAL EXCISION Right 6/15/2015    Procedure: CLAVICLE DISTAL EXCISION;  Surgeon: Tristian Garcia M.D.;  Location: SURGERY [facility];  Service:    • SHOULDER DECOMPRESSION Right 6/15/2015    Procedure: SHOULDER DECOMPRESSION MINI INCISION ;  Surgeon: Tristian Garcia M.D.;  Location: SURGERY [facility];  Service:    • OTHER      nose   • APPENDECTOMY     • PB  DELIVERY ONLY       Social History     Tobacco Use   • Smoking status: Former Smoker     Years: 15.00     Quit date: 2005     Years since quittin.4   • Smokeless tobacco: Never Used   • Tobacco comment: 3-4 years ago   Substance Use Topics   • Alcohol use: No     Alcohol/week: 0.0 oz     Chief Complaint   Patient presents with   • Abdominal Pain     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 Per Dr Grier, \" Prevena was still functioning, but there was more than expected output in it.  It was removed and there was leakage from the incision.  Several staples removed due to underlying fat necrosis.  No evidence of infection or fascial dehiscence\"    :  Wound VAC blocking,  Dressing removed and found to have copious oily purulent drainage to proximal tract.   Dr. Glass and Dr. Grier updated.    : Pt went to Sx  and had VAC placed. Drsg intact, foam stapled in place.     WOUND ASSESSMENT/LDA      Negative Pressure Wound Therapy 21 Surgical Abdomen Midline (Active)   Vacuum Serial Number [id number]   NPWT Pump Mode / Pressure Setting Continuous;125 mmHg    Dressing Type Medium;Black Foam (Regular)    Number of Foam Pieces Used 2    Canister Changed No    NEXT Dressing Change/Treatment Date 06/10/21            Wound 21 " Full Thickness Wound Abdomen Midline (Active)      06/07/21 2000   Site Assessment Red;Pink;Yellow    Periwound Assessment Intact    Margins Defined edges    Closure Secondary intention    Drainage Amount Small    Drainage Description Serosanguineous    Treatments Cleansed    Wound Cleansing Normal Saline Irrigation    Periwound Protectant Skin Protectant Wipes to Periwound;Drape    Dressing Cleansing/Solutions Not Applicable    Dressing Options Wound Vac    Dressing Changed Changed    Dressing Status Intact    Dressing Change/Treatment Frequency Tuesday, Thursday, Saturday, and As Needed    NEXT Dressing Change/Treatment Date 06/10/21    NEXT Weekly Photo (Inpatient Only) 06/15/21    Non-staged Wound Description Full thickness    Wound Length (cm) 19.5 cm 06/07/21 2000   Wound Width (cm) 2.2 cm 06/07/21 2000   Wound Depth (cm) 1.7 cm 06/07/21 2000   Wound Surface Area (cm^2) 42.9 cm^2 06/07/21 2000   Wound Volume (cm^3) 72.93 cm^3 06/07/21 2000   Shape linear    Wound Odor None    Exposed Structures Adipose;Sutures           Vascular:    GEE:   No results found.    Lab Values:    Lab Results   Component Value Date/Time    WBC 21.4 (H) 06/07/2021 01:57 AM    RBC 2.62 (L) 06/07/2021 01:57 AM    HEMOGLOBIN 7.5 (L) 06/07/2021 01:57 AM    HEMATOCRIT 23.7 (L) 06/07/2021 01:57 AM    CREACTPROT 24.76 (H) 05/28/2021 04:07 AM        Culture Results show:  Recent Results (from the past 720 hour(s))   CULTURE WOUND W/ GRAM STAIN    Collection Time: 05/28/21  8:41 AM    Specimen: Tissue   Result Value Ref Range    Significant Indicator POS (POS)     Source TISS     Site abdominal wound     Culture Result - (A)     Gram Stain Result       Few WBCs.  Few Gram positive cocci.  Few Gram positive rods.  Moderate Gram negative rods.      Culture Result Actinomyces odontolyticus  Moderate growth   (A)     Culture Result Enterococcus faecalis  Light growth   (A)        Susceptibility    Enterococcus faecalis - KOBY     Daptomycin 2  Sensitive mcg/mL     Gent Synergy <=500 Sensitive mcg/mL     Ampicillin <=2 Sensitive mcg/mL     Vancomycin 1 Sensitive mcg/mL     Penicillin 2 Sensitive mcg/mL       Pain Level/Medicated:  Premedicate by primary RN with discomfort with drsg application      INTERVENTIONS BY WOUND TEAM:  Chart and images reviewed. Discussed with bedside RN. This RN in to assess patient. Performed standard wound care which includes appropriate positioning, dressing removal and non-selective debridement.     Preparation for Dressing removal: Dressing saturated with NS   Cleansed with:  NS and gauze.  Sharp debridement: NA  Jenn wound: Cleansed with NS, Prepped with No Sting skin prep and drape  Primary Dressing: black foam    Secondary (Outer) Dressing: Sealed with drape. Button and TRAC pad applied. NPWT resumed @ 125 mmHg continuous.     Interdisciplinary consultation: Patient, Bedside RN    EVALUATION / RATIONALE FOR TREATMENT:  Most Recent Date:  6/7: Pt's VAC with smaller drsg stapled in place. Pain present with staple removal. Bedside RN holding pt's hand and providing comfort. Wound bed  than last week also decreased depth.     06/03/21:  Patient with large/copious amounts of purulent oily drainage from proximal tract.  [facility] ordered and CT order per MD.  Wound team to follow up on Saturday for possible VAC re-pplacement.     6/1: Pt has full thickness surgical wound to abd. Applied wound vac because NPWT encourages granulation tissue growth, maintains optimal moisture needed for wound healing, and promotes angiogenesis.     Goals: Steady decrease in wound area and depth weekly.    WOUND TEAM PLAN OF CARE ([X] for frequency of wound follow up,):   Nursing to follow orders written for wound care. Contact wound team if area fails to progress, deteriorates or with any questions/concerns  Dressing changes by wound team:                   Follow up 3 times weekly:                NPWT change 3 times weekly:x  Follow up 1-2  times weekly:      Follow up Bi-Monthly:                   Follow up as needed:     Other (explain):     NURSING PLAN OF CARE ORDERS (X):  Dressing changes: See Dressing Care orders:x  Skin care: See Skin Care orders:  RN Prevention Protocol: present  Rectal tube care: See Rectal Tube Care orders:   Other orders:    RSKIN:   CURRENTLY IN PLACE (X), APPLIED THIS VISIT (A), ORDERED (O):   Q shift Agustín:  X  Q shift pressure point assessments:  X    Surface/Positioning   Pressure redistribution mattress  x          Low Airloss          Bariatric foam      Bariatric DIDIER     Waffle cushion        Waffle Overlay          Reposition q 2 hours   x   TAPs Turning system     Z Og Pillow     Offloading/Redistribution  Didn't assess  Sacral Mepilex (Silicone dressing)     Heel Mepilex (Silicone dressing)         Heel float boots (Prevalon boot)             Float Heels off Bed with Pillows           Respiratory RA  Silicone O2 tubing  Has available       Gray Foam Ear protectors     Cannula fixation Device (Tender )          High flow offloading Clip    Elastic head band offloading device      Anchorfast                                                         Trach with Optifoam split foam             Containment/Moisture Prevention   Rectal tube or BMS    Purwick/Condom Cath  x      Young Catheter    Barrier wipes           Barrier paste       Antifungal tx      Interdry        Mobilization not assessed     Up to chair        Ambulate      PT/OT      Nutrition     Dietician        Diabetes Education      PO  x   TF     TPN     NPO   # days     Other        Anticipated discharge plans: will need VAC, supplies (small [employer] preferred) and drsg changes  LTACH:        SNF/Rehab:                  Home Health Care:           Outpatient Wound Center:            Self/Family Care:        Other:

## 2021-06-08 NOTE — PROGRESS NOTES
[facility] Medicine Daily Progress Note    Date of Service  6/8/2021    Chief Complaint  50 y.o. female admitted 5/22/2021 with abd pain.    Hospital Course  49 yo woman who presented with abd pain and CT showed cecal volvulus. She underwent exploratory celiotomy and right hemicolectomy with ileocolic anastomosis with Dr. Wood 5/22/21. She developed thrombocytopenia and ascites and returned for ex lap with resection of ileocolic anastomosis and wound vac placement 5/28/21. She was admitted to ICU, started on zosyn for bacterial peritonitis and given platelet transfusions. HIT panel was negative. Abd cultures grew actinomyces, enterococcus, bacteroides. Doppler showed catheter related thrombus in R arm and started on anticoagulation. She had additional washout in the OR with Dr. Wood 6/4/21 and remains on unasyn and fluconazole. Wound vac remains in place.    Interval Problem Update  6/8 - White count coming down, afebrile; states that she is eating well, and that her current medication regimen controls her pain and lasts ~4 hours.  Can likely dc home tomorrow with home health for wound care and dressing changes, will need outpatient ID recs from Dr Obrien, much appreciated.     Consultants/Specialty  Critical care  Surgery  ID    Code Status  Full Code    Disposition  HH and wound vac has been set up  Will need DOAC and anticoagulation clinic    Review of Systems  Review of Systems   Constitutional: Positive for malaise/fatigue. Negative for fever.   Respiratory: Negative for shortness of breath.    Cardiovascular: Negative for chest pain.   Gastrointestinal: Positive for abdominal pain. Negative for constipation and vomiting.   Musculoskeletal: Positive for myalgias.   Neurological: Positive for weakness. Negative for dizziness.   All other systems reviewed and are negative.       Physical Exam  Temp:  [36.7 °C (98.1 °F)-37.3 °C (99.2 °F)] 36.7 °C (98.1 °F)  Pulse:  [] 98  Resp:  [17-20] 20  BP:  (113-138)/(42-69) 113/42  SpO2:  [91 %-93 %] 91 %    Physical Exam  Vitals and nursing note reviewed.   Constitutional:       Appearance: She is not toxic-appearing.      Comments: fatigued   HENT:      Head: Normocephalic.      Mouth/Throat:      Mouth: Mucous membranes are moist.   Eyes:      General:         Right eye: No discharge.         Left eye: No discharge.   Cardiovascular:      Rate and Rhythm: Normal rate and regular rhythm.   Pulmonary:      Effort: Pulmonary effort is normal. No respiratory distress.      Breath sounds: No wheezing or rales.   Abdominal:      Tenderness: There is abdominal tenderness (diffuse). There is no guarding or rebound.      Comments: Wound vac and surgical incision centrally   Musculoskeletal:         General: No swelling.      Cervical back: Neck supple.   Skin:     General: Skin is warm and dry.   Neurological:      General: No focal deficit present.      Mental Status: She is alert.      Comments: AOx4         Fluids    Intake/Output Summary (Last 24 hours) at 6/8/2021 1222  Last data filed at 6/8/2021 1100  Gross per 24 hour   Intake 480 ml   Output 2400 ml   Net -1920 ml       Laboratory  Recent Labs     06/06/21  0501 06/06/21  0501 06/06/21  1243 06/07/21  0157 06/08/21  0442   WBC 22.8*  --   --  21.4* 14.7*   RBC 2.73*  --   --  2.62* 2.91*   HEMOGLOBIN 7.8*   < > 9.2* 7.5* 8.4*   HEMATOCRIT 24.4*  --   --  23.7* 27.5*   MCV 89.4  --   --  90.5 94.5   MCH 28.6  --   --  28.6 28.9   MCHC 32.0*  --   --  31.6* 30.5*   RDW 50.2*  --   --  50.4* 52.0*   PLATELETCT 396  --   --  409 433   MPV 10.2  --   --  10.0 9.9    < > = values in this interval not displayed.     Recent Labs     06/06/21  0501 06/07/21  0157 06/08/21  0442   SODIUM 131* 131* 135   POTASSIUM 4.1 3.9 4.1   CHLORIDE 97 97 100   CO2 25 25 25   GLUCOSE 114* 131* 106*   BUN 3* 3* 3*   CREATININE 0.58 0.60 0.61   CALCIUM 7.9* 8.1* 8.2*     Recent Labs     06/07/21  0157   APTT 185.0*   INR 1.17*                Imaging  CT-ABDOMEN-PELVIS WITH   Final Result      1.  Multiple rim enhancing peritoneal fluid collection suspicious for abscess      2.  These include the rectouterine space measuring 6.6 x 7.2 x 5.1 cm, right lower quadrant measuring 13.8 x 4.2 x 6.5 cm, left subphrenic space measuring 8.4 x 4.6 x 2.7 cm, and contiguous loculated fluid within the right and left paracolic gutter      3.  Interval right colectomy      4.  Small-moderate-sized bilateral pleural effusion and atelectasis      US-EXTREMITY VENOUS UPPER BILAT   Final Result      IR-MIDLINE CATHETER INSERTION WO GUIDANCE > AGE 3   Final Result                  Ultrasound-guided midline placement performed by qualified nursing staff    as above.          DX-CHEST-FOR LINE PLACEMENT Perform procedure in: Other(comment f6 below): (PACU)   Final Result      1.  Left IJ central line tip high in position located above the superior vena cava are within the left brachiocephalic vein.      2.  Patchy bilateral infiltrates.      3.  NG tube tip extends into the stomach.      CT-ABDOMEN-PELVIS WITH   Final Result      1.  Moderate to large amount of ascites within the abdomen and pelvis some areas of which are loculated in nature. There are also punctate areas of gas within those areas of ascites as well as free intraperitoneal air. This may be related to recent    surgical procedure however the degree of volume of ascites would be unusual for normal postoperative course. Consideration should be given for leakage of bowel content or other process.      2.  Diffuse colonic distention.      3.  Bibasilar atelectasis and pleural effusions.      4.  5 mm left renal pelvic stone which results in mild hydronephrosis above that level.      5.  Moderate right-sided hydronephrosis extending to the level of the upper sacrum. No ureteral stone. The ureter is not identified below that level.      6.  This was discussed with NEVA KAY at 6:44 PM on 5/27/2021.       [id number] VIEW   Final Result      Gaseous distended small bowel and colon is similar to prior, likely ileus.      [id number] VIEW   Final Result         Gaseous distention of the small bowel and colon, likely postoperative ileus.      CT-ABDOMEN-PELVIS WITH   Final Result         1.  Findings keeping with cecal volvulus.   2.  Mildly fluid distended distal small bowel.   3.  No free air.   4.  Small nonobstructing renal stones.   These findings were discussed with ESHA STERLING on 5/22/2021 11:45 AM.                       Assessment/Plan  * Bacterial peritonitis (HCC)  Assessment & Plan  - Abd cultures grew actinomyces, enterococcus, bacteroides. Plan is for oral antibiotics for several weeks with follow up imaging to resolution.  - Wound vac in place, wound care and supplies are set up and pt will be ready for DC in the am   - Surgery following, had ileocecal resection/redo 5/28/21 and washout 6/4/21  - ID consulted, on unasyn and fluconazole - will need recs for oral conversion for dc tomorrow, appreciate ID's help  - Blood cultures NGTD  - Pain currently well controlled on 10-15mg oxycodone per patient    Anemia  Assessment & Plan  Hgb stable, monitor with transition to oral eliquis.  Transfuse if Hgb <7    DVT of axillary vein, acute right (HCC)  Assessment & Plan  Associated with midline, small  Continue on lovenox, transition to oral Eliquis today    Thrombocytopenia (HCC)  Assessment & Plan  Resolved   HIT AB negative  Due to sepsis       Cecal volvulus (HCC)- (present on admission)  Assessment & Plan  S/p resection, complicated by anastomotic leak  Surgery following  Diet per surgery  Doing really well - stooling and eating well today        VTE prophylaxis: heparin

## 2021-06-08 NOTE — PROGRESS NOTES
Trauma / Surgical Daily Progress Note    Date of Service  6/8/2021    Chief Complaint  50 y.o. female admitted 5/22/2021 with cecal volvulus    Interval Events  5/22 Exploratory celiotomy and right hemicolectomy with a side-to-side   ileocolic anastomosis  5/28 Exploratory celiotomy, resection of ileocolic anastomosis with re-do  side-to-side ileocolic anastomosis  6/4 Ex celiotomy washout    Doing well. WBC down significantly. Tolerating diet. Stooling. On Unasyn per ID.   Have ID changed to PO abx with anticipated DC 6/9.    Review of Systems  Review of Systems   Gastrointestinal: Positive for abdominal pain.        Passing some flatus        Vital Signs for last 24 hours  Temp:  [36.8 °C (98.2 °F)-37.3 °C (99.2 °F)] 36.8 °C (98.2 °F)  Pulse:  [] 103  Resp:  [17-20] 18  BP: (113-138)/(48-69) 113/67  SpO2:  [92 %-93 %] 93 %    Hemodynamic parameters for last 24 hours       Respiratory Data     Respiration: 18, Pulse Oximetry: 93 %     Work Of Breathing / Effort: Mild;Shallow  RUL Breath Sounds: Clear, RML Breath Sounds: Clear, RLL Breath Sounds: Diminished, LUCITA Breath Sounds: Clear, LLL Breath Sounds: Diminished    Physical Exam  Physical Exam  Cardiovascular:      Rate and Rhythm: Normal rate.      Pulses: Normal pulses.   Pulmonary:      Effort: Pulmonary effort is normal.   Abdominal:      General: There is no distension.      Palpations: Abdomen is soft.      Tenderness: There is abdominal tenderness.      Comments: Wound Vac in place    Less distention, abdomen softer      Genitourinary:     Comments: diuresing  Musculoskeletal:         General: Normal range of motion.      Cervical back: Neck supple.      Comments: Generalized edema   Skin:     General: Skin is warm and dry.   Neurological:      General: No focal deficit present.      Mental Status: She is alert.   Psychiatric:      Comments: Teary today         Laboratory  Recent Results (from the past 24 hour(s))   CBC WITH DIFFERENTIAL    Collection  Time: 06/08/21  4:42 AM   Result Value Ref Range    WBC 14.7 (H) 4.8 - 10.8 K/uL    RBC 2.91 (L) 4.20 - 5.40 M/uL    Hemoglobin 8.4 (L) 12.0 - 16.0 g/dL    Hematocrit 27.5 (L) 37.0 - 47.0 %    MCV 94.5 81.4 - 97.8 fL    MCH 28.9 27.0 - 33.0 pg    MCHC 30.5 (L) 33.6 - 35.0 g/dL    RDW 52.0 (H) 35.9 - 50.0 fL    Platelet Count 433 164 - 446 K/uL    MPV 9.9 9.0 - 12.9 fL    Neutrophils-Polys 71.50 44.00 - 72.00 %    Lymphocytes 10.50 (L) 22.00 - 41.00 %    Monocytes 13.70 (H) 0.00 - 13.40 %    Eosinophils 1.50 0.00 - 6.90 %    Basophils 0.50 0.00 - 1.80 %    Immature Granulocytes 2.30 (H) 0.00 - 0.90 %    Nucleated RBC 0.20 /100 WBC    Neutrophils (Absolute) 10.53 (H) 2.00 - 7.15 K/uL    Lymphs (Absolute) 1.55 1.00 - 4.80 K/uL    Monos (Absolute) 2.01 (H) 0.00 - 0.85 K/uL    Eos (Absolute) 0.22 0.00 - 0.51 K/uL    Baso (Absolute) 0.07 0.00 - 0.12 K/uL    Immature Granulocytes (abs) 0.34 (H) 0.00 - 0.11 K/uL    NRBC (Absolute) 0.03 K/uL       Fluids    Intake/Output Summary (Last 24 hours) at 6/8/2021 0549  Last data filed at 6/7/2021 2100  Gross per 24 hour   Intake 435 ml   Output 1200 ml   Net -765 ml       Core Measures & Quality Metrics  Labs reviewed and Medications reviewed  Young catheter: No Young      DVT Prophylaxis: Enoxaparin (Lovenox)  DVT prophylaxis - mechanical: SCDs  Ulcer prophylaxis: Yes  Antibiotics: Treating active infection/contamination beyond 24 hours perioperative coverage  Assessed for rehab: Patient returned to prior level of function, rehabilitation not indicated at this time    ALICIA Score  ETOH Screening    Assessment/Plan  * Bacterial peritonitis (HCC)  Assessment & Plan  6/2 Zosyn stopped.  ID Consult  6/3 Day #2 Unasyn per ID  Plan to continue until 6/26    Cecal volvulus (HCC)- (present on admission)  Assessment & Plan  Acute abd pain  CT shows cecal volvulus  5/22 ex lap, r hemicolectomy  Await GI function  5/24 trend procalcitonin and CRP   Abd xray- ileus  5/25 procalcitonin and CRP  increasing, check lactic acid add reglan  5/26 Labs improving. Passing flatus - start clears  5/27 - Stooled - will adv to full liquids  5/27 - thrombocytopenia, bandemia, PM CT with ascites no freeair  5/28 - Exploratory celiotomy, resection of ileocolic anastomosis with re-do  side-to-side ileocolic anastomosis.  6/3  Stooling, advance to regular diet  6/4 Small wound dehiscence - returned to OR for washout and reclosure  Remains on unasyn    DVT of axillary vein, acute right (HCC)  Assessment & Plan  Noted on US  On heparin gtt  6/6  Switched to lovenox BID       Discussed patient condition with RN and Patient.    CRITICAL CARE TIME EXCLUDING PROCEDURES: 20  minutes

## 2021-06-08 NOTE — CARE PLAN
The patient is watcher    Shift Goals  Clinical Goals: manage pain  Patient Goals: sleep comfortably    Progress made toward(s) clinical / shift goals:  Progressing    Patient is not progressing towards the following goals:  Problem: Knowledge Deficit - Standard  Goal: Patient and family/care givers will demonstrate understanding of plan of care, disease process/condition, diagnostic tests and medications  Patient demonstrates appropriate knowledge of disease process/condition, treatment plan, diagnostic tests, and medications.Patient is compliant and asks appropriate questions about disease process and POC.   Outcome: Progressing     Problem: Pain - Standard  Goal: Alleviation of pain or a reduction in pain to the patient’s comfort goal  Patient pain controlled with medication. Pain reassessed PRN, medications administered as needed.   Outcome: Progressing     Problem: Fall Risk  Goal: Patient will remain free from falls  Safety measures in place, bed in lowest position, side rails up x2, bed alarm on, call light in place, patient is free from falls at this time.   Outcome: Progressing

## 2021-06-09 ENCOUNTER — APPOINTMENT (OUTPATIENT)
Dept: RADIOLOGY | Facility: MEDICAL CENTER | Age: 51
DRG: 329 | End: 2021-06-09
Attending: SURGERY
Payer: COMMERCIAL

## 2021-06-09 PROBLEM — E86.0 DEHYDRATION: Status: ACTIVE | Noted: 2021-06-09

## 2021-06-09 LAB
ALBUMIN SERPL BCP-MCNC: 2.2 G/DL (ref 3.2–4.9)
ALBUMIN/GLOB SERPL: 0.6 G/DL
ALP SERPL-CCNC: 92 U/L (ref 30–99)
ALT SERPL-CCNC: 9 U/L (ref 2–50)
ANION GAP SERPL CALC-SCNC: 13 MMOL/L (ref 7–16)
APTT PPP: 39.2 SEC (ref 24.7–36)
AST SERPL-CCNC: 15 U/L (ref 12–45)
BASOPHILS # BLD AUTO: 0.8 % (ref 0–1.8)
BASOPHILS # BLD: 0.09 K/UL (ref 0–0.12)
BILIRUB SERPL-MCNC: 0.3 MG/DL (ref 0.1–1.5)
BUN SERPL-MCNC: 3 MG/DL (ref 8–22)
CALCIUM SERPL-MCNC: 8.7 MG/DL (ref 8.4–10.2)
CHLORIDE SERPL-SCNC: 95 MMOL/L (ref 96–112)
CO2 SERPL-SCNC: 25 MMOL/L (ref 20–33)
CREAT SERPL-MCNC: 0.7 MG/DL (ref 0.5–1.4)
EOSINOPHIL # BLD AUTO: 0.15 K/UL (ref 0–0.51)
EOSINOPHIL NFR BLD: 1.3 % (ref 0–6.9)
ERYTHROCYTE [DISTWIDTH] IN BLOOD BY AUTOMATED COUNT: 49.5 FL (ref 35.9–50)
GLOBULIN SER CALC-MCNC: 3.7 G/DL (ref 1.9–3.5)
GLUCOSE SERPL-MCNC: 128 MG/DL (ref 65–99)
GRAM STN SPEC: NORMAL
HCT VFR BLD AUTO: 26.6 % (ref 37–47)
HGB BLD-MCNC: 8.1 G/DL (ref 12–16)
IMM GRANULOCYTES # BLD AUTO: 0.17 K/UL (ref 0–0.11)
IMM GRANULOCYTES NFR BLD AUTO: 1.4 % (ref 0–0.9)
INR PPP: 1.09 (ref 0.87–1.13)
LYMPHOCYTES # BLD AUTO: 1.09 K/UL (ref 1–4.8)
LYMPHOCYTES NFR BLD: 9.1 % (ref 22–41)
MCH RBC QN AUTO: 27.9 PG (ref 27–33)
MCHC RBC AUTO-ENTMCNC: 30.5 G/DL (ref 33.6–35)
MCV RBC AUTO: 91.7 FL (ref 81.4–97.8)
MONOCYTES # BLD AUTO: 2.34 K/UL (ref 0–0.85)
MONOCYTES NFR BLD AUTO: 19.5 % (ref 0–13.4)
NEUTROPHILS # BLD AUTO: 8.14 K/UL (ref 2–7.15)
NEUTROPHILS NFR BLD: 67.9 % (ref 44–72)
NRBC # BLD AUTO: 0.02 K/UL
NRBC BLD-RTO: 0.2 /100 WBC
PLATELET # BLD AUTO: 520 K/UL (ref 164–446)
PMV BLD AUTO: 9.6 FL (ref 9–12.9)
POTASSIUM SERPL-SCNC: 4.6 MMOL/L (ref 3.6–5.5)
PROT SERPL-MCNC: 5.9 G/DL (ref 6–8.2)
PROTHROMBIN TIME: 13.8 SEC (ref 12–14.6)
RBC # BLD AUTO: 2.9 M/UL (ref 4.2–5.4)
SIGNIFICANT IND 70042: NORMAL
SITE SITE: NORMAL
SODIUM SERPL-SCNC: 133 MMOL/L (ref 135–145)
SOURCE SOURCE: NORMAL
UFH PPP CHRO-ACNC: 0.2 IU/ML
WBC # BLD AUTO: 12 K/UL (ref 4.8–10.8)

## 2021-06-09 PROCEDURE — 36415 COLL VENOUS BLD VENIPUNCTURE: CPT

## 2021-06-09 PROCEDURE — 87205 SMEAR GRAM STAIN: CPT

## 2021-06-09 PROCEDURE — 700111 HCHG RX REV CODE 636 W/ 250 OVERRIDE (IP): Performed by: RADIOLOGY

## 2021-06-09 PROCEDURE — 87077 CULTURE AEROBIC IDENTIFY: CPT

## 2021-06-09 PROCEDURE — 99153 MOD SED SAME PHYS/QHP EA: CPT

## 2021-06-09 PROCEDURE — 700111 HCHG RX REV CODE 636 W/ 250 OVERRIDE (IP)

## 2021-06-09 PROCEDURE — 49406 IMAGE CATH FLUID PERI/RETRO: CPT

## 2021-06-09 PROCEDURE — 0W9J30Z DRAINAGE OF PELVIC CAVITY WITH DRAINAGE DEVICE, PERCUTANEOUS APPROACH: ICD-10-PCS | Performed by: RADIOLOGY

## 2021-06-09 PROCEDURE — 85520 HEPARIN ASSAY: CPT

## 2021-06-09 PROCEDURE — 99233 SBSQ HOSP IP/OBS HIGH 50: CPT | Performed by: FAMILY MEDICINE

## 2021-06-09 PROCEDURE — 700102 HCHG RX REV CODE 250 W/ 637 OVERRIDE(OP): Performed by: INTERNAL MEDICINE

## 2021-06-09 PROCEDURE — A9270 NON-COVERED ITEM OR SERVICE: HCPCS | Performed by: INTERNAL MEDICINE

## 2021-06-09 PROCEDURE — 85730 THROMBOPLASTIN TIME PARTIAL: CPT

## 2021-06-09 PROCEDURE — 700105 HCHG RX REV CODE 258: Performed by: SURGERY

## 2021-06-09 PROCEDURE — 700105 HCHG RX REV CODE 258: Performed by: INTERNAL MEDICINE

## 2021-06-09 PROCEDURE — A9270 NON-COVERED ITEM OR SERVICE: HCPCS | Performed by: SURGERY

## 2021-06-09 PROCEDURE — 700105 HCHG RX REV CODE 258: Performed by: FAMILY MEDICINE

## 2021-06-09 PROCEDURE — 700102 HCHG RX REV CODE 250 W/ 637 OVERRIDE(OP): Performed by: SURGERY

## 2021-06-09 PROCEDURE — 700111 HCHG RX REV CODE 636 W/ 250 OVERRIDE (IP): Performed by: INTERNAL MEDICINE

## 2021-06-09 PROCEDURE — 0W9G30Z DRAINAGE OF PERITONEAL CAVITY WITH DRAINAGE DEVICE, PERCUTANEOUS APPROACH: ICD-10-PCS | Performed by: RADIOLOGY

## 2021-06-09 PROCEDURE — 700102 HCHG RX REV CODE 250 W/ 637 OVERRIDE(OP): Performed by: FAMILY MEDICINE

## 2021-06-09 PROCEDURE — 700101 HCHG RX REV CODE 250: Performed by: FAMILY MEDICINE

## 2021-06-09 PROCEDURE — 85025 COMPLETE CBC W/AUTO DIFF WBC: CPT

## 2021-06-09 PROCEDURE — A9270 NON-COVERED ITEM OR SERVICE: HCPCS | Performed by: FAMILY MEDICINE

## 2021-06-09 PROCEDURE — 87070 CULTURE OTHR SPECIMN AEROBIC: CPT

## 2021-06-09 PROCEDURE — 700111 HCHG RX REV CODE 636 W/ 250 OVERRIDE (IP): Performed by: FAMILY MEDICINE

## 2021-06-09 PROCEDURE — 770006 HCHG ROOM/CARE - MED/SURG/GYN SEMI*

## 2021-06-09 PROCEDURE — 80053 COMPREHEN METABOLIC PANEL: CPT

## 2021-06-09 PROCEDURE — 85610 PROTHROMBIN TIME: CPT

## 2021-06-09 RX ORDER — MIDAZOLAM HYDROCHLORIDE 1 MG/ML
INJECTION INTRAMUSCULAR; INTRAVENOUS
Status: COMPLETED
Start: 2021-06-09 | End: 2021-06-09

## 2021-06-09 RX ORDER — MIDAZOLAM HYDROCHLORIDE 1 MG/ML
.5-2 INJECTION INTRAMUSCULAR; INTRAVENOUS PRN
Status: ACTIVE | OUTPATIENT
Start: 2021-06-09 | End: 2021-06-09

## 2021-06-09 RX ORDER — ONDANSETRON 2 MG/ML
INJECTION INTRAMUSCULAR; INTRAVENOUS
Status: ACTIVE
Start: 2021-06-09 | End: 2021-06-10

## 2021-06-09 RX ORDER — MORPHINE SULFATE 4 MG/ML
2-4 INJECTION, SOLUTION INTRAMUSCULAR; INTRAVENOUS EVERY 4 HOURS PRN
Status: DISCONTINUED | OUTPATIENT
Start: 2021-06-09 | End: 2021-06-21 | Stop reason: HOSPADM

## 2021-06-09 RX ORDER — SODIUM CHLORIDE 9 MG/ML
500 INJECTION, SOLUTION INTRAVENOUS
Status: ACTIVE | OUTPATIENT
Start: 2021-06-09 | End: 2021-06-09

## 2021-06-09 RX ORDER — NALOXONE HYDROCHLORIDE 0.4 MG/ML
INJECTION, SOLUTION INTRAMUSCULAR; INTRAVENOUS; SUBCUTANEOUS
Status: DISCONTINUED
Start: 2021-06-09 | End: 2021-06-09

## 2021-06-09 RX ORDER — SODIUM CHLORIDE 9 MG/ML
INJECTION, SOLUTION INTRAVENOUS CONTINUOUS
Status: ACTIVE | OUTPATIENT
Start: 2021-06-09 | End: 2021-06-11

## 2021-06-09 RX ORDER — SODIUM CHLORIDE, SODIUM LACTATE, POTASSIUM CHLORIDE, CALCIUM CHLORIDE 600; 310; 30; 20 MG/100ML; MG/100ML; MG/100ML; MG/100ML
INJECTION, SOLUTION INTRAVENOUS CONTINUOUS
Status: DISCONTINUED | OUTPATIENT
Start: 2021-06-09 | End: 2021-06-09

## 2021-06-09 RX ORDER — ONDANSETRON 2 MG/ML
4 INJECTION INTRAMUSCULAR; INTRAVENOUS PRN
Status: ACTIVE | OUTPATIENT
Start: 2021-06-09 | End: 2021-06-09

## 2021-06-09 RX ADMIN — AMPICILLIN SODIUM AND SULBACTAM SODIUM 3 G: 2; 1 INJECTION, POWDER, FOR SOLUTION INTRAMUSCULAR; INTRAVENOUS at 05:09

## 2021-06-09 RX ADMIN — DULOXETINE HYDROCHLORIDE 60 MG: 30 CAPSULE, DELAYED RELEASE ORAL at 05:07

## 2021-06-09 RX ADMIN — FENTANYL CITRATE 50 MCG: 50 INJECTION, SOLUTION INTRAMUSCULAR; INTRAVENOUS at 15:59

## 2021-06-09 RX ADMIN — AMPICILLIN SODIUM AND SULBACTAM SODIUM 3 G: 2; 1 INJECTION, POWDER, FOR SOLUTION INTRAMUSCULAR; INTRAVENOUS at 18:14

## 2021-06-09 RX ADMIN — FAMOTIDINE 20 MG: 20 TABLET ORAL at 18:14

## 2021-06-09 RX ADMIN — DOCUSATE SODIUM 100 MG: 100 CAPSULE, LIQUID FILLED ORAL at 05:07

## 2021-06-09 RX ADMIN — FENTANYL CITRATE 50 MCG: 50 INJECTION, SOLUTION INTRAMUSCULAR; INTRAVENOUS at 15:41

## 2021-06-09 RX ADMIN — FENTANYL CITRATE 50 MCG: 50 INJECTION, SOLUTION INTRAMUSCULAR; INTRAVENOUS at 15:20

## 2021-06-09 RX ADMIN — SODIUM CHLORIDE: 9 INJECTION, SOLUTION INTRAVENOUS at 22:44

## 2021-06-09 RX ADMIN — FENTANYL CITRATE 25 MCG: 50 INJECTION, SOLUTION INTRAMUSCULAR; INTRAVENOUS at 15:27

## 2021-06-09 RX ADMIN — MORPHINE SULFATE 2 MG: 4 INJECTION INTRAVENOUS at 21:04

## 2021-06-09 RX ADMIN — OXYCODONE HYDROCHLORIDE 15 MG: 10 TABLET ORAL at 22:34

## 2021-06-09 RX ADMIN — FAMOTIDINE 20 MG: 20 TABLET ORAL at 05:07

## 2021-06-09 RX ADMIN — AMPICILLIN SODIUM AND SULBACTAM SODIUM 3 G: 2; 1 INJECTION, POWDER, FOR SOLUTION INTRAMUSCULAR; INTRAVENOUS at 14:21

## 2021-06-09 RX ADMIN — DOCUSATE SODIUM 50 MG AND SENNOSIDES 8.6 MG 1 TABLET: 8.6; 5 TABLET, FILM COATED ORAL at 21:04

## 2021-06-09 RX ADMIN — SODIUM CHLORIDE, POTASSIUM CHLORIDE, SODIUM LACTATE AND CALCIUM CHLORIDE: 600; 310; 30; 20 INJECTION, SOLUTION INTRAVENOUS at 07:09

## 2021-06-09 RX ADMIN — MORPHINE SULFATE 4 MG: 4 INJECTION INTRAVENOUS at 14:20

## 2021-06-09 RX ADMIN — MIDAZOLAM HYDROCHLORIDE 1 MG: 1 INJECTION, SOLUTION INTRAMUSCULAR; INTRAVENOUS at 15:20

## 2021-06-09 RX ADMIN — SODIUM CHLORIDE: 9 INJECTION, SOLUTION INTRAVENOUS at 08:12

## 2021-06-09 RX ADMIN — FLUCONAZOLE 400 MG: 200 TABLET ORAL at 05:07

## 2021-06-09 RX ADMIN — OXYCODONE HYDROCHLORIDE 15 MG: 10 TABLET ORAL at 00:58

## 2021-06-09 RX ADMIN — TOPIRAMATE 100 MG: 100 TABLET, FILM COATED ORAL at 21:04

## 2021-06-09 RX ADMIN — DOCUSATE SODIUM 100 MG: 100 CAPSULE, LIQUID FILLED ORAL at 18:14

## 2021-06-09 RX ADMIN — MIDAZOLAM HYDROCHLORIDE 1 MG: 1 INJECTION, SOLUTION INTRAMUSCULAR; INTRAVENOUS at 15:27

## 2021-06-09 RX ADMIN — MORPHINE SULFATE 4 MG: 4 INJECTION INTRAVENOUS at 09:41

## 2021-06-09 RX ADMIN — MIDAZOLAM HYDROCHLORIDE 1 MG: 1 INJECTION, SOLUTION INTRAMUSCULAR; INTRAVENOUS at 15:41

## 2021-06-09 RX ADMIN — MORPHINE SULFATE 2 MG: 4 INJECTION INTRAVENOUS at 06:54

## 2021-06-09 RX ADMIN — OXYCODONE HYDROCHLORIDE 15 MG: 10 TABLET ORAL at 18:14

## 2021-06-09 RX ADMIN — POLYETHYLENE GLYCOL 3350 1 PACKET: 17 POWDER, FOR SOLUTION ORAL at 05:10

## 2021-06-09 RX ADMIN — MORPHINE SULFATE 2 MG: 4 INJECTION INTRAVENOUS at 02:44

## 2021-06-09 RX ADMIN — MIDAZOLAM HYDROCHLORIDE 1 MG: 1 INJECTION, SOLUTION INTRAMUSCULAR; INTRAVENOUS at 15:59

## 2021-06-09 RX ADMIN — OXYCODONE HYDROCHLORIDE 15 MG: 10 TABLET ORAL at 05:07

## 2021-06-09 ASSESSMENT — PAIN DESCRIPTION - PAIN TYPE
TYPE: SURGICAL PAIN
TYPE: SURGICAL PAIN
TYPE: ACUTE PAIN
TYPE: SURGICAL PAIN;ACUTE PAIN
TYPE: SURGICAL PAIN
TYPE: ACUTE PAIN
TYPE: SURGICAL PAIN

## 2021-06-09 ASSESSMENT — ENCOUNTER SYMPTOMS
SHORTNESS OF BREATH: 0
CONSTIPATION: 0
VOMITING: 0
ABDOMINAL PAIN: 1
DIZZINESS: 0
ROS GI COMMENTS: PASSING SOME FLATUS
FEVER: 0
WEAKNESS: 1
MYALGIAS: 1

## 2021-06-09 ASSESSMENT — FIBROSIS 4 INDEX: FIB4 SCORE: .4807692307692307692

## 2021-06-09 NOTE — WOUND TEAM
[facility]  Inpatient Services  Initial Wound and Skin Care Evaluation    Admission Date: 5/22/2021     Last order of IP CONSULT TO WOUND CARE was found on 6/1/2021 from Hospital Encounter on 5/22/2021     HPI, PMH, SH: Reviewed    Past Surgical History:   Procedure Laterality Date   • PB EXPLORATORY OF ABDOMEN N/A 6/4/2021    Procedure: LAPAROTOMY, EXPLORATORY, WITH WASH OUT;  Surgeon: Maryam Wood M.D.;  Location: [facility];  Service: General   • PB EXPLORATORY OF ABDOMEN N/A 5/28/2021    Procedure: LAPAROTOMY, EXPLORATORY- RESECTION OF ILEOCECAL ANASTATMOSIS.;  Surgeon: Maryam Wood M.D.;  Location: [facility];  Service: General   • PB EXPLORATORY OF ABDOMEN  5/22/2021    Procedure: LAPAROTOMY, EXPLORATORY;  Surgeon: Maryam Wood M.D.;  Location: [facility];  Service: General   • HEMICOLECTOMY, RIGHT  5/22/2021    Procedure: HEMICOLECTOMY, RIGHT, SIDE TO SIDE ANASTOMOSIS;  Surgeon: Maryam Wood M.D.;  Location: [facility];  Service: General   • SHOULDER DECOMPRESSION ARTHROSCOPIC Right 12/24/2018    Procedure: SHOULDER DECOMPRESSION ARTHROSCOPIC - SUBACROMIAL;  Surgeon: Tristian Garcia M.D.;  Location: [facility];  Service: Orthopedics   • CLAVICLE DISTAL EXCISION Left 12/24/2018    Procedure: CLAVICLE DISTAL EXCISION;  Surgeon: Tristian Garcia M.D.;  Location: [facility];  Service: Orthopedics   • SHOULDER ARTHROSCOPY W/ BICIPITAL TENODESIS REPAIR Left 12/24/2018    Procedure: SHOULDER ARTHROSCOPY W/ BICIPITAL TENODESIS REPAIR - AND PACKER;  Surgeon: Tristian Garcia M.D.;  Location: [facility];  Service: Orthopedics   • SHOULDER MANIPULATION Left 12/24/2018    Procedure: SHOULDER MANIPULATION;  Surgeon: Tristian Garcia M.D.;  Location: [facility];  Service: Orthopedics   • SHOULDER ARTHROSCOPY W/ BICIPITAL TENODESIS REPAIR Right 6/15/2015    Procedure: SHOULDER ARTHROSCOPY W/  "BICIPITAL TENODESIS, SYNOVECTOMY;  Surgeon: Tristian Garcia M.D.;  Location: SUR[facility]ERY [facility];  Service:    • CLAVICLE DISTAL EXCISION Right 6/15/2015    Procedure: CLAVICLE DISTAL EXCISION;  Surgeon: Tristian Garcia M.D.;  Location: SUR[facility]ERY [facility];  Service:    • SHOULDER DECOMPRESSION Right 6/15/2015    Procedure: SHOULDER DECOMPRESSION MINI INCISION ;  Surgeon: Tristian Garcia M.D.;  Location: SURGERY 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 Per Dr Grier, \" Prevena was still functioning, but there was more than expected output in it.  It was removed and there was leakage from the incision.  Several staples removed due to underlying fat necrosis.  No evidence of infection or fascial dehiscence\"    :  Wound VAC blocking,  Dressing removed and found to have copious oily purulent drainage to proximal tract.   Dr. Glass and Dr. Grier updated.    : Pt went to Sx  and had VAC placed. Drsg intact, foam stapled in place.     WOUND ASSESSMENT/LDA    Wound 21 Full Thickness Wound Abdomen Midline (Active)      21   Site Assessment Red;Pink;Yellow    Periwound Assessment Intact    Margins Defined edges    Closure Secondary intention    Drainage Amount Copious    Drainage Description Purulent;Tan    Treatments Cleansed    Wound Cleansing Dakin's Solution    Periwound Protectant " Skin Protectant Wipes to Periwound    Dressing Cleansing/Solutions 1/2 Strength Dakin's Solution    Dressing Options Moist Roll Gauze;Absorbent Abdominal Pad;Hypafix Tape    Dressing Changed Changed    Dressing Status Intact    Dressing Change/Treatment Frequency Every Shift, and As Needed    NEXT Dressing Change/Treatment Date 06/08/21    NEXT Weekly Photo (Inpatient Only) 06/14/21    Non-staged Wound Description Full thickness 06/08/21 1500   Wound Length (cm) 19.5 cm 06/07/21 2000   Wound Width (cm) 2.2 cm 06/07/21 2000   Wound Depth (cm) 1.7 cm 06/07/21 2000   Wound Surface Area (cm^2) 42.9 cm^2 06/07/21 2000   Wound Volume (cm^3) 72.93 cm^3 06/07/21 2000   Shape linear 06/08/21 1500   Wound Odor Mild 06/08/21 1500   Exposed Structures Adipose 06/08/21 1500          Vascular:    GEE:   No results found.    Lab Values:    Lab Results   Component Value Date/Time    WBC 14.7 (H) 06/08/2021 04:42 AM    RBC 2.91 (L) 06/08/2021 04:42 AM    HEMOGLOBIN 8.4 (L) 06/08/2021 04:42 AM    HEMATOCRIT 27.5 (L) 06/08/2021 04:42 AM    CREACTPROT 24.76 (H) 05/28/2021 04:07 AM        Culture Results show:  Recent Results (from the past 720 hour(s))   CULTURE WOUND W/ GRAM STAIN    Collection Time: 05/28/21  8:41 AM    Specimen: Tissue   Result Value Ref Range    Significant Indicator POS (POS)     Source TISS     Site abdominal wound     Culture Result - (A)     Gram Stain Result       Few WBCs.  Few Gram positive cocci.  Few Gram positive rods.  Moderate Gram negative rods.      Culture Result Actinomyces odontolyticus  Moderate growth   (A)     Culture Result Enterococcus faecalis  Light growth   (A)        Susceptibility    Enterococcus faecalis - KOBY     Daptomycin 2 Sensitive mcg/mL     Gent Synergy <=500 Sensitive mcg/mL     Ampicillin <=2 Sensitive mcg/mL     Vancomycin 1 Sensitive mcg/mL     Penicillin 2 Sensitive mcg/mL       Pain Level/Medicated:  Premedicate by primary RN with discomfort with drsg application           INTERVENTIONS BY WOUND TEAM:  Chart and images reviewed. Discussed with bedside RN. This RN in to assess patient. Performed standard wound care which includes appropriate positioning, dressing removal and non-selective debridement.     Preparation for Dressing removal: Dressing saturated with NS   Cleansed with:  NS then Dakins and gauze.  Sharp debridement: NA  Jenn wound: Cleansed with NS, Prepped with No Sting skin prep  Primary Dressing: Dakins moistened roll gauze x 1   Secondary (Outer) Dressing: abd pad, secured with hypafix tape  Interdisciplinary consultation: Patient, Bedside RN    EVALUATION / RATIONALE FOR TREATMENT:  Most Recent Date:    6/8: Pt's VAc was alarming so in to see cause. NPWT with o mmHg, tan drainage in cannister. Drsg saturated. Removed drsg. With removal of drsg proximal part of wound had large brown tan drainage with some small solid pieces. Pt continued to have copious drainage. Dr Lopez and Dr Wood were communicated with to report development. Placed wet to dry drsg. Pt up to bathroom and when back to bed drsg was saturated so changed again. Rinsed abdominal binder and hung to dry.  Drsg orders changed and culture was collected before cleaning with dakins. It appears that proximal part of wound bed has opened again, there is 1.3 cm of depth. Pt to go for CT scan.  6/7: Pt's VAC with smaller drsg stapled in place. Pain present with staple removal. Bedside RN holding pt's hand and providing comfort. Wound bed  than last week also decreased depth.     06/03/21:  Patient with large/copious amounts of purulent oily drainage from proximal tract.  [facility] ordered and CT order per MD.  Wound team to follow up on Saturday for possible VAC re-pplacement.     6/1: Pt has full thickness surgical wound to abd. Applied wound vac because NPWT encourages granulation tissue growth, maintains optimal moisture needed for wound healing, and promotes angiogenesis.     Goals: Steady decrease  in wound area and depth weekly.    WOUND TEAM PLAN OF CARE ([X] for frequency of wound follow up,):   Nursing to follow orders written for wound care. Contact wound team if area fails to progress, deteriorates or with any questions/concerns  Dressing changes by wound team:                   Follow up 3 times weekly:                NPWT change 3 times weekly:  Follow up 1-2 times weekly:      Follow up Bi-Monthly:                   Follow up as needed:  x   Other (explain):     NURSING PLAN OF CARE ORDERS (X):  Dressing changes: See Dressing Care orders:x  Skin care: See Skin Care orders:  RN Prevention Protocol: present  Rectal tube care: See Rectal Tube Care orders:   Other orders:    RSKIN:   CURRENTLY IN PLACE (X), APPLIED THIS VISIT (A), ORDERED (O):   Q shift Agustín:  X  Q shift pressure point assessments:  X    Surface/Positioning   Pressure redistribution mattress  x          Low Airloss          Bariatric foam      Bariatric DIDIER     Waffle cushion        Waffle Overlay          Reposition q 2 hours   Remind pt   TAPs Turning system     Z Og Pillow     Offloading/Redistribution  Didn't assess  Sacral Mepilex (Silicone dressing)     Heel Mepilex (Silicone dressing)         Heel float boots (Prevalon boot)             Float Heels off Bed with Pillows           Respiratory RA  Silicone O2 tubing  Has available       Gray Foam Ear protectors     Cannula fixation Device (Tender )          High flow offloading Clip    Elastic head band offloading device      Anchorfast                                                         Trach with Optifoam split foam             Containment/Moisture Prevention up to bathroom  Rectal tube or BMS    Purwick/Condom Cath     Young Catheter    Barrier wipes           Barrier paste       Antifungal tx      Interdry        Mobilization     Up to chair        Ambulate  x    PT/OT      Nutrition     Dietician        Diabetes Education      PO  x   TF     TPN     NPO   # days      Other        Anticipated discharge plans: will need VAC, supplies (small [employer] preferred) and drsg changes  LTACH:        SNF/Rehab:                  Home Health Care:           Outpatient Wound Center:            Self/Family Care:        Other:

## 2021-06-09 NOTE — PROGRESS NOTES
? IR Nursing Note    Site Marked  RLQ AND R LOWER FLANK, Procedure Confirmed with MD, RN, RT and patient pre procedure.   MIGUEL MAGALLANES  ?   Sedation Start Time: 1520   Ptime Out/Procedure Start Time: 1520   Procedure Stop Time: 1630   Sedation End Time: 1610   ?   End Tidal CO2 range 29-35 throughout procedure.   ?   RLQ access site, 10F CATH sealed with PERCUSTAY,   R LOWER FLANK 8F CATH SEALED WITH PERCUSTAY      ?   Patient tolerated procedure well though with discomfort. RLQ drain placed first while supine. Pt repositioned onto prone with moderate and visible discomfort, for second drain placement.  VS hemodynamically stable; pt awake and alert post procedure with controlled pain when repositioned onto supine and lying still; report given to charge KLAUDIA Gama . Patient transported to room 212 via bed by IR RN then transferred care to report RN.   ?

## 2021-06-09 NOTE — DISCHARGE PLANNING
Anticipated Discharge Disposition: Home with [facility] and wound vac    Action: Pt has wound vac available and pt has been accepted by [facility].   LSW to f/u with ID recommendations for abx course.     Barriers to Discharge: None     Plan: LSW to assist as needed

## 2021-06-09 NOTE — PROGRESS NOTES
ID Brief Note     Reviewed chart including vitals and labs.  Plan is to go for IR drainage today and I spoke with hospitalist to request fluid be sent for new cultures.     --- Continue unasyn and fluconazole for now  --- Agree with plan for drainage  --- F/up drainage cultures.     ID will follow.     Karly Obrien MD

## 2021-06-09 NOTE — DISCHARGE PLANNING
Anticipated Discharge Disposition: LTAC- [facility]     Action: Pt discussed during IDT rounds. Per Dr. Lopez pt will have IR drainage today. Pt no longer on wound vac. LSW requested to collect choice for LTACH.     LSW met with pt at bedside to discuss LTAC CHOICE. Pt provided verbal consent for referral to [facility]     LSW faxed LTAC choice to [facility].     Barriers to Discharge: None     Plan: Await LTAC acceptance, LSW to continue to follow for d/c needs, LSW to assist as needed     Addendum [id number]  LSW received a call from Norma with [facility] informing LSW that she is showing the referral. However, due to technical difficulties with Epic she is not able to open referrals. LSW and Norma staffed pt. It was requested for referral to be manually faxed if able to. LSW reached out to [facility] requesting referral to be manually faxed if possible.     If Khalif with [facility] able to open referral or has access to referral would like to submit for insurance auth.

## 2021-06-09 NOTE — DISCHARGE PLANNING
Received Choice form at [id number]  Agency/Facility Name: [facility]  Referral sent per Choice form @ [phone]

## 2021-06-09 NOTE — OR SURGEON
Immediate Post- Operative Note        PostOp Diagnosis:Abdominal and pelvic abscess    Procedure(s): CT guided abscess drainages x2      Estimated Blood Loss: Less than 5 ml        Complications: None            6/9/2021     4:14 PM     Doug Mata M.D.

## 2021-06-09 NOTE — PROGRESS NOTES
Pt's wound dressing to abd removed d/t saturation. Light brown drainage observed. Wound cleaned and new dressing applied following recommended dressing care. Pt tolerated procedure well. Will continue to monitor for drainage.

## 2021-06-09 NOTE — PROGRESS NOTES
No acute change from prior assessment except pt needs 2x dressing changes and x3 reinforced dressings d/t wound dressing saturation from copious drainage from abd wound. Dr. Wood made aware this early morning. Report given to day RN.

## 2021-06-09 NOTE — ASSESSMENT & PLAN NOTE
Stopped NS - had some third spacing due hypoalbuminemic state, was on 3L until this am.  Is to start TPN today at 83cc/hr, will not need NS on top of that.

## 2021-06-09 NOTE — PROGRESS NOTES
Infectious Disease Progress Note    Author: Karly Obrien M.D. Date & Time of service: 2021  5:09 PM    Chief Complaint:  Follow-up for intra-abdominal abscesses     Interval History:   patient is afebrile, white count 13.4.  Patient had worsening abdominal pain yesterday, noted purulent output during wound VAC change, repeat CT with multiple intra-abdominal abscesses, taken back to the OR this morning.   AF, O2 RA, doing well overall and states she ambulated twice this morning with normal bowel movement.  She has some abdominal pain with ambulation but otherwise minimal.     Review of Systems:  Review of Systems   Constitutional: Positive for malaise/fatigue. Negative for chills and fever.   Respiratory: Negative for cough and shortness of breath.    Gastrointestinal: Positive for abdominal pain. Negative for constipation, diarrhea, nausea and vomiting.       Hemodynamics:  Temp (24hrs), Av.9 °C (98.5 °F), Min:36.7 °C (98.1 °F), Max:37.3 °C (99.2 °F)  Temperature: 37 °C (98.6 °F)  Pulse  Av.5  Min: 60  Max: 156   Blood Pressure: 124/74       Physical Exam:  Physical Exam  Constitutional:       Appearance: Normal appearance.   Cardiovascular:      Rate and Rhythm: Normal rate and regular rhythm.      Heart sounds: Normal heart sounds.   Pulmonary:      Effort: Pulmonary effort is normal.      Breath sounds: Normal breath sounds.   Abdominal:      General: There is distension.      Palpations: Abdomen is soft.      Tenderness: There is abdominal tenderness. There is no guarding.      Comments: Abdominal bandaging in place, soaked with brownish fluid   Musculoskeletal:      Right lower leg: No edema.      Left lower leg: No edema.   Skin:     General: Skin is warm and dry.   Neurological:      General: No focal deficit present.      Mental Status: She is alert and oriented to person, place, and time.   Psychiatric:      Comments: Emotional         Meds:    Current Facility-Administered  Medications:   •  [START ON 6/9/2021] DULoxetine  •  topiramate  •  enoxaparin (LOVENOX) injection  •  fluconazole  •  oxyCODONE immediate-release  •  oxyCODONE immediate-release  •  morphine injection  •  ampicillin-sulbactam (UNASYN) IV  •  [DISCONTINUED] insulin regular **AND** [CANCELED] POC blood glucose manual result **AND** NOTIFY MD and PharmD **AND** glucose **AND** dextrose 50%  •  Metoprolol Tartrate  •  LORazepam  •  diazePAM  •  Respiratory Therapy Consult  •  Pharmacy Consult Request  •  docusate sodium  •  senna-docusate  •  senna-docusate  •  polyethylene glycol/lytes  •  magnesium hydroxide  •  bisacodyl  •  fleet  •  famotidine **OR** famotidine  •  ondansetron    Labs:  Recent Labs     06/06/21  0501 06/06/21  0501 06/06/21  1243 06/07/21  0157 06/08/21  0442   WBC 22.8*  --   --  21.4* 14.7*   RBC 2.73*  --   --  2.62* 2.91*   HEMOGLOBIN 7.8*   < > 9.2* 7.5* 8.4*   HEMATOCRIT 24.4*  --   --  23.7* 27.5*   MCV 89.4  --   --  90.5 94.5   MCH 28.6  --   --  28.6 28.9   RDW 50.2*  --   --  50.4* 52.0*   PLATELETCT 396  --   --  409 433   MPV 10.2  --   --  10.0 9.9   NEUTSPOLYS 80.30*  --   --  80.40* 71.50   LYMPHOCYTES 8.30*  --   --  8.20* 10.50*   MONOCYTES 8.10  --   --  7.90 13.70*   EOSINOPHILS 0.70  --   --  0.70 1.50   BASOPHILS 0.30  --   --  0.40 0.50    < > = values in this interval not displayed.     Recent Labs     06/06/21  0501 06/07/21  0157 06/08/21  0442   SODIUM 131* 131* 135   POTASSIUM 4.1 3.9 4.1   CHLORIDE 97 97 100   CO2 25 25 25   GLUCOSE 114* 131* 106*   BUN 3* 3* 3*     Recent Labs     06/06/21  0501 06/07/21  0157 06/08/21  0442   ALBUMIN 2.2* 2.1* 2.3*   TBILIRUBIN 0.3 0.3 0.3   ALKPHOSPHAT 90 82 126*   TOTPROTEIN 5.0* 5.2* 5.9*   ALTSGPT 10 9 10   ASTSGOT 21 19 21   CREATININE 0.58 0.60 0.61       Imaging:  CT-ABDOMEN-PELVIS WITH    Result Date: 6/8/2021 6/8/2021 4:22 PM HISTORY/REASON FOR EXAM:  Peritonitis or perforation suspected; Recent right hemicolectomy for  cecal volvulus with anastamosis failure and bacterial peritonitis with abscesses, now with copious discharge from the wound. Most recent abdominal surgery for washout of the peritoneal cavity performed on 6/4/2021. Ongoing generalized abdominal pain. TECHNIQUE/EXAM DESCRIPTION: CT scan of the abdomen and pelvis with contrast. Contrast-enhanced helical scanning was obtained from the diaphragmatic domes through the pubic symphysis following the bolus administration of 100 mL of Omnipaque 350 nonionic contrast without complication. Low dose optimization technique was utilized for this CT exam including automated exposure control and adjustment of the mA and/or kV according to patient size. COMPARISON: 6/3/2021 FINDINGS: The visualized lung bases demonstrate a small right and moderate left dependent pleural effusion with dependent airspace disease, likely atelectasis similar to the previous exam. There is no acute bony process. CT Abdomen: There is postoperative change in the anterior abdominal wall midline. There are a few tiny air lucencies in the subcutaneous tissues with abdominal wall defect seen with the previous skin staples removed. Tiny amounts of intraperitoneal air identified within the anterior abdominal fluid. There is loculated intra-abdominal fluid with some seen in the left lateral subphrenic space anterior to the spleen measuring 4.4 x 2.1 cm (previously 4.6 x 2.7 cm). There is a small amount of loculated fluid in the right anterior upper quadrant also similar to the prior study. This appears to communicate with a more confluent area of fluid anteriorly within the peritoneal cavity beginning at the level of the iliac crest and extending into the upper pelvis. The largest component is in the right lower  quadrant measuring 4.9 cm in AP diameter, down from 6.5 cm. The fluid collection with rim enhancement and air lucencies in the posterior pelvis measures 6.2 x 3.5 cm (previously 7.2 x 3.9 cm). The liver  is unremarkable. The spleen is unremarkable. The pancreas is unremarkable. The gallbladder demonstrates no stones. The adrenal glands are normal in size. The kidneys enhance symmetrically. The abdominal aorta is normal in caliber. There is no lymphadenopathy. The stomach is distended with some fluid and contrast material. There is postoperative change consistent with a right hemicolectomy. There is minimal dilatation of the left transverse colon at the anastomosis. CT Pelvis: There are no new pelvic fluid collections. There is mild diffuse body wall edema.     1.  Loculated rim-enhancing peritoneal fluid collections are again seen in the upper and lower quadrants as well as the posterior pelvis, all of which are very minimally decreased in size. 2.  There is postoperative change in the anterior abdominal wall with removal of the skin staples. 3.  There are postoperative changes of right hemicolectomy with very minimal dilatation of the transverse colon at the anastomosis. There is no bowel obstruction. 4.  Stable moderate left and small right dependent pleural effusions with dependent atelectasis.    CT-ABDOMEN-PELVIS WITH    Result Date: 6/3/2021  6/3/2021 4:55 PM HISTORY/REASON FOR EXAM:  Abdominal abscess/infection suspected; wound dehiscense, r/o abscess. Postoperative. Recent volvulus with right hemicolectomy TECHNIQUE/EXAM DESCRIPTION:  CT scan of the abdomen and pelvis with contrast. Contrast-enhanced helical scanning was obtained from the diaphragmatic domes through the pubic symphysis following the bolus administration of nonionic contrast without complication. 100 mL of Omnipaque 350 nonionic contrast was administered without complication. Low dose optimization technique was utilized for this CT exam including automated exposure control and adjustment of the mA and/or kV according to patient size. COMPARISON: CT abdomen and pelvis 5/27/2021 FINDINGS: Osseous structures: There is bilateral atelectasis and  there are small to moderate-sized bilateral pleural effusion. Lungs: Clear ABDOMEN: There is peritoneal fluid present posterior to the abdominal wall and in both paracolic gutter. This has some degree of loculation especially in the left paracolic gutter and could indicate infected fluid. Additionally, in the right lower quadrant there is a loculated fluid collection present measuring approximately 13.8 x 4.2 x 6.5 cm and this connects to the peritoneal fluid in the right paracolic gutter. There is loculated rim-enhancing fluid in the left subphrenic space measuring 8.4 cm in length and approximately 4.6 x 2.7 cm transversely. This connects to the fluid within the left paracolic gutter. There are recent postoperative changes with skin staple present and intraperitoneal air and fluid. LIVER: is normal in appearance. GALLBLADDER and BILIARY SYSTEM Gallbladder and biliary system are normal by CT assessment SPLEEN: Normal in appearance.. PANCREAS: Normal in appearance. The splenic vein and portal vein enhance normally. ADRENAL glands: Normal in appearance. RIGHT kidney: There is a nonobstructive 1 to 2 mm right medial lower pole calculus. LEFT kidney: Normal in appearance. There is no hydronephrosis. BOWEL and MESENTERY: Their has been right hemicolectomy. AORTA and VASCULATURE: is normal in appearance. Pelvis: In the recto uterine space there is a loculated fluid collection measuring 6.6 x 7.2 x 5.1 cm consistent with an abscess. Uterus and adnexa appear normal.     1.  Multiple rim enhancing peritoneal fluid collection suspicious for abscess 2.  These include the rectouterine space measuring 6.6 x 7.2 x 5.1 cm, right lower quadrant measuring 13.8 x 4.2 x 6.5 cm, left subphrenic space measuring 8.4 x 4.6 x 2.7 cm, and contiguous loculated fluid within the right and left paracolic gutter 3.  Interval right colectomy 4.  Small-moderate-sized bilateral pleural effusion and atelectasis    CT-ABDOMEN-PELVIS WITH    Result  Date: 5/27/2021 5/27/2021 5:48 PM HISTORY/REASON FOR EXAM: Diffuse abdominal pain and distention. TECHNIQUE/EXAM DESCRIPTION: CT scan of the abdomen and pelvis with contrast. Contrast-enhanced helical scanning was obtained from the diaphragmatic domes through the pubic symphysis following the bolus administration of 100 mL of Omnipaque 350 nonionic contrast without complication. Low dose optimization technique was utilized for this CT exam including automated exposure control and adjustment of the mA and/or kV according to patient size. COMPARISON: 5/22/2021 FINDINGS: CT Abdomen: There is new bibasilar atelectasis and small bilateral pleural effusions, left greater than right. There is fatty change of the liver. There is a small amount of free intraperitoneal air tracking along the surface of the liver. Gallbladder is distended with dense material present within the gallbladder. The spleen is normal. There is moderate right-sided hydronephrosis extending to the level of the upper sacrum. The right ureter is not identified below that level. No definite ureteral stone on the right. There is left ureteral pelvic junction stone which measures 5 mm in size and results in minimal left renal pelvic dilatation. The adrenal glands are normal. The pancreas is normal. The aorta is normal in caliber. No evidence of retroperitoneal adenopathy. CT Pelvis: There is multifocal ascites seen which is likely loculated in nature. There are punctate areas of free air seen within those areas of ascites. There are surgical changes involving the cecum. The colon is diffusely dilated. There is a large amount of free fluid which contains pockets of gas within the pelvis. There is a recent midline incision.     1.  Moderate to large amount of ascites within the abdomen and pelvis some areas of which are loculated in nature. There are also punctate areas of gas within those areas of ascites as well as free intraperitoneal air. This may be  related to recent surgical procedure however the degree of volume of ascites would be unusual for normal postoperative course. Consideration should be given for leakage of bowel content or other process. 2.  Diffuse colonic distention. 3.  Bibasilar atelectasis and pleural effusions. 4.  5 mm left renal pelvic stone which results in mild hydronephrosis above that level. 5.  Moderate right-sided hydronephrosis extending to the level of the upper sacrum. No ureteral stone. The ureter is not identified below that level. 6.  This was discussed with NEVA KAY at 6:44 PM on 5/27/2021.    CT-ABDOMEN-PELVIS WITH    Result Date: 5/22/2021 5/22/2021 11:19 AM HISTORY/REASON FOR EXAM:  Abdominal distension; IV contrast only. TECHNIQUE/EXAM DESCRIPTION:   CT scan of the abdomen and pelvis with contrast. Contrast-enhanced helical scanning was obtained from the diaphragmatic domes through the pubic symphysis following the bolus administration of nonionic contrast without complication. 100 mL of Omnipaque 350 nonionic contrast was administered without complication. Low dose optimization technique was utilized for this CT exam including automated exposure control and adjustment of the mA and/or kV according to patient size. COMPARISON: 4/22/2020 FINDINGS: Chest Base: Lung bases are clear. Liver:  Normal. Gallbladder: Normal Biliary tract: Nondilated. Pancreas: Normal. Spleen: Normal. Adrenals: Normal. Kidneys and Collecting Systems:  Cannot exclude a punctate nonobstructing stone in the lower right renal collecting system. There is a small nonobstructing left renal stone measuring about 5 mm. Gastrointestinal tract:  The cecum is distended and displaced into the left upper quadrant. The ascending colon proximally is narrowed in the midline and there is a somewhat twisted appearance.   Mildly fluid distended distal small bowel without significant small bowel obstruction. The appendix is nonvisualized. Peritoneum: No free air or  free fluid. Reproductive organs:  2.3 cm left adnexal hypodense lesion is indeterminate, possibly a dominant follicle/small cyst in a menstruating patient. Correlate clinically. Probable intramural fibroid in the fundus. Bladder:  Normal. Vessels:  Normal caliber. Lymph  Nodes:  No lymphadenopathy. Abdominal wall: Within normal limits. Bones:  No acute or aggressive abnormality.     1.  Findings keeping with cecal volvulus. 2.  Mildly fluid distended distal small bowel. 3.  No free air. 4.  Small nonobstructing renal stones. These findings were discussed with ESHA STERLING on 5/22/2021 11:45 AM.     [id number] VIEW    Result Date: 5/27/2021 5/27/2021 7:01 AM HISTORY/REASON FOR EXAM:  Distention. TECHNIQUE/EXAM DESCRIPTION AND NUMBER OF VIEWS:  1 view(s) of the abdomen. COMPARISON: 5/24/2021 FINDINGS: Gaseous distended  small bowel and colon is similar to prior study. There is some stool within the right colon. There is no significant interval change. No suspicious calcifications. No acute osseous abnormality.     Gaseous distended small bowel and colon is similar to prior, likely ileus.    [id number] VIEW    Result Date: 5/24/2021 5/24/2021 11:28 AM HISTORY/REASON FOR EXAM:  Distention; s/p right hemicolectomy Lower abdominal pain s/p right hemicolectomy 05/22/21 TECHNIQUE/EXAM DESCRIPTION AND NUMBER OF VIEWS:  1 view(s) of the abdomen. COMPARISON: 5/22/2021 FINDINGS: Gaseous distention of the small bowel and colon No portal venous gas or pneumatosis. No definite free intraperitoneal air but evaluation is limited on supine radiograph.     Gaseous distention of the small bowel and colon, likely postoperative ileus.    DX-CHEST-FOR LINE PLACEMENT Perform procedure in: Other(comment f6 below): (PACU)    Result Date: 5/28/2021 5/28/2021 10:10 AM HISTORY/REASON FOR EXAM:  Central line placement. TECHNIQUE/EXAM DESCRIPTION AND NUMBER OF VIEWS: Single AP view of the chest. COMPARISON: None FINDINGS: There is a  left IJ central line with the tip high in position above the superior vena cava within the area of the left brachiocephalic vein. No pneumothorax. NG tube extends into the stomach. There are patchy bilateral pulmonary infiltrates. The heart is mildly enlarged. There is no pleural effusion.     1.  Left IJ central line tip high in position located above the superior vena cava are within the left brachiocephalic vein. 2.  Patchy bilateral infiltrates. 3.  NG tube tip extends into the stomach.    US-EXTREMITY VENOUS UPPER BILAT    Result Date: 2021   Upper Extremity  Venous Duplex Report  Vascular Laboratory  CONCLUSIONS  Small thrombus seen around the catheter line at the RIGHT axillary vein and  proximal basilic vein.  No left upper extremity superficial and deep venous thrombosis.  HOANG EMERY  Exam Date:     2021 14:29  Room #:     Inpatient  Priority:     Routine  Ht (in):             Wt (lb):  Ordering Physician:        DEIDRA SNIDER  Referring Physician:       [id number], CAO  Sonographer:               Sharlene Morrell RVT, RDMS  Study Type:                Complete Bilateral  Technical Quality:         Fair  Age:    50    Gender:     F  MRN:    [id number]  :    1970      BSA:  Indications:     Edema  CPT Codes:       46508  ICD Codes:       782.3  History:         Swelling. No prior study  Limitations:  PROCEDURES:  Bilateral upper extremity venous duplex imaging.  The following venous structures were evaluated: internal jugular,  subclavian, axillary, brachial, cephalic and basilic veins.  FINDINGS:  Right upper extremity.  Small thrombus seen around the central line at the axillary vein and  proximal basilic vein.  All other veins of the right upper extremity demonstrate normal flow  dynamics with no evidence of deep vein thrombosis.  Left upper extremity.  Limited visualization of the internal jugular vein due to bandages.  All other  "veins of the left upper extremity demonstrate normal flow  dynamics with no evidence of deep vein thrombosis.  Doug Rossi MD  (Electronically Signed)  Final Date:      02 June 2021                   18:16    IR-MIDLINE CATHETER INSERTION WO GUIDANCE > AGE 3    Result Date: 6/1/2021  HISTORY/REASON FOR EXAM:  Midline Placement   TECHNIQUE/EXAM DESCRIPTION AND NUMBER OF VIEWS: Midline insertion with ultrasound guidance.  FINDINGS: Midline insertion with Ultrasound Guidance was performed by qualified nursing staff without the assistance of a Radiologist. Midline positioning as measured by RN or as appropriate length of catheter selected.              Ultrasound-guided midline placement performed by qualified nursing staff as above.       Micro:  Results     Procedure Component Value Units Date/Time    Culture Wound w/Gram Stain [[id number]] Collected: 06/08/21 1425    Order Status: Sent Specimen: Wound from Abdominal     BLOOD CULTURE [[id number]] Collected: 06/02/21 1730    Order Status: Completed Specimen: Blood from Peripheral Updated: 06/07/21 1817     Significant Indicator NEG     Source BLD     Site PERIPHERAL     Culture Result No growth after 5 days of incubation.  Blood culture testing and Gram stain, if indicated, are  performed at [facility], [address] [place] [address].  Positive blood cultures are  sent to [facility], [address] [place] [address], for organism identification and  susceptibility testing.      Narrative:      Per Hospital Policy: Only change Specimen Src: to \"Line\" if  specified by physician order.  Left Wrist    BLOOD CULTURE [[id number]] Collected: 06/02/21 1307    Order Status: Completed Specimen: Blood from Peripheral Updated: 06/07/21 1417     Significant Indicator NEG     Source BLD     Site PERIPHERAL     Culture Result No growth after 5 days of incubation.  Blood culture testing and Gram stain, if indicated, are  performed at " "[facility], [id number]  [facility] [place], [place].  Positive blood cultures are  sent to [facility] [place] [facility], [address], [place], for organism identification and  susceptibility testing.      Narrative:      Per Hospital Policy: Only change Specimen Src: to \"Line\" if  specified by physician order.  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   Studies reviewed  Micro reviewed    Patient wound VAC change today but significant mount of fluid and unable to be replaced.  Surgery was notified and CT abdomen pelvis was performed.  Patient continued on antibiotics but as below    Assessment/Hospital course:  This is a very pleasant 50-year-old female patient, originally admitted on 5/22/2021 with cecal volvulus for which he underwent a right hemicolectomy on 5/22.  This was complicated by intra-abdominal abscesses and free air concerning for leak.  She was taken to the OR for exploratory laparotomy and found to have necrosis of the ileocolonic anastomosis site, bowel is eviscerated and redo side-to-side ileocolic anastomosis was done.    Patient went back to the OR on 6/4 due to drainage from wound and concern for fascial dehiscence.  Per op note there is loosening of the fascia but no yessy dehiscence.  Abdomen is washed out including pockets noted on last CT.  Anastomosis was not evaluated as was scarred and no evidence of leak.     Pertinent Diagnoses:  Sepsis, improved  Intra-abdominal abscesses, persistent no real change per recent CT  Large midline incision, with significant drainage soaking bandaging  Feculent peritonitis  Cecal volvulus, " sequelae  Leukocytosis, ongoing but improved     Plan:  -OR cultures from 5/28 growing Bacteroides, Actinomyces, E faecalis  -Patient had worsening abdominal pain and a repeat CT scan obtained 6/3 showed multiple rim-enhancing peritoneal fluid collections several quite large and loculated.  Patient was taken back to the OR for exploratory laparotomy on 6/4 as discussed above, no new cultures were obtained.  Wound VAC changed today with copious drainage continues to saturate dressings.   -Repeat CT abdomen and pelvis was ordered with postop change in anterior abdominal wall, few teeny air lucencies and tiny amounts of intraperitoneal air identified within the anterior abdominal fluid.  Loculated intra-abdominal fluid in the left lateral splenic space measuring 4.4 x 2.1 cm, small amount of loculated fluid in the right anterior upper quadrant also similar to prior study.  This appears to Critic-Aid with fluid anterior to the peritoneal cavity measuring 4.9 x 6.5 cm.  Fluid collection with rim enhancement in the posterior pelvis now 6.2 x 3.5 cm.  Overall slight improvement but still with numerous related rim-enhancing fluid collections.      --- Continue IV Unasyn 3 g every 6 hours and fluconazole 400 mg daily  --- Awaiting surgical review of the CT abdomen pelvis and plan  --- Recommend drainage of the fluid collections if the patient is not taken for open surgery -if drainage is performed please obtain new cultures  --- Prior cultures with actinomyces from the abdominal cavity.  This organism typically requires a longer course once it is established causing tissue or bowel invasion often seen as a mass/abscess or fistula.  However this is an early culture and with source control the goal will therefore be to prevent future established infection so will plan on a several week antibiotic course w/ duration based on repeat imaging.     Discussed with internal medicine, Dr. Shepherd.   ID will follow.

## 2021-06-09 NOTE — CARE PLAN
The patient is Watcher- Medium risk of patient condition declining or worsening.    Shift Goals  Clinical Goals: Patient pain will be controlled   Patient Goals: sleep comfortably    Progress made toward(s) clinical / shift goals: Progressing   Pt's pain is well-controlled with PRN oxycodone and Morphine. Able to sleep comfortably during shift.    Patient is not progressing towards the following goals:    Problem: Fall Risk  Goal Patient will remain free from injury or falls  Outcome: Progressing    Problem: Skin Integrity  Goal: Wound drainage is improved after end of shift.  Outcome: Not progressing  Pt's wound has copious drainage that several dressing changes/reinforcement needed throughout shift.

## 2021-06-09 NOTE — PROGRESS NOTES
[facility] Medicine Daily Progress Note    Date of Service  6/9/2021    Chief Complaint  50 y.o. female admitted 5/22/2021 with abd pain.    Hospital Course  49 yo woman who presented with abd pain and CT showed cecal volvulus. She underwent exploratory celiotomy and right hemicolectomy with ileocolic anastomosis with Dr. Wood 5/22/21. She developed thrombocytopenia and ascites and returned for ex lap with resection of ileocolic anastomosis and wound vac placement 5/28/21. She was admitted to ICU, started on zosyn for bacterial peritonitis and given platelet transfusions. HIT panel was negative. Abd cultures grew actinomyces, enterococcus, bacteroides. Doppler showed catheter related thrombus in R arm and started on anticoagulation. She had additional washout in the OR with Dr. Wood 6/4/21 and remains on unasyn and fluconazole. Wound vac remains in place.    Interval Problem Update  6/8 - White count coming down, afebrile; states that she is eating well, and that her current medication regimen controls her pain and lasts ~4 hours.  Can likely dc home tomorrow with home health for wound care and dressing changes, will need outpatient ID recs from Dr Obrien, much appreciated.     6/9 - Afebrile overnight, remains tachycardic - dry MM, will change to NS and increase rate. White count down further today. Pt is to go for IR drain placement this afternoon, is NPO currently.    Consultants/Specialty  Critical care  Surgery  ID    Code Status  Full Code    Disposition  TBD - home with home health vs LTAC    Review of Systems  Review of Systems   Constitutional: Positive for malaise/fatigue. Negative for fever.   Respiratory: Negative for shortness of breath.    Cardiovascular: Negative for chest pain.   Gastrointestinal: Positive for abdominal pain. Negative for constipation and vomiting.   Musculoskeletal: Positive for myalgias.   Neurological: Positive for weakness. Negative for dizziness.   All other systems reviewed  and are negative.       Physical Exam  Temp:  [36.6 °C (97.9 °F)-37.7 °C (99.9 °F)] 36.6 °C (97.9 °F)  Pulse:  [109-113] 110  Resp:  [18] 18  BP: (100-124)/(62-76) 100/76  SpO2:  [91 %-96 %] 96 %    Physical Exam  Vitals and nursing note reviewed.   Constitutional:       Appearance: She is ill-appearing. She is not toxic-appearing.      Comments: fatigued   HENT:      Head: Normocephalic.      Mouth/Throat:      Mouth: Mucous membranes are moist.   Eyes:      General:         Right eye: No discharge.         Left eye: No discharge.   Cardiovascular:      Rate and Rhythm: Normal rate and regular rhythm.   Pulmonary:      Effort: Pulmonary effort is normal. No respiratory distress.      Breath sounds: No wheezing or rales.   Abdominal:      Tenderness: There is abdominal tenderness (diffuse). There is no guarding or rebound.      Comments: Abdomen with extensive dressing in place, c/d/i   Musculoskeletal:         General: No swelling.      Cervical back: Neck supple.   Skin:     General: Skin is warm and dry.   Neurological:      General: No focal deficit present.      Mental Status: She is alert.      Comments: AOx4         Fluids    Intake/Output Summary (Last 24 hours) at 6/9/2021 1141  Last data filed at 6/9/2021 0800  Gross per 24 hour   Intake 1800 ml   Output 5250 ml   Net -3450 ml       Laboratory  Recent Labs     06/07/21 0157 06/08/21  0442 06/09/21  0514   WBC 21.4* 14.7* 12.0*   RBC 2.62* 2.91* 2.90*   HEMOGLOBIN 7.5* 8.4* 8.1*   HEMATOCRIT 23.7* 27.5* 26.6*   MCV 90.5 94.5 91.7   MCH 28.6 28.9 27.9   MCHC 31.6* 30.5* 30.5*   RDW 50.4* 52.0* 49.5   PLATELETCT 409 433 520*   MPV 10.0 9.9 9.6     Recent Labs     06/07/21  0157 06/08/21  0442 06/09/21  0514   SODIUM 131* 135 133*   POTASSIUM 3.9 4.1 4.6   CHLORIDE 97 100 95*   CO2 25 25 25   GLUCOSE 131* 106* 128*   BUN 3* 3* 3*   CREATININE 0.60 0.61 0.70   CALCIUM 8.1* 8.2* 8.7     Recent Labs     06/07/21  0157 06/09/21  0726   APTT 185.0* 39.2*   INR  1.17* 1.09               Imaging  CT-ABDOMEN-PELVIS WITH   Final Result      1.  Loculated rim-enhancing peritoneal fluid collections are again seen in the upper and lower quadrants as well as the posterior pelvis, all of which are very minimally decreased in size.   2.  There is postoperative change in the anterior abdominal wall with removal of the skin staples.   3.  There are postoperative changes of right hemicolectomy with very minimal dilatation of the transverse colon at the anastomosis. There is no bowel obstruction.   4.  Stable moderate left and small right dependent pleural effusions with dependent atelectasis.      CT-ABDOMEN-PELVIS WITH   Final Result      1.  Multiple rim enhancing peritoneal fluid collection suspicious for abscess      2.  These include the rectouterine space measuring 6.6 x 7.2 x 5.1 cm, right lower quadrant measuring 13.8 x 4.2 x 6.5 cm, left subphrenic space measuring 8.4 x 4.6 x 2.7 cm, and contiguous loculated fluid within the right and left paracolic gutter      3.  Interval right colectomy      4.  Small-moderate-sized bilateral pleural effusion and atelectasis      US-EXTREMITY VENOUS UPPER BILAT   Final Result      IR-MIDLINE CATHETER INSERTION WO GUIDANCE > AGE 3   Final Result                  Ultrasound-guided midline placement performed by qualified nursing staff    as above.          DX-CHEST-FOR LINE PLACEMENT Perform procedure in: Other(comment f6 below): (PACU)   Final Result      1.  Left IJ central line tip high in position located above the superior vena cava are within the left brachiocephalic vein.      2.  Patchy bilateral infiltrates.      3.  NG tube tip extends into the stomach.      CT-ABDOMEN-PELVIS WITH   Final Result      1.  Moderate to large amount of ascites within the abdomen and pelvis some areas of which are loculated in nature. There are also punctate areas of gas within those areas of ascites as well as free intraperitoneal air. This may be  related to recent    surgical procedure however the degree of volume of ascites would be unusual for normal postoperative course. Consideration should be given for leakage of bowel content or other process.      2.  Diffuse colonic distention.      3.  Bibasilar atelectasis and pleural effusions.      4.  5 mm left renal pelvic stone which results in mild hydronephrosis above that level.      5.  Moderate right-sided hydronephrosis extending to the level of the upper sacrum. No ureteral stone. The ureter is not identified below that level.      6.  This was discussed with NEVA KAY at 6:44 PM on 5/27/2021.      [id number] VIEW   Final Result      Gaseous distended small bowel and colon is similar to prior, likely ileus.      [id number] VIEW   Final Result         Gaseous distention of the small bowel and colon, likely postoperative ileus.      CT-ABDOMEN-PELVIS WITH   Final Result         1.  Findings keeping with cecal volvulus.   2.  Mildly fluid distended distal small bowel.   3.  No free air.   4.  Small nonobstructing renal stones.   These findings were discussed with ESHA STERLING on 5/22/2021 11:45 AM.                  CT-DRAIN-PERITONEAL    (Results Pending)        Assessment/Plan  * Bacterial peritonitis (HCC)  Assessment & Plan  - Abd cultures grew actinomyces, enterococcus, bacteroides.    - Surgery following, had ileocecal resection/redo 5/28/21 and washout 6/4/21  - Had copious output to her wound vac 6/8  which then failed and was removed - then had significant purulent output from her wound. CT scan shows continued peritoneal abscesses with little improvement, is going for IR drainage today. Wound culture from 6/8 pending, repeats ordered with drain placement today.   - ID consulted, on unasyn and fluconazole - given the unchanged nature of the abscesses and continued need for wound care, pt may be a candidate for IV antibiotics and LTAC - will discuss with ID.      Dehydration  Assessment & Plan  - Hypochloremia and hyponatremia, dry MM - change LR to NS and increase rate     Anemia  Assessment & Plan  Hgb stable, monitor with transition to oral eliquis.  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

## 2021-06-09 NOTE — PROGRESS NOTES
Trauma / Surgical Daily Progress Note    Date of Service  6/9/2021    Chief Complaint  50 y.o. female admitted 5/22/2021 with cecal volvulus    Interval Events  5/22 Exploratory celiotomy and right hemicolectomy with a side-to-side   ileocolic anastomosis  5/28 Exploratory celiotomy, resection of ileocolic anastomosis with re-do  side-to-side ileocolic anastomosis  6/4 Ex celiotomy washout    WBC down further however drainage from wound prompted repeat CT. Two fluid collections requiring IR drain now ordered. No evidence of leak. Tolerating diet. Stooling. On Unasyn, Diflucan per ID.      Review of Systems  Review of Systems   Gastrointestinal: Positive for abdominal pain.        Passing some flatus        Vital Signs for last 24 hours  Temp:  [36.6 °C (97.9 °F)-37.7 °C (99.9 °F)] 36.6 °C (97.9 °F)  Pulse:  [] 110  Resp:  [18-20] 18  BP: (100-124)/(42-76) 100/76  SpO2:  [91 %-96 %] 96 %    Hemodynamic parameters for last 24 hours       Respiratory Data     Respiration: 18, Pulse Oximetry: 96 %        RUL Breath Sounds: Clear, RML Breath Sounds: Clear, RLL Breath Sounds: Diminished, LUCITA Breath Sounds: Clear, LLL Breath Sounds: Diminished    Physical Exam  Physical Exam  Cardiovascular:      Rate and Rhythm: Normal rate.      Pulses: Normal pulses.   Pulmonary:      Effort: Pulmonary effort is normal.   Abdominal:      General: There is no distension.      Palpations: Abdomen is soft.      Tenderness: There is abdominal tenderness.      Comments: Wound Vac in place    Less distention, abdomen softer      Genitourinary:     Comments: diuresing  Musculoskeletal:         General: Normal range of motion.      Cervical back: Neck supple.      Comments: Generalized edema   Skin:     General: Skin is warm and dry.   Neurological:      General: No focal deficit present.      Mental Status: She is alert.   Psychiatric:      Comments: Teary today         Laboratory  Recent Results (from the past 24 hour(s))   CBC WITH  DIFFERENTIAL    Collection Time: 06/09/21  5:14 AM   Result Value Ref Range    WBC 12.0 (H) 4.8 - 10.8 K/uL    RBC 2.90 (L) 4.20 - 5.40 M/uL    Hemoglobin 8.1 (L) 12.0 - 16.0 g/dL    Hematocrit 26.6 (L) 37.0 - 47.0 %    MCV 91.7 81.4 - 97.8 fL    MCH 27.9 27.0 - 33.0 pg    MCHC 30.5 (L) 33.6 - 35.0 g/dL    RDW 49.5 35.9 - 50.0 fL    Platelet Count 520 (H) 164 - 446 K/uL    MPV 9.6 9.0 - 12.9 fL    Neutrophils-Polys 67.90 44.00 - 72.00 %    Lymphocytes 9.10 (L) 22.00 - 41.00 %    Monocytes 19.50 (H) 0.00 - 13.40 %    Eosinophils 1.30 0.00 - 6.90 %    Basophils 0.80 0.00 - 1.80 %    Immature Granulocytes 1.40 (H) 0.00 - 0.90 %    Nucleated RBC 0.20 /100 WBC    Neutrophils (Absolute) 8.14 (H) 2.00 - 7.15 K/uL    Lymphs (Absolute) 1.09 1.00 - 4.80 K/uL    Monos (Absolute) 2.34 (H) 0.00 - 0.85 K/uL    Eos (Absolute) 0.15 0.00 - 0.51 K/uL    Baso (Absolute) 0.09 0.00 - 0.12 K/uL    Immature Granulocytes (abs) 0.17 (H) 0.00 - 0.11 K/uL    NRBC (Absolute) 0.02 K/uL   Comp Metabolic Panel    Collection Time: 06/09/21  5:14 AM   Result Value Ref Range    Sodium 133 (L) 135 - 145 mmol/L    Potassium 4.6 3.6 - 5.5 mmol/L    Chloride 95 (L) 96 - 112 mmol/L    Co2 25 20 - 33 mmol/L    Anion Gap 13.0 7.0 - 16.0    Glucose 128 (H) 65 - 99 mg/dL    Bun 3 (L) 8 - 22 mg/dL    Creatinine 0.70 0.50 - 1.40 mg/dL    Calcium 8.7 8.4 - 10.2 mg/dL    AST(SGOT) 15 12 - 45 U/L    ALT(SGPT) 9 2 - 50 U/L    Alkaline Phosphatase 92 30 - 99 U/L    Total Bilirubin 0.3 0.1 - 1.5 mg/dL    Albumin 2.2 (L) 3.2 - 4.9 g/dL    Total Protein 5.9 (L) 6.0 - 8.2 g/dL    Globulin 3.7 (H) 1.9 - 3.5 g/dL    A-G Ratio 0.6 g/dL   ESTIMATED GFR    Collection Time: 06/09/21  5:14 AM   Result Value Ref Range    GFR If African American >60 >60 mL/min/1.73 m 2    GFR If Non African American >60 >60 mL/min/1.73 m 2       Fluids    Intake/Output Summary (Last 24 hours) at 6/9/2021 0649  Last data filed at 6/9/2021 0539  Gross per 24 hour   Intake 2040 ml   Output 5650 ml    Net -3610 ml       Core Measures & Quality Metrics  Labs reviewed and Medications reviewed  Young catheter: No Young      DVT Prophylaxis: Enoxaparin (Lovenox)  DVT prophylaxis - mechanical: SCDs  Ulcer prophylaxis: Yes  Antibiotics: Treating active infection/contamination beyond 24 hours perioperative coverage  Assessed for rehab: Patient returned to prior level of function, rehabilitation not indicated at this time    ALICIA Score  ETOH Screening    Assessment/Plan  * Bacterial peritonitis (HCC)  Assessment & Plan  6/2 Zosyn stopped.  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 Passing flatus - start clears  5/27 - Stooled - will adv to full liquids  5/27 - thrombocytopenia, bandemia, PM CT with ascites no freeair  5/28 - Exploratory celiotomy, resection of ileocolic anastomosis with re-do  side-to-side ileocolic anastomosis.  6/3  Stooling, advance to regular diet  6/4 Small wound dehiscence - returned to OR for washout and reclosure  6/9 Repeat CT showed two fluid collections - IR to drain  Remains on unasyn    DVT of axillary vein, acute right (HCC)  Assessment & Plan  Noted on US  On heparin gtt  6/6  Switched to lovenox BID       Discussed patient condition with RN and Patient.    CRITICAL CARE TIME EXCLUDING PROCEDURES: 20  minutes

## 2021-06-09 NOTE — PROGRESS NOTES
Wound care RN notified this RN about wound vac alarm and dressing saturated. Medicated prior to dressing change. Dressing saturated after pt ambulated to the bathroom. Wound care RN notified. Dressing changed by wound care RN. At this time patient is on bed rest, reports nausea, medicated and encouraged to drink contrast for stat CT.

## 2021-06-09 NOTE — CARE PLAN
The patient is Watcher - Medium risk of patient condition declining or worsening    Shift Goals  Clinical Goals: patient will state pain is controled  Patient Goals: sleep comfortably    Progress made toward(s) clinical / shift goals:  Patient medicated per Md orders, non pharmaceutical measures taken, ice, heat, blanket, distraction tv. Patient asleep. During woke hours pt. in pain     Patient is not progressing towards the following goals:      Problem: Knowledge Deficit - Standard  Goal: Patient and family/care givers will demonstrate understanding of plan of care, disease process/condition, diagnostic tests and medications  Outcome: Not Progressing     Problem: Pain - Standard  Goal: Alleviation of pain or a reduction in pain to the patient’s comfort goal  Outcome: Not Progressing     Problem: Depression  Goal: Patient and family/caregiver will verbalize accurate information about at least two of the possible causes of depression, three-four of the signs and symptoms of depression  Outcome: Not Progressing     Problem: Fall Risk  Goal: Patient will remain free from falls  Outcome: Not Progressing     Problem: Skin Integrity  Goal: Skin integrity is maintained or improved  Outcome: Not Progressing     Problem: Early Mobilization - Post Surgery  Goal: Early mobilization post surgery  Outcome: Not Progressing     Problem: Pain - Post Surgery  Goal: Alleviation or reduction of pain post surgery  Outcome: Not Progressing     Problem: Wound/ / Incision Healing  Goal: Patient's wound/surgical incision will decrease in size and heals properly  Outcome: Not Progressing     Problem: Bowel Elimination - Post Surgical  Goal: Patient will resume regular bowel sounds and function with no discomfort or distention  Outcome: Not Progressing     Problem: Respiratory  Goal: Patient will achieve/maintain optimum respiratory ventilation and gas exchange  Outcome: Not Progressing     Problem: Discharge Barriers/Planning  Goal:  Patient's continuum of care needs are met  Outcome: Not Progressing    Educate patient on procedures, tests, reorient patient, guide through breathing exercises for anxiety.

## 2021-06-09 NOTE — PROGRESS NOTES
Pt A&O, denies any nausea, pain in abd 8/10. Medicated with oxycodone. CMS intact. Surgical dressing clean, intact w/ small drainage observed; reinforced. POC discussed with pt. Pt verbalized understanding. Ice pack, SCDs in placed. VS WDL. Safety precautions in place. Chocolate milk and fresh water/ice provided per pt request. No other needs at this time.

## 2021-06-10 ENCOUNTER — APPOINTMENT (OUTPATIENT)
Dept: RADIOLOGY | Facility: MEDICAL CENTER | Age: 51
DRG: 329 | End: 2021-06-10
Attending: FAMILY MEDICINE
Payer: COMMERCIAL

## 2021-06-10 PROBLEM — J96.90 RESPIRATORY FAILURE (HCC): Status: ACTIVE | Noted: 2021-06-10

## 2021-06-10 LAB
ALBUMIN SERPL BCP-MCNC: 2.4 G/DL (ref 3.2–4.9)
ALBUMIN/GLOB SERPL: 0.6 G/DL
ALP SERPL-CCNC: 83 U/L (ref 30–99)
ALT SERPL-CCNC: 8 U/L (ref 2–50)
ANION GAP SERPL CALC-SCNC: 12 MMOL/L (ref 7–16)
AST SERPL-CCNC: 14 U/L (ref 12–45)
BACTERIA WND AEROBE CULT: ABNORMAL
BACTERIA WND AEROBE CULT: ABNORMAL
BASOPHILS # BLD AUTO: 0 % (ref 0–1.8)
BASOPHILS # BLD: 0 K/UL (ref 0–0.12)
BILIRUB SERPL-MCNC: 0.3 MG/DL (ref 0.1–1.5)
BUN SERPL-MCNC: 4 MG/DL (ref 8–22)
CALCIUM SERPL-MCNC: 8.3 MG/DL (ref 8.4–10.2)
CHLORIDE SERPL-SCNC: 99 MMOL/L (ref 96–112)
CO2 SERPL-SCNC: 22 MMOL/L (ref 20–33)
CREAT SERPL-MCNC: 0.62 MG/DL (ref 0.5–1.4)
EOSINOPHIL # BLD AUTO: 0.11 K/UL (ref 0–0.51)
EOSINOPHIL NFR BLD: 1 % (ref 0–6.9)
ERYTHROCYTE [DISTWIDTH] IN BLOOD BY AUTOMATED COUNT: 48.8 FL (ref 35.9–50)
GLOBULIN SER CALC-MCNC: 3.7 G/DL (ref 1.9–3.5)
GLUCOSE SERPL-MCNC: 113 MG/DL (ref 65–99)
GRAM STN SPEC: ABNORMAL
GRAM STN SPEC: NORMAL
HCT VFR BLD AUTO: 24.4 % (ref 37–47)
HGB BLD-MCNC: 7.5 G/DL (ref 12–16)
HYPOCHROMIA BLD QL SMEAR: ABNORMAL
LYMPHOCYTES # BLD AUTO: 0.76 K/UL (ref 1–4.8)
LYMPHOCYTES NFR BLD: 7 % (ref 22–41)
MAGNESIUM SERPL-MCNC: 2 MG/DL (ref 1.5–2.5)
MANUAL DIFF BLD: ABNORMAL
MCH RBC QN AUTO: 28.1 PG (ref 27–33)
MCHC RBC AUTO-ENTMCNC: 30.7 G/DL (ref 33.6–35)
MCV RBC AUTO: 91.4 FL (ref 81.4–97.8)
MONOCYTES # BLD AUTO: 1.94 K/UL (ref 0–0.85)
MONOCYTES NFR BLD AUTO: 18 % (ref 0–13.4)
NEUTROPHILS # BLD AUTO: 7.99 K/UL (ref 2–7.15)
NEUTROPHILS NFR BLD: 58 % (ref 44–72)
NEUTS BAND NFR BLD MANUAL: 16 % (ref 0–10)
NRBC # BLD AUTO: 0 K/UL
NRBC BLD-RTO: 0 /100 WBC
PHOSPHATE SERPL-MCNC: 4.5 MG/DL (ref 2.5–4.5)
PLATELET # BLD AUTO: 464 K/UL (ref 164–446)
PLATELET BLD QL SMEAR: NORMAL
PMV BLD AUTO: 9 FL (ref 9–12.9)
POLYCHROMASIA BLD QL SMEAR: NORMAL
POTASSIUM SERPL-SCNC: 4.1 MMOL/L (ref 3.6–5.5)
PROT SERPL-MCNC: 6.1 G/DL (ref 6–8.2)
RBC # BLD AUTO: 2.67 M/UL (ref 4.2–5.4)
RBC BLD AUTO: PRESENT
SIGNIFICANT IND 70042: ABNORMAL
SIGNIFICANT IND 70042: NORMAL
SITE SITE: ABNORMAL
SITE SITE: NORMAL
SODIUM SERPL-SCNC: 133 MMOL/L (ref 135–145)
SOURCE SOURCE: ABNORMAL
SOURCE SOURCE: NORMAL
WBC # BLD AUTO: 10.8 K/UL (ref 4.8–10.8)

## 2021-06-10 PROCEDURE — 700111 HCHG RX REV CODE 636 W/ 250 OVERRIDE (IP): Performed by: INTERNAL MEDICINE

## 2021-06-10 PROCEDURE — 85007 BL SMEAR W/DIFF WBC COUNT: CPT

## 2021-06-10 PROCEDURE — 302111 WAFER OST 2.25IN N IMG RD 2 PC (BARRIER): Performed by: FAMILY MEDICINE

## 2021-06-10 PROCEDURE — 84100 ASSAY OF PHOSPHORUS: CPT

## 2021-06-10 PROCEDURE — 302098 PASTE RING (FLAT): Performed by: FAMILY MEDICINE

## 2021-06-10 PROCEDURE — 36415 COLL VENOUS BLD VENIPUNCTURE: CPT

## 2021-06-10 PROCEDURE — 83735 ASSAY OF MAGNESIUM: CPT

## 2021-06-10 PROCEDURE — 85027 COMPLETE CBC AUTOMATED: CPT

## 2021-06-10 PROCEDURE — 700102 HCHG RX REV CODE 250 W/ 637 OVERRIDE(OP): Performed by: INTERNAL MEDICINE

## 2021-06-10 PROCEDURE — 302108 TAPE SECURITY OSTOMY (PINK): Performed by: FAMILY MEDICINE

## 2021-06-10 PROCEDURE — 700105 HCHG RX REV CODE 258: Performed by: INTERNAL MEDICINE

## 2021-06-10 PROCEDURE — 80053 COMPREHEN METABOLIC PANEL: CPT

## 2021-06-10 PROCEDURE — 700111 HCHG RX REV CODE 636 W/ 250 OVERRIDE (IP): Performed by: FAMILY MEDICINE

## 2021-06-10 PROCEDURE — A9270 NON-COVERED ITEM OR SERVICE: HCPCS | Performed by: INTERNAL MEDICINE

## 2021-06-10 PROCEDURE — A9270 NON-COVERED ITEM OR SERVICE: HCPCS | Performed by: FAMILY MEDICINE

## 2021-06-10 PROCEDURE — 302102 BAG OST N IMG 2.25IN 2PC (FECAL): Performed by: FAMILY MEDICINE

## 2021-06-10 PROCEDURE — 700101 HCHG RX REV CODE 250: Performed by: FAMILY MEDICINE

## 2021-06-10 PROCEDURE — 99233 SBSQ HOSP IP/OBS HIGH 50: CPT | Performed by: FAMILY MEDICINE

## 2021-06-10 PROCEDURE — 770006 HCHG ROOM/CARE - MED/SURG/GYN SEMI*

## 2021-06-10 PROCEDURE — 700111 HCHG RX REV CODE 636 W/ 250 OVERRIDE (IP): Performed by: SURGERY

## 2021-06-10 PROCEDURE — 700105 HCHG RX REV CODE 258: Performed by: FAMILY MEDICINE

## 2021-06-10 PROCEDURE — A9270 NON-COVERED ITEM OR SERVICE: HCPCS | Performed by: SURGERY

## 2021-06-10 PROCEDURE — 71045 X-RAY EXAM CHEST 1 VIEW: CPT

## 2021-06-10 PROCEDURE — 700102 HCHG RX REV CODE 250 W/ 637 OVERRIDE(OP): Performed by: SURGERY

## 2021-06-10 PROCEDURE — 700111 HCHG RX REV CODE 636 W/ 250 OVERRIDE (IP): Performed by: NURSE PRACTITIONER

## 2021-06-10 PROCEDURE — 700102 HCHG RX REV CODE 250 W/ 637 OVERRIDE(OP): Performed by: FAMILY MEDICINE

## 2021-06-10 RX ADMIN — AMPICILLIN SODIUM AND SULBACTAM SODIUM 3 G: 2; 1 INJECTION, POWDER, FOR SOLUTION INTRAMUSCULAR; INTRAVENOUS at 18:39

## 2021-06-10 RX ADMIN — DOCUSATE SODIUM 100 MG: 100 CAPSULE, LIQUID FILLED ORAL at 18:40

## 2021-06-10 RX ADMIN — OXYCODONE HYDROCHLORIDE 15 MG: 10 TABLET ORAL at 13:04

## 2021-06-10 RX ADMIN — AMPICILLIN SODIUM AND SULBACTAM SODIUM 3 G: 2; 1 INJECTION, POWDER, FOR SOLUTION INTRAMUSCULAR; INTRAVENOUS at 00:03

## 2021-06-10 RX ADMIN — ONDANSETRON 4 MG: 2 INJECTION INTRAMUSCULAR; INTRAVENOUS at 23:31

## 2021-06-10 RX ADMIN — FLUCONAZOLE 400 MG: 200 TABLET ORAL at 05:30

## 2021-06-10 RX ADMIN — OXYCODONE HYDROCHLORIDE 15 MG: 10 TABLET ORAL at 20:19

## 2021-06-10 RX ADMIN — MORPHINE SULFATE 2 MG: 4 INJECTION INTRAVENOUS at 11:39

## 2021-06-10 RX ADMIN — AMPICILLIN SODIUM AND SULBACTAM SODIUM 3 G: 2; 1 INJECTION, POWDER, FOR SOLUTION INTRAMUSCULAR; INTRAVENOUS at 13:05

## 2021-06-10 RX ADMIN — MORPHINE SULFATE 2 MG: 4 INJECTION INTRAVENOUS at 23:29

## 2021-06-10 RX ADMIN — TOPIRAMATE 100 MG: 100 TABLET, FILM COATED ORAL at 20:19

## 2021-06-10 RX ADMIN — MORPHINE SULFATE 2 MG: 4 INJECTION INTRAVENOUS at 15:30

## 2021-06-10 RX ADMIN — FAMOTIDINE 20 MG: 20 TABLET ORAL at 05:30

## 2021-06-10 RX ADMIN — DULOXETINE HYDROCHLORIDE 60 MG: 30 CAPSULE, DELAYED RELEASE ORAL at 05:30

## 2021-06-10 RX ADMIN — LORAZEPAM 0.5 MG: 2 INJECTION INTRAMUSCULAR; INTRAVENOUS at 11:38

## 2021-06-10 RX ADMIN — OXYCODONE HYDROCHLORIDE 15 MG: 10 TABLET ORAL at 07:50

## 2021-06-10 RX ADMIN — ENOXAPARIN SODIUM 80 MG: 80 INJECTION SUBCUTANEOUS at 18:40

## 2021-06-10 RX ADMIN — POLYETHYLENE GLYCOL 3350 1 PACKET: 17 POWDER, FOR SOLUTION ORAL at 05:30

## 2021-06-10 RX ADMIN — AMPICILLIN SODIUM AND SULBACTAM SODIUM 3 G: 2; 1 INJECTION, POWDER, FOR SOLUTION INTRAMUSCULAR; INTRAVENOUS at 05:31

## 2021-06-10 RX ADMIN — SODIUM CHLORIDE: 9 INJECTION, SOLUTION INTRAVENOUS at 18:45

## 2021-06-10 RX ADMIN — OXYCODONE HYDROCHLORIDE 15 MG: 10 TABLET ORAL at 02:46

## 2021-06-10 RX ADMIN — MORPHINE SULFATE 2 MG: 4 INJECTION INTRAVENOUS at 05:31

## 2021-06-10 RX ADMIN — POLYETHYLENE GLYCOL 3350 1 PACKET: 17 POWDER, FOR SOLUTION ORAL at 18:40

## 2021-06-10 RX ADMIN — FAMOTIDINE 20 MG: 20 TABLET ORAL at 18:41

## 2021-06-10 RX ADMIN — DOCUSATE SODIUM 100 MG: 100 CAPSULE, LIQUID FILLED ORAL at 05:30

## 2021-06-10 RX ADMIN — MAGNESIUM HYDROXIDE 30 ML: 400 SUSPENSION ORAL at 05:30

## 2021-06-10 RX ADMIN — AMPICILLIN SODIUM AND SULBACTAM SODIUM 3 G: 2; 1 INJECTION, POWDER, FOR SOLUTION INTRAMUSCULAR; INTRAVENOUS at 23:28

## 2021-06-10 RX ADMIN — LORAZEPAM 0.5 MG: 2 INJECTION INTRAMUSCULAR; INTRAVENOUS at 15:30

## 2021-06-10 RX ADMIN — DOCUSATE SODIUM 50 MG AND SENNOSIDES 8.6 MG 1 TABLET: 8.6; 5 TABLET, FILM COATED ORAL at 20:19

## 2021-06-10 ASSESSMENT — ENCOUNTER SYMPTOMS
DIZZINESS: 0
FEVER: 0
VOMITING: 0
ROS GI COMMENTS: PASSING SOME FLATUS
CONSTIPATION: 0
MYALGIAS: 1
SHORTNESS OF BREATH: 0
WEAKNESS: 1
ABDOMINAL PAIN: 1

## 2021-06-10 ASSESSMENT — PAIN DESCRIPTION - PAIN TYPE
TYPE: ACUTE PAIN;SURGICAL PAIN
TYPE: ACUTE PAIN
TYPE: SURGICAL PAIN;ACUTE PAIN
TYPE: ACUTE PAIN

## 2021-06-10 NOTE — DISCHARGE PLANNING
Agency/Facility Name: [facility]  Spoke To: Norma  Outcome: Referral received and accepted.  [facility] will submit insurance auth today.  Norma is hopeful to have a bed available on Saturday.    JESSIE Garcia notified.

## 2021-06-10 NOTE — PROGRESS NOTES
Assumed care of the patient at 1900. She was resting in bed with no signs of distress. She is A/Ox4, VSS. She denies any pain or needs at this time. Bed in the lowest locked position, call light in reach.

## 2021-06-10 NOTE — WOUND TEAM
[facility]  Inpatient Services  Wound and Skin Care Evaluation    Admission Date: 5/22/2021     Last order of IP CONSULT TO WOUND CARE was found on 6/1/2021 from Hospital Encounter on 5/22/2021     HPI, PMH, SH: Reviewed    Past Surgical History:   Procedure Laterality Date   • PB EXPLORATORY OF ABDOMEN N/A 6/4/2021    Procedure: LAPAROTOMY, EXPLORATORY, WITH WASH OUT;  Surgeon: Maryam Wood M.D.;  Location: [facility];  Service: General   • PB EXPLORATORY OF ABDOMEN N/A 5/28/2021    Procedure: LAPAROTOMY, EXPLORATORY- RESECTION OF ILEOCECAL ANASTATMOSIS.;  Surgeon: Maryam Wood M.D.;  Location: [facility];  Service: General   • PB EXPLORATORY OF ABDOMEN  5/22/2021    Procedure: LAPAROTOMY, EXPLORATORY;  Surgeon: Maryam Wood M.D.;  Location: [facility];  Service: General   • HEMICOLECTOMY, RIGHT  5/22/2021    Procedure: HEMICOLECTOMY, RIGHT, SIDE TO SIDE ANASTOMOSIS;  Surgeon: Maryam Wood M.D.;  Location: [facility];  Service: General   • SHOULDER DECOMPRESSION ARTHROSCOPIC Right 12/24/2018    Procedure: SHOULDER DECOMPRESSION ARTHROSCOPIC - SUBACROMIAL;  Surgeon: Tristian Garcia M.D.;  Location: [facility];  Service: Orthopedics   • CLAVICLE DISTAL EXCISION Left 12/24/2018    Procedure: CLAVICLE DISTAL EXCISION;  Surgeon: Tristian Garcia M.D.;  Location: [facility];  Service: Orthopedics   • SHOULDER ARTHROSCOPY W/ BICIPITAL TENODESIS REPAIR Left 12/24/2018    Procedure: SHOULDER ARTHROSCOPY W/ BICIPITAL TENODESIS REPAIR - AND PACKER;  Surgeon: Tristian Garcia M.D.;  Location: [facility];  Service: Orthopedics   • SHOULDER MANIPULATION Left 12/24/2018    Procedure: SHOULDER MANIPULATION;  Surgeon: Tristian Garcia M.D.;  Location: [facility];  Service: Orthopedics   • SHOULDER ARTHROSCOPY W/ BICIPITAL TENODESIS REPAIR Right 6/15/2015    Procedure: SHOULDER ARTHROSCOPY W/ BICIPITAL  "TENODESIS, SYNOVECTOMY;  Surgeon: Tristian Garcia M.D.;  Location: [facility];  Service:    • CLAVICLE DISTAL EXCISION Right 6/15/2015    Procedure: CLAVICLE DISTAL EXCISION;  Surgeon: Tristian Garcia M.D.;  Location: [facility];  Service:    • SHOULDER DECOMPRESSION Right 6/15/2015    Procedure: SHOULDER DECOMPRESSION MINI INCISION ;  Surgeon: Tristian Garcia M.D.;  Location: [facility];  Service:    • OTHER      nose   • APPENDECTOMY     • PB  DELIVERY ONLY       Social History     Tobacco Use   • Smoking status: Former Smoker     Years: 15.00     Quit date: 2005     Years since quittin.4   • Smokeless tobacco: Never Used   • Tobacco comment: 3-4 years ago   Substance Use Topics   • Alcohol use: No     Alcohol/week: 0.0 oz     Chief Complaint   Patient presents with   • Abdominal Pain     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 Per Dr Grier, \" Prevena was still functioning, but there was more than expected output in it.  It was removed and there was leakage from the incision.  Several staples removed due to underlying fat necrosis.  No evidence of infection or fascial dehiscence\"    :  Wound VAC blocking,  Dressing removed and found to have copious oily purulent drainage to proximal tract.   Dr. Glass and Dr. Grier updated.    : Pt went to Sx  and had VAC placed. Drsg intact, foam stapled in place.     WOUND ASSESSMENT/LDA      Wound 21 Full Thickness Wound Abdomen Midline (Active)   Wound Image     06/10/21 1200   Site Assessment Red;Drainage    Periwound Assessment Clean;Dry;Intact    Margins Attached edges;Defined edges    Closure Adhesive bandage;Secondary intention    Drainage Amount Moderate    Drainage Description Brown    Treatments Cleansed;Site care;Chemical " Debridement    Wound Cleansing Dakin's Solution    Periwound Protectant Skin Protectant Wipes to Periwound;Paste Ring    Dressing Cleansing/Solutions 1/4 Strength Dakin's Solution    Dressing Options Moist Roll Gauze;Wound Manager    Dressing Changed New    Dressing Status Clean;Dry;Intact    Dressing Change/Treatment Frequency Every Shift, and As Needed    NEXT Dressing Change/Treatment Date 06/10/21    NEXT Weekly Photo (Inpatient Only) 06/17/21    Non-staged Wound Description Full thickness    Wound Length (cm) 19.1 cm    Wound Width (cm) 1.9 cm    Wound Depth (cm) 2.6 cm    Wound Surface Area (cm^2) 36.29 cm^2    Wound Volume (cm^3) 94.35 cm^3    Wound Healing % -29    Shape Linear    Wound Odor Mild    Exposed Structures Adipose;Bowel    WOUND NURSE ONLY - Time Spent with Patient (mins) 60      Vascular:    GEE:   No results found.    Lab Values:    Lab Results   Component Value Date/Time    WBC 10.8 06/10/2021 04:45 AM    RBC 2.67 (L) 06/10/2021 04:45 AM    HEMOGLOBIN 7.5 (L) 06/10/2021 04:45 AM    HEMATOCRIT 24.4 (L) 06/10/2021 04:45 AM    CREACTPROT 24.76 (H) 05/28/2021 04:07 AM      Culture Results show:  Recent Results (from the past 720 hour(s))   Culture Wound w/Gram Stain    Collection Time: 06/08/21  2:25 PM    Specimen: Abdominal; Wound   Result Value Ref Range    Significant Indicator POS (POS)     Source WND     Site ABDOMINAL     Culture Result Rare growth usual skin coco. (A)     Gram Stain Result Moderate WBCs.  No organisms seen.       Culture Result Bacteroides fragilis  Light growth   (A)        Pain Level/Medicated:  Premedicate by primary RN with IV Ativan and Morphine    INTERVENTIONS BY WOUND TEAM:  Chart and images reviewed. Discussed with bedside RN. This RN in to assess patient. Performed standard wound care which includes appropriate positioning, dressing removal and non-selective debridement.     Preparation for Dressing removal: Dressing saturated with NS   Cleansed with:  Dakins  "and gauze.  Sharp debridement: NA  Jenn wound: Cleansed with NS, Prepped with No Sting skin prep and 2- 4\" paste rings  Primary Dressing: Dakins moistened roll gauze x 1   Secondary (Outer) Dressing: Large wound manager cut to fit wound and 26F red rachelle to suction. 2\" silk tape around border.     Interdisciplinary consultation: Patient, Bedside RN (Megan), Wound RN (Michelle), General Surgery (Dr. Wood)    EVALUATION / RATIONALE FOR TREATMENT:  Most Recent Date:  6/10/21: Wound with multiple areas of stool draining. Fistula's? Unable to segregate areas to apply NPWT. Wound manager to wall suction and dakins moistened roll gauze to wound to assist in granular tissue development. Will plan to return on Saturday or Sunday and than again on Tuesday or Wednesday.    6/8/21: Pt's Vac was alarming so in to see cause. NPWT with o mmHg, tan drainage in cannister. Drsg saturated. Removed drsg. With removal of drsg proximal part of wound had large brown tan drainage with some small solid pieces. Pt continued to have copious drainage. Dr Lopez and Dr Wood were communicated with to report development. Placed wet to dry drsg. Pt up to bathroom and when back to bed drsg was saturated so changed again. Rinsed abdominal binder and hung to dry.  Drsg orders changed and culture was collected before cleaning with dakins. It appears that proximal part of wound bed has opened again, there is 1.3 cm of depth. Pt to go for CT scan.  6/7/21: Pt's VAC with smaller drsg stapled in place. Pain present with staple removal. Bedside RN holding pt's hand and providing comfort. Wound bed  than last week also decreased depth.   06/03/21:  Patient with large/copious amounts of purulent oily drainage from proximal tract.  Dakin's ordered and CT order per MD.  Wound team to follow up on Saturday for possible VAC re-pplacement.   6/1/21: Pt has full thickness surgical wound to abd. Applied wound vac because NPWT encourages granulation tissue " growth, maintains optimal moisture needed for wound healing, and promotes angiogenesis.     Goals: Steady decrease in wound area and depth weekly.    WOUND TEAM PLAN OF CARE ([X] for frequency of wound follow up,):   Nursing to follow orders written for wound care. Contact wound team if area fails to progress, deteriorates or with any questions/concerns  Dressing changes by wound team:                   Follow up 3 times weekly:                NPWT change 3 times weekly:  Follow up 1-2 times weekly:   X   Follow up Bi-Monthly:                   Follow up as needed:     Other (explain):     NURSING PLAN OF CARE ORDERS (X):  Dressing changes: See Dressing Care orders:x  Skin care: See Skin Care orders:  RN Prevention Protocol: present  Rectal tube care: See Rectal Tube Care orders:   Other orders:    RSKIN:   CURRENTLY IN PLACE (X), APPLIED THIS VISIT (A), ORDERED (O):   Q shift Agustín:  X  Q shift pressure point assessments:  X    Surface/Positioning   Pressure redistribution mattress  x          Low Airloss          Bariatric foam      Bariatric DIDIER     Waffle cushion        Waffle Overlay          Reposition q 2 hours   Remind pt   TAPs Turning system     Z Og Pillow     Offloading/Redistribution  Didn't assess  Sacral Mepilex (Silicone dressing)     Heel Mepilex (Silicone dressing)         Heel float boots (Prevalon boot)             Float Heels off Bed with Pillows           Respiratory RA  Silicone O2 tubing  Has available       Gray Foam Ear protectors     Cannula fixation Device (Tender )          High flow offloading Clip    Elastic head band offloading device      Anchorfast                                                         Trach with Optifoam split foam             Containment/Moisture Prevention up to bathroom  Rectal tube or BMS    Purwick/Condom Cath     Young Catheter    Barrier wipes           Barrier paste       Antifungal tx      Interdry        Mobilization     Up to chair        Ambulate   x    PT/OT      Nutrition     Dietician        Diabetes Education      PO  x   TF     TPN     NPO   # days     Other        Anticipated discharge plans:   LTACH:    X    SNF/Rehab:                  Home Health Care:           Outpatient Wound Center:            Self/Family Care:        Other:

## 2021-06-10 NOTE — PROGRESS NOTES
Dressing reinforced two times. Fecal matter on dressing. Wound care RN updated. Both drains flushed per order, patent. Patient tolerates well.

## 2021-06-10 NOTE — CARE PLAN
Problem: Pain - Standard  Goal: Alleviation of pain or a reduction in pain to the patient’s comfort goal  Outcome: Not Progressing     Problem: Fall Risk  Goal: Patient will remain free from falls  Outcome: Progressing   The patient is Watcher - Medium risk of patient condition declining or worsening    Shift Goals  Clinical Goals: pain control and safety      Progress made toward(s) clinical / shift goals:  Bed alarm active, patient calls for assistance.   Medicated per the MAR for pain    Patient is not progressing towards the following goals:

## 2021-06-10 NOTE — PROGRESS NOTES
[facility] Medicine Daily Progress Note    Date of Service  6/10/2021    Chief Complaint  50 y.o. female admitted 5/22/2021 with abd pain.    Hospital Course  51 yo woman who presented with abd pain and CT showed cecal volvulus. She underwent exploratory celiotomy and right hemicolectomy with ileocolic anastomosis with Dr. Wood 5/22/21. She developed thrombocytopenia and ascites and returned for ex lap with resection of ileocolic anastomosis and wound vac placement 5/28/21. She was admitted to ICU, started on zosyn for bacterial peritonitis and given platelet transfusions. HIT panel was negative. Abd cultures grew actinomyces, enterococcus, bacteroides. Doppler showed catheter related thrombus in R arm and started on anticoagulation. She had additional washout in the OR with Dr. Wood 6/4/21 and remains on unasyn and fluconazole. Wound vac remains in place.    Interval Problem Update  6/8 - White count coming down, afebrile; states that she is eating well, and that her current medication regimen controls her pain and lasts ~4 hours.  Can likely dc home tomorrow with home health for wound care and dressing changes, will need outpatient ID recs from Dr Obrien, much appreciated.     6/9 - Afebrile overnight, remains tachycardic - dry MM, will change to NS and increase rate. White count down further today. Pt is to go for IR drain placement this afternoon, is NPO currently.    6/10 - KRISTI drains in place, pt did not each much breakfast due to pain and nausea.  Will monitor oral intake carefully with low threshold to start TPN or enteric feeds to optimize nutritional status for healing. Is afebrile with improved white count but increased bands.  Remains on 3L, will check a CXR. Looking at LTAC when no further surgical interventions anticipated.    Consultants/Specialty  Critical care  Surgery  ID    Code Status  Full Code    Disposition  TBD - likely  LTAC    Review of Systems  Review of Systems   Constitutional: Positive  for malaise/fatigue. Negative for fever.   Respiratory: Negative for shortness of breath.    Cardiovascular: Negative for chest pain.   Gastrointestinal: Positive for abdominal pain. Negative for constipation and vomiting.   Musculoskeletal: Positive for myalgias.   Neurological: Positive for weakness. Negative for dizziness.   All other systems reviewed and are negative.       Physical Exam  Temp:  [36.6 °C (97.9 °F)-37.4 °C (99.4 °F)] 37.4 °C (99.4 °F)  Pulse:  [] 106  Resp:  [12-22] 17  BP: (106-131)/(56-85) 110/58  SpO2:  [92 %-100 %] 92 %    Physical Exam  Vitals and nursing note reviewed.   Constitutional:       Appearance: She is ill-appearing. She is not toxic-appearing.      Comments: fatigued   HENT:      Head: Normocephalic.      Mouth/Throat:      Mouth: Mucous membranes are moist.   Eyes:      General:         Right eye: No discharge.         Left eye: No discharge.   Cardiovascular:      Rate and Rhythm: Normal rate and regular rhythm.   Pulmonary:      Effort: Pulmonary effort is normal. No respiratory distress.      Breath sounds: No wheezing or rales.   Abdominal:      Tenderness: There is abdominal tenderness (diffuse). There is no guarding or rebound.      Comments: Abdomen with extensive dressing in place, c/d/i   Musculoskeletal:         General: No swelling.      Cervical back: Neck supple.   Skin:     General: Skin is warm and dry.   Neurological:      General: No focal deficit present.      Mental Status: She is alert.      Comments: AOx4         Fluids    Intake/Output Summary (Last 24 hours) at 6/10/2021 1134  Last data filed at 6/10/2021 0437  Gross per 24 hour   Intake 120 ml   Output 1730 ml   Net -1610 ml       Laboratory  Recent Labs     06/08/21  0442 06/09/21  0514 06/10/21  0445   WBC 14.7* 12.0* 10.8   RBC 2.91* 2.90* 2.67*   HEMOGLOBIN 8.4* 8.1* 7.5*   HEMATOCRIT 27.5* 26.6* 24.4*   MCV 94.5 91.7 91.4   MCH 28.9 27.9 28.1   MCHC 30.5* 30.5* 30.7*   RDW 52.0* 49.5 48.8    PLATELETCT 433 520* 464*   MPV 9.9 9.6 9.0     Recent Labs     06/08/21  0442 06/09/21  0514 06/10/21  0445   SODIUM 135 133* 133*   POTASSIUM 4.1 4.6 4.1   CHLORIDE 100 95* 99   CO2 25 25 22   GLUCOSE 106* 128* 113*   BUN 3* 3* 4*   CREATININE 0.61 0.70 0.62   CALCIUM 8.2* 8.7 8.3*     Recent Labs     06/09/21  0726   APTT 39.2*   INR 1.09               Imaging  CT-ABDOMEN-PELVIS WITH   Final Result      1.  Loculated rim-enhancing peritoneal fluid collections are again seen in the upper and lower quadrants as well as the posterior pelvis, all of which are very minimally decreased in size.   2.  There is postoperative change in the anterior abdominal wall with removal of the skin staples.   3.  There are postoperative changes of right hemicolectomy with very minimal dilatation of the transverse colon at the anastomosis. There is no bowel obstruction.   4.  Stable moderate left and small right dependent pleural effusions with dependent atelectasis.      CT-ABDOMEN-PELVIS WITH   Final Result      1.  Multiple rim enhancing peritoneal fluid collection suspicious for abscess      2.  These include the rectouterine space measuring 6.6 x 7.2 x 5.1 cm, right lower quadrant measuring 13.8 x 4.2 x 6.5 cm, left subphrenic space measuring 8.4 x 4.6 x 2.7 cm, and contiguous loculated fluid within the right and left paracolic gutter      3.  Interval right colectomy      4.  Small-moderate-sized bilateral pleural effusion and atelectasis      US-EXTREMITY VENOUS UPPER BILAT   Final Result      IR-MIDLINE CATHETER INSERTION WO GUIDANCE > AGE 3   Final Result                  Ultrasound-guided midline placement performed by qualified nursing staff    as above.          DX-CHEST-FOR LINE PLACEMENT Perform procedure in: Other(comment f6 below): (PACU)   Final Result      1.  Left IJ central line tip high in position located above the superior vena cava are within the left brachiocephalic vein.      2.  Patchy bilateral  infiltrates.      3.  NG tube tip extends into the stomach.      CT-ABDOMEN-PELVIS WITH   Final Result      1.  Moderate to large amount of ascites within the abdomen and pelvis some areas of which are loculated in nature. There are also punctate areas of gas within those areas of ascites as well as free intraperitoneal air. This may be related to recent    surgical procedure however the degree of volume of ascites would be unusual for normal postoperative course. Consideration should be given for leakage of bowel content or other process.      2.  Diffuse colonic distention.      3.  Bibasilar atelectasis and pleural effusions.      4.  5 mm left renal pelvic stone which results in mild hydronephrosis above that level.      5.  Moderate right-sided hydronephrosis extending to the level of the upper sacrum. No ureteral stone. The ureter is not identified below that level.      6.  This was discussed with NEVA KAY at 6:44 PM on 5/27/2021.      [id number] VIEW   Final Result      Gaseous distended small bowel and colon is similar to prior, likely ileus.      [id number] VIEW   Final Result         Gaseous distention of the small bowel and colon, likely postoperative ileus.      CT-ABDOMEN-PELVIS WITH   Final Result         1.  Findings keeping with cecal volvulus.   2.  Mildly fluid distended distal small bowel.   3.  No free air.   4.  Small nonobstructing renal stones.   These findings were discussed with ESHA STERLING on 5/22/2021 11:45 AM.                  CT-DRAIN-PERITONEAL    (Results Pending)   DX-CHEST-PORTABLE (1 VIEW)    (Results Pending)        Assessment/Plan  * Bacterial peritonitis (HCC)  Assessment & Plan  - Abd cultures grew actinomyces, enterococcus, bacteroides.    - Surgery following, had ileocecal resection/redo 5/28/21 and washout 6/4/21  - Had copious output to her wound vac 6/8  which then failed and was removed - then had significant purulent output from her wound. CT scan shows  continued peritoneal abscesses with little improvement,s/p IR drainage. Wound culture from 6/8 with bacteroides again thus far, repeats ordered with drain placement.  - ID consulted, on unasyn and fluconazole - given the unchanged nature of the abscesses and continued need for wound care, pt may be a candidate for IV antibiotics and LTAC - will discuss with ID.     Dehydration  Assessment & Plan  Continue NS for now, no peripheral edema, but monitor fluid status closely due to low albumin levels and high risk for third spacing     Anemia  Assessment & Plan  Hgb low but stable, hold anticoagulation if further drop  Transfuse if Hgb <7    DVT of axillary vein, acute right (HCC)  Assessment & Plan  Associated with midline, small  Continue on lovenox, transition to oral Eliquis when no interventions planned     Thrombocytopenia (HCC)  Assessment & Plan  Resolved   HIT AB negative  Due to sepsis       Cecal volvulus (HCC)- (present on admission)  Assessment & Plan  S/p resection, complicated by anastomotic leak  Surgery following, appreciated         VTE prophylaxis: Lovenox

## 2021-06-10 NOTE — PROGRESS NOTES
Trauma / Surgical Daily Progress Note    Date of Service  6/10/2021    Chief Complaint  50 y.o. female admitted 5/22/2021 with cecal volvulus    Interval Events  5/22 Exploratory celiotomy and right hemicolectomy with a side-to-side   ileocolic anastomosis  5/28 Exploratory celiotomy, resection of ileocolic anastomosis with re-do  side-to-side ileocolic anastomosis  6/4 Ex celiotomy washout    WBC normal now. IR drains placed. Looks like feculent drainage from upper wound. Tolerating diet. Not stooled for 2 days.. On Unasyn, Diflucan per ID.      Review of Systems  Review of Systems   Gastrointestinal: Positive for abdominal pain.        Passing some flatus        Vital Signs for last 24 hours  Temp:  [36.6 °C (97.9 °F)-36.8 °C (98.3 °F)] 36.6 °C (97.9 °F)  Pulse:  [] 95  Resp:  [12-22] 18  BP: (106-131)/(56-85) 111/58  SpO2:  [92 %-100 %] 99 %    Hemodynamic parameters for last 24 hours       Respiratory Data     Respiration: 18, Pulse Oximetry: 99 %     Work Of Breathing / Effort: Shallow  RUL Breath Sounds: Clear, RML Breath Sounds: Clear, RLL Breath Sounds: Clear, LUCITA Breath Sounds: Clear, LLL Breath Sounds: Clear    Physical Exam  Physical Exam  Cardiovascular:      Rate and Rhythm: Normal rate.      Pulses: Normal pulses.   Pulmonary:      Effort: Pulmonary effort is normal.   Abdominal:      General: There is no distension.      Palpations: Abdomen is soft.      Tenderness: There is abdominal tenderness.      Comments: Wound Vac in place    Less distention, abdomen softer      Genitourinary:     Comments: diuresing  Musculoskeletal:         General: Normal range of motion.      Cervical back: Neck supple.      Comments: Generalized edema   Skin:     General: Skin is warm and dry.   Neurological:      General: No focal deficit present.      Mental Status: She is alert.   Psychiatric:      Comments: Teary today         Laboratory  Recent Results (from the past 24 hour(s))   Prothrombin Time    Collection  Time: 06/09/21  7:26 AM   Result Value Ref Range    PT 13.8 12.0 - 14.6 sec    INR 1.09 0.87 - 1.13   APTT    Collection Time: 06/09/21  7:26 AM   Result Value Ref Range    APTT 39.2 (H) 24.7 - 36.0 sec   Heparin Xa (Unfractionated)    Collection Time: 06/09/21  7:26 AM   Result Value Ref Range    Heparin Xa (UFH) 0.20 IU/mL   CBC WITH DIFFERENTIAL    Collection Time: 06/10/21  4:45 AM   Result Value Ref Range    WBC 10.8 4.8 - 10.8 K/uL    RBC 2.67 (L) 4.20 - 5.40 M/uL    Hemoglobin 7.5 (L) 12.0 - 16.0 g/dL    Hematocrit 24.4 (L) 37.0 - 47.0 %    MCV 91.4 81.4 - 97.8 fL    MCH 28.1 27.0 - 33.0 pg    MCHC 30.7 (L) 33.6 - 35.0 g/dL    RDW 48.8 35.9 - 50.0 fL    Platelet Count 464 (H) 164 - 446 K/uL    MPV 9.0 9.0 - 12.9 fL    Neutrophils-Polys 58.00 44.00 - 72.00 %    Lymphocytes 7.00 (L) 22.00 - 41.00 %    Monocytes 18.00 (H) 0.00 - 13.40 %    Eosinophils 1.00 0.00 - 6.90 %    Basophils 0.00 0.00 - 1.80 %    Nucleated RBC 0.00 /100 WBC    Neutrophils (Absolute) 7.99 (H) 2.00 - 7.15 K/uL    Lymphs (Absolute) 0.76 (L) 1.00 - 4.80 K/uL    Monos (Absolute) 1.94 (H) 0.00 - 0.85 K/uL    Eos (Absolute) 0.11 0.00 - 0.51 K/uL    Baso (Absolute) 0.00 0.00 - 0.12 K/uL    NRBC (Absolute) 0.00 K/uL    Hypochromia 1+    Phosphorus: Every Monday and Thursday AM    Collection Time: 06/10/21  4:45 AM   Result Value Ref Range    Phosphorus 4.5 2.5 - 4.5 mg/dL   Magnesium: Every Monday and Thursday AM    Collection Time: 06/10/21  4:45 AM   Result Value Ref Range    Magnesium 2.0 1.5 - 2.5 mg/dL   Comp Metabolic Panel    Collection Time: 06/10/21  4:45 AM   Result Value Ref Range    Sodium 133 (L) 135 - 145 mmol/L    Potassium 4.1 3.6 - 5.5 mmol/L    Chloride 99 96 - 112 mmol/L    Co2 22 20 - 33 mmol/L    Anion Gap 12.0 7.0 - 16.0    Glucose 113 (H) 65 - 99 mg/dL    Bun 4 (L) 8 - 22 mg/dL    Creatinine 0.62 0.50 - 1.40 mg/dL    Calcium 8.3 (L) 8.4 - 10.2 mg/dL    AST(SGOT) 14 12 - 45 U/L    ALT(SGPT) 8 2 - 50 U/L    Alkaline  Phosphatase 83 30 - 99 U/L    Total Bilirubin 0.3 0.1 - 1.5 mg/dL    Albumin 2.4 (L) 3.2 - 4.9 g/dL    Total Protein 6.1 6.0 - 8.2 g/dL    Globulin 3.7 (H) 1.9 - 3.5 g/dL    A-G Ratio 0.6 g/dL   ESTIMATED GFR    Collection Time: 06/10/21  4:45 AM   Result Value Ref Range    GFR If African American >60 >60 mL/min/1.73 m 2    GFR If Non African American >60 >60 mL/min/1.73 m 2   DIFFERENTIAL MANUAL    Collection Time: 06/10/21  4:45 AM   Result Value Ref Range    Bands-Stabs 16.00 (H) 0.00 - 10.00 %    Manual Diff Status PERFORMED    PLATELET ESTIMATE    Collection Time: 06/10/21  4:45 AM   Result Value Ref Range    Plt Estimation Normal    MORPHOLOGY    Collection Time: 06/10/21  4:45 AM   Result Value Ref Range    RBC Morphology Present     Polychromia 1+        Fluids    Intake/Output Summary (Last 24 hours) at 6/10/2021 0635  Last data filed at 6/10/2021 0437  Gross per 24 hour   Intake 120 ml   Output 2530 ml   Net -2410 ml       Core Measures & Quality Metrics  Labs reviewed and Medications reviewed  Young catheter: No Young      DVT Prophylaxis: Enoxaparin (Lovenox)  DVT prophylaxis - mechanical: SCDs  Ulcer prophylaxis: Yes  Antibiotics: Treating active infection/contamination beyond 24 hours perioperative coverage  Assessed for rehab: Patient returned to prior level of function, rehabilitation not indicated at this time    ALICIA Score  ETOH Screening    Assessment/Plan  * Bacterial peritonitis (HCC)  Assessment & Plan  6/2 Zosyn stopped.  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 Passing flatus - start clears  5/27 - Stooled - will adv to full liquids  5/27 - thrombocytopenia, bandemia, PM CT  with ascites no freeair  5/28 - Exploratory celiotomy, resection of ileocolic anastomosis with re-do  side-to-side ileocolic anastomosis.  6/3  Stooling, advance to regular diet  6/4 Small wound dehiscence - returned to OR for washout and reclosure  6/9 Repeat CT showed two fluid collections - IR to drain  Remains on unasyn    DVT of axillary vein, acute right (HCC)  Assessment & Plan  Noted on US  On heparin gtt  6/6  Switched to lovenox BID       Discussed patient condition with RN and Patient.    CRITICAL CARE TIME EXCLUDING PROCEDURES: 20  minutes

## 2021-06-11 ENCOUNTER — APPOINTMENT (OUTPATIENT)
Dept: RADIOLOGY | Facility: MEDICAL CENTER | Age: 51
DRG: 329 | End: 2021-06-11
Attending: SURGERY
Payer: COMMERCIAL

## 2021-06-11 ENCOUNTER — ANESTHESIA (OUTPATIENT)
Dept: SURGERY | Facility: MEDICAL CENTER | Age: 51
DRG: 329 | End: 2021-06-11
Payer: COMMERCIAL

## 2021-06-11 ENCOUNTER — ANESTHESIA EVENT (OUTPATIENT)
Dept: SURGERY | Facility: MEDICAL CENTER | Age: 51
DRG: 329 | End: 2021-06-11
Payer: COMMERCIAL

## 2021-06-11 LAB
ALBUMIN SERPL BCP-MCNC: 2.3 G/DL (ref 3.2–4.9)
ALBUMIN/GLOB SERPL: 0.6 G/DL
ALP SERPL-CCNC: 87 U/L (ref 30–99)
ALT SERPL-CCNC: 9 U/L (ref 2–50)
ANION GAP SERPL CALC-SCNC: 12 MMOL/L (ref 7–16)
AST SERPL-CCNC: 17 U/L (ref 12–45)
BASOPHILS # BLD AUTO: 0 % (ref 0–1.8)
BASOPHILS # BLD: 0 K/UL (ref 0–0.12)
BILIRUB SERPL-MCNC: 0.2 MG/DL (ref 0.1–1.5)
BUN SERPL-MCNC: 5 MG/DL (ref 8–22)
CALCIUM SERPL-MCNC: 7.8 MG/DL (ref 8.4–10.2)
CHLORIDE SERPL-SCNC: 98 MMOL/L (ref 96–112)
CHOLEST SERPL-MCNC: 68 MG/DL (ref 100–199)
CO2 SERPL-SCNC: 21 MMOL/L (ref 20–33)
CREAT SERPL-MCNC: 0.62 MG/DL (ref 0.5–1.4)
EKG IMPRESSION: NORMAL
EOSINOPHIL # BLD AUTO: 0 K/UL (ref 0–0.51)
EOSINOPHIL NFR BLD: 0 % (ref 0–6.9)
ERYTHROCYTE [DISTWIDTH] IN BLOOD BY AUTOMATED COUNT: 48.2 FL (ref 35.9–50)
FERRITIN SERPL-MCNC: 346 NG/ML (ref 10–291)
FOLATE SERPL-MCNC: 10.7 NG/ML
GLOBULIN SER CALC-MCNC: 3.7 G/DL (ref 1.9–3.5)
GLUCOSE SERPL-MCNC: 102 MG/DL (ref 65–99)
HCT VFR BLD AUTO: 23.5 % (ref 37–47)
HGB BLD-MCNC: 7.4 G/DL (ref 12–16)
HYPOCHROMIA BLD QL SMEAR: ABNORMAL
IRON SATN MFR SERPL: ABNORMAL % (ref 15–55)
IRON SERPL-MCNC: 9 UG/DL (ref 40–170)
LACTATE BLD-SCNC: 1 MMOL/L (ref 0.5–2)
LACTATE BLD-SCNC: 1.2 MMOL/L (ref 0.5–2)
LYMPHOCYTES # BLD AUTO: 1.16 K/UL (ref 1–4.8)
LYMPHOCYTES NFR BLD: 9 % (ref 22–41)
MAGNESIUM SERPL-MCNC: 2.2 MG/DL (ref 1.5–2.5)
MANUAL DIFF BLD: NORMAL
MCH RBC QN AUTO: 28.5 PG (ref 27–33)
MCHC RBC AUTO-ENTMCNC: 31.5 G/DL (ref 33.6–35)
MCV RBC AUTO: 90.4 FL (ref 81.4–97.8)
METAMYELOCYTES NFR BLD MANUAL: 1 %
MONOCYTES # BLD AUTO: 1.94 K/UL (ref 0–0.85)
MONOCYTES NFR BLD AUTO: 15 % (ref 0–13.4)
NEUTROPHILS # BLD AUTO: 9.68 K/UL (ref 2–7.15)
NEUTROPHILS NFR BLD: 70 % (ref 44–72)
NEUTS BAND NFR BLD MANUAL: 5 % (ref 0–10)
NRBC # BLD AUTO: 0.02 K/UL
NRBC BLD-RTO: 0.2 /100 WBC
NT-PROBNP SERPL IA-MCNC: 817 PG/ML (ref 0–125)
PHOSPHATE SERPL-MCNC: 4.7 MG/DL (ref 2.5–4.5)
PLATELET # BLD AUTO: 476 K/UL (ref 164–446)
PLATELET BLD QL SMEAR: NORMAL
PMV BLD AUTO: 8.9 FL (ref 9–12.9)
POLYCHROMASIA BLD QL SMEAR: NORMAL
POTASSIUM SERPL-SCNC: 4.2 MMOL/L (ref 3.6–5.5)
PROT SERPL-MCNC: 6 G/DL (ref 6–8.2)
RBC # BLD AUTO: 2.6 M/UL (ref 4.2–5.4)
RBC BLD AUTO: PRESENT
SODIUM SERPL-SCNC: 131 MMOL/L (ref 135–145)
TIBC SERPL-MCNC: 152 UG/DL (ref 250–450)
TRIGL SERPL-MCNC: 85 MG/DL (ref 0–149)
UIBC SERPL-MCNC: 143 UG/DL (ref 110–370)
VIT B12 SERPL-MCNC: 1473 PG/ML (ref 211–911)
WBC # BLD AUTO: 12.9 K/UL (ref 4.8–10.8)

## 2021-06-11 PROCEDURE — 0D9F00Z DRAINAGE OF RIGHT LARGE INTESTINE WITH DRAINAGE DEVICE, OPEN APPROACH: ICD-10-PCS | Performed by: SURGERY

## 2021-06-11 PROCEDURE — 700102 HCHG RX REV CODE 250 W/ 637 OVERRIDE(OP): Performed by: SURGERY

## 2021-06-11 PROCEDURE — 700111 HCHG RX REV CODE 636 W/ 250 OVERRIDE (IP): Performed by: FAMILY MEDICINE

## 2021-06-11 PROCEDURE — 700105 HCHG RX REV CODE 258: Performed by: INTERNAL MEDICINE

## 2021-06-11 PROCEDURE — 700105 HCHG RX REV CODE 258: Performed by: ANESTHESIOLOGY

## 2021-06-11 PROCEDURE — A9270 NON-COVERED ITEM OR SERVICE: HCPCS | Performed by: INTERNAL MEDICINE

## 2021-06-11 PROCEDURE — 160036 HCHG PACU - EA ADDL 30 MINS PHASE I: Performed by: SURGERY

## 2021-06-11 PROCEDURE — 700117 HCHG RX CONTRAST REV CODE 255: Performed by: SURGERY

## 2021-06-11 PROCEDURE — A9270 NON-COVERED ITEM OR SERVICE: HCPCS | Performed by: FAMILY MEDICINE

## 2021-06-11 PROCEDURE — 160002 HCHG RECOVERY MINUTES (STAT): Performed by: SURGERY

## 2021-06-11 PROCEDURE — 160031 HCHG SURGERY MINUTES - 1ST 30 MINS LEVEL 5: Performed by: SURGERY

## 2021-06-11 PROCEDURE — 93010 ELECTROCARDIOGRAM REPORT: CPT | Performed by: INTERNAL MEDICINE

## 2021-06-11 PROCEDURE — 700111 HCHG RX REV CODE 636 W/ 250 OVERRIDE (IP): Performed by: ANESTHESIOLOGY

## 2021-06-11 PROCEDURE — 36415 COLL VENOUS BLD VENIPUNCTURE: CPT

## 2021-06-11 PROCEDURE — 501433 HCHG STAPLER, GIA MULTIFIRE 60/80: Performed by: SURGERY

## 2021-06-11 PROCEDURE — 80053 COMPREHEN METABOLIC PANEL: CPT

## 2021-06-11 PROCEDURE — 83540 ASSAY OF IRON: CPT

## 2021-06-11 PROCEDURE — 700105 HCHG RX REV CODE 258: Performed by: SURGERY

## 2021-06-11 PROCEDURE — 93005 ELECTROCARDIOGRAM TRACING: CPT | Performed by: SURGERY

## 2021-06-11 PROCEDURE — 99233 SBSQ HOSP IP/OBS HIGH 50: CPT | Performed by: FAMILY MEDICINE

## 2021-06-11 PROCEDURE — 160035 HCHG PACU - 1ST 60 MINS PHASE I: Performed by: SURGERY

## 2021-06-11 PROCEDURE — 83605 ASSAY OF LACTIC ACID: CPT

## 2021-06-11 PROCEDURE — 82607 VITAMIN B-12: CPT

## 2021-06-11 PROCEDURE — 74270 X-RAY XM COLON 1CNTRST STD: CPT

## 2021-06-11 PROCEDURE — 85007 BL SMEAR W/DIFF WBC COUNT: CPT

## 2021-06-11 PROCEDURE — 700102 HCHG RX REV CODE 250 W/ 637 OVERRIDE(OP): Performed by: FAMILY MEDICINE

## 2021-06-11 PROCEDURE — 82465 ASSAY BLD/SERUM CHOLESTEROL: CPT

## 2021-06-11 PROCEDURE — 160048 HCHG OR STATISTICAL LEVEL 1-5: Performed by: SURGERY

## 2021-06-11 PROCEDURE — 700111 HCHG RX REV CODE 636 W/ 250 OVERRIDE (IP): Performed by: INTERNAL MEDICINE

## 2021-06-11 PROCEDURE — 501838 HCHG SUTURE GENERAL: Performed by: SURGERY

## 2021-06-11 PROCEDURE — 700102 HCHG RX REV CODE 250 W/ 637 OVERRIDE(OP): Performed by: INTERNAL MEDICINE

## 2021-06-11 PROCEDURE — 83735 ASSAY OF MAGNESIUM: CPT

## 2021-06-11 PROCEDURE — 770006 HCHG ROOM/CARE - MED/SURG/GYN SEMI*

## 2021-06-11 PROCEDURE — 83550 IRON BINDING TEST: CPT

## 2021-06-11 PROCEDURE — 85027 COMPLETE CBC AUTOMATED: CPT

## 2021-06-11 PROCEDURE — A9270 NON-COVERED ITEM OR SERVICE: HCPCS | Performed by: SURGERY

## 2021-06-11 PROCEDURE — 700101 HCHG RX REV CODE 250: Performed by: ANESTHESIOLOGY

## 2021-06-11 PROCEDURE — 160042 HCHG SURGERY MINUTES - EA ADDL 1 MIN LEVEL 5: Performed by: SURGERY

## 2021-06-11 PROCEDURE — 700105 HCHG RX REV CODE 258: Performed by: FAMILY MEDICINE

## 2021-06-11 PROCEDURE — 700101 HCHG RX REV CODE 250: Performed by: SURGERY

## 2021-06-11 PROCEDURE — 82728 ASSAY OF FERRITIN: CPT

## 2021-06-11 PROCEDURE — 160009 HCHG ANES TIME/MIN: Performed by: SURGERY

## 2021-06-11 PROCEDURE — C1765 ADHESION BARRIER: HCPCS | Performed by: SURGERY

## 2021-06-11 PROCEDURE — 82746 ASSAY OF FOLIC ACID SERUM: CPT

## 2021-06-11 PROCEDURE — 84478 ASSAY OF TRIGLYCERIDES: CPT

## 2021-06-11 PROCEDURE — 84100 ASSAY OF PHOSPHORUS: CPT

## 2021-06-11 PROCEDURE — 83880 ASSAY OF NATRIURETIC PEPTIDE: CPT

## 2021-06-11 PROCEDURE — 99232 SBSQ HOSP IP/OBS MODERATE 35: CPT | Performed by: INTERNAL MEDICINE

## 2021-06-11 RX ORDER — KETAMINE HYDROCHLORIDE 50 MG/ML
INJECTION, SOLUTION INTRAMUSCULAR; INTRAVENOUS PRN
Status: DISCONTINUED | OUTPATIENT
Start: 2021-06-11 | End: 2021-06-11 | Stop reason: SURG

## 2021-06-11 RX ORDER — HALOPERIDOL 5 MG/ML
1 INJECTION INTRAMUSCULAR
Status: DISCONTINUED | OUTPATIENT
Start: 2021-06-11 | End: 2021-06-11 | Stop reason: HOSPADM

## 2021-06-11 RX ORDER — LIDOCAINE HYDROCHLORIDE 40 MG/ML
SOLUTION TOPICAL PRN
Status: DISCONTINUED | OUTPATIENT
Start: 2021-06-11 | End: 2021-06-11 | Stop reason: SURG

## 2021-06-11 RX ORDER — MIDAZOLAM HYDROCHLORIDE 1 MG/ML
INJECTION INTRAMUSCULAR; INTRAVENOUS PRN
Status: DISCONTINUED | OUTPATIENT
Start: 2021-06-11 | End: 2021-06-11 | Stop reason: SURG

## 2021-06-11 RX ORDER — MIDAZOLAM HYDROCHLORIDE 1 MG/ML
1 INJECTION INTRAMUSCULAR; INTRAVENOUS
Status: DISCONTINUED | OUTPATIENT
Start: 2021-06-11 | End: 2021-06-11 | Stop reason: HOSPADM

## 2021-06-11 RX ORDER — SODIUM CHLORIDE 9 MG/ML
INJECTION, SOLUTION INTRAVENOUS
Status: DISCONTINUED | OUTPATIENT
Start: 2021-06-11 | End: 2021-06-11 | Stop reason: SURG

## 2021-06-11 RX ORDER — DIPHENHYDRAMINE HYDROCHLORIDE 50 MG/ML
12.5 INJECTION INTRAMUSCULAR; INTRAVENOUS
Status: DISCONTINUED | OUTPATIENT
Start: 2021-06-11 | End: 2021-06-11 | Stop reason: HOSPADM

## 2021-06-11 RX ORDER — SUCCINYLCHOLINE/SOD CL,ISO/PF 200MG/10ML
SYRINGE (ML) INTRAVENOUS PRN
Status: DISCONTINUED | OUTPATIENT
Start: 2021-06-11 | End: 2021-06-11 | Stop reason: SURG

## 2021-06-11 RX ORDER — SODIUM CHLORIDE 9 MG/ML
INJECTION, SOLUTION INTRAVENOUS CONTINUOUS
Status: DISCONTINUED | OUTPATIENT
Start: 2021-06-11 | End: 2021-06-11 | Stop reason: HOSPADM

## 2021-06-11 RX ORDER — SODIUM CHLORIDE 9 MG/ML
INJECTION, SOLUTION INTRAVENOUS ONCE
Status: COMPLETED | OUTPATIENT
Start: 2021-06-11 | End: 2021-06-11

## 2021-06-11 RX ORDER — KETOROLAC TROMETHAMINE 30 MG/ML
INJECTION, SOLUTION INTRAMUSCULAR; INTRAVENOUS PRN
Status: DISCONTINUED | OUTPATIENT
Start: 2021-06-11 | End: 2021-06-11 | Stop reason: SURG

## 2021-06-11 RX ORDER — HYDROMORPHONE HYDROCHLORIDE 2 MG/ML
INJECTION, SOLUTION INTRAMUSCULAR; INTRAVENOUS; SUBCUTANEOUS PRN
Status: DISCONTINUED | OUTPATIENT
Start: 2021-06-11 | End: 2021-06-11 | Stop reason: SURG

## 2021-06-11 RX ORDER — ROCURONIUM BROMIDE 10 MG/ML
INJECTION, SOLUTION INTRAVENOUS PRN
Status: DISCONTINUED | OUTPATIENT
Start: 2021-06-11 | End: 2021-06-11 | Stop reason: SURG

## 2021-06-11 RX ORDER — HYDROMORPHONE HYDROCHLORIDE 1 MG/ML
0.1 INJECTION, SOLUTION INTRAMUSCULAR; INTRAVENOUS; SUBCUTANEOUS
Status: DISCONTINUED | OUTPATIENT
Start: 2021-06-11 | End: 2021-06-11 | Stop reason: HOSPADM

## 2021-06-11 RX ORDER — ONDANSETRON 2 MG/ML
4 INJECTION INTRAMUSCULAR; INTRAVENOUS
Status: DISCONTINUED | OUTPATIENT
Start: 2021-06-11 | End: 2021-06-11 | Stop reason: HOSPADM

## 2021-06-11 RX ORDER — HYDROMORPHONE HYDROCHLORIDE 1 MG/ML
0.2 INJECTION, SOLUTION INTRAMUSCULAR; INTRAVENOUS; SUBCUTANEOUS
Status: DISCONTINUED | OUTPATIENT
Start: 2021-06-11 | End: 2021-06-11 | Stop reason: HOSPADM

## 2021-06-11 RX ORDER — CEFOTETAN DISODIUM 2 G/20ML
INJECTION, POWDER, FOR SOLUTION INTRAMUSCULAR; INTRAVENOUS PRN
Status: DISCONTINUED | OUTPATIENT
Start: 2021-06-11 | End: 2021-06-11 | Stop reason: SURG

## 2021-06-11 RX ORDER — LIDOCAINE HYDROCHLORIDE 20 MG/ML
INJECTION, SOLUTION EPIDURAL; INFILTRATION; INTRACAUDAL; PERINEURAL PRN
Status: DISCONTINUED | OUTPATIENT
Start: 2021-06-11 | End: 2021-06-11 | Stop reason: SURG

## 2021-06-11 RX ORDER — DEXAMETHASONE SODIUM PHOSPHATE 4 MG/ML
INJECTION, SOLUTION INTRA-ARTICULAR; INTRALESIONAL; INTRAMUSCULAR; INTRAVENOUS; SOFT TISSUE PRN
Status: DISCONTINUED | OUTPATIENT
Start: 2021-06-11 | End: 2021-06-11 | Stop reason: SURG

## 2021-06-11 RX ORDER — HYDROMORPHONE HYDROCHLORIDE 1 MG/ML
0.4 INJECTION, SOLUTION INTRAMUSCULAR; INTRAVENOUS; SUBCUTANEOUS
Status: DISCONTINUED | OUTPATIENT
Start: 2021-06-11 | End: 2021-06-11 | Stop reason: HOSPADM

## 2021-06-11 RX ORDER — ONDANSETRON 2 MG/ML
INJECTION INTRAMUSCULAR; INTRAVENOUS PRN
Status: DISCONTINUED | OUTPATIENT
Start: 2021-06-11 | End: 2021-06-11 | Stop reason: SURG

## 2021-06-11 RX ADMIN — SODIUM CHLORIDE: 9 INJECTION, SOLUTION INTRAVENOUS at 09:14

## 2021-06-11 RX ADMIN — AMPICILLIN SODIUM AND SULBACTAM SODIUM 3 G: 2; 1 INJECTION, POWDER, FOR SOLUTION INTRAMUSCULAR; INTRAVENOUS at 18:04

## 2021-06-11 RX ADMIN — ROCURONIUM BROMIDE 15 MG: 10 INJECTION, SOLUTION INTRAVENOUS at 15:45

## 2021-06-11 RX ADMIN — SODIUM CHLORIDE: 9 INJECTION, SOLUTION INTRAVENOUS at 15:14

## 2021-06-11 RX ADMIN — AMPICILLIN SODIUM AND SULBACTAM SODIUM 3 G: 2; 1 INJECTION, POWDER, FOR SOLUTION INTRAMUSCULAR; INTRAVENOUS at 23:51

## 2021-06-11 RX ADMIN — FENTANYL CITRATE 100 MCG: 50 INJECTION, SOLUTION INTRAMUSCULAR; INTRAVENOUS at 15:22

## 2021-06-11 RX ADMIN — IOHEXOL 200 ML: 300 INJECTION, SOLUTION INTRAVENOUS at 11:45

## 2021-06-11 RX ADMIN — POVIDONE IODINE 15 ML: 100 SOLUTION TOPICAL at 14:30

## 2021-06-11 RX ADMIN — FLUCONAZOLE 400 MG: 200 TABLET ORAL at 05:55

## 2021-06-11 RX ADMIN — DEXAMETHASONE SODIUM PHOSPHATE 4 MG: 4 INJECTION, SOLUTION INTRAMUSCULAR; INTRAVENOUS at 15:27

## 2021-06-11 RX ADMIN — MORPHINE SULFATE 4 MG: 4 INJECTION INTRAVENOUS at 18:04

## 2021-06-11 RX ADMIN — CEFOTETAN DISODIUM 2 G: 2 INJECTION, POWDER, FOR SOLUTION INTRAMUSCULAR; INTRAVENOUS at 15:23

## 2021-06-11 RX ADMIN — MORPHINE SULFATE 2 MG: 4 INJECTION INTRAVENOUS at 08:47

## 2021-06-11 RX ADMIN — POLYETHYLENE GLYCOL 3350 1 PACKET: 17 POWDER, FOR SOLUTION ORAL at 06:22

## 2021-06-11 RX ADMIN — FAMOTIDINE 20 MG: 20 TABLET ORAL at 05:57

## 2021-06-11 RX ADMIN — KETAMINE HYDROCHLORIDE 50 MG: 50 INJECTION INTRAMUSCULAR; INTRAVENOUS at 15:22

## 2021-06-11 RX ADMIN — DULOXETINE HYDROCHLORIDE 60 MG: 30 CAPSULE, DELAYED RELEASE ORAL at 05:57

## 2021-06-11 RX ADMIN — ENOXAPARIN SODIUM 80 MG: 80 INJECTION SUBCUTANEOUS at 05:57

## 2021-06-11 RX ADMIN — Medication 140 MG: at 15:22

## 2021-06-11 RX ADMIN — SODIUM CHLORIDE: 9 INJECTION, SOLUTION INTRAVENOUS at 14:30

## 2021-06-11 RX ADMIN — LIDOCAINE HYDROCHLORIDE 4 ML: 40 SOLUTION TOPICAL at 15:22

## 2021-06-11 RX ADMIN — PROPOFOL 90 MG: 10 INJECTION, EMULSION INTRAVENOUS at 15:22

## 2021-06-11 RX ADMIN — ROCURONIUM BROMIDE 10 MG: 10 INJECTION, SOLUTION INTRAVENOUS at 15:22

## 2021-06-11 RX ADMIN — FENTANYL CITRATE 25 MCG: 50 INJECTION, SOLUTION INTRAMUSCULAR; INTRAVENOUS at 17:06

## 2021-06-11 RX ADMIN — AMPICILLIN SODIUM AND SULBACTAM SODIUM 3 G: 2; 1 INJECTION, POWDER, FOR SOLUTION INTRAMUSCULAR; INTRAVENOUS at 05:55

## 2021-06-11 RX ADMIN — DOCUSATE SODIUM 100 MG: 100 CAPSULE, LIQUID FILLED ORAL at 05:55

## 2021-06-11 RX ADMIN — MIDAZOLAM HYDROCHLORIDE 2 MG: 1 INJECTION, SOLUTION INTRAMUSCULAR; INTRAVENOUS at 15:22

## 2021-06-11 RX ADMIN — SUGAMMADEX 200 MG: 100 INJECTION, SOLUTION INTRAVENOUS at 15:55

## 2021-06-11 RX ADMIN — OXYCODONE HYDROCHLORIDE 15 MG: 10 TABLET ORAL at 12:01

## 2021-06-11 RX ADMIN — ONDANSETRON 4 MG: 2 INJECTION INTRAMUSCULAR; INTRAVENOUS at 15:44

## 2021-06-11 RX ADMIN — FENTANYL CITRATE 25 MCG: 50 INJECTION, SOLUTION INTRAMUSCULAR; INTRAVENOUS at 16:51

## 2021-06-11 RX ADMIN — AMPICILLIN SODIUM AND SULBACTAM SODIUM 3 G: 2; 1 INJECTION, POWDER, FOR SOLUTION INTRAMUSCULAR; INTRAVENOUS at 12:02

## 2021-06-11 RX ADMIN — OXYCODONE HYDROCHLORIDE 15 MG: 10 TABLET ORAL at 01:00

## 2021-06-11 RX ADMIN — KETOROLAC TROMETHAMINE 30 MG: 30 INJECTION, SOLUTION INTRAMUSCULAR at 15:52

## 2021-06-11 RX ADMIN — OXYCODONE HYDROCHLORIDE 15 MG: 10 TABLET ORAL at 05:55

## 2021-06-11 RX ADMIN — HYDROMORPHONE HYDROCHLORIDE 0.5 MG: 2 INJECTION, SOLUTION INTRAMUSCULAR; INTRAVENOUS; SUBCUTANEOUS at 15:45

## 2021-06-11 RX ADMIN — HYDROMORPHONE HYDROCHLORIDE 0.5 MG: 2 INJECTION, SOLUTION INTRAMUSCULAR; INTRAVENOUS; SUBCUTANEOUS at 15:37

## 2021-06-11 RX ADMIN — LIDOCAINE HYDROCHLORIDE 60 MG: 20 INJECTION, SOLUTION EPIDURAL; INFILTRATION; INTRACAUDAL; PERINEURAL at 15:22

## 2021-06-11 ASSESSMENT — PAIN DESCRIPTION - PAIN TYPE
TYPE: ACUTE PAIN;SURGICAL PAIN
TYPE: ACUTE PAIN
TYPE: ACUTE PAIN
TYPE: ACUTE PAIN;SURGICAL PAIN
TYPE: SURGICAL PAIN
TYPE: ACUTE PAIN
TYPE: SURGICAL PAIN
TYPE: SURGICAL PAIN

## 2021-06-11 ASSESSMENT — PAIN SCALES - GENERAL: PAIN_LEVEL: 5

## 2021-06-11 ASSESSMENT — ENCOUNTER SYMPTOMS
DIARRHEA: 0
SHORTNESS OF BREATH: 0
MYALGIAS: 1
DIZZINESS: 0
CONSTIPATION: 0
COUGH: 0
VOMITING: 0
WEAKNESS: 1
ROS GI COMMENTS: PASSING SOME FLATUS
FEVER: 0
CHILLS: 0
NAUSEA: 0
ABDOMINAL PAIN: 1

## 2021-06-11 NOTE — PROGRESS NOTES
Pharmacy TPN Day # 1       2021    Dosing Weight   74 kg TPN currently providing 50% of goal      TPN goal: 2074 kcal/day including 0.65 gm/kg/day Protein      TPN indication: Bowel resection       Pertinent PMH: Addmitted with cecal volvulus,  right hemicolectomy with ileocolic anastomosis,  resection of ileocolic anastomosis with re-do eleocolic anastomosis  Temp (24hrs), Av.7 °C (98.1 °F), Min:36.6 °C (97.8 °F), Max:36.9 °C (98.4 °F)  .  Recent Labs     21  0514 06/10/21  0445 21  0436   SODIUM 133* 133* 131*   POTASSIUM 4.6 4.1 4.2   CHLORIDE 95* 99 98   CO2 25 22 21   BUN 3* 4* 5*   CREATININE 0.70 0.62 0.62   GLUCOSE 128* 113* 102*   CALCIUM 8.7 8.3* 7.8*   ASTSGOT 15 14 17   ALTSGPT 9 8 9   ALBUMIN 2.2* 2.4* 2.3*   TBILIRUBIN 0.3 0.3 0.2   PHOSPHORUS  --  4.5 4.7*   MAGNESIUM  --  2.0 2.2     Accu-Checks  No results for input(s): POCGLUCOSE in the last 72 hours.    Vitals:    06/10/21 2004 06/10/21 2340 21 0411 21 0822   BP: 113/61 113/69 104/54 106/60   Weight:       Height:           Intake/Output Summary (Last 24 hours) at 2021 0945  Last data filed at 2021 0600  Gross per 24 hour   Intake 4927.68 ml   Output 2485 ml   Net 2442.68 ml       Orders Placed This Encounter   Procedures   • Diet Order Diet: Low Fiber(GI Soft) (advance as tolerated)     Standing Status:   Standing     Number of Occurrences:   1     Order Specific Question:   Diet:     Answer:   Low Fiber(GI Soft) [2]     Comments:   advance as tolerated         TPN for past 72 hours (Show up to 3 orders; newest on the left.)     Start date and time   2021      TPN Central Line Formulation [[id number]]    Order Status  Active       Base    Clinisol 15%  48 g    dextrose 70%  172 g    fat emulsions 20%  26 g       Additives    potassium chloride  80 mEq    sodium acetate  150 mEq    sodium chloride  150 mEq    magnesium sulfate  4 mEq    calcium GLUConate  9.4 mEq    M.T.E.-4 Adult  1 mL     M.V.I. Adult  10 mL    famotidine  40 mg       QS Base    sterile water  1,107.59 mL       Energy Contribution    Proteins  --    Dextrose  --    Lipids  --    Total  --       Electrolyte Ion Calculated Amount    Sodium  300 mEq    Potassium  80 mEq    Calcium  9.4 mEq    Magnesium  4.02 mEq    Aluminum  --    Phosphate  --    Chloride  230 mEq    Acetate  190.64 mEq       Other    Total Protein  48 g    Total Protein/kg  0.65 g/kg    Total Amino Acid  --    Total Amino Acid/kg  --    Glucose Infusion Rate  1.63 mg/kg/min    Osmolarity (Estimated)  --    Volume  1,992 mL    Rate  83 mL/hr    Dosing Weight  73.5 kg    Infusion Site  Central            This formula provides:  % kcal as lipids = 25  Grams protein/kg = 0.65  Non-protein calories = 845  Kcals/kg = 14  Total daily calories = 1037    Comments:  Will begin TPN at 50% goal, and increase to goal as patient tolerates KCALs.   Will monitor patient and labs per protocol.      Rafa Lopez [facility]

## 2021-06-11 NOTE — CARE PLAN
The patient is Stable - Low risk of patient condition declining or worsening    Shift Goals  Clinical Goals: pain control, wound care  Patient Goals: pain control and sleep    Progress made toward(s) clinical / shift goals:  Pt pain at 7 or 8 throughout the night. Pt pain improved towards end of night, Pt requiring pain medication consistently. Wound care completed.    Patient is not progressing towards the following goals:      Problem: Pain - Post Surgery  Goal: Alleviation or reduction of pain post surgery  6/11/2021 0440 by Kae Parry  Outcome: Not Progressing       Problem: Respiratory  Goal: Patient will achieve/maintain optimum respiratory ventilation and gas exchange  6/11/2021 0440 by Kae Parry  Outcome: Not Progressing  Pt requiring 1L of O2 to remain >90%. Pt encouraged to ambulate and practice deep breathing.

## 2021-06-11 NOTE — DIETARY
"Nutrition Support Assessment   Day 20 of admit.  Mari Dillon is a 51 y.o. female with admitting DX of Cecal Volvulus.     Current problem list:  1. Bacterial peritonitis  2. Dehydration  3. Anemia  4. DVT of axillary vein  5. Thrombocytopenia  6. Cecal volvulus     Assessment:  Estimated Nutritional Needs: based on:   Height: 175.3 cm (5' 9\")  Weight: 73.5 kg (162 lb)  Weight to Use in Calculations: 73.4 kg (161 lb 13.1 oz)  Ideal Body Weight: 65.8 kg (145 lb)  Percent Ideal Body Weight: 111.7  Body mass index is 23.92 kg/m²., BMI classification: Normal    Calculation/Equation: REE = 1415 x 1.2-1.3  Total Calories / day: 1698 - 1840 (Calories / k - 25)  Total Grams Protein / day: 99 - 131 (Grams Protein / kg IBW: 1.5 - 2)     Evaluation:   1. Pt S/P multiple surgeries for cecal volvulus. Abdominal wound now with areas of stool draining. TPN ordered.  2. Recorded PO intake worsened on . Breakfast this am was refused. Pt NPO at this time per flow sheets. Barium enema ordered to evaluate fistula.  3. Labs  include Na 131 (L), K+ 4.2, Glu 102 (H), BUN 5 (L), Creat 0.62, Alb 2.3 (L), Phos 4.7 (H), Mg 2.2, Triglycerides 85.  4. Medications include Unasyn, Colace, Pepcid, Miralax.  5. LBM 6/10     Malnutrition Risk: Criteria not met. At risk with poor PO intake.     Recommendations/Plan:  1. TPN per Pharmacy.  2. Monitor weight.  3. RD following.                "

## 2021-06-11 NOTE — ASSESSMENT & PLAN NOTE
- Secondary to pulmonary edema from third spacing secondary to hypoalbuminemia  - resolved, on r/a

## 2021-06-11 NOTE — PROGRESS NOTES
Assumed care of pt at 1915. Received report from Megan RUDOLPH. Pt resting in bed. Pt stated abdominal pain 7 on a 0 to 10 scale. Pt medicated per MAR with resource RN. Abdominal dressing in place with drain connected to continuous suction CDI. 2 KRISTI drains in place, dressings CDI. Purewick in place. Pt informed on plan for abdominal dressing change for this shift. Pt verbalized understanding.Pt encouraged to ambulate if they require using the bathroom. No other needs at this time. Call light in reach, bed in lowest position.

## 2021-06-11 NOTE — CARE PLAN
The patient is Watcher - Medium risk of patient condition declining or worsening    Shift Goals  Clinical Goals: pain control and safety    Progress made toward(s) clinical / shift goals: patient states pain well controled

## 2021-06-11 NOTE — ANESTHESIA TIME REPORT
Anesthesia Start and Stop Event Times     Date Time Event    6/11/2021 1457 Ready for Procedure     1515 Anesthesia Start     1610 Anesthesia Stop        Responsible Staff  06/11/21    Name Role Begin End    Luisa Pan M.D. Anesth 1515 1610        Preop Diagnosis (Free Text):  Pre-op Diagnosis             Preop Diagnosis (Codes):    Post op Diagnosis  Intestinal anastomotic leak      Premium Reason  Non-Premium    Comments:

## 2021-06-11 NOTE — PROGRESS NOTES
Trauma / Surgical Daily Progress Note    Date of Service  6/11/2021    Chief Complaint  50 y.o. female admitted 5/22/2021 with cecal volvulus    Interval Events  5/22 Exploratory celiotomy and right hemicolectomy with a side-to-side   ileocolic anastomosis  5/28 Exploratory celiotomy, resection of ileocolic anastomosis with re-do  side-to-side ileocolic anastomosis  6/4 Ex celiotomy washout    Noted feculent drainage from upper wound. Wound manager in place. Is stooling also. Will get BE to evaluate size of fistula. Tolerating diet.  On Unasyn, Diflucan per ID.  Will start TPN.    Review of Systems  Review of Systems   Gastrointestinal: Positive for abdominal pain.        Passing some flatus        Vital Signs for last 24 hours  Temp:  [36.6 °C (97.9 °F)-37.4 °C (99.4 °F)] 36.9 °C (98.4 °F)  Pulse:  [] 109  Resp:  [16-17] 16  BP: (104-117)/(54-69) 104/54  SpO2:  [90 %-98 %] 98 %    Hemodynamic parameters for last 24 hours       Respiratory Data     Respiration: 16, Pulse Oximetry: 98 %     Work Of Breathing / Effort: Shallow  RUL Breath Sounds: Clear, RML Breath Sounds: Clear, RLL Breath Sounds: Fine Crackles, LUCITA Breath Sounds: Clear, LLL Breath Sounds: Fine Crackles    Physical Exam  Physical Exam  Cardiovascular:      Rate and Rhythm: Normal rate.      Pulses: Normal pulses.   Pulmonary:      Effort: Pulmonary effort is normal.   Abdominal:      General: There is no distension.      Palpations: Abdomen is soft.      Tenderness: There is abdominal tenderness.      Comments: Wound with fecalent material from upper wound.  Less distention, abdomen softer      Genitourinary:     Comments: diuresing  Musculoskeletal:         General: Normal range of motion.      Cervical back: Neck supple.      Comments: Generalized edema   Skin:     General: Skin is warm and dry.   Neurological:      General: No focal deficit present.      Mental Status: She is alert.   Psychiatric:      Comments: Teary today          Laboratory  Recent Results (from the past 24 hour(s))   CBC WITH DIFFERENTIAL    Collection Time: 06/11/21  4:36 AM   Result Value Ref Range    WBC 12.9 (H) 4.8 - 10.8 K/uL    RBC 2.60 (L) 4.20 - 5.40 M/uL    Hemoglobin 7.4 (L) 12.0 - 16.0 g/dL    Hematocrit 23.5 (L) 37.0 - 47.0 %    MCV 90.4 81.4 - 97.8 fL    MCH 28.5 27.0 - 33.0 pg    MCHC 31.5 (L) 33.6 - 35.0 g/dL    RDW 48.2 35.9 - 50.0 fL    Platelet Count 476 (H) 164 - 446 K/uL    MPV 8.9 (L) 9.0 - 12.9 fL    Neutrophils-Polys 70.00 44.00 - 72.00 %    Lymphocytes 9.00 (L) 22.00 - 41.00 %    Monocytes 15.00 (H) 0.00 - 13.40 %    Eosinophils 0.00 0.00 - 6.90 %    Basophils 0.00 0.00 - 1.80 %    Nucleated RBC 0.20 /100 WBC    Neutrophils (Absolute) 9.68 (H) 2.00 - 7.15 K/uL    Lymphs (Absolute) 1.16 1.00 - 4.80 K/uL    Monos (Absolute) 1.94 (H) 0.00 - 0.85 K/uL    Eos (Absolute) 0.00 0.00 - 0.51 K/uL    Baso (Absolute) 0.00 0.00 - 0.12 K/uL    NRBC (Absolute) 0.02 K/uL    Hypochromia 1+    proBrain Natriuretic Peptide, NT    Collection Time: 06/11/21  4:36 AM   Result Value Ref Range    NT-proBNP 817 (H) 0 - 125 pg/mL   DIFFERENTIAL MANUAL    Collection Time: 06/11/21  4:36 AM   Result Value Ref Range    Bands-Stabs 5.00 0.00 - 10.00 %    Metamyelocytes 1.00 %    Manual Diff Status PERFORMED    PLATELET ESTIMATE    Collection Time: 06/11/21  4:36 AM   Result Value Ref Range    Plt Estimation Normal    MORPHOLOGY    Collection Time: 06/11/21  4:36 AM   Result Value Ref Range    RBC Morphology Present     Polychromia 1+        Fluids    Intake/Output Summary (Last 24 hours) at 6/11/2021 0643  Last data filed at 6/11/2021 0600  Gross per 24 hour   Intake 4927.68 ml   Output 3485 ml   Net 1442.68 ml       Core Measures & Quality Metrics  Labs reviewed and Medications reviewed  Young catheter: No Young      DVT Prophylaxis: Enoxaparin (Lovenox)  DVT prophylaxis - mechanical: SCDs  Ulcer prophylaxis: Yes  Antibiotics: Treating active infection/contamination  beyond 24 hours perioperative coverage  Assessed for rehab: Patient returned to prior level of function, rehabilitation not indicated at this time    ALICIA Score  ETOH Screening    Assessment/Plan  * Bacterial peritonitis (HCC)  Assessment & Plan  6/2 Zosyn stopped.  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 Passing flatus - start clears  5/27 - Stooled - will adv to full liquids  5/27 - thrombocytopenia, bandemia, PM CT with ascites no freeair  5/28 - Exploratory celiotomy, resection of ileocolic anastomosis with re-do  side-to-side ileocolic anastomosis.  6/3  Stooling, advance to regular diet  6/4 Small wound dehiscence - returned to OR for washout and reclosure  6/9 Repeat CT showed two fluid collections - IR drains placed  Remains on unasyn, diflucan    DVT of axillary vein, acute right (HCC)  Assessment & Plan  Noted on US  On heparin gtt  6/6  Switched to lovenox BID       Discussed patient condition with RN and Patient.    CRITICAL CARE TIME EXCLUDING PROCEDURES: 20  minutes

## 2021-06-11 NOTE — DISCHARGE PLANNING
Anticipated Discharge Disposition: [facility]     Action: LSW informed by attending that pt may not be medically cleared for transfer to LTAC this weekend.     Barriers to Discharge: None     Plan: LSW to continue to follow for d/c needs, LSW to assist as needed

## 2021-06-11 NOTE — CARE PLAN
The patient is Stable - Low risk of patient condition declining or worsening    Shift Goals  Clinical Goals: pain control, wound care  Patient Goals: pain control and sleep    Progress made toward(s) clinical / shift goals:  Pt pain managed with medication per MAR as well as rest. Wound care performed. Pt encouraged to ambulate to the bathroom to improve bowel function as well as assist in improvement of oxygenation.    Patient is not progressing towards the following goals: pain, respiratory status. Pt requiring 1L of oxygen and taking shallow breaths. Pt encouraged to mobilize and practice deep breathing.

## 2021-06-11 NOTE — PROGRESS NOTES
Infectious Disease Progress Note    Author: Elvis Eckert M.D. Date & Time of service: 2021  9:23 AM    Chief Complaint:  Follow-up for intra-abdominal abscesses     Interval History:   patient is afebrile, white count 13.4.  Patient had worsening abdominal pain yesterday, noted purulent output during wound VAC change, repeat CT with multiple intra-abdominal abscesses, taken back to the OR this morning.   AF, O2 RA, doing well overall and states she ambulated twice this morning with normal bowel movement.  She has some abdominal pain with ambulation but otherwise minimal.    patient remains afebrile, had drains placed, white count 12.9, tolerating antimicrobials thus far.  Resting comfortably this morning.    Review of Systems:  Review of Systems   Constitutional: Positive for malaise/fatigue. Negative for chills and fever.   Respiratory: Negative for cough and shortness of breath.    Gastrointestinal: Positive for abdominal pain. Negative for constipation, diarrhea, nausea and vomiting.       Hemodynamics:  Temp (24hrs), Av.7 °C (98.1 °F), Min:36.6 °C (97.8 °F), Max:36.9 °C (98.4 °F)  Temperature: 36.6 °C (97.8 °F)  Pulse  Av.7  Min: 60  Max: 156   Blood Pressure: 106/60       Physical Exam:  Physical Exam  Constitutional:       Appearance: Normal appearance.   Cardiovascular:      Rate and Rhythm: Normal rate and regular rhythm.      Heart sounds: Normal heart sounds.   Pulmonary:      Effort: Pulmonary effort is normal.      Breath sounds: Normal breath sounds.   Abdominal:      General: There is distension.      Palpations: Abdomen is soft.      Tenderness: There is abdominal tenderness. There is no guarding.      Comments: Feculent output upper wound   Musculoskeletal:      Right lower leg: No edema.      Left lower leg: No edema.   Skin:     General: Skin is warm and dry.   Neurological:      General: No focal deficit present.      Mental Status: She is alert and oriented to person,  place, and time.         Meds:    Current Facility-Administered Medications:   •  MD Alert...TPN per Pharmacy  •  NS  •  morphine injection  •  DULoxetine  •  topiramate  •  enoxaparin (LOVENOX) injection  •  oxyCODONE immediate-release  •  oxyCODONE immediate-release  •  morphine injection  •  ampicillin-sulbactam (UNASYN) IV  •  [DISCONTINUED] insulin regular **AND** [CANCELED] POC blood glucose manual result **AND** NOTIFY MD and PharmD **AND** glucose **AND** dextrose 50%  •  Metoprolol Tartrate  •  LORazepam  •  diazePAM  •  Respiratory Therapy Consult  •  Pharmacy Consult Request  •  docusate sodium  •  senna-docusate  •  senna-docusate  •  polyethylene glycol/lytes  •  magnesium hydroxide  •  bisacodyl  •  fleet  •  famotidine **OR** famotidine  •  ondansetron    Labs:  Recent Labs     06/09/21  0514 06/10/21  0445 06/11/21  0436   WBC 12.0* 10.8 12.9*   RBC 2.90* 2.67* 2.60*   HEMOGLOBIN 8.1* 7.5* 7.4*   HEMATOCRIT 26.6* 24.4* 23.5*   MCV 91.7 91.4 90.4   MCH 27.9 28.1 28.5   RDW 49.5 48.8 48.2   PLATELETCT 520* 464* 476*   MPV 9.6 9.0 8.9*   NEUTSPOLYS 67.90 58.00 70.00   LYMPHOCYTES 9.10* 7.00* 9.00*   MONOCYTES 19.50* 18.00* 15.00*   EOSINOPHILS 1.30 1.00 0.00   BASOPHILS 0.80 0.00 0.00   RBCMORPHOLO  --  Present Present     Recent Labs     06/09/21  0514 06/10/21  0445 06/11/21  0436   SODIUM 133* 133* 131*   POTASSIUM 4.6 4.1 4.2   CHLORIDE 95* 99 98   CO2 25 22 21   GLUCOSE 128* 113* 102*   BUN 3* 4* 5*     Recent Labs     06/09/21  0514 06/10/21  0445 06/11/21  0436   ALBUMIN 2.2* 2.4* 2.3*   TBILIRUBIN 0.3 0.3 0.2   ALKPHOSPHAT 92 83 87   TOTPROTEIN 5.9* 6.1 6.0   ALTSGPT 9 8 9   ASTSGOT 15 14 17   CREATININE 0.70 0.62 0.62       Imaging:  CT-ABDOMEN-PELVIS WITH    Result Date: 6/8/2021 6/8/2021 4:22 PM HISTORY/REASON FOR EXAM:  Peritonitis or perforation suspected; 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 Most recent abdominal surgery for washout of the peritoneal cavity performed on 6/4/2021. Ongoing generalized abdominal pain. TECHNIQUE/EXAM DESCRIPTION: CT scan of the abdomen and pelvis with contrast. Contrast-enhanced helical scanning was obtained from the diaphragmatic domes through the pubic symphysis following the bolus administration of 100 mL of Omnipaque 350 nonionic contrast without complication. Low dose optimization technique was utilized for this CT exam including automated exposure control and adjustment of the mA and/or kV according to patient size. COMPARISON: 6/3/2021 FINDINGS: The visualized lung bases demonstrate a small right and moderate left dependent pleural effusion with dependent airspace disease, likely atelectasis similar to the previous exam. There is no acute bony process. CT Abdomen: There is postoperative change in the anterior abdominal wall midline. There are a few tiny air lucencies in the subcutaneous tissues with abdominal wall defect seen with the previous skin staples removed. Tiny amounts of intraperitoneal air identified within the anterior abdominal fluid. There is loculated intra-abdominal fluid with some seen in the left lateral subphrenic space anterior to the spleen measuring 4.4 x 2.1 cm (previously 4.6 x 2.7 cm). There is a small amount of loculated fluid in the right anterior upper quadrant also similar to the prior study. This appears to communicate with a more confluent area of fluid anteriorly within the peritoneal cavity beginning at the level of the iliac crest and extending into the upper pelvis. The largest component is in the right lower  quadrant measuring 4.9 cm in AP diameter, down from 6.5 cm. The fluid collection with rim enhancement and air lucencies in the posterior pelvis measures 6.2 x 3.5 cm (previously 7.2 x 3.9 cm). The liver is unremarkable. The spleen is unremarkable. The pancreas is unremarkable. The gallbladder  demonstrates no stones. The adrenal glands are normal in size. The kidneys enhance symmetrically. The abdominal aorta is normal in caliber. There is no lymphadenopathy. The stomach is distended with some fluid and contrast material. There is postoperative change consistent with a right hemicolectomy. There is minimal dilatation of the left transverse colon at the anastomosis. CT Pelvis: There are no new pelvic fluid collections. There is mild diffuse body wall edema.     1.  Loculated rim-enhancing peritoneal fluid collections are again seen in the upper and lower quadrants as well as the posterior pelvis, all of which are very minimally decreased in size. 2.  There is postoperative change in the anterior abdominal wall with removal of the skin staples. 3.  There are postoperative changes of right hemicolectomy with very minimal dilatation of the transverse colon at the anastomosis. There is no bowel obstruction. 4.  Stable moderate left and small right dependent pleural effusions with dependent atelectasis.    CT-ABDOMEN-PELVIS WITH    Result Date: 6/3/2021  6/3/2021 4:55 PM HISTORY/REASON FOR EXAM:  Abdominal abscess/infection suspected; wound dehiscense, r/o abscess. Postoperative. Recent volvulus with right hemicolectomy TECHNIQUE/EXAM DESCRIPTION:  CT scan of the abdomen and pelvis with contrast. Contrast-enhanced helical scanning was obtained from the diaphragmatic domes through the pubic symphysis following the bolus administration of nonionic contrast without complication. 100 mL of Omnipaque 350 nonionic contrast was administered without complication. Low dose optimization technique was utilized for this CT exam including automated exposure control and adjustment of the mA and/or kV according to patient size. COMPARISON: CT abdomen and pelvis 5/27/2021 FINDINGS: Osseous structures: There is bilateral atelectasis and there are small to moderate-sized bilateral pleural effusion. Lungs: Clear ABDOMEN: There is  peritoneal fluid present posterior to the abdominal wall and in both paracolic gutter. This has some degree of loculation especially in the left paracolic gutter and could indicate infected fluid. Additionally, in the right lower quadrant there is a loculated fluid collection present measuring approximately 13.8 x 4.2 x 6.5 cm and this connects to the peritoneal fluid in the right paracolic gutter. There is loculated rim-enhancing fluid in the left subphrenic space measuring 8.4 cm in length and approximately 4.6 x 2.7 cm transversely. This connects to the fluid within the left paracolic gutter. There are recent postoperative changes with skin staple present and intraperitoneal air and fluid. LIVER: is normal in appearance. GALLBLADDER and BILIARY SYSTEM Gallbladder and biliary system are normal by CT assessment SPLEEN: Normal in appearance.. PANCREAS: Normal in appearance. The splenic vein and portal vein enhance normally. ADRENAL glands: Normal in appearance. RIGHT kidney: There is a nonobstructive 1 to 2 mm right medial lower pole calculus. LEFT kidney: Normal in appearance. There is no hydronephrosis. BOWEL and MESENTERY: Their has been right hemicolectomy. AORTA and VASCULATURE: is normal in appearance. Pelvis: In the recto uterine space there is a loculated fluid collection measuring 6.6 x 7.2 x 5.1 cm consistent with an abscess. Uterus and adnexa appear normal.     1.  Multiple rim enhancing peritoneal fluid collection suspicious for abscess 2.  These include the rectouterine space measuring 6.6 x 7.2 x 5.1 cm, right lower quadrant measuring 13.8 x 4.2 x 6.5 cm, left subphrenic space measuring 8.4 x 4.6 x 2.7 cm, and contiguous loculated fluid within the right and left paracolic gutter 3.  Interval right colectomy 4.  Small-moderate-sized bilateral pleural effusion and atelectasis    CT-ABDOMEN-PELVIS WITH    Result Date: 5/27/2021 5/27/2021 5:48 PM HISTORY/REASON FOR EXAM: Diffuse abdominal pain and  distention. TECHNIQUE/EXAM DESCRIPTION: CT scan of the abdomen and pelvis with contrast. Contrast-enhanced helical scanning was obtained from the diaphragmatic domes through the pubic symphysis following the bolus administration of 100 mL of Omnipaque 350 nonionic contrast without complication. Low dose optimization technique was utilized for this CT exam including automated exposure control and adjustment of the mA and/or kV according to patient size. COMPARISON: 5/22/2021 FINDINGS: CT Abdomen: There is new bibasilar atelectasis and small bilateral pleural effusions, left greater than right. There is fatty change of the liver. There is a small amount of free intraperitoneal air tracking along the surface of the liver. Gallbladder is distended with dense material present within the gallbladder. The spleen is normal. There is moderate right-sided hydronephrosis extending to the level of the upper sacrum. The right ureter is not identified below that level. No definite ureteral stone on the right. There is left ureteral pelvic junction stone which measures 5 mm in size and results in minimal left renal pelvic dilatation. The adrenal glands are normal. The pancreas is normal. The aorta is normal in caliber. No evidence of retroperitoneal adenopathy. CT Pelvis: There is multifocal ascites seen which is likely loculated in nature. There are punctate areas of free air seen within those areas of ascites. There are surgical changes involving the cecum. The colon is diffusely dilated. There is a large amount of free fluid which contains pockets of gas within the pelvis. There is a recent midline incision.     1.  Moderate to large amount of ascites within the abdomen and pelvis some areas of which are loculated in nature. There are also punctate areas of gas within those areas of ascites as well as free intraperitoneal air. This may be related to recent surgical procedure however the degree of volume of ascites would be  unusual for normal postoperative course. Consideration should be given for leakage of bowel content or other process. 2.  Diffuse colonic distention. 3.  Bibasilar atelectasis and pleural effusions. 4.  5 mm left renal pelvic stone which results in mild hydronephrosis above that level. 5.  Moderate right-sided hydronephrosis extending to the level of the upper sacrum. No ureteral stone. The ureter is not identified below that level. 6.  This was discussed with NEVA KAY at 6:44 PM on 5/27/2021.    CT-ABDOMEN-PELVIS WITH    Result Date: 5/22/2021 5/22/2021 11:19 AM HISTORY/REASON FOR EXAM:  Abdominal distension; IV contrast only. TECHNIQUE/EXAM DESCRIPTION:   CT scan of the abdomen and pelvis with contrast. Contrast-enhanced helical scanning was obtained from the diaphragmatic domes through the pubic symphysis following the bolus administration of nonionic contrast without complication. 100 mL of Omnipaque 350 nonionic contrast was administered without complication. Low dose optimization technique was utilized for this CT exam including automated exposure control and adjustment of the mA and/or kV according to patient size. COMPARISON: 4/22/2020 FINDINGS: Chest Base: Lung bases are clear. Liver:  Normal. Gallbladder: Normal Biliary tract: Nondilated. Pancreas: Normal. Spleen: Normal. Adrenals: Normal. Kidneys and Collecting Systems:  Cannot exclude a punctate nonobstructing stone in the lower right renal collecting system. There is a small nonobstructing left renal stone measuring about 5 mm. Gastrointestinal tract:  The cecum is distended and displaced into the left upper quadrant. The ascending colon proximally is narrowed in the midline and there is a somewhat twisted appearance.   Mildly fluid distended distal small bowel without significant small bowel obstruction. The appendix is nonvisualized. Peritoneum: No free air or free fluid. Reproductive organs:  2.3 cm left adnexal hypodense lesion is  indeterminate, possibly a dominant follicle/small cyst in a menstruating patient. Correlate clinically. Probable intramural fibroid in the fundus. Bladder:  Normal. Vessels:  Normal caliber. Lymph  Nodes:  No lymphadenopathy. Abdominal wall: Within normal limits. Bones:  No acute or aggressive abnormality.     1.  Findings keeping with cecal volvulus. 2.  Mildly fluid distended distal small bowel. 3.  No free air. 4.  Small nonobstructing renal stones. These findings were discussed with ESHA STERLING on 5/22/2021 11:45 AM.     [id number] VIEW    Result Date: 5/27/2021 5/27/2021 7:01 AM HISTORY/REASON FOR EXAM:  Distention. TECHNIQUE/EXAM DESCRIPTION AND NUMBER OF VIEWS:  1 view(s) of the abdomen. COMPARISON: 5/24/2021 FINDINGS: Gaseous distended  small bowel and colon is similar to prior study. There is some stool within the right colon. There is no significant interval change. No suspicious calcifications. No acute osseous abnormality.     Gaseous distended small bowel and colon is similar to prior, likely ileus.    [id number] VIEW    Result Date: 5/24/2021 5/24/2021 11:28 AM HISTORY/REASON FOR EXAM:  Distention; s/p right hemicolectomy Lower abdominal pain s/p right hemicolectomy 05/22/21 TECHNIQUE/EXAM DESCRIPTION AND NUMBER OF VIEWS:  1 view(s) of the abdomen. COMPARISON: 5/22/2021 FINDINGS: Gaseous distention of the small bowel and colon No portal venous gas or pneumatosis. No definite free intraperitoneal air but evaluation is limited on supine radiograph.     Gaseous distention of the small bowel and colon, likely postoperative ileus.    DX-CHEST-FOR LINE PLACEMENT Perform procedure in: Other(comment f6 below): (PACU)    Result Date: 5/28/2021 5/28/2021 10:10 AM HISTORY/REASON FOR EXAM:  Central line placement. TECHNIQUE/EXAM DESCRIPTION AND NUMBER OF VIEWS: Single AP view of the chest. COMPARISON: None FINDINGS: There is a left IJ central line with the tip high in position above the superior vena  cava within the area of the left brachiocephalic vein. No pneumothorax. NG tube extends into the stomach. There are patchy bilateral pulmonary infiltrates. The heart is mildly enlarged. There is no pleural effusion.     1.  Left IJ central line tip high in position located above the superior vena cava are within the left brachiocephalic vein. 2.  Patchy bilateral infiltrates. 3.  NG tube tip extends into the stomach.    US-EXTREMITY VENOUS UPPER BILAT    Result Date: 2021   Upper Extremity  Venous Duplex Report  Vascular Laboratory  CONCLUSIONS  Small thrombus seen around the catheter line at the RIGHT axillary vein and  proximal basilic vein.  No left upper extremity superficial and deep venous thrombosis.  HAONG EMERY  Exam Date:     2021 14:29  Room #:     Inpatient  Priority:     Routine  Ht (in):             Wt (lb):  Ordering Physician:        DEIDRA SNIDER  Referring Physician:       [id number], CAO  Sonographer:               Sharlene Morrell RVT, RDMS  Study Type:                Complete Bilateral  Technical Quality:         Fair  Age:    50    Gender:     F  MRN:    [id number]  :    1970      BSA:  Indications:     Edema  CPT Codes:       43148  ICD Codes:       782.3  History:         Swelling. No prior study  Limitations:  PROCEDURES:  Bilateral upper extremity venous duplex imaging.  The following venous structures were evaluated: internal jugular,  subclavian, axillary, brachial, cephalic and basilic veins.  FINDINGS:  Right upper extremity.  Small thrombus seen around the central line at the axillary vein and  proximal basilic vein.  All other veins of the right upper extremity demonstrate normal flow  dynamics with no evidence of deep vein thrombosis.  Left upper extremity.  Limited visualization of the internal jugular vein due to bandages.  All other veins of the left upper extremity demonstrate normal flow  dynamics with no  evidence of deep vein thrombosis.  Doug Rossi MD  (Electronically Signed)  Final Date:      02 June 2021                   18:16    IR-MIDLINE CATHETER INSERTION WO GUIDANCE > AGE 3    Result Date: 6/1/2021  HISTORY/REASON FOR EXAM:  Midline Placement   TECHNIQUE/EXAM DESCRIPTION AND NUMBER OF VIEWS: Midline insertion with ultrasound guidance.  FINDINGS: Midline insertion with Ultrasound Guidance was performed by qualified nursing staff without the assistance of a Radiologist. Midline positioning as measured by RN or as appropriate length of catheter selected.              Ultrasound-guided midline placement performed by qualified nursing staff as above.       Micro:  Results     Procedure Component Value Units Date/Time    GRAM STAIN [[id number]] Collected: 06/09/21 1630    Order Status: Completed Specimen: Wound Updated: 06/10/21 1336     Significant Indicator .     Source WND     Site KRISTI DRAIN     Gram Stain Result Moderate WBCs.  Few Yeast.      Narrative:      Collected By:[id number] HALILAGIC LIANA  Please collect culture when drain is placed to abdomen today  Collected By:[id number] HALILAGIC LIANA    CULTURE WOUND W/ GRAM STAIN [[id number]] Collected: 06/09/21 1630    Order Status: Completed Specimen: Wound from KRISTI Drain Updated: 06/10/21 1335     Significant Indicator NEG     Source WND     Site KRISTI DRAIN     Culture Result -     Gram Stain Result Moderate WBCs.  Few Yeast.      Narrative:      Collected By:[id number] HALILAGIC LIANA  Please collect culture when drain is placed to abdomen today  Collected By:[id number] HALILAGIC LIANA    Culture Wound w/Gram Stain [[id number]]  (Abnormal) Collected: 06/08/21 1425    Order Status: Completed Specimen: Wound from Abdominal Updated: 06/10/21 0938     Significant Indicator POS     Source WND     Site ABDOMINAL     Culture Result Rare growth usual skin coco.     Gram Stain Result Moderate WBCs.  No organisms seen.       Culture Result Bacteroides fragilis  Light growth       "Narrative:      Collected By:[id number] HALILAGIC LIANA  Collected By:[id number] HALILAGIC LIANA    GRAM STAIN [[id number]] Collected: 06/08/21 1425    Order Status: Completed Specimen: Wound Updated: 06/09/21 0826     Significant Indicator .     Source WND     Site ABDOMINAL     Gram Stain Result Moderate WBCs.  No organisms seen.      Narrative:      Collected By:[id number] HALILAGIC LIANA  Collected By:[id number] HALILAGIC LIANA    BLOOD CULTURE [[id number]] Collected: 06/02/21 1730    Order Status: Completed Specimen: Blood from Peripheral Updated: 06/07/21 1817     Significant Indicator NEG     Source BLD     Site PERIPHERAL     Culture Result No growth after 5 days of incubation.  Blood culture testing and Gram stain, if indicated, are  performed at [facility], [facility]  [facility].[place], [place].  Positive blood cultures are  sent to [facility], [address] [place] [address], for organism identification and  susceptibility testing.      Narrative:      Per Hospital Policy: Only change Specimen Src: to \"Line\" if  specified by physician order.  Left Wrist    BLOOD CULTURE [[id number]] Collected: 06/02/21 1307    Order Status: Completed Specimen: Blood from Peripheral Updated: 06/07/21 1417     Significant Indicator NEG     Source BLD     Site PERIPHERAL     Culture Result No growth after 5 days of incubation.  Blood culture testing and Gram stain, if indicated, are  performed at [facility], [facility]  [facility].[place], [place].  Positive blood cultures are  sent to [facility], [address] [place] [address], for organism identification and  susceptibility testing.      Narrative:      Per Hospital Policy: Only change Specimen Src: to \"Line\" if  specified by physician order.  No site indicated            Assessment/Hospital course:  This is a very pleasant 50-year-old female patient, originally admitted on 5/22/2021 with cecal volvulus for " which she underwent a right hemicolectomy on 5/22.  This was complicated by intra-abdominal abscesses and free air concerning for leak.  She was taken to the OR for exploratory laparotomy and found to have necrosis of the ileocolonic anastomosis site, bowel is eviscerated and redo side-to-side ileocolic anastomosis was done.    Patient went back to the OR on 6/4 due to drainage from wound and concern for fascial dehiscence.  Per op note there is loosening of the fascia but no yessy dehiscence.  Abdomen was washed out including pockets noted on last CT.  Anastomosis was not evaluated as was scarred and no evidence of leak.     Pertinent Diagnoses:  Sepsis, improved  Intra-abdominal abscesses, persistent  Large midline incision, with significant feculent drainage   Feculent peritonitis  Cecal volvulus, sequelae  Leukocytosis, ongoing but improved     Plan:  -OR cultures from 5/28 growing Bacteroides, Actinomyces, E faecalis  -Patient had worsening abdominal pain and a repeat CT scan obtained 6/3 showed multiple rim-enhancing peritoneal fluid collections several quite large and loculated.  Patient was taken back to the OR for exploratory laparotomy on 6/4 as discussed above, no new cultures were obtained.    -Repeat CT abdomen and pelvis 6/8 was ordered with postop change in anterior abdominal wall, few tiny air lucencies and tiny amounts of intraperitoneal air identified within the anterior abdominal fluid.  Loculated intra-abdominal fluid in the left lateral splenic space measuring 4.4 x 2.1 cm, small amount of loculated fluid in the right anterior upper quadrant also similar to prior study.  This appears to communicate with fluid anterior to the peritoneal cavity measuring 4.9 x 6.5 cm.  Fluid collection with rim enhancement in the posterior pelvis now 6.2 x 3.5 cm.  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 Lopez.  ID will follow.

## 2021-06-11 NOTE — OR NURSING
1608  To PACU from OR via bed, respirations spontaneous and non-labored.   Wound vac to abdominal incision, dressing intact, GREER drain to wound, david drain to bowel, 2nd greer to abscess on pt's back.      1623 pt rouses to verbal stim, no c/o    1638 pt removed o2 mask-n/c applied, pt denies pain/nausea    1650 medicated for c/o pain    1700 called pt's mom with update    1706 medicated for c/o increasing pain    1725 pt states pain at a tolerable level now.   Called report to Felisha mane    1737 transferred to floor in stable condition on O2 via bed with transporter

## 2021-06-11 NOTE — ANESTHESIA PREPROCEDURE EVALUATION
Ill appearing 52 yo. Bowel leak.    Relevant Problems   NEURO   (positive) History of kidney stones      CARDIAC   (positive) DVT of axillary vein, acute right (HCC)   (positive) Intractable migraine with aura without status migrainosus       Physical Exam    Airway   Mallampati: II  TM distance: >3 FB  Neck ROM: full       Cardiovascular - normal exam  Rhythm: regular  Rate: abnormal  (-) murmur     Dental - normal exam           Pulmonary - normal exam  Breath sounds clear to auscultation     Abdominal    Neurological - normal exam                 Anesthesia Plan    ASA 3- EMERGENT   ASA physical status emergent criteria: acute peritonitis    Plan - general       Airway plan will be ETT          Induction: intravenous    Postoperative Plan: Postoperative administration of opioids is intended.    Pertinent diagnostic labs and testing reviewed    Informed Consent:    Anesthetic plan and risks discussed with patient.    Use of blood products discussed with: patient whom consented to blood products.

## 2021-06-11 NOTE — PROGRESS NOTES
Assumed care of patient at bedside report from NOC RN. Updated on POC. Patient currently sleeping without signs or symptoms of discomfort or distress; on 1 L O2 nasal cannula; up x1 assist. Call light within reach. Whiteboard updated. Fall precautions in place. Bed locked and in lowest position. No other needs indicated at this time.

## 2021-06-11 NOTE — ANESTHESIA PROCEDURE NOTES
Airway    Date/Time: 6/11/2021 3:22 PM  Performed by: Luisa Pan M.D.  Authorized by: Luisa Pan M.D.     Location:  OR  Urgency:  Elective  Indications for Airway Management:  Anesthesia      Spontaneous Ventilation: absent    Sedation Level:  Deep  Preoxygenated: Yes    Patient Position:  Sniffing  Mask Difficulty Assessment:  0 - not attempted  Final Airway Type:  Endotracheal airway  Final Endotracheal Airway:  ETT  Cuffed: Yes    Technique Used for Successful ETT Placement:  Direct laryngoscopy  Devices/Methods Used in Placement:  Cricoid pressure    Insertion Site:  Oral  Blade Type:  Mor  Laryngoscope Blade/Videolaryngoscope Blade Size:  3  ETT Size (mm):  7.0  Measured from:  Teeth  ETT to Teeth (cm):  21  Placement Verified by: auscultation and capnometry    Cormack-Lehane Classification:  Grade I - full view of glottis  Number of Attempts at Approach:  1

## 2021-06-12 ENCOUNTER — APPOINTMENT (OUTPATIENT)
Dept: RADIOLOGY | Facility: MEDICAL CENTER | Age: 51
DRG: 329 | End: 2021-06-12
Attending: FAMILY MEDICINE
Payer: COMMERCIAL

## 2021-06-12 ENCOUNTER — APPOINTMENT (OUTPATIENT)
Dept: CARDIOLOGY | Facility: MEDICAL CENTER | Age: 51
DRG: 329 | End: 2021-06-12
Attending: FAMILY MEDICINE
Payer: COMMERCIAL

## 2021-06-12 LAB
ALBUMIN SERPL BCP-MCNC: 2 G/DL (ref 3.2–4.9)
ALBUMIN/GLOB SERPL: 0.6 G/DL
ALP SERPL-CCNC: 89 U/L (ref 30–99)
ALT SERPL-CCNC: 7 U/L (ref 2–50)
ANION GAP SERPL CALC-SCNC: 13 MMOL/L (ref 7–16)
AST SERPL-CCNC: 9 U/L (ref 12–45)
BACTERIA WND AEROBE CULT: ABNORMAL
BASOPHILS # BLD AUTO: 0.4 % (ref 0–1.8)
BASOPHILS # BLD: 0.07 K/UL (ref 0–0.12)
BILIRUB SERPL-MCNC: <0.2 MG/DL (ref 0.1–1.5)
BUN SERPL-MCNC: 7 MG/DL (ref 8–22)
CALCIUM SERPL-MCNC: 7.5 MG/DL (ref 8.4–10.2)
CHLORIDE SERPL-SCNC: 103 MMOL/L (ref 96–112)
CO2 SERPL-SCNC: 19 MMOL/L (ref 20–33)
CREAT SERPL-MCNC: 0.49 MG/DL (ref 0.5–1.4)
EOSINOPHIL # BLD AUTO: 0.02 K/UL (ref 0–0.51)
EOSINOPHIL NFR BLD: 0.1 % (ref 0–6.9)
ERYTHROCYTE [DISTWIDTH] IN BLOOD BY AUTOMATED COUNT: 50.2 FL (ref 35.9–50)
GLOBULIN SER CALC-MCNC: 3.6 G/DL (ref 1.9–3.5)
GLUCOSE SERPL-MCNC: 111 MG/DL (ref 65–99)
GRAM STN SPEC: ABNORMAL
HCT VFR BLD AUTO: 24.9 % (ref 37–47)
HGB BLD-MCNC: 7.4 G/DL (ref 12–16)
IMM GRANULOCYTES # BLD AUTO: 0.73 K/UL (ref 0–0.11)
IMM GRANULOCYTES NFR BLD AUTO: 4.2 % (ref 0–0.9)
LACTATE BLD-SCNC: 1 MMOL/L (ref 0.5–2)
LACTATE BLD-SCNC: 1 MMOL/L (ref 0.5–2)
LV EJECT FRACT  99904: 55
LV EJECT FRACT MOD 2C 99903: 52.67
LV EJECT FRACT MOD 4C 99902: 46.81
LV EJECT FRACT MOD BP 99901: 53.48
LYMPHOCYTES # BLD AUTO: 0.73 K/UL (ref 1–4.8)
LYMPHOCYTES NFR BLD: 4.2 % (ref 22–41)
MAGNESIUM SERPL-MCNC: 2.3 MG/DL (ref 1.5–2.5)
MCH RBC QN AUTO: 27.1 PG (ref 27–33)
MCHC RBC AUTO-ENTMCNC: 29.7 G/DL (ref 33.6–35)
MCV RBC AUTO: 91.2 FL (ref 81.4–97.8)
MONOCYTES # BLD AUTO: 1.16 K/UL (ref 0–0.85)
MONOCYTES NFR BLD AUTO: 6.7 % (ref 0–13.4)
NEUTROPHILS # BLD AUTO: 14.64 K/UL (ref 2–7.15)
NEUTROPHILS NFR BLD: 84.4 % (ref 44–72)
NRBC # BLD AUTO: 0.03 K/UL
NRBC BLD-RTO: 0.2 /100 WBC
PHOSPHATE SERPL-MCNC: 4.3 MG/DL (ref 2.5–4.5)
PLATELET # BLD AUTO: 498 K/UL (ref 164–446)
PMV BLD AUTO: 8.9 FL (ref 9–12.9)
POTASSIUM SERPL-SCNC: 4.2 MMOL/L (ref 3.6–5.5)
PROT SERPL-MCNC: 5.6 G/DL (ref 6–8.2)
RBC # BLD AUTO: 2.73 M/UL (ref 4.2–5.4)
SIGNIFICANT IND 70042: ABNORMAL
SITE SITE: ABNORMAL
SODIUM SERPL-SCNC: 135 MMOL/L (ref 135–145)
SOURCE SOURCE: ABNORMAL
WBC # BLD AUTO: 17.4 K/UL (ref 4.8–10.8)

## 2021-06-12 PROCEDURE — B548ZZA ULTRASONOGRAPHY OF SUPERIOR VENA CAVA, GUIDANCE: ICD-10-PCS | Performed by: FAMILY MEDICINE

## 2021-06-12 PROCEDURE — 83735 ASSAY OF MAGNESIUM: CPT

## 2021-06-12 PROCEDURE — 700105 HCHG RX REV CODE 258: Performed by: FAMILY MEDICINE

## 2021-06-12 PROCEDURE — C1751 CATH, INF, PER/CENT/MIDLINE: HCPCS

## 2021-06-12 PROCEDURE — 700111 HCHG RX REV CODE 636 W/ 250 OVERRIDE (IP): Performed by: INTERNAL MEDICINE

## 2021-06-12 PROCEDURE — 84100 ASSAY OF PHOSPHORUS: CPT

## 2021-06-12 PROCEDURE — 700111 HCHG RX REV CODE 636 W/ 250 OVERRIDE (IP): Performed by: FAMILY MEDICINE

## 2021-06-12 PROCEDURE — 700102 HCHG RX REV CODE 250 W/ 637 OVERRIDE(OP): Performed by: FAMILY MEDICINE

## 2021-06-12 PROCEDURE — A9270 NON-COVERED ITEM OR SERVICE: HCPCS | Performed by: FAMILY MEDICINE

## 2021-06-12 PROCEDURE — A9270 NON-COVERED ITEM OR SERVICE: HCPCS | Performed by: INTERNAL MEDICINE

## 2021-06-12 PROCEDURE — A9270 NON-COVERED ITEM OR SERVICE: HCPCS | Performed by: SURGERY

## 2021-06-12 PROCEDURE — 770006 HCHG ROOM/CARE - MED/SURG/GYN SEMI*

## 2021-06-12 PROCEDURE — 700105 HCHG RX REV CODE 258: Performed by: INTERNAL MEDICINE

## 2021-06-12 PROCEDURE — 99233 SBSQ HOSP IP/OBS HIGH 50: CPT | Performed by: FAMILY MEDICINE

## 2021-06-12 PROCEDURE — 93306 TTE W/DOPPLER COMPLETE: CPT | Mod: 26 | Performed by: INTERNAL MEDICINE

## 2021-06-12 PROCEDURE — 80053 COMPREHEN METABOLIC PANEL: CPT

## 2021-06-12 PROCEDURE — 36415 COLL VENOUS BLD VENIPUNCTURE: CPT

## 2021-06-12 PROCEDURE — 83605 ASSAY OF LACTIC ACID: CPT

## 2021-06-12 PROCEDURE — 93306 TTE W/DOPPLER COMPLETE: CPT

## 2021-06-12 PROCEDURE — 02HV33Z INSERTION OF INFUSION DEVICE INTO SUPERIOR VENA CAVA, PERCUTANEOUS APPROACH: ICD-10-PCS | Performed by: FAMILY MEDICINE

## 2021-06-12 PROCEDURE — 85025 COMPLETE CBC W/AUTO DIFF WBC: CPT

## 2021-06-12 PROCEDURE — 700101 HCHG RX REV CODE 250: Performed by: FAMILY MEDICINE

## 2021-06-12 PROCEDURE — 700102 HCHG RX REV CODE 250 W/ 637 OVERRIDE(OP): Performed by: SURGERY

## 2021-06-12 PROCEDURE — 700102 HCHG RX REV CODE 250 W/ 637 OVERRIDE(OP): Performed by: INTERNAL MEDICINE

## 2021-06-12 RX ORDER — SODIUM CHLORIDE 9 MG/ML
INJECTION, SOLUTION INTRAVENOUS CONTINUOUS
Status: DISCONTINUED | OUTPATIENT
Start: 2021-06-12 | End: 2021-06-20

## 2021-06-12 RX ORDER — LIDOCAINE HYDROCHLORIDE 20 MG/ML
20 INJECTION, SOLUTION INFILTRATION; PERINEURAL
Status: DISCONTINUED | OUTPATIENT
Start: 2021-06-12 | End: 2021-06-21 | Stop reason: HOSPADM

## 2021-06-12 RX ORDER — LIDOCAINE HYDROCHLORIDE 40 MG/ML
SOLUTION TOPICAL
Status: DISCONTINUED | OUTPATIENT
Start: 2021-06-12 | End: 2021-06-21 | Stop reason: HOSPADM

## 2021-06-12 RX ADMIN — AMPICILLIN SODIUM AND SULBACTAM SODIUM 3 G: 2; 1 INJECTION, POWDER, FOR SOLUTION INTRAMUSCULAR; INTRAVENOUS at 17:09

## 2021-06-12 RX ADMIN — LIDOCAINE HYDROCHLORIDE 18 ML: 40 SOLUTION TOPICAL at 08:09

## 2021-06-12 RX ADMIN — ENOXAPARIN SODIUM 80 MG: 80 INJECTION SUBCUTANEOUS at 17:08

## 2021-06-12 RX ADMIN — MORPHINE SULFATE 2 MG: 4 INJECTION INTRAVENOUS at 18:28

## 2021-06-12 RX ADMIN — ENOXAPARIN SODIUM 80 MG: 80 INJECTION SUBCUTANEOUS at 06:00

## 2021-06-12 RX ADMIN — MORPHINE SULFATE 4 MG: 4 INJECTION INTRAVENOUS at 07:54

## 2021-06-12 RX ADMIN — OXYCODONE HYDROCHLORIDE 10 MG: 10 TABLET ORAL at 16:40

## 2021-06-12 RX ADMIN — MORPHINE SULFATE 2 MG: 4 INJECTION INTRAVENOUS at 23:53

## 2021-06-12 RX ADMIN — AMPICILLIN SODIUM AND SULBACTAM SODIUM 3 G: 2; 1 INJECTION, POWDER, FOR SOLUTION INTRAMUSCULAR; INTRAVENOUS at 12:31

## 2021-06-12 RX ADMIN — AMPICILLIN SODIUM AND SULBACTAM SODIUM 3 G: 2; 1 INJECTION, POWDER, FOR SOLUTION INTRAMUSCULAR; INTRAVENOUS at 06:00

## 2021-06-12 RX ADMIN — TOPIRAMATE 100 MG: 100 TABLET, FILM COATED ORAL at 20:07

## 2021-06-12 RX ADMIN — DOCUSATE SODIUM 100 MG: 100 CAPSULE, LIQUID FILLED ORAL at 17:09

## 2021-06-12 RX ADMIN — OXYCODONE HYDROCHLORIDE 10 MG: 10 TABLET ORAL at 09:17

## 2021-06-12 RX ADMIN — OXYCODONE HYDROCHLORIDE 10 MG: 10 TABLET ORAL at 21:11

## 2021-06-12 RX ADMIN — POTASSIUM CHLORIDE: 2 INJECTION, SOLUTION, CONCENTRATE INTRAVENOUS at 20:09

## 2021-06-12 RX ADMIN — SODIUM CHLORIDE: 9 INJECTION, SOLUTION INTRAVENOUS at 11:06

## 2021-06-12 RX ADMIN — MICAFUNGIN SODIUM 100 MG: 20 INJECTION, POWDER, LYOPHILIZED, FOR SOLUTION INTRAVENOUS at 11:06

## 2021-06-12 RX ADMIN — POLYETHYLENE GLYCOL 3350 1 PACKET: 17 POWDER, FOR SOLUTION ORAL at 17:09

## 2021-06-12 RX ADMIN — MORPHINE SULFATE 4 MG: 4 INJECTION INTRAVENOUS at 12:31

## 2021-06-12 RX ADMIN — DULOXETINE HYDROCHLORIDE 60 MG: 30 CAPSULE, DELAYED RELEASE ORAL at 09:19

## 2021-06-12 RX ADMIN — AMPICILLIN SODIUM AND SULBACTAM SODIUM 3 G: 2; 1 INJECTION, POWDER, FOR SOLUTION INTRAMUSCULAR; INTRAVENOUS at 23:54

## 2021-06-12 ASSESSMENT — PAIN DESCRIPTION - PAIN TYPE
TYPE: ACUTE PAIN;SURGICAL PAIN
TYPE: ACUTE PAIN
TYPE: ACUTE PAIN;SURGICAL PAIN

## 2021-06-12 ASSESSMENT — ENCOUNTER SYMPTOMS
ROS GI COMMENTS: PASSING SOME FLATUS
DIZZINESS: 0
ABDOMINAL PAIN: 1
MYALGIAS: 1
CONSTIPATION: 0
FEVER: 0
WEAKNESS: 1
SHORTNESS OF BREATH: 0
VOMITING: 0

## 2021-06-12 NOTE — PROGRESS NOTES
Brief ID note:    -Barium enema reveals extensive ileocolic anastomotic leak into the peritoneal cavity extending through the enterocutaneous fistula to the skin surface.  Patient was taken back to the OR 6/11, but the bowel was too inflamed to mobilize for ostomy, drain was placed  -Drain cultures from 6/9 also growing a Candida krusei.  Will change fluconazole to IV micafungin 100 mg every 24 hours, continue IV Unasyn 3 g every 6 hours

## 2021-06-12 NOTE — ANESTHESIA POSTPROCEDURE EVALUATION
Patient: Mair Dillon    Procedure Summary     Date: 06/11/21 Room / Location:  OR  / SURGERY [facility]    Anesthesia Start: 1515 Anesthesia Stop: 1610    Procedures:       LAPAROTOMY, EXPLORATORY (Abdomen)      WOUND VAC PLACEMENT  (Abdomen) Diagnosis: ([place] leak)    Surgeons: Maryam Wood M.D. Responsible Provider: Luisa Pan M.D.    Anesthesia Type: general ASA Status: 3 - Emergent          Final Anesthesia Type: general  Last vitals  BP   Blood Pressure: 108/61    Temp   36.4 °C (97.6 °F)    Pulse   90   Resp   20    SpO2   92 %      Anesthesia Post Evaluation    Patient location during evaluation: PACU  Patient participation: complete - patient participated  Level of consciousness: awake and alert  Pain score: 5    Airway patency: patent  Anesthetic complications: no  Cardiovascular status: hemodynamically stable  Respiratory status: acceptable  Hydration status: euvolemic    PONV: none          No complications documented.     Nurse Pain Score: 7 (NPRS)

## 2021-06-12 NOTE — PROGRESS NOTES
4 Eyes Skin Assessment Completed by KLAUDIA Trejo and ***, RN.    Head WDL  Ears WDL  Nose WDL  Mouth WDL  Neck WDL  Breast/Chest WDL  Shoulder Blades WDL  Spine WDL  (R) Arm/Elbow/Hand WDL  (L) Arm/Elbow/Hand WDL  Abdomen Incision ( & wound vac and drain to abdomen)  Groin WDL  Scrotum/Coccyx/Buttocks WDL  (R) Leg WDL  (L) Leg WDL  (R) Heel/Foot/Toe WDL  (L) Heel/Foot/Toe WDL          Devices In Places Blood Pressure Cuff, Pulse Ox, Young, SCD's and Nasal Cannula      Interventions In Place NC W/Ear Foams and Pillows    Possible Skin Injury No    Pictures Uploaded Into Epic N/A  Wound Consult Placed Yes  RN Wound Prevention Protocol Ordered No

## 2021-06-12 NOTE — CARE PLAN
The patient is Stable - Low risk of patient condition declining or worsening    Shift Goals  Clinical Goals: pain control, wound and drain care  Patient Goals: pain control, wound and drain care    Progress made toward(s) clinical / shift goals:  Pt pain well controlled throughout the night. Pt did not request pain medication throughout most of shift. Room kept dark with door closed to encourage rest. Woundvac in place and draining serosanguinous fluid. Incision dressing CDI. KRISTI drains in place and dressings CDI.    Patient is not progressing towards the following goals:      Problem: Early Mobilization - Post Surgery  Goal: Early mobilization post surgery  Outcome: Not Progressing   Pt not mobilized throughout the night. Pt stated that she would like to rest tonight and try to mobilize in the morning.

## 2021-06-12 NOTE — PROGRESS NOTES
[facility] Medicine Daily Progress Note    Date of Service  6/12/2021    Chief Complaint  50 y.o. female admitted 5/22/2021 with abd pain.    Hospital Course  51 yo woman who presented with abd pain and CT showed cecal volvulus. She underwent exploratory celiotomy and right hemicolectomy with ileocolic anastomosis with Dr. Wood 5/22/21. She developed thrombocytopenia and ascites and returned for ex lap with resection of ileocolic anastomosis and wound vac placement 5/28/21. She was admitted to ICU, started on zosyn for bacterial peritonitis and given platelet transfusions. HIT panel was negative. Abd cultures grew actinomyces, enterococcus, bacteroides. Doppler showed catheter related thrombus in R arm and started on anticoagulation. She had additional washout in the OR with Dr. Wood 6/4/21 and remains on unasyn and fluconazole. Wound vac remains in place.    Interval Problem Update  6/8 - White count coming down, afebrile; states that she is eating well, and that her current medication regimen controls her pain and lasts ~4 hours.  Can likely dc home tomorrow with home health for wound care and dressing changes, will need outpatient ID recs from Dr Obrien, much appreciated.     6/9 - Afebrile overnight, remains tachycardic - dry MM, will change to NS and increase rate. White count down further today. Pt is to go for IR drain placement this afternoon, is NPO currently.    6/10 - KRISTI drains in place, pt did not each much breakfast due to pain and nausea.  Will monitor oral intake carefully with low threshold to start TPN or enteric feeds to optimize nutritional status for healing. Is afebrile with improved white count but increased bands.  Remains on 3L, will check a CXR. Looking at LTAC when no further surgical interventions anticipated.    6/11 - Pt had barium enema which demonstrated an extensive anastomotic leak - discussed with Dr Wood, plans for OR today. Vitals are stable, bandemia is improved but white  count up. Pain is controlled per patient, TPN ordered to start tonight. Pt down to room air this evening.  Will stop IVFs as she will be receiving TPN now and had some third spacing with IVFs.      6/12 - PICC placed today for TPN - some hypotension this morning that has improved without intervention by this afternoon. Went back to OR yesterday for anastomotic leak, bowel too inflamed to create iliostomy, drain was placed and abdomen washed out. White count up today and hemoglobin down, but pt's pain is improved and abdomen is softer.    Consultants/Specialty  Critical care  Surgery  ID    Code Status  Full Code    Disposition  TBD - likely  LTAC    Review of Systems  Review of Systems   Constitutional: Positive for malaise/fatigue. Negative for fever.   Respiratory: Negative for shortness of breath.    Cardiovascular: Negative for chest pain.   Gastrointestinal: Positive for abdominal pain. Negative for constipation and vomiting.   Musculoskeletal: Positive for myalgias.   Neurological: Positive for weakness. Negative for dizziness.   All other systems reviewed and are negative.       Physical Exam  Temp:  [36 °C (96.8 °F)-37.4 °C (99.3 °F)] 36.4 °C (97.6 °F)  Pulse:  [] 90  Resp:  [12-20] 16  BP: ()/(48-67) 117/52  SpO2:  [90 %-100 %] 100 %    Physical Exam  Vitals and nursing note reviewed.   Constitutional:       Appearance: She is ill-appearing. She is not toxic-appearing.      Comments: fatigued   HENT:      Head: Normocephalic.      Mouth/Throat:      Mouth: Mucous membranes are moist.   Eyes:      General:         Right eye: No discharge.         Left eye: No discharge.   Cardiovascular:      Rate and Rhythm: Normal rate and regular rhythm.   Pulmonary:      Effort: Pulmonary effort is normal. No respiratory distress.      Breath sounds: No wheezing or rales.   Abdominal:      Tenderness: There is abdominal tenderness (diffuse). There is no guarding or rebound.      Comments: Abdomen with  extensive dressing in place, c/d/i   Musculoskeletal:         General: No swelling.      Cervical back: Neck supple.   Skin:     General: Skin is warm and dry.   Neurological:      General: No focal deficit present.      Mental Status: She is alert.      Comments: AOx4         Fluids    Intake/Output Summary (Last 24 hours) at 6/12/2021 1303  Last data filed at 6/12/2021 0839  Gross per 24 hour   Intake 1140 ml   Output 705 ml   Net 435 ml       Laboratory  Recent Labs     06/10/21  0445 06/11/21  0436 06/12/21  0220   WBC 10.8 12.9* 17.4*   RBC 2.67* 2.60* 2.73*   HEMOGLOBIN 7.5* 7.4* 7.4*   HEMATOCRIT 24.4* 23.5* 24.9*   MCV 91.4 90.4 91.2   MCH 28.1 28.5 27.1   MCHC 30.7* 31.5* 29.7*   RDW 48.8 48.2 50.2*   PLATELETCT 464* 476* 498*   MPV 9.0 8.9* 8.9*     Recent Labs     06/10/21  0445 06/11/21  0436 06/12/21  0220   SODIUM 133* 131* 135   POTASSIUM 4.1 4.2 4.2   CHLORIDE 99 98 103   CO2 22 21 19*   GLUCOSE 113* 102* 111*   BUN 4* 5* 7*   CREATININE 0.62 0.62 0.49*   CALCIUM 8.3* 7.8* 7.5*             Recent Labs     06/11/21  0436   TRIGLYCERIDE 85       Imaging  EC-ECHOCARDIOGRAM COMPLETE W/O CONT         DX-BARIUM ENEMA   Final Result      1.  Extensive ileocolic anastomotic leakage extending into the peritoneal cavity and extending through the enterocutaneous fistula to the skin surface            2.  Right hemicolectomy      3.  Findings were discussed with NEVA KAY on 6/11/2021 12:21 PM.      DX-CHEST-PORTABLE (1 VIEW)   Final Result      1.  The cardiac silhouette is enlarged and there are changes most consistent with vascular congestion/edema with a trace of left pleural effusion.   2.  Superimposed pneumonitis and/or atelectasis is also possible.      CT-DRAIN-PERITONEAL   Final Result      1.  CT guided right lower quadrant abscess and pelvic abscess catheter drainage.   2.  The current plan is to obtain a follow-up CT in 57 days..      CT-ABDOMEN-PELVIS WITH   Final Result      1.  Loculated  rim-enhancing peritoneal fluid collections are again seen in the upper and lower quadrants as well as the posterior pelvis, all of which are very minimally decreased in size.   2.  There is postoperative change in the anterior abdominal wall with removal of the skin staples.   3.  There are postoperative changes of right hemicolectomy with very minimal dilatation of the transverse colon at the anastomosis. There is no bowel obstruction.   4.  Stable moderate left and small right dependent pleural effusions with dependent atelectasis.      CT-ABDOMEN-PELVIS WITH   Final Result      1.  Multiple rim enhancing peritoneal fluid collection suspicious for abscess      2.  These include the rectouterine space measuring 6.6 x 7.2 x 5.1 cm, right lower quadrant measuring 13.8 x 4.2 x 6.5 cm, left subphrenic space measuring 8.4 x 4.6 x 2.7 cm, and contiguous loculated fluid within the right and left paracolic gutter      3.  Interval right colectomy      4.  Small-moderate-sized bilateral pleural effusion and atelectasis      US-EXTREMITY VENOUS UPPER BILAT   Final Result      IR-MIDLINE CATHETER INSERTION WO GUIDANCE > AGE 3   Final Result                  Ultrasound-guided midline placement performed by qualified nursing staff    as above.          DX-CHEST-FOR LINE PLACEMENT Perform procedure in: Other(comment f6 below): (PACU)   Final Result      1.  Left IJ central line tip high in position located above the superior vena cava are within the left brachiocephalic vein.      2.  Patchy bilateral infiltrates.      3.  NG tube tip extends into the stomach.      CT-ABDOMEN-PELVIS WITH   Final Result      1.  Moderate to large amount of ascites within the abdomen and pelvis some areas of which are loculated in nature. There are also punctate areas of gas within those areas of ascites as well as free intraperitoneal air. This may be related to recent    surgical procedure however the degree of volume of ascites would be unusual  for normal postoperative course. Consideration should be given for leakage of bowel content or other process.      2.  Diffuse colonic distention.      3.  Bibasilar atelectasis and pleural effusions.      4.  5 mm left renal pelvic stone which results in mild hydronephrosis above that level.      5.  Moderate right-sided hydronephrosis extending to the level of the upper sacrum. No ureteral stone. The ureter is not identified below that level.      6.  This was discussed with NEVA KAY at 6:44 PM on 5/27/2021.      [id number] VIEW   Final Result      Gaseous distended small bowel and colon is similar to prior, likely ileus.      [id number]-2 VIEW   Final Result         Gaseous distention of the small bowel and colon, likely postoperative ileus.      CT-ABDOMEN-PELVIS WITH   Final Result         1.  Findings keeping with cecal volvulus.   2.  Mildly fluid distended distal small bowel.   3.  No free air.   4.  Small nonobstructing renal stones.   These findings were discussed with ESHA STERLING on 5/22/2021 11:45 AM.                  IR-PICC LINE PLACEMENT W/ GUIDANCE > AGE 5    (Results Pending)        Assessment/Plan  * Bacterial peritonitis (HCC)  Assessment & Plan  - Abd cultures grew actinomyces, enterococcus, bacteroides.    - s/p ileocecal resection/redo 5/28/21 and washout 6/4/2, drain placement on 6/9 by IR. Developed anastomotic leak seen on barium enema 6/11 and went to the OR - ileostomy could not be performed due to inflammation of the bowel. Drain was placed and abdomen washed out.  - Wound cultures initially with Bacteroides, Actinomyces, E faecalis. Was on Unasyn and Diflucan.  Culture from when pt began having purulent discharge on 6/8 with bacteroides, culture from IR drain placement 6/9 with bacteroides/candida albicans and krusei.  Diflucan changed to Micafungin.    Acute hypoxic respiratory failure (HCC)  Assessment & Plan  - Secondary to pulmonary edema from third spacing secondary  to hypoalbuminemia  - Stop IVFs as pt to start TPN tonight at 83cc/hr  - Now 100% on 2L    Dehydration  Assessment & Plan  Stopped NS - had some third spacing due hypoalbuminemic state, was on 3L until this am.  Is to start TPN today at 83cc/hr, will not need NS on top of that.    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

## 2021-06-12 NOTE — WOUND TEAM
[facility]  Inpatient Services  Wound and Skin Care Evaluation    Admission Date: 5/22/2021     Last order of IP CONSULT TO WOUND CARE was found on 6/1/2021 from Hospital Encounter on 5/22/2021     HPI, PMH, SH: Reviewed    Past Surgical History:   Procedure Laterality Date   • PB EXPLORATORY OF ABDOMEN  6/11/2021    Procedure: LAPAROTOMY, EXPLORATORY;  Surgeon: Maryam Wood M.D.;  Location: [facility];  Service: General   • ILEOSTOMY  6/11/2021    Procedure: WOUND VAC PLACEMENT ;  Surgeon: Maryam Wood M.D.;  Location: [facility];  Service: General   • PB EXPLORATORY OF ABDOMEN N/A 6/4/2021    Procedure: LAPAROTOMY, EXPLORATORY, WITH WASH OUT;  Surgeon: Maryam Wood M.D.;  Location: [facility];  Service: General   • PB EXPLORATORY OF ABDOMEN N/A 5/28/2021    Procedure: LAPAROTOMY, EXPLORATORY- RESECTION OF ILEOCECAL ANASTATMOSIS.;  Surgeon: Maryam Wood M.D.;  Location: [facility];  Service: General   • PB EXPLORATORY OF ABDOMEN  5/22/2021    Procedure: LAPAROTOMY, EXPLORATORY;  Surgeon: Maryam Wood M.D.;  Location: [facility];  Service: General   • HEMICOLECTOMY, RIGHT  5/22/2021    Procedure: HEMICOLECTOMY, RIGHT, SIDE TO SIDE ANASTOMOSIS;  Surgeon: Maryam Wood M.D.;  Location: [facility];  Service: General   • SHOULDER DECOMPRESSION ARTHROSCOPIC Right 12/24/2018    Procedure: SHOULDER DECOMPRESSION ARTHROSCOPIC - SUBACROMIAL;  Surgeon: Tristian Garcia M.D.;  Location: [facility];  Service: Orthopedics   • CLAVICLE DISTAL EXCISION Left 12/24/2018    Procedure: CLAVICLE DISTAL EXCISION;  Surgeon: Tristian Garcia M.D.;  Location: [facility];  Service: Orthopedics   • SHOULDER ARTHROSCOPY W/ BICIPITAL TENODESIS REPAIR Left 12/24/2018    Procedure: SHOULDER ARTHROSCOPY W/ BICIPITAL TENODESIS REPAIR - AND PACKER;  Surgeon: Tristian Garcia M.D.;  Location: [facility];   "Service: Orthopedics   • SHOULDER MANIPULATION Left 2018    Procedure: SHOULDER MANIPULATION;  Surgeon: Tristian Garcia M.D.;  Location: SURGERY [facility];  Service: Orthopedics   • SHOULDER ARTHROSCOPY W/ BICIPITAL TENODESIS REPAIR Right 6/15/2015    Procedure: SHOULDER ARTHROSCOPY W/ BICIPITAL TENODESIS, SYNOVECTOMY;  Surgeon: Tristian Garcia M.D.;  Location: SURGERY [facility];  Service:    • CLAVICLE DISTAL EXCISION Right 6/15/2015    Procedure: CLAVICLE DISTAL EXCISION;  Surgeon: Tristian Garcia M.D.;  Location: SURGERY [facility];  Service:    • SHOULDER DECOMPRESSION Right 6/15/2015    Procedure: SHOULDER DECOMPRESSION MINI INCISION ;  Surgeon: Tristian Garcia M.D.;  Location: SURGERY [facility];  Service:    • OTHER      nose   • APPENDECTOMY     • PB  DELIVERY ONLY       Social History     Tobacco Use   • Smoking status: Former Smoker     Years: 15.00     Quit date: 2005     Years since quittin.4   • Smokeless tobacco: Never Used   • Tobacco comment: 3-4 years ago   Substance Use Topics   • Alcohol use: No     Alcohol/week: 0.0 oz     Chief Complaint   Patient presents with   • Abdominal Pain     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 Per Dr Grier, \" Prevena was still functioning, but there was more than expected output in it.  It was removed and there was leakage from the incision.  Several staples removed due to underlying fat necrosis.  No evidence of infection or fascial dehiscence\"    :  Wound VAC blocking,  Dressing removed and found to have copious oily purulent drainage to proximal tract.   Dr. Glass and Dr. Grier updated.    : Pt went to Sx  and had VAC placed. Drsg intact, foam stapled in place.     : Pt returned to OR for diverting ileostomy, however, bowel was too " inflamed to mobilized and Malecot drain was place.    WOUND ASSESSMENT/LDA         Negative Pressure Wound Therapy 06/12/21 Surgical Abdomen (Active)   NPWT Pump Mode / Pressure Setting Ulta;125 mmHg    Dressing Type Medium;Black Foam (Regular);White Foam    Number of Foam Pieces Used 4    Canister Changed No    NEXT Dressing Change/Treatment Date 06/14/21            Wound 06/12/21 Full Thickness Wound Abdomen Midline NPWT with Malecot drain (Active)   Wound Image   06/12/21 0800   Site Assessment Red    Periwound Assessment Intact    Margins Unattached edges    Closure Secondary intention    Drainage Amount Scant    Drainage Description Serosanguineous    Treatments Site care    Wound Cleansing Normal Saline Irrigation    Periwound Protectant Drape;Skin Protectant Wipes to Periwound    Dressing Cleansing/Solutions Not Applicable    Dressing Options Wound Vac    Dressing Changed Changed    Dressing Status Intact    Dressing Change/Treatment Frequency By Wound Team Only    NEXT Dressing Change/Treatment Date 06/14/21    NEXT Weekly Photo (Inpatient Only) 06/19/21    Non-staged Wound Description Full thickness    Wound Length (cm) 20 cm    Wound Width (cm) 1 cm    Wound Depth (cm) 1.7 cm    Wound Surface Area (cm^2) 20 cm^2    Wound Volume (cm^3) 34 cm^3    Wound Healing % 53    Tunneling (cm) 0 cm    Shape linear    Wound Odor None    Exposed Structures None         Vascular:    GEE:   No results found.    Lab Values:    Lab Results   Component Value Date/Time    WBC 17.4 (H) 06/12/2021 02:20 AM    RBC 2.73 (L) 06/12/2021 02:20 AM    HEMOGLOBIN 7.4 (L) 06/12/2021 02:20 AM    HEMATOCRIT 24.9 (L) 06/12/2021 02:20 AM    CREACTPROT 24.76 (H) 05/28/2021 04:07 AM      Culture Results show:  Recent Results (from the past 720 hour(s))   Culture Wound w/Gram Stain    Collection Time: 06/08/21  2:25 PM    Specimen: Abdominal; Wound   Result Value Ref Range    Significant Indicator POS (POS)     Source WND     Site ABDOMINAL        Culture Result Rare growth usual skin coco. (A)     Gram Stain Result Moderate WBCs.  No organisms seen.       Culture Result Bacteroides fragilis  Light growth   (A)        Pain Level/Medicated:  IV morphine, topical lidocaine.    INTERVENTIONS BY WOUND TEAM:  Chart and images reviewed. Discussed with bedside RN. This RN in to assess patient. Performed standard wound care which includes appropriate positioning, dressing removal and non-selective debridement.     Preparation for Dressing removal: dressing moistened with NS and lidocaine. 15 staples removed.  Cleansed with:  NS  and gauze.  Sharp debridement: NA  Jenn wound: Cleansed with NS, Prepped with No Sting skin prep and drape  Primary Dressing: 3 segments white foam to wound bed. 1 half thick spiral black foam to remaining depth. Mepitel and paste ring to base of Malecot to obtain better seal.  Secondary (Outer) Dressing: button, drape and tracpad to foam    Interdisciplinary consultation: Patient, Alicia Bedside RN, Best Wound RN    EVALUATION / RATIONALE FOR TREATMENT:  Most Recent Date:  6/12: Communicated with Dr. Wood on 6/11 via Voalte. Ok to resume wound vac. Malecot on mid abdominal wound, sutured in placed and connected to down drain bag. No abnormal structures, fecal matter or discolored drainaige noted in wound. Partial granular growth noted. Pt has 2 KRISTI drain on RUQ and RLQ. RLQ KRISTI appears to be collecting fluid from wound bed as saline used to cleanse wound immediately collected in KRISTI. It is likely there will be less output in RLQ KRISTI now that vac is in place. Pt tolerated well with current pain med regimen.    6/10/21: Wound with multiple areas of stool draining. Fistula's? Unable to segregate areas to apply NPWT. Wound manager to wall suction and dakins moistened roll gauze to wound to assist in granular tissue development. Will plan to return on Saturday or Sunday and than again on Tuesday or Wednesday.  6/8/21: Pt's Vac was alarming so in  to see cause. NPWT with o mmHg, tan drainage in cannister. Drsg saturated. Removed drsg. With removal of drsg proximal part of wound had large brown tan drainage with some small solid pieces. Pt continued to have copious drainage. Dr Lopez and Dr Wood were communicated with to report development. Placed wet to dry drsg. Pt up to bathroom and when back to bed drsg was saturated so changed again. Rinsed abdominal binder and hung to dry.  Drsg orders changed and culture was collected before cleaning with dakins. It appears that proximal part of wound bed has opened again, there is 1.3 cm of depth. Pt to go for CT scan.  6/7/21: Pt's VAC with smaller drsg stapled in place. Pain present with staple removal. Bedside RN holding pt's hand and providing comfort. Wound bed  than last week also decreased depth.   06/03/21:  Patient with large/copious amounts of purulent oily drainage from proximal tract.  Dakin's ordered and CT order per MD.  Wound team to follow up on Saturday for possible VAC re-pplacement.   6/1/21: Pt has full thickness surgical wound to abd. Applied wound vac because NPWT encourages granulation tissue growth, maintains optimal moisture needed for wound healing, and promotes angiogenesis.     Goals: Steady decrease in wound area and depth weekly.    WOUND TEAM PLAN OF CARE ([X] for frequency of wound follow up,):   Nursing to follow orders written for wound care. Contact wound team if area fails to progress, deteriorates or with any questions/concerns  Dressing changes by wound team:                   Follow up 3 times weekly:                NPWT change 3 times weekly:  Follow up 1-2 times weekly:   X   Follow up Bi-Monthly:                   Follow up as needed:     Other (explain):     NURSING PLAN OF CARE ORDERS (X):  Dressing changes: See Dressing Care orders:x  Skin care: See Skin Care orders:  RN Prevention Protocol: present  Rectal tube care: See Rectal Tube Care orders:   Other  orders:      Anticipated discharge plans:   LTACH:    X    SNF/Rehab:                  Home Health Care:           Outpatient Wound Center:            Self/Family Care:        Other:

## 2021-06-12 NOTE — PROGRESS NOTES
Pt to start TPN tonight for the first time but only has a midline.   20:35 - Paged pharmacy to verify if midline can be used for TPN. Pharmacist said TPN has to be infused through a central line only. At this time there is no one available to insert a central line tonight.  20:38 - Paged MD for questions and updates regarding TPN.   20:41 - Dr. Hopper called back and said we will potentially have to wait to start TPN tomorrow, but that he would review the pts chart for more information.

## 2021-06-12 NOTE — PROCEDURES
Vascular Access Team     Date of Insertion: 6/12/21  Arm Circumference: 27  Internal length: 43  External Length: 0  Vein Occupancy %: 40   Reason for PICC: TPN  Labs: WBC 17.4, , BUN 7, Cr 0.49, GFR >60, INR 1.09     Consents confirmed, vessel patency confirmed with ultrasound. Risks and benefits of procedure explained to patient and education regarding central line associated bloodstream infections provided. Questions answered.      PICC placed in LUE per licensed provider order with ultrasound guidance.  5 Fr, 2 lumen PICC placed in BASILIC vein after 1 attempt(s). 2 mL of 1% lidocaine injected intradermally, 21 gauge microintroducer needle and modified Seldinger technique used. 43 cm catheter inserted with good blood return. Secured at 0 cm marker. Each lumen flushed without resistance with 10 mL 0.9% normal saline. PICC line secured with Biopatch and Tegaderm.     PICC tip placement location is confirmed by nurse to be in the Superior Vena Cava (SVC) utilizing 3CG technology. PICC line is appropriate for use at this time. Patient tolerated procedure well, without complications.  Patient condition relayed to unit RN or ordering physician via this post procedure note in the EMR.      Ultrasound images uploaded to PACS and viewable in the EMR - yes  Ultrasound imaged printed and placed in paper chart - no     BARD Power PICC ref # [id number], Lot # [id number], Expiration Date 2022-03-31

## 2021-06-12 NOTE — PROGRESS NOTES
Trauma / Surgical Daily Progress Note    Date of Service  6/12/2021    Chief Complaint  50 y.o. female admitted 5/22/2021 with cecal volvulus    Interval Events  5/22 Exploratory celiotomy and right hemicolectomy with a side-to-side   ileocolic anastomosis  5/28 Exploratory celiotomy, resection of ileocolic anastomosis with re-do  side-to-side ileocolic anastomosis  6/4 Ex celiotomy washout  6/11 Ex celiotomy, drain placement    BE showed leak. Returned to OR - bowel too inflammed to mobilize for ostomy. Drain placed. NPO. Will start clears On Unasyn, Diflucan per ID.     Review of Systems  Review of Systems   Gastrointestinal: Positive for abdominal pain.        Passing some flatus        Vital Signs for last 24 hours  Temp:  [36 °C (96.8 °F)-37.4 °C (99.3 °F)] 36.2 °C (97.2 °F)  Pulse:  [] 82  Resp:  [12-20] 20  BP: ()/(48-67) 96/57  SpO2:  [90 %-99 %] 93 %    Hemodynamic parameters for last 24 hours       Respiratory Data     Respiration: 20, Pulse Oximetry: 93 %     Work Of Breathing / Effort: Shallow  RUL Breath Sounds: Clear, RML Breath Sounds: Clear, RLL Breath Sounds: Diminished, LUCITA Breath Sounds: Clear, LLL Breath Sounds: Diminished    Physical Exam  Physical Exam  Cardiovascular:      Rate and Rhythm: Normal rate.      Pulses: Normal pulses.   Pulmonary:      Effort: Pulmonary effort is normal.   Abdominal:      General: There is no distension.      Palpations: Abdomen is soft.      Tenderness: There is abdominal tenderness.      Comments: Wound vac in place.  Drains serosang    Genitourinary:     Comments: diuresing  Musculoskeletal:         General: Normal range of motion.      Cervical back: Neck supple.      Comments: Generalized edema   Skin:     General: Skin is warm and dry.   Neurological:      General: No focal deficit present.      Mental Status: She is alert.   Psychiatric:      Comments:           Laboratory  Recent Results (from the past 24 hour(s))   ECG    Collection Time:  21  2:06 PM   Result Value Ref Range    Report       [facility]    Test Date:  2021  Pt Name:    HOANG EMERY                 Department: [facility]  MRN:        [id number]                      Room:       [facility]  Gender:     Female                       Technician: MI  :        1970                   Requested By:NEVA KAY  Order #:    [id number]                    Reading MD: Jared Salcedo MD    Measurements  Intervals                                Axis  Rate:       116                          P:          53  CA:         124                          QRS:        30  QRSD:       76                           T:          14  QT:         340  QTc:        473    Interpretive Statements  SINUS TACHYCARDIA  EARLY PRECORDIAL R/S TRANSITION  Compared to ECG 2021 23:26:40  Atrial fibrillation no longer present  T-wave abnormality no longer present  Electronically Signed On 2021 15:11:53 PDT by Jared Salcedo MD     LACTIC ACID    Collection Time: 21  6:16 PM   Result Value Ref Range    Lactic Acid 1.2 0.5 - 2.0 mmol/L   LACTIC ACID    Collection Time: 21 10:04 PM   Result Value Ref Range    Lactic Acid 1.0 0.5 - 2.0 mmol/L   CBC WITH DIFFERENTIAL    Collection Time: 21  2:20 AM   Result Value Ref Range    WBC 17.4 (H) 4.8 - 10.8 K/uL    RBC 2.73 (L) 4.20 - 5.40 M/uL    Hemoglobin 7.4 (L) 12.0 - 16.0 g/dL    Hematocrit 24.9 (L) 37.0 - 47.0 %    MCV 91.2 81.4 - 97.8 fL    MCH 27.1 27.0 - 33.0 pg    MCHC 29.7 (L) 33.6 - 35.0 g/dL    RDW 50.2 (H) 35.9 - 50.0 fL    Platelet Count 498 (H) 164 - 446 K/uL    MPV 8.9 (L) 9.0 - 12.9 fL    Neutrophils-Polys 84.40 (H) 44.00 - 72.00 %    Lymphocytes 4.20 (L) 22.00 - 41.00 %    Monocytes 6.70 0.00 - 13.40 %    Eosinophils 0.10 0.00 - 6.90 %    Basophils 0.40 0.00 - 1.80 %    Immature Granulocytes 4.20 (H) 0.00 - 0.90 %    Nucleated RBC 0.20 /100 WBC    Neutrophils (Absolute) 14.64 (H) 2.00 - 7.15 K/uL    Lymphs (Absolute) 0.73  (L) 1.00 - 4.80 K/uL    Monos (Absolute) 1.16 (H) 0.00 - 0.85 K/uL    Eos (Absolute) 0.02 0.00 - 0.51 K/uL    Baso (Absolute) 0.07 0.00 - 0.12 K/uL    Immature Granulocytes (abs) 0.73 (H) 0.00 - 0.11 K/uL    NRBC (Absolute) 0.03 K/uL   Comp Metabolic Panel    Collection Time: 06/12/21  2:20 AM   Result Value Ref Range    Sodium 135 135 - 145 mmol/L    Potassium 4.2 3.6 - 5.5 mmol/L    Chloride 103 96 - 112 mmol/L    Co2 19 (L) 20 - 33 mmol/L    Anion Gap 13.0 7.0 - 16.0    Glucose 111 (H) 65 - 99 mg/dL    Bun 7 (L) 8 - 22 mg/dL    Creatinine 0.49 (L) 0.50 - 1.40 mg/dL    Calcium 7.5 (L) 8.4 - 10.2 mg/dL    AST(SGOT) 9 (L) 12 - 45 U/L    ALT(SGPT) 7 2 - 50 U/L    Alkaline Phosphatase 89 30 - 99 U/L    Total Bilirubin <0.2 0.1 - 1.5 mg/dL    Albumin 2.0 (L) 3.2 - 4.9 g/dL    Total Protein 5.6 (L) 6.0 - 8.2 g/dL    Globulin 3.6 (H) 1.9 - 3.5 g/dL    A-G Ratio 0.6 g/dL   Magnesium    Collection Time: 06/12/21  2:20 AM   Result Value Ref Range    Magnesium 2.3 1.5 - 2.5 mg/dL   Phosphorus    Collection Time: 06/12/21  2:20 AM   Result Value Ref Range    Phosphorus 4.3 2.5 - 4.5 mg/dL   LACTIC ACID    Collection Time: 06/12/21  2:20 AM   Result Value Ref Range    Lactic Acid 1.0 0.5 - 2.0 mmol/L   ESTIMATED GFR    Collection Time: 06/12/21  2:20 AM   Result Value Ref Range    GFR If African American >60 >60 mL/min/1.73 m 2    GFR If Non African American >60 >60 mL/min/1.73 m 2   LACTIC ACID    Collection Time: 06/12/21  6:34 AM   Result Value Ref Range    Lactic Acid 1.0 0.5 - 2.0 mmol/L       Fluids    Intake/Output Summary (Last 24 hours) at 6/12/2021 0716  Last data filed at 6/12/2021 0600  Gross per 24 hour   Intake 900 ml   Output 705 ml   Net 195 ml       Core Measures & Quality Metrics  Labs reviewed and Medications reviewed  Young catheter: No Young      DVT Prophylaxis: Enoxaparin (Lovenox)  DVT prophylaxis - mechanical: SCDs  Ulcer prophylaxis: Yes  Antibiotics: Treating active infection/contamination beyond 24  hours perioperative coverage  Assessed for rehab: Patient returned to prior level of function, rehabilitation not indicated at this time    ALICIA Score  ETOH Screening    Assessment/Plan  * Bacterial peritonitis (HCC)  Assessment & Plan  6/2 Zosyn stopped.  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 Passing flatus - start clears  5/27 - Stooled - will adv to full liquids  5/27 - thrombocytopenia, bandemia, PM CT with ascites no freeair  5/28 - Exploratory celiotomy, resection of ileocolic anastomosis with re-do  side-to-side ileocolic anastomosis.  6/3  Stooling, advance to regular diet  6/4 Small wound dehiscence - returned to OR for washout and reclosure  6/9 Repeat CT showed two fluid collections - IR drains placed  6/11 BE shows leak - Ex lap, drain  Remains on unasyn, diflucan    DVT of axillary vein, acute right (HCC)  Assessment & Plan  Noted on US  On heparin gtt  6/6  Switched to lovenox BID       Discussed patient condition with RN and Patient.    CRITICAL CARE TIME EXCLUDING PROCEDURES: 20  minutes

## 2021-06-12 NOTE — PROGRESS NOTES
Assumed care of pt at 1915. Received report from Felisha RUDOLPH. Pt in bed sleeping. Pt stated pain 4 on a 0 to 10 scale. Pt declined medication intervention at this time. Abdominal incision with woundvac in place CDI. KRISTI drain in RLQ draining serosanguinous fluid with dressing CDI. KRISTI drain in right lower back/buttock draining serosanguinous fluid with dressing CDI. Malecot tube to gravity drainage in place draining light brown liquid stool. Young catheter in place draining clear yellow urine. Catheter care completed with water and soap. Pt reminded of NPO status. Pt verbalized understanding. Refer to Myaa RUDOLPH progress note at 2035 for information regarding TPN.  Pt stated she has no other needs at this time. Call light in reach, bed in lowest position.

## 2021-06-12 NOTE — PROGRESS NOTES
Spoke to Dr. Jurado during evening rounds regarding TPN and pts midline IV. Ok to hold TPN until tomorrow (6/12) per MD when PICC line is placed.

## 2021-06-12 NOTE — OP REPORT
DATE OF SERVICE:  06/11/2021     PREOPERATIVE DIAGNOSIS:  Anastomotic leak after right hemicolectomy.     POSTOPERATIVE DIAGNOSIS:  Anastomotic leak after right hemicolectomy.     PROCEDURES:  Exploratory celiotomy and control of anastomotic leak with   Malecot drain.     SURGEON:  Maryam Wood MD     ASSISTANT:  GLADYS Siegel     ANESTHESIA:  General endotracheal.     ANESTHESIOLOGIST:  Luisa Pan MD     INDICATIONS:  The patient is a 51-year-old female who was admitted on   05/22/2021 with a cecal volvulus.  She had a right hemicolectomy that went   uneventfully; however, six days after surgery, she developed peritonitis,   taken to the OR and was found to have abdomen with bacterial peritonitis and   an impending anastomotic dehiscence.  This was resected and put back together.    Unfortunately now, she does have an anastomotic leak.  She is being brought   at this time for probable ileostomy. The indications for a surgical assistant in this surgery were indicated due to complexity of the procedure. Their role included aiding in incision, retraction, holding devices including camera for laparoscopic procedure, and closure of the wound.      FINDINGS:  A small leak in the mid part of the anastomosis.  Unfortunately,   the bowel was too matted down to allow for an ostomy to be brought up.    Therefore, Malecot tube was placed in the anastomotic opening to control the   leak.  It was brought out through the incision.  The abdomen was copiously   irrigated and a new drain was placed.     DESCRIPTION OF PROCEDURE:  After the patient was identified and consented, she   was brought to the operating room and placed in supine position.  The patient   underwent general endotracheal anesthetic clearance.  The patient's abdomen   was reopened, the aforementioned findings were noted.  The abdomen was   copiously irrigated and the source of the leak was found; however, the bowel   was too matted to try to  mobilize for an ostomy.  It was therefore elected to   be controlled with a Malecot tube in the opening and subsequently a 19 mm   Jimbo was placed also next to this.  The wound was then closed with   interrupted #1 Vicryl.  The wound VAC was placed.  The patient was extubated   and taken to recovery room in stable condition.  All sponge and needle counts   were correct.        ______________________________  MD ABBY ALTAMIRANO/AKANKSHA/ERNA    DD:  06/11/2021 15:56  DT:  06/11/2021 16:24    Job#:  [id number]    CC:LASHONDA LANE MD

## 2021-06-12 NOTE — PROGRESS NOTES
Pharmacy TPN Day # 1       2021    Dosing Weight   73.5 kg TPN currently providing 50% of goal      TPN goal: 1825 kcal/day including 1.5 gm/kg/day Protein      TPN indication: bowel resection         Pertinent PMH:   50 y.o. female admitted 2021 with cecal volvulus      Exploratory celiotomy and right hemicolectomy with a side-to-side ileocolic anastomosis   Exploratory celiotomy, resection of ileocolic anastomosis with re-do side-to-side ileocolic anastomosis   Ex celiotomy washout   Ex celiotomy, drain placement     BE showed leak. Returned to OR - bowel too inflammed to mobilize for ostomy. Drain placed. NPO.  Unasyn, Diflucan per ID.  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  Patient was taken back to the OR , but the bowel was too inflamed to mobilize for ostomy, drain was placed  -Drain cultures from  also growing a Candida krusei.  Will change fluconazole to IV micafungin 100 mg every 24 hours, continue IV Unasyn 3 g every 6 hours     Temp (24hrs), Av.4 °C (97.6 °F), Min:36 °C (96.8 °F), Max:37.4 °C (99.3 °F)    Recent Labs     06/10/21  0445 21  0436 21  0220   SODIUM 133* 131* 135   POTASSIUM 4.1 4.2 4.2   CHLORIDE 99 98 103   CO2  19*   BUN 4* 5* 7*   CREATININE 0.62 0.62 0.49*   GLUCOSE 113* 102* 111*   CALCIUM 8.3* 7.8* 7.5*   ASTSGOT 14 17 9*   ALTSGPT 8 9 7   ALBUMIN 2.4* 2.3* 2.0*   TBILIRUBIN 0.3 0.2 <0.2   PHOSPHORUS 4.5 4.7* 4.3   MAGNESIUM 2.0 2.2 2.3     Vitals:    21 0010 21 0520 21 0801 21 1205   BP: (!) 93/55 (!) 96/57 (!) 96/59 117/52   Weight:       Height:         Intake/Output Summary (Last 24 hours) at 2021 1212  Last data filed at 2021 0839  Gross per 24 hour   Intake 1140 ml   Output 705 ml   Net 435 ml     Orders Placed This Encounter   Procedures   • Diet Order Diet: Clear Liquid      Standing Status:   Standing     Number of Occurrences:   1     Order Specific Question:   Diet:     Answer:   Clear Liquid [10]     TPN for past 72 hours    Start date and time   06/12/2021 2000      TPN Central Line Formulation [[id number]]    Order Status  Active          Clinisol 15%  55 g    dextrose 70%  140 g    fat emulsions 20%  22.5 g          potassium chloride  40 mEq    sodium acetate  150 mEq    sodium chloride  150 mEq    magnesium sulfate  4 mEq    calcium GLUConate  9.4 mEq    M.T.E.-4 Adult  1 mL    M.V.I. Adult  10 mL    famotidine  40 mg          sterile water  1,144.12 mL          Proteins  --    Dextrose  --    Lipids  --    Total  --          Sodium  300 mEq    Potassium  40 mEq    Calcium  9.4 mEq    Magnesium  4.02 mEq    Aluminum  --    Phosphate  --    Chloride  190 mEq    Acetate  196.57 mEq          Total Protein  55 g    Total Protein/kg  0.75 g/kg    Total Amino Acid  --    Total Amino Acid/kg  --    Glucose Infusion Rate  1.32 mg/kg/min    Osmolarity (Estimated)  --    Volume  1,992 mL    Rate  83 mL/hr    Dosing Weight  73.5 kg    Infusion Site  Central        This formula provides:  % kcal as lipids = 22  Grams protein/kg = 0.7  Non-protein calories = 701  Kcals/kg = 12.5  Total daily calories = 921    Comments:  Start tpn at 1/2 goal. tpn was not started 6/11 due to pt having midline. Osmolarity ~960, picc line placed today 6/12, confirmed with picc interventional radiology nurse    PICC tip placement location is confirmed by nurse to be in the Superior Vena Cava (SVC) utilizing 3CG technology. PICC line is appropriate for use at this time. Patient tolerated procedure well, without complications.  Patient condition relayed to unit RN or ordering physician via this post procedure note in the EMR.    Meme Malik, PorterD

## 2021-06-13 PROBLEM — E44.0 PROTEIN-CALORIE MALNUTRITION, MODERATE (HCC): Status: ACTIVE | Noted: 2021-06-13

## 2021-06-13 PROBLEM — E86.0 DEHYDRATION: Status: RESOLVED | Noted: 2021-06-09 | Resolved: 2021-06-13

## 2021-06-13 LAB
ALBUMIN SERPL BCP-MCNC: 2 G/DL (ref 3.2–4.9)
ALBUMIN/GLOB SERPL: 0.6 G/DL
ALP SERPL-CCNC: 72 U/L (ref 30–99)
ALT SERPL-CCNC: <5 U/L (ref 2–50)
ANION GAP SERPL CALC-SCNC: 10 MMOL/L (ref 7–16)
ANISOCYTOSIS BLD QL SMEAR: ABNORMAL
AST SERPL-CCNC: 12 U/L (ref 12–45)
BASOPHILS # BLD AUTO: 1 % (ref 0–1.8)
BASOPHILS # BLD: 0.16 K/UL (ref 0–0.12)
BILIRUB SERPL-MCNC: <0.2 MG/DL (ref 0.1–1.5)
BUN SERPL-MCNC: 8 MG/DL (ref 8–22)
CALCIUM SERPL-MCNC: 7.3 MG/DL (ref 8.4–10.2)
CHLORIDE SERPL-SCNC: 100 MMOL/L (ref 96–112)
CO2 SERPL-SCNC: 23 MMOL/L (ref 20–33)
CREAT SERPL-MCNC: 0.48 MG/DL (ref 0.5–1.4)
EOSINOPHIL # BLD AUTO: 0.16 K/UL (ref 0–0.51)
EOSINOPHIL NFR BLD: 1 % (ref 0–6.9)
ERYTHROCYTE [DISTWIDTH] IN BLOOD BY AUTOMATED COUNT: 48.5 FL (ref 35.9–50)
GLOBULIN SER CALC-MCNC: 3.2 G/DL (ref 1.9–3.5)
GLUCOSE BLD-MCNC: 101 MG/DL (ref 65–99)
GLUCOSE BLD-MCNC: 115 MG/DL (ref 65–99)
GLUCOSE SERPL-MCNC: 97 MG/DL (ref 65–99)
HCT VFR BLD AUTO: 22.2 % (ref 37–47)
HCT VFR BLD AUTO: 23.5 % (ref 37–47)
HGB BLD-MCNC: 6.9 G/DL (ref 12–16)
HGB BLD-MCNC: 7.6 G/DL (ref 12–16)
HGB RETIC QN AUTO: 22.8 PG/CELL (ref 29–35)
IMM RETICS NFR: 27.5 % (ref 9.3–17.4)
LYMPHOCYTES # BLD AUTO: 1.42 K/UL (ref 1–4.8)
LYMPHOCYTES NFR BLD: 9 % (ref 22–41)
MAGNESIUM SERPL-MCNC: 1.9 MG/DL (ref 1.5–2.5)
MANUAL DIFF BLD: NORMAL
MCH RBC QN AUTO: 27.4 PG (ref 27–33)
MCHC RBC AUTO-ENTMCNC: 31.1 G/DL (ref 33.6–35)
MCV RBC AUTO: 88.1 FL (ref 81.4–97.8)
METAMYELOCYTES NFR BLD MANUAL: 1 %
MONOCYTES # BLD AUTO: 0.95 K/UL (ref 0–0.85)
MONOCYTES NFR BLD AUTO: 6 % (ref 0–13.4)
MYELOCYTES NFR BLD MANUAL: 4 %
NEUTROPHILS # BLD AUTO: 12.32 K/UL (ref 2–7.15)
NEUTROPHILS NFR BLD: 74 % (ref 44–72)
NEUTS BAND NFR BLD MANUAL: 4 % (ref 0–10)
NRBC # BLD AUTO: 0.09 K/UL
NRBC BLD-RTO: 0.6 /100 WBC
PHOSPHATE SERPL-MCNC: 2.5 MG/DL (ref 2.5–4.5)
PLATELET # BLD AUTO: 473 K/UL (ref 164–446)
PLATELET BLD QL SMEAR: NORMAL
PMV BLD AUTO: 9.4 FL (ref 9–12.9)
POLYCHROMASIA BLD QL SMEAR: NORMAL
POTASSIUM SERPL-SCNC: 3.7 MMOL/L (ref 3.6–5.5)
PROT SERPL-MCNC: 5.2 G/DL (ref 6–8.2)
RBC # BLD AUTO: 2.52 M/UL (ref 4.2–5.4)
RBC BLD AUTO: PRESENT
RETICS # AUTO: 0.1 M/UL (ref 0.04–0.06)
RETICS/RBC NFR: 3.7 % (ref 0.8–2.1)
SODIUM SERPL-SCNC: 133 MMOL/L (ref 135–145)
WBC # BLD AUTO: 15.8 K/UL (ref 4.8–10.8)

## 2021-06-13 PROCEDURE — 700111 HCHG RX REV CODE 636 W/ 250 OVERRIDE (IP): Performed by: INTERNAL MEDICINE

## 2021-06-13 PROCEDURE — 700111 HCHG RX REV CODE 636 W/ 250 OVERRIDE (IP): Performed by: FAMILY MEDICINE

## 2021-06-13 PROCEDURE — 700101 HCHG RX REV CODE 250: Performed by: FAMILY MEDICINE

## 2021-06-13 PROCEDURE — 84100 ASSAY OF PHOSPHORUS: CPT

## 2021-06-13 PROCEDURE — 85027 COMPLETE CBC AUTOMATED: CPT

## 2021-06-13 PROCEDURE — 99233 SBSQ HOSP IP/OBS HIGH 50: CPT | Performed by: INTERNAL MEDICINE

## 2021-06-13 PROCEDURE — 82962 GLUCOSE BLOOD TEST: CPT

## 2021-06-13 PROCEDURE — 85014 HEMATOCRIT: CPT

## 2021-06-13 PROCEDURE — 700105 HCHG RX REV CODE 258: Performed by: INTERNAL MEDICINE

## 2021-06-13 PROCEDURE — 85018 HEMOGLOBIN: CPT

## 2021-06-13 PROCEDURE — 85046 RETICYTE/HGB CONCENTRATE: CPT

## 2021-06-13 PROCEDURE — 80053 COMPREHEN METABOLIC PANEL: CPT

## 2021-06-13 PROCEDURE — 700111 HCHG RX REV CODE 636 W/ 250 OVERRIDE (IP): Performed by: SURGERY

## 2021-06-13 PROCEDURE — A9270 NON-COVERED ITEM OR SERVICE: HCPCS | Performed by: INTERNAL MEDICINE

## 2021-06-13 PROCEDURE — 770006 HCHG ROOM/CARE - MED/SURG/GYN SEMI*

## 2021-06-13 PROCEDURE — 83735 ASSAY OF MAGNESIUM: CPT

## 2021-06-13 PROCEDURE — 700105 HCHG RX REV CODE 258: Performed by: FAMILY MEDICINE

## 2021-06-13 PROCEDURE — 700102 HCHG RX REV CODE 250 W/ 637 OVERRIDE(OP): Performed by: FAMILY MEDICINE

## 2021-06-13 PROCEDURE — 700102 HCHG RX REV CODE 250 W/ 637 OVERRIDE(OP): Performed by: INTERNAL MEDICINE

## 2021-06-13 PROCEDURE — 85007 BL SMEAR W/DIFF WBC COUNT: CPT

## 2021-06-13 PROCEDURE — A9270 NON-COVERED ITEM OR SERVICE: HCPCS | Performed by: FAMILY MEDICINE

## 2021-06-13 PROCEDURE — 700102 HCHG RX REV CODE 250 W/ 637 OVERRIDE(OP): Performed by: SURGERY

## 2021-06-13 PROCEDURE — A9270 NON-COVERED ITEM OR SERVICE: HCPCS | Performed by: SURGERY

## 2021-06-13 PROCEDURE — 99233 SBSQ HOSP IP/OBS HIGH 50: CPT | Performed by: FAMILY MEDICINE

## 2021-06-13 RX ADMIN — ONDANSETRON 4 MG: 2 INJECTION INTRAMUSCULAR; INTRAVENOUS at 04:14

## 2021-06-13 RX ADMIN — ENOXAPARIN SODIUM 80 MG: 80 INJECTION SUBCUTANEOUS at 17:32

## 2021-06-13 RX ADMIN — OXYCODONE HYDROCHLORIDE 10 MG: 10 TABLET ORAL at 16:16

## 2021-06-13 RX ADMIN — DULOXETINE HYDROCHLORIDE 60 MG: 30 CAPSULE, DELAYED RELEASE ORAL at 08:48

## 2021-06-13 RX ADMIN — AMPICILLIN SODIUM AND SULBACTAM SODIUM 3 G: 2; 1 INJECTION, POWDER, FOR SOLUTION INTRAMUSCULAR; INTRAVENOUS at 05:49

## 2021-06-13 RX ADMIN — MICAFUNGIN SODIUM 100 MG: 20 INJECTION, POWDER, LYOPHILIZED, FOR SOLUTION INTRAVENOUS at 20:22

## 2021-06-13 RX ADMIN — OXYCODONE HYDROCHLORIDE 10 MG: 10 TABLET ORAL at 01:17

## 2021-06-13 RX ADMIN — AMPICILLIN SODIUM AND SULBACTAM SODIUM 3 G: 2; 1 INJECTION, POWDER, FOR SOLUTION INTRAMUSCULAR; INTRAVENOUS at 17:33

## 2021-06-13 RX ADMIN — MORPHINE SULFATE 2 MG: 4 INJECTION INTRAVENOUS at 17:32

## 2021-06-13 RX ADMIN — MORPHINE SULFATE 2 MG: 4 INJECTION INTRAVENOUS at 08:49

## 2021-06-13 RX ADMIN — POLYETHYLENE GLYCOL 3350 1 PACKET: 17 POWDER, FOR SOLUTION ORAL at 17:32

## 2021-06-13 RX ADMIN — TOPIRAMATE 100 MG: 100 TABLET, FILM COATED ORAL at 20:21

## 2021-06-13 RX ADMIN — OXYCODONE HYDROCHLORIDE 10 MG: 10 TABLET ORAL at 20:24

## 2021-06-13 RX ADMIN — MORPHINE SULFATE 2 MG: 4 INJECTION INTRAVENOUS at 22:06

## 2021-06-13 RX ADMIN — AMPICILLIN SODIUM AND SULBACTAM SODIUM 3 G: 2; 1 INJECTION, POWDER, FOR SOLUTION INTRAMUSCULAR; INTRAVENOUS at 12:11

## 2021-06-13 RX ADMIN — OXYCODONE HYDROCHLORIDE 10 MG: 10 TABLET ORAL at 05:57

## 2021-06-13 RX ADMIN — OXYCODONE HYDROCHLORIDE 10 MG: 10 TABLET ORAL at 12:09

## 2021-06-13 RX ADMIN — POTASSIUM CHLORIDE: 2 INJECTION, SOLUTION, CONCENTRATE INTRAVENOUS at 20:41

## 2021-06-13 RX ADMIN — MORPHINE SULFATE 2 MG: 4 INJECTION INTRAVENOUS at 04:02

## 2021-06-13 RX ADMIN — DOCUSATE SODIUM 100 MG: 100 CAPSULE, LIQUID FILLED ORAL at 17:32

## 2021-06-13 ASSESSMENT — COGNITIVE AND FUNCTIONAL STATUS - GENERAL
STANDING UP FROM CHAIR USING ARMS: A LOT
TOILETING: A LOT
SUGGESTED CMS G CODE MODIFIER MOBILITY: CL
CLIMB 3 TO 5 STEPS WITH RAILING: A LOT
DRESSING REGULAR LOWER BODY CLOTHING: A LOT
WALKING IN HOSPITAL ROOM: A LOT
MOBILITY SCORE: 12
MOVING TO AND FROM BED TO CHAIR: A LOT
EATING MEALS: A LITTLE
DRESSING REGULAR UPPER BODY CLOTHING: A LITTLE
HELP NEEDED FOR BATHING: A LOT
PERSONAL GROOMING: A LITTLE
SUGGESTED CMS G CODE MODIFIER DAILY ACTIVITY: CK
TURNING FROM BACK TO SIDE WHILE IN FLAT BAD: A LOT
MOVING FROM LYING ON BACK TO SITTING ON SIDE OF FLAT BED: A LOT
DAILY ACTIVITIY SCORE: 15

## 2021-06-13 ASSESSMENT — PAIN DESCRIPTION - PAIN TYPE
TYPE: ACUTE PAIN
TYPE: ACUTE PAIN
TYPE: ACUTE PAIN;SURGICAL PAIN
TYPE: ACUTE PAIN
TYPE: ACUTE PAIN

## 2021-06-13 ASSESSMENT — ENCOUNTER SYMPTOMS
NAUSEA: 0
CHILLS: 0
FEVER: 0
DIARRHEA: 0
MYALGIAS: 1
COUGH: 0
ABDOMINAL PAIN: 1
ROS GI COMMENTS: PASSING SOME FLATUS
SHORTNESS OF BREATH: 0
CONSTIPATION: 0
WEAKNESS: 1
VOMITING: 0
DIZZINESS: 0

## 2021-06-13 NOTE — PROGRESS NOTES
Received report from day shift, assumed care at 1900. Pt reports 6/10 abdominal pain, medicated per MAR. Pt A&Ox4, VSS, denies N/V, SOB. Sx incision with wound vac in place with [place], mid abdomen, 2 KRISTI drains in place, located on back and right abdomen. Call light and belongings in reach, pt denies further needs at this time.

## 2021-06-13 NOTE — PROGRESS NOTES
[facility] Medicine Daily Progress Note    Date of Service  6/13/2021    Chief Complaint  50 y.o. female admitted 5/22/2021 with abd pain.    Hospital Course  49 yo woman who presented with abd pain and CT showed cecal volvulus. She underwent exploratory celiotomy and right hemicolectomy with ileocolic anastomosis with Dr. Wood 5/22/21. She developed thrombocytopenia and ascites and returned for ex lap with resection of ileocolic anastomosis and wound vac placement 5/28/21. She was admitted to ICU, started on zosyn for bacterial peritonitis and given platelet transfusions. HIT panel was negative. Abd cultures grew actinomyces, enterococcus, bacteroides. Doppler showed catheter related thrombus in R arm and started on anticoagulation. She had additional washout in the OR with Dr. Wood 6/4/21 and remains on unasyn and fluconazole. Wound vac remains in place.    Interval Problem Update  6/8 - White count coming down, afebrile; states that she is eating well, and that her current medication regimen controls her pain and lasts ~4 hours.  Can likely dc home tomorrow with home health for wound care and dressing changes, will need outpatient ID recs from Dr Obrien, much appreciated.     6/9 - Afebrile overnight, remains tachycardic - dry MM, will change to NS and increase rate. White count down further today. Pt is to go for IR drain placement this afternoon, is NPO currently.    6/10 - KRISTI drains in place, pt did not each much breakfast due to pain and nausea.  Will monitor oral intake carefully with low threshold to start TPN or enteric feeds to optimize nutritional status for healing. Is afebrile with improved white count but increased bands.  Remains on 3L, will check a CXR. Looking at LTAC when no further surgical interventions anticipated.    6/11 - Pt had barium enema which demonstrated an extensive anastomotic leak - discussed with Dr Wood, plans for OR today. Vitals are stable, bandemia is improved but white  count up. Pain is controlled per patient, TPN ordered to start tonight. Pt down to room air this evening.  Will stop IVFs as she will be receiving TPN now and had some third spacing with IVFs.      6/12 - PICC placed today for TPN - some hypotension this morning that has improved without intervention by this afternoon. Went back to OR yesterday for anastomotic leak, bowel too inflamed to create iliostomy, drain was placed and abdomen washed out. White count up today and hemoglobin down, but pt's pain is improved and abdomen is softer.    6/13 - PICC in place, TPN initiated - still has R sided midline as well that we can use. Despite drop in hemoglobin, pt's vitals are improved today - BP is stable, no further tachycardia, on 0.5L O2. Given patient's significantly depressed albumin with propensity towards third spacing (and previous 3L O2 requirement), will hold off on transfusion this am, and recheck an H/H this afternoon.  If dropped further or vitals trend towards the unstable, will transfuse 1u PRBC. Pt states her pain is currently controlled.     Consultants/Specialty  Critical care  Surgery  ID    Code Status  Full Code    Disposition  TBD - likely  LTAC when no further surgical procedures anticipated     Review of Systems  Review of Systems   Constitutional: Positive for malaise/fatigue. Negative for fever.   Respiratory: Negative for shortness of breath.    Cardiovascular: Negative for chest pain.   Gastrointestinal: Positive for abdominal pain. Negative for constipation and vomiting.   Musculoskeletal: Positive for myalgias.   Neurological: Positive for weakness. Negative for dizziness.   All other systems reviewed and are negative.       Physical Exam  Temp:  [36.3 °C (97.3 °F)-36.6 °C (97.9 °F)] 36.3 °C (97.3 °F)  Pulse:  [82-90] 89  Resp:  [16-18] 17  BP: (116-146)/(52-66) 130/63  SpO2:  [94 %-100 %] 96 %    Physical Exam  Vitals and nursing note reviewed.   Constitutional:       Appearance: She is  ill-appearing. She is not toxic-appearing.      Comments: fatigued   HENT:      Head: Normocephalic.      Mouth/Throat:      Mouth: Mucous membranes are moist.   Eyes:      General:         Right eye: No discharge.         Left eye: No discharge.   Cardiovascular:      Rate and Rhythm: Normal rate and regular rhythm.   Pulmonary:      Effort: Pulmonary effort is normal. No respiratory distress.      Breath sounds: No wheezing or rales.   Abdominal:      General: There is distension (Soft).      Tenderness: There is abdominal tenderness (diffuse). There is no guarding or rebound.      Comments: Abdomen with Prevena, malecot and two greer drains.       Musculoskeletal:         General: No swelling.      Cervical back: Neck supple.   Skin:     General: Skin is warm and dry.   Neurological:      General: No focal deficit present.      Mental Status: She is alert.      Comments: AOx4         Fluids    Intake/Output Summary (Last 24 hours) at 6/13/2021 1120  Last data filed at 6/13/2021 0549  Gross per 24 hour   Intake 549 ml   Output 1245 ml   Net -696 ml       Laboratory  Recent Labs     06/11/21  0436 06/12/21  0220 06/13/21  0316   WBC 12.9* 17.4* 15.8*   RBC 2.60* 2.73* 2.52*   HEMOGLOBIN 7.4* 7.4* 6.9*   HEMATOCRIT 23.5* 24.9* 22.2*   MCV 90.4 91.2 88.1   MCH 28.5 27.1 27.4   MCHC 31.5* 29.7* 31.1*   RDW 48.2 50.2* 48.5   PLATELETCT 476* 498* 473*   MPV 8.9* 8.9* 9.4     Recent Labs     06/11/21  0436 06/12/21  0220 06/13/21  0418   SODIUM 131* 135 133*   POTASSIUM 4.2 4.2 3.7   CHLORIDE 98 103 100   CO2 21 19* 23   GLUCOSE 102* 111* 97   BUN 5* 7* 8   CREATININE 0.62 0.49* 0.48*   CALCIUM 7.8* 7.5* 7.3*             Recent Labs     06/11/21  0436   TRIGLYCERIDE 85       Imaging  IR-PICC LINE PLACEMENT W/ GUIDANCE > AGE 5   Final Result                  Ultrasound-guided PICC placement performed by qualified nursing staff as    above.          EC-ECHOCARDIOGRAM COMPLETE W/O CONT   Final Result      DX-BARIUM ENEMA    Final Result      1.  Extensive ileocolic anastomotic leakage extending into the peritoneal cavity and extending through the enterocutaneous fistula to the skin surface            2.  Right hemicolectomy      3.  Findings were discussed with NEVA KAY on 6/11/2021 12:21 PM.      DX-CHEST-PORTABLE (1 VIEW)   Final Result      1.  The cardiac silhouette is enlarged and there are changes most consistent with vascular congestion/edema with a trace of left pleural effusion.   2.  Superimposed pneumonitis and/or atelectasis is also possible.      CT-DRAIN-PERITONEAL   Final Result      1.  CT guided right lower quadrant abscess and pelvic abscess catheter drainage.   2.  The current plan is to obtain a follow-up CT in 57 days..      CT-ABDOMEN-PELVIS WITH   Final Result      1.  Loculated rim-enhancing peritoneal fluid collections are again seen in the upper and lower quadrants as well as the posterior pelvis, all of which are very minimally decreased in size.   2.  There is postoperative change in the anterior abdominal wall with removal of the skin staples.   3.  There are postoperative changes of right hemicolectomy with very minimal dilatation of the transverse colon at the anastomosis. There is no bowel obstruction.   4.  Stable moderate left and small right dependent pleural effusions with dependent atelectasis.      CT-ABDOMEN-PELVIS WITH   Final Result      1.  Multiple rim enhancing peritoneal fluid collection suspicious for abscess      2.  These include the rectouterine space measuring 6.6 x 7.2 x 5.1 cm, right lower quadrant measuring 13.8 x 4.2 x 6.5 cm, left subphrenic space measuring 8.4 x 4.6 x 2.7 cm, and contiguous loculated fluid within the right and left paracolic gutter      3.  Interval right colectomy      4.  Small-moderate-sized bilateral pleural effusion and atelectasis      US-EXTREMITY VENOUS UPPER BILAT   Final Result      IR-MIDLINE CATHETER INSERTION WO GUIDANCE > AGE 3   Final Result                   Ultrasound-guided midline placement performed by qualified nursing staff    as above.          DX-CHEST-FOR LINE PLACEMENT Perform procedure in: Other(comment f6 below): (PACU)   Final Result      1.  Left IJ central line tip high in position located above the superior vena cava are within the left brachiocephalic vein.      2.  Patchy bilateral infiltrates.      3.  NG tube tip extends into the stomach.      CT-ABDOMEN-PELVIS WITH   Final Result      1.  Moderate to large amount of ascites within the abdomen and pelvis some areas of which are loculated in nature. There are also punctate areas of gas within those areas of ascites as well as free intraperitoneal air. This may be related to recent    surgical procedure however the degree of volume of ascites would be unusual for normal postoperative course. Consideration should be given for leakage of bowel content or other process.      2.  Diffuse colonic distention.      3.  Bibasilar atelectasis and pleural effusions.      4.  5 mm left renal pelvic stone which results in mild hydronephrosis above that level.      5.  Moderate right-sided hydronephrosis extending to the level of the upper sacrum. No ureteral stone. The ureter is not identified below that level.      6.  This was discussed with NEVA KAY at 6:44 PM on 5/27/2021.      OK-RWRMRCI-2 VIEW   Final Result      Gaseous distended small bowel and colon is similar to prior, likely ileus.      CO-[id number]-7 VIEW   Final Result         Gaseous distention of the small bowel and colon, likely postoperative ileus.      CT-ABDOMEN-PELVIS WITH   Final Result         1.  Findings keeping with cecal volvulus.   2.  Mildly fluid distended distal small bowel.   3.  No free air.   4.  Small nonobstructing renal stones.   These findings were discussed with ESHA STERLING on 5/22/2021 11:45 AM.                       Assessment/Plan  * Bacterial peritonitis (HCC)  Assessment & Plan  - s/p ileocecal  resection/redo 5/28/21 and washout 6/4/2, drain placement on 6/9 by IR. Developed anastomotic leak seen on barium enema 6/11 and went to the OR - ileostomy could not be performed due to inflammation of the bowel. Drain was placed and abdomen washed out.  - Wound cultures initially with Bacteroides, Actinomyces, E faecalis. Was on Unasyn and Diflucan.  Culture from when pt began having purulent discharge on 6/8 with bacteroides, culture from IR drain placement 6/9 with bacteroides/candida albicans and krusei.  Diflucan changed to Micafungin.  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)  Assessment & Plan  S/p resection, complicated by anastomotic leak  Surgery following, appreciated        Protein-calorie malnutrition, moderate (HCC)  Assessment & Plan  - Albumin level of 2.0, TPN initiated  - Check prealbumin in the am   - On CLD  - Boost plus supplements ordered between meals     Acute hypoxic respiratory failure (HCC)  Assessment & Plan  - Secondary to pulmonary edema from third spacing secondary to hypoalbuminemia  - No IVFs as pt is on TPN  at 83cc/hr  - Now down from 3L to 0.5L    Anemia  Assessment & Plan  - Hgb slowly trending down, not indicative of acute bleed but likely due to ACD and return trips to the OR.  - Iron low, but suppressed TIBC as well, likely some ABLA with anemia of acute illness as well  - No IV iron for now given her active infection  - Transfuse if Hgb <7  -Retic count appropriate   - . Despite drop in hemoglobin, pt's vitals are improved today - BP is stable, no further tachycardia, on 0.5L O2. Given patient's significantly depressed albumin with propensity towards third spacing (and previous 3L O2 requirement), will hold off on transfusion this am, and recheck an H/H this afternoon.  If dropped further or vitals trend towards the unstable, will transfuse 1u PRBC  - Continue Lovenox for DVT as Hgb without large acute drops suggesting active bleed     DVT  of axillary vein, acute right (HCC)  Assessment & Plan  Associated with midline, small  Continue on lovenox, transition to oral Eliquis when no interventions planned     Thrombocytopenia (HCC)  Assessment & Plan  Resolved   HIT AB negative  Due to sepsis          VTE prophylaxis: Lovenox

## 2021-06-13 NOTE — ASSESSMENT & PLAN NOTE
- Albumin level of 2.0, TPN initiated and tolerating   - PICC in place  - Boost plus supplements ordered between meals

## 2021-06-13 NOTE — PROGRESS NOTES
Trauma / Surgical Daily Progress Note    Date of Service  6/13/2021    Chief Complaint  50 y.o. female admitted 5/22/2021 with cecal volvulus    Interval Events  5/22 Exploratory celiotomy and right hemicolectomy with a side-to-side   ileocolic anastomosis  5/28 Exploratory celiotomy, resection of ileocolic anastomosis with re-do  side-to-side ileocolic anastomosis  6/4 Ex celiotomy washout  6/11 Ex celiotomy, drain placement    On clears and TPN started. On Unasyn, micofungin per ID. Drains working.    Review of Systems  Review of Systems   Gastrointestinal: Positive for abdominal pain.        Passing some flatus        Vital Signs for last 24 hours  Temp:  [36.3 °C (97.4 °F)-36.6 °C (97.9 °F)] 36.6 °C (97.9 °F)  Pulse:  [82-90] 88  Resp:  [15-18] 16  BP: ()/(52-66) 141/63  SpO2:  [94 %-100 %] 94 %    Hemodynamic parameters for last 24 hours       Respiratory Data     Respiration: 16, Pulse Oximetry: 94 %     Work Of Breathing / Effort: Within Normal Limits  RUL Breath Sounds: Clear, RML Breath Sounds: Clear, RLL Breath Sounds: Diminished, LUCITA Breath Sounds: Clear, LLL Breath Sounds: Diminished    Physical Exam  Physical Exam  Cardiovascular:      Rate and Rhythm: Normal rate.      Pulses: Normal pulses.   Pulmonary:      Effort: Pulmonary effort is normal.   Abdominal:      General: There is no distension.      Palpations: Abdomen is soft.      Tenderness: There is abdominal tenderness.      Comments: Wound vac in place.  Drains serosang    Genitourinary:     Comments: diuresing  Musculoskeletal:         General: Normal range of motion.      Cervical back: Neck supple.      Comments: Generalized edema   Skin:     General: Skin is warm and dry.   Neurological:      General: No focal deficit present.      Mental Status: She is alert.   Psychiatric:      Comments:           Laboratory  Recent Results (from the past 24 hour(s))   EC-ECHOCARDIOGRAM COMPLETE W/O CONT    Collection Time: 06/12/21  9:38 AM   Result  Value Ref Range    Eject.Frac. MOD BP 53.48     Eject.Frac. MOD 4C 46.81     Eject.Frac. MOD 2C 52.67     Left Ventrical Ejection Fraction 55    CBC WITH DIFFERENTIAL    Collection Time: 06/13/21  3:16 AM   Result Value Ref Range    WBC 15.8 (H) 4.8 - 10.8 K/uL    RBC 2.52 (L) 4.20 - 5.40 M/uL    Hemoglobin 6.9 (L) 12.0 - 16.0 g/dL    Hematocrit 22.2 (L) 37.0 - 47.0 %    MCV 88.1 81.4 - 97.8 fL    MCH 27.4 27.0 - 33.0 pg    MCHC 31.1 (L) 33.6 - 35.0 g/dL    RDW 48.5 35.9 - 50.0 fL    Platelet Count 473 (H) 164 - 446 K/uL    MPV 9.4 9.0 - 12.9 fL    Neutrophils-Polys 74.00 (H) 44.00 - 72.00 %    Lymphocytes 9.00 (L) 22.00 - 41.00 %    Monocytes 6.00 0.00 - 13.40 %    Eosinophils 1.00 0.00 - 6.90 %    Basophils 1.00 0.00 - 1.80 %    Nucleated RBC 0.60 /100 WBC    Neutrophils (Absolute) 12.32 (H) 2.00 - 7.15 K/uL    Lymphs (Absolute) 1.42 1.00 - 4.80 K/uL    Monos (Absolute) 0.95 (H) 0.00 - 0.85 K/uL    Eos (Absolute) 0.16 0.00 - 0.51 K/uL    Baso (Absolute) 0.16 (H) 0.00 - 0.12 K/uL    NRBC (Absolute) 0.09 K/uL    Anisocytosis 1+    DIFFERENTIAL MANUAL    Collection Time: 06/13/21  3:16 AM   Result Value Ref Range    Bands-Stabs 4.00 0.00 - 10.00 %    Metamyelocytes 1.00 %    Myelocytes 4.00 %    Manual Diff Status PERFORMED    PLATELET ESTIMATE    Collection Time: 06/13/21  3:16 AM   Result Value Ref Range    Plt Estimation Normal    MORPHOLOGY    Collection Time: 06/13/21  3:16 AM   Result Value Ref Range    RBC Morphology Present     Polychromia 1+    Comp Metabolic Panel    Collection Time: 06/13/21  4:18 AM   Result Value Ref Range    Sodium 133 (L) 135 - 145 mmol/L    Potassium 3.7 3.6 - 5.5 mmol/L    Chloride 100 96 - 112 mmol/L    Co2 23 20 - 33 mmol/L    Anion Gap 10.0 7.0 - 16.0    Glucose 97 65 - 99 mg/dL    Bun 8 8 - 22 mg/dL    Creatinine 0.48 (L) 0.50 - 1.40 mg/dL    Calcium 7.3 (L) 8.4 - 10.2 mg/dL    AST(SGOT) 12 12 - 45 U/L    ALT(SGPT) <5 2 - 50 U/L    Alkaline Phosphatase 72 30 - 99 U/L    Total  Bilirubin <0.2 0.1 - 1.5 mg/dL    Albumin 2.0 (L) 3.2 - 4.9 g/dL    Total Protein 5.2 (L) 6.0 - 8.2 g/dL    Globulin 3.2 1.9 - 3.5 g/dL    A-G Ratio 0.6 g/dL   Magnesium    Collection Time: 06/13/21  4:18 AM   Result Value Ref Range    Magnesium 1.9 1.5 - 2.5 mg/dL   Phosphorus    Collection Time: 06/13/21  4:18 AM   Result Value Ref Range    Phosphorus 2.5 2.5 - 4.5 mg/dL   ESTIMATED GFR    Collection Time: 06/13/21  4:18 AM   Result Value Ref Range    GFR If African American >60 >60 mL/min/1.73 m 2    GFR If Non African American >60 >60 mL/min/1.73 m 2       Fluids    Intake/Output Summary (Last 24 hours) at 6/13/2021 0738  Last data filed at 6/13/2021 0549  Gross per 24 hour   Intake 789 ml   Output 1245 ml   Net -456 ml       Core Measures & Quality Metrics  Labs reviewed and Medications reviewed  Young catheter: No Young      DVT Prophylaxis: Enoxaparin (Lovenox)  DVT prophylaxis - mechanical: SCDs  Ulcer prophylaxis: Yes  Antibiotics: Treating active infection/contamination beyond 24 hours perioperative coverage  Assessed for rehab: Patient returned to prior level of function, rehabilitation not indicated at this time    ALICIA Score  ETOH Screening    Assessment/Plan  * Bacterial peritonitis (HCC)  Assessment & Plan  6/2 Zosyn stopped.  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 Passing flatus - start clears  5/27 - Stooled - will adv to full liquids  5/27 - thrombocytopenia, bandemia, PM CT with ascites no freeair  5/28 - Exploratory celiotomy, resection of ileocolic anastomosis with re-do  side-to-side ileocolic anastomosis.  6/3   Stooling, advance to regular diet  6/4 Small wound dehiscence - returned to OR for washout and reclosure  6/9 Repeat CT showed two fluid collections - IR drains placed  6/11 BE shows leak - Ex lap, drain  Remains on unasyn, diflucan    DVT of axillary vein, acute right (HCC)  Assessment & Plan  Noted on US  On heparin gtt  6/6  Switched to lovenox BID       Discussed patient condition with RN and Patient.    CRITICAL CARE TIME EXCLUDING PROCEDURES: 20  minutes

## 2021-06-13 NOTE — CARE PLAN
"The patient is Watcher - Medium risk of patient condition declining or worsening    Shift Goals  Clinical Goals: Pain control  Patient Goals: advance diet    Progress made toward(s) clinical / shift goals:  Patient reported pain of 6/10 throughout shift and by the end of the shift stated \"we are starting to get it under control.\" Pain medications given per MAR.     Patient is not progressing towards the following goals: Pt started on TPN due to nutritional status.       "

## 2021-06-13 NOTE — PROGRESS NOTES
Received report from night shift RN. Pt is awake and alert. No signs of distress. Pt denies any nausea at this time. Pt denies any numbness or tingling. Pt reports pain 6/10, pt medicated per MAR. Pt is able to planter flex and dorsi flex ble, + pulses. Dressing to midline is cdi. prevena in place. Young in place. greer drains in place. TPN in place. fall precautions in place. Bed in lowest and locked position. Call light within reach.

## 2021-06-13 NOTE — CARE PLAN
The patient is Stable - Low risk of patient condition declining or worsening    Shift Goals  Clinical Goals: pain control  Patient Goals: advance diet    Progress made toward(s) clinical / shift goals:  Pain medication administered. Patient's diet advanced to clear liquid. Tolerating well.     Patient is not progressing towards the following goals:

## 2021-06-13 NOTE — PROGRESS NOTES
Infectious Disease Progress Note    Author: Elvis Eckert M.D. Date & Time of service: 2021  8:20 AM    Chief Complaint:  Follow-up for intra-abdominal abscesses     Interval History:   patient is afebrile, white count 13.4.  Patient had worsening abdominal pain yesterday, noted purulent output during wound VAC change, repeat CT with multiple intra-abdominal abscesses, taken back to the OR this morning.   AF, O2 RA, doing well overall and states she ambulated twice this morning with normal bowel movement.  She has some abdominal pain with ambulation but otherwise minimal.    patient remains afebrile, had drains placed, white count 12.9, tolerating antimicrobials thus far.  Resting comfortably this morning.   patient remains afebrile, white count 15.8, additional procedures and change to antifungal as below    Review of Systems:  Review of Systems   Constitutional: Positive for malaise/fatigue. Negative for chills and fever.   Respiratory: Negative for cough and shortness of breath.    Gastrointestinal: Positive for abdominal pain. Negative for constipation, diarrhea, nausea and vomiting.     Hemodynamics:  Temp (24hrs), Av.4 °C (97.6 °F), Min:36.3 °C (97.4 °F), Max:36.6 °C (97.9 °F)  Temperature: 36.6 °C (97.9 °F)  Pulse  Av.4  Min: 60  Max: 156   Blood Pressure: 141/63       Physical Exam:  Physical Exam  Constitutional:       General: She is not in acute distress.     Appearance: Normal appearance. She is ill-appearing. She is not toxic-appearing.   HENT:      Mouth/Throat:      Pharynx: No oropharyngeal exudate.   Eyes:      General: No scleral icterus.        Right eye: No discharge.         Left eye: No discharge.      Conjunctiva/sclera: Conjunctivae normal.   Cardiovascular:      Rate and Rhythm: Normal rate and regular rhythm.      Heart sounds: Normal heart sounds.   Pulmonary:      Effort: Pulmonary effort is normal. No respiratory distress.      Breath sounds: No stridor.    Abdominal:      Palpations: Abdomen is soft.      Tenderness: There is abdominal tenderness. There is no guarding.      Comments: Midline incision with wound VAC and additional drain in place with mild amount of purulent appearing material in bulb   Musculoskeletal:      Cervical back: No rigidity.      Right lower leg: No edema.      Left lower leg: No edema.   Skin:     General: Skin is warm and dry.   Neurological:      General: No focal deficit present.      Mental Status: She is alert and oriented to person, place, and time.   Psychiatric:         Mood and Affect: Mood normal.         Behavior: Behavior normal.         Meds:    Current Facility-Administered Medications:   •  lidocaine **OR** lidocaine  •  micafungin (MYCAMINE) ivpb  •  NS  •  TPN Central Line Formulation  •  MD Alert...TPN per Pharmacy  •  morphine injection  •  DULoxetine  •  topiramate  •  enoxaparin (LOVENOX) injection  •  oxyCODONE immediate-release  •  oxyCODONE immediate-release  •  morphine injection  •  ampicillin-sulbactam (UNASYN) IV  •  [DISCONTINUED] insulin regular **AND** [CANCELED] POC blood glucose manual result **AND** NOTIFY MD and PharmD **AND** glucose **AND** dextrose 50%  •  Metoprolol Tartrate  •  LORazepam  •  diazePAM  •  Respiratory Therapy Consult  •  Pharmacy Consult Request  •  docusate sodium  •  senna-docusate  •  senna-docusate  •  polyethylene glycol/lytes  •  magnesium hydroxide  •  bisacodyl  •  ondansetron    Labs:  Recent Labs     06/11/21  0436 06/12/21  0220 06/13/21  0316   WBC 12.9* 17.4* 15.8*   RBC 2.60* 2.73* 2.52*   HEMOGLOBIN 7.4* 7.4* 6.9*   HEMATOCRIT 23.5* 24.9* 22.2*   MCV 90.4 91.2 88.1   MCH 28.5 27.1 27.4   RDW 48.2 50.2* 48.5   PLATELETCT 476* 498* 473*   MPV 8.9* 8.9* 9.4   NEUTSPOLYS 70.00 84.40* 74.00*   LYMPHOCYTES 9.00* 4.20* 9.00*   MONOCYTES 15.00* 6.70 6.00   EOSINOPHILS 0.00 0.10 1.00   BASOPHILS 0.00 0.40 1.00   RBCMORPHOLO Present  --  Present     Recent Labs     06/11/21  0612  06/12/21 0220 06/13/21  0418   SODIUM 131* 135 133*   POTASSIUM 4.2 4.2 3.7   CHLORIDE 98 103 100   CO2 21 19* 23   GLUCOSE 102* 111* 97   BUN 5* 7* 8     Recent Labs     06/11/21  0436 06/12/21 0220 06/13/21 0418   ALBUMIN 2.3* 2.0* 2.0*   TBILIRUBIN 0.2 <0.2 <0.2   ALKPHOSPHAT 87 89 72   TOTPROTEIN 6.0 5.6* 5.2*   ALTSGPT 9 7 <5   ASTSGOT 17 9* 12   CREATININE 0.62 0.49* 0.48*       Imaging:  CT-ABDOMEN-PELVIS WITH    Result Date: 6/8/2021 6/8/2021 4:22 PM HISTORY/REASON FOR EXAM:  Peritonitis or perforation suspected; 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 Most recent abdominal surgery for washout of the peritoneal cavity performed on 6/4/2021. Ongoing generalized abdominal pain. TECHNIQUE/EXAM DESCRIPTION: CT scan of the abdomen and pelvis with contrast. Contrast-enhanced helical scanning was obtained from the diaphragmatic domes through the pubic symphysis following the bolus administration of 100 mL of Omnipaque 350 nonionic contrast without complication. Low dose optimization technique was utilized for this CT exam including automated exposure control and adjustment of the mA and/or kV according to patient size. COMPARISON: 6/3/2021 FINDINGS: The visualized lung bases demonstrate a small right and moderate left dependent pleural effusion with dependent airspace disease, likely atelectasis similar to the previous exam. There is no acute bony process. CT Abdomen: There is postoperative change in the anterior abdominal wall midline. There are a few tiny air lucencies in the subcutaneous tissues with abdominal wall defect seen with the previous skin staples removed. Tiny amounts of intraperitoneal air identified within the anterior abdominal fluid. There is loculated intra-abdominal fluid with some seen in the left lateral subphrenic space anterior to the spleen measuring 4.4 x 2.1 cm  (previously 4.6 x 2.7 cm). There is a small amount of loculated fluid in the right anterior upper quadrant also similar to the prior study. This appears to communicate with a more confluent area of fluid anteriorly within the peritoneal cavity beginning at the level of the iliac crest and extending into the upper pelvis. The largest component is in the right lower  quadrant measuring 4.9 cm in AP diameter, down from 6.5 cm. The fluid collection with rim enhancement and air lucencies in the posterior pelvis measures 6.2 x 3.5 cm (previously 7.2 x 3.9 cm). The liver is unremarkable. The spleen is unremarkable. The pancreas is unremarkable. The gallbladder demonstrates no stones. The adrenal glands are normal in size. The kidneys enhance symmetrically. The abdominal aorta is normal in caliber. There is no lymphadenopathy. The stomach is distended with some fluid and contrast material. There is postoperative change consistent with a right hemicolectomy. There is minimal dilatation of the left transverse colon at the anastomosis. CT Pelvis: There are no new pelvic fluid collections. There is mild diffuse body wall edema.     1.  Loculated rim-enhancing peritoneal fluid collections are again seen in the upper and lower quadrants as well as the posterior pelvis, all of which are very minimally decreased in size. 2.  There is postoperative change in the anterior abdominal wall with removal of the skin staples. 3.  There are postoperative changes of right hemicolectomy with very minimal dilatation of the transverse colon at the anastomosis. There is no bowel obstruction. 4.  Stable moderate left and small right dependent pleural effusions with dependent atelectasis.    CT-ABDOMEN-PELVIS WITH    Result Date: 6/3/2021  6/3/2021 4:55 PM HISTORY/REASON FOR EXAM:  Abdominal abscess/infection suspected; wound dehiscense, r/o abscess. Postoperative. Recent volvulus with right hemicolectomy TECHNIQUE/EXAM DESCRIPTION:  CT scan of  the abdomen and pelvis with contrast. Contrast-enhanced helical scanning was obtained from the diaphragmatic domes through the pubic symphysis following the bolus administration of nonionic contrast without complication. 100 mL of Omnipaque 350 nonionic contrast was administered without complication. Low dose optimization technique was utilized for this CT exam including automated exposure control and adjustment of the mA and/or kV according to patient size. COMPARISON: CT abdomen and pelvis 5/27/2021 FINDINGS: Osseous structures: There is bilateral atelectasis and there are small to moderate-sized bilateral pleural effusion. Lungs: Clear ABDOMEN: There is peritoneal fluid present posterior to the abdominal wall and in both paracolic gutter. This has some degree of loculation especially in the left paracolic gutter and could indicate infected fluid. Additionally, in the right lower quadrant there is a loculated fluid collection present measuring approximately 13.8 x 4.2 x 6.5 cm and this connects to the peritoneal fluid in the right paracolic gutter. There is loculated rim-enhancing fluid in the left subphrenic space measuring 8.4 cm in length and approximately 4.6 x 2.7 cm transversely. This connects to the fluid within the left paracolic gutter. There are recent postoperative changes with skin staple present and intraperitoneal air and fluid. LIVER: is normal in appearance. GALLBLADDER and BILIARY SYSTEM Gallbladder and biliary system are normal by CT assessment SPLEEN: Normal in appearance.. PANCREAS: Normal in appearance. The splenic vein and portal vein enhance normally. ADRENAL glands: Normal in appearance. RIGHT kidney: There is a nonobstructive 1 to 2 mm right medial lower pole calculus. LEFT kidney: Normal in appearance. There is no hydronephrosis. BOWEL and MESENTERY: Their has been right hemicolectomy. AORTA and VASCULATURE: is normal in appearance. Pelvis: In the recto uterine space there is a loculated  fluid collection measuring 6.6 x 7.2 x 5.1 cm consistent with an abscess. Uterus and adnexa appear normal.     1.  Multiple rim enhancing peritoneal fluid collection suspicious for abscess 2.  These include the rectouterine space measuring 6.6 x 7.2 x 5.1 cm, right lower quadrant measuring 13.8 x 4.2 x 6.5 cm, left subphrenic space measuring 8.4 x 4.6 x 2.7 cm, and contiguous loculated fluid within the right and left paracolic gutter 3.  Interval right colectomy 4.  Small-moderate-sized bilateral pleural effusion and atelectasis    CT-ABDOMEN-PELVIS WITH    Result Date: 5/27/2021 5/27/2021 5:48 PM HISTORY/REASON FOR EXAM: Diffuse abdominal pain and distention. TECHNIQUE/EXAM DESCRIPTION: CT scan of the abdomen and pelvis with contrast. Contrast-enhanced helical scanning was obtained from the diaphragmatic domes through the pubic symphysis following the bolus administration of 100 mL of Omnipaque 350 nonionic contrast without complication. Low dose optimization technique was utilized for this CT exam including automated exposure control and adjustment of the mA and/or kV according to patient size. COMPARISON: 5/22/2021 FINDINGS: CT Abdomen: There is new bibasilar atelectasis and small bilateral pleural effusions, left greater than right. There is fatty change of the liver. There is a small amount of free intraperitoneal air tracking along the surface of the liver. Gallbladder is distended with dense material present within the gallbladder. The spleen is normal. There is moderate right-sided hydronephrosis extending to the level of the upper sacrum. The right ureter is not identified below that level. No definite ureteral stone on the right. There is left ureteral pelvic junction stone which measures 5 mm in size and results in minimal left renal pelvic dilatation. The adrenal glands are normal. The pancreas is normal. The aorta is normal in caliber. No evidence of retroperitoneal adenopathy. CT Pelvis: There is  multifocal ascites seen which is likely loculated in nature. There are punctate areas of free air seen within those areas of ascites. There are surgical changes involving the cecum. The colon is diffusely dilated. There is a large amount of free fluid which contains pockets of gas within the pelvis. There is a recent midline incision.     1.  Moderate to large amount of ascites within the abdomen and pelvis some areas of which are loculated in nature. There are also punctate areas of gas within those areas of ascites as well as free intraperitoneal air. This may be related to recent surgical procedure however the degree of volume of ascites would be unusual for normal postoperative course. Consideration should be given for leakage of bowel content or other process. 2.  Diffuse colonic distention. 3.  Bibasilar atelectasis and pleural effusions. 4.  5 mm left renal pelvic stone which results in mild hydronephrosis above that level. 5.  Moderate right-sided hydronephrosis extending to the level of the upper sacrum. No ureteral stone. The ureter is not identified below that level. 6.  This was discussed with NEVA KAY at 6:44 PM on 5/27/2021.    CT-ABDOMEN-PELVIS WITH    Result Date: 5/22/2021 5/22/2021 11:19 AM HISTORY/REASON FOR EXAM:  Abdominal distension; IV contrast only. TECHNIQUE/EXAM DESCRIPTION:   CT scan of the abdomen and pelvis with contrast. Contrast-enhanced helical scanning was obtained from the diaphragmatic domes through the pubic symphysis following the bolus administration of nonionic contrast without complication. 100 mL of Omnipaque 350 nonionic contrast was administered without complication. Low dose optimization technique was utilized for this CT exam including automated exposure control and adjustment of the mA and/or kV according to patient size. COMPARISON: 4/22/2020 FINDINGS: Chest Base: Lung bases are clear. Liver:  Normal. Gallbladder: Normal Biliary tract: Nondilated. Pancreas:  Normal. Spleen: Normal. Adrenals: Normal. Kidneys and Collecting Systems:  Cannot exclude a punctate nonobstructing stone in the lower right renal collecting system. There is a small nonobstructing left renal stone measuring about 5 mm. Gastrointestinal tract:  The cecum is distended and displaced into the left upper quadrant. The ascending colon proximally is narrowed in the midline and there is a somewhat twisted appearance.   Mildly fluid distended distal small bowel without significant small bowel obstruction. The appendix is nonvisualized. Peritoneum: No free air or free fluid. Reproductive organs:  2.3 cm left adnexal hypodense lesion is indeterminate, possibly a dominant follicle/small cyst in a menstruating patient. Correlate clinically. Probable intramural fibroid in the fundus. Bladder:  Normal. Vessels:  Normal caliber. Lymph  Nodes:  No lymphadenopathy. Abdominal wall: Within normal limits. Bones:  No acute or aggressive abnormality.     1.  Findings keeping with cecal volvulus. 2.  Mildly fluid distended distal small bowel. 3.  No free air. 4.  Small nonobstructing renal stones. These findings were discussed with ESHA STERLING on 5/22/2021 11:45 AM.     [id number] VIEW    Result Date: 5/27/2021 5/27/2021 7:01 AM HISTORY/REASON FOR EXAM:  Distention. TECHNIQUE/EXAM DESCRIPTION AND NUMBER OF VIEWS:  1 view(s) of the abdomen. COMPARISON: 5/24/2021 FINDINGS: Gaseous distended  small bowel and colon is similar to prior study. There is some stool within the right colon. There is no significant interval change. No suspicious calcifications. No acute osseous abnormality.     Gaseous distended small bowel and colon is similar to prior, likely ileus.    [id number] VIEW    Result Date: 5/24/2021 5/24/2021 11:28 AM HISTORY/REASON FOR EXAM:  Distention; s/p right hemicolectomy Lower abdominal pain s/p right hemicolectomy 05/22/21 TECHNIQUE/EXAM DESCRIPTION AND NUMBER OF VIEWS:  1 view(s) of the abdomen.  COMPARISON: 2021 FINDINGS: Gaseous distention of the small bowel and colon No portal venous gas or pneumatosis. No definite free intraperitoneal air but evaluation is limited on supine radiograph.     Gaseous distention of the small bowel and colon, likely postoperative ileus.    DX-CHEST-FOR LINE PLACEMENT Perform procedure in: Other(comment f6 below): (PACU)    Result Date: 2021 10:10 AM HISTORY/REASON FOR EXAM:  Central line placement. TECHNIQUE/EXAM DESCRIPTION AND NUMBER OF VIEWS: Single AP view of the chest. COMPARISON: None FINDINGS: There is a left IJ central line with the tip high in position above the superior vena cava within the area of the left brachiocephalic vein. No pneumothorax. NG tube extends into the stomach. There are patchy bilateral pulmonary infiltrates. The heart is mildly enlarged. There is no pleural effusion.     1.  Left IJ central line tip high in position located above the superior vena cava are within the left brachiocephalic vein. 2.  Patchy bilateral infiltrates. 3.  NG tube tip extends into the stomach.    US-EXTREMITY VENOUS UPPER BILAT    Result Date: 2021   Upper Extremity  Venous Duplex Report  Vascular Laboratory  CONCLUSIONS  Small thrombus seen around the catheter line at the RIGHT axillary vein and  proximal basilic vein.  No left upper extremity superficial and deep venous thrombosis.  HOANG EMERY  Exam Date:     2021 14:29  Room #:     Inpatient  Priority:     Routine  Ht (in):             Wt (lb):  Ordering Physician:        DEIDRA SNIDER  Referring Physician:       [id number] Licea  Sonographer:               Sharlene Morrell RVT, RDMS  Study Type:                Complete Bilateral  Technical Quality:         Fair  Age:    50    Gender:     F  MRN:    [id number]  :    1970      BSA:  Indications:     Edema  CPT Codes:       97572  ICD Codes:       782.3  History:          Swelling. No prior study  Limitations:  PROCEDURES:  Bilateral upper extremity venous duplex imaging.  The following venous structures were evaluated: internal jugular,  subclavian, axillary, brachial, cephalic and basilic veins.  FINDINGS:  Right upper extremity.  Small thrombus seen around the central line at the axillary vein and  proximal basilic vein.  All other veins of the right upper extremity demonstrate normal flow  dynamics with no evidence of deep vein thrombosis.  Left upper extremity.  Limited visualization of the internal jugular vein due to bandages.  All other veins of the left upper extremity demonstrate normal flow  dynamics with no evidence of deep vein thrombosis.  Doug Rossi MD  (Electronically Signed)  Final Date:      02 June 2021                   18:16    IR-MIDLINE CATHETER INSERTION WO GUIDANCE > AGE 3    Result Date: 6/1/2021  HISTORY/REASON FOR EXAM:  Midline Placement   TECHNIQUE/EXAM DESCRIPTION AND NUMBER OF VIEWS: Midline insertion with ultrasound guidance.  FINDINGS: Midline insertion with Ultrasound Guidance was performed by qualified nursing staff without the assistance of a Radiologist. Midline positioning as measured by RN or as appropriate length of catheter selected.              Ultrasound-guided midline placement performed by qualified nursing staff as above.       Micro:  Results     Procedure Component Value Units Date/Time    CULTURE WOUND W/ GRAM STAIN [[id number]]  (Abnormal) Collected: 06/09/21 1630    Order Status: Completed Specimen: Wound from KRISTI Drain Updated: 06/12/21 1100     Significant Indicator POS     Source WND     Site KRISTI DRAIN     Culture Result -     Gram Stain Result Moderate WBCs.  Few Yeast.       Culture Result Candida albicans  Light growth        Candida krusei  Light growth        Bacteroides fragilis  Heavy growth      Narrative:      Collected By:[id number] ROBBIE GAINES  Please collect culture when drain is placed to abdomen today  Collected  "By:[id number] ROBBIE GAINES    GRAM STAIN [[id number]] Collected: 06/09/21 1630    Order Status: Completed Specimen: Wound Updated: 06/10/21 1336     Significant Indicator .     Source WND     Site KRISTI DRAIN     Gram Stain Result Moderate WBCs.  Few Yeast.      Narrative:      Collected By:[id number] ROBBIE GAINES  Please collect culture when drain is placed to abdomen today  Collected By:[id number] ROBBIE GAINES    Culture Wound w/Gram Stain [[id number]]  (Abnormal) Collected: 06/08/21 1425    Order Status: Completed Specimen: Wound from Abdominal Updated: 06/10/21 0938     Significant Indicator POS     Source WND     Site ABDOMINAL     Culture Result Rare growth usual skin coco.     Gram Stain Result Moderate WBCs.  No organisms seen.       Culture Result Bacteroides fragilis  Light growth      Narrative:      Collected By:[id number] ROBBIE GAINES  Collected By:[id number] ROBBIE GAINES    GRAM STAIN [[id number]] Collected: 06/08/21 1425    Order Status: Completed Specimen: Wound Updated: 06/09/21 0826     Significant Indicator .     Source WND     Site ABDOMINAL     Gram Stain Result Moderate WBCs.  No organisms seen.      Narrative:      Collected By:[id number] ROBBIE GAINES  Collected By:[id number] ROBBIE GAINES    BLOOD CULTURE [[id number]] Collected: 06/02/21 1730    Order Status: Completed Specimen: Blood from Peripheral Updated: 06/07/21 1817     Significant Indicator NEG     Source BLD     Site PERIPHERAL     Culture Result No growth after 5 days of incubation.  Blood culture testing and Gram stain, if indicated, are  performed at [facility], [address] [place] [address].  Positive blood cultures are  sent to [facility], [address] [place] [address], for organism identification and  susceptibility testing.      Narrative:      Per Hospital Policy: Only change Specimen Src: to \"Line\" if  specified by physician order.  Left Wrist    BLOOD CULTURE [[id number]] Collected: 06/02/21 " "1307    Order Status: Completed Specimen: Blood from Peripheral Updated: 06/07/21 1417     Significant Indicator NEG     Source BLD     Site PERIPHERAL     Culture Result No growth after 5 days of incubation.  Blood culture testing and Gram stain, if indicated, are  performed at [facility], [address] [place] [address].  Positive blood cultures are  sent to [facility], [address] [place] [address], for organism identification and  susceptibility testing.      Narrative:      Per Hospital Policy: Only change Specimen Src: to \"Line\" if  specified by physician order.  No site indicated            Assessment/Hospital course:  This is a very pleasant 50-year-old female patient, originally admitted on 5/22/2021 with cecal volvulus for which she underwent a right hemicolectomy on 5/22.  This was complicated by intra-abdominal abscesses and free air concerning for leak.  She was taken to the OR for exploratory laparotomy and found to have necrosis of the ileocolonic anastomosis site, bowel is eviscerated and redo side-to-side ileocolic anastomosis was done.    Patient went back to the OR on 6/4 due to drainage from wound and concern for fascial dehiscence.  Per op note there is loosening of the fascia but no yessy dehiscence.  Abdomen was washed out including pockets noted on last CT.  Anastomosis was not evaluated as was scarred and no evidence of leak.     Pertinent Diagnoses:  Sepsis  Intra-abdominal abscesses, persistent  Large midline incision, with significant feculent drainage   Feculent peritonitis  Anastomotic leak, ongoing  Cecal volvulus, sequelae  Leukocytosis, ongoing      Plan:  -OR cultures from 5/28 growing Bacteroides, Actinomyces, E faecalis  -Patient had worsening abdominal pain and a repeat CT scan obtained 6/3 showed multiple rim-enhancing peritoneal fluid collections several quite large and loculated.  Patient was taken back to the OR for " exploratory laparotomy on 6/4 as discussed above, no new cultures were obtained.    -Repeat CT abdomen and pelvis 6/8 was ordered with postop change in anterior abdominal wall, few tiny air lucencies and tiny amounts of intraperitoneal air identified within the anterior abdominal fluid.  Loculated intra-abdominal fluid in the left lateral splenic space measuring 4.4 x 2.1 cm, small amount of loculated fluid in the right anterior upper quadrant also similar to prior study.  This appears to communicate with fluid anterior to the peritoneal cavity measuring 4.9 x 6.5 cm.  Fluid collection with rim enhancement in the posterior pelvis now 6.2 x 3.5 cm.  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  Patient was taken back to the OR 6/11, but the bowel was too inflamed to mobilize for ostomy, drains were placed      --- Continue IV Unasyn 3 g every 6 hours and IV micafungin 100 mg every 24 hours  --- Will follow along with general surgery    Discussed with internal medicine, Dr. Lopez.  ID will follow.

## 2021-06-13 NOTE — PROGRESS NOTES
Pharmacy TPN Day # 2       2021    Dosing Weight   73.5 kg   TPN currently providing 50% of goal  TPN goal: 1825 kcal/day including 1.5 gm/kg/day Protein  TPN indication: Bowel resection  Pertinent PMH: 50 y.o. female admitted 2021 with cecal volvulus  >  Exploratory celiotomy and right hemicolectomy with a side-to-side ileocolic anastomosis  >  Exploratory celiotomy, resection of ileocolic anastomosis with re-do side-to-side ileocolic anastomosis  >  Ex celiotomy washout  >  Ex celiotomy, drain placement     BE showed leak. Returned to OR - bowel too inflammed to mobilize for ostomy. Drain placed. NPO.  Unasyn, Diflucan per ID.  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  Patient was taken back to the OR , but the bowel was too inflamed to mobilize for ostomy, drain was placed  -Drain cultures from  also growing a Candida krusei.  Will change fluconazole to IV micafungin 100 mg every 24 hours, continue IV Unasyn 3 g every 6 hours     Temp (24hrs), Av.4 °C (97.6 °F), Min:36.3 °C (97.4 °F), Max:36.6 °C (97.9 °F)  .  Recent Labs     21  0436 21  0220 21  0418   SODIUM 131* 135 133*   POTASSIUM 4.2 4.2 3.7   CHLORIDE 98 103 100   CO2 21 19* 23   BUN 5* 7* 8   CREATININE 0.62 0.49* 0.48*   GLUCOSE 102* 111* 97   CALCIUM 7.8* 7.5* 7.3*   ASTSGOT 17 9* 12   ALTSGPT 9 7 <5   ALBUMIN 2.3* 2.0* 2.0*   TBILIRUBIN 0.2 <0.2 <0.2   PHOSPHORUS 4.7* 4.3 2.5   MAGNESIUM 2.2 2.3 1.9     Accu-Checks  No results for input(s): POCGLUCOSE in the last 72 hours.    Vitals:    21 1619 21 0008 21 0605   BP: 116/53 133/52 146/66 141/63   Weight:       Height:           Intake/Output Summary (Last 24 hours) at 2021 0813  Last data filed at 2021 0549  Gross per 24 hour   Intake 789 ml   Output 1245 ml   Net -456 ml       Orders Placed This  Encounter   Procedures   • Diet Order Diet: Clear Liquid     Standing Status:   Standing     Number of Occurrences:   1     Order Specific Question:   Diet:     Answer:   Clear Liquid [10]         TPN for past 72 hours (Show up to 3 orders; newest on the left. Changes between the two most recent orders are indicated.)     Start date and time   06/12/2021 2000 06/11/2021 2000      TPN Central Line Formulation [[id number]] TPN Central Line Formulation [[id number]]    Order Status  Last Dose in Progress Discontinued    Last Admin  Rate Verify at 06/13/2021 0712 by Marilin Rios R.N.        Base    Clinisol 15%  55 g 48 g    dextrose 70%  140 g 172 g    fat emulsions 20%  22.5 g 26 g       Additives    potassium chloride  40 mEq 80 mEq    sodium acetate  150 mEq 150 mEq    sodium chloride  150 mEq 150 mEq    magnesium sulfate  4 mEq 4 mEq    calcium GLUConate  9.4 mEq 9.4 mEq    M.T.E.-4 Adult  1 mL 1 mL    M.V.I. Adult  10 mL 10 mL    famotidine  40 mg 40 mg       QS Base    sterile water  1,144.12 mL 1,107.59 mL       Energy Contribution    Proteins  -- --    Dextrose  -- --    Lipids  -- --    Total  -- --       Electrolyte Ion Calculated Amount    Sodium  300 mEq 300 mEq    Potassium  40 mEq 80 mEq    Calcium  9.4 mEq 9.4 mEq    Magnesium  4.02 mEq 4.02 mEq    Aluminum  -- --    Phosphate  -- --    Chloride  190 mEq 230 mEq    Acetate  196.57 mEq 190.64 mEq       Other    Total Protein  55 g 48 g    Total Protein/kg  0.75 g/kg 0.65 g/kg    Total Amino Acid  -- --    Total Amino Acid/kg  -- --    Glucose Infusion Rate  1.32 mg/kg/min 1.63 mg/kg/min    Osmolarity (Estimated)  -- --    Volume  1,992 mL 1,992 mL    Rate  83 mL/hr 83 mL/hr    Dosing Weight  73.5 kg 73.5 kg    Infusion [facility]            This formula provides:  % kcal as lipids = 25  Grams protein/kg = 1.7  Non-protein calories = 1054  Kcals/kg = 28.8  Total daily calories = 1382    Comments:  - PICC line placed yesterday (6/12/21);  confirmed in superior vena cava utilizing 3GC technology  - Glucose levels WNL with lab this AM of 97, hypoglycemic protocol on MAR, no need for sliding scale insulin at this time, will monitor.  - No electrolyte replacement outside of TPN bag over the past 24 hours.    Macronutrients:   - Increase macronutrients to 75% of goal: 82 gm amino acids, 210 gm D70%, 34 gm Lipids 20%    Micronutrients:  - Maintain NS salinity  - K level stable, however will add KPhos 22 mEq (15 mmol) as phos level is trending down at 2.5  - Slightly increase Mg to 6 mEq (for Mg level 1.9)  - All other contents to remain the same, pharmacy to follow and adjust daily as indicated    Taylor Frankel, PharmD

## 2021-06-13 NOTE — PROGRESS NOTES
0358: notified Dr. Jurado of hbg of 6.9. No new orders given.    0648: Checked with Dr. Jurado, no new orders at this time. Dr. Jurado would like day shift team to round on pt to determine need for blood transfusion.

## 2021-06-14 LAB
ALBUMIN SERPL BCP-MCNC: 2.4 G/DL (ref 3.2–4.9)
ALBUMIN/GLOB SERPL: 0.6 G/DL
ALP SERPL-CCNC: 81 U/L (ref 30–99)
ALT SERPL-CCNC: 6 U/L (ref 2–50)
ANION GAP SERPL CALC-SCNC: 12 MMOL/L (ref 7–16)
AST SERPL-CCNC: 14 U/L (ref 12–45)
BASOPHILS # BLD AUTO: 1 % (ref 0–1.8)
BASOPHILS # BLD: 0.17 K/UL (ref 0–0.12)
BILIRUB SERPL-MCNC: <0.2 MG/DL (ref 0.1–1.5)
BUN SERPL-MCNC: 6 MG/DL (ref 8–22)
CALCIUM SERPL-MCNC: 7.9 MG/DL (ref 8.4–10.2)
CHLORIDE SERPL-SCNC: 102 MMOL/L (ref 96–112)
CO2 SERPL-SCNC: 22 MMOL/L (ref 20–33)
CREAT SERPL-MCNC: 0.41 MG/DL (ref 0.5–1.4)
EOSINOPHIL # BLD AUTO: 0.52 K/UL (ref 0–0.51)
EOSINOPHIL NFR BLD: 3 % (ref 0–6.9)
ERYTHROCYTE [DISTWIDTH] IN BLOOD BY AUTOMATED COUNT: 48.2 FL (ref 35.9–50)
GLOBULIN SER CALC-MCNC: 3.7 G/DL (ref 1.9–3.5)
GLUCOSE BLD-MCNC: 121 MG/DL (ref 65–99)
GLUCOSE BLD-MCNC: 123 MG/DL (ref 65–99)
GLUCOSE SERPL-MCNC: 112 MG/DL (ref 65–99)
HCT VFR BLD AUTO: 24.2 % (ref 37–47)
HGB BLD-MCNC: 7.6 G/DL (ref 12–16)
HYPOCHROMIA BLD QL SMEAR: ABNORMAL
LYMPHOCYTES # BLD AUTO: 1.21 K/UL (ref 1–4.8)
LYMPHOCYTES NFR BLD: 7 % (ref 22–41)
MAGNESIUM SERPL-MCNC: 1.9 MG/DL (ref 1.5–2.5)
MANUAL DIFF BLD: NORMAL
MCH RBC QN AUTO: 27.3 PG (ref 27–33)
MCHC RBC AUTO-ENTMCNC: 31.4 G/DL (ref 33.6–35)
MCV RBC AUTO: 87.1 FL (ref 81.4–97.8)
METAMYELOCYTES NFR BLD MANUAL: 1 %
MONOCYTES # BLD AUTO: 1.04 K/UL (ref 0–0.85)
MONOCYTES NFR BLD AUTO: 6 % (ref 0–13.4)
NEUTROPHILS # BLD AUTO: 14.19 K/UL (ref 2–7.15)
NEUTROPHILS NFR BLD: 78 % (ref 44–72)
NEUTS BAND NFR BLD MANUAL: 4 % (ref 0–10)
NRBC # BLD AUTO: 0.17 K/UL
NRBC BLD-RTO: 1 /100 WBC
PHOSPHATE SERPL-MCNC: 2.9 MG/DL (ref 2.5–4.5)
PLATELET # BLD AUTO: 492 K/UL (ref 164–446)
PLATELET BLD QL SMEAR: NORMAL
PMV BLD AUTO: 8.8 FL (ref 9–12.9)
POLYCHROMASIA BLD QL SMEAR: NORMAL
POTASSIUM SERPL-SCNC: 3.9 MMOL/L (ref 3.6–5.5)
PREALB SERPL-MCNC: 10.7 MG/DL (ref 18–38)
PROT SERPL-MCNC: 6.1 G/DL (ref 6–8.2)
RBC # BLD AUTO: 2.78 M/UL (ref 4.2–5.4)
RBC BLD AUTO: PRESENT
SODIUM SERPL-SCNC: 136 MMOL/L (ref 135–145)
TRIGL SERPL-MCNC: 164 MG/DL (ref 0–149)
VARIANT LYMPHS BLD QL SMEAR: NORMAL
WBC # BLD AUTO: 17.3 K/UL (ref 4.8–10.8)

## 2021-06-14 PROCEDURE — 85027 COMPLETE CBC AUTOMATED: CPT

## 2021-06-14 PROCEDURE — 770006 HCHG ROOM/CARE - MED/SURG/GYN SEMI*

## 2021-06-14 PROCEDURE — 700111 HCHG RX REV CODE 636 W/ 250 OVERRIDE (IP): Performed by: STUDENT IN AN ORGANIZED HEALTH CARE EDUCATION/TRAINING PROGRAM

## 2021-06-14 PROCEDURE — A9270 NON-COVERED ITEM OR SERVICE: HCPCS | Performed by: INTERNAL MEDICINE

## 2021-06-14 PROCEDURE — 700111 HCHG RX REV CODE 636 W/ 250 OVERRIDE (IP): Performed by: FAMILY MEDICINE

## 2021-06-14 PROCEDURE — 80053 COMPREHEN METABOLIC PANEL: CPT

## 2021-06-14 PROCEDURE — 700102 HCHG RX REV CODE 250 W/ 637 OVERRIDE(OP): Performed by: SURGERY

## 2021-06-14 PROCEDURE — 84100 ASSAY OF PHOSPHORUS: CPT

## 2021-06-14 PROCEDURE — 84134 ASSAY OF PREALBUMIN: CPT

## 2021-06-14 PROCEDURE — A9270 NON-COVERED ITEM OR SERVICE: HCPCS | Performed by: FAMILY MEDICINE

## 2021-06-14 PROCEDURE — 82962 GLUCOSE BLOOD TEST: CPT

## 2021-06-14 PROCEDURE — 700102 HCHG RX REV CODE 250 W/ 637 OVERRIDE(OP): Performed by: NURSE PRACTITIONER

## 2021-06-14 PROCEDURE — A9270 NON-COVERED ITEM OR SERVICE: HCPCS | Performed by: NURSE PRACTITIONER

## 2021-06-14 PROCEDURE — 700111 HCHG RX REV CODE 636 W/ 250 OVERRIDE (IP): Performed by: INTERNAL MEDICINE

## 2021-06-14 PROCEDURE — 700105 HCHG RX REV CODE 258: Performed by: INTERNAL MEDICINE

## 2021-06-14 PROCEDURE — 99233 SBSQ HOSP IP/OBS HIGH 50: CPT | Performed by: STUDENT IN AN ORGANIZED HEALTH CARE EDUCATION/TRAINING PROGRAM

## 2021-06-14 PROCEDURE — 700105 HCHG RX REV CODE 258: Performed by: STUDENT IN AN ORGANIZED HEALTH CARE EDUCATION/TRAINING PROGRAM

## 2021-06-14 PROCEDURE — 700111 HCHG RX REV CODE 636 W/ 250 OVERRIDE (IP): Performed by: SURGERY

## 2021-06-14 PROCEDURE — 84478 ASSAY OF TRIGLYCERIDES: CPT

## 2021-06-14 PROCEDURE — 83735 ASSAY OF MAGNESIUM: CPT

## 2021-06-14 PROCEDURE — 700102 HCHG RX REV CODE 250 W/ 637 OVERRIDE(OP): Performed by: INTERNAL MEDICINE

## 2021-06-14 PROCEDURE — 99233 SBSQ HOSP IP/OBS HIGH 50: CPT | Performed by: INTERNAL MEDICINE

## 2021-06-14 PROCEDURE — 700101 HCHG RX REV CODE 250: Performed by: FAMILY MEDICINE

## 2021-06-14 PROCEDURE — 700101 HCHG RX REV CODE 250: Performed by: STUDENT IN AN ORGANIZED HEALTH CARE EDUCATION/TRAINING PROGRAM

## 2021-06-14 PROCEDURE — 700102 HCHG RX REV CODE 250 W/ 637 OVERRIDE(OP): Performed by: FAMILY MEDICINE

## 2021-06-14 PROCEDURE — 85007 BL SMEAR W/DIFF WBC COUNT: CPT

## 2021-06-14 PROCEDURE — A9270 NON-COVERED ITEM OR SERVICE: HCPCS | Performed by: SURGERY

## 2021-06-14 RX ORDER — IBUPROFEN 400 MG/1
800 TABLET ORAL 3 TIMES DAILY
Status: DISCONTINUED | OUTPATIENT
Start: 2021-06-14 | End: 2021-06-16

## 2021-06-14 RX ORDER — DIPHENHYDRAMINE HCL 25 MG
25-50 TABLET ORAL EVERY 6 HOURS PRN
Status: DISCONTINUED | OUTPATIENT
Start: 2021-06-14 | End: 2021-06-21 | Stop reason: HOSPADM

## 2021-06-14 RX ADMIN — IBUPROFEN 800 MG: 400 TABLET, FILM COATED ORAL at 20:43

## 2021-06-14 RX ADMIN — AMPICILLIN SODIUM AND SULBACTAM SODIUM 3 G: 2; 1 INJECTION, POWDER, FOR SOLUTION INTRAMUSCULAR; INTRAVENOUS at 18:22

## 2021-06-14 RX ADMIN — ONDANSETRON 4 MG: 2 INJECTION INTRAMUSCULAR; INTRAVENOUS at 00:37

## 2021-06-14 RX ADMIN — ONDANSETRON 4 MG: 2 INJECTION INTRAMUSCULAR; INTRAVENOUS at 16:31

## 2021-06-14 RX ADMIN — OXYCODONE HYDROCHLORIDE 10 MG: 10 TABLET ORAL at 12:08

## 2021-06-14 RX ADMIN — DULOXETINE HYDROCHLORIDE 60 MG: 30 CAPSULE, DELAYED RELEASE ORAL at 08:39

## 2021-06-14 RX ADMIN — AMPICILLIN SODIUM AND SULBACTAM SODIUM 3 G: 2; 1 INJECTION, POWDER, FOR SOLUTION INTRAMUSCULAR; INTRAVENOUS at 05:27

## 2021-06-14 RX ADMIN — AMPICILLIN SODIUM AND SULBACTAM SODIUM 3 G: 2; 1 INJECTION, POWDER, FOR SOLUTION INTRAMUSCULAR; INTRAVENOUS at 11:28

## 2021-06-14 RX ADMIN — IBUPROFEN 800 MG: 400 TABLET, FILM COATED ORAL at 15:30

## 2021-06-14 RX ADMIN — IBUPROFEN 800 MG: 400 TABLET, FILM COATED ORAL at 08:39

## 2021-06-14 RX ADMIN — OXYCODONE HYDROCHLORIDE 10 MG: 10 TABLET ORAL at 16:20

## 2021-06-14 RX ADMIN — AMPICILLIN SODIUM AND SULBACTAM SODIUM 3 G: 2; 1 INJECTION, POWDER, FOR SOLUTION INTRAMUSCULAR; INTRAVENOUS at 00:37

## 2021-06-14 RX ADMIN — DOCUSATE SODIUM 100 MG: 100 CAPSULE, LIQUID FILLED ORAL at 18:22

## 2021-06-14 RX ADMIN — POTASSIUM CHLORIDE: 2 INJECTION, SOLUTION, CONCENTRATE INTRAVENOUS at 20:48

## 2021-06-14 RX ADMIN — OXYCODONE HYDROCHLORIDE 10 MG: 10 TABLET ORAL at 05:38

## 2021-06-14 RX ADMIN — MORPHINE SULFATE 2 MG: 4 INJECTION INTRAVENOUS at 03:47

## 2021-06-14 RX ADMIN — ENOXAPARIN SODIUM 80 MG: 80 INJECTION SUBCUTANEOUS at 05:27

## 2021-06-14 RX ADMIN — TOPIRAMATE 100 MG: 100 TABLET, FILM COATED ORAL at 20:43

## 2021-06-14 RX ADMIN — OXYCODONE HYDROCHLORIDE 15 MG: 10 TABLET ORAL at 00:37

## 2021-06-14 RX ADMIN — ENOXAPARIN SODIUM 80 MG: 80 INJECTION SUBCUTANEOUS at 18:22

## 2021-06-14 RX ADMIN — POLYETHYLENE GLYCOL 3350 1 PACKET: 17 POWDER, FOR SOLUTION ORAL at 18:22

## 2021-06-14 RX ADMIN — MICAFUNGIN SODIUM 100 MG: 20 INJECTION, POWDER, LYOPHILIZED, FOR SOLUTION INTRAVENOUS at 20:43

## 2021-06-14 ASSESSMENT — ENCOUNTER SYMPTOMS
DIARRHEA: 0
MYALGIAS: 1
SHORTNESS OF BREATH: 0
CONSTIPATION: 0
WEAKNESS: 1
VOMITING: 0
ABDOMINAL PAIN: 1
ROS GI COMMENTS: PASSING SOME FLATUS
DIZZINESS: 0
NAUSEA: 0
COUGH: 0
CHILLS: 0
FEVER: 0

## 2021-06-14 ASSESSMENT — PAIN DESCRIPTION - PAIN TYPE
TYPE: SURGICAL PAIN;ACUTE PAIN
TYPE: ACUTE PAIN;SURGICAL PAIN
TYPE: ACUTE PAIN;SURGICAL PAIN
TYPE: SURGICAL PAIN;ACUTE PAIN
TYPE: ACUTE PAIN;SURGICAL PAIN
TYPE: ACUTE PAIN;SURGICAL PAIN
TYPE: SURGICAL PAIN;ACUTE PAIN

## 2021-06-14 NOTE — PROGRESS NOTES
0800 Assumed care of pt. A&O x4, reports 5/10 abdominal pain. Denies nausea, numbness/tingling. Discussed POC. Discussed fall risk and importance of ambulation. Pt agreed to get up today.     0900 Pt ambulated to bathroom, had BM. Pain increased with movement.   0930 Pt resting comfortably in bed.

## 2021-06-14 NOTE — CARE PLAN
The patient is Stable - Low risk of patient condition declining or worsening    Shift Goals  Clinical Goals: pain control & ambulation  Patient Goals: advance diet    Progress made toward(s) clinical / shift goals:  Patient ambulated 3xs today. Pt had BM. Educated regarding fall risk. Fall precautions in place. Pt didn't request IV pain medication because pain was controlled by oral pain medication    Problem: Knowledge Deficit - Standard  Goal: Patient and family/care givers will demonstrate understanding of plan of care, disease process/condition, diagnostic tests and medications  Outcome: Progressing     Problem: Fall Risk  Goal: Patient will remain free from falls  Outcome: Progressing     Problem: Early Mobilization - Post Surgery  Goal: Early mobilization post surgery  Outcome: Progressing     Problem: Bowel Elimination - Post Surgical  Goal: Patient will resume regular bowel sounds and function with no discomfort or distention  Outcome: Progressing   Pain assessed and medicated per MAR.    Patient is not progressing towards the following goals:     Problem: Pain - Standard  Goal: Alleviation of pain or a reduction in pain to the patient’s comfort goal  Outcome: Not Progressing

## 2021-06-14 NOTE — PROGRESS NOTES
Pharmacy TPN Day # 3       2021    Dosing Weight   73.5 kg         TPN currently providing 100% of goal                                                  TPN goal: 1825 kcal/day including 1.5 gm/kg/day Protein                                                  TPN indication: bowel resection                                                                Pertinent PMH:   50 y.o. female admitted 2021 with cecal volvulus      Exploratory celiotomy and right hemicolectomy with a side-to-side ileocolic anastomosis   Exploratory celiotomy, resection of ileocolic anastomosis with re-do side-to-side ileocolic anastomosis   Ex celiotomy washout   Ex celiotomy, drain placement     BE showed leak. Returned to OR - bowel too inflammed to mobilize for ostomy. Drain placed. NPO.  Unasyn, Diflucan per ID.  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  Patient was taken back to the OR , but the bowel was too inflamed to mobilize for ostomy, drain was placed  -Drain cultures from  also growing a Candida krusei.  Will change fluconazole to IV micafungin 100 mg every 24 hours, continue IV Unasyn 3 g every 6 hours     Temp (24hrs), Av.4 °C (97.6 °F), Min:36.4 °C (97.5 °F), Max:36.5 °C (97.7 °F)    Recent Labs     21  0220 21  0418 21  0439   SODIUM 135 133* 136   POTASSIUM 4.2 3.7 3.9   CHLORIDE 103 100 102   CO2 19* 23 22   BUN 7* 8 6*   CREATININE 0.49* 0.48* 0.41*   GLUCOSE 111* 97 112*   CALCIUM 7.5* 7.3* 7.9*   ASTSGOT 9* 12 14   ALTSGPT 7 <5 6   ALBUMIN 2.0* 2.0* 2.4*   TBILIRUBIN <0.2 <0.2 <0.2   PHOSPHORUS 4.3 2.5 2.9   MAGNESIUM 2.3 1.9 1.9     Accu-Checks  Recent Labs     21  1207 21   POCGLUCOSE 101* 115*     Vitals:    218 21 0057 21 0449 21 0900   BP: 146/58 142/53 142/60 141/60   Weight:       Height:          Intake/Output Summary (Last 24 hours) at 6/14/2021 1309  Last data filed at 6/14/2021 1200  Gross per 24 hour   Intake 810 ml   Output 3462 ml   Net -2652 ml     Orders Placed This Encounter   Procedures   • Diet Order Diet: Clear Liquid     Standing Status:   Standing     Number of Occurrences:   1     Order Specific Question:   Diet:     Answer:   Clear Liquid [10]     TPN for past 72 hours (Show up to 3 orders; newest on the left. Changes between the two most recent orders are indicated.)     Start date and time   06/14/2021 2000 06/13/2021 2000 06/12/2021 2000      TPN Central Line Formulation [[id number]] TPN Central Line Formulation [[id number]] TPN Central Line Formulation [[id number]]    Order Status  Active Last Dose in Progress Completed    Last Admin   New Bag at 06/13/2021 2041 by Heather Ordaz RVANDANA Rate Verify at 06/13/2021 1912 by Marilin Rios RVANDANA       Base    Clinisol 15%  110 g 82 g 55 g    dextrose 70%  275 g 210 g 140 g    fat emulsions 20%  45 g 34 g 22.5 g       Additives    potassium phosphate  15 mmol 15 mmol --    potassium chloride  40 mEq 40 mEq 40 mEq    sodium acetate  150 mEq 150 mEq 150 mEq    sodium chloride  150 mEq 150 mEq 150 mEq    magnesium sulfate  6 mEq 6 mEq 4 mEq    calcium GLUConate  9.4 mEq 9.4 mEq 9.4 mEq    M.T.E.-4 Adult  1 mL 1 mL 1 mL    M.V.I. Adult  10 mL 10 mL 10 mL    famotidine  40 mg 40 mg 40 mg       QS Base    sterile water  466.57 mL 801.13 mL 1,144.12 mL       Energy Contribution    Proteins  -- -- --    Dextrose  -- -- --    Lipids  -- -- --    Total  -- -- --       Electrolyte Ion Calculated Amount    Sodium  300 mEq 300 mEq 300 mEq    Potassium  62 mEq 62 mEq 40 mEq    Calcium  9.4 mEq 9.4 mEq 9.4 mEq    Magnesium  6.01 mEq 6.01 mEq 4.02 mEq    Aluminum  -- -- --    Phosphate  15 mmol 15 mmol --    Chloride  190 mEq 190 mEq 190 mEq    Acetate  243.13 mEq 219.43 mEq 196.57 mEq       Other    Total Protein  110 g 82 g 55 g    Total Protein/kg  1.5  g/kg 1.12 g/kg 0.75 g/kg    Total Amino Acid  -- -- --    Total Amino Acid/kg  -- -- --    Glucose Infusion Rate  2.6 mg/kg/min 1.98 mg/kg/min 1.32 mg/kg/min    Osmolarity (Estimated)  -- -- --    Volume  1,992 mL 1,992 mL 1,992 mL    Rate  83 mL/hr 83 mL/hr 83 mL/hr    Dosing Weight  73.5 kg 73.5 kg 73.5 kg    In[facility]        This formula provides:  % kcal as lipids = 25  Grams protein/kg = 1.5  Non-protein calories = 1385  Kcals/kg =24.8  Total daily calories = 1825     Comments:  - PICC line placed (6/12/21); confirmed in superior vena cava utilizing 3GC technology  - Glucose levels WNL, hypoglycemic protocol on MAR, no need for sliding scale insulin at this time  - No electrolyte replacement outside of TPN bag over the past 24 hours.  - Continue IV Unasyn 3 g every 6 hours and IV micafungin 100 mg every 24 hours  - Recommend repeat CT scan in 5 to 7 days  - Continue to monitor drain output    Macronutrients:   - Increase macronutrients to 100% of goal: 110 gm amino acids, 275 gm dextrose, 45 gm Lipids      Micronutrients:  - Maintain NS salinity    Anticipated Discharge Disposition: LTAC- [facility]   Per Dr. Rollins pt is not cleared to transfer to LTAC. Anticipate 2-3 days.

## 2021-06-14 NOTE — CARE PLAN
The patient is Watcher - Medium risk of patient condition declining or worsening    Shift Goals  Clinical Goals: Pain control  Patient Goals: advance diet    Patient is not progressing towards the following goals: Patient did not report well controlled pain throughout shift. Patient reported 6-7/10 abdominal pain related to surgical incision throughout shift despite pain medications given. Patient declines mobilization due to pain at this time but would like to attempt to mobilize later in the day.     Problem: Early Mobilization - Post Surgery  Goal: Early mobilization post surgery  Outcome: Not Progressing     Problem: Pain - Post Surgery  Goal: Alleviation or reduction of pain post surgery  Outcome: Not Progressing

## 2021-06-14 NOTE — PROGRESS NOTES
Increased output noted from anterior KRISTI drain (1). 50 mL of brown, mucousy drainage.   2-4 mL sanguinous drainage noted from posterior KRISTI drain (2)

## 2021-06-14 NOTE — PROGRESS NOTES
Received report from day shift RN and assumed care of patient at 1900. Patient is A&Ox4, denies SOB, N/V. Reports 6/10 pain in abdomen r/t surgical incision, medicated per MAR. Wound vac in place to midline incision with [place], clean, dry intact. KRISTI drains in place at this time. TPN given per MAR. Call light and belongings in reach, bed in lowest locked position.

## 2021-06-14 NOTE — PROGRESS NOTES
Trauma / Surgical Daily Progress Note    Date of Service  6/14/2021    Chief Complaint  50 y.o. female admitted 5/22/2021 with cecal volvulus    Interval Events  5/22 Exploratory celiotomy and right hemicolectomy with a side-to-side   ileocolic anastomosis  5/28 Exploratory celiotomy, resection of ileocolic anastomosis with re-do  side-to-side ileocolic anastomosis  6/4 Ex celiotomy washout  6/11 Ex celiotomy, drain placement    POD #3  Afebrile.  Tolerating clears and TPN. On Unasyn, micofungin per ID. Drains working.  Stooling.  Needs to mobilize. Add Motrin for pain.    Review of Systems  Review of Systems   Gastrointestinal: Positive for abdominal pain.        Passing some flatus        Vital Signs for last 24 hours  Temp:  [36.3 °C (97.3 °F)-36.5 °C (97.7 °F)] 36.5 °C (97.7 °F)  Pulse:  [80-93] 80  Resp:  [16-18] 18  BP: (130-148)/(53-69) 142/60  SpO2:  [91 %-97 %] 91 %    Hemodynamic parameters for last 24 hours       Respiratory Data     Respiration: 18, Pulse Oximetry: 91 %        RUL Breath Sounds: Clear, RML Breath Sounds: Clear, RLL Breath Sounds: Diminished, LUCITA Breath Sounds: Clear, LLL Breath Sounds: Diminished    Physical Exam  Physical Exam  Cardiovascular:      Rate and Rhythm: Normal rate.      Pulses: Normal pulses.   Pulmonary:      Effort: Pulmonary effort is normal.   Abdominal:      General: There is no distension.      Palpations: Abdomen is soft.      Tenderness: There is abdominal tenderness.      Comments: Wound vac in place.  Drains bilious   Genitourinary:     Comments: diuresing  Musculoskeletal:         General: Normal range of motion.      Cervical back: Neck supple.      Comments: Generalized edema   Skin:     General: Skin is warm and dry.   Neurological:      General: No focal deficit present.      Mental Status: She is alert.   Psychiatric:      Comments:           Laboratory  Recent Results (from the past 24 hour(s))   HEMOGLOBIN AND HEMATOCRIT    Collection Time: 06/13/21 11:40  AM   Result Value Ref Range    Hemoglobin 7.6 (L) 12.0 - 16.0 g/dL    Hematocrit 23.5 (L) 37.0 - 47.0 %   POCT glucose device results    Collection Time: 06/13/21 12:07 PM   Result Value Ref Range    Glucose - Accu-Ck 101 (H) 65 - 99 mg/dL   POCT glucose device results    Collection Time: 06/13/21  8:34 PM   Result Value Ref Range    Glucose - Accu-Ck 115 (H) 65 - 99 mg/dL   CBC WITH DIFFERENTIAL    Collection Time: 06/14/21  4:39 AM   Result Value Ref Range    WBC 17.3 (H) 4.8 - 10.8 K/uL    RBC 2.78 (L) 4.20 - 5.40 M/uL    Hemoglobin 7.6 (L) 12.0 - 16.0 g/dL    Hematocrit 24.2 (L) 37.0 - 47.0 %    MCV 87.1 81.4 - 97.8 fL    MCH 27.3 27.0 - 33.0 pg    MCHC 31.4 (L) 33.6 - 35.0 g/dL    RDW 48.2 35.9 - 50.0 fL    Platelet Count 492 (H) 164 - 446 K/uL    MPV 8.8 (L) 9.0 - 12.9 fL    Neutrophils-Polys 78.00 (H) 44.00 - 72.00 %    Lymphocytes 7.00 (L) 22.00 - 41.00 %    Monocytes 6.00 0.00 - 13.40 %    Eosinophils 3.00 0.00 - 6.90 %    Basophils 1.00 0.00 - 1.80 %    Nucleated RBC 1.00 /100 WBC    Neutrophils (Absolute) 14.19 (H) 2.00 - 7.15 K/uL    Lymphs (Absolute) 1.21 1.00 - 4.80 K/uL    Monos (Absolute) 1.04 (H) 0.00 - 0.85 K/uL    Eos (Absolute) 0.52 (H) 0.00 - 0.51 K/uL    Baso (Absolute) 0.17 (H) 0.00 - 0.12 K/uL    NRBC (Absolute) 0.17 K/uL    Hypochromia 1+    Phosphorus: Every Monday and Thursday AM    Collection Time: 06/14/21  4:39 AM   Result Value Ref Range    Phosphorus 2.9 2.5 - 4.5 mg/dL   Magnesium: Every Monday and Thursday AM    Collection Time: 06/14/21  4:39 AM   Result Value Ref Range    Magnesium 1.9 1.5 - 2.5 mg/dL   Comp Metabolic Panel    Collection Time: 06/14/21  4:39 AM   Result Value Ref Range    Sodium 136 135 - 145 mmol/L    Potassium 3.9 3.6 - 5.5 mmol/L    Chloride 102 96 - 112 mmol/L    Co2 22 20 - 33 mmol/L    Anion Gap 12.0 7.0 - 16.0    Glucose 112 (H) 65 - 99 mg/dL    Bun 6 (L) 8 - 22 mg/dL    Creatinine 0.41 (L) 0.50 - 1.40 mg/dL    Calcium 7.9 (L) 8.4 - 10.2 mg/dL    AST(SGOT)  14 12 - 45 U/L    ALT(SGPT) 6 2 - 50 U/L    Alkaline Phosphatase 81 30 - 99 U/L    Total Bilirubin <0.2 0.1 - 1.5 mg/dL    Albumin 2.4 (L) 3.2 - 4.9 g/dL    Total Protein 6.1 6.0 - 8.2 g/dL    Globulin 3.7 (H) 1.9 - 3.5 g/dL    A-G Ratio 0.6 g/dL   ESTIMATED GFR    Collection Time: 06/14/21  4:39 AM   Result Value Ref Range    GFR If African American >60 >60 mL/min/1.73 m 2    GFR If Non African American >60 >60 mL/min/1.73 m 2   PLATELET ESTIMATE    Collection Time: 06/14/21  4:39 AM   Result Value Ref Range    Plt Estimation Increased    MORPHOLOGY    Collection Time: 06/14/21  4:39 AM   Result Value Ref Range    RBC Morphology Present     Polychromia 1+     Reactive Lymphocytes Few    DIFFERENTIAL MANUAL    Collection Time: 06/14/21  4:39 AM   Result Value Ref Range    Bands-Stabs 4.00 0.00 - 10.00 %    Metamyelocytes 1.00 %    Manual Diff Status PERFORMED        Fluids    Intake/Output Summary (Last 24 hours) at 6/14/2021 0701  Last data filed at 6/14/2021 0630  Gross per 24 hour   Intake 670 ml   Output 3962 ml   Net -3292 ml       Core Measures & Quality Metrics  Labs reviewed and Medications reviewed  Young catheter: No Young      DVT Prophylaxis: Enoxaparin (Lovenox)  DVT prophylaxis - mechanical: SCDs  Ulcer prophylaxis: Yes  Antibiotics: Treating active infection/contamination beyond 24 hours perioperative coverage  Assessed for rehab: Patient returned to prior level of function, rehabilitation not indicated at this time    ALICIA Score  ETOH Screening    Assessment/Plan  * Bacterial peritonitis (HCC)  Assessment & Plan  6/2 Zosyn stopped.  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 Passing flatus - start clears  5/27 - Stooled - will adv to full liquids  5/27 - thrombocytopenia, bandemia, PM CT with ascites no freeair  5/28 - Exploratory celiotomy, resection of ileocolic anastomosis with re-do  side-to-side ileocolic anastomosis.  6/3  Stooling, advance to regular diet  6/4 Small wound dehiscence - returned to OR for washout and reclosure  6/9 Repeat CT showed two fluid collections - IR drains placed  6/11 BE shows leak - Ex lap, drain  Remains on unasyn, diflucan    DVT of axillary vein, acute right (HCC)  Assessment & Plan  Noted on US  On heparin gtt  6/6  Switched to lovenox BID       Discussed patient condition with RN and Patient.    CRITICAL CARE TIME EXCLUDING PROCEDURES: 20  minutes

## 2021-06-14 NOTE — CARE PLAN
Problem: Nutritional:  Goal: Nutrition support tolerated and meeting greater than 85% of estimated needs  Outcome: Met     Per pharmacy's note, TPN is at 100% of goal. TPN providing 1825 kcals and 110 gms of protein which is meeting pt's estimated needs. RD will continue to follow.

## 2021-06-14 NOTE — DISCHARGE PLANNING
Anticipated Discharge Disposition: LT[facility]- [facility]     Action: LSW received a call from Norma with [facility] and informed LSW that pt is pending insurance auth.     LSW messaged Dr. Rollins for update on clearance for pt to transfer to LTAC.     Barriers to Discharge: None     Plan: LSW to continue to assist with d/c needs, LSW to assist as needed     Addendum 0937  Per Dr. Rollins pt is not cleared to transfer to LTAC. Anticipate 2-3 days.     Addendum 0920  LSW informed by Norma with [facility] that insurance auth was denied. [facility] has appealed the denial. Norma will provide an update when available.     LSW to f/u with anticipated medical clearance during IDT rounds.     Addendum 2638  [facility] appealing denial for pt's insurance authorization. Norma with [facility] emailed LSW a letter that needs to be signed by Dr. Rojo in regards to the appeal.     Dr. Rojo able to provide signature for letter. LSW faxed to fax on the letter- [phone]    LSW received a message from bedside Nancy RUDOLPH informing LSW that a phone call was received from pt's insurance requesting clinicals from 6/13-present. LSW messaged [facility] Lynnette RUDOLPH via Teams and provided request and fax- [phone]

## 2021-06-14 NOTE — PROGRESS NOTES
Infectious Disease Progress Note    Author: Thuy Ochoa M.D. Date & Time of service: 2021  9:01 AM    Chief Complaint:  Follow-up for intra-abdominal abscesses     Interval History:   patient is afebrile, white count 13.4.  Patient had worsening abdominal pain yesterday, noted purulent output during wound VAC change, repeat CT with multiple intra-abdominal abscesses, taken back to the OR this morning.   AF, O2 RA, doing well overall and states she ambulated twice this morning with normal bowel movement.  She has some abdominal pain with ambulation but otherwise minimal.    patient remains afebrile, had drains placed, white count 12.9, tolerating antimicrobials thus far.  Resting comfortably this morning.   patient remains afebrile, white count 15.8, additional procedures and change to antifungal as below   afebrile WBC 17.3 drain output decreasing.  Recorded 65 cc on . Ongoing abdoominal pain      Review of Systems:  Review of Systems   Constitutional: Positive for malaise/fatigue. Negative for chills and fever.   Respiratory: Negative for cough and shortness of breath.    Gastrointestinal: Positive for abdominal pain. Negative for constipation, diarrhea, nausea and vomiting.     Hemodynamics:  Temp (24hrs), Av.4 °C (97.6 °F), Min:36.4 °C (97.5 °F), Max:36.5 °C (97.7 °F)  Temperature: 36.5 °C (97.7 °F)  Pulse  Av.1  Min: 60  Max: 156   Blood Pressure: 142/60       Physical Exam:  Physical Exam  Constitutional:       General: She is not in acute distress.     Appearance: Normal appearance. She is ill-appearing. She is not toxic-appearing.   HENT:      Mouth/Throat:      Pharynx: No oropharyngeal exudate.   Eyes:      General: No scleral icterus.        Right eye: No discharge.         Left eye: No discharge.      Conjunctiva/sclera: Conjunctivae normal.   Cardiovascular:      Rate and Rhythm: Normal rate and regular rhythm.      Heart sounds: Normal heart sounds.    Pulmonary:      Effort: Pulmonary effort is normal. No respiratory distress.      Breath sounds: No stridor.   Abdominal:      Palpations: Abdomen is soft.      Tenderness: There is abdominal tenderness. There is no guarding.      Comments: Midline incision with wound VAC and additional drain in place with mild amount of purulent appearing material in bulb   Musculoskeletal:      Cervical back: No rigidity.      Right lower leg: No edema.      Left lower leg: No edema.      Comments: Bilateral upper extremity midlines   Skin:     General: Skin is warm and dry.   Neurological:      General: No focal deficit present.      Mental Status: She is alert and oriented to person, place, and time.   Psychiatric:         Mood and Affect: Mood normal.         Behavior: Behavior normal.         Meds:    Current Facility-Administered Medications:   •  ibuprofen  •  diphenhydrAMINE  •  TPN Central Line Formulation  •  lidocaine **OR** lidocaine  •  micafungin (MYCAMINE) ivpb  •  NS  •  MD Alert...TPN per Pharmacy  •  morphine injection  •  DULoxetine  •  topiramate  •  enoxaparin (LOVENOX) injection  •  oxyCODONE immediate-release  •  oxyCODONE immediate-release  •  morphine injection  •  ampicillin-sulbactam (UNASYN) IV  •  [DISCONTINUED] insulin regular **AND** [CANCELED] POC blood glucose manual result **AND** NOTIFY MD and PharmD **AND** glucose **AND** dextrose 50%  •  Metoprolol Tartrate  •  LORazepam  •  diazePAM  •  Respiratory Therapy Consult  •  Pharmacy Consult Request  •  docusate sodium  •  senna-docusate  •  senna-docusate  •  polyethylene glycol/lytes  •  magnesium hydroxide  •  bisacodyl  •  ondansetron    Labs:  Recent Labs     06/12/21  0220 06/12/21  0220 06/13/21  0316 06/13/21  1140 06/14/21  0439   WBC 17.4*  --  15.8*  --  17.3*   RBC 2.73*  --  2.52*  --  2.78*   HEMOGLOBIN 7.4*   < > 6.9* 7.6* 7.6*   HEMATOCRIT 24.9*   < > 22.2* 23.5* 24.2*   MCV 91.2  --  88.1  --  87.1   MCH 27.1  --  27.4  --  27.3    RDW 50.2*  --  48.5  --  48.2   PLATELETCT 498*  --  473*  --  492*   MPV 8.9*  --  9.4  --  8.8*   NEUTSPOLYS 84.40*  --  74.00*  --  78.00*   LYMPHOCYTES 4.20*  --  9.00*  --  7.00*   MONOCYTES 6.70  --  6.00  --  6.00   EOSINOPHILS 0.10  --  1.00  --  3.00   BASOPHILS 0.40  --  1.00  --  1.00   RBCMORPHOLO  --   --  Present  --  Present    < > = values in this interval not displayed.     Recent Labs     06/12/21 0220 06/13/21 0418 06/14/21  0439   SODIUM 135 133* 136   POTASSIUM 4.2 3.7 3.9   CHLORIDE 103 100 102   CO2 19* 23 22   GLUCOSE 111* 97 112*   BUN 7* 8 6*     Recent Labs     06/12/21 0220 06/13/21 0418 06/14/21  0439   ALBUMIN 2.0* 2.0* 2.4*   TBILIRUBIN <0.2 <0.2 <0.2   ALKPHOSPHAT 89 72 81   TOTPROTEIN 5.6* 5.2* 6.1   ALTSGPT 7 <5 6   ASTSGOT 9* 12 14   CREATININE 0.49* 0.48* 0.41*       Imaging:  CT-ABDOMEN-PELVIS WITH    Result Date: 6/8/2021 6/8/2021 4:22 PM HISTORY/REASON FOR EXAM:  Peritonitis or perforation suspected; 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 Most recent abdominal surgery for washout of the peritoneal cavity performed on 6/4/2021. Ongoing generalized abdominal pain. TECHNIQUE/EXAM DESCRIPTION: CT scan of the abdomen and pelvis with contrast. Contrast-enhanced helical scanning was obtained from the diaphragmatic domes through the pubic symphysis following the bolus administration of 100 mL of Omnipaque 350 nonionic contrast without complication. Low dose optimization technique was utilized for this CT exam including automated exposure control and adjustment of the mA and/or kV according to patient size. COMPARISON: 6/3/2021 FINDINGS: The visualized lung bases demonstrate a small right and moderate left dependent pleural effusion with dependent airspace disease, likely atelectasis similar to the previous exam. There is no acute bony process. CT Abdomen: There is  postoperative change in the anterior abdominal wall midline. There are a few tiny air lucencies in the subcutaneous tissues with abdominal wall defect seen with the previous skin staples removed. Tiny amounts of intraperitoneal air identified within the anterior abdominal fluid. There is loculated intra-abdominal fluid with some seen in the left lateral subphrenic space anterior to the spleen measuring 4.4 x 2.1 cm (previously 4.6 x 2.7 cm). There is a small amount of loculated fluid in the right anterior upper quadrant also similar to the prior study. This appears to communicate with a more confluent area of fluid anteriorly within the peritoneal cavity beginning at the level of the iliac crest and extending into the upper pelvis. The largest component is in the right lower  quadrant measuring 4.9 cm in AP diameter, down from 6.5 cm. The fluid collection with rim enhancement and air lucencies in the posterior pelvis measures 6.2 x 3.5 cm (previously 7.2 x 3.9 cm). The liver is unremarkable. The spleen is unremarkable. The pancreas is unremarkable. The gallbladder demonstrates no stones. The adrenal glands are normal in size. The kidneys enhance symmetrically. The abdominal aorta is normal in caliber. There is no lymphadenopathy. The stomach is distended with some fluid and contrast material. There is postoperative change consistent with a right hemicolectomy. There is minimal dilatation of the left transverse colon at the anastomosis. CT Pelvis: There are no new pelvic fluid collections. There is mild diffuse body wall edema.     1.  Loculated rim-enhancing peritoneal fluid collections are again seen in the upper and lower quadrants as well as the posterior pelvis, all of which are very minimally decreased in size. 2.  There is postoperative change in the anterior abdominal wall with removal of the skin staples. 3.  There are postoperative changes of right hemicolectomy with very minimal dilatation of the  transverse colon at the anastomosis. There is no bowel obstruction. 4.  Stable moderate left and small right dependent pleural effusions with dependent atelectasis.    CT-ABDOMEN-PELVIS WITH    Result Date: 6/3/2021  6/3/2021 4:55 PM HISTORY/REASON FOR EXAM:  Abdominal abscess/infection suspected; wound dehiscense, r/o abscess. Postoperative. Recent volvulus with right hemicolectomy TECHNIQUE/EXAM DESCRIPTION:  CT scan of the abdomen and pelvis with contrast. Contrast-enhanced helical scanning was obtained from the diaphragmatic domes through the pubic symphysis following the bolus administration of nonionic contrast without complication. 100 mL of Omnipaque 350 nonionic contrast was administered without complication. Low dose optimization technique was utilized for this CT exam including automated exposure control and adjustment of the mA and/or kV according to patient size. COMPARISON: CT abdomen and pelvis 5/27/2021 FINDINGS: Osseous structures: There is bilateral atelectasis and there are small to moderate-sized bilateral pleural effusion. Lungs: Clear ABDOMEN: There is peritoneal fluid present posterior to the abdominal wall and in both paracolic gutter. This has some degree of loculation especially in the left paracolic gutter and could indicate infected fluid. Additionally, in the right lower quadrant there is a loculated fluid collection present measuring approximately 13.8 x 4.2 x 6.5 cm and this connects to the peritoneal fluid in the right paracolic gutter. There is loculated rim-enhancing fluid in the left subphrenic space measuring 8.4 cm in length and approximately 4.6 x 2.7 cm transversely. This connects to the fluid within the left paracolic gutter. There are recent postoperative changes with skin staple present and intraperitoneal air and fluid. LIVER: is normal in appearance. GALLBLADDER and BILIARY SYSTEM Gallbladder and biliary system are normal by CT assessment SPLEEN: Normal in appearance..  PANCREAS: Normal in appearance. The splenic vein and portal vein enhance normally. ADRENAL glands: Normal in appearance. RIGHT kidney: There is a nonobstructive 1 to 2 mm right medial lower pole calculus. LEFT kidney: Normal in appearance. There is no hydronephrosis. BOWEL and MESENTERY: Their has been right hemicolectomy. AORTA and VASCULATURE: is normal in appearance. Pelvis: In the recto uterine space there is a loculated fluid collection measuring 6.6 x 7.2 x 5.1 cm consistent with an abscess. Uterus and adnexa appear normal.     1.  Multiple rim enhancing peritoneal fluid collection suspicious for abscess 2.  These include the rectouterine space measuring 6.6 x 7.2 x 5.1 cm, right lower quadrant measuring 13.8 x 4.2 x 6.5 cm, left subphrenic space measuring 8.4 x 4.6 x 2.7 cm, and contiguous loculated fluid within the right and left paracolic gutter 3.  Interval right colectomy 4.  Small-moderate-sized bilateral pleural effusion and atelectasis    CT-ABDOMEN-PELVIS WITH    Result Date: 5/27/2021 5/27/2021 5:48 PM HISTORY/REASON FOR EXAM: Diffuse abdominal pain and distention. TECHNIQUE/EXAM DESCRIPTION: CT scan of the abdomen and pelvis with contrast. Contrast-enhanced helical scanning was obtained from the diaphragmatic domes through the pubic symphysis following the bolus administration of 100 mL of Omnipaque 350 nonionic contrast without complication. Low dose optimization technique was utilized for this CT exam including automated exposure control and adjustment of the mA and/or kV according to patient size. COMPARISON: 5/22/2021 FINDINGS: CT Abdomen: There is new bibasilar atelectasis and small bilateral pleural effusions, left greater than right. There is fatty change of the liver. There is a small amount of free intraperitoneal air tracking along the surface of the liver. Gallbladder is distended with dense material present within the gallbladder. The spleen is normal. There is moderate right-sided  hydronephrosis extending to the level of the upper sacrum. The right ureter is not identified below that level. No definite ureteral stone on the right. There is left ureteral pelvic junction stone which measures 5 mm in size and results in minimal left renal pelvic dilatation. The adrenal glands are normal. The pancreas is normal. The aorta is normal in caliber. No evidence of retroperitoneal adenopathy. CT Pelvis: There is multifocal ascites seen which is likely loculated in nature. There are punctate areas of free air seen within those areas of ascites. There are surgical changes involving the cecum. The colon is diffusely dilated. There is a large amount of free fluid which contains pockets of gas within the pelvis. There is a recent midline incision.     1.  Moderate to large amount of ascites within the abdomen and pelvis some areas of which are loculated in nature. There are also punctate areas of gas within those areas of ascites as well as free intraperitoneal air. This may be related to recent surgical procedure however the degree of volume of ascites would be unusual for normal postoperative course. Consideration should be given for leakage of bowel content or other process. 2.  Diffuse colonic distention. 3.  Bibasilar atelectasis and pleural effusions. 4.  5 mm left renal pelvic stone which results in mild hydronephrosis above that level. 5.  Moderate right-sided hydronephrosis extending to the level of the upper sacrum. No ureteral stone. The ureter is not identified below that level. 6.  This was discussed with NEVA KAY at 6:44 PM on 5/27/2021.    CT-ABDOMEN-PELVIS WITH    Result Date: 5/22/2021 5/22/2021 11:19 AM HISTORY/REASON FOR EXAM:  Abdominal distension; IV contrast only. TECHNIQUE/EXAM DESCRIPTION:   CT scan of the abdomen and pelvis with contrast. Contrast-enhanced helical scanning was obtained from the diaphragmatic domes through the pubic symphysis following the bolus administration  of nonionic contrast without complication. 100 mL of Omnipaque 350 nonionic contrast was administered without complication. Low dose optimization technique was utilized for this CT exam including automated exposure control and adjustment of the mA and/or kV according to patient size. COMPARISON: 4/22/2020 FINDINGS: Chest Base: Lung bases are clear. Liver:  Normal. Gallbladder: Normal Biliary tract: Nondilated. Pancreas: Normal. Spleen: Normal. Adrenals: Normal. Kidneys and Collecting Systems:  Cannot exclude a punctate nonobstructing stone in the lower right renal collecting system. There is a small nonobstructing left renal stone measuring about 5 mm. Gastrointestinal tract:  The cecum is distended and displaced into the left upper quadrant. The ascending colon proximally is narrowed in the midline and there is a somewhat twisted appearance.   Mildly fluid distended distal small bowel without significant small bowel obstruction. The appendix is nonvisualized. Peritoneum: No free air or free fluid. Reproductive organs:  2.3 cm left adnexal hypodense lesion is indeterminate, possibly a dominant follicle/small cyst in a menstruating patient. Correlate clinically. Probable intramural fibroid in the fundus. Bladder:  Normal. Vessels:  Normal caliber. Lymph  Nodes:  No lymphadenopathy. Abdominal wall: Within normal limits. Bones:  No acute or aggressive abnormality.     1.  Findings keeping with cecal volvulus. 2.  Mildly fluid distended distal small bowel. 3.  No free air. 4.  Small nonobstructing renal stones. These findings were discussed with ESHA STERLING on 5/22/2021 11:45 AM.     LF-XELANRF-3 VIEW    Result Date: 5/27/2021 5/27/2021 7:01 AM HISTORY/REASON FOR EXAM:  Distention. TECHNIQUE/EXAM DESCRIPTION AND NUMBER OF VIEWS:  1 view(s) of the abdomen. COMPARISON: 5/24/2021 FINDINGS: Gaseous distended  small bowel and colon is similar to prior study. There is some stool within the right colon. There is no  significant interval change. No suspicious calcifications. No acute osseous abnormality.     Gaseous distended small bowel and colon is similar to prior, likely ileus.    [id number]-0 VIEW    Result Date: 5/24/2021 5/24/2021 11:28 AM HISTORY/REASON FOR EXAM:  Distention; s/p right hemicolectomy Lower abdominal pain s/p right hemicolectomy 05/22/21 TECHNIQUE/EXAM DESCRIPTION AND NUMBER OF VIEWS:  1 view(s) of the abdomen. COMPARISON: 5/22/2021 FINDINGS: Gaseous distention of the small bowel and colon No portal venous gas or pneumatosis. No definite free intraperitoneal air but evaluation is limited on supine radiograph.     Gaseous distention of the small bowel and colon, likely postoperative ileus.    DX-CHEST-FOR LINE PLACEMENT Perform procedure in: Other(comment f6 below): (PACU)    Result Date: 5/28/2021 5/28/2021 10:10 AM HISTORY/REASON FOR EXAM:  Central line placement. TECHNIQUE/EXAM DESCRIPTION AND NUMBER OF VIEWS: Single AP view of the chest. COMPARISON: None FINDINGS: There is a left IJ central line with the tip high in position above the superior vena cava within the area of the left brachiocephalic vein. No pneumothorax. NG tube extends into the stomach. There are patchy bilateral pulmonary infiltrates. The heart is mildly enlarged. There is no pleural effusion.     1.  Left IJ central line tip high in position located above the superior vena cava are within the left brachiocephalic vein. 2.  Patchy bilateral infiltrates. 3.  NG tube tip extends into the stomach.    US-EXTREMITY VENOUS UPPER BILAT    Result Date: 6/2/2021   Upper Extremity  Venous Duplex Report  Vascular Laboratory  CONCLUSIONS  Small thrombus seen around the catheter line at the RIGHT axillary vein and  proximal basilic vein.  No left upper extremity superficial and deep venous thrombosis.  HOANG EMERY  Exam Date:     06/02/2021 14:29  Room #:     Inpatient  Priority:     Routine  Ht (in):             Wt (lb):  Ordering Physician:         DEIDRA SNIDER  Referring Physician:       [id number]  Sonographer:               Sharlene Morrell RVT, RDMS  Study Type:                Complete Bilateral  Technical Quality:         Fair  Age:    50    Gender:     F  MRN:    [id number]  :    1970      BSA:  Indications:     Edema  CPT Codes:       22100  ICD Codes:       782.3  History:         Swelling. No prior study  Limitations:  PROCEDURES:  Bilateral upper extremity venous duplex imaging.  The following venous structures were evaluated: internal jugular,  subclavian, axillary, brachial, cephalic and basilic veins.  FINDINGS:  Right upper extremity.  Small thrombus seen around the central line at the axillary vein and  proximal basilic vein.  All other veins of the right upper extremity demonstrate normal flow  dynamics with no evidence of deep vein thrombosis.  Left upper extremity.  Limited visualization of the internal jugular vein due to bandages.  All other veins of the left upper extremity demonstrate normal flow  dynamics with no evidence of deep vein thrombosis.  Doug Rossi MD  (Electronically Signed)  Final Date:      2021                   18:16    IR-MIDLINE CATHETER INSERTION WO GUIDANCE > AGE 3    Result Date: 2021  HISTORY/REASON FOR EXAM:  Midline Placement   TECHNIQUE/EXAM DESCRIPTION AND NUMBER OF VIEWS: Midline insertion with ultrasound guidance.  FINDINGS: Midline insertion with Ultrasound Guidance was performed by qualified nursing staff without the assistance of a Radiologist. Midline positioning as measured by RN or as appropriate length of catheter selected.              Ultrasound-guided midline placement performed by qualified nursing staff as above.       Micro:  Results     Procedure Component Value Units Date/Time    CULTURE WOUND W/ GRAM STAIN [[id number]]  (Abnormal) Collected: 21 1630    Order Status: Completed Specimen: Wound from KRISTI  Drain Updated: 06/12/21 1100     Significant Indicator POS     Source WND     Site KRISTI DRAIN     Culture Result -     Gram Stain Result Moderate WBCs.  Few Yeast.       Culture Result Candida albicans  Light growth        Candida krusei  Light growth        Bacteroides fragilis  Heavy growth      Narrative:      Collected By:[id number] SchveyCRISTOBALDigiSyndA  Please collect culture when drain is placed to abdomen today  Collected By:[id number] SchveyILAGIC LIANA    GRAM STAIN [[id number]] Collected: 06/09/21 1630    Order Status: Completed Specimen: Wound Updated: 06/10/21 1336     Significant Indicator .     Source WND     Site KRISTI DRAIN     Gram Stain Result Moderate WBCs.  Few Yeast.      Narrative:      Collected By:[id number] AmtecA  Please collect culture when drain is placed to abdomen today  Collected By:[id number] adflyer LIANA    Culture Wound w/Gram Stain [[id number]]  (Abnormal) Collected: 06/08/21 1425    Order Status: Completed Specimen: Wound from Abdominal Updated: 06/10/21 0938     Significant Indicator POS     Source WND     Site ABDOMINAL     Culture Result Rare growth usual skin coco.     Gram Stain Result Moderate WBCs.  No organisms seen.       Culture Result Bacteroides fragilis  Light growth      Narrative:      Collected By:[id number] SchveyCRISTOBALDigiSyndA  Collected By:[id number] adflyer LIANA    GRAM STAIN [[id number]] Collected: 06/08/21 1425    Order Status: Completed Specimen: Wound Updated: 06/09/21 0826     Significant Indicator .     Source WND     Site ABDOMINAL     Gram Stain Result Moderate WBCs.  No organisms seen.      Narrative:      Collected By:[id number] AmtecA  Collected By:[id number] AmtecA    BLOOD CULTURE [[id number]] Collected: 06/02/21 1730    Order Status: Completed Specimen: Blood from Peripheral Updated: 06/07/21 1817     Significant Indicator NEG     Source BLD     Site PERIPHERAL     Culture Result No growth after 5 days of incubation.  Blood culture testing and Gram stain, if indicated,  "are  performed at [facility], [address]  [address] [facility], [place].  Positive blood cultures are  sent to [facility], [address] [place] [address], for organism identification and  susceptibility testing.      Narrative:      Per Hospital Policy: Only change Specimen Src: to \"Line\" if  specified by physician order.  Left Wrist    BLOOD CULTURE [[id number]] Collected: 06/02/21 1307    Order Status: Completed Specimen: Blood from Peripheral Updated: 06/07/21 1417     Significant Indicator NEG     Source BLD     Site PERIPHERAL     Culture Result No growth after 5 days of incubation.  Blood culture testing and Gram stain, if indicated, are  performed at [facility], [facility]  [address] [facility], [place].  Positive blood cultures are  sent to [facility], [address] [place] [address], for organism identification and  susceptibility testing.      Narrative:      Per Hospital Policy: Only change Specimen Src: to \"Line\" if  specified by physician order.  No site indicated            Assessment/Hospital course:  This is a very pleasant 50-year-old female patient, originally admitted on 5/22/2021 with cecal volvulus for which she underwent a right hemicolectomy on 5/22.  This was complicated by intra-abdominal abscesses and free air concerning for leak.  She was taken to the OR for exploratory laparotomy and found to have necrosis of the ileocolonic anastomosis site, bowel is eviscerated and redo side-to-side ileocolic anastomosis was done.    Patient went back to the OR on 6/4 due to drainage from wound and concern for fascial dehiscence.  Per op note there is loosening of the fascia but no yessy dehiscence.  Abdomen was washed out including pockets noted on last CT.  Anastomosis was not evaluated as was scarred and no evidence of leak.     Pertinent Diagnoses:  Sepsis  Intra-abdominal abscesses, persistent  Large midline incision, " with significant feculent drainage   Feculent peritonitis  Anastomotic leak, ongoing  Cecal volvulus, sequelae  Leukocytosis, persistent  TPN dependent     Plan:  -OR cultures from 5/28 growing Bacteroides, Actinomyces, E faecalis  -Patient had worsening abdominal pain and a repeat CT scan obtained 6/3 showed multiple rim-enhancing peritoneal fluid collections several quite large and loculated.  Patient was taken back to the OR for exploratory laparotomy on 6/4 as discussed above, no new cultures were obtained.    -Repeat CT abdomen and pelvis 6/8 was ordered with postop change in anterior abdominal wall, few tiny air lucencies and tiny amounts of intraperitoneal air identified within the anterior abdominal fluid.  Loculated intra-abdominal fluid in the left lateral splenic space measuring 4.4 x 2.1 cm, small amount of loculated fluid in the right anterior upper quadrant also similar to prior study.  This appears to communicate with fluid anterior to the peritoneal cavity measuring 4.9 x 6.5 cm.  Fluid collection with rim enhancement in the posterior pelvis now 6.2 x 3.5 cm.  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  Patient was taken back to the OR 6/11, but the bowel was too inflamed to mobilize for ostomy, drains were placed      --- Continue IV Unasyn 3 g every 6 hours and IV micafungin 100 mg every 24 hours  -Recommend repeat CT scan in 5 to 7 days  -Continue to monitor drain output  --- Will follow along with general surgery    Discussed with internal medicine, Dr. Rollins.  ID will follow.

## 2021-06-14 NOTE — PROGRESS NOTES
[facility] Medicine Daily Progress Note    Date of Service  6/14/2021    Chief Complaint  50 y.o. female admitted 5/22/2021 with abd pain.    Hospital Course  51 yo woman who presented with abd pain and CT showed cecal volvulus. She underwent exploratory celiotomy and right hemicolectomy with ileocolic anastomosis with Dr. Wood 5/22/21. She developed thrombocytopenia and ascites and returned for ex lap with resection of ileocolic anastomosis and wound vac placement 5/28/21. She was admitted to ICU, started on zosyn for bacterial peritonitis and given platelet transfusions. HIT panel was negative. Abd cultures grew actinomyces, enterococcus, bacteroides. Doppler showed catheter related thrombus in R arm and started on anticoagulation. She had additional washout in the OR with Dr. Wood 6/4/21 and remains on unasyn and fluconazole. Wound vac remains in place.    Interval Problem Update  6/8 - White count coming down, afebrile; states that she is eating well, and that her current medication regimen controls her pain and lasts ~4 hours.  Can likely dc home tomorrow with home health for wound care and dressing changes, will need outpatient ID recs from Dr Obrien, much appreciated.     6/9 - Afebrile overnight, remains tachycardic - dry MM, will change to NS and increase rate. White count down further today. Pt is to go for IR drain placement this afternoon, is NPO currently.    6/10 - KRISTI drains in place, pt did not each much breakfast due to pain and nausea.  Will monitor oral intake carefully with low threshold to start TPN or enteric feeds to optimize nutritional status for healing. Is afebrile with improved white count but increased bands.  Remains on 3L, will check a CXR. Looking at LTAC when no further surgical interventions anticipated.    6/11 - Pt had barium enema which demonstrated an extensive anastomotic leak - discussed with Dr Wood, plans for OR today. Vitals are stable, bandemia is improved but white  count up. Pain is controlled per patient, TPN ordered to start tonight. Pt down to room air this evening.  Will stop IVFs as she will be receiving TPN now and had some third spacing with IVFs.      6/12 - PICC placed today for TPN - some hypotension this morning that has improved without intervention by this afternoon. Went back to OR yesterday for anastomotic leak, bowel too inflamed to create iliostomy, drain was placed and abdomen washed out. White count up today and hemoglobin down, but pt's pain is improved and abdomen is softer.    6/13 - PICC in place, TPN initiated - still has R sided midline as well that we can use. Despite drop in hemoglobin, pt's vitals are improved today - BP is stable, no further tachycardia, on 0.5L O2. Given patient's significantly depressed albumin with propensity towards third spacing (and previous 3L O2 requirement), will hold off on transfusion this am, and recheck an H/H this afternoon.  If dropped further or vitals trend towards the unstable, will transfuse 1u PRBC. Pt states her pain is currently controlled.     6/14 - Vitals stable. Hgb stable. Surgery & ID on board. ?LTACH placement   Per ID - Continue IV Unasyn & IV micafungin, Recommend repeat CT scan in 5 to 7 days  Will Continue to monitor drain output    Consultants/Specialty  Critical care  Surgery  ID    Code Status  Full Code    Disposition  TBD - likely  LTAC when no further surgical procedures anticipated     Review of Systems  Review of Systems   Constitutional: Positive for malaise/fatigue. Negative for fever.   Respiratory: Negative for shortness of breath.    Cardiovascular: Negative for chest pain.   Gastrointestinal: Positive for abdominal pain. Negative for constipation and vomiting.   Musculoskeletal: Positive for myalgias.   Neurological: Positive for weakness. Negative for dizziness.   All other systems reviewed and are negative.       Physical Exam  Temp:  [36.4 °C (97.5 °F)-36.5 °C (97.7 °F)] 36.5 °C  (97.7 °F)  Pulse:  [80-93] 83  Resp:  [16-18] 16  BP: (141-148)/(53-69) 141/60  SpO2:  [91 %-97 %] 96 %    Physical Exam  Vitals and nursing note reviewed.   Constitutional:       Appearance: She is ill-appearing. She is not toxic-appearing.      Comments: fatigued   HENT:      Head: Normocephalic.      Mouth/Throat:      Mouth: Mucous membranes are moist.   Eyes:      General:         Right eye: No discharge.         Left eye: No discharge.   Cardiovascular:      Rate and Rhythm: Normal rate and regular rhythm.   Pulmonary:      Effort: Pulmonary effort is normal. No respiratory distress.      Breath sounds: No wheezing or rales.   Abdominal:      General: There is distension (Soft).      Tenderness: There is abdominal tenderness (diffuse). There is no guarding or rebound.      Comments: Abdomen with Prevena, malecot and two greer drains.       Musculoskeletal:         General: No swelling.      Cervical back: Neck supple.   Skin:     General: Skin is warm and dry.   Neurological:      General: No focal deficit present.      Mental Status: She is alert.      Comments: AOx4         Fluids    Intake/Output Summary (Last 24 hours) at 6/14/2021 1140  Last data filed at 6/14/2021 0800  Gross per 24 hour   Intake 790 ml   Output 3962 ml   Net -3172 ml       Laboratory  Recent Labs     06/12/21  0220 06/12/21  0220 06/13/21  0316 06/13/21  1140 06/14/21  0439   WBC 17.4*  --  15.8*  --  17.3*   RBC 2.73*  --  2.52*  --  2.78*   HEMOGLOBIN 7.4*   < > 6.9* 7.6* 7.6*   HEMATOCRIT 24.9*   < > 22.2* 23.5* 24.2*   MCV 91.2  --  88.1  --  87.1   MCH 27.1  --  27.4  --  27.3   MCHC 29.7*  --  31.1*  --  31.4*   RDW 50.2*  --  48.5  --  48.2   PLATELETCT 498*  --  473*  --  492*   MPV 8.9*  --  9.4  --  8.8*    < > = values in this interval not displayed.     Recent Labs     06/12/21  0220 06/13/21  0418 06/14/21  0439   SODIUM 135 133* 136   POTASSIUM 4.2 3.7 3.9   CHLORIDE 103 100 102   CO2 19* 23 22   GLUCOSE 111* 97 112*   BUN 7*  8 6*   CREATININE 0.49* 0.48* 0.41*   CALCIUM 7.5* 7.3* 7.9*             Recent Labs     06/14/21  0439   TRIGLYCERIDE 164*       Imaging  IR-PICC LINE PLACEMENT W/ GUIDANCE > AGE 5   Final Result                  Ultrasound-guided PICC placement performed by qualified nursing staff as    above.          EC-ECHOCARDIOGRAM COMPLETE W/O CONT   Final Result      DX-BARIUM ENEMA   Final Result      1.  Extensive ileocolic anastomotic leakage extending into the peritoneal cavity and extending through the enterocutaneous fistula to the skin surface            2.  Right hemicolectomy      3.  Findings were discussed with NEVA KAY on 6/11/2021 12:21 PM.      DX-CHEST-PORTABLE (1 VIEW)   Final Result      1.  The cardiac silhouette is enlarged and there are changes most consistent with vascular congestion/edema with a trace of left pleural effusion.   2.  Superimposed pneumonitis and/or atelectasis is also possible.      CT-DRAIN-PERITONEAL   Final Result      1.  CT guided right lower quadrant abscess and pelvic abscess catheter drainage.   2.  The current plan is to obtain a follow-up CT in 57 days..      CT-ABDOMEN-PELVIS WITH   Final Result      1.  Loculated rim-enhancing peritoneal fluid collections are again seen in the upper and lower quadrants as well as the posterior pelvis, all of which are very minimally decreased in size.   2.  There is postoperative change in the anterior abdominal wall with removal of the skin staples.   3.  There are postoperative changes of right hemicolectomy with very minimal dilatation of the transverse colon at the anastomosis. There is no bowel obstruction.   4.  Stable moderate left and small right dependent pleural effusions with dependent atelectasis.      CT-ABDOMEN-PELVIS WITH   Final Result      1.  Multiple rim enhancing peritoneal fluid collection suspicious for abscess      2.  These include the rectouterine space measuring 6.6 x 7.2 x 5.1 cm, right lower quadrant  measuring 13.8 x 4.2 x 6.5 cm, left subphrenic space measuring 8.4 x 4.6 x 2.7 cm, and contiguous loculated fluid within the right and left paracolic gutter      3.  Interval right colectomy      4.  Small-moderate-sized bilateral pleural effusion and atelectasis      US-EXTREMITY VENOUS UPPER BILAT   Final Result      IR-MIDLINE CATHETER INSERTION WO GUIDANCE > AGE 3   Final Result                  Ultrasound-guided midline placement performed by qualified nursing staff    as above.          DX-CHEST-FOR LINE PLACEMENT Perform procedure in: Other(comment f6 below): (PACU)   Final Result      1.  Left IJ central line tip high in position located above the superior vena cava are within the left brachiocephalic vein.      2.  Patchy bilateral infiltrates.      3.  NG tube tip extends into the stomach.      CT-ABDOMEN-PELVIS WITH   Final Result      1.  Moderate to large amount of ascites within the abdomen and pelvis some areas of which are loculated in nature. There are also punctate areas of gas within those areas of ascites as well as free intraperitoneal air. This may be related to recent    surgical procedure however the degree of volume of ascites would be unusual for normal postoperative course. Consideration should be given for leakage of bowel content or other process.      2.  Diffuse colonic distention.      3.  Bibasilar atelectasis and pleural effusions.      4.  5 mm left renal pelvic stone which results in mild hydronephrosis above that level.      5.  Moderate right-sided hydronephrosis extending to the level of the upper sacrum. No ureteral stone. The ureter is not identified below that level.      6.  This was discussed with NEVA KAY at 6:44 PM on 5/27/2021.      [id number] VIEW   Final Result      Gaseous distended small bowel and colon is similar to prior, likely ileus.      [id number] VIEW   Final Result         Gaseous distention of the small bowel and colon, likely postoperative ileus.       CT-ABDOMEN-PELVIS WITH   Final Result         1.  Findings keeping with cecal volvulus.   2.  Mildly fluid distended distal small bowel.   3.  No free air.   4.  Small nonobstructing renal stones.   These findings were discussed with ESHA STERLING on 5/22/2021 11:45 AM.                       Assessment/Plan  * Bacterial peritonitis (HCC)  Assessment & Plan  - s/p ileocecal resection/redo 5/28/21 and washout 6/4/2, drain placement on 6/9 by IR. Developed anastomotic leak seen on barium enema 6/11 and went to the OR - ileostomy could not be performed due to inflammation of the bowel. Drain was placed and abdomen washed out.  - Wound cultures initially with Bacteroides, Actinomyces, E faecalis. Was on Unasyn and Diflucan.  Culture from when pt began having purulent discharge on 6/8 with bacteroides, culture from IR drain placement 6/9 with bacteroides/candida albicans and krusei.  Diflucan changed to Micafungin.  - Appreciate Surgery and ID   - Per ID - Continue IV Unasyn & IV micafungin, Recommend repeat CT scan in 5 to 7 days  Will Continue to monitor drain output    Protein-calorie malnutrition, moderate (HCC)  Assessment & Plan  - Albumin level of 2.0, TPN initiated  - Check prealbumin in the am   - On CLD  - Boost plus supplements ordered between meals     Acute hypoxic respiratory failure (HCC)  Assessment & Plan  - Secondary to pulmonary edema from third spacing secondary to hypoalbuminemia  - No IVFs as pt is on TPN  at 83cc/hr  - Now down from 3L to 0.5L    Anemia  Assessment & Plan  - Hgb slowly trending down, not indicative of acute bleed but likely due to ACD and return trips to the OR.  - Iron low, but suppressed TIBC as well, likely some ABLA with anemia of acute illness as well  - No IV iron for now given her active infection  - Transfuse if Hgb <7  -Retic count appropriate   - . Despite drop in hemoglobin, pt's vitals are improved today - BP is stable, no further tachycardia, on 0.5L O2. Given  patient's significantly depressed albumin with propensity towards third spacing (and previous 3L O2 requirement), will hold off on transfusion this am, and recheck an H/H this afternoon.  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

## 2021-06-15 LAB
ALBUMIN SERPL BCP-MCNC: 2.6 G/DL (ref 3.2–4.9)
ALBUMIN/GLOB SERPL: 0.7 G/DL
ALP SERPL-CCNC: 78 U/L (ref 30–99)
ALT SERPL-CCNC: 8 U/L (ref 2–50)
ANION GAP SERPL CALC-SCNC: 11 MMOL/L (ref 7–16)
AST SERPL-CCNC: 18 U/L (ref 12–45)
BASOPHILS # BLD AUTO: 1 % (ref 0–1.8)
BASOPHILS # BLD: 0.16 K/UL (ref 0–0.12)
BILIRUB SERPL-MCNC: 0.2 MG/DL (ref 0.1–1.5)
BUN SERPL-MCNC: 9 MG/DL (ref 8–22)
CALCIUM SERPL-MCNC: 8 MG/DL (ref 8.4–10.2)
CHLORIDE SERPL-SCNC: 104 MMOL/L (ref 96–112)
CO2 SERPL-SCNC: 21 MMOL/L (ref 20–33)
CREAT SERPL-MCNC: 0.35 MG/DL (ref 0.5–1.4)
EOSINOPHIL # BLD AUTO: 0 K/UL (ref 0–0.51)
EOSINOPHIL NFR BLD: 0 % (ref 0–6.9)
ERYTHROCYTE [DISTWIDTH] IN BLOOD BY AUTOMATED COUNT: 48.5 FL (ref 35.9–50)
GLOBULIN SER CALC-MCNC: 3.7 G/DL (ref 1.9–3.5)
GLUCOSE SERPL-MCNC: 104 MG/DL (ref 65–99)
HCT VFR BLD AUTO: 24.9 % (ref 37–47)
HGB BLD-MCNC: 8 G/DL (ref 12–16)
HYPOCHROMIA BLD QL SMEAR: ABNORMAL
LYMPHOCYTES # BLD AUTO: 2.21 K/UL (ref 1–4.8)
LYMPHOCYTES NFR BLD: 14 % (ref 22–41)
MAGNESIUM SERPL-MCNC: 1.9 MG/DL (ref 1.5–2.5)
MANUAL DIFF BLD: NORMAL
MCH RBC QN AUTO: 28.3 PG (ref 27–33)
MCHC RBC AUTO-ENTMCNC: 32.1 G/DL (ref 33.6–35)
MCV RBC AUTO: 88 FL (ref 81.4–97.8)
MONOCYTES # BLD AUTO: 0.47 K/UL (ref 0–0.85)
MONOCYTES NFR BLD AUTO: 3 % (ref 0–13.4)
MYELOCYTES NFR BLD MANUAL: 10 %
NEUTROPHILS # BLD AUTO: 11.38 K/UL (ref 2–7.15)
NEUTROPHILS NFR BLD: 69 % (ref 44–72)
NEUTS BAND NFR BLD MANUAL: 3 % (ref 0–10)
NRBC # BLD AUTO: 0.2 K/UL
NRBC BLD-RTO: 1.3 /100 WBC
PHOSPHATE SERPL-MCNC: 3.2 MG/DL (ref 2.5–4.5)
PLATELET # BLD AUTO: 421 K/UL (ref 164–446)
PLATELET BLD QL SMEAR: NORMAL
PMV BLD AUTO: 8.9 FL (ref 9–12.9)
POLYCHROMASIA BLD QL SMEAR: NORMAL
POTASSIUM SERPL-SCNC: 4 MMOL/L (ref 3.6–5.5)
PROT SERPL-MCNC: 6.3 G/DL (ref 6–8.2)
RBC # BLD AUTO: 2.83 M/UL (ref 4.2–5.4)
RBC BLD AUTO: PRESENT
SODIUM SERPL-SCNC: 136 MMOL/L (ref 135–145)
WBC # BLD AUTO: 15.8 K/UL (ref 4.8–10.8)

## 2021-06-15 PROCEDURE — 700102 HCHG RX REV CODE 250 W/ 637 OVERRIDE(OP): Performed by: INTERNAL MEDICINE

## 2021-06-15 PROCEDURE — 700111 HCHG RX REV CODE 636 W/ 250 OVERRIDE (IP): Performed by: INTERNAL MEDICINE

## 2021-06-15 PROCEDURE — 700102 HCHG RX REV CODE 250 W/ 637 OVERRIDE(OP): Performed by: NURSE PRACTITIONER

## 2021-06-15 PROCEDURE — 700105 HCHG RX REV CODE 258: Performed by: INTERNAL MEDICINE

## 2021-06-15 PROCEDURE — 700102 HCHG RX REV CODE 250 W/ 637 OVERRIDE(OP): Performed by: SURGERY

## 2021-06-15 PROCEDURE — 80053 COMPREHEN METABOLIC PANEL: CPT

## 2021-06-15 PROCEDURE — 770006 HCHG ROOM/CARE - MED/SURG/GYN SEMI*

## 2021-06-15 PROCEDURE — 85007 BL SMEAR W/DIFF WBC COUNT: CPT

## 2021-06-15 PROCEDURE — 700111 HCHG RX REV CODE 636 W/ 250 OVERRIDE (IP): Performed by: SURGERY

## 2021-06-15 PROCEDURE — A9270 NON-COVERED ITEM OR SERVICE: HCPCS | Performed by: NURSE PRACTITIONER

## 2021-06-15 PROCEDURE — 700111 HCHG RX REV CODE 636 W/ 250 OVERRIDE (IP): Performed by: FAMILY MEDICINE

## 2021-06-15 PROCEDURE — 83735 ASSAY OF MAGNESIUM: CPT

## 2021-06-15 PROCEDURE — 700101 HCHG RX REV CODE 250: Performed by: FAMILY MEDICINE

## 2021-06-15 PROCEDURE — A9270 NON-COVERED ITEM OR SERVICE: HCPCS | Performed by: INTERNAL MEDICINE

## 2021-06-15 PROCEDURE — 97605 NEG PRS WND THER DME<=50SQCM: CPT

## 2021-06-15 PROCEDURE — A9270 NON-COVERED ITEM OR SERVICE: HCPCS | Performed by: FAMILY MEDICINE

## 2021-06-15 PROCEDURE — 84100 ASSAY OF PHOSPHORUS: CPT

## 2021-06-15 PROCEDURE — 700101 HCHG RX REV CODE 250: Performed by: INTERNAL MEDICINE

## 2021-06-15 PROCEDURE — 85027 COMPLETE CBC AUTOMATED: CPT

## 2021-06-15 PROCEDURE — 700102 HCHG RX REV CODE 250 W/ 637 OVERRIDE(OP): Performed by: FAMILY MEDICINE

## 2021-06-15 PROCEDURE — A9270 NON-COVERED ITEM OR SERVICE: HCPCS | Performed by: SURGERY

## 2021-06-15 PROCEDURE — 302098 PASTE RING (FLAT): Performed by: INTERNAL MEDICINE

## 2021-06-15 PROCEDURE — 99233 SBSQ HOSP IP/OBS HIGH 50: CPT | Performed by: INTERNAL MEDICINE

## 2021-06-15 RX ADMIN — DULOXETINE HYDROCHLORIDE 60 MG: 30 CAPSULE, DELAYED RELEASE ORAL at 08:26

## 2021-06-15 RX ADMIN — OXYCODONE HYDROCHLORIDE 10 MG: 10 TABLET ORAL at 03:26

## 2021-06-15 RX ADMIN — DOCUSATE SODIUM 100 MG: 100 CAPSULE, LIQUID FILLED ORAL at 06:00

## 2021-06-15 RX ADMIN — AMPICILLIN SODIUM AND SULBACTAM SODIUM 3 G: 2; 1 INJECTION, POWDER, FOR SOLUTION INTRAMUSCULAR; INTRAVENOUS at 05:56

## 2021-06-15 RX ADMIN — OXYCODONE HYDROCHLORIDE 10 MG: 10 TABLET ORAL at 18:03

## 2021-06-15 RX ADMIN — IBUPROFEN 800 MG: 400 TABLET, FILM COATED ORAL at 08:26

## 2021-06-15 RX ADMIN — OXYCODONE HYDROCHLORIDE 10 MG: 10 TABLET ORAL at 11:42

## 2021-06-15 RX ADMIN — TOPIRAMATE 100 MG: 100 TABLET, FILM COATED ORAL at 20:33

## 2021-06-15 RX ADMIN — POTASSIUM CHLORIDE: 2 INJECTION, SOLUTION, CONCENTRATE INTRAVENOUS at 20:25

## 2021-06-15 RX ADMIN — IBUPROFEN 800 MG: 400 TABLET, FILM COATED ORAL at 20:33

## 2021-06-15 RX ADMIN — AMPICILLIN SODIUM AND SULBACTAM SODIUM 3 G: 2; 1 INJECTION, POWDER, FOR SOLUTION INTRAMUSCULAR; INTRAVENOUS at 11:43

## 2021-06-15 RX ADMIN — AMPICILLIN SODIUM AND SULBACTAM SODIUM 3 G: 2; 1 INJECTION, POWDER, FOR SOLUTION INTRAMUSCULAR; INTRAVENOUS at 00:21

## 2021-06-15 RX ADMIN — IBUPROFEN 800 MG: 400 TABLET, FILM COATED ORAL at 15:19

## 2021-06-15 RX ADMIN — MICAFUNGIN SODIUM 100 MG: 20 INJECTION, POWDER, LYOPHILIZED, FOR SOLUTION INTRAVENOUS at 20:32

## 2021-06-15 RX ADMIN — MORPHINE SULFATE 4 MG: 4 INJECTION INTRAVENOUS at 14:12

## 2021-06-15 RX ADMIN — AMPICILLIN SODIUM AND SULBACTAM SODIUM 3 G: 2; 1 INJECTION, POWDER, FOR SOLUTION INTRAMUSCULAR; INTRAVENOUS at 18:03

## 2021-06-15 RX ADMIN — ONDANSETRON 4 MG: 2 INJECTION INTRAMUSCULAR; INTRAVENOUS at 11:42

## 2021-06-15 RX ADMIN — LIDOCAINE HYDROCHLORIDE 20 ML: 20 INJECTION, SOLUTION INFILTRATION; PERINEURAL at 14:11

## 2021-06-15 RX ADMIN — ENOXAPARIN SODIUM 80 MG: 80 INJECTION SUBCUTANEOUS at 06:01

## 2021-06-15 ASSESSMENT — PAIN DESCRIPTION - PAIN TYPE
TYPE: ACUTE PAIN;SURGICAL PAIN
TYPE: SURGICAL PAIN;ACUTE PAIN
TYPE: ACUTE PAIN;SURGICAL PAIN
TYPE: ACUTE PAIN;SURGICAL PAIN
TYPE: SURGICAL PAIN;ACUTE PAIN
TYPE: ACUTE PAIN;SURGICAL PAIN

## 2021-06-15 ASSESSMENT — ENCOUNTER SYMPTOMS
ABDOMINAL PAIN: 1
ROS GI COMMENTS: PASSING SOME FLATUS
WEAKNESS: 1
VOMITING: 0
CONSTIPATION: 0

## 2021-06-15 NOTE — CARE PLAN
The patient is Watcher - Medium risk of patient condition declining or worsening    Shift Goals: Patient will not experience pain >7/10, non-pharmacological measures will be utilized, and patient will be able to sleep throughout a majority of the night.   Clinical Goals: Pain control & ambulation  Patient Goals:     Progress made toward(s) clinical / shift goals: Medicated patient with scheduled motrin throughout shift and only one med administration of oxycodone 10mg - 5/10 pain    Problem: Early Mobilization - Post Surgery  Goal: Early mobilization post surgery  Outcome: Progressing  Note: Goal for patient is to be up in chair for meals and going to bathroom, not utilizing purewick    Problem: Pain - Post Surgery  Goal: Alleviation or reduction of pain post surgery  Outcome: Progressing  Note: Patient is reporting pain to be 5/10, mostly with midline incision / wound vac    Problem: Wound/ / Incision Healing  Goal: Patient's wound/surgical incision will decrease in size and heals properly  Outcome: Progressing  Note: Wound vac and Malecot in place to midline incision on abdomen. KRISTI drains (2) in place      Problem: Bowel Elimination - Post Surgical  Goal: Patient will resume regular bowel sounds and function with no discomfort or distention  Outcome: Progressing  Note: Most recent BM 6/14     Problem: Respiratory  Goal: Patient will achieve/maintain optimum respiratory ventilation and gas exchange  Outcome: Progressing  Note: Patient is now on room air, previously 3L

## 2021-06-15 NOTE — PROGRESS NOTES
Assumed care of pt. A&O x4. Pt was sleeping comfortably, awakens easily. Pt reports 5/10 pain to abdomen. Surgical dressings CDI. Pt denies nausea. Discussed POC regarding ambulation and sitting up in chair for meals. Pt agreeable to plan and understands importance of mobility. No signs of distress.

## 2021-06-15 NOTE — PROGRESS NOTES
[facility] Medicine Daily Progress Note    Date of Service  6/15/2021    Chief Complaint  51 y.o. female admitted 5/22/2021 with abd pain    Hospital Course  49 yo woman who presented with abd pain and CT showed cecal volvulus. She underwent exploratory celiotomy and right hemicolectomy with ileocolic anastomosis with Dr. Wood 5/22/21. She developed thrombocytopenia and ascites and returned for ex lap with resection of ileocolic anastomosis and wound vac placement 5/28/21. She was admitted to ICU, started on zosyn for bacterial peritonitis and given platelet transfusions. HIT panel was negative. Abd cultures grew actinomyces, enterococcus, bacteroides. Doppler showed catheter related thrombus in R arm and started on anticoagulation. She had additional washout in the OR with Dr. Wood 6/4/21 and remains on unasyn and fluconazole. Wound vac remains in place. She was then found with anastomotic leak and underwent malecot drain placement 6/11/21. She was started on TPN for malnutrition.    Interval Problem Update  On TPN. Has no appetite. Having BMs and ambulating. Pain is fairly controlled    Consultants/Specialty  Critical care  Surgery  ID    Code Status  Full Code    Disposition  LTACH    Review of Systems  Review of Systems   Constitutional: Positive for malaise/fatigue.   Gastrointestinal: Positive for abdominal pain. Negative for constipation and vomiting.   Neurological: Positive for weakness.   All other systems reviewed and are negative.       Physical Exam  Temp:  [36.5 °C (97.7 °F)-36.8 °C (98.3 °F)] 36.8 °C (98.3 °F)  Pulse:  [77-93] 83  Resp:  [16-18] 16  BP: (135-152)/(51-65) 135/65  SpO2:  [96 %-99 %] 96 %    Physical Exam  Vitals and nursing note reviewed.   Constitutional:       Appearance: She is not ill-appearing, toxic-appearing or diaphoretic.      Comments: fatigued   HENT:      Head: Normocephalic.      Mouth/Throat:      Mouth: Mucous membranes are dry.   Eyes:      General:         Right eye: No  discharge.         Left eye: No discharge.   Cardiovascular:      Rate and Rhythm: Normal rate and regular rhythm.   Pulmonary:      Effort: Pulmonary effort is normal. No respiratory distress.      Breath sounds: No wheezing or rales.   Abdominal:      Tenderness: There is abdominal tenderness. There is no guarding or rebound.      Comments: Central wound vac and drain in place   Musculoskeletal:         General: No swelling.      Cervical back: Neck supple.   Skin:     General: Skin is warm and dry.      Coloration: Skin is pale.   Neurological:      Mental Status: She is alert.      Comments: AOx4         Fluids    Intake/Output Summary (Last 24 hours) at 6/15/2021 0832  Last data filed at 6/15/2021 0633  Gross per 24 hour   Intake 730 ml   Output 3446 ml   Net -2716 ml       Laboratory  Recent Labs     06/13/21  0316 06/13/21  0316 06/13/21  1140 06/14/21  0439 06/15/21  0350   WBC 15.8*  --   --  17.3* 15.8*   RBC 2.52*  --   --  2.78* 2.83*   HEMOGLOBIN 6.9*   < > 7.6* 7.6* 8.0*   HEMATOCRIT 22.2*   < > 23.5* 24.2* 24.9*   MCV 88.1  --   --  87.1 88.0   MCH 27.4  --   --  27.3 28.3   MCHC 31.1*  --   --  31.4* 32.1*   RDW 48.5  --   --  48.2 48.5   PLATELETCT 473*  --   --  492* 421   MPV 9.4  --   --  8.8* 8.9*    < > = values in this interval not displayed.     Recent Labs     06/13/21  0418 06/14/21  0439 06/15/21  0306   SODIUM 133* 136 136   POTASSIUM 3.7 3.9 4.0   CHLORIDE 100 102 104   CO2 23 22 21   GLUCOSE 97 112* 104*   BUN 8 6* 9   CREATININE 0.48* 0.41* 0.35*   CALCIUM 7.3* 7.9* 8.0*             Recent Labs     06/14/21  0439   TRIGLYCERIDE 164*       Imaging  IR-PICC LINE PLACEMENT W/ GUIDANCE > AGE 5   Final Result                  Ultrasound-guided PICC placement performed by qualified nursing staff as    above.          EC-ECHOCARDIOGRAM COMPLETE W/O CONT   Final Result      DX-BARIUM ENEMA   Final Result      1.  Extensive ileocolic anastomotic leakage extending into the peritoneal cavity and  extending through the enterocutaneous fistula to the skin surface            2.  Right hemicolectomy      3.  Findings were discussed with NEVA KAY on 6/11/2021 12:21 PM.      DX-CHEST-PORTABLE (1 VIEW)   Final Result      1.  The cardiac silhouette is enlarged and there are changes most consistent with vascular congestion/edema with a trace of left pleural effusion.   2.  Superimposed pneumonitis and/or atelectasis is also possible.      CT-DRAIN-PERITONEAL   Final Result      1.  CT guided right lower quadrant abscess and pelvic abscess catheter drainage.   2.  The current plan is to obtain a follow-up CT in 57 days..      CT-ABDOMEN-PELVIS WITH   Final Result      1.  Loculated rim-enhancing peritoneal fluid collections are again seen in the upper and lower quadrants as well as the posterior pelvis, all of which are very minimally decreased in size.   2.  There is postoperative change in the anterior abdominal wall with removal of the skin staples.   3.  There are postoperative changes of right hemicolectomy with very minimal dilatation of the transverse colon at the anastomosis. There is no bowel obstruction.   4.  Stable moderate left and small right dependent pleural effusions with dependent atelectasis.      CT-ABDOMEN-PELVIS WITH   Final Result      1.  Multiple rim enhancing peritoneal fluid collection suspicious for abscess      2.  These include the rectouterine space measuring 6.6 x 7.2 x 5.1 cm, right lower quadrant measuring 13.8 x 4.2 x 6.5 cm, left subphrenic space measuring 8.4 x 4.6 x 2.7 cm, and contiguous loculated fluid within the right and left paracolic gutter      3.  Interval right colectomy      4.  Small-moderate-sized bilateral pleural effusion and atelectasis      US-EXTREMITY VENOUS UPPER BILAT   Final Result      IR-MIDLINE CATHETER INSERTION WO GUIDANCE > AGE 3   Final Result                  Ultrasound-guided midline placement performed by qualified nursing staff    as above.           DX-CHEST-FOR LINE PLACEMENT Perform procedure in: Other(comment f6 below): (PACU)   Final Result      1.  Left IJ central line tip high in position located above the superior vena cava are within the left brachiocephalic vein.      2.  Patchy bilateral infiltrates.      3.  NG tube tip extends into the stomach.      CT-ABDOMEN-PELVIS WITH   Final Result      1.  Moderate to large amount of ascites within the abdomen and pelvis some areas of which are loculated in nature. There are also punctate areas of gas within those areas of ascites as well as free intraperitoneal air. This may be related to recent    surgical procedure however the degree of volume of ascites would be unusual for normal postoperative course. Consideration should be given for leakage of bowel content or other process.      2.  Diffuse colonic distention.      3.  Bibasilar atelectasis and pleural effusions.      4.  5 mm left renal pelvic stone which results in mild hydronephrosis above that level.      5.  Moderate right-sided hydronephrosis extending to the level of the upper sacrum. No ureteral stone. The ureter is not identified below that level.      6.  This was discussed with NEVA KAY at 6:44 PM on 5/27/2021.      [id number] VIEW   Final Result      Gaseous distended small bowel and colon is similar to prior, likely ileus.      [id number] VIEW   Final Result         Gaseous distention of the small bowel and colon, likely postoperative ileus.      CT-ABDOMEN-PELVIS WITH   Final Result         1.  Findings keeping with cecal volvulus.   2.  Mildly fluid distended distal small bowel.   3.  No free air.   4.  Small nonobstructing renal stones.   These findings were discussed with ESHA STERLING on 5/22/2021 11:45 AM.                       Assessment/Plan  * Bacterial peritonitis (HCC)  Assessment & Plan  - s/p ileocecal resection/redo 5/28/21 and washout 6/4/2, drain placement on 6/9 by IR. Developed anastomotic leak seen  on barium enema 6/11 and went to the OR - ileostomy could not be performed due to inflammation of the bowel. Drain was placed and abdomen washed out.  - Wound cultures initially with Bacteroides, Actinomyces, E faecalis. Was on Unasyn and Diflucan.  Culture from when pt began having purulent discharge on 6/8 with bacteroides, culture from IR drain placement 6/9 with bacteroides/candida albicans and krusei.  Diflucan changed to Micafungin.  - Appreciate Surgery and ID   - Per ID - Continue IV Unasyn & IV micafungin, Recommend repeat CT scan in 5 to 7 days  Will Continue to monitor drain output    Protein-calorie malnutrition, moderate (HCC)  Assessment & Plan  - Albumin level of 2.0, TPN initiated and tolerating   - PICC in place  - Boost plus supplements ordered between meals     Acute hypoxic respiratory failure (HCC)  Assessment & Plan  - Secondary to pulmonary edema from third spacing secondary to hypoalbuminemia  - resolved, on r/a    Anemia  Assessment & Plan  - Hgb slowly trending down, not indicative of acute bleed but likely due to ACD and return trips to the OR.  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

## 2021-06-15 NOTE — PROGRESS NOTES
Received report from day shift RN. Assumed care of patient. Patient is currently AOx4, reporting 5/10 pain, scheduled Mortrin given. VSS. Mild nausea reported during TPN change. TPN at 83mL/hr, goal. KRISTI drains (2) present, CDI. Midline incision with wound vac and Malecot in place, CDI. Pt reports having BM today, bowel sounds hypoactive at this time. Plan of care reviewed with the patient. Patient verbalized understanding. Hourly rounding in place. Bed locked and in lowest position, call light and belongings within reach. Chart check complete.

## 2021-06-15 NOTE — PROGRESS NOTES
Pharmacy TPN Day # 4       6/15/2021    Dosing Weight   73.5 kg TPN currently providing 100% of goal      TPN goal: 1825 kcal/day including 1.5 gm/kg/day Protein      TPN indication: bowel resection                                                                Pertinent PMH:   50 y.o. female admitted 2021 with cecal volvulus      Exploratory celiotomy and right hemicolectomy with a side-to-side ileocolic anastomosis   Exploratory celiotomy, resection of ileocolic anastomosis with re-do side-to-side ileocolic anastomosis   Ex celiotomy washout   Ex celiotomy, drain placement     BE showed leak. Returned to OR - bowel too inflammed to mobilize for ostomy. Drain placed. NPO.  Unasyn, Diflucan per ID.  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  Patient was taken back to the OR , but the bowel was too inflamed to mobilize for ostomy, drain was placed  -Drain cultures from  also growing a Candida krusei.  Will change fluconazole to IV micafungin 100 mg every 24 hours, continue IV Unasyn 3 g every 6 hours  Temp (24hrs), Av.7 °C (98 °F), Min:36.5 °C (97.7 °F), Max:36.8 °C (98.3 °F)  .  Recent Labs     21  0418 21  0439 21  1630 06/15/21  0306   SODIUM 133* 136  --  136   POTASSIUM 3.7 3.9  --  4.0   CHLORIDE 100 102  --  104   CO2   --     BUN 8 6*  --  9   CREATININE 0.48* 0.41*  --  0.35*   GLUCOSE 97 112*  --  104*   CALCIUM 7.3* 7.9*  --  8.0*   ASTSGOT 12 14  --  18   ALTSGPT <5 6  --  8   ALBUMIN 2.0* 2.4*  --  2.6*   TBILIRUBIN <0.2 <0.2  --  0.2   PHOSPHORUS 2.5 2.9  --  3.2   MAGNESIUM 1.9 1.9  --  1.9   PREALBUMIN  --   --  10.7*  --      Accu-Checks  Recent Labs     21  2034 21  1423 21  2224   POCGLUCOSE 115* 121* 123*       Vitals:    21 1608 21 2100 06/15/21 0100 06/15/21 0445   BP: 138/53 152/61 141/51 135/65    Weight:       Height:           Intake/Output Summary (Last 24 hours) at 6/15/2021 0847  Last data filed at 6/15/2021 0633  Gross per 24 hour   Intake 730 ml   Output 3446 ml   Net -2716 ml       Orders Placed This Encounter   Procedures   • Diet Order Diet: Clear Liquid     Standing Status:   Standing     Number of Occurrences:   1     Order Specific Question:   Diet:     Answer:   Clear Liquid [10]         TPN for past 72 hours (Show up to 3 orders; newest on the left. Changes between the two most recent orders are indicated.)     Start date and time   06/15/2021 2000 06/14/2021 2000 06/13/2021 2000      TPN Central Line Formulation [[id number]] TPN Central Line Formulation [[id number]] TPN Central Line Formulation [[id number]]    Order Status  Active Last Dose in Progress Completed    Last Admin   Rate Verify at 06/15/2021 0655 by Nancy Balbuena R.N. Rate Verify at 06/14/2021 1852 by Etta Valencia R.N.       Base    Clinisol 15%  110 g 110 g 82 g    dextrose 70%  275 g 275 g 210 g    fat emulsions 20%  45 g 45 g 34 g       Additives    potassium phosphate  15 mmol 15 mmol 15 mmol    potassium chloride  40 mEq 40 mEq 40 mEq    sodium acetate  150 mEq 150 mEq 150 mEq    sodium chloride  150 mEq 150 mEq 150 mEq    magnesium sulfate  8 mEq 6 mEq 6 mEq    calcium GLUConate  9.4 mEq 9.4 mEq 9.4 mEq    M.T.E.-4 Adult  1 mL 1 mL 1 mL    M.V.I. Adult  10 mL 10 mL 10 mL    famotidine  40 mg 40 mg 40 mg       QS Base    sterile water  466.08 mL 466.57 mL 801.13 mL       Energy Contribution    Proteins  -- -- --    Dextrose  -- -- --    Lipids  -- -- --    Total  -- -- --       Electrolyte Ion Calculated Amount    Sodium  300 mEq 300 mEq 300 mEq    Potassium  62 mEq 62 mEq 62 mEq    Calcium  9.4 mEq 9.4 mEq 9.4 mEq    Magnesium  8 mEq 6.01 mEq 6.01 mEq    Aluminum  -- -- --    Phosphate  15 mmol 15 mmol 15 mmol    Chloride  190 mEq 190 mEq 190 mEq    Acetate  243.13 mEq 243.13 mEq 219.43 mEq       Other    Total Protein   110 g 110 g 82 g    Total Protein/kg  1.5 g/kg 1.5 g/kg 1.12 g/kg    Total Amino Acid  -- -- --    Total Amino Acid/kg  -- -- --    Glucose Infusion Rate  2.6 mg/kg/min 2.6 mg/kg/min 1.98 mg/kg/min    Osmolarity (Estimated)  -- -- --    Volume  1,992 mL 1,992 mL 1,992 mL    Rate  83 mL/hr 83 mL/hr 83 mL/hr    Dosing Weight  73.5 kg 73.5 kg 73.5 kg    Infusion Site  [facility]            This formula provides:  % kcal as lipids = 25  Grams protein/kg = 1.5  Non-protein calories = 1385  Kcals/kg = 24.8  Total daily calories = 1825    Comments:  Continue macro-nutrients @ 100% goal. All lytes remain the same except increase MgSO4 to 8 mEq for serum Mg of 1.9. Pharmacy to continue to f/u and monitor per TPN protocol.        Mitch BuenrostroD.

## 2021-06-15 NOTE — PROGRESS NOTES
Trauma / Surgical Daily Progress Note    Date of Service  6/15/2021    Chief Complaint  50 y.o. female admitted 5/22/2021 with cecal volvulus    Interval Events  5/22 Exploratory celiotomy and right hemicolectomy with a side-to-side   ileocolic anastomosis  5/28 Exploratory celiotomy, resection of ileocolic anastomosis with re-do  side-to-side ileocolic anastomosis  6/4 Ex celiotomy washout  6/11 Ex celiotomy, drain placement    POD #4  Afebrile.  Tolerating clears and TPN. On Unasyn, micofungin per ID. Drains working.  Stooling.  Needs to mobilize. OOB more then in bed    Review of Systems  Review of Systems   Gastrointestinal: Positive for abdominal pain.        Passing some flatus        Vital Signs for last 24 hours  Temp:  [36.5 °C (97.7 °F)-36.8 °C (98.3 °F)] 36.8 °C (98.3 °F)  Pulse:  [77-93] 83  Resp:  [16-18] 16  BP: (135-152)/(51-65) 135/65  SpO2:  [96 %-99 %] 96 %    Hemodynamic parameters for last 24 hours       Respiratory Data     Respiration: 16, Pulse Oximetry: 96 %     Work Of Breathing / Effort: Within Normal Limits  RUL Breath Sounds: Clear, RML Breath Sounds: Clear, RLL Breath Sounds: Diminished, LUCITA Breath Sounds: Clear, LLL Breath Sounds: Diminished    Physical Exam  Physical Exam  Cardiovascular:      Rate and Rhythm: Normal rate.      Pulses: Normal pulses.   Pulmonary:      Effort: Pulmonary effort is normal.   Abdominal:      General: There is no distension.      Palpations: Abdomen is soft.      Tenderness: There is abdominal tenderness.      Comments: Wound vac in place.  Drains bilious   Genitourinary:     Comments: diuresing  Musculoskeletal:         General: Normal range of motion.      Cervical back: Neck supple.      Comments: Generalized edema   Skin:     General: Skin is warm and dry.   Neurological:      General: No focal deficit present.      Mental Status: She is alert.   Psychiatric:      Comments:           Laboratory  Recent Results (from the past 24 hour(s))   POCT glucose  device results    Collection Time: 06/14/21  2:23 PM   Result Value Ref Range    Glucose - Accu-Ck 121 (H) 65 - 99 mg/dL   Prealbumin    Collection Time: 06/14/21  4:30 PM   Result Value Ref Range    Pre-Albumin 10.7 (L) 18.0 - 38.0 mg/dL   POCT glucose device results    Collection Time: 06/14/21 10:24 PM   Result Value Ref Range    Glucose - Accu-Ck 123 (H) 65 - 99 mg/dL   Comp Metabolic Panel    Collection Time: 06/15/21  3:06 AM   Result Value Ref Range    Sodium 136 135 - 145 mmol/L    Potassium 4.0 3.6 - 5.5 mmol/L    Chloride 104 96 - 112 mmol/L    Co2 21 20 - 33 mmol/L    Anion Gap 11.0 7.0 - 16.0    Glucose 104 (H) 65 - 99 mg/dL    Bun 9 8 - 22 mg/dL    Creatinine 0.35 (L) 0.50 - 1.40 mg/dL    Calcium 8.0 (L) 8.4 - 10.2 mg/dL    AST(SGOT) 18 12 - 45 U/L    ALT(SGPT) 8 2 - 50 U/L    Alkaline Phosphatase 78 30 - 99 U/L    Total Bilirubin 0.2 0.1 - 1.5 mg/dL    Albumin 2.6 (L) 3.2 - 4.9 g/dL    Total Protein 6.3 6.0 - 8.2 g/dL    Globulin 3.7 (H) 1.9 - 3.5 g/dL    A-G Ratio 0.7 g/dL   MAGNESIUM    Collection Time: 06/15/21  3:06 AM   Result Value Ref Range    Magnesium 1.9 1.5 - 2.5 mg/dL   PHOSPHORUS    Collection Time: 06/15/21  3:06 AM   Result Value Ref Range    Phosphorus 3.2 2.5 - 4.5 mg/dL   ESTIMATED GFR    Collection Time: 06/15/21  3:06 AM   Result Value Ref Range    GFR If African American >60 >60 mL/min/1.73 m 2    GFR If Non African American >60 >60 mL/min/1.73 m 2   CBC WITH DIFFERENTIAL    Collection Time: 06/15/21  3:50 AM   Result Value Ref Range    WBC 15.8 (H) 4.8 - 10.8 K/uL    RBC 2.83 (L) 4.20 - 5.40 M/uL    Hemoglobin 8.0 (L) 12.0 - 16.0 g/dL    Hematocrit 24.9 (L) 37.0 - 47.0 %    MCV 88.0 81.4 - 97.8 fL    MCH 28.3 27.0 - 33.0 pg    MCHC 32.1 (L) 33.6 - 35.0 g/dL    RDW 48.5 35.9 - 50.0 fL    Platelet Count 421 164 - 446 K/uL    MPV 8.9 (L) 9.0 - 12.9 fL    Neutrophils-Polys 69.00 44.00 - 72.00 %    Lymphocytes 14.00 (L) 22.00 - 41.00 %    Monocytes 3.00 0.00 - 13.40 %    Eosinophils  0.00 0.00 - 6.90 %    Basophils 1.00 0.00 - 1.80 %    Nucleated RBC 1.30 /100 WBC    Neutrophils (Absolute) 11.38 (H) 2.00 - 7.15 K/uL    Lymphs (Absolute) 2.21 1.00 - 4.80 K/uL    Monos (Absolute) 0.47 0.00 - 0.85 K/uL    Eos (Absolute) 0.00 0.00 - 0.51 K/uL    Baso (Absolute) 0.16 (H) 0.00 - 0.12 K/uL    NRBC (Absolute) 0.20 K/uL    Hypochromia 1+    DIFFERENTIAL MANUAL    Collection Time: 06/15/21  3:50 AM   Result Value Ref Range    Bands-Stabs 3.00 0.00 - 10.00 %    Myelocytes 10.00 %    Manual Diff Status PERFORMED    PLATELET ESTIMATE    Collection Time: 06/15/21  3:50 AM   Result Value Ref Range    Plt Estimation Normal    MORPHOLOGY    Collection Time: 06/15/21  3:50 AM   Result Value Ref Range    RBC Morphology Present     Polychromia 1+        Fluids    Intake/Output Summary (Last 24 hours) at 6/15/2021 0629  Last data filed at 6/15/2021 0234  Gross per 24 hour   Intake 850 ml   Output 3778 ml   Net -2928 ml       Core Measures & Quality Metrics  Labs reviewed and Medications reviewed  Young catheter: No Young      DVT Prophylaxis: Enoxaparin (Lovenox)  DVT prophylaxis - mechanical: SCDs  Ulcer prophylaxis: Yes  Antibiotics: Treating active infection/contamination beyond 24 hours perioperative coverage  Assessed for rehab: Patient returned to prior level of function, rehabilitation not indicated at this time    ALICIA Score  ETOH Screening    Assessment/Plan  * Bacterial peritonitis (HCC)  Assessment & Plan  6/2 Zosyn stopped.  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 Passing flatus - start clears  5/27 - Stooled - will adv to full  liquids  5/27 - thrombocytopenia, bandemia, PM CT with ascites no freeair  5/28 - Exploratory celiotomy, resection of ileocolic anastomosis with re-do  side-to-side ileocolic anastomosis.  6/3  Stooling, advance to regular diet  6/4 Small wound dehiscence - returned to OR for washout and reclosure  6/9 Repeat CT showed two fluid collections - IR drains placed  6/11 BE shows leak - Ex lap, drain  Remains on unasyn, diflucan    DVT of axillary vein, acute right (HCC)  Assessment & Plan  Noted on US  On heparin gtt  6/6  Switched to lovenox BID       Discussed patient condition with RN and Patient.    CRITICAL CARE TIME EXCLUDING PROCEDURES: 20  minutes

## 2021-06-15 NOTE — CARE PLAN
Problem: Pain - Standard  Goal: Alleviation of pain or a reduction in pain to the patient’s comfort goal  Outcome: Progressing     Problem: Fall Risk  Goal: Patient will remain free from falls  Outcome: Progressing   The patient is Stable - Low risk of patient condition declining or worsening    Shift Goals  Clinical Goals: pain control and ambulation  Patient Goals: advance diet    Progress made toward(s) clinical / shift goals:  pain assessed and medicated per MAR. Pt didn't report pain during wound care due to medications administered. Pt ambulated without falling. Fall precautions in place.     Patient is not progressing towards the following goals:

## 2021-06-16 LAB
ANION GAP SERPL CALC-SCNC: 12 MMOL/L (ref 7–16)
BASOPHILS # BLD AUTO: 0 % (ref 0–1.8)
BASOPHILS # BLD: 0 K/UL (ref 0–0.12)
BUN SERPL-MCNC: 11 MG/DL (ref 8–22)
CALCIUM SERPL-MCNC: 8.2 MG/DL (ref 8.4–10.2)
CHLORIDE SERPL-SCNC: 104 MMOL/L (ref 96–112)
CO2 SERPL-SCNC: 18 MMOL/L (ref 20–33)
CREAT SERPL-MCNC: 0.35 MG/DL (ref 0.5–1.4)
EOSINOPHIL # BLD AUTO: 0.5 K/UL (ref 0–0.51)
EOSINOPHIL NFR BLD: 3 % (ref 0–6.9)
ERYTHROCYTE [DISTWIDTH] IN BLOOD BY AUTOMATED COUNT: 49.6 FL (ref 35.9–50)
GLUCOSE BLD-MCNC: 111 MG/DL (ref 65–99)
GLUCOSE SERPL-MCNC: 149 MG/DL (ref 65–99)
HCT VFR BLD AUTO: 25.6 % (ref 37–47)
HGB BLD-MCNC: 8 G/DL (ref 12–16)
HYPOCHROMIA BLD QL SMEAR: ABNORMAL
LYMPHOCYTES # BLD AUTO: 2.51 K/UL (ref 1–4.8)
LYMPHOCYTES NFR BLD: 15 % (ref 22–41)
MANUAL DIFF BLD: NORMAL
MCH RBC QN AUTO: 27.9 PG (ref 27–33)
MCHC RBC AUTO-ENTMCNC: 31.3 G/DL (ref 33.6–35)
MCV RBC AUTO: 89.2 FL (ref 81.4–97.8)
METAMYELOCYTES NFR BLD MANUAL: 4 %
MONOCYTES # BLD AUTO: 0.67 K/UL (ref 0–0.85)
MONOCYTES NFR BLD AUTO: 4 % (ref 0–13.4)
MYELOCYTES NFR BLD MANUAL: 3 %
NEUTROPHILS # BLD AUTO: 11.86 K/UL (ref 2–7.15)
NEUTROPHILS NFR BLD: 69 % (ref 44–72)
NEUTS BAND NFR BLD MANUAL: 2 % (ref 0–10)
NRBC # BLD AUTO: 0.17 K/UL
NRBC BLD-RTO: 1 /100 WBC
PLATELET # BLD AUTO: 417 K/UL (ref 164–446)
PLATELET BLD QL SMEAR: NORMAL
PMV BLD AUTO: 9.1 FL (ref 9–12.9)
POLYCHROMASIA BLD QL SMEAR: NORMAL
POTASSIUM SERPL-SCNC: 4 MMOL/L (ref 3.6–5.5)
RBC # BLD AUTO: 2.87 M/UL (ref 4.2–5.4)
RBC BLD AUTO: PRESENT
SODIUM SERPL-SCNC: 134 MMOL/L (ref 135–145)
WBC # BLD AUTO: 16.7 K/UL (ref 4.8–10.8)

## 2021-06-16 PROCEDURE — 85027 COMPLETE CBC AUTOMATED: CPT

## 2021-06-16 PROCEDURE — A9270 NON-COVERED ITEM OR SERVICE: HCPCS | Performed by: SURGERY

## 2021-06-16 PROCEDURE — A9270 NON-COVERED ITEM OR SERVICE: HCPCS | Performed by: FAMILY MEDICINE

## 2021-06-16 PROCEDURE — 80048 BASIC METABOLIC PNL TOTAL CA: CPT

## 2021-06-16 PROCEDURE — 700105 HCHG RX REV CODE 258: Performed by: INTERNAL MEDICINE

## 2021-06-16 PROCEDURE — 700101 HCHG RX REV CODE 250: Performed by: INTERNAL MEDICINE

## 2021-06-16 PROCEDURE — 700111 HCHG RX REV CODE 636 W/ 250 OVERRIDE (IP): Performed by: FAMILY MEDICINE

## 2021-06-16 PROCEDURE — 700111 HCHG RX REV CODE 636 W/ 250 OVERRIDE (IP): Performed by: INTERNAL MEDICINE

## 2021-06-16 PROCEDURE — 700102 HCHG RX REV CODE 250 W/ 637 OVERRIDE(OP): Performed by: INTERNAL MEDICINE

## 2021-06-16 PROCEDURE — 770006 HCHG ROOM/CARE - MED/SURG/GYN SEMI*

## 2021-06-16 PROCEDURE — 700102 HCHG RX REV CODE 250 W/ 637 OVERRIDE(OP): Performed by: SURGERY

## 2021-06-16 PROCEDURE — 99233 SBSQ HOSP IP/OBS HIGH 50: CPT | Performed by: INTERNAL MEDICINE

## 2021-06-16 PROCEDURE — 700102 HCHG RX REV CODE 250 W/ 637 OVERRIDE(OP): Performed by: FAMILY MEDICINE

## 2021-06-16 PROCEDURE — 82962 GLUCOSE BLOOD TEST: CPT

## 2021-06-16 PROCEDURE — 700111 HCHG RX REV CODE 636 W/ 250 OVERRIDE (IP): Performed by: NURSE PRACTITIONER

## 2021-06-16 PROCEDURE — 85007 BL SMEAR W/DIFF WBC COUNT: CPT

## 2021-06-16 PROCEDURE — A9270 NON-COVERED ITEM OR SERVICE: HCPCS | Performed by: INTERNAL MEDICINE

## 2021-06-16 RX ORDER — IBUPROFEN 600 MG/1
600 TABLET ORAL EVERY 6 HOURS
Status: DISCONTINUED | OUTPATIENT
Start: 2021-06-16 | End: 2021-06-21 | Stop reason: HOSPADM

## 2021-06-16 RX ORDER — ACETAMINOPHEN 325 MG/1
650 TABLET ORAL EVERY 6 HOURS
Status: DISCONTINUED | OUTPATIENT
Start: 2021-06-16 | End: 2021-06-21 | Stop reason: HOSPADM

## 2021-06-16 RX ADMIN — ACETAMINOPHEN 650 MG: 325 TABLET, FILM COATED ORAL at 09:03

## 2021-06-16 RX ADMIN — AMPICILLIN SODIUM AND SULBACTAM SODIUM 3 G: 2; 1 INJECTION, POWDER, FOR SOLUTION INTRAMUSCULAR; INTRAVENOUS at 12:14

## 2021-06-16 RX ADMIN — OXYCODONE HYDROCHLORIDE 10 MG: 10 TABLET ORAL at 01:03

## 2021-06-16 RX ADMIN — ACETAMINOPHEN 650 MG: 325 TABLET, FILM COATED ORAL at 16:30

## 2021-06-16 RX ADMIN — ACETAMINOPHEN 650 MG: 325 TABLET, FILM COATED ORAL at 13:09

## 2021-06-16 RX ADMIN — MORPHINE SULFATE 4 MG: 4 INJECTION INTRAVENOUS at 13:09

## 2021-06-16 RX ADMIN — AMPICILLIN SODIUM AND SULBACTAM SODIUM 3 G: 2; 1 INJECTION, POWDER, FOR SOLUTION INTRAMUSCULAR; INTRAVENOUS at 06:00

## 2021-06-16 RX ADMIN — AMPICILLIN SODIUM AND SULBACTAM SODIUM 3 G: 2; 1 INJECTION, POWDER, FOR SOLUTION INTRAMUSCULAR; INTRAVENOUS at 00:57

## 2021-06-16 RX ADMIN — LORAZEPAM 0.5 MG: 2 INJECTION INTRAMUSCULAR; INTRAVENOUS at 10:54

## 2021-06-16 RX ADMIN — OXYCODONE HYDROCHLORIDE 10 MG: 10 TABLET ORAL at 19:50

## 2021-06-16 RX ADMIN — POTASSIUM CHLORIDE: 2 INJECTION, SOLUTION, CONCENTRATE INTRAVENOUS at 20:44

## 2021-06-16 RX ADMIN — MICAFUNGIN SODIUM 100 MG: 20 INJECTION, POWDER, LYOPHILIZED, FOR SOLUTION INTRAVENOUS at 20:53

## 2021-06-16 RX ADMIN — AMPICILLIN SODIUM AND SULBACTAM SODIUM 3 G: 2; 1 INJECTION, POWDER, FOR SOLUTION INTRAMUSCULAR; INTRAVENOUS at 16:30

## 2021-06-16 RX ADMIN — DULOXETINE HYDROCHLORIDE 60 MG: 30 CAPSULE, DELAYED RELEASE ORAL at 06:00

## 2021-06-16 RX ADMIN — AMPICILLIN SODIUM AND SULBACTAM SODIUM 3 G: 2; 1 INJECTION, POWDER, FOR SOLUTION INTRAMUSCULAR; INTRAVENOUS at 23:37

## 2021-06-16 RX ADMIN — OXYCODONE HYDROCHLORIDE 10 MG: 10 TABLET ORAL at 09:04

## 2021-06-16 RX ADMIN — LORAZEPAM 0.5 MG: 2 INJECTION INTRAMUSCULAR; INTRAVENOUS at 16:31

## 2021-06-16 RX ADMIN — IBUPROFEN 600 MG: 600 TABLET ORAL at 13:10

## 2021-06-16 RX ADMIN — IBUPROFEN 600 MG: 600 TABLET ORAL at 18:00

## 2021-06-16 RX ADMIN — ENOXAPARIN SODIUM 80 MG: 80 INJECTION SUBCUTANEOUS at 16:30

## 2021-06-16 ASSESSMENT — ENCOUNTER SYMPTOMS
DIARRHEA: 0
ROS GI COMMENTS: PASSING SOME FLATUS
NAUSEA: 0
COUGH: 0
VOMITING: 0
DIARRHEA: 1
FEVER: 0
ABDOMINAL PAIN: 1
WEAKNESS: 1
CHILLS: 0
CONSTIPATION: 0
SHORTNESS OF BREATH: 0

## 2021-06-16 ASSESSMENT — PAIN DESCRIPTION - PAIN TYPE: TYPE: ACUTE PAIN;SURGICAL PAIN

## 2021-06-16 NOTE — CARE PLAN
Problem: Pain - Standard  Goal: Alleviation of pain or a reduction in pain to the patient’s comfort goal  Outcome: Progressing  Flowsheets  Taken 6/16/2021 0903 by Sofy Childress RJAZIEL.  Pain Rating Scale (NPRS): 7  Taken 6/15/2021 0520 by Etta Valencia RDarlynNDarlyn  Non Verbal Scale: Moaning  Taken 6/5/2021 0306 by Suzanne Mckeon RVANDANA  OB Pain Intervention: Rest  Taken 5/27/2021 0707 by Zachary Elizondo RVANDANA  Dumont-Ramsay Scale: 0   OB Pain Level: 1-Coping  Note: Pt educated on non-pharmacologic pain management measures. PRN pain medication available. Continuously assessing pts pain rating and need for intervention.       Problem: Fall Risk  Goal: Patient will remain free from falls  Outcome: Progressing  Educated pt on POC and fall risk. Assessed pts understanding of using call light for assistance. Fall precautions in place. Call light within reach, appropriate signage placed, treaded socks and bed alarm on.        Problem: Skin Integrity  Goal: Skin integrity is maintained or improved  Outcome: Progressing  Note: Appropriate preventative skin measures in place.  Pt receiving adequate nutrition.  Assessing skin regularly for breakdown. Mepilex and barrier cream used. Will continue to assess for skin breakdown.       The patient is Stable - Low risk of patient condition declining or worsening    Shift Goals  Clinical Goals: pain cntrl, mobilize  Patient Goals: rest    Progress made toward(s) clinical / shift goals:  prn pain meds available, purewick removed to encourage mobilization during day    Patient is not progressing towards the following goals:

## 2021-06-16 NOTE — PROGRESS NOTES
Infectious Disease Progress Note    Author: Thuy Ochoa M.D. Date & Time of service: 2021  1:36 PM    Chief Complaint:  Follow-up for intra-abdominal abscesses     Interval History:   patient is afebrile, white count 13.4.  Patient had worsening abdominal pain yesterday, noted purulent output during wound VAC change, repeat CT with multiple intra-abdominal abscesses, taken back to the OR this morning.   AF, O2 RA, doing well overall and states she ambulated twice this morning with normal bowel movement.  She has some abdominal pain with ambulation but otherwise minimal.    patient remains afebrile, had drains placed, white count 12.9, tolerating antimicrobials thus far.  Resting comfortably this morning.   patient remains afebrile, white count 15.8, additional procedures and change to antifungal as below   afebrile WBC 17.3 drain output decreasing.  Recorded 65 cc on . Ongoing abdoominal pain   afebrile, WBC 16.7.  Patient remains weak.  Has ongoing abdominal pain.  Eager to go home    Review of Systems:  Review of Systems   Constitutional: Positive for malaise/fatigue. Negative for chills and fever.   Respiratory: Negative for cough and shortness of breath.    Gastrointestinal: Positive for abdominal pain. Negative for constipation, diarrhea, nausea and vomiting.     Hemodynamics:  Temp (24hrs), Av.5 °C (97.7 °F), Min:36.4 °C (97.6 °F), Max:36.5 °C (97.7 °F)  Temperature: 36.4 °C (97.6 °F)  Pulse  Av.7  Min: 60  Max: 156   Blood Pressure: 134/81       Physical Exam:  Physical Exam  Constitutional:       General: She is not in acute distress.     Appearance: Normal appearance. She is ill-appearing. She is not toxic-appearing.   HENT:      Mouth/Throat:      Pharynx: No oropharyngeal exudate.   Eyes:      General: No scleral icterus.        Right eye: No discharge.         Left eye: No discharge.      Conjunctiva/sclera: Conjunctivae normal.   Cardiovascular:      Rate  and Rhythm: Normal rate and regular rhythm.      Heart sounds: Normal heart sounds.   Pulmonary:      Effort: Pulmonary effort is normal. No respiratory distress.      Breath sounds: No stridor.   Abdominal:      Palpations: Abdomen is soft.      Tenderness: There is abdominal tenderness. There is no guarding.      Comments: Midline incision with wound VAC and additional drain in place with mild amount of purulent appearing material in bulb   Musculoskeletal:      Cervical back: No rigidity.      Right lower leg: No edema.      Left lower leg: No edema.      Comments: Bilateral upper extremity midlines   Skin:     General: Skin is warm and dry.   Neurological:      General: No focal deficit present.      Mental Status: She is alert and oriented to person, place, and time.   Psychiatric:         Mood and Affect: Mood normal.         Behavior: Behavior normal.         Meds:    Current Facility-Administered Medications:   •  ibuprofen  •  acetaminophen  •  TPN Central Line Formulation  •  TPN Central Line Formulation  •  diphenhydrAMINE  •  lidocaine **OR** lidocaine  •  micafungin (MYCAMINE) ivpb  •  NS  •  MD Alert...TPN per Pharmacy  •  morphine injection  •  DULoxetine  •  topiramate  •  enoxaparin (LOVENOX) injection  •  oxyCODONE immediate-release  •  oxyCODONE immediate-release  •  morphine injection  •  ampicillin-sulbactam (UNASYN) IV  •  [DISCONTINUED] insulin regular **AND** [CANCELED] POC blood glucose manual result **AND** NOTIFY MD and PharmD **AND** glucose **AND** dextrose 50%  •  Metoprolol Tartrate  •  LORazepam  •  diazePAM  •  Respiratory Therapy Consult  •  Pharmacy Consult Request  •  [Held by provider] senna-docusate  •  [Held by provider] bisacodyl  •  ondansetron    Labs:  Recent Labs     06/14/21  0439 06/15/21  0350 06/16/21  0359   WBC 17.3* 15.8* 16.7*   RBC 2.78* 2.83* 2.87*   HEMOGLOBIN 7.6* 8.0* 8.0*   HEMATOCRIT 24.2* 24.9* 25.6*   MCV 87.1 88.0 89.2   MCH 27.3 28.3 27.9   RDW 48.2 48.5  49.6   PLATELETCT 492* 421 417   MPV 8.8* 8.9* 9.1   NEUTSPOLYS 78.00* 69.00 69.00   LYMPHOCYTES 7.00* 14.00* 15.00*   MONOCYTES 6.00 3.00 4.00   EOSINOPHILS 3.00 0.00 3.00   BASOPHILS 1.00 1.00 0.00   RBCMORPHOLO Present Present Present     Recent Labs     06/14/21 0439 06/15/21  0306 06/16/21  0359   SODIUM 136 136 134*   POTASSIUM 3.9 4.0 4.0   CHLORIDE 102 104 104   CO2 22 21 18*   GLUCOSE 112* 104* 149*   BUN 6* 9 11     Recent Labs     06/14/21  0439 06/15/21  0306 06/16/21  0359   ALBUMIN 2.4* 2.6*  --    TBILIRUBIN <0.2 0.2  --    ALKPHOSPHAT 81 78  --    TOTPROTEIN 6.1 6.3  --    ALTSGPT 6 8  --    ASTSGOT 14 18  --    CREATININE 0.41* 0.35* 0.35*       Imaging:  CT-ABDOMEN-PELVIS WITH    Result Date: 6/8/2021 6/8/2021 4:22 PM HISTORY/REASON FOR EXAM:  Peritonitis or perforation suspected; 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 Most recent abdominal surgery for washout of the peritoneal cavity performed on 6/4/2021. Ongoing generalized abdominal pain. TECHNIQUE/EXAM DESCRIPTION: CT scan of the abdomen and pelvis with contrast. Contrast-enhanced helical scanning was obtained from the diaphragmatic domes through the pubic symphysis following the bolus administration of 100 mL of Omnipaque 350 nonionic contrast without complication. Low dose optimization technique was utilized for this CT exam including automated exposure control and adjustment of the mA and/or kV according to patient size. COMPARISON: 6/3/2021 FINDINGS: The visualized lung bases demonstrate a small right and moderate left dependent pleural effusion with dependent airspace disease, likely atelectasis similar to the previous exam. There is no acute bony process. CT Abdomen: There is postoperative change in the anterior abdominal wall midline. There are a few tiny air lucencies in the subcutaneous tissues with abdominal wall  defect seen with the previous skin staples removed. Tiny amounts of intraperitoneal air identified within the anterior abdominal fluid. There is loculated intra-abdominal fluid with some seen in the left lateral subphrenic space anterior to the spleen measuring 4.4 x 2.1 cm (previously 4.6 x 2.7 cm). There is a small amount of loculated fluid in the right anterior upper quadrant also similar to the prior study. This appears to communicate with a more confluent area of fluid anteriorly within the peritoneal cavity beginning at the level of the iliac crest and extending into the upper pelvis. The largest component is in the right lower  quadrant measuring 4.9 cm in AP diameter, down from 6.5 cm. The fluid collection with rim enhancement and air lucencies in the posterior pelvis measures 6.2 x 3.5 cm (previously 7.2 x 3.9 cm). The liver is unremarkable. The spleen is unremarkable. The pancreas is unremarkable. The gallbladder demonstrates no stones. The adrenal glands are normal in size. The kidneys enhance symmetrically. The abdominal aorta is normal in caliber. There is no lymphadenopathy. The stomach is distended with some fluid and contrast material. There is postoperative change consistent with a right hemicolectomy. There is minimal dilatation of the left transverse colon at the anastomosis. CT Pelvis: There are no new pelvic fluid collections. There is mild diffuse body wall edema.     1.  Loculated rim-enhancing peritoneal fluid collections are again seen in the upper and lower quadrants as well as the posterior pelvis, all of which are very minimally decreased in size. 2.  There is postoperative change in the anterior abdominal wall with removal of the skin staples. 3.  There are postoperative changes of right hemicolectomy with very minimal dilatation of the transverse colon at the anastomosis. There is no bowel obstruction. 4.  Stable moderate left and small right dependent pleural effusions with dependent  atelectasis.    CT-ABDOMEN-PELVIS WITH    Result Date: 6/3/2021  6/3/2021 4:55 PM HISTORY/REASON FOR EXAM:  Abdominal abscess/infection suspected; wound dehiscense, r/o abscess. Postoperative. Recent volvulus with right hemicolectomy TECHNIQUE/EXAM DESCRIPTION:  CT scan of the abdomen and pelvis with contrast. Contrast-enhanced helical scanning was obtained from the diaphragmatic domes through the pubic symphysis following the bolus administration of nonionic contrast without complication. 100 mL of Omnipaque 350 nonionic contrast was administered without complication. Low dose optimization technique was utilized for this CT exam including automated exposure control and adjustment of the mA and/or kV according to patient size. COMPARISON: CT abdomen and pelvis 5/27/2021 FINDINGS: Osseous structures: There is bilateral atelectasis and there are small to moderate-sized bilateral pleural effusion. Lungs: Clear ABDOMEN: There is peritoneal fluid present posterior to the abdominal wall and in both paracolic gutter. This has some degree of loculation especially in the left paracolic gutter and could indicate infected fluid. Additionally, in the right lower quadrant there is a loculated fluid collection present measuring approximately 13.8 x 4.2 x 6.5 cm and this connects to the peritoneal fluid in the right paracolic gutter. There is loculated rim-enhancing fluid in the left subphrenic space measuring 8.4 cm in length and approximately 4.6 x 2.7 cm transversely. This connects to the fluid within the left paracolic gutter. There are recent postoperative changes with skin staple present and intraperitoneal air and fluid. LIVER: is normal in appearance. GALLBLADDER and BILIARY SYSTEM Gallbladder and biliary system are normal by CT assessment SPLEEN: Normal in appearance.. PANCREAS: Normal in appearance. The splenic vein and portal vein enhance normally. ADRENAL glands: Normal in appearance. RIGHT kidney: There is a  nonobstructive 1 to 2 mm right medial lower pole calculus. LEFT kidney: Normal in appearance. There is no hydronephrosis. BOWEL and MESENTERY: Their has been right hemicolectomy. AORTA and VASCULATURE: is normal in appearance. Pelvis: In the recto uterine space there is a loculated fluid collection measuring 6.6 x 7.2 x 5.1 cm consistent with an abscess. Uterus and adnexa appear normal.     1.  Multiple rim enhancing peritoneal fluid collection suspicious for abscess 2.  These include the rectouterine space measuring 6.6 x 7.2 x 5.1 cm, right lower quadrant measuring 13.8 x 4.2 x 6.5 cm, left subphrenic space measuring 8.4 x 4.6 x 2.7 cm, and contiguous loculated fluid within the right and left paracolic gutter 3.  Interval right colectomy 4.  Small-moderate-sized bilateral pleural effusion and atelectasis    CT-ABDOMEN-PELVIS WITH    Result Date: 5/27/2021 5/27/2021 5:48 PM HISTORY/REASON FOR EXAM: Diffuse abdominal pain and distention. TECHNIQUE/EXAM DESCRIPTION: CT scan of the abdomen and pelvis with contrast. Contrast-enhanced helical scanning was obtained from the diaphragmatic domes through the pubic symphysis following the bolus administration of 100 mL of Omnipaque 350 nonionic contrast without complication. Low dose optimization technique was utilized for this CT exam including automated exposure control and adjustment of the mA and/or kV according to patient size. COMPARISON: 5/22/2021 FINDINGS: CT Abdomen: There is new bibasilar atelectasis and small bilateral pleural effusions, left greater than right. There is fatty change of the liver. There is a small amount of free intraperitoneal air tracking along the surface of the liver. Gallbladder is distended with dense material present within the gallbladder. The spleen is normal. There is moderate right-sided hydronephrosis extending to the level of the upper sacrum. The right ureter is not identified below that level. No definite ureteral stone on the  right. There is left ureteral pelvic junction stone which measures 5 mm in size and results in minimal left renal pelvic dilatation. The adrenal glands are normal. The pancreas is normal. The aorta is normal in caliber. No evidence of retroperitoneal adenopathy. CT Pelvis: There is multifocal ascites seen which is likely loculated in nature. There are punctate areas of free air seen within those areas of ascites. There are surgical changes involving the cecum. The colon is diffusely dilated. There is a large amount of free fluid which contains pockets of gas within the pelvis. There is a recent midline incision.     1.  Moderate to large amount of ascites within the abdomen and pelvis some areas of which are loculated in nature. There are also punctate areas of gas within those areas of ascites as well as free intraperitoneal air. This may be related to recent surgical procedure however the degree of volume of ascites would be unusual for normal postoperative course. Consideration should be given for leakage of bowel content or other process. 2.  Diffuse colonic distention. 3.  Bibasilar atelectasis and pleural effusions. 4.  5 mm left renal pelvic stone which results in mild hydronephrosis above that level. 5.  Moderate right-sided hydronephrosis extending to the level of the upper sacrum. No ureteral stone. The ureter is not identified below that level. 6.  This was discussed with NEVA KAY at 6:44 PM on 5/27/2021.    CT-ABDOMEN-PELVIS WITH    Result Date: 5/22/2021 5/22/2021 11:19 AM HISTORY/REASON FOR EXAM:  Abdominal distension; IV contrast only. TECHNIQUE/EXAM DESCRIPTION:   CT scan of the abdomen and pelvis with contrast. Contrast-enhanced helical scanning was obtained from the diaphragmatic domes through the pubic symphysis following the bolus administration of nonionic contrast without complication. 100 mL of Omnipaque 350 nonionic contrast was administered without complication. Low dose optimization  technique was utilized for this CT exam including automated exposure control and adjustment of the mA and/or kV according to patient size. COMPARISON: 4/22/2020 FINDINGS: Chest Base: Lung bases are clear. Liver:  Normal. Gallbladder: Normal Biliary tract: Nondilated. Pancreas: Normal. Spleen: Normal. Adrenals: Normal. Kidneys and Collecting Systems:  Cannot exclude a punctate nonobstructing stone in the lower right renal collecting system. There is a small nonobstructing left renal stone measuring about 5 mm. Gastrointestinal tract:  The cecum is distended and displaced into the left upper quadrant. The ascending colon proximally is narrowed in the midline and there is a somewhat twisted appearance.   Mildly fluid distended distal small bowel without significant small bowel obstruction. The appendix is nonvisualized. Peritoneum: No free air or free fluid. Reproductive organs:  2.3 cm left adnexal hypodense lesion is indeterminate, possibly a dominant follicle/small cyst in a menstruating patient. Correlate clinically. Probable intramural fibroid in the fundus. Bladder:  Normal. Vessels:  Normal caliber. Lymph  Nodes:  No lymphadenopathy. Abdominal wall: Within normal limits. Bones:  No acute or aggressive abnormality.     1.  Findings keeping with cecal volvulus. 2.  Mildly fluid distended distal small bowel. 3.  No free air. 4.  Small nonobstructing renal stones. These findings were discussed with ESHA STERLING on 5/22/2021 11:45 AM.     [id number] VIEW    Result Date: 5/27/2021 5/27/2021 7:01 AM HISTORY/REASON FOR EXAM:  Distention. TECHNIQUE/EXAM DESCRIPTION AND NUMBER OF VIEWS:  1 view(s) of the abdomen. COMPARISON: 5/24/2021 FINDINGS: Gaseous distended  small bowel and colon is similar to prior study. There is some stool within the right colon. There is no significant interval change. No suspicious calcifications. No acute osseous abnormality.     Gaseous distended small bowel and colon is similar to  prior, likely ileus.    [id number] VIEW    Result Date: 5/24/2021 5/24/2021 11:28 AM HISTORY/REASON FOR EXAM:  Distention; s/p right hemicolectomy Lower abdominal pain s/p right hemicolectomy 05/22/21 TECHNIQUE/EXAM DESCRIPTION AND NUMBER OF VIEWS:  1 view(s) of the abdomen. COMPARISON: 5/22/2021 FINDINGS: Gaseous distention of the small bowel and colon No portal venous gas or pneumatosis. No definite free intraperitoneal air but evaluation is limited on supine radiograph.     Gaseous distention of the small bowel and colon, likely postoperative ileus.    DX-CHEST-FOR LINE PLACEMENT Perform procedure in: Other(comment f6 below): (PACU)    Result Date: 5/28/2021 5/28/2021 10:10 AM HISTORY/REASON FOR EXAM:  Central line placement. TECHNIQUE/EXAM DESCRIPTION AND NUMBER OF VIEWS: Single AP view of the chest. COMPARISON: None FINDINGS: There is a left IJ central line with the tip high in position above the superior vena cava within the area of the left brachiocephalic vein. No pneumothorax. NG tube extends into the stomach. There are patchy bilateral pulmonary infiltrates. The heart is mildly enlarged. There is no pleural effusion.     1.  Left IJ central line tip high in position located above the superior vena cava are within the left brachiocephalic vein. 2.  Patchy bilateral infiltrates. 3.  NG tube tip extends into the stomach.    US-EXTREMITY VENOUS UPPER BILAT    Result Date: 6/2/2021   Upper Extremity  Venous Duplex Report  Vascular Laboratory  CONCLUSIONS  Small thrombus seen around the catheter line at the RIGHT axillary vein and  proximal basilic vein.  No left upper extremity superficial and deep venous thrombosis.  HOANG EMERY  Exam Date:     06/02/2021 14:29  Room #:     Inpatient  Priority:     Routine  Ht (in):             Wt (lb):  Ordering Physician:        DEIDRA SNIDER  Referring Physician:       [id number], CAO  Sonographer:               Sharlene Morrell RVT,                              RDMS  Study Type:                Complete Bilateral  Technical Quality:         Fair  Age:    50    Gender:     F  MRN:    [id number]  :    1970      BSA:  Indications:     Edema  CPT Codes:       42817  ICD Codes:       782.3  History:         Swelling. No prior study  Limitations:  PROCEDURES:  Bilateral upper extremity venous duplex imaging.  The following venous structures were evaluated: internal jugular,  subclavian, axillary, brachial, cephalic and basilic veins.  FINDINGS:  Right upper extremity.  Small thrombus seen around the central line at the axillary vein and  proximal basilic vein.  All other veins of the right upper extremity demonstrate normal flow  dynamics with no evidence of deep vein thrombosis.  Left upper extremity.  Limited visualization of the internal jugular vein due to bandages.  All other veins of the left upper extremity demonstrate normal flow  dynamics with no evidence of deep vein thrombosis.  Doug Rossi MD  (Electronically Signed)  Final Date:      2021                   18:16    IR-MIDLINE CATHETER INSERTION WO GUIDANCE > AGE 3    Result Date: 2021  HISTORY/REASON FOR EXAM:  Midline Placement   TECHNIQUE/EXAM DESCRIPTION AND NUMBER OF VIEWS: Midline insertion with ultrasound guidance.  FINDINGS: Midline insertion with Ultrasound Guidance was performed by qualified nursing staff without the assistance of a Radiologist. Midline positioning as measured by RN or as appropriate length of catheter selected.              Ultrasound-guided midline placement performed by qualified nursing staff as above.       Micro:  Results     Procedure Component Value Units Date/Time    CULTURE WOUND W/ GRAM STAIN [[id number]]  (Abnormal) Collected: 21 1630    Order Status: Completed Specimen: Wound from KRISTI Drain Updated: 21 1100     Significant Indicator POS     Source WND     Site KRISTI DRAIN     Culture Result -     Gram Stain Result Moderate  WBCs.  Few Yeast.       Culture Result Candida albicans  Light growth        Candida krusei  Light growth        Bacteroides fragilis  Heavy growth      Narrative:      Collected By:[id number] EMELIQuill Content LIANA  Please collect culture when drain is placed to abdomen today  Collected By:[id number] SERGEYTONO LIANA    GRAM STAIN [[id number]] Collected: 06/09/21 1630    Order Status: Completed Specimen: Wound Updated: 06/10/21 1336     Significant Indicator .     Source WND     Site KRISTI DRAIN     Gram Stain Result Moderate WBCs.  Few Yeast.      Narrative:      Collected By:[id number] SERGEYCRISTOBALOSVALDO FLOWERA  Please collect culture when drain is placed to abdomen today  Collected By:[id number] SERGEYCRISTOBALQuill Content LIANA    Culture Wound w/Gram Stain [[id number]]  (Abnormal) Collected: 06/08/21 1425    Order Status: Completed Specimen: Wound from Abdominal Updated: 06/10/21 0938     Significant Indicator POS     Source WND     Site ABDOMINAL     Culture Result Rare growth usual skin coco.     Gram Stain Result Moderate WBCs.  No organisms seen.       Culture Result Bacteroides fragilis  Light growth      Narrative:      Collected By:[id number] SERGEYCRISTOBALQuill Content LIANA  Collected By:[id number] ITCTONO GAINES            Assessment/Hospital course:  This is a very pleasant 50-year-old female patient, originally admitted on 5/22/2021 with cecal volvulus for which she underwent a right hemicolectomy on 5/22.  This was complicated by intra-abdominal abscesses and free air concerning for leak.  She was taken to the OR for exploratory laparotomy and found to have necrosis of the ileocolonic anastomosis site, bowel is eviscerated and redo side-to-side ileocolic anastomosis was done.    Patient went back to the OR on 6/4 due to drainage from wound and concern for fascial dehiscence.  Per op note there is loosening of the fascia but no yessy dehiscence.  Abdomen was washed out including pockets noted on last CT.  Anastomosis was not evaluated as was scarred and no evidence of  leak.     Pertinent Diagnoses:  Sepsis  Intra-abdominal abscesses, persistent  Large midline incision, with significant feculent drainage   Feculent peritonitis  Anastomotic leak, ongoing  Cecal volvulus, sequelae  Leukocytosis, persistent  TPN dependent     Plan:  -OR cultures from 5/28 growing Bacteroides, Actinomyces, E faecalis  -Patient had worsening abdominal pain and a repeat CT scan obtained 6/3 showed multiple rim-enhancing peritoneal fluid collections several quite large and loculated.  Patient was taken back to the OR for exploratory laparotomy on 6/4 as discussed above, no new cultures were obtained.    -Repeat CT abdomen and pelvis 6/8 was ordered with postop change in anterior abdominal wall, few tiny air lucencies and tiny amounts of intraperitoneal air identified within the anterior abdominal fluid.  Loculated intra-abdominal fluid in the left lateral splenic space measuring 4.4 x 2.1 cm, small amount of loculated fluid in the right anterior upper quadrant also similar to prior study.  This appears to communicate with fluid anterior to the peritoneal cavity measuring 4.9 x 6.5 cm.  Fluid collection with rim enhancement in the posterior pelvis now 6.2 x 3.5 cm.  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  Patient was taken back to the OR 6/11, but the bowel was too inflamed to mobilize for ostomy, drains were placed      --- Continue IV Unasyn 3 g every 6 hours and IV micafungin 100 mg every 24 hours  -Plan for repeat CT scan today  -Continue to monitor drain output  --- Will follow along with general surgery  -Duration of antibiotics will depend on results of repeat CT scan    Discussed with internal medicine,    Luzma HANLEY will follow.

## 2021-06-16 NOTE — PROGRESS NOTES
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 Returned to OR - bowel too inflammed to mobilize for ostomy. Drain placed. NPO.  Unasyn, Diflucan per ID.  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  Patient was taken back to the OR , but the bowel was too inflamed to mobilize for ostomy, drain was placed  -Drain cultures from  also growing a Candida krusei.  Will change fluconazole to IV micafungin 100 mg every 24 hours, continue IV Unasyn 3 g every 6 hours  Temp (24hrs), Av.6 °C (97.9 °F), Min:36.5 °C (97.7 °F), Max:36.7 °C (98.1 °F)  .  Recent Labs     21  0439 21  1630 06/15/21  0306 21  0359   SODIUM 136  --  136 134*   POTASSIUM 3.9  --  4.0 4.0   CHLORIDE 102  --  104 104   CO2  18*   BUN 6*  --  9 11   CREATININE 0.41*  --  0.35* 0.35*   GLUCOSE 112*  --  104* 149*   CALCIUM 7.9*  --  8.0* 8.2*   ASTSGOT 14  --  18  --    ALTSGPT 6  --  8  --    ALBUMIN 2.4*  --  2.6*  --    TBILIRUBIN <0.2  --  0.2  --    PHOSPHORUS 2.9  --  3.2  --    MAGNESIUM 1.9  --  1.9  --    PREALBUMIN  --  10.7*  --   --      Accu-Checks  Recent Labs     21  2034 21  1423 21  2224   POCGLUCOSE 115* 121* 123*       Vitals:    06/15/21 0445 06/15/21 1040 06/15/21 1244 06/15/21 1700   BP: 135/65 142/73 131/67 138/68   Weight:       Height:           Intake/Output Summary  (Last 24 hours) at 6/16/2021 1101  Last data filed at 6/15/2021 1900  Gross per 24 hour   Intake --   Output 1040 ml   Net -1040 ml       Orders Placed This Encounter   Procedures   • Diet Order Diet: Clear Liquid     Standing Status:   Standing     Number of Occurrences:   1     Order Specific Question:   Diet:     Answer:   Clear Liquid [10]         TPN for past 72 hours (Show up to 3 orders; newest on the left. Changes between the two most recent orders are indicated.)     Start date and time   06/16/2021 2000 06/15/2021 2000 06/14/2021 2000      TPN Central Line Formulation [[id number]] TPN Central Line Formulation [[id number]] TPN Central Line Formulation [[id number]]    Order Status  Active Last Dose in Progress Completed    Last Admin   New Bag at 06/15/2021 2025 by Sonja Burgess R.N. Rate Verify at 06/15/2021 1924 by Sonja Burgess R.N.       Base    Clinisol 15%  110 g 110 g 110 g    dextrose 70%  275 g 275 g 275 g    fat emulsions 20%  45 g 45 g 45 g       Additives    potassium phosphate  15 mmol 15 mmol 15 mmol    potassium chloride  40 mEq 40 mEq 40 mEq    sodium acetate  150 mEq 150 mEq 150 mEq    sodium chloride  150 mEq 150 mEq 150 mEq    magnesium sulfate  8 mEq 8 mEq 6 mEq    calcium GLUConate  9.4 mEq 9.4 mEq 9.4 mEq    M.T.E.-4 Adult  1 mL 1 mL 1 mL    M.V.I. Adult  10 mL 10 mL 10 mL    famotidine  40 mg 40 mg 40 mg       QS Base    sterile water  466.08 mL 466.08 mL 466.57 mL       Energy Contribution    Proteins  -- -- --    Dextrose  -- -- --    Lipids  -- -- --    Total  -- -- --       Electrolyte Ion Calculated Amount    Sodium  300 mEq 300 mEq 300 mEq    Potassium  62 mEq 62 mEq 62 mEq    Calcium  9.4 mEq 9.4 mEq 9.4 mEq    Magnesium  8 mEq 8 mEq 6.01 mEq    Aluminum  -- -- --    Phosphate  15 mmol 15 mmol 15 mmol    Chloride  190 mEq 190 mEq 190 mEq    Acetate  243.13 mEq 243.13 mEq 243.13 mEq       Other    Total Protein  110 g 110 g 110 g    Total Protein/kg  1.5 g/kg 1.5 g/kg  1.5 g/kg    Total Amino Acid  -- -- --    Total Amino Acid/kg  -- -- --    Glucose Infusion Rate  2.6 mg/kg/min 2.6 mg/kg/min 2.6 mg/kg/min    Osmolarity (Estimated)  -- -- --    Volume  1,992 mL 1,992 mL 1,992 mL    Rate  83 mL/hr 83 mL/hr 83 mL/hr    Dosing Weight  73.5 kg 73.5 kg 73.5 kg    Infusion Site  [facility]            This formula provides:  % kcal as lipids = 25  Grams protein/kg = 1.5  Non-protein calories = 1385  Kcals/kg = 24.8  Total daily calories = 1825    Comments:  Continue TPN same formula, same rate as previous TPN  Labs Thursday per protocol.      Rafa Lopez [facility]

## 2021-06-16 NOTE — WOUND TEAM
[facility]  Inpatient Services  Wound and Skin Care Evaluation    Admission Date: 5/22/2021     Last order of IP CONSULT TO WOUND CARE was found on 6/1/2021 from Hospital Encounter on 5/22/2021     HPI, PMH, SH: Reviewed    Past Surgical History:   Procedure Laterality Date   • PB EXPLORATORY OF ABDOMEN  6/11/2021    Procedure: LAPAROTOMY, EXPLORATORY;  Surgeon: Maryam Wood M.D.;  Location: [facility];  Service: General   • ILEOSTOMY  6/11/2021    Procedure: WOUND VAC PLACEMENT ;  Surgeon: Maryam Wood M.D.;  Location: [facility];  Service: General   • PB EXPLORATORY OF ABDOMEN N/A 6/4/2021    Procedure: LAPAROTOMY, EXPLORATORY, WITH WASH OUT;  Surgeon: Maryam Wood M.D.;  Location: [facility];  Service: General   • PB EXPLORATORY OF ABDOMEN N/A 5/28/2021    Procedure: LAPAROTOMY, EXPLORATORY- RESECTION OF ILEOCECAL ANASTATMOSIS.;  Surgeon: Maryam Wood M.D.;  Location: [facility];  Service: General   • PB EXPLORATORY OF ABDOMEN  5/22/2021    Procedure: LAPAROTOMY, EXPLORATORY;  Surgeon: Maryam Wood M.D.;  Location: [facility];  Service: General   • HEMICOLECTOMY, RIGHT  5/22/2021    Procedure: HEMICOLECTOMY, RIGHT, SIDE TO SIDE ANASTOMOSIS;  Surgeon: Maryam Wood M.D.;  Location: [facility];  Service: General   • SHOULDER DECOMPRESSION ARTHROSCOPIC Right 12/24/2018    Procedure: SHOULDER DECOMPRESSION ARTHROSCOPIC - SUBACROMIAL;  Surgeon: Tristian Garcia M.D.;  Location: [facility];  Service: Orthopedics   • CLAVICLE DISTAL EXCISION Left 12/24/2018    Procedure: CLAVICLE DISTAL EXCISION;  Surgeon: Tristian Garcia M.D.;  Location: [facility];  Service: Orthopedics   • SHOULDER ARTHROSCOPY W/ BICIPITAL TENODESIS REPAIR Left 12/24/2018    Procedure: SHOULDER ARTHROSCOPY W/ BICIPITAL TENODESIS REPAIR - AND PACKER;  Surgeon: Tristian Garcia M.D.;  Location: [facility];   "Service: Orthopedics   • SHOULDER MANIPULATION Left 2018    Procedure: SHOULDER MANIPULATION;  Surgeon: Tristian Garcia M.D.;  Location: [facility];  Service: Orthopedics   • SHOULDER ARTHROSCOPY W/ BICIPITAL TENODESIS REPAIR Right 6/15/2015    Procedure: SHOULDER ARTHROSCOPY W/ BICIPITAL TENODESIS, SYNOVECTOMY;  Surgeon: Tristian Garcia M.D.;  Location: SUR[facility];  Service:    • CLAVICLE DISTAL EXCISION Right 6/15/2015    Procedure: CLAVICLE DISTAL EXCISION;  Surgeon: Tristian Garcia M.D.;  Location: SUR[facility];  Service:    • SHOULDER DECOMPRESSION Right 6/15/2015    Procedure: SHOULDER DECOMPRESSION MINI INCISION ;  Surgeon: Tristian Garcia M.D.;  Location: SUR[facility];  Service:    • OTHER      nose   • APPENDECTOMY     • PB  DELIVERY ONLY       Social History     Tobacco Use   • Smoking status: Former Smoker     Years: 15.00     Quit date: 2005     Years since quittin.4   • Smokeless tobacco: Never Used   • Tobacco comment: 3-4 years ago   Substance Use Topics   • Alcohol use: No     Alcohol/week: 0.0 oz     Chief Complaint   Patient presents with   • Abdominal Pain     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 Per Dr Grier, \" Prevena was still functioning, but there was more than expected output in it.  It was removed and there was leakage from the incision.  Several staples removed due to underlying fat necrosis.  No evidence of infection or fascial dehiscence\"    :  Wound VAC blocking,  Dressing removed and found to have copious oily purulent drainage to proximal tract.   Dr. Glass and Dr. Grier updated.    : Pt went to Sx  and had VAC placed. Drsg intact, foam stapled in place.     : Pt returned to OR for diverting ileostomy, however, bowel was too " inflamed to mobilized and Malecot drain was place.    WOUND ASSESSMENT/LDA         Negative Pressure Wound Therapy 06/12/21 Surgical Abdomen (Active)   NPWT Pump Mode / Pressure Setting Continuous;125 mmHg    Dressing Type Medium;Black Foam (Regular);White Foam    Number of Foam Pieces Used 6    Canister Changed No    NEXT Dressing Change/Treatment Date 06/17/21            Wound 06/12/21 Full Thickness Wound Abdomen Midline NPWT with Malecot drain (Active)      06/15/21 1515   Site Assessment Red    Periwound Assessment Intact    Margins Unattached edges    Closure Secondary intention    Drainage Amount Small    Drainage Description Serosanguineous    Treatments Cleansed    Wound Cleansing Normal Saline Irrigation    Periwound Protectant Skin Protectant Wipes to Periwound;Drape    Dressing Cleansing/Solutions Not Applicable    Dressing Options Wound Vac    Dressing Changed Changed    Dressing Status Intact    Dressing Change/Treatment Frequency Tuesday, Thursday, Saturday, and As Needed    NEXT Dressing Change/Treatment Date 06/17/21    NEXT Weekly Photo (Inpatient Only) 06/22/21    Non-staged Wound Description Full thickness    Wound Length (cm) 19.5 cm 06/15/21 1515   Wound Width (cm) 2.2 cm 06/15/21 1515   Wound Depth (cm) 2 cm 06/15/21 1515   Wound Surface Area (cm^2) 42.9 cm^2 06/15/21 1515   Wound Volume (cm^3) 85.8 cm^3 06/15/21 1515   Wound Healing % -18 06/15/21 1515   Tunneling (cm) 0 cm 06/12/21 0800   Shape linear    Wound Odor None    Exposed Structures Other Malencot drain           Vascular:    GEE:   No results found.    Lab Values:    Lab Results   Component Value Date/Time    WBC 15.8 (H) 06/15/2021 03:50 AM    RBC 2.83 (L) 06/15/2021 03:50 AM    HEMOGLOBIN 8.0 (L) 06/15/2021 03:50 AM    HEMATOCRIT 24.9 (L) 06/15/2021 03:50 AM    CREACTPROT 24.76 (H) 05/28/2021 04:07 AM      Culture Results show:  Recent Results (from the past 720 hour(s))   CULTURE WOUND W/ GRAM STAIN    Collection Time: 06/09/21   4:30 PM    Specimen: KRISTI Drain; Wound   Result Value Ref Range    Significant Indicator POS (POS)     Source WND     Site KRISTI DRAIN     Culture Result - (A)     Gram Stain Result Moderate WBCs.  Few Yeast.       Culture Result Candida albicans  Light growth   (A)     Culture Result Candida krusei  Light growth   (A)     Culture Result Bacteroides fragilis  Heavy growth   (A)        Pain Level/Medicated:  IV morphine, topical lidocaine tolerated okay    INTERVENTIONS BY WOUND TEAM:  Chart and images reviewed. Discussed with bedside RN. This RN in to assess patient. Performed standard wound care which includes appropriate positioning, dressing removal and non-selective debridement.     Preparation for Dressing removal: dressing moistened with NS and lidocaine. 15 staples removed.  Cleansed with:  NS  and gauze.  Sharp debridement: NA  Jenn wound: Cleansed with NS, Prepped with No Sting skin prep and drape  Primary Dressing: 3 segments white foam to wound bed. 1 half thick spiral black foam to remaining depth. Mepitel and paste ring to base of Malecot to obtain better seal.  Secondary (Outer) Dressing: button, drape and tracpad to foam    Interdisciplinary consultation: Patient,  Bedside RN    EVALUATION / RATIONALE FOR TREATMENT:  Most Recent Date:  6/15: Pt's wound improving, still has some brown output into canister almost same amount out in dd.  Wound bed red with some granulation. Continuing POC.     6/12: Communicated with Dr. Wood on 6/11 via [employer]. Ok to resume wound vac. Malecot on mid abdominal wound, sutured in placed and connected to down drain bag. No abnormal structures, fecal matter or discolored drainaige noted in wound. Partial granular growth noted. Pt has 2 KRISTI drain on RUQ and RLQ. RLQ KRISTI appears to be collecting fluid from wound bed as saline used to cleanse wound immediately collected in KRISTI. It is likely there will be less output in RLQ KRISTI now that vac is in place. Pt tolerated well with current  pain med regimen.    6/10/21: Wound with multiple areas of stool draining. Fistula's? Unable to segregate areas to apply NPWT. Wound manager to wall suction and dakins moistened roll gauze to wound to assist in granular tissue development. Will plan to return on Saturday or Sunday and than again on Tuesday or Wednesday.  6/8/21: Pt's Vac was alarming so in to see cause. NPWT with o mmHg, tan drainage in cannister. Drsg saturated. Removed drsg. With removal of drsg proximal part of wound had large brown tan drainage with some small solid pieces. Pt continued to have copious drainage. Dr Lopez and Dr Wood were communicated with to report development. Placed wet to dry drsg. Pt up to bathroom and when back to bed drsg was saturated so changed again. Rinsed abdominal binder and hung to dry.  Drsg orders changed and culture was collected before cleaning with dakins. It appears that proximal part of wound bed has opened again, there is 1.3 cm of depth. Pt to go for CT scan.  6/7/21: Pt's VAC with smaller drsg stapled in place. Pain present with staple removal. Bedside RN holding pt's hand and providing comfort. Wound bed  than last week also decreased depth.   06/03/21:  Patient with large/copious amounts of purulent oily drainage from proximal tract.  Dakin's ordered and CT order per MD.  Wound team to follow up on Saturday for possible VAC re-pplacement.   6/1/21: Pt has full thickness surgical wound to abd. Applied wound vac because NPWT encourages granulation tissue growth, maintains optimal moisture needed for wound healing, and promotes angiogenesis.     Goals: Steady decrease in wound area and depth weekly.    WOUND TEAM PLAN OF CARE ([X] for frequency of wound follow up,):   Nursing to follow orders written for wound care. Contact wound team if area fails to progress, deteriorates or with any questions/concerns  Dressing changes by wound team:                   Follow up 3 times weekly:                 NPWT change 3 times weekly:  Follow up 1-2 times weekly:   X   Follow up Bi-Monthly:                   Follow up as needed:     Other (explain):     NURSING PLAN OF CARE ORDERS (X):  Dressing changes: See Dressing Care orders:x  Skin care: See Skin Care orders:  RN Prevention Protocol: present  Rectal tube care: See Rectal Tube Care orders:   Other orders:      Anticipated discharge plans:   LTACH:    X    SNF/Rehab:                  Home Health Care:           Outpatient Wound Center:            Self/Family Care:        Other:

## 2021-06-16 NOTE — CARE PLAN
The patient is Stable - Low risk of patient condition declining or worsening    Shift Goals  Clinical Goals: pain control  Patient Goals: rest    Progress made toward(s) clinical / shift goals:  Patient denies pain at this time. Pain reassessed PRN, medications administered as needed.     Patient is not progressing towards the following goals:      Problem: Knowledge Deficit - Standard  Goal: Patient and family/care givers will demonstrate understanding of plan of care, disease process/condition, diagnostic tests and medications  Patient demonstrates appropriate knowledge of disease process/condition, treatment plan, diagnostic tests, and medications.Patient is compliant and asks appropriate questions about disease process and POC.   Outcome: Progressing     Problem: Pain - Standard  Goal: Alleviation of pain or a reduction in pain to the patient’s comfort goal  Patient pain controlled with medication. Pain reassessed PRN, medications administered as needed.   Outcome: Progressing     Problem: Fall Risk  Goal: Patient will remain free from falls  Safety measures in place, bed in lowest position, side rails up x2, bed alarm on, call light in place, patient is free from falls at this time.   Outcome: Progressing

## 2021-06-16 NOTE — PROGRESS NOTES
[facility] Medicine Daily Progress Note    Date of Service  6/16/2021    Chief Complaint  51 y.o. female admitted 5/22/2021 with abd pain    Hospital Course  49 yo woman who presented with abd pain and CT showed cecal volvulus. She underwent exploratory celiotomy and right hemicolectomy with ileocolic anastomosis with Dr. Wood 5/22/21. She developed thrombocytopenia and ascites and returned for ex lap with resection of ileocolic anastomosis and wound vac placement 5/28/21. She was admitted to ICU, started on zosyn for bacterial peritonitis and given platelet transfusions. HIT panel was negative. Abd cultures grew actinomyces, enterococcus, bacteroides. Doppler showed catheter related thrombus in R arm and started on anticoagulation. She had additional washout in the OR with Dr. Wood 6/4/21 and remains on unasyn and fluconazole. Wound vac remains in place. She was then found with anastomotic leak and underwent malecot drain placement 6/11/21. She was started on TPN for malnutrition.    Interval Problem Update  WBC 16  Still with low oral intake  Drain with 40cc recorded  CT ordered today  She is ambulating less, encourage to continue to keep up her strength    Consultants/Specialty  Critical care  Surgery  ID    Code Status  Full Code    Disposition  LTACH    Review of Systems  Review of Systems   Constitutional: Positive for malaise/fatigue.   Gastrointestinal: Positive for abdominal pain and diarrhea. Negative for constipation and vomiting.   Neurological: Positive for weakness.   All other systems reviewed and are negative.       Physical Exam  Temp:  [36.5 °C (97.7 °F)-36.7 °C (98.1 °F)] 36.5 °C (97.7 °F)  Pulse:  [77-90] 77  Resp:  [17] 17  BP: (131-142)/(67-73) 138/68  SpO2:  [96 %-100 %] 96 %    Physical Exam  Vitals and nursing note reviewed.   Constitutional:       Appearance: She is not ill-appearing, toxic-appearing or diaphoretic.      Comments: fatigued   HENT:      Head: Normocephalic.       Mouth/Throat:      Mouth: Mucous membranes are dry.   Eyes:      General:         Right eye: No discharge.         Left eye: No discharge.   Cardiovascular:      Rate and Rhythm: Normal rate and regular rhythm.   Pulmonary:      Effort: Pulmonary effort is normal. No respiratory distress.      Breath sounds: No wheezing or rales.   Abdominal:      Tenderness: There is abdominal tenderness. There is no guarding or rebound.      Comments: Central wound vac and drain in place   Musculoskeletal:         General: No swelling.      Cervical back: Neck supple.   Skin:     General: Skin is warm and dry.      Coloration: Skin is pale.   Neurological:      Mental Status: She is alert.      Comments: AOx4         Fluids    Intake/Output Summary (Last 24 hours) at 6/16/2021 1012  Last data filed at 6/15/2021 1900  Gross per 24 hour   Intake --   Output 1040 ml   Net -1040 ml       Laboratory  Recent Labs     06/14/21  0439 06/15/21  0350 06/16/21  0359   WBC 17.3* 15.8* 16.7*   RBC 2.78* 2.83* 2.87*   HEMOGLOBIN 7.6* 8.0* 8.0*   HEMATOCRIT 24.2* 24.9* 25.6*   MCV 87.1 88.0 89.2   MCH 27.3 28.3 27.9   MCHC 31.4* 32.1* 31.3*   RDW 48.2 48.5 49.6   PLATELETCT 492* 421 417   MPV 8.8* 8.9* 9.1     Recent Labs     06/14/21  0439 06/15/21  0306 06/16/21  0359   SODIUM 136 136 134*   POTASSIUM 3.9 4.0 4.0   CHLORIDE 102 104 104   CO2 22 21 18*   GLUCOSE 112* 104* 149*   BUN 6* 9 11   CREATININE 0.41* 0.35* 0.35*   CALCIUM 7.9* 8.0* 8.2*             Recent Labs     06/14/21  0439   TRIGLYCERIDE 164*       Imaging  IR-PICC LINE PLACEMENT W/ GUIDANCE > AGE 5   Final Result                  Ultrasound-guided PICC placement performed by qualified nursing staff as    above.          EC-ECHOCARDIOGRAM COMPLETE W/O CONT   Final Result      DX-BARIUM ENEMA   Final Result      1.  Extensive ileocolic anastomotic leakage extending into the peritoneal cavity and extending through the enterocutaneous fistula to the skin surface            2.  Right  hemicolectomy      3.  Findings were discussed with NEVA KAY on 6/11/2021 12:21 PM.      DX-CHEST-PORTABLE (1 VIEW)   Final Result      1.  The cardiac silhouette is enlarged and there are changes most consistent with vascular congestion/edema with a trace of left pleural effusion.   2.  Superimposed pneumonitis and/or atelectasis is also possible.      CT-DRAIN-PERITONEAL   Final Result      1.  CT guided right lower quadrant abscess and pelvic abscess catheter drainage.   2.  The current plan is to obtain a follow-up CT in 57 days..      CT-ABDOMEN-PELVIS WITH   Final Result      1.  Loculated rim-enhancing peritoneal fluid collections are again seen in the upper and lower quadrants as well as the posterior pelvis, all of which are very minimally decreased in size.   2.  There is postoperative change in the anterior abdominal wall with removal of the skin staples.   3.  There are postoperative changes of right hemicolectomy with very minimal dilatation of the transverse colon at the anastomosis. There is no bowel obstruction.   4.  Stable moderate left and small right dependent pleural effusions with dependent atelectasis.      CT-ABDOMEN-PELVIS WITH   Final Result      1.  Multiple rim enhancing peritoneal fluid collection suspicious for abscess      2.  These include the rectouterine space measuring 6.6 x 7.2 x 5.1 cm, right lower quadrant measuring 13.8 x 4.2 x 6.5 cm, left subphrenic space measuring 8.4 x 4.6 x 2.7 cm, and contiguous loculated fluid within the right and left paracolic gutter      3.  Interval right colectomy      4.  Small-moderate-sized bilateral pleural effusion and atelectasis      US-EXTREMITY VENOUS UPPER BILAT   Final Result      IR-MIDLINE CATHETER INSERTION WO GUIDANCE > AGE 3   Final Result                  Ultrasound-guided midline placement performed by qualified nursing staff    as above.          DX-CHEST-FOR LINE PLACEMENT Perform procedure in: Other(comment f6 below):  (PACU)   Final Result      1.  Left IJ central line tip high in position located above the superior vena cava are within the left brachiocephalic vein.      2.  Patchy bilateral infiltrates.      3.  NG tube tip extends into the stomach.      CT-ABDOMEN-PELVIS WITH   Final Result      1.  Moderate to large amount of ascites within the abdomen and pelvis some areas of which are loculated in nature. There are also punctate areas of gas within those areas of ascites as well as free intraperitoneal air. This may be related to recent    surgical procedure however the degree of volume of ascites would be unusual for normal postoperative course. Consideration should be given for leakage of bowel content or other process.      2.  Diffuse colonic distention.      3.  Bibasilar atelectasis and pleural effusions.      4.  5 mm left renal pelvic stone which results in mild hydronephrosis above that level.      5.  Moderate right-sided hydronephrosis extending to the level of the upper sacrum. No ureteral stone. The ureter is not identified below that level.      6.  This was discussed with NEVA KAY at 6:44 PM on 5/27/2021.      EG-ERYVMCC-7 VIEW   Final Result      Gaseous distended small bowel and colon is similar to prior, likely ileus.      AU-SESPPLA-1 VIEW   Final Result         Gaseous distention of the small bowel and colon, likely postoperative ileus.      CT-ABDOMEN-PELVIS WITH   Final Result         1.  Findings keeping with cecal volvulus.   2.  Mildly fluid distended distal small bowel.   3.  No free air.   4.  Small nonobstructing renal stones.   These findings were discussed with ESHA STERLING on 5/22/2021 11:45 AM.                       Assessment/Plan  * Bacterial peritonitis (HCC)  Assessment & Plan  - s/p ileocecal resection/redo 5/28/21 and washout 6/4/2, drain placement on 6/9 by IR. Developed anastomotic leak seen on barium enema 6/11 and went to the OR - ileostomy could not be performed due to  inflammation of the bowel. Drain was placed and abdomen washed out.  - Wound cultures initially with Bacteroides, Actinomyces, E faecalis. Was on Unasyn and Diflucan.  Culture from when pt began having purulent discharge on 6/8 with bacteroides, culture from IR drain placement 6/9 with bacteroides/candida albicans and krusei.  Diflucan changed to Micafungin.  - Appreciate Surgery and ID   - Per ID - Continue IV Unasyn & IV micafungin  CT AP today  Will Continue to monitor drain output    Protein-calorie malnutrition, moderate (HCC)  Assessment & Plan  - Albumin level of 2.0, TPN initiated and tolerating   - PICC in place  - Boost plus supplements ordered between meals     Acute hypoxic respiratory failure (HCC)  Assessment & Plan  - Secondary to pulmonary edema from third spacing secondary to hypoalbuminemia  - resolved, on r/a    Anemia  Assessment & Plan  - Hgb slowly trending down, not indicative of acute bleed but likely due to ACD and return trips to the OR.  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

## 2021-06-17 ENCOUNTER — APPOINTMENT (OUTPATIENT)
Dept: RADIOLOGY | Facility: MEDICAL CENTER | Age: 51
DRG: 329 | End: 2021-06-17
Attending: SURGERY
Payer: COMMERCIAL

## 2021-06-17 LAB
ALBUMIN SERPL BCP-MCNC: 2.8 G/DL (ref 3.2–4.9)
ALBUMIN/GLOB SERPL: 0.7 G/DL
ALP SERPL-CCNC: 77 U/L (ref 30–99)
ALT SERPL-CCNC: 9 U/L (ref 2–50)
ANION GAP SERPL CALC-SCNC: 12 MMOL/L (ref 7–16)
ANISOCYTOSIS BLD QL SMEAR: ABNORMAL
AST SERPL-CCNC: 17 U/L (ref 12–45)
BASOPHILS # BLD AUTO: 0.5 % (ref 0–1.8)
BASOPHILS # BLD: 0.08 K/UL (ref 0–0.12)
BILIRUB SERPL-MCNC: <0.2 MG/DL (ref 0.1–1.5)
BUN SERPL-MCNC: 15 MG/DL (ref 8–22)
CALCIUM SERPL-MCNC: 8.3 MG/DL (ref 8.4–10.2)
CHLORIDE SERPL-SCNC: 103 MMOL/L (ref 96–112)
CO2 SERPL-SCNC: 20 MMOL/L (ref 20–33)
CREAT SERPL-MCNC: 0.37 MG/DL (ref 0.5–1.4)
EOSINOPHIL # BLD AUTO: 0.57 K/UL (ref 0–0.51)
EOSINOPHIL NFR BLD: 3.5 % (ref 0–6.9)
ERYTHROCYTE [DISTWIDTH] IN BLOOD BY AUTOMATED COUNT: 50 FL (ref 35.9–50)
GLOBULIN SER CALC-MCNC: 3.8 G/DL (ref 1.9–3.5)
GLUCOSE BLD-MCNC: 104 MG/DL (ref 65–99)
GLUCOSE BLD-MCNC: 84 MG/DL (ref 65–99)
GLUCOSE SERPL-MCNC: 89 MG/DL (ref 65–99)
HCT VFR BLD AUTO: 25.2 % (ref 37–47)
HGB BLD-MCNC: 7.9 G/DL (ref 12–16)
LYMPHOCYTES # BLD AUTO: 1.79 K/UL (ref 1–4.8)
LYMPHOCYTES NFR BLD: 11 % (ref 22–41)
MAGNESIUM SERPL-MCNC: 1.9 MG/DL (ref 1.5–2.5)
MANUAL DIFF BLD: NORMAL
MCH RBC QN AUTO: 27.5 PG (ref 27–33)
MCHC RBC AUTO-ENTMCNC: 31.3 G/DL (ref 33.6–35)
MCV RBC AUTO: 87.8 FL (ref 81.4–97.8)
METAMYELOCYTES NFR BLD MANUAL: 1.5 %
MICROCYTES BLD QL SMEAR: ABNORMAL
MONOCYTES # BLD AUTO: 1.47 K/UL (ref 0–0.85)
MONOCYTES NFR BLD AUTO: 9 % (ref 0–13.4)
MYELOCYTES NFR BLD MANUAL: 1.5 %
NEUTROPHILS # BLD AUTO: 11.9 K/UL (ref 2–7.15)
NEUTROPHILS NFR BLD: 70.5 % (ref 44–72)
NEUTS BAND NFR BLD MANUAL: 2.5 % (ref 0–10)
NRBC # BLD AUTO: 0.11 K/UL
NRBC BLD-RTO: 0.7 /100 WBC
OVALOCYTES BLD QL SMEAR: NORMAL
PHOSPHATE SERPL-MCNC: 3.4 MG/DL (ref 2.5–4.5)
PLATELET # BLD AUTO: 379 K/UL (ref 164–446)
PLATELET BLD QL SMEAR: NORMAL
PMV BLD AUTO: 9.4 FL (ref 9–12.9)
POIKILOCYTOSIS BLD QL SMEAR: NORMAL
POLYCHROMASIA BLD QL SMEAR: NORMAL
POTASSIUM SERPL-SCNC: 4.2 MMOL/L (ref 3.6–5.5)
PROT SERPL-MCNC: 6.6 G/DL (ref 6–8.2)
RBC # BLD AUTO: 2.87 M/UL (ref 4.2–5.4)
RBC BLD AUTO: PRESENT
SODIUM SERPL-SCNC: 135 MMOL/L (ref 135–145)
TRIGL SERPL-MCNC: 143 MG/DL (ref 0–149)
WBC # BLD AUTO: 16.3 K/UL (ref 4.8–10.8)

## 2021-06-17 PROCEDURE — 74177 CT ABD & PELVIS W/CONTRAST: CPT

## 2021-06-17 PROCEDURE — 700102 HCHG RX REV CODE 250 W/ 637 OVERRIDE(OP): Performed by: INTERNAL MEDICINE

## 2021-06-17 PROCEDURE — 85027 COMPLETE CBC AUTOMATED: CPT

## 2021-06-17 PROCEDURE — 700105 HCHG RX REV CODE 258: Performed by: INTERNAL MEDICINE

## 2021-06-17 PROCEDURE — 700111 HCHG RX REV CODE 636 W/ 250 OVERRIDE (IP): Performed by: INTERNAL MEDICINE

## 2021-06-17 PROCEDURE — 99232 SBSQ HOSP IP/OBS MODERATE 35: CPT | Performed by: INTERNAL MEDICINE

## 2021-06-17 PROCEDURE — 82962 GLUCOSE BLOOD TEST: CPT

## 2021-06-17 PROCEDURE — 84478 ASSAY OF TRIGLYCERIDES: CPT

## 2021-06-17 PROCEDURE — 700102 HCHG RX REV CODE 250 W/ 637 OVERRIDE(OP): Performed by: SURGERY

## 2021-06-17 PROCEDURE — 83735 ASSAY OF MAGNESIUM: CPT

## 2021-06-17 PROCEDURE — A9270 NON-COVERED ITEM OR SERVICE: HCPCS | Performed by: NURSE PRACTITIONER

## 2021-06-17 PROCEDURE — 99233 SBSQ HOSP IP/OBS HIGH 50: CPT | Performed by: INTERNAL MEDICINE

## 2021-06-17 PROCEDURE — 84100 ASSAY OF PHOSPHORUS: CPT

## 2021-06-17 PROCEDURE — 700111 HCHG RX REV CODE 636 W/ 250 OVERRIDE (IP): Performed by: FAMILY MEDICINE

## 2021-06-17 PROCEDURE — 700101 HCHG RX REV CODE 250: Performed by: INTERNAL MEDICINE

## 2021-06-17 PROCEDURE — 770006 HCHG ROOM/CARE - MED/SURG/GYN SEMI*

## 2021-06-17 PROCEDURE — 80053 COMPREHEN METABOLIC PANEL: CPT

## 2021-06-17 PROCEDURE — 700117 HCHG RX CONTRAST REV CODE 255: Performed by: SURGERY

## 2021-06-17 PROCEDURE — A9270 NON-COVERED ITEM OR SERVICE: HCPCS | Performed by: SURGERY

## 2021-06-17 PROCEDURE — 700101 HCHG RX REV CODE 250: Performed by: FAMILY MEDICINE

## 2021-06-17 PROCEDURE — 85007 BL SMEAR W/DIFF WBC COUNT: CPT

## 2021-06-17 PROCEDURE — A9270 NON-COVERED ITEM OR SERVICE: HCPCS | Performed by: INTERNAL MEDICINE

## 2021-06-17 PROCEDURE — 700102 HCHG RX REV CODE 250 W/ 637 OVERRIDE(OP): Performed by: NURSE PRACTITIONER

## 2021-06-17 RX ADMIN — MICAFUNGIN SODIUM 100 MG: 20 INJECTION, POWDER, LYOPHILIZED, FOR SOLUTION INTRAVENOUS at 20:18

## 2021-06-17 RX ADMIN — ENOXAPARIN SODIUM 80 MG: 80 INJECTION SUBCUTANEOUS at 17:32

## 2021-06-17 RX ADMIN — IBUPROFEN 600 MG: 600 TABLET ORAL at 23:07

## 2021-06-17 RX ADMIN — AMPICILLIN SODIUM AND SULBACTAM SODIUM 3 G: 2; 1 INJECTION, POWDER, FOR SOLUTION INTRAMUSCULAR; INTRAVENOUS at 23:08

## 2021-06-17 RX ADMIN — ACETAMINOPHEN 650 MG: 325 TABLET, FILM COATED ORAL at 12:04

## 2021-06-17 RX ADMIN — TOPIRAMATE 100 MG: 100 TABLET, FILM COATED ORAL at 20:12

## 2021-06-17 RX ADMIN — IOHEXOL 100 ML: 350 INJECTION, SOLUTION INTRAVENOUS at 13:32

## 2021-06-17 RX ADMIN — POTASSIUM CHLORIDE: 2 INJECTION, SOLUTION, CONCENTRATE INTRAVENOUS at 20:33

## 2021-06-17 RX ADMIN — IBUPROFEN 600 MG: 600 TABLET ORAL at 12:04

## 2021-06-17 RX ADMIN — AMPICILLIN SODIUM AND SULBACTAM SODIUM 3 G: 2; 1 INJECTION, POWDER, FOR SOLUTION INTRAMUSCULAR; INTRAVENOUS at 05:16

## 2021-06-17 RX ADMIN — OXYCODONE HYDROCHLORIDE 10 MG: 10 TABLET ORAL at 05:16

## 2021-06-17 RX ADMIN — DIPHENHYDRAMINE HCL 50 MG: 25 TABLET ORAL at 20:30

## 2021-06-17 RX ADMIN — ENOXAPARIN SODIUM 80 MG: 80 INJECTION SUBCUTANEOUS at 05:17

## 2021-06-17 RX ADMIN — AMPICILLIN SODIUM AND SULBACTAM SODIUM 3 G: 2; 1 INJECTION, POWDER, FOR SOLUTION INTRAMUSCULAR; INTRAVENOUS at 12:04

## 2021-06-17 RX ADMIN — IBUPROFEN 600 MG: 600 TABLET ORAL at 05:16

## 2021-06-17 RX ADMIN — ACETAMINOPHEN 650 MG: 325 TABLET, FILM COATED ORAL at 17:32

## 2021-06-17 RX ADMIN — AMPICILLIN SODIUM AND SULBACTAM SODIUM 3 G: 2; 1 INJECTION, POWDER, FOR SOLUTION INTRAMUSCULAR; INTRAVENOUS at 17:32

## 2021-06-17 RX ADMIN — IOHEXOL 25 ML: 240 INJECTION, SOLUTION INTRATHECAL; INTRAVASCULAR; INTRAVENOUS; ORAL at 13:34

## 2021-06-17 RX ADMIN — IBUPROFEN 600 MG: 600 TABLET ORAL at 17:32

## 2021-06-17 RX ADMIN — ACETAMINOPHEN 650 MG: 325 TABLET, FILM COATED ORAL at 23:07

## 2021-06-17 RX ADMIN — ACETAMINOPHEN 650 MG: 325 TABLET, FILM COATED ORAL at 05:16

## 2021-06-17 ASSESSMENT — PAIN DESCRIPTION - PAIN TYPE
TYPE: ACUTE PAIN;SURGICAL PAIN
TYPE: ACUTE PAIN;SURGICAL PAIN

## 2021-06-17 ASSESSMENT — ENCOUNTER SYMPTOMS
WEAKNESS: 1
VOMITING: 0
DIARRHEA: 1
CONSTIPATION: 0
COUGH: 0
ABDOMINAL PAIN: 1
DIARRHEA: 0
ROS GI COMMENTS: PASSING SOME FLATUS
NAUSEA: 0
FEVER: 0
CHILLS: 0
SHORTNESS OF BREATH: 0

## 2021-06-17 NOTE — PROGRESS NOTES
Trauma / Surgical Daily Progress Note    Date of Service  6/17/2021    Chief Complaint  50 y.o. female admitted 5/22/2021 with cecal volvulus    Interval Events  5/22 Exploratory celiotomy and right hemicolectomy with a side-to-side   ileocolic anastomosis  5/28 Exploratory celiotomy, resection of ileocolic anastomosis with re-do  side-to-side ileocolic anastomosis  6/4 Ex celiotomy washout  6/11 Ex celiotomy, drain placement    POD #6 Afebrile.  Tolerating clears and TPN. On Unasyn, micofungin per ID. Drains working.  Stooling.  Needs to mobilize. OOB more then in bed. Pending CT    Review of Systems  Review of Systems   Gastrointestinal: Positive for abdominal pain.        Passing some flatus        Vital Signs for last 24 hours  Temp:  [36.4 °C (97.6 °F)-36.9 °C (98.5 °F)] 36.9 °C (98.4 °F)  Pulse:  [80-94] 81  Resp:  [16-20] 18  BP: (133-137)/(66-81) 136/75  SpO2:  [98 %-100 %] 98 %    Hemodynamic parameters for last 24 hours       Respiratory Data     Respiration: 18, Pulse Oximetry: 98 %     Work Of Breathing / Effort: Within Normal Limits  RUL Breath Sounds: Clear, RML Breath Sounds: Clear, RLL Breath Sounds: Diminished, LUCITA Breath Sounds: Clear, LLL Breath Sounds: Diminished    Physical Exam  Physical Exam  Cardiovascular:      Rate and Rhythm: Normal rate.      Pulses: Normal pulses.   Pulmonary:      Effort: Pulmonary effort is normal.   Abdominal:      General: There is no distension.      Palpations: Abdomen is soft.      Tenderness: There is abdominal tenderness.      Comments: Wound vac in place.  Drains bilious   Genitourinary:     Comments: diuresing  Musculoskeletal:         General: Normal range of motion.      Cervical back: Neck supple.      Comments: Generalized edema   Skin:     General: Skin is warm and dry.   Neurological:      General: No focal deficit present.      Mental Status: She is alert.   Psychiatric:      Comments:           Laboratory  Recent Results (from the past 24 hour(s))    POCT glucose device results    Collection Time: 06/16/21  5:15 PM   Result Value Ref Range    Glucose - Accu-Ck 111 (H) 65 - 99 mg/dL   CBC WITH DIFFERENTIAL    Collection Time: 06/17/21  3:33 AM   Result Value Ref Range    WBC 16.3 (H) 4.8 - 10.8 K/uL    RBC 2.87 (L) 4.20 - 5.40 M/uL    Hemoglobin 7.9 (L) 12.0 - 16.0 g/dL    Hematocrit 25.2 (L) 37.0 - 47.0 %    MCV 87.8 81.4 - 97.8 fL    MCH 27.5 27.0 - 33.0 pg    MCHC 31.3 (L) 33.6 - 35.0 g/dL    RDW 50.0 35.9 - 50.0 fL    Platelet Count 379 164 - 446 K/uL    MPV 9.4 9.0 - 12.9 fL    Neutrophils-Polys 70.50 44.00 - 72.00 %    Lymphocytes 11.00 (L) 22.00 - 41.00 %    Monocytes 9.00 0.00 - 13.40 %    Eosinophils 3.50 0.00 - 6.90 %    Basophils 0.50 0.00 - 1.80 %    Nucleated RBC 0.70 /100 WBC    Neutrophils (Absolute) 11.90 (H) 2.00 - 7.15 K/uL    Lymphs (Absolute) 1.79 1.00 - 4.80 K/uL    Monos (Absolute) 1.47 (H) 0.00 - 0.85 K/uL    Eos (Absolute) 0.57 (H) 0.00 - 0.51 K/uL    Baso (Absolute) 0.08 0.00 - 0.12 K/uL    NRBC (Absolute) 0.11 K/uL    Anisocytosis 1+     Microcytosis 1+    Phosphorus: Every Monday and Thursday AM    Collection Time: 06/17/21  3:33 AM   Result Value Ref Range    Phosphorus 3.4 2.5 - 4.5 mg/dL   Magnesium: Every Monday and Thursday AM    Collection Time: 06/17/21  3:33 AM   Result Value Ref Range    Magnesium 1.9 1.5 - 2.5 mg/dL   Comp Metabolic Panel    Collection Time: 06/17/21  3:33 AM   Result Value Ref Range    Sodium 135 135 - 145 mmol/L    Potassium 4.2 3.6 - 5.5 mmol/L    Chloride 103 96 - 112 mmol/L    Co2 20 20 - 33 mmol/L    Anion Gap 12.0 7.0 - 16.0    Glucose 89 65 - 99 mg/dL    Bun 15 8 - 22 mg/dL    Creatinine 0.37 (L) 0.50 - 1.40 mg/dL    Calcium 8.3 (L) 8.4 - 10.2 mg/dL    AST(SGOT) 17 12 - 45 U/L    ALT(SGPT) 9 2 - 50 U/L    Alkaline Phosphatase 77 30 - 99 U/L    Total Bilirubin <0.2 0.1 - 1.5 mg/dL    Albumin 2.8 (L) 3.2 - 4.9 g/dL    Total Protein 6.6 6.0 - 8.2 g/dL    Globulin 3.8 (H) 1.9 - 3.5 g/dL    A-G Ratio 0.7  g/dL   Triglyceride    Collection Time: 06/17/21  3:33 AM   Result Value Ref Range    Triglycerides 143 0 - 149 mg/dL   ESTIMATED GFR    Collection Time: 06/17/21  3:33 AM   Result Value Ref Range    GFR If African American >60 >60 mL/min/1.73 m 2    GFR If Non African American >60 >60 mL/min/1.73 m 2   DIFFERENTIAL MANUAL    Collection Time: 06/17/21  3:33 AM   Result Value Ref Range    Bands-Stabs 2.50 0.00 - 10.00 %    Metamyelocytes 1.50 %    Myelocytes 1.50 %    Manual Diff Status PERFORMED    PLATELET ESTIMATE    Collection Time: 06/17/21  3:33 AM   Result Value Ref Range    Plt Estimation Normal    MORPHOLOGY    Collection Time: 06/17/21  3:33 AM   Result Value Ref Range    RBC Morphology Present     Polychromia 1+     Poikilocytosis 1+     Ovalocytes 1+        Fluids    Intake/Output Summary (Last 24 hours) at 6/17/2021 0653  Last data filed at 6/17/2021 0537  Gross per 24 hour   Intake 876.17 ml   Output 29 ml   Net 847.17 ml       Core Measures & Quality Metrics  Labs reviewed and Medications reviewed  Young catheter: No Young      DVT Prophylaxis: Enoxaparin (Lovenox)  DVT prophylaxis - mechanical: SCDs  Ulcer prophylaxis: Yes  Antibiotics: Treating active infection/contamination beyond 24 hours perioperative coverage  Assessed for rehab: Patient returned to prior level of function, rehabilitation not indicated at this time    ALICIA Score  ETOH Screening    Assessment/Plan  * Bacterial peritonitis (HCC)  Assessment & Plan  6/2 Zosyn stopped.  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 Passing flatus - start clears  5/27 - Stooled - will  adv to full liquids  5/27 - thrombocytopenia, bandemia, PM CT with ascites no freeair  5/28 - Exploratory celiotomy, resection of ileocolic anastomosis with re-do  side-to-side ileocolic anastomosis.  6/3  Stooling, advance to regular diet  6/4 Small wound dehiscence - returned to OR for washout and reclosure  6/9 Repeat CT showed two fluid collections - IR drains placed  6/11 BE shows leak - Ex lap, drain  6/16 - plan CT in AM  Remains on unasyn, diflucan    DVT of axillary vein, acute right (HCC)  Assessment & Plan  Noted on US  On heparin gtt  6/6  Switched to lovenox BID       Discussed patient condition with RN and Patient.    CRITICAL CARE TIME EXCLUDING PROCEDURES: 20  minutes

## 2021-06-17 NOTE — PROGRESS NOTES
Received report from nightshift RN and assumed care of patient at 0700. Patient is sleeping with no signs of distress. Call light in reach. Bed in lowest locked position. Hourly rounding to continue.

## 2021-06-17 NOTE — PROGRESS NOTES
Infectious Disease Progress Note    Author: Thuy Ochoa M.D. Date & Time of service: 2021  8:49 AM    Chief Complaint:  Follow-up for intra-abdominal abscesses     Interval History:   patient is afebrile, white count 13.4.  Patient had worsening abdominal pain yesterday, noted purulent output during wound VAC change, repeat CT with multiple intra-abdominal abscesses, taken back to the OR this morning.   AF, O2 RA, doing well overall and states she ambulated twice this morning with normal bowel movement.  She has some abdominal pain with ambulation but otherwise minimal.    patient remains afebrile, had drains placed, white count 12.9, tolerating antimicrobials thus far.  Resting comfortably this morning.   patient remains afebrile, white count 15.8, additional procedures and change to antifungal as below   afebrile WBC 17.3 drain output decreasing.  Recorded 65 cc on . Ongoing abdoominal pain   afebrile, WBC 16.7.  Patient remains weak.  Has ongoing abdominal pain.  Eager to go home   afebrile WBC 16.3.  Plan for repeat CT scan today.  Drain output decreasing overall.  Friend at bedside visiting.  No new symptoms to report.  Work with physical therapy yesterday      Review of Systems:  Review of Systems   Constitutional: Positive for malaise/fatigue. Negative for chills and fever.   Respiratory: Negative for cough and shortness of breath.    Gastrointestinal: Positive for abdominal pain. Negative for constipation, diarrhea, nausea and vomiting.     Hemodynamics:  Temp (24hrs), Av.7 °C (98.1 °F), Min:36.4 °C (97.6 °F), Max:36.9 °C (98.5 °F)  Temperature: 36.9 °C (98.4 °F)  Pulse  Av.5  Min: 60  Max: 156   Blood Pressure:  (pt refused)       Physical Exam:  Physical Exam  Constitutional:       General: She is not in acute distress.     Appearance: Normal appearance. She is ill-appearing. She is not toxic-appearing.   HENT:      Mouth/Throat:      Pharynx: No  oropharyngeal exudate.   Eyes:      General: No scleral icterus.        Right eye: No discharge.         Left eye: No discharge.      Conjunctiva/sclera: Conjunctivae normal.   Cardiovascular:      Rate and Rhythm: Normal rate and regular rhythm.      Heart sounds: Normal heart sounds.   Pulmonary:      Effort: Pulmonary effort is normal. No respiratory distress.      Breath sounds: No stridor.   Abdominal:      Palpations: Abdomen is soft.      Tenderness: There is abdominal tenderness. There is no guarding.      Comments: Midline incision with wound VAC and additional drain in place with mild amount of purulent fluid in bulb   Musculoskeletal:      Cervical back: No rigidity.      Right lower leg: No edema.      Left lower leg: No edema.      Comments: Bilateral upper extremity midlines   Skin:     General: Skin is warm and dry.      Coloration: Skin is pale.   Neurological:      General: No focal deficit present.      Mental Status: She is alert and oriented to person, place, and time.   Psychiatric:         Mood and Affect: Mood normal.         Behavior: Behavior normal.         Meds:    Current Facility-Administered Medications:   •  TPN Central Line Formulation  •  ibuprofen  •  acetaminophen  •  TPN Central Line Formulation  •  diphenhydrAMINE  •  lidocaine **OR** lidocaine  •  micafungin (MYCAMINE) ivpb  •  NS  •  MD Alert...TPN per Pharmacy  •  morphine injection  •  DULoxetine  •  topiramate  •  enoxaparin (LOVENOX) injection  •  oxyCODONE immediate-release  •  oxyCODONE immediate-release  •  morphine injection  •  ampicillin-sulbactam (UNASYN) IV  •  [DISCONTINUED] insulin regular **AND** [CANCELED] POC blood glucose manual result **AND** NOTIFY MD and PharmD **AND** glucose **AND** dextrose 50%  •  Metoprolol Tartrate  •  LORazepam  •  diazePAM  •  Respiratory Therapy Consult  •  Pharmacy Consult Request  •  [Held by provider] senna-docusate  •  [Held by provider] bisacodyl  •   ondansetron    Labs:  Recent Labs     06/15/21  0350 06/16/21  0359 06/17/21  0333   WBC 15.8* 16.7* 16.3*   RBC 2.83* 2.87* 2.87*   HEMOGLOBIN 8.0* 8.0* 7.9*   HEMATOCRIT 24.9* 25.6* 25.2*   MCV 88.0 89.2 87.8   MCH 28.3 27.9 27.5   RDW 48.5 49.6 50.0   PLATELETCT 421 417 379   MPV 8.9* 9.1 9.4   NEUTSPOLYS 69.00 69.00 70.50   LYMPHOCYTES 14.00* 15.00* 11.00*   MONOCYTES 3.00 4.00 9.00   EOSINOPHILS 0.00 3.00 3.50   BASOPHILS 1.00 0.00 0.50   RBCMORPHOLO Present Present Present     Recent Labs     06/15/21  0306 06/16/21  0359 06/17/21  0333   SODIUM 136 134* 135   POTASSIUM 4.0 4.0 4.2   CHLORIDE 104 104 103   CO2 21 18* 20   GLUCOSE 104* 149* 89   BUN 9 11 15     Recent Labs     06/15/21  0306 06/16/21  0359 06/17/21  0333   ALBUMIN 2.6*  --  2.8*   TBILIRUBIN 0.2  --  <0.2   ALKPHOSPHAT 78  --  77   TOTPROTEIN 6.3  --  6.6   ALTSGPT 8  --  9   ASTSGOT 18  --  17   CREATININE 0.35* 0.35* 0.37*       Imaging:  CT-ABDOMEN-PELVIS WITH    Result Date: 6/8/2021 6/8/2021 4:22 PM HISTORY/REASON FOR EXAM:  Peritonitis or perforation suspected; 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 Most recent abdominal surgery for washout of the peritoneal cavity performed on 6/4/2021. Ongoing generalized abdominal pain. TECHNIQUE/EXAM DESCRIPTION: CT scan of the abdomen and pelvis with contrast. Contrast-enhanced helical scanning was obtained from the diaphragmatic domes through the pubic symphysis following the bolus administration of 100 mL of Omnipaque 350 nonionic contrast without complication. Low dose optimization technique was utilized for this CT exam including automated exposure control and adjustment of the mA and/or kV according to patient size. COMPARISON: 6/3/2021 FINDINGS: The visualized lung bases demonstrate a small right and moderate left dependent pleural effusion with dependent airspace disease,  likely atelectasis similar to the previous exam. There is no acute bony process. CT Abdomen: There is postoperative change in the anterior abdominal wall midline. There are a few tiny air lucencies in the subcutaneous tissues with abdominal wall defect seen with the previous skin staples removed. Tiny amounts of intraperitoneal air identified within the anterior abdominal fluid. There is loculated intra-abdominal fluid with some seen in the left lateral subphrenic space anterior to the spleen measuring 4.4 x 2.1 cm (previously 4.6 x 2.7 cm). There is a small amount of loculated fluid in the right anterior upper quadrant also similar to the prior study. This appears to communicate with a more confluent area of fluid anteriorly within the peritoneal cavity beginning at the level of the iliac crest and extending into the upper pelvis. The largest component is in the right lower  quadrant measuring 4.9 cm in AP diameter, down from 6.5 cm. The fluid collection with rim enhancement and air lucencies in the posterior pelvis measures 6.2 x 3.5 cm (previously 7.2 x 3.9 cm). The liver is unremarkable. The spleen is unremarkable. The pancreas is unremarkable. The gallbladder demonstrates no stones. The adrenal glands are normal in size. The kidneys enhance symmetrically. The abdominal aorta is normal in caliber. There is no lymphadenopathy. The stomach is distended with some fluid and contrast material. There is postoperative change consistent with a right hemicolectomy. There is minimal dilatation of the left transverse colon at the anastomosis. CT Pelvis: There are no new pelvic fluid collections. There is mild diffuse body wall edema.     1.  Loculated rim-enhancing peritoneal fluid collections are again seen in the upper and lower quadrants as well as the posterior pelvis, all of which are very minimally decreased in size. 2.  There is postoperative change in the anterior abdominal wall with removal of the skin staples. 3.   There are postoperative changes of right hemicolectomy with very minimal dilatation of the transverse colon at the anastomosis. There is no bowel obstruction. 4.  Stable moderate left and small right dependent pleural effusions with dependent atelectasis.    CT-ABDOMEN-PELVIS WITH    Result Date: 6/3/2021  6/3/2021 4:55 PM HISTORY/REASON FOR EXAM:  Abdominal abscess/infection suspected; wound dehiscense, r/o abscess. Postoperative. Recent volvulus with right hemicolectomy TECHNIQUE/EXAM DESCRIPTION:  CT scan of the abdomen and pelvis with contrast. Contrast-enhanced helical scanning was obtained from the diaphragmatic domes through the pubic symphysis following the bolus administration of nonionic contrast without complication. 100 mL of Omnipaque 350 nonionic contrast was administered without complication. Low dose optimization technique was utilized for this CT exam including automated exposure control and adjustment of the mA and/or kV according to patient size. COMPARISON: CT abdomen and pelvis 5/27/2021 FINDINGS: Osseous structures: There is bilateral atelectasis and there are small to moderate-sized bilateral pleural effusion. Lungs: Clear ABDOMEN: There is peritoneal fluid present posterior to the abdominal wall and in both paracolic gutter. This has some degree of loculation especially in the left paracolic gutter and could indicate infected fluid. Additionally, in the right lower quadrant there is a loculated fluid collection present measuring approximately 13.8 x 4.2 x 6.5 cm and this connects to the peritoneal fluid in the right paracolic gutter. There is loculated rim-enhancing fluid in the left subphrenic space measuring 8.4 cm in length and approximately 4.6 x 2.7 cm transversely. This connects to the fluid within the left paracolic gutter. There are recent postoperative changes with skin staple present and intraperitoneal air and fluid. LIVER: is normal in appearance. GALLBLADDER and BILIARY SYSTEM  Gallbladder and biliary system are normal by CT assessment SPLEEN: Normal in appearance.. PANCREAS: Normal in appearance. The splenic vein and portal vein enhance normally. ADRENAL glands: Normal in appearance. RIGHT kidney: There is a nonobstructive 1 to 2 mm right medial lower pole calculus. LEFT kidney: Normal in appearance. There is no hydronephrosis. BOWEL and MESENTERY: Their has been right hemicolectomy. AORTA and VASCULATURE: is normal in appearance. Pelvis: In the recto uterine space there is a loculated fluid collection measuring 6.6 x 7.2 x 5.1 cm consistent with an abscess. Uterus and adnexa appear normal.     1.  Multiple rim enhancing peritoneal fluid collection suspicious for abscess 2.  These include the rectouterine space measuring 6.6 x 7.2 x 5.1 cm, right lower quadrant measuring 13.8 x 4.2 x 6.5 cm, left subphrenic space measuring 8.4 x 4.6 x 2.7 cm, and contiguous loculated fluid within the right and left paracolic gutter 3.  Interval right colectomy 4.  Small-moderate-sized bilateral pleural effusion and atelectasis    CT-ABDOMEN-PELVIS WITH    Result Date: 5/27/2021 5/27/2021 5:48 PM HISTORY/REASON FOR EXAM: Diffuse abdominal pain and distention. TECHNIQUE/EXAM DESCRIPTION: CT scan of the abdomen and pelvis with contrast. Contrast-enhanced helical scanning was obtained from the diaphragmatic domes through the pubic symphysis following the bolus administration of 100 mL of Omnipaque 350 nonionic contrast without complication. Low dose optimization technique was utilized for this CT exam including automated exposure control and adjustment of the mA and/or kV according to patient size. COMPARISON: 5/22/2021 FINDINGS: CT Abdomen: There is new bibasilar atelectasis and small bilateral pleural effusions, left greater than right. There is fatty change of the liver. There is a small amount of free intraperitoneal air tracking along the surface of the liver. Gallbladder is distended with dense  material present within the gallbladder. The spleen is normal. There is moderate right-sided hydronephrosis extending to the level of the upper sacrum. The right ureter is not identified below that level. No definite ureteral stone on the right. There is left ureteral pelvic junction stone which measures 5 mm in size and results in minimal left renal pelvic dilatation. The adrenal glands are normal. The pancreas is normal. The aorta is normal in caliber. No evidence of retroperitoneal adenopathy. CT Pelvis: There is multifocal ascites seen which is likely loculated in nature. There are punctate areas of free air seen within those areas of ascites. There are surgical changes involving the cecum. The colon is diffusely dilated. There is a large amount of free fluid which contains pockets of gas within the pelvis. There is a recent midline incision.     1.  Moderate to large amount of ascites within the abdomen and pelvis some areas of which are loculated in nature. There are also punctate areas of gas within those areas of ascites as well as free intraperitoneal air. This may be related to recent surgical procedure however the degree of volume of ascites would be unusual for normal postoperative course. Consideration should be given for leakage of bowel content or other process. 2.  Diffuse colonic distention. 3.  Bibasilar atelectasis and pleural effusions. 4.  5 mm left renal pelvic stone which results in mild hydronephrosis above that level. 5.  Moderate right-sided hydronephrosis extending to the level of the upper sacrum. No ureteral stone. The ureter is not identified below that level. 6.  This was discussed with NEVA KAY at 6:44 PM on 5/27/2021.    CT-ABDOMEN-PELVIS WITH    Result Date: 5/22/2021 5/22/2021 11:19 AM HISTORY/REASON FOR EXAM:  Abdominal distension; IV contrast only. TECHNIQUE/EXAM DESCRIPTION:   CT scan of the abdomen and pelvis with contrast. Contrast-enhanced helical scanning was obtained  from the diaphragmatic domes through the pubic symphysis following the bolus administration of nonionic contrast without complication. 100 mL of Omnipaque 350 nonionic contrast was administered without complication. Low dose optimization technique was utilized for this CT exam including automated exposure control and adjustment of the mA and/or kV according to patient size. COMPARISON: 4/22/2020 FINDINGS: Chest Base: Lung bases are clear. Liver:  Normal. Gallbladder: Normal Biliary tract: Nondilated. Pancreas: Normal. Spleen: Normal. Adrenals: Normal. Kidneys and Collecting Systems:  Cannot exclude a punctate nonobstructing stone in the lower right renal collecting system. There is a small nonobstructing left renal stone measuring about 5 mm. Gastrointestinal tract:  The cecum is distended and displaced into the left upper quadrant. The ascending colon proximally is narrowed in the midline and there is a somewhat twisted appearance.   Mildly fluid distended distal small bowel without significant small bowel obstruction. The appendix is nonvisualized. Peritoneum: No free air or free fluid. Reproductive organs:  2.3 cm left adnexal hypodense lesion is indeterminate, possibly a dominant follicle/small cyst in a menstruating patient. Correlate clinically. Probable intramural fibroid in the fundus. Bladder:  Normal. Vessels:  Normal caliber. Lymph  Nodes:  No lymphadenopathy. Abdominal wall: Within normal limits. Bones:  No acute or aggressive abnormality.     1.  Findings keeping with cecal volvulus. 2.  Mildly fluid distended distal small bowel. 3.  No free air. 4.  Small nonobstructing renal stones. These findings were discussed with ESHA STERLING on 5/22/2021 11:45 AM.     [id number] VIEW    Result Date: 5/27/2021 5/27/2021 7:01 AM HISTORY/REASON FOR EXAM:  Distention. TECHNIQUE/EXAM DESCRIPTION AND NUMBER OF VIEWS:  1 view(s) of the abdomen. COMPARISON: 5/24/2021 FINDINGS: Gaseous distended  small bowel and  colon is similar to prior study. There is some stool within the right colon. There is no significant interval change. No suspicious calcifications. No acute osseous abnormality.     Gaseous distended small bowel and colon is similar to prior, likely ileus.    [id number] VIEW    Result Date: 5/24/2021 5/24/2021 11:28 AM HISTORY/REASON FOR EXAM:  Distention; s/p right hemicolectomy Lower abdominal pain s/p right hemicolectomy 05/22/21 TECHNIQUE/EXAM DESCRIPTION AND NUMBER OF VIEWS:  1 view(s) of the abdomen. COMPARISON: 5/22/2021 FINDINGS: Gaseous distention of the small bowel and colon No portal venous gas or pneumatosis. No definite free intraperitoneal air but evaluation is limited on supine radiograph.     Gaseous distention of the small bowel and colon, likely postoperative ileus.    DX-CHEST-FOR LINE PLACEMENT Perform procedure in: Other(comment f6 below): (PACU)    Result Date: 5/28/2021 5/28/2021 10:10 AM HISTORY/REASON FOR EXAM:  Central line placement. TECHNIQUE/EXAM DESCRIPTION AND NUMBER OF VIEWS: Single AP view of the chest. COMPARISON: None FINDINGS: There is a left IJ central line with the tip high in position above the superior vena cava within the area of the left brachiocephalic vein. No pneumothorax. NG tube extends into the stomach. There are patchy bilateral pulmonary infiltrates. The heart is mildly enlarged. There is no pleural effusion.     1.  Left IJ central line tip high in position located above the superior vena cava are within the left brachiocephalic vein. 2.  Patchy bilateral infiltrates. 3.  NG tube tip extends into the stomach.    US-EXTREMITY VENOUS UPPER BILAT    Result Date: 6/2/2021   Upper Extremity  Venous Duplex Report  Vascular Laboratory  CONCLUSIONS  Small thrombus seen around the catheter line at the RIGHT axillary vein and  proximal basilic vein.  No left upper extremity superficial and deep venous thrombosis.  HOANG EMERY  Exam Date:     06/02/2021 14:29  Room #:      Inpatient  Priority:     Routine  Ht (in):             Wt (lb):  Ordering Physician:        DEIDRA NSIDER  Referring Physician:       [id number], CAO  Sonographer:               Sharlene Morrell RVT, RDMS  Study Type:                Complete Bilateral  Technical Quality:         Fair  Age:    50    Gender:     F  MRN:    [id number]  :    1970      BSA:  Indications:     Edema  CPT Codes:       15673  ICD Codes:       782.3  History:         Swelling. No prior study  Limitations:  PROCEDURES:  Bilateral upper extremity venous duplex imaging.  The following venous structures were evaluated: internal jugular,  subclavian, axillary, brachial, cephalic and basilic veins.  FINDINGS:  Right upper extremity.  Small thrombus seen around the central line at the axillary vein and  proximal basilic vein.  All other veins of the right upper extremity demonstrate normal flow  dynamics with no evidence of deep vein thrombosis.  Left upper extremity.  Limited visualization of the internal jugular vein due to bandages.  All other veins of the left upper extremity demonstrate normal flow  dynamics with no evidence of deep vein thrombosis.  Doug Rossi MD  (Electronically Signed)  Final Date:      2021                   18:16    IR-MIDLINE CATHETER INSERTION WO GUIDANCE > AGE 3    Result Date: 2021  HISTORY/REASON FOR EXAM:  Midline Placement   TECHNIQUE/EXAM DESCRIPTION AND NUMBER OF VIEWS: Midline insertion with ultrasound guidance.  FINDINGS: Midline insertion with Ultrasound Guidance was performed by qualified nursing staff without the assistance of a Radiologist. Midline positioning as measured by RN or as appropriate length of catheter selected.              Ultrasound-guided midline placement performed by qualified nursing staff as above.       Micro:  Results     Procedure Component Value Units Date/Time    CULTURE WOUND W/ GRAM STAIN [[id number]]   (Abnormal) Collected: 06/09/21 1630    Order Status: Completed Specimen: Wound from KRISTI Drain Updated: 06/12/21 1100     Significant Indicator POS     Source WND     Site KRISTI DRAIN     Culture Result -     Gram Stain Result Moderate WBCs.  Few Yeast.       Culture Result Candida albicans  Light growth        Candida krusei  Light growth        Bacteroides fragilis  Heavy growth      Narrative:      Collected By:[id number] [employer] LIANA  Please collect culture when drain is placed to abdomen today  Collected By:[id number] [employer] LIANA    GRAM STAIN [[id number]] Collected: 06/09/21 1630    Order Status: Completed Specimen: Wound Updated: 06/10/21 1336     Significant Indicator .     Source WND     Site KRISTI DRAIN     Gram Stain Result Moderate WBCs.  Few Yeast.      Narrative:      Collected By:[id number] [employer] LIANA  Please collect culture when drain is placed to abdomen today  Collected By:[id number] [employer] LIANA    Culture Wound w/Gram Stain [[id number]]  (Abnormal) Collected: 06/08/21 1425    Order Status: Completed Specimen: Wound from Abdominal Updated: 06/10/21 0938     Significant Indicator POS     Source WND     Site ABDOMINAL     Culture Result Rare growth usual skin coco.     Gram Stain Result Moderate WBCs.  No organisms seen.       Culture Result Bacteroides fragilis  Light growth      Narrative:      Collected By:[id number] [employer] LIANA  Collected By:[id number] [employer] LIANA            Assessment/Hospital course:  This is a very pleasant 50-year-old female patient, originally admitted on 5/22/2021 with cecal volvulus for which she underwent a right hemicolectomy on 5/22.  This was complicated by intra-abdominal abscesses and free air concerning for leak.  She was taken to the OR for exploratory laparotomy and found to have necrosis of the ileocolonic anastomosis site, bowel is eviscerated and redo side-to-side ileocolic anastomosis was done.    Patient went back to the OR on 6/4 due to drainage from wound and concern for  fascial dehiscence.  Per op note there is loosening of the fascia but no yessy dehiscence.  Abdomen was washed out including pockets noted on last CT.  Anastomosis was not evaluated as was scarred and no evidence of leak.     Pertinent Diagnoses:  Sepsis, improving  Intra-abdominal abscesses, persistent  Large midline incision, with significant feculent drainage   Feculent peritonitis  Anastomotic leak, ongoing  Cecal volvulus, sequelae  Leukocytosis, persistent  TPN dependent     Plan:  -OR cultures from 5/28 growing Bacteroides, Actinomyces, E faecalis  -Patient had worsening abdominal pain and a repeat CT scan obtained 6/3 showed multiple rim-enhancing peritoneal fluid collections several quite large and loculated.  Patient was taken back to the OR for exploratory laparotomy on 6/4 as discussed above, no new cultures were obtained.    -Repeat CT abdomen and pelvis 6/8 was ordered with postop change in anterior abdominal wall, few tiny air lucencies and tiny amounts of intraperitoneal air identified within the anterior abdominal fluid.  Loculated intra-abdominal fluid in the left lateral splenic space measuring 4.4 x 2.1 cm, small amount of loculated fluid in the right anterior upper quadrant also similar to prior study.  This appears to communicate with fluid anterior to the peritoneal cavity measuring 4.9 x 6.5 cm.  Fluid collection with rim enhancement in the posterior pelvis now 6.2 x 3.5 cm.  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  Patient was taken back to the OR 6/11, but the bowel was too inflamed to mobilize for ostomy, drains were placed      -Continue IV Unasyn 3 g every 6 hours and IV micafungin 100 mg every 24  hours  -Plan for repeat CT scan today  -Continue to monitor drain output-decreased overall  -General surgery following  -Drain output per surgery  -Duration of antibiotics will depend on results of repeat CT scan    I have performed a physical exam and reviewed and updated ROS and plan today 6/17/2021.  In review of yesterday's note 6/16/2021, there are no changes except as documented above.      Discussed with internal medicine, Dr. Rojo.  ID will follow.

## 2021-06-17 NOTE — DISCHARGE PLANNING
Anticipated Discharge Disposition: [facility]- [facility]     Action: LSW received a call from Norma with [facility] informing LSW that insurance auth has been received. If pt is cleared they can take pt today.   LSW messaged Dr. Rojo to f/u with appropriateness for pt to transfer to LTAC today.     Barriers to Discharge: None     Plan: Await clearance for pt to transfer, LSW to continue to follow for d/c needs, LSW to assist as needed     Addendum 0830  LSW informed by Dr. Rojo that pt has not been cleared by surgery.   LSW called Norma with [facility] to provide update. [facility] hopeful for pt to be able to transfer tomorrow.     Addendum 0904  LSW informed by Dr. Rojo that pt has been staffed with Dr. Lamb. Pt okay to d/c to [facility].   LSW messaged Cali with [facility] to provide update. [facility] will f/u with bed availability.     Addendum 1318  [facility] doesn't have beds available today. Per Cali who will be on call this weekend, potential bed available tomorrow.     LSW provided pt's social to [facility].     Addendum 1417  LSW received a call from Cali with [facility] informing LSW that they have a bed opening. Requesting transport for 1900 for today.   LSW messaged Dr. Rojo to provide update.     LSW to coordinate transport.      Addendum 1440  LSW informed by Cali with [facility] that bed no longer available. LSW notified Dr. Rojo via [employer].

## 2021-06-17 NOTE — PROGRESS NOTES
"Pharmacy TPN Day # 6       2021    Dosing Weight   66 kg TPN currently providing 100% of goal      TPN goal: 1825 kcal/day including 1.5 gm/kg/day Protein      TPN indication: Bowel Resection       Pertinent PMH:  50 y.o. 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 Returned to OR - bowel too inflammed to mobilize for ostomy. Drain placed. NPO.  Unasyn, Diflucan per ID.  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  Patient was taken back to the OR , but the bowel was too inflamed to mobilize for ostomy, drain was placed  -Drain cultures from  also growing a Candida krusei.  Will change fluconazole to IV micafungin 100 mg every 24 hours, continue IV Unasyn 3 g every 6 hours  Temp (24hrs), Av.8 °C (98.3 °F), Min:36.6 °C (97.8 °F), Max:36.9 °C (98.5 °F)  .  Recent Labs     21  1630 06/15/21  0306 21  0359 21  0333   SODIUM  --  136 134* 135   POTASSIUM  --  4.0 4.0 4.2   CHLORIDE  --  104 104 103   CO2  --   18* 20   BUN  --  9 11 15   CREATININE  --  0.35* 0.35* 0.37*   GLUCOSE  --  104* 149* 89   CALCIUM  --  8.0* 8.2* 8.3*   ASTSGOT  --  18  --  17   ALTSGPT  --  8  --  9   ALBUMIN  --  2.6*  --  2.8*   TBILIRUBIN  --  0.2  --  <0.2   PHOSPHORUS  --  3.2  --  3.4   MAGNESIUM  --  1.9  --  1.9   PREALBUMIN 10.7*  --   --   --      Accu-Checks  Recent Labs     21  1423 21  2224 21  1715   POCGLUCOSE 121* 123* 111*       Vitals:    21 1505 21 0249 21 0512 21 0945   BP:  133/78 136/75 132/69   Weight: 73.5 kg (162 lb)      Height: 1.753 m (5' 9\")    "       Intake/Output Summary (Last 24 hours) at 6/17/2021 1139  Last data filed at 6/17/2021 0537  Gross per 24 hour   Intake 876.17 ml   Output 29 ml   Net 847.17 ml       Orders Placed This Encounter   Procedures   • Diet Order Diet: Clear Liquid     Standing Status:   Standing     Number of Occurrences:   1     Order Specific Question:   Diet:     Answer:   Clear Liquid [10]         TPN for past 72 hours (Show up to 3 orders; newest on the left. Changes between the two most recent orders are indicated.)     Start date and time   06/17/2021 2000 06/16/2021 2000 06/15/2021 2000      TPN Central Line Formulation [[id number]] TPN Central Line Formulation [[id number]] TPN Central Line Formulation [[id number]]    Order Status  Active Last Dose in Progress Completed    Last Admin   Rate Verify at 06/17/2021 0708 by Heather Ordaz R.N. New Bag at 06/15/2021 2025 by Sonja Burgess R.N.       Base    Clinisol 15%  110 g 110 g 110 g    dextrose 70%  275 g 275 g 275 g    fat emulsions 20%  45 g 45 g 45 g       Additives    potassium phosphate  15 mmol 15 mmol 15 mmol    potassium chloride  40 mEq 40 mEq 40 mEq    sodium acetate  150 mEq 150 mEq 150 mEq    sodium chloride  150 mEq 150 mEq 150 mEq    magnesium sulfate  8 mEq 8 mEq 8 mEq    calcium GLUConate  9.4 mEq 9.4 mEq 9.4 mEq    M.T.E.-4 Adult  1 mL 1 mL 1 mL    M.V.I. Adult  10 mL 10 mL 10 mL    famotidine  40 mg 40 mg 40 mg       QS Base    sterile water  466.08 mL 466.08 mL 466.08 mL       Energy Contribution    Proteins  -- -- --    Dextrose  -- -- --    Lipids  -- -- --    Total  -- -- --       Electrolyte Ion Calculated Amount    Sodium  300 mEq 300 mEq 300 mEq    Potassium  62 mEq 62 mEq 62 mEq    Calcium  9.4 mEq 9.4 mEq 9.4 mEq    Magnesium  8 mEq 8 mEq 8 mEq    Aluminum  -- -- --    Phosphate  15 mmol 15 mmol 15 mmol    Chloride  190 mEq 190 mEq 190 mEq    Acetate  243.13 mEq 243.13 mEq 243.13 mEq       Other    Total Protein  110 g 110 g 110 g    Total  Protein/kg  1.5 g/kg 1.5 g/kg 1.5 g/kg    Total Amino Acid  -- -- --    Total Amino Acid/kg  -- -- --    Glucose Infusion Rate  2.6 mg/kg/min 2.6 mg/kg/min 2.6 mg/kg/min    Osmolarity (Estimated)  -- -- --    Volume  1,992 mL 1,992 mL 1,992 mL    Rate  83 mL/hr 83 mL/hr 83 mL/hr    Dosing Weight  73.5 kg 73.5 kg 73.5 kg    Infusion Site  [facility]            This formula provides:  % kcal as lipids = 25  Grams protein/kg = 1.5  Non-protein calories = 1385  Kcals/kg = 24.8  Total daily calories = 1825    Comments:  No changes continue TPN gary formula, same rate as previous day.      Rafa Lopez [facility]

## 2021-06-17 NOTE — PROGRESS NOTES
"Received report from day shift RN, assumed care of patient at 1900. Patient A&Ox4, denies N/V, SOB at this time. Reports 5/10 abdominal pain at this time, medicated per MAR. Patient refusing Q4 vitals despite education at this time. Attempted at 2337, patient stated \"I just want to sleep.\" Patient refused PO midnight medications. Patient awakens easily. Patient denies further needs at this time.   "

## 2021-06-17 NOTE — CARE PLAN
The patient is Stable - low risk of patient condition declining or worsening     Shift Goals  Clinical Goals: Pain management  Patient Goals: rest    Progress made toward(s) clinical / shift goals: Patient able to communicate pain needs appropriately. PRN pain medications available.     Problem: Wound/ / Incision Healing  Goal: Patient's wound/surgical incision will decrease in size and heals properly  Outcome: Progressing  Note: Wound vac, KRISTI drains, and malecot in place.      Patient is not progressing towards the following goals: Patient utilizing pure wick, declines to get up to commode to void at this time.

## 2021-06-17 NOTE — PROGRESS NOTES
Hospital Medicine Daily Progress Note    Date of Service  6/17/2021    Chief Complaint  51 y.o. female admitted 5/22/2021 with abd pain    Hospital Course  51 yo woman who presented with abd pain and CT showed cecal volvulus. She underwent exploratory celiotomy and right hemicolectomy with ileocolic anastomosis with Dr. Wood 5/22/21. She developed thrombocytopenia and ascites and returned for ex lap with resection of ileocolic anastomosis and wound vac placement 5/28/21. She was admitted to ICU, started on zosyn for bacterial peritonitis and given platelet transfusions. HIT panel was negative. Abd cultures grew actinomyces, enterococcus, bacteroides. Doppler showed catheter related thrombus in R arm and started on anticoagulation. She had additional washout in the OR with Dr. Wood 6/4/21 and remains on unasyn and fluconazole. Wound vac remains in place. She was then found with anastomotic leak and underwent malecot drain placement 6/11/21. She was started on TPN for malnutrition.    Interval Problem Update  WBC 16  Drain 29cc  She says her pain is getting better. I encouraged to her ambulate daily to keep up her strength. Nursing to discuss with patient as well. She is eating foods her family is bringing in  CT AP pending    Consultants/Specialty  Critical care  Surgery  ID    Code Status  Full Code    Disposition  LTACH    Review of Systems  Review of Systems   Constitutional: Positive for malaise/fatigue.   Gastrointestinal: Positive for abdominal pain and diarrhea. Negative for constipation and vomiting.   Neurological: Positive for weakness.   All other systems reviewed and are negative.       Physical Exam  Temp:  [36.4 °C (97.6 °F)-36.9 °C (98.5 °F)] 36.9 °C (98.4 °F)  Pulse:  [80-94] 81  Resp:  [16-20] 18  BP: (133-137)/(66-81) 136/75  SpO2:  [98 %-100 %] 98 %    Physical Exam  Vitals and nursing note reviewed.   Constitutional:       Appearance: She is not toxic-appearing or diaphoretic.      Comments:  fatigued   HENT:      Head: Normocephalic.      Mouth/Throat:      Mouth: Mucous membranes are dry.   Eyes:      General:         Right eye: No discharge.         Left eye: No discharge.   Cardiovascular:      Rate and Rhythm: Normal rate and regular rhythm.   Pulmonary:      Effort: Pulmonary effort is normal. No respiratory distress.      Breath sounds: No wheezing or rales.   Abdominal:      Tenderness: There is abdominal tenderness. There is no guarding or rebound.      Comments: Central wound vac and drain in place   Musculoskeletal:      Cervical back: Neck supple.      Right lower leg: No edema.      Left lower leg: No edema.   Skin:     General: Skin is warm and dry.      Coloration: Skin is pale.   Neurological:      Mental Status: She is alert.      Comments: AOx4         Fluids    Intake/Output Summary (Last 24 hours) at 6/17/2021 0855  Last data filed at 6/17/2021 0537  Gross per 24 hour   Intake 876.17 ml   Output 29 ml   Net 847.17 ml       Laboratory  Recent Labs     06/15/21  0350 06/16/21  0359 06/17/21  0333   WBC 15.8* 16.7* 16.3*   RBC 2.83* 2.87* 2.87*   HEMOGLOBIN 8.0* 8.0* 7.9*   HEMATOCRIT 24.9* 25.6* 25.2*   MCV 88.0 89.2 87.8   MCH 28.3 27.9 27.5   MCHC 32.1* 31.3* 31.3*   RDW 48.5 49.6 50.0   PLATELETCT 421 417 379   MPV 8.9* 9.1 9.4     Recent Labs     06/15/21  0306 06/16/21  0359 06/17/21  0333   SODIUM 136 134* 135   POTASSIUM 4.0 4.0 4.2   CHLORIDE 104 104 103   CO2 21 18* 20   GLUCOSE 104* 149* 89   BUN 9 11 15   CREATININE 0.35* 0.35* 0.37*   CALCIUM 8.0* 8.2* 8.3*             Recent Labs     06/17/21  0333   TRIGLYCERIDE 143       Imaging  IR-PICC LINE PLACEMENT W/ GUIDANCE > AGE 5   Final Result                  Ultrasound-guided PICC placement performed by qualified nursing staff as    above.          EC-ECHOCARDIOGRAM COMPLETE W/O CONT   Final Result      DX-BARIUM ENEMA   Final Result      1.  Extensive ileocolic anastomotic leakage extending into the peritoneal cavity and  extending through the enterocutaneous fistula to the skin surface            2.  Right hemicolectomy      3.  Findings were discussed with NEVA KAY on 6/11/2021 12:21 PM.      DX-CHEST-PORTABLE (1 VIEW)   Final Result      1.  The cardiac silhouette is enlarged and there are changes most consistent with vascular congestion/edema with a trace of left pleural effusion.   2.  Superimposed pneumonitis and/or atelectasis is also possible.      CT-DRAIN-PERITONEAL   Final Result      1.  CT guided right lower quadrant abscess and pelvic abscess catheter drainage.   2.  The current plan is to obtain a follow-up CT in 57 days..      CT-ABDOMEN-PELVIS WITH   Final Result      1.  Loculated rim-enhancing peritoneal fluid collections are again seen in the upper and lower quadrants as well as the posterior pelvis, all of which are very minimally decreased in size.   2.  There is postoperative change in the anterior abdominal wall with removal of the skin staples.   3.  There are postoperative changes of right hemicolectomy with very minimal dilatation of the transverse colon at the anastomosis. There is no bowel obstruction.   4.  Stable moderate left and small right dependent pleural effusions with dependent atelectasis.      CT-ABDOMEN-PELVIS WITH   Final Result      1.  Multiple rim enhancing peritoneal fluid collection suspicious for abscess      2.  These include the rectouterine space measuring 6.6 x 7.2 x 5.1 cm, right lower quadrant measuring 13.8 x 4.2 x 6.5 cm, left subphrenic space measuring 8.4 x 4.6 x 2.7 cm, and contiguous loculated fluid within the right and left paracolic gutter      3.  Interval right colectomy      4.  Small-moderate-sized bilateral pleural effusion and atelectasis      US-EXTREMITY VENOUS UPPER BILAT   Final Result      IR-MIDLINE CATHETER INSERTION WO GUIDANCE > AGE 3   Final Result                  Ultrasound-guided midline placement performed by qualified nursing staff    as above.           DX-CHEST-FOR LINE PLACEMENT Perform procedure in: Other(comment f6 below): (PACU)   Final Result      1.  Left IJ central line tip high in position located above the superior vena cava are within the left brachiocephalic vein.      2.  Patchy bilateral infiltrates.      3.  NG tube tip extends into the stomach.      CT-ABDOMEN-PELVIS WITH   Final Result      1.  Moderate to large amount of ascites within the abdomen and pelvis some areas of which are loculated in nature. There are also punctate areas of gas within those areas of ascites as well as free intraperitoneal air. This may be related to recent    surgical procedure however the degree of volume of ascites would be unusual for normal postoperative course. Consideration should be given for leakage of bowel content or other process.      2.  Diffuse colonic distention.      3.  Bibasilar atelectasis and pleural effusions.      4.  5 mm left renal pelvic stone which results in mild hydronephrosis above that level.      5.  Moderate right-sided hydronephrosis extending to the level of the upper sacrum. No ureteral stone. The ureter is not identified below that level.      6.  This was discussed with NEVA KAY at 6:44 PM on 5/27/2021.      PE-NWTERLY-9 VIEW   Final Result      Gaseous distended small bowel and colon is similar to prior, likely ileus.      UU-OYOBCLI-6 VIEW   Final Result         Gaseous distention of the small bowel and colon, likely postoperative ileus.      CT-ABDOMEN-PELVIS WITH   Final Result         1.  Findings keeping with cecal volvulus.   2.  Mildly fluid distended distal small bowel.   3.  No free air.   4.  Small nonobstructing renal stones.   These findings were discussed with ESHA STERLING on 5/22/2021 11:45 AM.                  CT-ABDOMEN-PELVIS WITH    (Results Pending)        Assessment/Plan  * Bacterial peritonitis (HCC)  Assessment & Plan  - s/p ileocecal resection/redo 5/28/21 and washout 6/4/2, drain placement  on 6/9 by IR. Developed anastomotic leak seen on barium enema 6/11 and went to the OR - ileostomy could not be performed due to inflammation of the bowel. Drain was placed and abdomen washed out.  - Wound cultures initially with Bacteroides, Actinomyces, E faecalis. Was on Unasyn and Diflucan.  Culture from when pt began having purulent discharge on 6/8 with bacteroides, culture from IR drain placement 6/9 with bacteroides/candida albicans and krusei.  Diflucan changed to Micafungin.  - Appreciate Surgery and ID   - Per ID - Continue IV Unasyn & IV micafungin  CT AP today  Will Continue to monitor drain output    Protein-calorie malnutrition, moderate (HCC)  Assessment & Plan  - Albumin level of 2.0, TPN initiated and tolerating   - PICC in place  - Boost plus supplements ordered between meals     Acute hypoxic respiratory failure (HCC)  Assessment & Plan  - Secondary to pulmonary edema from third spacing secondary to hypoalbuminemia  - resolved, on r/a    Anemia  Assessment & Plan  - Hgb slowly trending down, not indicative of acute bleed but likely due to ACD and return trips to the OR.  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

## 2021-06-18 LAB
ANISOCYTOSIS BLD QL SMEAR: ABNORMAL
BASOPHILS # BLD AUTO: 0 % (ref 0–1.8)
BASOPHILS # BLD: 0 K/UL (ref 0–0.12)
EOSINOPHIL # BLD AUTO: 0.85 K/UL (ref 0–0.51)
EOSINOPHIL NFR BLD: 6 % (ref 0–6.9)
ERYTHROCYTE [DISTWIDTH] IN BLOOD BY AUTOMATED COUNT: 52.1 FL (ref 35.9–50)
GLUCOSE BLD-MCNC: 88 MG/DL (ref 65–99)
GLUCOSE BLD-MCNC: 98 MG/DL (ref 65–99)
HCT VFR BLD AUTO: 28.6 % (ref 37–47)
HGB BLD-MCNC: 8.8 G/DL (ref 12–16)
LYMPHOCYTES # BLD AUTO: 0.99 K/UL (ref 1–4.8)
LYMPHOCYTES NFR BLD: 7 % (ref 22–41)
MANUAL DIFF BLD: NORMAL
MCH RBC QN AUTO: 27.8 PG (ref 27–33)
MCHC RBC AUTO-ENTMCNC: 30.8 G/DL (ref 33.6–35)
MCV RBC AUTO: 90.2 FL (ref 81.4–97.8)
MICROCYTES BLD QL SMEAR: ABNORMAL
MONOCYTES # BLD AUTO: 0.99 K/UL (ref 0–0.85)
MONOCYTES NFR BLD AUTO: 7 % (ref 0–13.4)
MYELOCYTES NFR BLD MANUAL: 2 %
NEUTROPHILS # BLD AUTO: 11 K/UL (ref 2–7.15)
NEUTROPHILS NFR BLD: 77 % (ref 44–72)
NEUTS BAND NFR BLD MANUAL: 1 % (ref 0–10)
NRBC # BLD AUTO: 0.03 K/UL
NRBC BLD-RTO: 0.2 /100 WBC
OVALOCYTES BLD QL SMEAR: NORMAL
PLATELET # BLD AUTO: 378 K/UL (ref 164–446)
PLATELET BLD QL SMEAR: NORMAL
PMV BLD AUTO: 9.2 FL (ref 9–12.9)
POIKILOCYTOSIS BLD QL SMEAR: NORMAL
POLYCHROMASIA BLD QL SMEAR: NORMAL
RBC # BLD AUTO: 3.17 M/UL (ref 4.2–5.4)
RBC BLD AUTO: PRESENT
WBC # BLD AUTO: 14.1 K/UL (ref 4.8–10.8)

## 2021-06-18 PROCEDURE — A9270 NON-COVERED ITEM OR SERVICE: HCPCS | Performed by: INTERNAL MEDICINE

## 2021-06-18 PROCEDURE — 700105 HCHG RX REV CODE 258: Performed by: INTERNAL MEDICINE

## 2021-06-18 PROCEDURE — A9270 NON-COVERED ITEM OR SERVICE: HCPCS | Performed by: FAMILY MEDICINE

## 2021-06-18 PROCEDURE — 97605 NEG PRS WND THER DME<=50SQCM: CPT

## 2021-06-18 PROCEDURE — 99232 SBSQ HOSP IP/OBS MODERATE 35: CPT | Performed by: INTERNAL MEDICINE

## 2021-06-18 PROCEDURE — 85027 COMPLETE CBC AUTOMATED: CPT

## 2021-06-18 PROCEDURE — 700111 HCHG RX REV CODE 636 W/ 250 OVERRIDE (IP): Performed by: FAMILY MEDICINE

## 2021-06-18 PROCEDURE — 700111 HCHG RX REV CODE 636 W/ 250 OVERRIDE (IP): Performed by: SURGERY

## 2021-06-18 PROCEDURE — 700102 HCHG RX REV CODE 250 W/ 637 OVERRIDE(OP): Performed by: INTERNAL MEDICINE

## 2021-06-18 PROCEDURE — 700102 HCHG RX REV CODE 250 W/ 637 OVERRIDE(OP): Performed by: SURGERY

## 2021-06-18 PROCEDURE — 82962 GLUCOSE BLOOD TEST: CPT | Mod: 91

## 2021-06-18 PROCEDURE — 36415 COLL VENOUS BLD VENIPUNCTURE: CPT

## 2021-06-18 PROCEDURE — A9270 NON-COVERED ITEM OR SERVICE: HCPCS | Performed by: SURGERY

## 2021-06-18 PROCEDURE — 85007 BL SMEAR W/DIFF WBC COUNT: CPT

## 2021-06-18 PROCEDURE — 700111 HCHG RX REV CODE 636 W/ 250 OVERRIDE (IP): Performed by: INTERNAL MEDICINE

## 2021-06-18 PROCEDURE — 700101 HCHG RX REV CODE 250: Performed by: FAMILY MEDICINE

## 2021-06-18 PROCEDURE — 700102 HCHG RX REV CODE 250 W/ 637 OVERRIDE(OP): Performed by: FAMILY MEDICINE

## 2021-06-18 PROCEDURE — 770006 HCHG ROOM/CARE - MED/SURG/GYN SEMI*

## 2021-06-18 PROCEDURE — 700101 HCHG RX REV CODE 250: Performed by: INTERNAL MEDICINE

## 2021-06-18 RX ORDER — OXYCODONE HYDROCHLORIDE 10 MG/1
10-15 TABLET ORAL EVERY 4 HOURS PRN
Qty: 5 TABLET | Refills: 0 | Status: SHIPPED
Start: 2021-06-18 | End: 2021-06-18

## 2021-06-18 RX ORDER — OXYCODONE HYDROCHLORIDE 10 MG/1
10-15 TABLET ORAL EVERY 4 HOURS PRN
Qty: 5 TABLET | Refills: 0 | Status: SHIPPED | OUTPATIENT
Start: 2021-06-18 | End: 2021-06-20

## 2021-06-18 RX ADMIN — AMPICILLIN SODIUM AND SULBACTAM SODIUM 3 G: 2; 1 INJECTION, POWDER, FOR SOLUTION INTRAMUSCULAR; INTRAVENOUS at 17:06

## 2021-06-18 RX ADMIN — IBUPROFEN 600 MG: 600 TABLET ORAL at 12:07

## 2021-06-18 RX ADMIN — ONDANSETRON 4 MG: 2 INJECTION INTRAMUSCULAR; INTRAVENOUS at 18:03

## 2021-06-18 RX ADMIN — AMPICILLIN SODIUM AND SULBACTAM SODIUM 3 G: 2; 1 INJECTION, POWDER, FOR SOLUTION INTRAMUSCULAR; INTRAVENOUS at 23:26

## 2021-06-18 RX ADMIN — AMPICILLIN SODIUM AND SULBACTAM SODIUM 3 G: 2; 1 INJECTION, POWDER, FOR SOLUTION INTRAMUSCULAR; INTRAVENOUS at 05:06

## 2021-06-18 RX ADMIN — POTASSIUM CHLORIDE: 2 INJECTION, SOLUTION, CONCENTRATE INTRAVENOUS at 20:25

## 2021-06-18 RX ADMIN — AMPICILLIN SODIUM AND SULBACTAM SODIUM 3 G: 2; 1 INJECTION, POWDER, FOR SOLUTION INTRAMUSCULAR; INTRAVENOUS at 12:06

## 2021-06-18 RX ADMIN — MORPHINE SULFATE 2 MG: 4 INJECTION INTRAVENOUS at 08:07

## 2021-06-18 RX ADMIN — OXYCODONE HYDROCHLORIDE 10 MG: 10 TABLET ORAL at 20:24

## 2021-06-18 RX ADMIN — ONDANSETRON 4 MG: 2 INJECTION INTRAMUSCULAR; INTRAVENOUS at 05:58

## 2021-06-18 RX ADMIN — ONDANSETRON 4 MG: 2 INJECTION INTRAMUSCULAR; INTRAVENOUS at 23:27

## 2021-06-18 RX ADMIN — LIDOCAINE HYDROCHLORIDE 1 ML: 40 SOLUTION TOPICAL at 08:08

## 2021-06-18 RX ADMIN — TOPIRAMATE 100 MG: 100 TABLET, FILM COATED ORAL at 20:24

## 2021-06-18 RX ADMIN — ENOXAPARIN SODIUM 80 MG: 80 INJECTION SUBCUTANEOUS at 17:06

## 2021-06-18 RX ADMIN — DULOXETINE HYDROCHLORIDE 60 MG: 30 CAPSULE, DELAYED RELEASE ORAL at 05:13

## 2021-06-18 RX ADMIN — ACETAMINOPHEN 650 MG: 325 TABLET, FILM COATED ORAL at 17:06

## 2021-06-18 RX ADMIN — IBUPROFEN 600 MG: 600 TABLET ORAL at 17:06

## 2021-06-18 RX ADMIN — ACETAMINOPHEN 650 MG: 325 TABLET, FILM COATED ORAL at 12:06

## 2021-06-18 RX ADMIN — MICAFUNGIN SODIUM 100 MG: 20 INJECTION, POWDER, LYOPHILIZED, FOR SOLUTION INTRAVENOUS at 20:24

## 2021-06-18 RX ADMIN — OXYCODONE HYDROCHLORIDE 10 MG: 10 TABLET ORAL at 13:37

## 2021-06-18 RX ADMIN — IBUPROFEN 600 MG: 600 TABLET ORAL at 05:06

## 2021-06-18 ASSESSMENT — PAIN DESCRIPTION - PAIN TYPE
TYPE: SURGICAL PAIN

## 2021-06-18 ASSESSMENT — ENCOUNTER SYMPTOMS
VOMITING: 0
FEVER: 0
CONSTIPATION: 0
ROS GI COMMENTS: PASSING SOME FLATUS
DIARRHEA: 1
ABDOMINAL PAIN: 1
WEAKNESS: 1

## 2021-06-18 NOTE — DISCHARGE SUMMARY
Discharge Summary    CHIEF COMPLAINT ON ADMISSION  Chief Complaint   Patient presents with   • Abdominal Pain       Reason for Admission  Abd Pain     CODE STATUS  Full Code    HPI & HOSPITAL COURSE  52 yo woman who presented with abd pain and CT showed cecal volvulus. She underwent exploratory celiotomy and right hemicolectomy with ileocolic anastomosis with Dr. Wood 5/22/21. She developed thrombocytopenia and ascites and returned for ex lap with resection of ileocolic anastomosis and wound vac placement 5/28/21. She was admitted to ICU, started on zosyn for bacterial peritonitis and given platelet transfusions. HIT panel was negative. Her thrombocytopenia recovered, likely from sepsis. Doppler showed midline catheter related thrombus in R arm and started on anticoagulation, midline has been removed. Abd cultures grew actinomyces, enterococcus, bacteroides. Infectious disease was consulted for antibiotics, she was on unasyn and fluconazole. She had additional washout in the OR with Dr. Wood 6/4/21 and CT guided drainage of abd abscesses with IR 6/9/21. She was then found with possible anastomotic leak and underwent malecot drain placement 6/11/21. She had poor oral intake and she was kept on clear liquid diet per surgery and started on TPN for malnutrition. PICC in place for this. CT AP on 6/17/21 showed decreased fluid collections. Dr. Wills recommended leaving drains in place, stay on full liquid diet for now and surgery to f/u in 2 weeks.   She remains on unasyn and micafungin, Dr. Castillo recommended continuing until 6/28/21 then switch to PO augmentin for 4 more weeks.    #Cecal Volvulus and Peritonitis  - surgical drains and wound vac in place, surgeon to follow or follow up with Dr. Wood in 2 weeks  - full liquid diet, advance per surgeon, continue TPN via PICC for now until oral nutrition improved  - continue unasyn and micafungin for 10 more days (July 28) then switch to PO Augmentin for 4 more weeks,  follow up with [facility]    #Right Arm DVT  - associated with midline  - continue on Eliquis anticoagulation  - monitor anemia, Hgb has been ~7-8    Therefore, she is discharged in good and stable condition to a long-term acute care hospital.    The patient met 2-midnight criteria for an inpatient stay at the time of discharge.      Discharged date 6/21/21    FOLLOW UP ITEMS POST DISCHARGE  Surgeon to follow, diet per surgeon, wound vac and drains left in place  Continue TPN until oral nutrition improve    DISCHARGE DIAGNOSES  Principal Problem:    Bacterial peritonitis (HCC) POA: No  Active Problems:    Leukocytosis POA: Unknown    Cecal volvulus (HCC) POA: Yes    Thrombocytopenia (HCC) POA: No    DVT of axillary vein, acute right (HCC) POA: No    Anemia POA: Unknown    Acute hypoxic respiratory failure (HCC) POA: Unknown      Overview: Pt with poor inspiratory effort, may have some atelectasis. Given her poor       nutritional status and low albumin, is at risk for third spacing - will       check CXR     Protein-calorie malnutrition, moderate (HCC) POA: Unknown  Resolved Problems:    Atrial fibrillation with RVR (HCC) POA: Unknown    Edema POA: Unknown    Dehydration POA: Unknown      FOLLOW UP  [facility]  [address] [facility]  [address]th [address]  [facility] [address] [phone]          MEDICATIONS ON DISCHARGE     Medication List      START taking these medications      Instructions   apixaban 5mg Tabs  Start taking on: June 18, 2021  Commonly known as: ELIQUIS   Take 2 Tablets by mouth 2 times a day for 7 days, THEN 1 tablet 2 times a day for 21 days. After completion of 10mg BID x 7d, please start 5mg BID  Indications: DVT/PE     NS SOLN 100 mL with ampicillin/sulbactam 3 (2-1) g SOLR 3 g   Infuse 3 g into a venous catheter every 6 hours.  Dose: 3 g     NS SOLN 100 mL with micafungin 100 MG SOLR 100 mg   Infuse 100 mg into a venous catheter every 24 hours.  Dose: 100 mg        oxyCODONE immediate-release 5 MG Tabs  Commonly known as: ROXICODONE   Take 1-2 Tablets by mouth every four hours as needed for Moderate Pain or Severe Pain for up to 3 days.  Dose: 5-10 mg        CONTINUE taking these medications      Instructions   DULoxetine 60 MG Cpep delayed-release capsule  Commonly known as: CYMBALTA   Take 1 Cap by mouth every day. TAKE 1 CAP BY MOUTH EVERY DAY.  Dose: 60 mg     topiramate 100 MG Tabs  Commonly known as: TOPAMAX   Take 1 Tab by mouth every day.  Dose: 100 mg        STOP taking these medications    Zabrina 0.35 MG tablet  Generic drug: norethindrone     HYDROcodone/acetaminophen  MG Tabs  Commonly known as: NORCO     hydrOXYzine HCl 25 MG Tabs  Commonly known as: ATARAX            Allergies  No Known Allergies    DIET  Orders Placed This Encounter   Procedures   • Diet Order Diet: Full Liquid     Standing Status:   Standing     Number of Occurrences:   1     Order Specific Question:   Diet:     Answer:   Full Liquid [11]       ACTIVITY  As tolerated.  Weight bearing as tolerated    LINES, DRAINS, AND WOUNDS  This is an automated list. Peripheral IVs will be removed prior to discharge.  Midline IV 06/01/21 Right;Upper;Anterior Upper arm (Active)   Site Assessment Clean;Dry;Intact 06/17/21 2200   Dressing Type Transparent;Occlusive 06/17/21 2200   Line Status Scrubbed the hub prior to access;Infusing;Flushed 06/17/21 2200   Dressing Status Clean;Dry;Intact 06/17/21 2200   Dressing Intervention N/A 06/17/21 2200   Dressing Change Due 06/19/21 06/17/21 2200   Date Primary Tubing Changed 06/17/21 06/17/21 2200   Date Secondary Tubing Changed 06/17/21 06/17/21 2200   NEXT Primary Tubing Change  06/20/21 06/17/21 2200   NEXT Secondary Tubing Change  06/18/21 06/17/21 2200   Infiltration Grading ([facility]) 0 06/17/21 2200   Phlebitis Scale ([facility] Only) 0 06/17/21 2200       PICC Double Lumen 06/12/21 Lumen 1: Red Lumen 2: Purple Left Basilic (Active)   Site Assessment  Clean;Dry;Intact 06/17/21 2200   Lumen 1 Status Normal saline locked 06/17/21 2200   Lumen 2 Status Infusing 06/17/21 2200   Line Secured at (cm) 0 cm 06/12/21 1200   Extremity Circumference (cm) 27 cm 06/12/21 1200   Dressing Type Occlusive;Biopatch;Transparent;Securing device 06/17/21 2200   Dressing Status Clean;Dry;Intact 06/17/21 2200   Dressing Intervention N/A 06/17/21 2200   Dressing Change Due 06/19/21 06/17/21 2200   Date Primary Tubing Changed 06/17/21 06/17/21 2200   Date Secondary Tubing Changed 06/13/21 06/14/21 0800   Date TPN Tubing Changed (Q 24 Hrs) 06/16/21 06/16/21 1942   Line Necessity Assessed TPN Orders to Start Within 24 Hours 06/16/21 1942   $ Double Lumen PICC Charge Single kit used 06/12/21 1200     Closed/Suction Drain 1 Right;Anterior Abdomen Locking Loop Catheter 10 Fr. (Active)   Site Description Healing 06/17/21 0815   Dressing Type Open to Air 06/17/21 0815   Dressing Status Dry;Clean;Intact 06/17/21 0815   Drainage Appearance Tan;Cloudy 06/17/21 0815   Tube Status To bulb suction 06/17/21 0815   Output (mL) 10 mL 06/17/21 2000       Closed/Suction Drain 2 Right;Posterior Back Locking Loop Catheter 8 Fr. (Active)   Site Description Unable to view 06/17/21 0815   Dressing Status Clean;Intact;Dry 06/17/21 0815   Drainage Appearance Serosanguineous 06/17/21 0815   Tube Status To bulb suction 06/17/21 0815   Output (mL) 1 mL 06/17/21 2000      Wound 06/12/21 Full Thickness Wound Abdomen Midline NPWT with Malecot drain (Active)   Wound Image   06/15/21 1515   Site Assessment Red 06/18/21 0900   Periwound Assessment Intact 06/18/21 0900   Margins BAMBI 06/17/21 2000   Closure Secondary intention 06/18/21 0900   Drainage Amount Small 06/18/21 0900   Drainage Description Serosanguineous 06/18/21 0900   Treatments Site care;Cleansed 06/18/21 0900   Wound Cleansing Normal Saline Irrigation 06/18/21 0900   Periwound Protectant Skin Protectant Wipes to Periwound;Drape;Paste Ring 06/18/21 0900      Dressing Cleansing/Solutions Not Applicable 06/17/21 2000   Dressing Options Wound Vac 06/18/21 0900   Dressing Changed Changed 06/18/21 0900   Dressing Status Intact 06/17/21 2000   Dressing Change/Treatment Frequency Monday, Wednesday, Friday, and As Needed 06/18/21 0900   NEXT Dressing Change/Treatment Date 06/21/21 06/18/21 0900   NEXT Weekly Photo (Inpatient Only) 06/21/21 06/18/21 0900   Non-staged Wound Description Full thickness 06/18/21 0900   Wound Length (cm) 19.5 cm 06/15/21 1515   Wound Width (cm) 2.2 cm 06/15/21 1515   Wound Depth (cm) 2 cm 06/15/21 1515   Wound Surface Area (cm^2) 42.9 cm^2 06/15/21 1515   Wound Volume (cm^3) 85.8 cm^3 06/15/21 1515   Wound Healing % -18 06/15/21 1515   Wound Bed Granulation (%) 90 % 06/18/21 0900   Tunneling (cm) 0 cm 06/12/21 0800   Shape linear 06/18/21 0900   Wound Odor None 06/18/21 0900   Exposed Structures Other (Comments) 06/18/21 0900   WOUND NURSE ONLY - Time Spent with Patient (mins) 60 06/18/21 0900      PICC Double Lumen 06/12/21 Lumen 1: Red Lumen 2: Purple Left Basilic (Active)   Site Assessment Clean;Dry;Intact 06/17/21 2200   Lumen 1 Status Normal saline locked 06/17/21 2200   Lumen 2 Status Infusing 06/17/21 2200   Line Secured at (cm) 0 cm 06/12/21 1200   Extremity Circumference (cm) 27 cm 06/12/21 1200   Dressing Type Occlusive;Biopatch;Transparent;Securing device 06/17/21 2200   Dressing Status Clean;Dry;Intact 06/17/21 2200   Dressing Intervention N/A 06/17/21 2200   Dressing Change Due 06/19/21 06/17/21 2200   Date Primary Tubing Changed 06/17/21 06/17/21 2200   Date Secondary Tubing Changed 06/13/21 06/14/21 0800   Date TPN Tubing Changed (Q 24 Hrs) 06/16/21 06/16/21 1942   Line Necessity Assessed TPN Orders to Start Within 24 Hours 06/16/21 1942   $ Double Lumen PICC Charge Single kit used 06/12/21 1200                MENTAL STATUS ON TRANSFER   AOx4          CONSULTATIONS  Surgery - Dr. Wood  ID - Dr. Anna Corona  care    PROCEDURES  5/22/21: PROCEDURES:  Exploratory celiotomy and right hemicolectomy with a side-to-side   ileocolic anastomosis.    5/28/21: PROCEDURES:  Exploratory celiotomy, resection of ileocolic anastomosis with re-do  side-to-side ileocolic anastomosis.    6/4/21: PROCEDURES:  Exploratory celiotomy, wound VAC placement, area of less than 100   cm2, and washout of abdominal cavity.    6/9/21:  Procedure(s): CT guided abscess drainages x2    6/11/21: PROCEDURES:  Exploratory celiotomy and control of anastomotic leak with   Malecot drain.    LABORATORY  Lab Results   Component Value Date    SODIUM 136 06/21/2021    POTASSIUM 3.8 06/21/2021    CHLORIDE 104 06/21/2021    CO2 23 06/21/2021    GLUCOSE 107 (H) 06/21/2021    BUN 11 06/21/2021    CREATININE 0.35 (L) 06/21/2021    CREATININE 0.8 01/04/2007        Lab Results   Component Value Date    WBC 10.6 06/21/2021    HEMOGLOBIN 7.5 (L) 06/21/2021    HEMATOCRIT 24.0 (L) 06/21/2021    PLATELETCT 293 06/21/2021        Total time of the discharge process exceeds 38 minutes.

## 2021-06-18 NOTE — PROGRESS NOTES
Pt is awake in bed. VSS. Pt declines any abdominal pain. Dressing to abdomen is clean, dry, and intact. Discussed POC with pt. Pt verbalizes understanding. Fall precautions in place.

## 2021-06-18 NOTE — PROGRESS NOTES
[facility] Medicine Daily Progress Note    Date of Service  6/18/2021    Chief Complaint  51 y.o. female admitted 5/22/2021 with abd pain    Hospital Course  49 yo woman who presented with abd pain and CT showed cecal volvulus. She underwent exploratory celiotomy and right hemicolectomy with ileocolic anastomosis with Dr. Wood 5/22/21. She developed thrombocytopenia and ascites and returned for ex lap with resection of ileocolic anastomosis and wound vac placement 5/28/21. She was admitted to ICU, started on zosyn for bacterial peritonitis and given platelet transfusions. HIT panel was negative. Abd cultures grew actinomyces, enterococcus, bacteroides. Doppler showed catheter related thrombus in R arm and started on anticoagulation. She had additional washout in the OR with Dr. Wood 6/4/21 and remains on unasyn and fluconazole. Wound vac remains in place. She was then found with anastomotic leak and underwent malecot drain placement 6/11/21. She was started on TPN for malnutrition.    Interval Problem Update  WBC 14  Wound vac changed today, less fluid at site of malecot drain  Her pain is better, she is ambulating. She feels very hungry. I discussed with Dr. Wills, hold off on advancing diet for now  LTACH bed not available today    Consultants/Specialty  Critical care  Surgery  ID    Code Status  Full Code    Disposition  LTACH bed pending    Review of Systems  Review of Systems   Constitutional: Positive for malaise/fatigue.   Gastrointestinal: Positive for abdominal pain and diarrhea. Negative for constipation and vomiting.   Neurological: Positive for weakness.   All other systems reviewed and are negative.       Physical Exam  Temp:  [36.3 °C (97.4 °F)-37.1 °C (98.8 °F)] 36.7 °C (98.1 °F)  Pulse:  [77-91] 90  Resp:  [17-18] 17  BP: (131-146)/(66-78) 142/66  SpO2:  [92 %-99 %] 96 %    Physical Exam  Vitals and nursing note reviewed.   Constitutional:       Appearance: She is not toxic-appearing or diaphoretic.       Comments: fatigued   HENT:      Head: Normocephalic.      Mouth/Throat:      Mouth: Mucous membranes are dry.   Eyes:      General:         Right eye: No discharge.         Left eye: No discharge.   Pulmonary:      Effort: No respiratory distress.   Abdominal:      General: There is no distension.      Comments: Central wound appears well healing with malecot drain in place with minimal brown fluid   Musculoskeletal:      Cervical back: Neck supple.      Right lower leg: No edema.      Left lower leg: No edema.   Skin:     General: Skin is warm and dry.      Coloration: Skin is pale.   Neurological:      Mental Status: She is alert.      Comments: AOx4         Fluids    Intake/Output Summary (Last 24 hours) at 6/18/2021 1041  Last data filed at 6/17/2021 2000  Gross per 24 hour   Intake 391.33 ml   Output 661 ml   Net -269.67 ml       Laboratory  Recent Labs     06/16/21  0359 06/17/21  0333 06/18/21  0630   WBC 16.7* 16.3* 14.1*   RBC 2.87* 2.87* 3.17*   HEMOGLOBIN 8.0* 7.9* 8.8*   HEMATOCRIT 25.6* 25.2* 28.6*   MCV 89.2 87.8 90.2   MCH 27.9 27.5 27.8   MCHC 31.3* 31.3* 30.8*   RDW 49.6 50.0 52.1*   PLATELETCT 417 379 378   MPV 9.1 9.4 9.2     Recent Labs     06/16/21  0359 06/17/21  0333   SODIUM 134* 135   POTASSIUM 4.0 4.2   CHLORIDE 104 103   CO2 18* 20   GLUCOSE 149* 89   BUN 11 15   CREATININE 0.35* 0.37*   CALCIUM 8.2* 8.3*             Recent Labs     06/17/21  0333   TRIGLYCERIDE 143       Imaging  CT-ABDOMEN-PELVIS WITH   Final Result      1.  There are 2 surgical drains or tubes. The midline tube extends to the midline surgical wound is located immediately adjacent to the inferior wall of the transverse colon.      2.  Additional surgical drain is noted in the right side of the abdomen and pelvis. Fluid collection in this area is nearly completely resolved. Minimal right paracolic gutter fluid and left paracolic gutter fluid remains.      3.  Pigtail catheter noted deep in the pelvis in the  cul-de-sac area is noted with near complete evacuation of this fluid collection which also contained air on the prior CT.      4.  Decrease in pleural effusions bilaterally.      5.  No new fluid collections are identified.         IR-PICC LINE PLACEMENT W/ GUIDANCE > AGE 5   Final Result                  Ultrasound-guided PICC placement performed by qualified nursing staff as    above.          EC-ECHOCARDIOGRAM COMPLETE W/O CONT   Final Result      DX-BARIUM ENEMA   Final Result      1.  Extensive ileocolic anastomotic leakage extending into the peritoneal cavity and extending through the enterocutaneous fistula to the skin surface            2.  Right hemicolectomy      3.  Findings were discussed with NEVA KAY on 6/11/2021 12:21 PM.      DX-CHEST-PORTABLE (1 VIEW)   Final Result      1.  The cardiac silhouette is enlarged and there are changes most consistent with vascular congestion/edema with a trace of left pleural effusion.   2.  Superimposed pneumonitis and/or atelectasis is also possible.      CT-DRAIN-PERITONEAL   Final Result      1.  CT guided right lower quadrant abscess and pelvic abscess catheter drainage.   2.  The current plan is to obtain a follow-up CT in 57 days..      CT-ABDOMEN-PELVIS WITH   Final Result      1.  Loculated rim-enhancing peritoneal fluid collections are again seen in the upper and lower quadrants as well as the posterior pelvis, all of which are very minimally decreased in size.   2.  There is postoperative change in the anterior abdominal wall with removal of the skin staples.   3.  There are postoperative changes of right hemicolectomy with very minimal dilatation of the transverse colon at the anastomosis. There is no bowel obstruction.   4.  Stable moderate left and small right dependent pleural effusions with dependent atelectasis.      CT-ABDOMEN-PELVIS WITH   Final Result      1.  Multiple rim enhancing peritoneal fluid collection suspicious for abscess      2.   These include the rectouterine space measuring 6.6 x 7.2 x 5.1 cm, right lower quadrant measuring 13.8 x 4.2 x 6.5 cm, left subphrenic space measuring 8.4 x 4.6 x 2.7 cm, and contiguous loculated fluid within the right and left paracolic gutter      3.  Interval right colectomy      4.  Small-moderate-sized bilateral pleural effusion and atelectasis      US-EXTREMITY VENOUS UPPER BILAT   Final Result      IR-MIDLINE CATHETER INSERTION WO GUIDANCE > AGE 3   Final Result                  Ultrasound-guided midline placement performed by qualified nursing staff    as above.          DX-CHEST-FOR LINE PLACEMENT Perform procedure in: Other(comment f6 below): (PACU)   Final Result      1.  Left IJ central line tip high in position located above the superior vena cava are within the left brachiocephalic vein.      2.  Patchy bilateral infiltrates.      3.  NG tube tip extends into the stomach.      CT-ABDOMEN-PELVIS WITH   Final Result      1.  Moderate to large amount of ascites within the abdomen and pelvis some areas of which are loculated in nature. There are also punctate areas of gas within those areas of ascites as well as free intraperitoneal air. This may be related to recent    surgical procedure however the degree of volume of ascites would be unusual for normal postoperative course. Consideration should be given for leakage of bowel content or other process.      2.  Diffuse colonic distention.      3.  Bibasilar atelectasis and pleural effusions.      4.  5 mm left renal pelvic stone which results in mild hydronephrosis above that level.      5.  Moderate right-sided hydronephrosis extending to the level of the upper sacrum. No ureteral stone. The ureter is not identified below that level.      6.  This was discussed with NEVA KAY at 6:44 PM on 5/27/2021.      [id number] VIEW   Final Result      Gaseous distended small bowel and colon is similar to prior, likely ileus.      [id number] VIEW   Final  Result         Gaseous distention of the small bowel and colon, likely postoperative ileus.      CT-ABDOMEN-PELVIS WITH   Final Result         1.  Findings keeping with cecal volvulus.   2.  Mildly fluid distended distal small bowel.   3.  No free air.   4.  Small nonobstructing renal stones.   These findings were discussed with ESHA STERLING on 5/22/2021 11:45 AM.                       Assessment/Plan  * Bacterial peritonitis (HCC)  Assessment & Plan  - s/p ileocecal resection/redo 5/28/21 and washout 6/4/2, drain placement on 6/9 by IR. Developed anastomotic leak seen on barium enema 6/11 and went to the OR - ileostomy could not be performed due to inflammation of the bowel. Drain was placed and abdomen washed out.  - Wound cultures initially with Bacteroides, Actinomyces, E faecalis. Was on Unasyn and Diflucan.  Culture from when pt began having purulent discharge on 6/8 with bacteroides, culture from IR drain placement 6/9 with bacteroides/candida albicans and krusei.  Diflucan changed to Micafungin.  CT AP 6/17/21 showed improved fluid collections  Per surgery, f/u in 2 weeks with Dr. Wood  - Per ID - Continue IV Unasyn & IV micafungin, f/u recs  Will Continue to monitor drain output  Diet per surgeon, continue clear liquids    Protein-calorie malnutrition, moderate (HCC)  Assessment & Plan  - Albumin level of 2.0, TPN initiated and tolerating   - PICC in place  - Boost plus supplements ordered between meals     Acute hypoxic respiratory failure (HCC)  Assessment & Plan  - Secondary to pulmonary edema from third spacing secondary to hypoalbuminemia  - resolved, on r/a    Anemia  Assessment & Plan  - Hgb slowly trending down, not indicative of acute bleed but likely due to ACD and return trips to the OR.  - Iron low, but suppressed TIBC as well, likely some ABLA with anemia of acute illness as well  - No IV iron for now given her active infection  - Transfuse if Hgb <7  - Hgb fluctuating but stable  -  Continue Lovenox for DVT as Hgb without large acute drops suggesting active bleed     DVT of axillary vein, acute right (HCC)  Assessment & Plan  Associated with midline, small  Continue on lovenox, transition to oral Eliquis when no interventions planned     Thrombocytopenia (HCC)  Assessment & Plan  Resolved   HIT AB negative  Due to sepsis       Cecal volvulus (HCC)- (present on admission)  Assessment & Plan  S/p resection, complicated by anastomotic leak  Surgery following, appreciated           VTE prophylaxis: lovenox

## 2021-06-18 NOTE — WOUND TEAM
[facility]  Inpatient Services  Wound and Skin Care Evaluation    Admission Date: 5/22/2021     Last order of IP CONSULT TO WOUND CARE was found on 6/1/2021 from Hospital Encounter on 5/22/2021     HPI, PMH, SH: Reviewed    Past Surgical History:   Procedure Laterality Date   • PB EXPLORATORY OF ABDOMEN  6/11/2021    Procedure: LAPAROTOMY, EXPLORATORY;  Surgeon: Maryam Wood M.D.;  Location: [facility];  Service: General   • ILEOSTOMY  6/11/2021    Procedure: WOUND VAC PLACEMENT ;  Surgeon: Maryam Wood M.D.;  Location: [facility];  Service: General   • PB EXPLORATORY OF ABDOMEN N/A 6/4/2021    Procedure: LAPAROTOMY, EXPLORATORY, WITH WASH OUT;  Surgeon: Maryam Wood M.D.;  Location: [facility];  Service: General   • PB EXPLORATORY OF ABDOMEN N/A 5/28/2021    Procedure: LAPAROTOMY, EXPLORATORY- RESECTION OF ILEOCECAL ANASTATMOSIS.;  Surgeon: Maryam Wood M.D.;  Location: [facility];  Service: General   • PB EXPLORATORY OF ABDOMEN  5/22/2021    Procedure: LAPAROTOMY, EXPLORATORY;  Surgeon: Maryam Wood M.D.;  Location: [facility];  Service: General   • HEMICOLECTOMY, RIGHT  5/22/2021    Procedure: HEMICOLECTOMY, RIGHT, SIDE TO SIDE ANASTOMOSIS;  Surgeon: Maryam Wood M.D.;  Location: [facility];  Service: General   • SHOULDER DECOMPRESSION ARTHROSCOPIC Right 12/24/2018    Procedure: SHOULDER DECOMPRESSION ARTHROSCOPIC - SUBACROMIAL;  Surgeon: Tristian Garcia M.D.;  Location: [facility];  Service: Orthopedics   • CLAVICLE DISTAL EXCISION Left 12/24/2018    Procedure: CLAVICLE DISTAL EXCISION;  Surgeon: Tristian Garcia M.D.;  Location: [facility];  Service: Orthopedics   • SHOULDER ARTHROSCOPY W/ BICIPITAL TENODESIS REPAIR Left 12/24/2018    Procedure: SHOULDER ARTHROSCOPY W/ BICIPITAL TENODESIS REPAIR - AND PACKER;  Surgeon: Tristian Garcia M.D.;  Location: [facility];   "Service: Orthopedics   • SHOULDER MANIPULATION Left 2018    Procedure: SHOULDER MANIPULATION;  Surgeon: Tristian Garcia M.D.;  Location: [facility];  Service: Orthopedics   • SHOULDER ARTHROSCOPY W/ BICIPITAL TENODESIS REPAIR Right 6/15/2015    Procedure: SHOULDER ARTHROSCOPY W/ BICIPITAL TENODESIS, SYNOVECTOMY;  Surgeon: Tristian Garcia M.D.;  Location: [facility];  Service:    • CLAVICLE DISTAL EXCISION Right 6/15/2015    Procedure: CLAVICLE DISTAL EXCISION;  Surgeon: Tristian Garcia M.D.;  Location: [facility];  Service:    • SHOULDER DECOMPRESSION Right 6/15/2015    Procedure: SHOULDER DECOMPRESSION MINI INCISION ;  Surgeon: Tristian Garcia M.D.;  Location: [facility];  Service:    • OTHER      nose   • APPENDECTOMY     • PB  DELIVERY ONLY       Social History     Tobacco Use   • Smoking status: Former Smoker     Years: 15.00     Quit date: 2005     Years since quittin.4   • Smokeless tobacco: Never Used   • Tobacco comment: 3-4 years ago   Substance Use Topics   • Alcohol use: No     Alcohol/week: 0.0 oz     Chief Complaint   Patient presents with   • Abdominal Pain     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 Per Dr Grier, \" Prevena was still functioning, but there was more than expected output in it.  It was removed and there was leakage from the incision.  Several staples removed due to underlying fat necrosis.  No evidence of infection or fascial dehiscence\"    :  Wound VAC blocking,  Dressing removed and found to have copious oily purulent drainage to proximal tract.   Dr. Glass and Dr. Grier updated.    : Pt went to Sx  and had VAC placed. Drsg intact, foam stapled in place.     : Pt returned to OR for diverting ileostomy, however, bowel was too " inflamed to mobilized and Malecot drain was place.    WOUND ASSESSMENT/LDA       Negative Pressure Wound Therapy 06/12/21 Surgical Abdomen (Active)   NPWT Pump Mode / Pressure Setting 125 mmHg;[place] 06/17/21 2000   Dressing Type Medium;Black Foam (Regular);White Foam 06/17/21 2000   Number of Foam Pieces Used 6 06/15/21 1515   Canister Changed No 06/16/21 2053   NEXT Dressing Change/Treatment Date 06/17/21 06/15/21 1515           Wound 06/12/21 Full Thickness Wound Abdomen Midline NPWT with Malecot drain (Active)   Wound Image   06/15/21 1515   Site Assessment Red 06/18/21 0900   Periwound Assessment Intact 06/18/21 0900   Margins BAMBI 06/17/21 2000   Closure Secondary intention 06/18/21 0900   Drainage Amount Small 06/18/21 0900   Drainage Description Serosanguineous 06/18/21 0900   Treatments Site care;Cleansed 06/18/21 0900   Wound Cleansing Normal Saline Irrigation 06/18/21 0900   Periwound Protectant Skin Protectant Wipes to Periwound;Drape;Paste Ring 06/18/21 0900   Dressing Cleansing/Solutions Not Applicable 06/17/21 2000   Dressing Options Wound Vac 06/18/21 0900   Dressing Changed Changed 06/18/21 0900   Dressing Status Intact 06/17/21 2000   Dressing Change/Treatment Frequency Monday, Wednesday, Friday, and As Needed 06/18/21 0900   NEXT Dressing Change/Treatment Date 06/21/21 06/18/21 0900   NEXT Weekly Photo (Inpatient Only) 06/21/21 06/18/21 0900   Non-staged Wound Description Full thickness 06/18/21 0900   Wound Length (cm) 19.5 cm 06/15/21 1515   Wound Width (cm) 2.2 cm 06/15/21 1515   Wound Depth (cm) 2 cm 06/15/21 1515   Wound Surface Area (cm^2) 42.9 cm^2 06/15/21 1515   Wound Volume (cm^3) 85.8 cm^3 06/15/21 1515   Wound Healing % -18 06/15/21 1515   Wound Bed Granulation (%) 90 % 06/18/21 0900   Tunneling (cm) 0 cm 06/12/21 0800   Shape linear 06/18/21 0900   Wound Odor None 06/18/21 0900   Exposed Structures Other (Comments) 06/18/21 0900   WOUND NURSE ONLY - Time Spent with Patient (mins) 60  06/18/21 0900               Vascular:    GEE:   No results found.    Lab Values:    Lab Results   Component Value Date/Time    WBC 14.1 (H) 06/18/2021 06:30 AM    RBC 3.17 (L) 06/18/2021 06:30 AM    HEMOGLOBIN 8.8 (L) 06/18/2021 06:30 AM    HEMATOCRIT 28.6 (L) 06/18/2021 06:30 AM    CREACTPROT 24.76 (H) 05/28/2021 04:07 AM      Culture Results show:  Recent Results (from the past 720 hour(s))   CULTURE WOUND W/ GRAM STAIN    Collection Time: 06/09/21  4:30 PM    Specimen: KRISTI Drain; Wound   Result Value Ref Range    Significant Indicator POS (POS)     Source WND     Site KRISTI DRAIN     Culture Result - (A)     Gram Stain Result Moderate WBCs.  Few Yeast.       Culture Result Candida albicans  Light growth   (A)     Culture Result Candida krusei  Light growth   (A)     Culture Result Bacteroides fragilis  Heavy growth   (A)        Pain Level/Medicated:  IV morphine, topical lidocaine tolerated okay    INTERVENTIONS BY WOUND TEAM:  Chart and images reviewed. Discussed with bedside RN.  Performed standard wound care which includes appropriate positioning, dressing removal and non-selective debridement.     Preparation for Dressing removal: dressing moistened with NS and lidocaine.   Cleansed with:  NS  and gauze.  Sharp debridement: NA  Jenn wound: Cleansed with NS, Prepped with No Sting skin prep and drape, dsd in navel  Primary Dressing: 3 segments white foam to wound bed. 1 half thick spiral black foam to remaining depth. Mepitel and paste ring to base of Malecot to obtain better seal.  Secondary (Outer) Dressing: button, drape and tracpad to foam    Interdisciplinary consultation: Patient,  Bedside RN    EVALUATION / RATIONALE FOR TREATMENT:  Most Recent Date:  6/15: Pt's wound improving, still has some brown output into canister almost same amount out in dd.  Wound bed red with some granulation. Continuing POC.     6/12: Communicated with Dr. Wood on 6/11 via Vo[employer]. Ok to resume wound vac. Malecot on mid abdominal  wound, sutured in placed and connected to down drain bag. No abnormal structures, fecal matter or discolored drainaige noted in wound. Partial granular growth noted. Pt has 2 KRISTI drain on RUQ and RLQ. RLQ KRISTI appears to be collecting fluid from wound bed as saline used to cleanse wound immediately collected in KRISTI. It is likely there will be less output in RLQ KRISTI now that vac is in place. Pt tolerated well with current pain med regimen.    6/10/21: Wound with multiple areas of stool draining. Fistula's? Unable to segregate areas to apply NPWT. Wound manager to wall suction and dakins moistened roll gauze to wound to assist in granular tissue development. Will plan to return on Saturday or Sunday and than again on Tuesday or Wednesday.  6/8/21: Pt's Vac was alarming so in to see cause. NPWT with o mmHg, tan drainage in cannister. Drsg saturated. Removed drsg. With removal of drsg proximal part of wound had large brown tan drainage with some small solid pieces. Pt continued to have copious drainage. Dr Lopez and Dr Wood were communicated with to report development. Placed wet to dry drsg. Pt up to bathroom and when back to bed drsg was saturated so changed again. Rinsed abdominal binder and hung to dry.  Drsg orders changed and culture was collected before cleaning with dakins. It appears that proximal part of wound bed has opened again, there is 1.3 cm of depth. Pt to go for CT scan.  6/7/21: Pt's VAC with smaller drsg stapled in place. Pain present with staple removal. Bedside RN holding pt's hand and providing comfort. Wound bed  than last week also decreased depth.   06/03/21:  Patient with large/copious amounts of purulent oily drainage from proximal tract.  Dakin's ordered and CT order per MD.  Wound team to follow up on Saturday for possible VAC re-pplacement.   6/1/21: Pt has full thickness surgical wound to abd. Applied wound vac because NPWT encourages granulation tissue growth, maintains optimal  moisture needed for wound healing, and promotes angiogenesis.     Goals: Steady decrease in wound area and depth weekly.    WOUND TEAM PLAN OF CARE ([X] for frequency of wound follow up,):   Nursing to follow orders written for wound care. Contact wound team if area fails to progress, deteriorates or with any questions/concerns  Dressing changes by wound team:                   Follow up 3 times weekly:                NPWT change 3 times weekly:  Follow up 1-2 times weekly:   X   Follow up Bi-Monthly:                   Follow up as needed:     Other (explain):     NURSING PLAN OF CARE ORDERS (X):  Dressing changes: See Dressing Care orders:x  Skin care: See Skin Care orders:  RN Prevention Protocol: present  Rectal tube care: See Rectal Tube Care orders:   Other orders:      Anticipated discharge plans:   LTACH:    X    SNF/Rehab:                  Home Health Care:           Outpatient Wound Center:            Self/Family Care:        Other:

## 2021-06-18 NOTE — CARE PLAN
The patient is Stable - Low risk of patient condition declining or worsening    Shift Goals  Clinical Goals: pain management, promote rest  Patient Goals: sleep comfortably    Progress made toward(s) clinical / shift goals:        Problem: Knowledge Deficit - Standard  Goal: Patient and family/care givers will demonstrate understanding of plan of care, disease process/condition, diagnostic tests and medications  Outcome: Progressing     Problem: Pain - Standard  Goal: Alleviation of pain or a reduction in pain to the patient’s comfort goal  Outcome: Progressing     Problem: Fall Risk  Goal: Patient will remain free from falls  Outcome: Progressing     Problem: Skin Integrity  Goal: Skin integrity is maintained or improved  Outcome: Progressing     Problem: Early Mobilization - Post Surgery  Goal: Early mobilization post surgery  Outcome: Progressing       Patient is not progressing towards the following goals:

## 2021-06-18 NOTE — CARE PLAN
The patient is Watcher - Medium risk of patient condition declining or worsening    Shift Goals  Clinical Goals: Increase ambulation  Patient Goals: sleep comfortably    Progress made toward(s) clinical / shift goals:  Encourage patient to ambulate frequently.      Problem: Pain - Standard  Goal: Alleviation of pain or a reduction in pain to the patient’s comfort goal  Outcome: Progressing     Problem: Fall Risk  Goal: Patient will remain free from falls  Outcome: Progressing     Problem: Early Mobilization - Post Surgery  Goal: Early mobilization post surgery  Outcome: Progressing

## 2021-06-19 PROBLEM — D72.829 LEUKOCYTOSIS: Status: ACTIVE | Noted: 2021-06-19

## 2021-06-19 LAB
ANISOCYTOSIS BLD QL SMEAR: ABNORMAL
ANISOCYTOSIS BLD QL SMEAR: ABNORMAL
APPEARANCE UR: CLEAR
BASOPHILS # BLD AUTO: 0 % (ref 0–1.8)
BASOPHILS # BLD AUTO: 0 % (ref 0–1.8)
BASOPHILS # BLD: 0 K/UL (ref 0–0.12)
BASOPHILS # BLD: 0 K/UL (ref 0–0.12)
BILIRUB UR QL STRIP.AUTO: NEGATIVE
C DIFF DNA SPEC QL NAA+PROBE: NEGATIVE
C DIFF TOX GENS STL QL NAA+PROBE: NEGATIVE
COLOR UR: YELLOW
EOSINOPHIL # BLD AUTO: 0 K/UL (ref 0–0.51)
EOSINOPHIL # BLD AUTO: 0.3 K/UL (ref 0–0.51)
EOSINOPHIL NFR BLD: 0 % (ref 0–6.9)
EOSINOPHIL NFR BLD: 1 % (ref 0–6.9)
ERYTHROCYTE [DISTWIDTH] IN BLOOD BY AUTOMATED COUNT: 52.3 FL (ref 35.9–50)
ERYTHROCYTE [DISTWIDTH] IN BLOOD BY AUTOMATED COUNT: 53.3 FL (ref 35.9–50)
GLUCOSE BLD-MCNC: 142 MG/DL (ref 65–99)
GLUCOSE UR STRIP.AUTO-MCNC: NEGATIVE MG/DL
HCT VFR BLD AUTO: 23.8 % (ref 37–47)
HCT VFR BLD AUTO: 25.3 % (ref 37–47)
HGB BLD-MCNC: 7.5 G/DL (ref 12–16)
HGB BLD-MCNC: 8.2 G/DL (ref 12–16)
HYPOCHROMIA BLD QL SMEAR: ABNORMAL
HYPOCHROMIA BLD QL SMEAR: ABNORMAL
KETONES UR STRIP.AUTO-MCNC: ABNORMAL MG/DL
LEUKOCYTE ESTERASE UR QL STRIP.AUTO: NEGATIVE
LG PLATELETS BLD QL SMEAR: NORMAL
LIPASE SERPL-CCNC: 94 U/L (ref 7–58)
LYMPHOCYTES # BLD AUTO: 0.44 K/UL (ref 1–4.8)
LYMPHOCYTES # BLD AUTO: 0.59 K/UL (ref 1–4.8)
LYMPHOCYTES NFR BLD: 1 % (ref 22–41)
LYMPHOCYTES NFR BLD: 2 % (ref 22–41)
MACROCYTES BLD QL SMEAR: ABNORMAL
MACROCYTES BLD QL SMEAR: ABNORMAL
MANUAL DIFF BLD: NORMAL
MANUAL DIFF BLD: NORMAL
MCH RBC QN AUTO: 28.3 PG (ref 27–33)
MCH RBC QN AUTO: 28.8 PG (ref 27–33)
MCHC RBC AUTO-ENTMCNC: 31.5 G/DL (ref 33.6–35)
MCHC RBC AUTO-ENTMCNC: 32.4 G/DL (ref 33.6–35)
MCV RBC AUTO: 88.8 FL (ref 81.4–97.8)
MCV RBC AUTO: 89.8 FL (ref 81.4–97.8)
MICRO URNS: ABNORMAL
MICROCYTES BLD QL SMEAR: ABNORMAL
MONOCYTES # BLD AUTO: 0.3 K/UL (ref 0–0.85)
MONOCYTES # BLD AUTO: 1.31 K/UL (ref 0–0.85)
MONOCYTES NFR BLD AUTO: 1 % (ref 0–13.4)
MONOCYTES NFR BLD AUTO: 3 % (ref 0–13.4)
NEUTROPHILS # BLD AUTO: 28.51 K/UL (ref 2–7.15)
NEUTROPHILS # BLD AUTO: 41.86 K/UL (ref 2–7.15)
NEUTROPHILS NFR BLD: 90 % (ref 44–72)
NEUTROPHILS NFR BLD: 94 % (ref 44–72)
NEUTS BAND NFR BLD MANUAL: 2 % (ref 0–10)
NEUTS BAND NFR BLD MANUAL: 6 % (ref 0–10)
NITRITE UR QL STRIP.AUTO: NEGATIVE
NRBC # BLD AUTO: 0.02 K/UL
NRBC # BLD AUTO: 0.05 K/UL
NRBC BLD-RTO: 0.1 /100 WBC
NRBC BLD-RTO: 0.1 /100 WBC
PH UR STRIP.AUTO: 7 [PH] (ref 5–8)
PLATELET # BLD AUTO: 298 K/UL (ref 164–446)
PLATELET # BLD AUTO: 323 K/UL (ref 164–446)
PLATELET BLD QL SMEAR: NORMAL
PLATELET BLD QL SMEAR: NORMAL
PMV BLD AUTO: 9.2 FL (ref 9–12.9)
PMV BLD AUTO: 9.4 FL (ref 9–12.9)
POLYCHROMASIA BLD QL SMEAR: NORMAL
POLYCHROMASIA BLD QL SMEAR: NORMAL
PROT UR QL STRIP: NEGATIVE MG/DL
RBC # BLD AUTO: 2.65 M/UL (ref 4.2–5.4)
RBC # BLD AUTO: 2.85 M/UL (ref 4.2–5.4)
RBC BLD AUTO: PRESENT
RBC BLD AUTO: PRESENT
RBC UR QL AUTO: NEGATIVE
SP GR UR STRIP.AUTO: 1
WBC # BLD AUTO: 29.7 K/UL (ref 4.8–10.8)
WBC # BLD AUTO: 43.6 K/UL (ref 4.8–10.8)

## 2021-06-19 PROCEDURE — 99232 SBSQ HOSP IP/OBS MODERATE 35: CPT | Performed by: INTERNAL MEDICINE

## 2021-06-19 PROCEDURE — 700101 HCHG RX REV CODE 250: Performed by: INTERNAL MEDICINE

## 2021-06-19 PROCEDURE — 83690 ASSAY OF LIPASE: CPT

## 2021-06-19 PROCEDURE — 82962 GLUCOSE BLOOD TEST: CPT

## 2021-06-19 PROCEDURE — 85027 COMPLETE CBC AUTOMATED: CPT

## 2021-06-19 PROCEDURE — 700111 HCHG RX REV CODE 636 W/ 250 OVERRIDE (IP): Performed by: INTERNAL MEDICINE

## 2021-06-19 PROCEDURE — 700105 HCHG RX REV CODE 258: Performed by: INTERNAL MEDICINE

## 2021-06-19 PROCEDURE — 87493 C DIFF AMPLIFIED PROBE: CPT

## 2021-06-19 PROCEDURE — 700102 HCHG RX REV CODE 250 W/ 637 OVERRIDE(OP): Performed by: SURGERY

## 2021-06-19 PROCEDURE — A9270 NON-COVERED ITEM OR SERVICE: HCPCS | Performed by: SURGERY

## 2021-06-19 PROCEDURE — 81003 URINALYSIS AUTO W/O SCOPE: CPT

## 2021-06-19 PROCEDURE — 85007 BL SMEAR W/DIFF WBC COUNT: CPT

## 2021-06-19 PROCEDURE — 87040 BLOOD CULTURE FOR BACTERIA: CPT | Mod: 91

## 2021-06-19 PROCEDURE — 770006 HCHG ROOM/CARE - MED/SURG/GYN SEMI*

## 2021-06-19 PROCEDURE — 36415 COLL VENOUS BLD VENIPUNCTURE: CPT

## 2021-06-19 PROCEDURE — 99233 SBSQ HOSP IP/OBS HIGH 50: CPT | Performed by: INTERNAL MEDICINE

## 2021-06-19 PROCEDURE — 700111 HCHG RX REV CODE 636 W/ 250 OVERRIDE (IP): Performed by: SURGERY

## 2021-06-19 RX ADMIN — AMPICILLIN SODIUM AND SULBACTAM SODIUM 3 G: 2; 1 INJECTION, POWDER, FOR SOLUTION INTRAMUSCULAR; INTRAVENOUS at 07:00

## 2021-06-19 RX ADMIN — AMPICILLIN SODIUM AND SULBACTAM SODIUM 3 G: 2; 1 INJECTION, POWDER, FOR SOLUTION INTRAMUSCULAR; INTRAVENOUS at 11:32

## 2021-06-19 RX ADMIN — IBUPROFEN 600 MG: 600 TABLET ORAL at 18:10

## 2021-06-19 RX ADMIN — ACETAMINOPHEN 650 MG: 325 TABLET, FILM COATED ORAL at 18:10

## 2021-06-19 RX ADMIN — AMPICILLIN SODIUM AND SULBACTAM SODIUM 3 G: 2; 1 INJECTION, POWDER, FOR SOLUTION INTRAMUSCULAR; INTRAVENOUS at 18:10

## 2021-06-19 RX ADMIN — MICAFUNGIN SODIUM 100 MG: 20 INJECTION, POWDER, LYOPHILIZED, FOR SOLUTION INTRAVENOUS at 20:39

## 2021-06-19 RX ADMIN — ONDANSETRON 4 MG: 2 INJECTION INTRAMUSCULAR; INTRAVENOUS at 20:53

## 2021-06-19 RX ADMIN — POTASSIUM CHLORIDE: 2 INJECTION, SOLUTION, CONCENTRATE INTRAVENOUS at 20:33

## 2021-06-19 RX ADMIN — IBUPROFEN 600 MG: 600 TABLET ORAL at 11:32

## 2021-06-19 ASSESSMENT — ENCOUNTER SYMPTOMS
CONSTIPATION: 0
DIARRHEA: 0
WEAKNESS: 1
SHORTNESS OF BREATH: 0
ABDOMINAL PAIN: 1
VOMITING: 0
COUGH: 0
NAUSEA: 0
FEVER: 0
DIARRHEA: 1

## 2021-06-19 NOTE — PROGRESS NOTES
Trauma / Surgical Daily Progress Note    Date of Service  6/19/2021    Chief Complaint  50 y.o. female admitted 5/22/2021 with cecal volvulus    Interval Events  5/22 Exploratory celiotomy and right hemicolectomy with a side-to-side   ileocolic anastomosis  5/28 Exploratory celiotomy, resection of ileocolic anastomosis with re-do  side-to-side ileocolic anastomosis  6/4 Ex celiotomy washout  6/11 Ex celiotomy, drain placement    POD #8 Afebrile. Elevation WBC's 43K  Question lab error. Pt not symptomatic.  Feels better.  Tolerating clears and TPN. On Unasyn, micofungin per ID. Drains working.  Stooling.  Needs to mobilize. OOB more then in bed.  .  Drains being irrigated. Repeat labs, blood cultures, and monitor .    Review of Systems  Review of Systems   Gastrointestinal: Positive for abdominal pain.        Passing  Flatus and stool   Genitourinary: Negative.         Vital Signs for last 24 hours  Temp:  [37 °C (98.6 °F)-37.1 °C (98.8 °F)] 37.1 °C (98.7 °F)  Pulse:  [] 100  Resp:  [17] 17  BP: (104-145)/(63-71) 104/63  SpO2:  [97 %-100 %] 98 %    Hemodynamic parameters for last 24 hours       Respiratory Data     Respiration: 17, Pulse Oximetry: 98 %     Work Of Breathing / Effort: Within Normal Limits  RUL Breath Sounds: Clear, RML Breath Sounds: Clear, RLL Breath Sounds: Diminished, LUCITA Breath Sounds: Clear, LLL Breath Sounds: Diminished    Physical Exam  Physical Exam  Cardiovascular:      Rate and Rhythm: Normal rate.      Pulses: Normal pulses.   Pulmonary:      Effort: Pulmonary effort is normal.   Abdominal:      General: There is no distension.      Palpations: Abdomen is soft.      Tenderness: There is abdominal tenderness.      Comments: Wound vac in place.  Drain purulent  Drain serous   Genitourinary:     Comments: diuresing  Musculoskeletal:         General: Normal range of motion.      Cervical back: Neck supple.      Comments: Generalized edema   Skin:     General: Skin is warm and dry.    Neurological:      General: No focal deficit present.      Mental Status: She is alert.   Psychiatric:      Comments:           Laboratory  Recent Results (from the past 24 hour(s))   POCT glucose device results    Collection Time: 06/18/21  5:02 PM   Result Value Ref Range    Glucose - Accu-Ck 98 65 - 99 mg/dL   POCT glucose device results    Collection Time: 06/19/21  4:36 AM   Result Value Ref Range    Glucose - Accu-Ck 142 (H) 65 - 99 mg/dL   CBC WITH DIFFERENTIAL    Collection Time: 06/19/21  5:48 AM   Result Value Ref Range    WBC 43.6 (HH) 4.8 - 10.8 K/uL    RBC 2.85 (L) 4.20 - 5.40 M/uL    Hemoglobin 8.2 (L) 12.0 - 16.0 g/dL    Hematocrit 25.3 (L) 37.0 - 47.0 %    MCV 88.8 81.4 - 97.8 fL    MCH 28.8 27.0 - 33.0 pg    MCHC 32.4 (L) 33.6 - 35.0 g/dL    RDW 52.3 (H) 35.9 - 50.0 fL    Platelet Count 323 164 - 446 K/uL    MPV 9.2 9.0 - 12.9 fL    Neutrophils-Polys 90.00 (H) 44.00 - 72.00 %    Lymphocytes 1.00 (L) 22.00 - 41.00 %    Monocytes 3.00 0.00 - 13.40 %    Eosinophils 0.00 0.00 - 6.90 %    Basophils 0.00 0.00 - 1.80 %    Nucleated RBC 0.10 /100 WBC    Neutrophils (Absolute) 41.86 (H) 2.00 - 7.15 K/uL    Lymphs (Absolute) 0.44 (L) 1.00 - 4.80 K/uL    Monos (Absolute) 1.31 (H) 0.00 - 0.85 K/uL    Eos (Absolute) 0.00 0.00 - 0.51 K/uL    Baso (Absolute) 0.00 0.00 - 0.12 K/uL    NRBC (Absolute) 0.05 K/uL    Hypochromia 1+     Anisocytosis 1+     Macrocytosis 1+     Microcytosis 1+    PLATELET ESTIMATE    Collection Time: 06/19/21  5:48 AM   Result Value Ref Range    Plt Estimation Normal    MORPHOLOGY    Collection Time: 06/19/21  5:48 AM   Result Value Ref Range    RBC Morphology Present     Polychromia 1+    DIFFERENTIAL MANUAL    Collection Time: 06/19/21  5:48 AM   Result Value Ref Range    Bands-Stabs 6.00 0.00 - 10.00 %    Manual Diff Status PERFORMED    C Diff by PCR rflx Toxin    Collection Time: 06/19/21  8:03 AM    Specimen: Stool   Result Value Ref Range    C Diff by PCR Negative Negative     [id number] Presumptive Negative Negative       Fluids    Intake/Output Summary (Last 24 hours) at 6/19/2021 1212  Last data filed at 6/19/2021 0426  Gross per 24 hour   Intake --   Output 659 ml   Net -659 ml       Core Measures & Quality Metrics  Labs reviewed and Medications reviewed  Young catheter: No Young      DVT Prophylaxis: Enoxaparin (Lovenox)  DVT prophylaxis - mechanical: SCDs  Ulcer prophylaxis: Yes  Antibiotics: Treating active infection/contamination beyond 24 hours perioperative coverage  Assessed for rehab: Patient returned to prior level of function, rehabilitation not indicated at this time    ALICIA Score  ETOH Screening    Assessment/Plan  * Bacterial peritonitis (HCC)  Assessment & Plan  6/2 Zosyn stopped.  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 Passing flatus - start clears  5/27 - Stooled - will adv to full liquids  5/27 - thrombocytopenia, bandemia, PM CT with ascites no freeair  5/28 - Exploratory celiotomy, resection of ileocolic anastomosis with re-do  side-to-side ileocolic anastomosis.  6/3  Stooling, advance to regular diet  6/4 Small wound dehiscence - returned to OR for washout and reclosure  6/9 Repeat CT showed two fluid collections - IR drains placed  6/11 BE shows leak - Ex lap, drain  6/16 - plan CT in AM  Remains on unasyn, diflucan    DVT of axillary vein, acute right (HCC)  Assessment & Plan  Noted on US  On heparin gtt  6/6  Switched to lovenox BID       Discussed patient condition with RN and Patient.  Dr. Chino  CRITICAL CARE TIME EXCLUDING PROCEDURES: 20  minutes

## 2021-06-19 NOTE — ASSESSMENT & PLAN NOTE
Acute change in WBC, now resolved  Abd exam stable  C diff neg, blood cultures with NGTD, UA neg for infection

## 2021-06-19 NOTE — PROGRESS NOTES
Trauma / Surgical Daily Progress Note    Date of Service  6/18/2021    Chief Complaint  50 y.o. female admitted 5/22/2021 with cecal volvulus    Interval Events  5/22 Exploratory celiotomy and right hemicolectomy with a side-to-side   ileocolic anastomosis  5/28 Exploratory celiotomy, resection of ileocolic anastomosis with re-do  side-to-side ileocolic anastomosis  6/4 Ex celiotomy washout  6/11 Ex celiotomy, drain placement    POD #7 Afebrile.  Tolerating clears and TPN. On Unasyn, micofungin per ID. Drains working.  Stooling.  Needs to mobilize. OOB more then in bed.  CT showed resolution of the 2 pockets.  Drains have not been being irrigated. Plan for transfer to LTAC tomorrow.    Review of Systems  Review of Systems   Gastrointestinal: Positive for abdominal pain.        Passing some flatus   Genitourinary: Negative.         Vital Signs for last 24 hours  Temp:  [36.3 °C (97.4 °F)-36.7 °C (98.1 °F)] 36.7 °C (98.1 °F)  Pulse:  [77-90] 90  Resp:  [17] 17  BP: (131-144)/(66-78) 142/66  SpO2:  [96 %-99 %] 96 %    Hemodynamic parameters for last 24 hours       Respiratory Data     Respiration: 17, Pulse Oximetry: 96 %     Work Of Breathing / Effort: Within Normal Limits  RUL Breath Sounds: Clear, RML Breath Sounds: Clear, RLL Breath Sounds: Diminished, LUCITA Breath Sounds: Clear, LLL Breath Sounds: Diminished    Physical Exam  Physical Exam  Cardiovascular:      Rate and Rhythm: Normal rate.      Pulses: Normal pulses.   Pulmonary:      Effort: Pulmonary effort is normal.   Abdominal:      General: There is no distension.      Palpations: Abdomen is soft.      Tenderness: There is abdominal tenderness.      Comments: Wound vac in place.  Drains bilious   Genitourinary:     Comments: diuresing  Musculoskeletal:         General: Normal range of motion.      Cervical back: Neck supple.      Comments: Generalized edema   Skin:     General: Skin is warm and dry.   Neurological:      General: No focal deficit present.       Mental Status: She is alert.   Psychiatric:      Comments:           Laboratory  Recent Results (from the past 24 hour(s))   POCT glucose device results    Collection Time: 06/17/21  6:44 PM   Result Value Ref Range    Glucose - Accu-Ck 104 (H) 65 - 99 mg/dL   POCT glucose device results    Collection Time: 06/18/21  5:09 AM   Result Value Ref Range    Glucose - Accu-Ck 88 65 - 99 mg/dL   CBC WITH DIFFERENTIAL    Collection Time: 06/18/21  6:30 AM   Result Value Ref Range    WBC 14.1 (H) 4.8 - 10.8 K/uL    RBC 3.17 (L) 4.20 - 5.40 M/uL    Hemoglobin 8.8 (L) 12.0 - 16.0 g/dL    Hematocrit 28.6 (L) 37.0 - 47.0 %    MCV 90.2 81.4 - 97.8 fL    MCH 27.8 27.0 - 33.0 pg    MCHC 30.8 (L) 33.6 - 35.0 g/dL    RDW 52.1 (H) 35.9 - 50.0 fL    Platelet Count 378 164 - 446 K/uL    MPV 9.2 9.0 - 12.9 fL    Neutrophils-Polys 77.00 (H) 44.00 - 72.00 %    Lymphocytes 7.00 (L) 22.00 - 41.00 %    Monocytes 7.00 0.00 - 13.40 %    Eosinophils 6.00 0.00 - 6.90 %    Basophils 0.00 0.00 - 1.80 %    Nucleated RBC 0.20 /100 WBC    Neutrophils (Absolute) 11.00 (H) 2.00 - 7.15 K/uL    Lymphs (Absolute) 0.99 (L) 1.00 - 4.80 K/uL    Monos (Absolute) 0.99 (H) 0.00 - 0.85 K/uL    Eos (Absolute) 0.85 (H) 0.00 - 0.51 K/uL    Baso (Absolute) 0.00 0.00 - 0.12 K/uL    NRBC (Absolute) 0.03 K/uL    Anisocytosis 1+     Microcytosis 1+    DIFFERENTIAL MANUAL    Collection Time: 06/18/21  6:30 AM   Result Value Ref Range    Bands-Stabs 1.00 0.00 - 10.00 %    Myelocytes 2.00 %    Manual Diff Status PERFORMED    PLATELET ESTIMATE    Collection Time: 06/18/21  6:30 AM   Result Value Ref Range    Plt Estimation Normal    MORPHOLOGY    Collection Time: 06/18/21  6:30 AM   Result Value Ref Range    RBC Morphology Present     Polychromia 1+     Poikilocytosis 1+     Ovalocytes 1+    POCT glucose device results    Collection Time: 06/18/21  5:02 PM   Result Value Ref Range    Glucose - Accu-Ck 98 65 - 99 mg/dL       Fluids    Intake/Output Summary (Last 24 hours) at  6/18/2021 1723  Last data filed at 6/17/2021 2000  Gross per 24 hour   Intake 220 ml   Output 311 ml   Net -91 ml       Core Measures & Quality Metrics  Labs reviewed and Medications reviewed  Young catheter: No Young      DVT Prophylaxis: Enoxaparin (Lovenox)  DVT prophylaxis - mechanical: SCDs  Ulcer prophylaxis: Yes  Antibiotics: Treating active infection/contamination beyond 24 hours perioperative coverage  Assessed for rehab: Patient returned to prior level of function, rehabilitation not indicated at this time    ALICIA Score  ETOH Screening    Assessment/Plan  * Bacterial peritonitis (HCC)  Assessment & Plan  6/2 Zosyn stopped.  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 Passing flatus - start clears  5/27 - Stooled - will adv to full liquids  5/27 - thrombocytopenia, bandemia, PM CT with ascites no freeair  5/28 - Exploratory celiotomy, resection of ileocolic anastomosis with re-do  side-to-side ileocolic anastomosis.  6/3  Stooling, advance to regular diet  6/4 Small wound dehiscence - returned to OR for washout and reclosure  6/9 Repeat CT showed two fluid collections - IR drains placed  6/11 BE shows leak - Ex lap, drain  6/16 - plan CT in AM  Remains on unasyn, diflucan    DVT of axillary vein, acute right (HCC)  Assessment & Plan  Noted on US  On heparin gtt  6/6  Switched to lovenox BID       Discussed patient condition with RN and Patient.  Dr. Wills  CRITICAL CARE TIME EXCLUDING PROCEDURES: 20  minutes

## 2021-06-19 NOTE — DISCHARGE PLANNING
Anticipated Discharge Disposition: LTAC    Action: Notified by MD pt is not cleared for LTAC today due to high WBC.   Notified Cali shafer/ ROB that pt is no longer cleared    Barriers to Discharge: None    Plan: Transfer to LTAC when medically cleared

## 2021-06-19 NOTE — PROGRESS NOTES
Pharmacy TPN Day # 8       2021    Dosing Weight   66 kg TPN currently providing 100% of goal                                                  TPN goal: 1825 kcal/day including 1.5 gm/kg/day Protein                                                  TPN indication: Bowel Resection                                                              Pertinent PMH:  50 y.o. 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 Returned to OR - bowel too inflammed to mobilize for ostomy. Drain placed. NPO.  Unasyn, Diflucan per ID.  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  Patient was taken back to the OR , but the bowel was too inflamed to mobilize for ostomy, drain was placed  -Drain cultures from  also growing a Candida krusei.  Will change fluconazole to IV micafungin 100 mg every 24 hours, continue IV Unasyn 3 g every 6 hoursTemp (24hrs), Av.1 °C (98.7 °F), Min:37 °C (98.6 °F), Max:37.1 °C (98.8 °F)  .  Recent Labs     21  0333   SODIUM 135   POTASSIUM 4.2   CHLORIDE 103   CO2 20   BUN 15   CREATININE 0.37*   GLUCOSE 89   CALCIUM 8.3*   ASTSGOT 17   ALTSGPT 9   ALBUMIN 2.8*   TBILIRUBIN <0.2   PHOSPHORUS 3.4   MAGNESIUM 1.9     Accu-Checks  Recent Labs     21  0509 21  1702 21  0436   POCGLUCOSE 88 98 142*       Vitals:    21 0909 21 1605 21 2043 21 0426   BP: 142/66 145/68 125/71 104/63   Weight:       Height:           Intake/Output Summary (Last 24 hours) at 2021 0921  Last data filed at 2021 0426  Gross per 24 hour   Intake --   Output 659 ml   Net -659 ml        Orders Placed This Encounter   Procedures   • Diet Order Diet: Clear Liquid     Standing Status:   Standing     Number of Occurrences:   1     Order Specific Question:   Diet:     Answer:   Clear Liquid [10]         TPN for past 72 hours (Show up to 3 orders; newest on the left. Changes between the two most recent orders are indicated.)     Start date and time   06/19/2021 2000 06/18/2021 2000 06/17/2021 2000      TPN Central Line Formulation [[id number]] TPN Central Line Formulation [[id number]] TPN Central Line Formulation [[id number]]    Order Status  Active Last Dose in Progress Completed    Last Admin   New Bag at 06/18/2021 2025 by Frankie Hernandez R.N. New Bag at 06/17/2021 2033 by Riki Moreno R.N.       Base    Clinisol 15%  110 g 110 g 110 g    dextrose 70%  275 g 275 g 275 g    fat emulsions 20%  45 g 45 g 45 g       Additives    potassium phosphate  15 mmol 15 mmol 15 mmol    potassium chloride  40 mEq 40 mEq 40 mEq    sodium acetate  150 mEq 150 mEq 150 mEq    sodium chloride  150 mEq 150 mEq 150 mEq    magnesium sulfate  8 mEq 8 mEq 8 mEq    calcium GLUConate  9.4 mEq 9.4 mEq 9.4 mEq    M.T.E.-4 Adult  1 mL 1 mL 1 mL    M.V.I. Adult  10 mL 10 mL 10 mL    famotidine  40 mg 40 mg 40 mg       QS Base    sterile water  466.08 mL 466.08 mL 466.08 mL       Energy Contribution    Proteins  -- -- --    Dextrose  -- -- --    Lipids  -- -- --    Total  -- -- --       Electrolyte Ion Calculated Amount    Sodium  300 mEq 300 mEq 300 mEq    Potassium  62 mEq 62 mEq 62 mEq    Calcium  9.4 mEq 9.4 mEq 9.4 mEq    Magnesium  8 mEq 8 mEq 8 mEq    Aluminum  -- -- --    Phosphate  15 mmol 15 mmol 15 mmol    Chloride  190 mEq 190 mEq 190 mEq    Acetate  243.13 mEq 243.13 mEq 243.13 mEq       Other    Total Protein  110 g 110 g 110 g    Total Protein/kg  1.5 g/kg 1.5 g/kg 1.5 g/kg    Total Amino Acid  -- -- --    Total Amino Acid/kg  -- -- --    Glucose Infusion Rate  2.6 mg/kg/min 2.6 mg/kg/min 2.6 mg/kg/min     Osmolarity (Estimated)  -- -- --    Volume  1,992 mL 1,992 mL 1,992 mL    Rate  83 mL/hr 83 mL/hr 83 mL/hr    Dosing Weight  73.5 kg 73.5 kg 73.5 kg    In[facility]  [facility]            This formula provides:  % kcal as lipids = 25  Grams protein/kg = 1.5  Non-protein calories = 1385  Kcals/kg = 24.8  Total daily calories = 1825     Comments:  No changes continue TPN gary formula, same rate as previous day.       Ashley MORAPh.

## 2021-06-19 NOTE — PROGRESS NOTES
Infectious Disease Progress Note    Author: Rossi Castillo M.D. Date & Time of service: 2021  12:00 PM    Chief Complaint:  Follow-up for intra-abdominal abscesses     Interval History:   patient is afebrile, white count 13.4.  Patient had worsening abdominal pain yesterday, noted purulent output during wound VAC change, repeat CT with multiple intra-abdominal abscesses, taken back to the OR this morning.   AF, O2 RA, doing well overall and states she ambulated twice this morning with normal bowel movement.  She has some abdominal pain with ambulation but otherwise minimal.    patient remains afebrile, had drains placed, white count 12.9, tolerating antimicrobials thus far.  Resting comfortably this morning.   patient remains afebrile, white count 15.8, additional procedures and change to antifungal as below   afebrile WBC 17.3 drain output decreasing.  Recorded 65 cc on . Ongoing abdoominal pain   afebrile, WBC 16.7.  Patient remains weak.  Has ongoing abdominal pain.  Eager to go home   afebrile WBC 16.3.  Plan for repeat CT scan today.  Drain output decreasing overall.  Friend at bedside visiting.  No new symptoms to report.  Work with physical therapy yesterday   AF WBC 14.1 CT scan reviwed No acute events overnight   AF WBC 43.6 Denies any worsening pain or new sxs Cdiff neg Wants something else to eat Juice or smoothie      Review of Systems:  Review of Systems   Constitutional: Negative for fever.   Respiratory: Negative for cough and shortness of breath.    Cardiovascular: Negative for chest pain.   Gastrointestinal: Negative for diarrhea, nausea and vomiting.        Pain controlled-no increase   Skin: Negative for rash.     Hemodynamics:  Temp (24hrs), Av.1 °C (98.7 °F), Min:37 °C (98.6 °F), Max:37.1 °C (98.8 °F)  Temperature: 37.1 °C (98.7 °F)  Pulse  Av.2  Min: 60  Max: 156   Blood Pressure: 104/63       Physical Exam:  Physical Exam  Vitals and  nursing note reviewed.   Constitutional:       General: She is not in acute distress.     Appearance: She is not ill-appearing, toxic-appearing or diaphoretic.   HENT:      Nose: No congestion or rhinorrhea.      Mouth/Throat:      Pharynx: No oropharyngeal exudate.   Eyes:      General: No scleral icterus.     Extraocular Movements: Extraocular movements intact.      Pupils: Pupils are equal, round, and reactive to light.   Cardiovascular:      Rate and Rhythm: Normal rate.   Pulmonary:      Effort: Pulmonary effort is normal. No respiratory distress.      Breath sounds: No stridor.   Abdominal:      General: There is no distension.      Palpations: Abdomen is soft.      Comments: Drains serosanguinous  Midline VAC   Musculoskeletal:      Comments: PICC LUE   Skin:     Coloration: Skin is pale. Skin is not jaundiced.      Findings: Bruising present. No rash.   Neurological:      General: No focal deficit present.      Mental Status: She is alert and oriented to person, place, and time.   Psychiatric:         Mood and Affect: Mood normal.         Behavior: Behavior normal.         Meds:    Current Facility-Administered Medications:   •  Special Contact Isolation **AND** [COMPLETED] C Diff by PCR rflx Toxin **AND** Pharmacy  •  TPN Central Line Formulation  •  TPN Central Line Formulation  •  ibuprofen  •  acetaminophen  •  diphenhydrAMINE  •  lidocaine **OR** lidocaine  •  micafungin (MYCAMINE) ivpb  •  NS  •  MD Alert...TPN per Pharmacy  •  morphine injection  •  DULoxetine  •  topiramate  •  enoxaparin (LOVENOX) injection  •  oxyCODONE immediate-release  •  oxyCODONE immediate-release  •  morphine injection  •  ampicillin-sulbactam (UNASYN) IV  •  [DISCONTINUED] insulin regular **AND** [CANCELED] POC blood glucose manual result **AND** NOTIFY MD and PharmD **AND** glucose **AND** dextrose 50%  •  Metoprolol Tartrate  •  LORazepam  •  diazePAM  •  Respiratory Therapy Consult  •  Pharmacy Consult Request  •   ondansetron    Labs:  Recent Labs     06/17/21  0333 06/18/21  0630 06/19/21  0548   WBC 16.3* 14.1* 43.6*   RBC 2.87* 3.17* 2.85*   HEMOGLOBIN 7.9* 8.8* 8.2*   HEMATOCRIT 25.2* 28.6* 25.3*   MCV 87.8 90.2 88.8   MCH 27.5 27.8 28.8   RDW 50.0 52.1* 52.3*   PLATELETCT 379 378 323   MPV 9.4 9.2 9.2   NEUTSPOLYS 70.50 77.00* 90.00*   LYMPHOCYTES 11.00* 7.00* 1.00*   MONOCYTES 9.00 7.00 3.00   EOSINOPHILS 3.50 6.00 0.00   BASOPHILS 0.50 0.00 0.00   RBCMORPHOLO Present Present Present     Recent Labs     06/17/21  0333   SODIUM 135   POTASSIUM 4.2   CHLORIDE 103   CO2 20   GLUCOSE 89   BUN 15     Recent Labs     06/17/21  0333   ALBUMIN 2.8*   TBILIRUBIN <0.2   ALKPHOSPHAT 77   TOTPROTEIN 6.6   ALTSGPT 9   ASTSGOT 17   CREATININE 0.37*       Imaging:  CT-ABDOMEN-PELVIS WITH    Result Date: 6/8/2021 6/8/2021 4:22 PM HISTORY/REASON FOR EXAM:  Peritonitis or perforation suspected; 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 Most recent abdominal surgery for washout of the peritoneal cavity performed on 6/4/2021. Ongoing generalized abdominal pain. TECHNIQUE/EXAM DESCRIPTION: CT scan of the abdomen and pelvis with contrast. Contrast-enhanced helical scanning was obtained from the diaphragmatic domes through the pubic symphysis following the bolus administration of 100 mL of Omnipaque 350 nonionic contrast without complication. Low dose optimization technique was utilized for this CT exam including automated exposure control and adjustment of the mA and/or kV according to patient size. COMPARISON: 6/3/2021 FINDINGS: The visualized lung bases demonstrate a small right and moderate left dependent pleural effusion with dependent airspace disease, likely atelectasis similar to the previous exam. There is no acute bony process. CT Abdomen: There is postoperative change in the anterior abdominal wall midline. There are a  few tiny air lucencies in the subcutaneous tissues with abdominal wall defect seen with the previous skin staples removed. Tiny amounts of intraperitoneal air identified within the anterior abdominal fluid. There is loculated intra-abdominal fluid with some seen in the left lateral subphrenic space anterior to the spleen measuring 4.4 x 2.1 cm (previously 4.6 x 2.7 cm). There is a small amount of loculated fluid in the right anterior upper quadrant also similar to the prior study. This appears to communicate with a more confluent area of fluid anteriorly within the peritoneal cavity beginning at the level of the iliac crest and extending into the upper pelvis. The largest component is in the right lower  quadrant measuring 4.9 cm in AP diameter, down from 6.5 cm. The fluid collection with rim enhancement and air lucencies in the posterior pelvis measures 6.2 x 3.5 cm (previously 7.2 x 3.9 cm). The liver is unremarkable. The spleen is unremarkable. The pancreas is unremarkable. The gallbladder demonstrates no stones. The adrenal glands are normal in size. The kidneys enhance symmetrically. The abdominal aorta is normal in caliber. There is no lymphadenopathy. The stomach is distended with some fluid and contrast material. There is postoperative change consistent with a right hemicolectomy. There is minimal dilatation of the left transverse colon at the anastomosis. CT Pelvis: There are no new pelvic fluid collections. There is mild diffuse body wall edema.     1.  Loculated rim-enhancing peritoneal fluid collections are again seen in the upper and lower quadrants as well as the posterior pelvis, all of which are very minimally decreased in size. 2.  There is postoperative change in the anterior abdominal wall with removal of the skin staples. 3.  There are postoperative changes of right hemicolectomy with very minimal dilatation of the transverse colon at the anastomosis. There is no bowel obstruction. 4.  Stable  moderate left and small right dependent pleural effusions with dependent atelectasis.    CT-ABDOMEN-PELVIS WITH    Result Date: 6/3/2021  6/3/2021 4:55 PM HISTORY/REASON FOR EXAM:  Abdominal abscess/infection suspected; wound dehiscense, r/o abscess. Postoperative. Recent volvulus with right hemicolectomy TECHNIQUE/EXAM DESCRIPTION:  CT scan of the abdomen and pelvis with contrast. Contrast-enhanced helical scanning was obtained from the diaphragmatic domes through the pubic symphysis following the bolus administration of nonionic contrast without complication. 100 mL of Omnipaque 350 nonionic contrast was administered without complication. Low dose optimization technique was utilized for this CT exam including automated exposure control and adjustment of the mA and/or kV according to patient size. COMPARISON: CT abdomen and pelvis 5/27/2021 FINDINGS: Osseous structures: There is bilateral atelectasis and there are small to moderate-sized bilateral pleural effusion. Lungs: Clear ABDOMEN: There is peritoneal fluid present posterior to the abdominal wall and in both paracolic gutter. This has some degree of loculation especially in the left paracolic gutter and could indicate infected fluid. Additionally, in the right lower quadrant there is a loculated fluid collection present measuring approximately 13.8 x 4.2 x 6.5 cm and this connects to the peritoneal fluid in the right paracolic gutter. There is loculated rim-enhancing fluid in the left subphrenic space measuring 8.4 cm in length and approximately 4.6 x 2.7 cm transversely. This connects to the fluid within the left paracolic gutter. There are recent postoperative changes with skin staple present and intraperitoneal air and fluid. LIVER: is normal in appearance. GALLBLADDER and BILIARY SYSTEM Gallbladder and biliary system are normal by CT assessment SPLEEN: Normal in appearance.. PANCREAS: Normal in appearance. The splenic vein and portal vein enhance normally.  ADRENAL glands: Normal in appearance. RIGHT kidney: There is a nonobstructive 1 to 2 mm right medial lower pole calculus. LEFT kidney: Normal in appearance. There is no hydronephrosis. BOWEL and MESENTERY: Their has been right hemicolectomy. AORTA and VASCULATURE: is normal in appearance. Pelvis: In the recto uterine space there is a loculated fluid collection measuring 6.6 x 7.2 x 5.1 cm consistent with an abscess. Uterus and adnexa appear normal.     1.  Multiple rim enhancing peritoneal fluid collection suspicious for abscess 2.  These include the rectouterine space measuring 6.6 x 7.2 x 5.1 cm, right lower quadrant measuring 13.8 x 4.2 x 6.5 cm, left subphrenic space measuring 8.4 x 4.6 x 2.7 cm, and contiguous loculated fluid within the right and left paracolic gutter 3.  Interval right colectomy 4.  Small-moderate-sized bilateral pleural effusion and atelectasis    CT-ABDOMEN-PELVIS WITH    Result Date: 5/27/2021 5/27/2021 5:48 PM HISTORY/REASON FOR EXAM: Diffuse abdominal pain and distention. TECHNIQUE/EXAM DESCRIPTION: CT scan of the abdomen and pelvis with contrast. Contrast-enhanced helical scanning was obtained from the diaphragmatic domes through the pubic symphysis following the bolus administration of 100 mL of Omnipaque 350 nonionic contrast without complication. Low dose optimization technique was utilized for this CT exam including automated exposure control and adjustment of the mA and/or kV according to patient size. COMPARISON: 5/22/2021 FINDINGS: CT Abdomen: There is new bibasilar atelectasis and small bilateral pleural effusions, left greater than right. There is fatty change of the liver. There is a small amount of free intraperitoneal air tracking along the surface of the liver. Gallbladder is distended with dense material present within the gallbladder. The spleen is normal. There is moderate right-sided hydronephrosis extending to the level of the upper sacrum. The right ureter is not  identified below that level. No definite ureteral stone on the right. There is left ureteral pelvic junction stone which measures 5 mm in size and results in minimal left renal pelvic dilatation. The adrenal glands are normal. The pancreas is normal. The aorta is normal in caliber. No evidence of retroperitoneal adenopathy. CT Pelvis: There is multifocal ascites seen which is likely loculated in nature. There are punctate areas of free air seen within those areas of ascites. There are surgical changes involving the cecum. The colon is diffusely dilated. There is a large amount of free fluid which contains pockets of gas within the pelvis. There is a recent midline incision.     1.  Moderate to large amount of ascites within the abdomen and pelvis some areas of which are loculated in nature. There are also punctate areas of gas within those areas of ascites as well as free intraperitoneal air. This may be related to recent surgical procedure however the degree of volume of ascites would be unusual for normal postoperative course. Consideration should be given for leakage of bowel content or other process. 2.  Diffuse colonic distention. 3.  Bibasilar atelectasis and pleural effusions. 4.  5 mm left renal pelvic stone which results in mild hydronephrosis above that level. 5.  Moderate right-sided hydronephrosis extending to the level of the upper sacrum. No ureteral stone. The ureter is not identified below that level. 6.  This was discussed with NEVA KAY at 6:44 PM on 5/27/2021.    CT-ABDOMEN-PELVIS WITH    Result Date: 5/22/2021 5/22/2021 11:19 AM HISTORY/REASON FOR EXAM:  Abdominal distension; IV contrast only. TECHNIQUE/EXAM DESCRIPTION:   CT scan of the abdomen and pelvis with contrast. Contrast-enhanced helical scanning was obtained from the diaphragmatic domes through the pubic symphysis following the bolus administration of nonionic contrast without complication. 100 mL of Omnipaque 350 nonionic  contrast was administered without complication. Low dose optimization technique was utilized for this CT exam including automated exposure control and adjustment of the mA and/or kV according to patient size. COMPARISON: 4/22/2020 FINDINGS: Chest Base: Lung bases are clear. Liver:  Normal. Gallbladder: Normal Biliary tract: Nondilated. Pancreas: Normal. Spleen: Normal. Adrenals: Normal. Kidneys and Collecting Systems:  Cannot exclude a punctate nonobstructing stone in the lower right renal collecting system. There is a small nonobstructing left renal stone measuring about 5 mm. Gastrointestinal tract:  The cecum is distended and displaced into the left upper quadrant. The ascending colon proximally is narrowed in the midline and there is a somewhat twisted appearance.   Mildly fluid distended distal small bowel without significant small bowel obstruction. The appendix is nonvisualized. Peritoneum: No free air or free fluid. Reproductive organs:  2.3 cm left adnexal hypodense lesion is indeterminate, possibly a dominant follicle/small cyst in a menstruating patient. Correlate clinically. Probable intramural fibroid in the fundus. Bladder:  Normal. Vessels:  Normal caliber. Lymph  Nodes:  No lymphadenopathy. Abdominal wall: Within normal limits. Bones:  No acute or aggressive abnormality.     1.  Findings keeping with cecal volvulus. 2.  Mildly fluid distended distal small bowel. 3.  No free air. 4.  Small nonobstructing renal stones. These findings were discussed with ESHA STERLING on 5/22/2021 11:45 AM.     [id number] VIEW    Result Date: 5/27/2021 5/27/2021 7:01 AM HISTORY/REASON FOR EXAM:  Distention. TECHNIQUE/EXAM DESCRIPTION AND NUMBER OF VIEWS:  1 view(s) of the abdomen. COMPARISON: 5/24/2021 FINDINGS: Gaseous distended  small bowel and colon is similar to prior study. There is some stool within the right colon. There is no significant interval change. No suspicious calcifications. No acute osseous  abnormality.     Gaseous distended small bowel and colon is similar to prior, likely ileus.    [id number] VIEW    Result Date: 5/24/2021 5/24/2021 11:28 AM HISTORY/REASON FOR EXAM:  Distention; s/p right hemicolectomy Lower abdominal pain s/p right hemicolectomy 05/22/21 TECHNIQUE/EXAM DESCRIPTION AND NUMBER OF VIEWS:  1 view(s) of the abdomen. COMPARISON: 5/22/2021 FINDINGS: Gaseous distention of the small bowel and colon No portal venous gas or pneumatosis. No definite free intraperitoneal air but evaluation is limited on supine radiograph.     Gaseous distention of the small bowel and colon, likely postoperative ileus.    DX-CHEST-FOR LINE PLACEMENT Perform procedure in: Other(comment f6 below): (PACU)    Result Date: 5/28/2021 5/28/2021 10:10 AM HISTORY/REASON FOR EXAM:  Central line placement. TECHNIQUE/EXAM DESCRIPTION AND NUMBER OF VIEWS: Single AP view of the chest. COMPARISON: None FINDINGS: There is a left IJ central line with the tip high in position above the superior vena cava within the area of the left brachiocephalic vein. No pneumothorax. NG tube extends into the stomach. There are patchy bilateral pulmonary infiltrates. The heart is mildly enlarged. There is no pleural effusion.     1.  Left IJ central line tip high in position located above the superior vena cava are within the left brachiocephalic vein. 2.  Patchy bilateral infiltrates. 3.  NG tube tip extends into the stomach.    US-EXTREMITY VENOUS UPPER BILAT    Result Date: 6/2/2021   Upper Extremity  Venous Duplex Report  Vascular Laboratory  CONCLUSIONS  Small thrombus seen around the catheter line at the RIGHT axillary vein and  proximal basilic vein.  No left upper extremity superficial and deep venous thrombosis.  HOANG EMERY  Exam Date:     06/02/2021 14:29  Room #:     Inpatient  Priority:     Routine  Ht (in):             Wt (lb):  Ordering Physician:        DEIDRA SNIDER  Referring Physician:       AGATHA Meyer  Sonographer:                Sharlene Morrell ZEUS,                             [facility]  Study Type:                Complete Bilateral  Technical Quality:         Fair  Age:    50    Gender:     F  MRN:    [id number]  :    1970      BSA:  Indications:     Edema  CPT Codes:       16029  ICD Codes:       782.3  History:         Swelling. No prior study  Limitations:  PROCEDURES:  Bilateral upper extremity venous duplex imaging.  The following venous structures were evaluated: internal jugular,  subclavian, axillary, brachial, cephalic and basilic veins.  FINDINGS:  Right upper extremity.  Small thrombus seen around the central line at the axillary vein and  proximal basilic vein.  All other veins of the right upper extremity demonstrate normal flow  dynamics with no evidence of deep vein thrombosis.  Left upper extremity.  Limited visualization of the internal jugular vein due to bandages.  All other veins of the left upper extremity demonstrate normal flow  dynamics with no evidence of deep vein thrombosis.  Doug Rossi MD  (Electronically Signed)  Final Date:      2021                   18:16    IR-MIDLINE CATHETER INSERTION WO GUIDANCE > AGE 3    Result Date: 2021  HISTORY/REASON FOR EXAM:  Midline Placement   TECHNIQUE/EXAM DESCRIPTION AND NUMBER OF VIEWS: Midline insertion with ultrasound guidance.  FINDINGS: Midline insertion with Ultrasound Guidance was performed by qualified nursing staff without the assistance of a Radiologist. Midline positioning as measured by RN or as appropriate length of catheter selected.              Ultrasound-guided midline placement performed by qualified nursing staff as above.       Micro:  Results     Procedure Component Value Units Date/Time    C Diff by PCR rflx Toxin [[id number]] Collected: 21 0803    Order Status: Completed Specimen: Stool Updated: 21 1151     C Diff by PCR Negative     Comment: C. difficile NOT detected by  "PCR.  Treatment not indicated per guidelines.  Repeat testing not indicated within 7 days.          027-NAP1-BI Presumptive Negative     Comment: Presumptive 027/NAP1/BI target DNA sequences are NOT DETECTED.       Narrative:      Special Contact [id number] ELIZABETH HEREDIA  Does this patient have risk factors for C-diff?->Yes  C-Diff Risk Factors->antibiotic exposure  Has patient taken stool softeners or laxatives in the last 5  days?->No    BLOOD CULTURE [[id number]] Collected: 06/19/21 0823    Order Status: Completed Specimen: Blood from Peripheral Updated: 06/19/21 0833    Narrative:      Special Contact Isolation  Per Hospital Policy: Only change Specimen Src: to \"Line\" if  specified by physician order.    BLOOD CULTURE [[id number]] Collected: 06/19/21 0823    Order Status: Completed Specimen: Blood from Peripheral Updated: 06/19/21 0832    Narrative:      Special Contact Isolation  Per Hospital Policy: Only change Specimen Src: to \"Line\" if  specified by physician order.            Assessment/Hospital course:  This is a very pleasant 50-year-old female patient, originally admitted on 5/22/2021 with cecal volvulus for which she underwent a right hemicolectomy on 5/22.  This was complicated by intra-abdominal abscesses and free air concerning for leak.  She was taken to the OR for exploratory laparotomy and found to have necrosis of the ileocolonic anastomosis site, bowel is eviscerated and redo side-to-side ileocolic anastomosis was done.    Patient went back to the OR on 6/4 due to drainage from wound and concern for fascial dehiscence.  Per op note there is loosening of the fascia but no yessy dehiscence.  Abdomen was washed out including pockets noted on last CT.  Anastomosis was not evaluated as was scarred and no evidence of leak.     Pertinent Diagnoses:  Sepsis  Intra-abdominal abscesses, persistent  Large midline incision, with significant feculent drainage   Feculent peritonitis  Anastomotic leak, " ongoing  Cecal volvulus, sequelae  Leukocytosis, persistent Sudden increase but no fever or new sxs  TPN dependent     Plan:  -OR cultures from 5/28 growing Bacteroides, Actinomyces, E faecalis  -Patient had worsening abdominal pain and a repeat CT scan obtained 6/3 showed multiple rim-enhancing peritoneal fluid collections several quite large and loculated.    -s/p OR exploratory laparotomy on 6/4 -no new cultures were obtained.    -CT abdomen and pelvis 6/8 : postop change in anterior abdominal wall, few tiny air lucencies and tiny amounts of intraperitoneal air identified within the anterior abdominal fluid.  Loculated intra-abdominal fluid in the left lateral splenic space measuring 4.4 x 2.1 cm, small amount of loculated fluid in the right anterior upper quadrant also similar to prior study.  This appears to communicate with fluid anterior to the peritoneal cavity measuring 4.9 x 6.5 cm.  Fluid collection with rim enhancement in the posterior pelvis now 6.2 x 3.5 cm. 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  Patient was taken back to the OR 6/11, but the bowel was too inflamed to mobilize for ostomy, drains were placed    -CT scan 6/17  2 surgical drains or tubes. The midline tube extends to the midline surgical wound is located immediately adjacent to the inferior wall of the transverse colon. Additional surgical drain is noted in the right side of the abdomen and pelvis. Fluid collection in this area is nearly completely resolved. Minimal right paracolic gutter fluid and left paracolic gutter fluid remains. Pigtail catheter noted deep in the pelvis in the cul-de-sac area is noted with near complete evacuation of this fluid collection which also contained air on  the prior CT. Decrease in pleural effusions bilaterally. No new fluid collections are identified.    -Continue IV Unasyn 3 g every 6 hours and IV micafungin 100 mg every 24 hours. 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 No drop in Hgb  -Agree with blood cxs, repeat CBC, and check lipase  -Drain removal per surgery. Advance diet per surgery    FU ID clinic after discharge

## 2021-06-19 NOTE — CARE PLAN
The patient is Watcher - Medium risk of patient condition declining or worsening    Shift Goals  Clinical Goals: pain manage tolerate clear liquid diet   Patient Goals: sleep comfortably    Progress made toward(s) clinical / shift goals:  pain managed     Patient is not progressing towards the following goals:

## 2021-06-19 NOTE — PROGRESS NOTES
[facility] Medicine Daily Progress Note    Date of Service  6/19/2021    Chief Complaint  51 y.o. female admitted 5/22/2021 with abd pain    Hospital Course  51 yo woman who presented with abd pain and CT showed cecal volvulus. She underwent exploratory celiotomy and right hemicolectomy with ileocolic anastomosis with Dr. Wood 5/22/21. She developed thrombocytopenia and ascites and returned for ex lap with resection of ileocolic anastomosis and wound vac placement 5/28/21. She was admitted to ICU, started on zosyn for bacterial peritonitis and given platelet transfusions. HIT panel was negative. Abd cultures grew actinomyces, enterococcus, bacteroides. Doppler showed catheter related thrombus in R arm and started on anticoagulation. She had additional washout in the OR with Dr. Wood 6/4/21 and remains on unasyn and fluconazole. Wound vac remains in place. She was then found with anastomotic leak and underwent malecot drain placement 6/11/21. She was started on TPN for malnutrition.    Interval Problem Update  WBC 43.6 today  Patient says she is feeling much better today, pain is better and she is ambulating more. Her diarrhea is the same, watery 1-2 times a day. She denies cough or dysuria.  Check blood cx x2 and C diff. Hold transfer to LTACH for now in case patient decompensates/develops worsening sepsis    Consultants/Specialty  Critical care  Surgery  ID    Code Status  Full Code    Disposition  LTACH    Review of Systems  Review of Systems   Constitutional: Negative for malaise/fatigue.   Respiratory: Negative for cough and shortness of breath.    Cardiovascular: Negative for chest pain.   Gastrointestinal: Positive for abdominal pain and diarrhea. Negative for constipation and vomiting.   Genitourinary: Negative for dysuria.   Musculoskeletal: Negative for joint pain.   Skin: Negative for rash.   Neurological: Positive for weakness.   All other systems reviewed and are negative.       Physical Exam  Temp:   [36.7 °C (98.1 °F)-37.1 °C (98.8 °F)] 37.1 °C (98.7 °F)  Pulse:  [] 100  Resp:  [17] 17  BP: (104-145)/(63-71) 104/63  SpO2:  [96 %-100 %] 98 %    Physical Exam  Vitals and nursing note reviewed.   Constitutional:       Appearance: She is not toxic-appearing or diaphoretic.      Comments: fatigued   HENT:      Head: Normocephalic.      Mouth/Throat:      Mouth: Mucous membranes are dry.   Eyes:      General:         Right eye: No discharge.         Left eye: No discharge.   Cardiovascular:      Rate and Rhythm: Normal rate and regular rhythm.      Pulses: Normal pulses.   Pulmonary:      Effort: Pulmonary effort is normal. No respiratory distress.      Breath sounds: No wheezing or rales.   Abdominal:      General: There is no distension.      Tenderness: There is abdominal tenderness (around surgical wound).      Comments: Central wound wac and drain in place   Musculoskeletal:      Cervical back: Neck supple. No rigidity.      Right lower leg: No edema.      Left lower leg: No edema.   Skin:     General: Skin is warm and dry.      Capillary Refill: Capillary refill takes less than 2 seconds.      Coloration: Skin is pale.      Findings: No rash.   Neurological:      Mental Status: She is alert.      Comments: AOx4         Fluids    Intake/Output Summary (Last 24 hours) at 6/19/2021 0839  Last data filed at 6/19/2021 0426  Gross per 24 hour   Intake --   Output 659 ml   Net -659 ml       Laboratory  Recent Labs     06/17/21  0333 06/18/21  0630 06/19/21  0548   WBC 16.3* 14.1* 43.6*   RBC 2.87* 3.17* 2.85*   HEMOGLOBIN 7.9* 8.8* 8.2*   HEMATOCRIT 25.2* 28.6* 25.3*   MCV 87.8 90.2 88.8   MCH 27.5 27.8 28.8   MCHC 31.3* 30.8* 32.4*   RDW 50.0 52.1* 52.3*   PLATELETCT 379 378 323   MPV 9.4 9.2 9.2     Recent Labs     06/17/21  0333   SODIUM 135   POTASSIUM 4.2   CHLORIDE 103   CO2 20   GLUCOSE 89   BUN 15   CREATININE 0.37*   CALCIUM 8.3*             Recent Labs     06/17/21  0333   TRIGLYCERIDE 143        Imaging  CT-ABDOMEN-PELVIS WITH   Final Result      1.  There are 2 surgical drains or tubes. The midline tube extends to the midline surgical wound is located immediately adjacent to the inferior wall of the transverse colon.      2.  Additional surgical drain is noted in the right side of the abdomen and pelvis. Fluid collection in this area is nearly completely resolved. Minimal right paracolic gutter fluid and left paracolic gutter fluid remains.      3.  Pigtail catheter noted deep in the pelvis in the cul-de-sac area is noted with near complete evacuation of this fluid collection which also contained air on the prior CT.      4.  Decrease in pleural effusions bilaterally.      5.  No new fluid collections are identified.         IR-PICC LINE PLACEMENT W/ GUIDANCE > AGE 5   Final Result                  Ultrasound-guided PICC placement performed by qualified nursing staff as    above.          EC-ECHOCARDIOGRAM COMPLETE W/O CONT   Final Result      DX-BARIUM ENEMA   Final Result      1.  Extensive ileocolic anastomotic leakage extending into the peritoneal cavity and extending through the enterocutaneous fistula to the skin surface            2.  Right hemicolectomy      3.  Findings were discussed with NEVA KAY on 6/11/2021 12:21 PM.      DX-CHEST-PORTABLE (1 VIEW)   Final Result      1.  The cardiac silhouette is enlarged and there are changes most consistent with vascular congestion/edema with a trace of left pleural effusion.   2.  Superimposed pneumonitis and/or atelectasis is also possible.      CT-DRAIN-PERITONEAL   Final Result      1.  CT guided right lower quadrant abscess and pelvic abscess catheter drainage.   2.  The current plan is to obtain a follow-up CT in 57 days..      CT-ABDOMEN-PELVIS WITH   Final Result      1.  Loculated rim-enhancing peritoneal fluid collections are again seen in the upper and lower quadrants as well as the posterior pelvis, all of which are very minimally  decreased in size.   2.  There is postoperative change in the anterior abdominal wall with removal of the skin staples.   3.  There are postoperative changes of right hemicolectomy with very minimal dilatation of the transverse colon at the anastomosis. There is no bowel obstruction.   4.  Stable moderate left and small right dependent pleural effusions with dependent atelectasis.      CT-ABDOMEN-PELVIS WITH   Final Result      1.  Multiple rim enhancing peritoneal fluid collection suspicious for abscess      2.  These include the rectouterine space measuring 6.6 x 7.2 x 5.1 cm, right lower quadrant measuring 13.8 x 4.2 x 6.5 cm, left subphrenic space measuring 8.4 x 4.6 x 2.7 cm, and contiguous loculated fluid within the right and left paracolic gutter      3.  Interval right colectomy      4.  Small-moderate-sized bilateral pleural effusion and atelectasis      US-EXTREMITY VENOUS UPPER BILAT   Final Result      IR-MIDLINE CATHETER INSERTION WO GUIDANCE > AGE 3   Final Result                  Ultrasound-guided midline placement performed by qualified nursing staff    as above.          DX-CHEST-FOR LINE PLACEMENT Perform procedure in: Other(comment f6 below): (PACU)   Final Result      1.  Left IJ central line tip high in position located above the superior vena cava are within the left brachiocephalic vein.      2.  Patchy bilateral infiltrates.      3.  NG tube tip extends into the stomach.      CT-ABDOMEN-PELVIS WITH   Final Result      1.  Moderate to large amount of ascites within the abdomen and pelvis some areas of which are loculated in nature. There are also punctate areas of gas within those areas of ascites as well as free intraperitoneal air. This may be related to recent    surgical procedure however the degree of volume of ascites would be unusual for normal postoperative course. Consideration should be given for leakage of bowel content or other process.      2.  Diffuse colonic distention.      3.   Bibasilar atelectasis and pleural effusions.      4.  5 mm left renal pelvic stone which results in mild hydronephrosis above that level.      5.  Moderate right-sided hydronephrosis extending to the level of the upper sacrum. No ureteral stone. The ureter is not identified below that level.      6.  This was discussed with NEVA WOOD at 6:44 PM on 5/27/2021.      OD-MTIYECT-6 VIEW   Final Result      Gaseous distended small bowel and colon is similar to prior, likely ileus.      TU-OUUHXGD-3 VIEW   Final Result         Gaseous distention of the small bowel and colon, likely postoperative ileus.      CT-ABDOMEN-PELVIS WITH   Final Result         1.  Findings keeping with cecal volvulus.   2.  Mildly fluid distended distal small bowel.   3.  No free air.   4.  Small nonobstructing renal stones.   These findings were discussed with ESHA STERLING on 5/22/2021 11:45 AM.                       Assessment/Plan  * Bacterial peritonitis (HCC)  Assessment & Plan  - s/p ileocecal resection/redo 5/28/21 and washout 6/4/2, drain placement on 6/9 by IR. Developed anastomotic leak seen on barium enema 6/11 and went to the OR - ileostomy could not be performed due to inflammation of the bowel. Drain was placed and abdomen washed out.  - Wound cultures initially with Bacteroides, Actinomyces, E faecalis. Was on Unasyn and Diflucan.  Culture from when pt began having purulent discharge on 6/8 with bacteroides, culture from IR drain placement 6/9 with bacteroides/candida albicans and krusei.  Diflucan changed to Micafungin.  CT AP 6/17/21 showed improved fluid collections  Per surgery, f/u in 2 weeks with Dr. Wood  - Per ID - Continue IV Unasyn & IV micafungin, f/u recs  Will Continue to monitor drain output  Diet per surgeon, continue clear liquids    Leukocytosis  Assessment & Plan  Acute change in WBC  Abd exam appears stable  Check C diff given ongoing diarrhea and on antibiotic  Given she is on TPN with PICC, check  blood cultures x2  Trend CBC and monitor exam closely    Protein-calorie malnutrition, moderate (HCC)  Assessment & Plan  - Albumin level of 2.0, TPN initiated and tolerating   - PICC in place  - Boost plus supplements ordered between meals     Acute hypoxic respiratory failure (HCC)  Assessment & Plan  - Secondary to pulmonary edema from third spacing secondary to hypoalbuminemia  - resolved, on r/a    Anemia  Assessment & Plan  - Hgb slowly trending down, not indicative of acute bleed but likely due to ACD and return trips to the OR.  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

## 2021-06-20 LAB
ALBUMIN SERPL BCP-MCNC: 2.9 G/DL (ref 3.2–4.9)
ALBUMIN/GLOB SERPL: 0.9 G/DL
ALP SERPL-CCNC: 90 U/L (ref 30–99)
ALT SERPL-CCNC: 13 U/L (ref 2–50)
ANION GAP SERPL CALC-SCNC: 12 MMOL/L (ref 7–16)
AST SERPL-CCNC: 19 U/L (ref 12–45)
BASOPHILS # BLD AUTO: 0.4 % (ref 0–1.8)
BASOPHILS # BLD: 0.04 K/UL (ref 0–0.12)
BILIRUB SERPL-MCNC: <0.2 MG/DL (ref 0.1–1.5)
BUN SERPL-MCNC: 11 MG/DL (ref 8–22)
CALCIUM SERPL-MCNC: 8.2 MG/DL (ref 8.4–10.2)
CHLORIDE SERPL-SCNC: 103 MMOL/L (ref 96–112)
CO2 SERPL-SCNC: 21 MMOL/L (ref 20–33)
CREAT SERPL-MCNC: 0.4 MG/DL (ref 0.5–1.4)
EOSINOPHIL # BLD AUTO: 0.14 K/UL (ref 0–0.51)
EOSINOPHIL NFR BLD: 1.4 % (ref 0–6.9)
ERYTHROCYTE [DISTWIDTH] IN BLOOD BY AUTOMATED COUNT: 55.7 FL (ref 35.9–50)
GLOBULIN SER CALC-MCNC: 3.4 G/DL (ref 1.9–3.5)
GLUCOSE SERPL-MCNC: 108 MG/DL (ref 65–99)
HCT VFR BLD AUTO: 25.1 % (ref 37–47)
HGB BLD-MCNC: 7.9 G/DL (ref 12–16)
IMM GRANULOCYTES # BLD AUTO: 0.13 K/UL (ref 0–0.11)
IMM GRANULOCYTES NFR BLD AUTO: 1.3 % (ref 0–0.9)
LYMPHOCYTES # BLD AUTO: 0.86 K/UL (ref 1–4.8)
LYMPHOCYTES NFR BLD: 8.4 % (ref 22–41)
MCH RBC QN AUTO: 28.6 PG (ref 27–33)
MCHC RBC AUTO-ENTMCNC: 31.5 G/DL (ref 33.6–35)
MCV RBC AUTO: 90.9 FL (ref 81.4–97.8)
MONOCYTES # BLD AUTO: 0.4 K/UL (ref 0–0.85)
MONOCYTES NFR BLD AUTO: 3.9 % (ref 0–13.4)
NEUTROPHILS # BLD AUTO: 8.62 K/UL (ref 2–7.15)
NEUTROPHILS NFR BLD: 84.6 % (ref 44–72)
NRBC # BLD AUTO: 0.02 K/UL
NRBC BLD-RTO: 0.2 /100 WBC
PLATELET # BLD AUTO: 267 K/UL (ref 164–446)
PMV BLD AUTO: 9.9 FL (ref 9–12.9)
POTASSIUM SERPL-SCNC: 3.4 MMOL/L (ref 3.6–5.5)
PROT SERPL-MCNC: 6.3 G/DL (ref 6–8.2)
RBC # BLD AUTO: 2.76 M/UL (ref 4.2–5.4)
SODIUM SERPL-SCNC: 136 MMOL/L (ref 135–145)
WBC # BLD AUTO: 10.2 K/UL (ref 4.8–10.8)

## 2021-06-20 PROCEDURE — 36415 COLL VENOUS BLD VENIPUNCTURE: CPT

## 2021-06-20 PROCEDURE — 99232 SBSQ HOSP IP/OBS MODERATE 35: CPT | Performed by: INTERNAL MEDICINE

## 2021-06-20 PROCEDURE — 700111 HCHG RX REV CODE 636 W/ 250 OVERRIDE (IP): Performed by: INTERNAL MEDICINE

## 2021-06-20 PROCEDURE — 700111 HCHG RX REV CODE 636 W/ 250 OVERRIDE (IP): Performed by: SURGERY

## 2021-06-20 PROCEDURE — 700105 HCHG RX REV CODE 258: Performed by: INTERNAL MEDICINE

## 2021-06-20 PROCEDURE — 700102 HCHG RX REV CODE 250 W/ 637 OVERRIDE(OP): Performed by: FAMILY MEDICINE

## 2021-06-20 PROCEDURE — 85025 COMPLETE CBC W/AUTO DIFF WBC: CPT

## 2021-06-20 PROCEDURE — A9270 NON-COVERED ITEM OR SERVICE: HCPCS | Performed by: SURGERY

## 2021-06-20 PROCEDURE — A9270 NON-COVERED ITEM OR SERVICE: HCPCS | Performed by: FAMILY MEDICINE

## 2021-06-20 PROCEDURE — 770006 HCHG ROOM/CARE - MED/SURG/GYN SEMI*

## 2021-06-20 PROCEDURE — 80053 COMPREHEN METABOLIC PANEL: CPT

## 2021-06-20 PROCEDURE — 700101 HCHG RX REV CODE 250: Performed by: INTERNAL MEDICINE

## 2021-06-20 PROCEDURE — 82962 GLUCOSE BLOOD TEST: CPT

## 2021-06-20 PROCEDURE — 700102 HCHG RX REV CODE 250 W/ 637 OVERRIDE(OP): Performed by: SURGERY

## 2021-06-20 RX ORDER — OXYCODONE HYDROCHLORIDE 5 MG/1
5-10 TABLET ORAL EVERY 4 HOURS PRN
Qty: 5 TABLET | Refills: 0 | Status: SHIPPED | OUTPATIENT
Start: 2021-06-20 | End: 2021-06-23

## 2021-06-20 RX ADMIN — ONDANSETRON 4 MG: 2 INJECTION INTRAMUSCULAR; INTRAVENOUS at 20:05

## 2021-06-20 RX ADMIN — ONDANSETRON 4 MG: 2 INJECTION INTRAMUSCULAR; INTRAVENOUS at 06:17

## 2021-06-20 RX ADMIN — ACETAMINOPHEN 650 MG: 325 TABLET, FILM COATED ORAL at 11:58

## 2021-06-20 RX ADMIN — IBUPROFEN 600 MG: 600 TABLET ORAL at 17:34

## 2021-06-20 RX ADMIN — AMPICILLIN SODIUM AND SULBACTAM SODIUM 3 G: 2; 1 INJECTION, POWDER, FOR SOLUTION INTRAMUSCULAR; INTRAVENOUS at 17:34

## 2021-06-20 RX ADMIN — AMPICILLIN SODIUM AND SULBACTAM SODIUM 3 G: 2; 1 INJECTION, POWDER, FOR SOLUTION INTRAMUSCULAR; INTRAVENOUS at 06:23

## 2021-06-20 RX ADMIN — ACETAMINOPHEN 650 MG: 325 TABLET, FILM COATED ORAL at 17:34

## 2021-06-20 RX ADMIN — MICAFUNGIN SODIUM 100 MG: 20 INJECTION, POWDER, LYOPHILIZED, FOR SOLUTION INTRAVENOUS at 21:08

## 2021-06-20 RX ADMIN — AMPICILLIN SODIUM AND SULBACTAM SODIUM 3 G: 2; 1 INJECTION, POWDER, FOR SOLUTION INTRAMUSCULAR; INTRAVENOUS at 23:34

## 2021-06-20 RX ADMIN — IBUPROFEN 600 MG: 600 TABLET ORAL at 06:18

## 2021-06-20 RX ADMIN — ACETAMINOPHEN 650 MG: 325 TABLET, FILM COATED ORAL at 06:18

## 2021-06-20 RX ADMIN — POTASSIUM CHLORIDE: 2 INJECTION, SOLUTION, CONCENTRATE INTRAVENOUS at 20:08

## 2021-06-20 RX ADMIN — DULOXETINE HYDROCHLORIDE 60 MG: 30 CAPSULE, DELAYED RELEASE ORAL at 06:18

## 2021-06-20 RX ADMIN — IBUPROFEN 600 MG: 600 TABLET ORAL at 11:58

## 2021-06-20 RX ADMIN — AMPICILLIN SODIUM AND SULBACTAM SODIUM 3 G: 2; 1 INJECTION, POWDER, FOR SOLUTION INTRAMUSCULAR; INTRAVENOUS at 11:58

## 2021-06-20 RX ADMIN — AMPICILLIN SODIUM AND SULBACTAM SODIUM 3 G: 2; 1 INJECTION, POWDER, FOR SOLUTION INTRAMUSCULAR; INTRAVENOUS at 00:57

## 2021-06-20 ASSESSMENT — ENCOUNTER SYMPTOMS
DIARRHEA: 1
ABDOMINAL PAIN: 1
CONSTIPATION: 0
SHORTNESS OF BREATH: 0
COUGH: 0
VOMITING: 0
WEAKNESS: 1

## 2021-06-20 ASSESSMENT — PAIN DESCRIPTION - PAIN TYPE
TYPE: ACUTE PAIN
TYPE: ACUTE PAIN

## 2021-06-20 NOTE — PROGRESS NOTES
Pharmacy TPN Day # 9       2021    Dosing Weight   66 kg TPN currently providing 100% of goal                                                  TPN goal: 1825 kcal/day including 1.5 gm/kg/day Protein                                                  TPN indication: Bowel Resection                                                              Pertinent PMH:  50 y.o. 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 Returned to OR - bowel too inflammed to mobilize for ostomy. Drain placed. NPO.  Unasyn, Diflucan per ID.  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  Patient was taken back to the OR , but the bowel was too inflamed to mobilize for ostomy, drain was placed  -Drain cultures from  also growing a Candida krusei.  Will change fluconazole to IV micafungin 100 mg every 24 hours, continue IV Unasyn 3 g every 6 hours    Temp (24hrs), Av.9 °C (98.5 °F), Min:36.6 °C (97.8 °F), Max:37.3 °C (99.2 °F)  .  No results for input(s): SODIUM, POTASSIUM, CHLORIDE, CO2, BUN, CREATININE, GLUCOSE, CALCIUM, ASTSGOT, ALTSGPT, ALBUMIN, TBILIRUBIN, PHOSPHORUS, ALBUMIN, MAGNESIUM, PREALBUMIN in the last 72 hours.  Accu-Checks  Recent Labs     21  0509 21  1702 21  0436   POCGLUCOSE 88 98 142*       Vitals:    21 2043 21 0426 21 1212 21   BP: 125/71 104/63 134/56 140/64   Weight:       Height:           Intake/Output Summary (Last 24 hours) at 2021 0828  Last data filed at 2021 0700  Gross per 24 hour   Intake 572.5 ml   Output 1443 ml   Net -870.5 ml       Orders Placed This  Encounter   Procedures   • Diet Order Diet: Clear Liquid     Standing Status:   Standing     Number of Occurrences:   1     Order Specific Question:   Diet:     Answer:   Clear Liquid [10]         TPN for past 72 hours (Show up to 3 orders; newest on the left. Changes between the two most recent orders are indicated.)     Start date and time   06/19/2021 2000 06/18/2021 2000 06/17/2021 2000      TPN Central Line Formulation [[id number]] TPN Central Line Formulation [[id number]] TPN Central Line Formulation [[id number]]    Order Status  Last Dose in Progress Completed Completed    Last Admin  New Bag at 06/19/2021 2033 by Heather Ordaz R.N. New Bag at 06/18/2021 2025 by Frankie Hernandez R.N. New Bag at 06/17/2021 2033 by Riki Moreno R.N.       Base    Clinisol 15%  110 g 110 g 110 g    dextrose 70%  275 g 275 g 275 g    fat emulsions 20%  45 g 45 g 45 g       Additives    potassium phosphate  15 mmol 15 mmol 15 mmol    potassium chloride  40 mEq 40 mEq 40 mEq    sodium acetate  150 mEq 150 mEq 150 mEq    sodium chloride  150 mEq 150 mEq 150 mEq    magnesium sulfate  8 mEq 8 mEq 8 mEq    calcium GLUConate  9.4 mEq 9.4 mEq 9.4 mEq    M.T.E.-4 Adult  1 mL 1 mL 1 mL    M.V.I. Adult  10 mL 10 mL 10 mL    famotidine  40 mg 40 mg 40 mg       QS Base    sterile water  466.08 mL 466.08 mL 466.08 mL       Energy Contribution    Proteins  -- -- --    Dextrose  -- -- --    Lipids  -- -- --    Total  -- -- --       Electrolyte Ion Calculated Amount    Sodium  300 mEq 300 mEq 300 mEq    Potassium  62 mEq 62 mEq 62 mEq    Calcium  9.4 mEq 9.4 mEq 9.4 mEq    Magnesium  8 mEq 8 mEq 8 mEq    Aluminum  -- -- --    Phosphate  15 mmol 15 mmol 15 mmol    Chloride  190 mEq 190 mEq 190 mEq    Acetate  243.13 mEq 243.13 mEq 243.13 mEq       Other    Total Protein  110 g 110 g 110 g    Total Protein/kg  1.5 g/kg 1.5 g/kg 1.5 g/kg    Total Amino Acid  -- -- --    Total Amino Acid/kg  -- -- --    Glucose Infusion Rate  2.6 mg/kg/min  2.6 mg/kg/min 2.6 mg/kg/min    Osmolarity (Estimated)  -- -- --    Volume  1,992 mL 1,992 mL 1,992 mL    Rate  83 mL/hr 83 mL/hr 83 mL/hr    Dosing Weight  73.5 kg 73.5 kg 73.5 kg    [facility]            This formula provides:  % kcal as lipids = 25  Grams protein/kg = 1.5  Non-protein calories = 1385  Kcals/kg = 24.8  Total daily calories = 1825    Comments:  No changes continue TPN same formula, same rate as previous day.      Ashley MORA Ph.

## 2021-06-20 NOTE — PROGRESS NOTES
Received report from day shift RN, assumed care of patient at 1900. Patient complaints of nausea, denies SOB. KRISTI drains, malecot, and wound vac in place. Call light and belongings in reach. Bed is in lowest, locked position. Patient denies further needs at this time.

## 2021-06-20 NOTE — CARE PLAN
"The patient is Stable - Low risk of patient condition declining or worsening    Shift Goals  Clinical Goals: Pain control  Patient Goals: sleep comfortably    Progress made toward(s) clinical / shift goals: Patient able to communicate pain needs effectively. Patient has not reported severe pain at this time. Patient stated \"I do not want to be woken up\" when discussing plan of care.     Patient is not progressing towards the following goals: Patient declined to ambulate this shift, instead elected to use purewick.       "

## 2021-06-20 NOTE — PROGRESS NOTES
IR drain (KRISTI drain #2) flushed with 20 mL sterile saline, 16 ml pulled back.   Unable to flush KRISTI drain #1, no port available.   Both drains placed back onto bulb suction.

## 2021-06-20 NOTE — DISCHARGE PLANNING
Anticipated Discharge Disposition: [facility]    Action: Notified by MD that patient medically clear for discharge to [facility]. LSW spoke to Cali, Liaison from [facility]. Cali to see if bed is available and call this LSW.    Barriers to Discharge: bed availability    Plan: LSW to f/u with [facility] on bed availability

## 2021-06-20 NOTE — PROGRESS NOTES
[facility] Medicine Daily Progress Note    Date of Service  6/20/2021    Chief Complaint  51 y.o. female admitted 5/22/2021 with abd pain    Hospital Course  49 yo woman who presented with abd pain and CT showed cecal volvulus. She underwent exploratory celiotomy and right hemicolectomy with ileocolic anastomosis with Dr. Wood 5/22/21. She developed thrombocytopenia and ascites and returned for ex lap with resection of ileocolic anastomosis and wound vac placement 5/28/21. She was admitted to ICU, started on zosyn for bacterial peritonitis and given platelet transfusions. HIT panel was negative. Abd cultures grew actinomyces, enterococcus, bacteroides. Doppler showed catheter related thrombus in R arm and started on anticoagulation. She had additional washout in the OR with Dr. Wood 6/4/21 and remains on unasyn and fluconazole. Wound vac remains in place. She was then found with anastomotic leak and underwent malecot drain placement 6/11/21. She was started on TPN for malnutrition.    Interval Problem Update  Afebrile  WBC is 10  She feels well, her pain is much improved    Consultants/Specialty  Critical care  Surgery  ID    Code Status  Full Code    Disposition  LTACH    Review of Systems  Review of Systems   Constitutional: Negative for malaise/fatigue.   Respiratory: Negative for cough and shortness of breath.    Cardiovascular: Negative for chest pain.   Gastrointestinal: Positive for abdominal pain (improved) and diarrhea. Negative for constipation and vomiting.   Genitourinary: Negative for dysuria.   Musculoskeletal: Negative for joint pain.   Skin: Negative for rash.   Neurological: Positive for weakness.   All other systems reviewed and are negative.       Physical Exam  Temp:  [36.6 °C (97.8 °F)] 36.6 °C (97.8 °F)  Pulse:  [89] 89  Resp:  [18] 18  BP: (140)/(64) 140/64  SpO2:  [99 %] 99 %    Physical Exam  Vitals and nursing note reviewed.   Constitutional:       Appearance: She is not toxic-appearing or  diaphoretic.      Comments: fatigued   HENT:      Head: Normocephalic.      Mouth/Throat:      Mouth: Mucous membranes are dry.   Eyes:      General:         Right eye: No discharge.         Left eye: No discharge.   Cardiovascular:      Rate and Rhythm: Normal rate and regular rhythm.      Pulses: Normal pulses.   Pulmonary:      Effort: Pulmonary effort is normal. No respiratory distress.      Breath sounds: No wheezing or rales.   Abdominal:      General: There is no distension.      Tenderness: There is abdominal tenderness (around surgical wound, mild).      Comments: Central wound wac and drains in place   Musculoskeletal:      Cervical back: Neck supple. No rigidity.      Right lower leg: No edema.      Left lower leg: No edema.   Skin:     General: Skin is warm and dry.      Capillary Refill: Capillary refill takes less than 2 seconds.      Coloration: Skin is pale.      Findings: No rash.   Neurological:      Mental Status: She is alert.      Comments: AOx4         Fluids    Intake/Output Summary (Last 24 hours) at 6/20/2021 1459  Last data filed at 6/20/2021 0800  Gross per 24 hour   Intake 812.5 ml   Output 1193 ml   Net -380.5 ml       Laboratory  Recent Labs     06/19/21  0548 06/19/21  1331 06/20/21  0800   WBC 43.6* 29.7* 10.2   RBC 2.85* 2.65* 2.76*   HEMOGLOBIN 8.2* 7.5* 7.9*   HEMATOCRIT 25.3* 23.8* 25.1*   MCV 88.8 89.8 90.9   MCH 28.8 28.3 28.6   MCHC 32.4* 31.5* 31.5*   RDW 52.3* 53.3* 55.7*   PLATELETCT 323 298 267   MPV 9.2 9.4 9.9     Recent Labs     06/20/21  0800   SODIUM 136   POTASSIUM 3.4*   CHLORIDE 103   CO2 21   GLUCOSE 108*   BUN 11   CREATININE 0.40*   CALCIUM 8.2*                   Imaging  CT-ABDOMEN-PELVIS WITH   Final Result      1.  There are 2 surgical drains or tubes. The midline tube extends to the midline surgical wound is located immediately adjacent to the inferior wall of the transverse colon.      2.  Additional surgical drain is noted in the right side of the abdomen  and pelvis. Fluid collection in this area is nearly completely resolved. Minimal right paracolic gutter fluid and left paracolic gutter fluid remains.      3.  Pigtail catheter noted deep in the pelvis in the cul-de-sac area is noted with near complete evacuation of this fluid collection which also contained air on the prior CT.      4.  Decrease in pleural effusions bilaterally.      5.  No new fluid collections are identified.         IR-PICC LINE PLACEMENT W/ GUIDANCE > AGE 5   Final Result                  Ultrasound-guided PICC placement performed by qualified nursing staff as    above.          EC-ECHOCARDIOGRAM COMPLETE W/O CONT   Final Result      DX-BARIUM ENEMA   Final Result      1.  Extensive ileocolic anastomotic leakage extending into the peritoneal cavity and extending through the enterocutaneous fistula to the skin surface            2.  Right hemicolectomy      3.  Findings were discussed with NEVA KAY on 6/11/2021 12:21 PM.      DX-CHEST-PORTABLE (1 VIEW)   Final Result      1.  The cardiac silhouette is enlarged and there are changes most consistent with vascular congestion/edema with a trace of left pleural effusion.   2.  Superimposed pneumonitis and/or atelectasis is also possible.      CT-DRAIN-PERITONEAL   Final Result      1.  CT guided right lower quadrant abscess and pelvic abscess catheter drainage.   2.  The current plan is to obtain a follow-up CT in 57 days..      CT-ABDOMEN-PELVIS WITH   Final Result      1.  Loculated rim-enhancing peritoneal fluid collections are again seen in the upper and lower quadrants as well as the posterior pelvis, all of which are very minimally decreased in size.   2.  There is postoperative change in the anterior abdominal wall with removal of the skin staples.   3.  There are postoperative changes of right hemicolectomy with very minimal dilatation of the transverse colon at the anastomosis. There is no bowel obstruction.   4.  Stable moderate left  and small right dependent pleural effusions with dependent atelectasis.      CT-ABDOMEN-PELVIS WITH   Final Result      1.  Multiple rim enhancing peritoneal fluid collection suspicious for abscess      2.  These include the rectouterine space measuring 6.6 x 7.2 x 5.1 cm, right lower quadrant measuring 13.8 x 4.2 x 6.5 cm, left subphrenic space measuring 8.4 x 4.6 x 2.7 cm, and contiguous loculated fluid within the right and left paracolic gutter      3.  Interval right colectomy      4.  Small-moderate-sized bilateral pleural effusion and atelectasis      US-EXTREMITY VENOUS UPPER BILAT   Final Result      IR-MIDLINE CATHETER INSERTION WO GUIDANCE > AGE 3   Final Result                  Ultrasound-guided midline placement performed by qualified nursing staff    as above.          DX-CHEST-FOR LINE PLACEMENT Perform procedure in: Other(comment f6 below): (PACU)   Final Result      1.  Left IJ central line tip high in position located above the superior vena cava are within the left brachiocephalic vein.      2.  Patchy bilateral infiltrates.      3.  NG tube tip extends into the stomach.      CT-ABDOMEN-PELVIS WITH   Final Result      1.  Moderate to large amount of ascites within the abdomen and pelvis some areas of which are loculated in nature. There are also punctate areas of gas within those areas of ascites as well as free intraperitoneal air. This may be related to recent    surgical procedure however the degree of volume of ascites would be unusual for normal postoperative course. Consideration should be given for leakage of bowel content or other process.      2.  Diffuse colonic distention.      3.  Bibasilar atelectasis and pleural effusions.      4.  5 mm left renal pelvic stone which results in mild hydronephrosis above that level.      5.  Moderate right-sided hydronephrosis extending to the level of the upper sacrum. No ureteral stone. The ureter is not identified below that level.      6.  This was  discussed with NEVA WOOD at 6:44 PM on 5/27/2021.      [id number] VIEW   Final Result      Gaseous distended small bowel and colon is similar to prior, likely ileus.      [id number] VIEW   Final Result         Gaseous distention of the small bowel and colon, likely postoperative ileus.      CT-ABDOMEN-PELVIS WITH   Final Result         1.  Findings keeping with cecal volvulus.   2.  Mildly fluid distended distal small bowel.   3.  No free air.   4.  Small nonobstructing renal stones.   These findings were discussed with ESHA STERLING on 5/22/2021 11:45 AM.                       Assessment/Plan  * Bacterial peritonitis (HCC)  Assessment & Plan  - s/p ileocecal resection/redo 5/28/21 and washout 6/4/2, drain placement on 6/9 by IR. Developed anastomotic leak seen on barium enema 6/11 and went to the OR - ileostomy could not be performed due to inflammation of the bowel. Drain was placed and abdomen washed out.  - Wound cultures initially with Bacteroides, Actinomyces, E faecalis. Was on Unasyn and Diflucan.  Culture from when pt began having purulent discharge on 6/8 with bacteroides, culture from IR drain placement 6/9 with bacteroides/candida albicans and krusei.  Diflucan changed to Micafungin.  CT AP 6/17/21 showed improved fluid collections  Per surgery, f/u in 2 weeks with Dr. Wood  - Per ID - Continue IV Unasyn & IV micafungin, f/u recs  Will Continue to monitor drain output  Diet per surgeon, continue clear liquids    Leukocytosis  Assessment & Plan  Acute change in WBC, now resolved  Abd exam stable  C diff neg, blood cultures with NGTD, UA neg for infection    Protein-calorie malnutrition, moderate (HCC)  Assessment & Plan  - Albumin level of 2.0, TPN initiated and tolerating   - PICC in place  - Boost plus supplements ordered between meals     Acute hypoxic respiratory failure (HCC)  Assessment & Plan  - Secondary to pulmonary edema from third spacing secondary to hypoalbuminemia  - resolved,  on r/a    Anemia  Assessment & Plan  - Hgb slowly trending down, not indicative of acute bleed but likely due to ACD and return trips to the OR.  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

## 2021-06-20 NOTE — CARE PLAN
The patient is Stable - Low risk of patient condition declining or worsening    Shift Goals  Clinical Goals: Patient pain will be controlled to tolerable pain level.      Progress made toward(s) clinical / shift goals:  Patient pain is within tolerable pain level for the patient. Only scheduled pain medication were given per order. Patient has been having multiple loose stools. Stool samples were obtain to roll out Cdiff.        Problem: Fall Risk  Goal: Patient will remain free from falls  Outcome: Progressing

## 2021-06-21 VITALS
SYSTOLIC BLOOD PRESSURE: 155 MMHG | HEART RATE: 91 BPM | BODY MASS INDEX: 23.99 KG/M2 | RESPIRATION RATE: 18 BRPM | HEIGHT: 69 IN | TEMPERATURE: 97.8 F | OXYGEN SATURATION: 92 % | WEIGHT: 162 LBS | DIASTOLIC BLOOD PRESSURE: 114 MMHG

## 2021-06-21 LAB
ALBUMIN SERPL BCP-MCNC: 2.8 G/DL (ref 3.2–4.9)
ALBUMIN/GLOB SERPL: 0.8 G/DL
ALP SERPL-CCNC: 80 U/L (ref 30–99)
ALT SERPL-CCNC: 13 U/L (ref 2–50)
ANION GAP SERPL CALC-SCNC: 9 MMOL/L (ref 7–16)
AST SERPL-CCNC: 17 U/L (ref 12–45)
BASOPHILS # BLD AUTO: 0.4 % (ref 0–1.8)
BASOPHILS # BLD: 0.04 K/UL (ref 0–0.12)
BILIRUB SERPL-MCNC: <0.2 MG/DL (ref 0.1–1.5)
BUN SERPL-MCNC: 11 MG/DL (ref 8–22)
CALCIUM SERPL-MCNC: 8.2 MG/DL (ref 8.4–10.2)
CHLORIDE SERPL-SCNC: 104 MMOL/L (ref 96–112)
CO2 SERPL-SCNC: 23 MMOL/L (ref 20–33)
CREAT SERPL-MCNC: 0.35 MG/DL (ref 0.5–1.4)
EOSINOPHIL # BLD AUTO: 0.38 K/UL (ref 0–0.51)
EOSINOPHIL NFR BLD: 3.6 % (ref 0–6.9)
ERYTHROCYTE [DISTWIDTH] IN BLOOD BY AUTOMATED COUNT: 55.5 FL (ref 35.9–50)
GLOBULIN SER CALC-MCNC: 3.4 G/DL (ref 1.9–3.5)
GLUCOSE BLD-MCNC: 114 MG/DL (ref 65–99)
GLUCOSE BLD-MCNC: 116 MG/DL (ref 65–99)
GLUCOSE SERPL-MCNC: 107 MG/DL (ref 65–99)
HCT VFR BLD AUTO: 24 % (ref 37–47)
HGB BLD-MCNC: 7.5 G/DL (ref 12–16)
IMM GRANULOCYTES # BLD AUTO: 0.12 K/UL (ref 0–0.11)
IMM GRANULOCYTES NFR BLD AUTO: 1.1 % (ref 0–0.9)
LYMPHOCYTES # BLD AUTO: 1.27 K/UL (ref 1–4.8)
LYMPHOCYTES NFR BLD: 12 % (ref 22–41)
MAGNESIUM SERPL-MCNC: 1.9 MG/DL (ref 1.5–2.5)
MCH RBC QN AUTO: 28.1 PG (ref 27–33)
MCHC RBC AUTO-ENTMCNC: 31.3 G/DL (ref 33.6–35)
MCV RBC AUTO: 89.9 FL (ref 81.4–97.8)
MONOCYTES # BLD AUTO: 0.59 K/UL (ref 0–0.85)
MONOCYTES NFR BLD AUTO: 5.6 % (ref 0–13.4)
NEUTROPHILS # BLD AUTO: 8.17 K/UL (ref 2–7.15)
NEUTROPHILS NFR BLD: 77.3 % (ref 44–72)
NRBC # BLD AUTO: 0 K/UL
NRBC BLD-RTO: 0 /100 WBC
PHOSPHATE SERPL-MCNC: 3 MG/DL (ref 2.5–4.5)
PLATELET # BLD AUTO: 293 K/UL (ref 164–446)
PMV BLD AUTO: 10.1 FL (ref 9–12.9)
POTASSIUM SERPL-SCNC: 3.8 MMOL/L (ref 3.6–5.5)
PREALB SERPL-MCNC: 27.3 MG/DL (ref 18–38)
PROT SERPL-MCNC: 6.2 G/DL (ref 6–8.2)
RBC # BLD AUTO: 2.67 M/UL (ref 4.2–5.4)
SODIUM SERPL-SCNC: 136 MMOL/L (ref 135–145)
TRIGL SERPL-MCNC: 215 MG/DL (ref 0–149)
WBC # BLD AUTO: 10.6 K/UL (ref 4.8–10.8)

## 2021-06-21 PROCEDURE — 84100 ASSAY OF PHOSPHORUS: CPT

## 2021-06-21 PROCEDURE — A9270 NON-COVERED ITEM OR SERVICE: HCPCS | Performed by: NURSE PRACTITIONER

## 2021-06-21 PROCEDURE — A9270 NON-COVERED ITEM OR SERVICE: HCPCS | Performed by: FAMILY MEDICINE

## 2021-06-21 PROCEDURE — 99232 SBSQ HOSP IP/OBS MODERATE 35: CPT | Performed by: INTERNAL MEDICINE

## 2021-06-21 PROCEDURE — 85025 COMPLETE CBC W/AUTO DIFF WBC: CPT

## 2021-06-21 PROCEDURE — 700102 HCHG RX REV CODE 250 W/ 637 OVERRIDE(OP): Performed by: SURGERY

## 2021-06-21 PROCEDURE — 82962 GLUCOSE BLOOD TEST: CPT

## 2021-06-21 PROCEDURE — 84478 ASSAY OF TRIGLYCERIDES: CPT

## 2021-06-21 PROCEDURE — 700111 HCHG RX REV CODE 636 W/ 250 OVERRIDE (IP): Performed by: SURGERY

## 2021-06-21 PROCEDURE — 84134 ASSAY OF PREALBUMIN: CPT

## 2021-06-21 PROCEDURE — 700111 HCHG RX REV CODE 636 W/ 250 OVERRIDE (IP): Performed by: HOSPITALIST

## 2021-06-21 PROCEDURE — 700102 HCHG RX REV CODE 250 W/ 637 OVERRIDE(OP): Performed by: FAMILY MEDICINE

## 2021-06-21 PROCEDURE — 99239 HOSP IP/OBS DSCHRG MGMT >30: CPT | Performed by: INTERNAL MEDICINE

## 2021-06-21 PROCEDURE — 302098 PASTE RING (FLAT): Performed by: INTERNAL MEDICINE

## 2021-06-21 PROCEDURE — 700105 HCHG RX REV CODE 258: Performed by: INTERNAL MEDICINE

## 2021-06-21 PROCEDURE — 36415 COLL VENOUS BLD VENIPUNCTURE: CPT

## 2021-06-21 PROCEDURE — 700111 HCHG RX REV CODE 636 W/ 250 OVERRIDE (IP): Performed by: INTERNAL MEDICINE

## 2021-06-21 PROCEDURE — A9270 NON-COVERED ITEM OR SERVICE: HCPCS | Performed by: SURGERY

## 2021-06-21 PROCEDURE — 83735 ASSAY OF MAGNESIUM: CPT

## 2021-06-21 PROCEDURE — 80053 COMPREHEN METABOLIC PANEL: CPT

## 2021-06-21 PROCEDURE — 700102 HCHG RX REV CODE 250 W/ 637 OVERRIDE(OP): Performed by: NURSE PRACTITIONER

## 2021-06-21 RX ADMIN — DIPHENHYDRAMINE HCL 50 MG: 25 TABLET ORAL at 00:36

## 2021-06-21 RX ADMIN — IBUPROFEN 600 MG: 600 TABLET ORAL at 12:02

## 2021-06-21 RX ADMIN — ONDANSETRON 4 MG: 2 INJECTION INTRAMUSCULAR; INTRAVENOUS at 05:55

## 2021-06-21 RX ADMIN — ACETAMINOPHEN 650 MG: 325 TABLET, FILM COATED ORAL at 12:02

## 2021-06-21 RX ADMIN — DULOXETINE HYDROCHLORIDE 60 MG: 30 CAPSULE, DELAYED RELEASE ORAL at 05:44

## 2021-06-21 RX ADMIN — ACETAMINOPHEN 650 MG: 325 TABLET, FILM COATED ORAL at 05:44

## 2021-06-21 RX ADMIN — AMPICILLIN SODIUM AND SULBACTAM SODIUM 3 G: 2; 1 INJECTION, POWDER, FOR SOLUTION INTRAMUSCULAR; INTRAVENOUS at 12:03

## 2021-06-21 RX ADMIN — ALTEPLASE 2 MG: 2.2 INJECTION, POWDER, LYOPHILIZED, FOR SOLUTION INTRAVENOUS at 05:42

## 2021-06-21 RX ADMIN — ONDANSETRON 4 MG: 2 INJECTION INTRAMUSCULAR; INTRAVENOUS at 00:25

## 2021-06-21 RX ADMIN — AMPICILLIN SODIUM AND SULBACTAM SODIUM 3 G: 2; 1 INJECTION, POWDER, FOR SOLUTION INTRAMUSCULAR; INTRAVENOUS at 05:44

## 2021-06-21 RX ADMIN — IBUPROFEN 600 MG: 600 TABLET ORAL at 05:44

## 2021-06-21 ASSESSMENT — ENCOUNTER SYMPTOMS
ABDOMINAL PAIN: 1
SHORTNESS OF BREATH: 0
COUGH: 0
DIARRHEA: 0
VOMITING: 0
NAUSEA: 0
FEVER: 0
FALLS: 0

## 2021-06-21 ASSESSMENT — PAIN DESCRIPTION - PAIN TYPE: TYPE: ACUTE PAIN

## 2021-06-21 NOTE — DISCHARGE PLANNING
Anticipated Discharge Disposition: LTAC- [facility]    Action: Pt discussed during morning rounds. Per Dr. Rojo pt cleared for LTAC today.     LSW messaged Norma with [facility] providing update and following up with bed availability. Norma informed LSW that she will have an update on bed availability available after 0900.     Barriers to Discharge: Bed availability     Plan: Await bed availability, coordinate transportation when appropriate, LSW to assist as needed     Addendum 1110  LSW informed by Norma with [facility] that they have a bed available today.   Requesting transport for 1800.     LSW faxed facesheet and transport request forms to ride line.  LSW messaged Jyoti with the ride line to inform.     LSW messaged Dr. Rojo with update.     Await confirmed transport time.     Addendum 1137  LSW informed by Jyoti with [facility] that [facility] transport confirmed for 1800. Bedside RN, Kristine also included in Voalte message.   LSW notified Dr. Rojo and Norma with [facility].     Addendum 1201  LSW met with pt to collect signature for COBRA. Pt provided signature.   Pt will notify family of transfer.     Addendum 1403  LSW to placed completed transfer packet in pt's chart.

## 2021-06-21 NOTE — PROGRESS NOTES
Infectious Disease Progress Note    Author: Rossi Castillo M.D. Date & Time of service: 2021  12:15 PM    Chief Complaint:  Follow-up for intra-abdominal abscesses     Interval History:   patient is afebrile, white count 13.4.  Patient had worsening abdominal pain yesterday, noted purulent output during wound VAC change, repeat CT with multiple intra-abdominal abscesses, taken back to the OR this morning.   AF, O2 RA, doing well overall and states she ambulated twice this morning with normal bowel movement.  She has some abdominal pain with ambulation but otherwise minimal.    patient remains afebrile, had drains placed, white count 12.9, tolerating antimicrobials thus far.  Resting comfortably this morning.   patient remains afebrile, white count 15.8, additional procedures and change to antifungal as below   afebrile WBC 17.3 drain output decreasing.  Recorded 65 cc on . Ongoing abdoominal pain   afebrile, WBC 16.7.  Patient remains weak.  Has ongoing abdominal pain.  Eager to go home   afebrile WBC 16.3.  Plan for repeat CT scan today.  Drain output decreasing overall.  Friend at bedside visiting.  No new symptoms to report.  Work with physical therapy yesterday   AF WBC 14.1 CT scan reviwed No acute events overnight   AF WBC 43.6 Denies any worsening pain or new sxs Cdiff neg Wants something else to eat Juice or smoothie   AF WBC 10.2 Feels better and pleased that diet advanced to full liquids. Getting TPN. No new complaints. Plan for transfer to LTAC today noted. New IV LUE    Review of Systems:  Review of Systems   Constitutional: Negative for fever.   Respiratory: Negative for cough and shortness of breath.    Cardiovascular: Negative for chest pain.   Gastrointestinal: Negative for diarrhea, nausea and vomiting.        Pain controlled-no increase   Musculoskeletal: Negative for falls.   Skin: Negative for rash.     Hemodynamics:  Temp (24hrs), Av.8 °C  (98.3 °F), Min:36.7 °C (98 °F), Max:37.2 °C (98.9 °F)  Temperature: 36.7 °C (98 °F)  Pulse  Av.9  Min: 60  Max: 156   Blood Pressure: 155/70       Physical Exam:  Physical Exam  Vitals and nursing note reviewed.   Constitutional:       General: She is not in acute distress.     Appearance: She is not ill-appearing, toxic-appearing or diaphoretic.   HENT:      Nose: No congestion or rhinorrhea.      Mouth/Throat:      Pharynx: No oropharyngeal exudate.   Eyes:      General: No scleral icterus.     Extraocular Movements: Extraocular movements intact.      Pupils: Pupils are equal, round, and reactive to light.   Cardiovascular:      Rate and Rhythm: Normal rate.   Pulmonary:      Effort: Pulmonary effort is normal. No respiratory distress.      Breath sounds: No stridor.   Abdominal:      General: There is no distension.      Palpations: Abdomen is soft.      Comments: Drains serosanguinous  Midline VAC   Musculoskeletal:      Comments: PICC LUE  Midline RUE-to be removed today   Skin:     Coloration: Skin is pale. Skin is not jaundiced.      Findings: Bruising present. No rash.   Neurological:      General: No focal deficit present.      Mental Status: She is alert and oriented to person, place, and time.   Psychiatric:         Mood and Affect: Mood normal.         Behavior: Behavior normal.         Meds:    Current Facility-Administered Medications:   •  alteplase  •  TPN Central Line Formulation  •  TPN Central Line Formulation  •  ibuprofen  •  acetaminophen  •  diphenhydrAMINE  •  lidocaine **OR** lidocaine  •  micafungin (MYCAMINE) ivpb  •  MD Alert...TPN per Pharmacy  •  morphine injection  •  DULoxetine  •  topiramate  •  enoxaparin (LOVENOX) injection  •  oxyCODONE immediate-release  •  oxyCODONE immediate-release  •  morphine injection  •  ampicillin-sulbactam (UNASYN) IV  •  [DISCONTINUED] insulin regular **AND** [CANCELED] POC blood glucose manual result **AND** NOTIFY MD and PharmD **AND** glucose  **AND** dextrose 50%  •  Metoprolol Tartrate  •  LORazepam  •  diazePAM  •  Respiratory Therapy Consult  •  Pharmacy Consult Request  •  ondansetron    Labs:  Recent Labs     06/19/21  0548 06/19/21  0548 06/19/21  1331 06/20/21  0800 06/21/21  0421   WBC 43.6*   < > 29.7* 10.2 10.6   RBC 2.85*   < > 2.65* 2.76* 2.67*   HEMOGLOBIN 8.2*   < > 7.5* 7.9* 7.5*   HEMATOCRIT 25.3*   < > 23.8* 25.1* 24.0*   MCV 88.8   < > 89.8 90.9 89.9   MCH 28.8   < > 28.3 28.6 28.1   RDW 52.3*   < > 53.3* 55.7* 55.5*   PLATELETCT 323   < > 298 267 293   MPV 9.2   < > 9.4 9.9 10.1   NEUTSPOLYS 90.00*   < > 94.00* 84.60* 77.30*   LYMPHOCYTES 1.00*   < > 2.00* 8.40* 12.00*   MONOCYTES 3.00   < > 1.00 3.90 5.60   EOSINOPHILS 0.00   < > 1.00 1.40 3.60   BASOPHILS 0.00   < > 0.00 0.40 0.40   RBCMORPHOLO Present  --  Present  --   --     < > = values in this interval not displayed.     Recent Labs     06/20/21  0800 06/21/21  0421   SODIUM 136 136   POTASSIUM 3.4* 3.8   CHLORIDE 103 104   CO2 21 23   GLUCOSE 108* 107*   BUN 11 11     Recent Labs     06/20/21  0800 06/21/21  0421   ALBUMIN 2.9* 2.8*   TBILIRUBIN <0.2 <0.2   ALKPHOSPHAT 90 80   TOTPROTEIN 6.3 6.2   ALTSGPT 13 13   ASTSGOT 19 17   CREATININE 0.40* 0.35*       Imaging:  CT-ABDOMEN-PELVIS WITH    Result Date: 6/8/2021 6/8/2021 4:22 PM HISTORY/REASON FOR EXAM:  Peritonitis or perforation suspected; 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 Most recent abdominal surgery for washout of the peritoneal cavity performed on 6/4/2021. Ongoing generalized abdominal pain. TECHNIQUE/EXAM DESCRIPTION: CT scan of the abdomen and pelvis with contrast. Contrast-enhanced helical scanning was obtained from the diaphragmatic domes through the pubic symphysis following the bolus administration of 100 mL of Omnipaque 350 nonionic contrast without complication. Low dose optimization  technique was utilized for this CT exam including automated exposure control and adjustment of the mA and/or kV according to patient size. COMPARISON: 6/3/2021 FINDINGS: The visualized lung bases demonstrate a small right and moderate left dependent pleural effusion with dependent airspace disease, likely atelectasis similar to the previous exam. There is no acute bony process. CT Abdomen: There is postoperative change in the anterior abdominal wall midline. There are a few tiny air lucencies in the subcutaneous tissues with abdominal wall defect seen with the previous skin staples removed. Tiny amounts of intraperitoneal air identified within the anterior abdominal fluid. There is loculated intra-abdominal fluid with some seen in the left lateral subphrenic space anterior to the spleen measuring 4.4 x 2.1 cm (previously 4.6 x 2.7 cm). There is a small amount of loculated fluid in the right anterior upper quadrant also similar to the prior study. This appears to communicate with a more confluent area of fluid anteriorly within the peritoneal cavity beginning at the level of the iliac crest and extending into the upper pelvis. The largest component is in the right lower  quadrant measuring 4.9 cm in AP diameter, down from 6.5 cm. The fluid collection with rim enhancement and air lucencies in the posterior pelvis measures 6.2 x 3.5 cm (previously 7.2 x 3.9 cm). The liver is unremarkable. The spleen is unremarkable. The pancreas is unremarkable. The gallbladder demonstrates no stones. The adrenal glands are normal in size. The kidneys enhance symmetrically. The abdominal aorta is normal in caliber. There is no lymphadenopathy. The stomach is distended with some fluid and contrast material. There is postoperative change consistent with a right hemicolectomy. There is minimal dilatation of the left transverse colon at the anastomosis. CT Pelvis: There are no new pelvic fluid collections. There is mild diffuse body wall  edema.     1.  Loculated rim-enhancing peritoneal fluid collections are again seen in the upper and lower quadrants as well as the posterior pelvis, all of which are very minimally decreased in size. 2.  There is postoperative change in the anterior abdominal wall with removal of the skin staples. 3.  There are postoperative changes of right hemicolectomy with very minimal dilatation of the transverse colon at the anastomosis. There is no bowel obstruction. 4.  Stable moderate left and small right dependent pleural effusions with dependent atelectasis.    CT-ABDOMEN-PELVIS WITH    Result Date: 6/3/2021  6/3/2021 4:55 PM HISTORY/REASON FOR EXAM:  Abdominal abscess/infection suspected; wound dehiscense, r/o abscess. Postoperative. Recent volvulus with right hemicolectomy TECHNIQUE/EXAM DESCRIPTION:  CT scan of the abdomen and pelvis with contrast. Contrast-enhanced helical scanning was obtained from the diaphragmatic domes through the pubic symphysis following the bolus administration of nonionic contrast without complication. 100 mL of Omnipaque 350 nonionic contrast was administered without complication. Low dose optimization technique was utilized for this CT exam including automated exposure control and adjustment of the mA and/or kV according to patient size. COMPARISON: CT abdomen and pelvis 5/27/2021 FINDINGS: Osseous structures: There is bilateral atelectasis and there are small to moderate-sized bilateral pleural effusion. Lungs: Clear ABDOMEN: There is peritoneal fluid present posterior to the abdominal wall and in both paracolic gutter. This has some degree of loculation especially in the left paracolic gutter and could indicate infected fluid. Additionally, in the right lower quadrant there is a loculated fluid collection present measuring approximately 13.8 x 4.2 x 6.5 cm and this connects to the peritoneal fluid in the right paracolic gutter. There is loculated rim-enhancing fluid in the left subphrenic  space measuring 8.4 cm in length and approximately 4.6 x 2.7 cm transversely. This connects to the fluid within the left paracolic gutter. There are recent postoperative changes with skin staple present and intraperitoneal air and fluid. LIVER: is normal in appearance. GALLBLADDER and BILIARY SYSTEM Gallbladder and biliary system are normal by CT assessment SPLEEN: Normal in appearance.. PANCREAS: Normal in appearance. The splenic vein and portal vein enhance normally. ADRENAL glands: Normal in appearance. RIGHT kidney: There is a nonobstructive 1 to 2 mm right medial lower pole calculus. LEFT kidney: Normal in appearance. There is no hydronephrosis. BOWEL and MESENTERY: Their has been right hemicolectomy. AORTA and VASCULATURE: is normal in appearance. Pelvis: In the recto uterine space there is a loculated fluid collection measuring 6.6 x 7.2 x 5.1 cm consistent with an abscess. Uterus and adnexa appear normal.     1.  Multiple rim enhancing peritoneal fluid collection suspicious for abscess 2.  These include the rectouterine space measuring 6.6 x 7.2 x 5.1 cm, right lower quadrant measuring 13.8 x 4.2 x 6.5 cm, left subphrenic space measuring 8.4 x 4.6 x 2.7 cm, and contiguous loculated fluid within the right and left paracolic gutter 3.  Interval right colectomy 4.  Small-moderate-sized bilateral pleural effusion and atelectasis    CT-ABDOMEN-PELVIS WITH    Result Date: 5/27/2021 5/27/2021 5:48 PM HISTORY/REASON FOR EXAM: Diffuse abdominal pain and distention. TECHNIQUE/EXAM DESCRIPTION: CT scan of the abdomen and pelvis with contrast. Contrast-enhanced helical scanning was obtained from the diaphragmatic domes through the pubic symphysis following the bolus administration of 100 mL of Omnipaque 350 nonionic contrast without complication. Low dose optimization technique was utilized for this CT exam including automated exposure control and adjustment of the mA and/or kV according to patient size. COMPARISON:  5/22/2021 FINDINGS: CT Abdomen: There is new bibasilar atelectasis and small bilateral pleural effusions, left greater than right. There is fatty change of the liver. There is a small amount of free intraperitoneal air tracking along the surface of the liver. Gallbladder is distended with dense material present within the gallbladder. The spleen is normal. There is moderate right-sided hydronephrosis extending to the level of the upper sacrum. The right ureter is not identified below that level. No definite ureteral stone on the right. There is left ureteral pelvic junction stone which measures 5 mm in size and results in minimal left renal pelvic dilatation. The adrenal glands are normal. The pancreas is normal. The aorta is normal in caliber. No evidence of retroperitoneal adenopathy. CT Pelvis: There is multifocal ascites seen which is likely loculated in nature. There are punctate areas of free air seen within those areas of ascites. There are surgical changes involving the cecum. The colon is diffusely dilated. There is a large amount of free fluid which contains pockets of gas within the pelvis. There is a recent midline incision.     1.  Moderate to large amount of ascites within the abdomen and pelvis some areas of which are loculated in nature. There are also punctate areas of gas within those areas of ascites as well as free intraperitoneal air. This may be related to recent surgical procedure however the degree of volume of ascites would be unusual for normal postoperative course. Consideration should be given for leakage of bowel content or other process. 2.  Diffuse colonic distention. 3.  Bibasilar atelectasis and pleural effusions. 4.  5 mm left renal pelvic stone which results in mild hydronephrosis above that level. 5.  Moderate right-sided hydronephrosis extending to the level of the upper sacrum. No ureteral stone. The ureter is not identified below that level. 6.  This was discussed with NEVA CORONEL  ELIZA at 6:44 PM on 5/27/2021.    CT-ABDOMEN-PELVIS WITH    Result Date: 5/22/2021 5/22/2021 11:19 AM HISTORY/REASON FOR EXAM:  Abdominal distension; IV contrast only. TECHNIQUE/EXAM DESCRIPTION:   CT scan of the abdomen and pelvis with contrast. Contrast-enhanced helical scanning was obtained from the diaphragmatic domes through the pubic symphysis following the bolus administration of nonionic contrast without complication. 100 mL of Omnipaque 350 nonionic contrast was administered without complication. Low dose optimization technique was utilized for this CT exam including automated exposure control and adjustment of the mA and/or kV according to patient size. COMPARISON: 4/22/2020 FINDINGS: Chest Base: Lung bases are clear. Liver:  Normal. Gallbladder: Normal Biliary tract: Nondilated. Pancreas: Normal. Spleen: Normal. Adrenals: Normal. Kidneys and Collecting Systems:  Cannot exclude a punctate nonobstructing stone in the lower right renal collecting system. There is a small nonobstructing left renal stone measuring about 5 mm. Gastrointestinal tract:  The cecum is distended and displaced into the left upper quadrant. The ascending colon proximally is narrowed in the midline and there is a somewhat twisted appearance.   Mildly fluid distended distal small bowel without significant small bowel obstruction. The appendix is nonvisualized. Peritoneum: No free air or free fluid. Reproductive organs:  2.3 cm left adnexal hypodense lesion is indeterminate, possibly a dominant follicle/small cyst in a menstruating patient. Correlate clinically. Probable intramural fibroid in the fundus. Bladder:  Normal. Vessels:  Normal caliber. Lymph  Nodes:  No lymphadenopathy. Abdominal wall: Within normal limits. Bones:  No acute or aggressive abnormality.     1.  Findings keeping with cecal volvulus. 2.  Mildly fluid distended distal small bowel. 3.  No free air. 4.  Small nonobstructing renal stones. These findings were  discussed with ESHA STERLING on 5/22/2021 11:45 AM.     [id number] VIEW    Result Date: 5/27/2021 5/27/2021 7:01 AM HISTORY/REASON FOR EXAM:  Distention. TECHNIQUE/EXAM DESCRIPTION AND NUMBER OF VIEWS:  1 view(s) of the abdomen. COMPARISON: 5/24/2021 FINDINGS: Gaseous distended  small bowel and colon is similar to prior study. There is some stool within the right colon. There is no significant interval change. No suspicious calcifications. No acute osseous abnormality.     Gaseous distended small bowel and colon is similar to prior, likely ileus.    [id number] VIEW    Result Date: 5/24/2021 5/24/2021 11:28 AM HISTORY/REASON FOR EXAM:  Distention; s/p right hemicolectomy Lower abdominal pain s/p right hemicolectomy 05/22/21 TECHNIQUE/EXAM DESCRIPTION AND NUMBER OF VIEWS:  1 view(s) of the abdomen. COMPARISON: 5/22/2021 FINDINGS: Gaseous distention of the small bowel and colon No portal venous gas or pneumatosis. No definite free intraperitoneal air but evaluation is limited on supine radiograph.     Gaseous distention of the small bowel and colon, likely postoperative ileus.    DX-CHEST-FOR LINE PLACEMENT Perform procedure in: Other(comment f6 below): (PACU)    Result Date: 5/28/2021 5/28/2021 10:10 AM HISTORY/REASON FOR EXAM:  Central line placement. TECHNIQUE/EXAM DESCRIPTION AND NUMBER OF VIEWS: Single AP view of the chest. COMPARISON: None FINDINGS: There is a left IJ central line with the tip high in position above the superior vena cava within the area of the left brachiocephalic vein. No pneumothorax. NG tube extends into the stomach. There are patchy bilateral pulmonary infiltrates. The heart is mildly enlarged. There is no pleural effusion.     1.  Left IJ central line tip high in position located above the superior vena cava are within the left brachiocephalic vein. 2.  Patchy bilateral infiltrates. 3.  NG tube tip extends into the stomach.    US-EXTREMITY VENOUS UPPER BILAT    Result Date:  2021   Upper Extremity  Venous Duplex Report  Vascular Laboratory  CONCLUSIONS  Small thrombus seen around the catheter line at the RIGHT axillary vein and  proximal basilic vein.  No left upper extremity superficial and deep venous thrombosis.  HOANG EMERY  Exam Date:     2021 14:29  Room #:     Inpatient  Priority:     Routine  Ht (in):             Wt (lb):  Ordering Physician:        DEIDRA SNIDER  Referring Physician:       [id number] Licea  Sonographer:               Sharlene Morrell RVT, RDMS  Study Type:                Complete Bilateral  Technical Quality:         Fair  Age:    50    Gender:     F  MRN:    [id number]  :    1970      BSA:  Indications:     Edema  CPT Codes:       27404  ICD Codes:       782.3  History:         Swelling. No prior study  Limitations:  PROCEDURES:  Bilateral upper extremity venous duplex imaging.  The following venous structures were evaluated: internal jugular,  subclavian, axillary, brachial, cephalic and basilic veins.  FINDINGS:  Right upper extremity.  Small thrombus seen around the central line at the axillary vein and  proximal basilic vein.  All other veins of the right upper extremity demonstrate normal flow  dynamics with no evidence of deep vein thrombosis.  Left upper extremity.  Limited visualization of the internal jugular vein due to bandages.  All other veins of the left upper extremity demonstrate normal flow  dynamics with no evidence of deep vein thrombosis.  Doug Rossi MD  (Electronically Signed)  Final Date:      2021                   18:16    IR-MIDLINE CATHETER INSERTION WO GUIDANCE > AGE 3    Result Date: 2021  HISTORY/REASON FOR EXAM:  Midline Placement   TECHNIQUE/EXAM DESCRIPTION AND NUMBER OF VIEWS: Midline insertion with ultrasound guidance.  FINDINGS: Midline insertion with Ultrasound Guidance was performed by qualified nursing staff without the assistance of  "a Radiologist. Midline positioning as measured by RN or as appropriate length of catheter selected.              Ultrasound-guided midline placement performed by qualified nursing staff as above.       Micro:  Results     Procedure Component Value Units Date/Time    BLOOD CULTURE [[id number]] Collected: 06/19/21 0823    Order Status: Completed Specimen: Blood from Peripheral Updated: 06/20/21 0717     Significant Indicator NEG     Source BLD     Site PERIPHERAL     Culture Result No Growth  Note: Blood cultures are incubated for 5 days and  are monitored continuously.Positive blood cultures  are called to the RN and reported as soon as  they are identified.  Blood culture testing and Gram stain, if indicated, are  performed at [facility], [address] [place] [address].  Positive blood cultures are  sent to [facility], [address] [place] [address], for organism identification and  susceptibility testing.      Narrative:      Special Contact Isolation  Per Hospital Policy: Only change Specimen Src: to \"Line\" if  specified by physician order.  Right Hand    BLOOD CULTURE [[id number]] Collected: 06/19/21 0823    Order Status: Completed Specimen: Blood from Peripheral Updated: 06/20/21 0717     Significant Indicator NEG     Source BLD     Site PERIPHERAL     Culture Result No Growth  Note: Blood cultures are incubated for 5 days and  are monitored continuously.Positive blood cultures  are called to the RN and reported as soon as  they are identified.  Blood culture testing and Gram stain, if indicated, are  performed at [facility], [address] [place] [address].[place], [place].  Positive blood cultures are  sent to [facility], [address] [place] [address], for organism identification and  susceptibility testing.      Narrative:      Special Contact Isolation  Per Hospital Policy: Only change Specimen Src: to \"Line\" if  specified " by physician order.  Right Wrist    URINALYSIS [[id number]]  (Abnormal) Collected: 06/19/21 1320    Order Status: Completed Specimen: Urine, Clean Catch Updated: 06/19/21 1609     Color Yellow     Character Clear     Specific Gravity 1.005     Ph 7.0     Glucose Negative mg/dL      Ketones Trace mg/dL      Protein Negative mg/dL      Bilirubin Negative     Nitrite Negative     Leukocyte Esterase Negative     Occult Blood Negative     Micro Urine Req see below     Comment: Microscopic examination not performed when specimen is clear  and chemically negative for protein, blood, leukocyte esterase  and nitrite.         Narrative:      Special Contact [id number] JOHNSON ADONAY HEREDIA    C Diff by PCR rflx Toxin [[id number]] Collected: 06/19/21 0803    Order Status: Completed Specimen: Stool Updated: 06/19/21 1151     C Diff by PCR Negative     Comment: C. difficile NOT detected by PCR.  Treatment not indicated per guidelines.  Repeat testing not indicated within 7 days.          027-NAP1-BI Presumptive Negative     Comment: Presumptive 027/NAP1/BI target DNA sequences are NOT DETECTED.       Narrative:      Special Contact [id number] JOHNSON ADONAY HEREDIA  Does this patient have risk factors for C-diff?->Yes  C-Diff Risk Factors->antibiotic exposure  Has patient taken stool softeners or laxatives in the last 5  days?->No            Assessment/Hospital course:  This is a very pleasant 50-year-old female patient, originally admitted on 5/22/2021 with cecal volvulus for which she underwent a right hemicolectomy on 5/22.  This was complicated by intra-abdominal abscesses and free air concerning for leak.  She was taken to the OR for exploratory laparotomy and found to have necrosis of the ileocolonic anastomosis site, bowel is eviscerated and redo side-to-side ileocolic anastomosis was done.    Patient went back to the OR on 6/4 due to drainage from wound and concern for fascial dehiscence.  Per op note there is loosening  of the fascia but no yessy dehiscence.  Abdomen was washed out including pockets noted on last CT.  Anastomosis was not evaluated as was scarred and no evidence of leak.     Pertinent Diagnoses:  Sepsis, improved On treatment  Intra-abdominal abscesses, persistent. On treatment  Large midline incision, with significant feculent drainage   Feculent peritonitis, on treatment  Anastomotic leak, ongoing  Cecal volvulus, sequelae  Leukocytosis, resolved  TPN dependent     Plan:  -OR cultures from 5/28 growing Bacteroides, Actinomyces, E faecalis  -Patient had worsening abdominal pain and a repeat CT scan obtained 6/3 showed multiple rim-enhancing peritoneal fluid collections several quite large and loculated.    -s/p OR exploratory laparotomy on 6/4 -no new cultures were obtained.    -CT abdomen and pelvis 6/8 : postop change in anterior abdominal wall, few tiny air lucencies and tiny amounts of intraperitoneal air identified within the anterior abdominal fluid.  Loculated intra-abdominal fluid in the left lateral splenic space measuring 4.4 x 2.1 cm, small amount of loculated fluid in the right anterior upper quadrant also similar to prior study.  This appears to communicate with fluid anterior to the peritoneal cavity measuring 4.9 x 6.5 cm.  Fluid collection with rim enhancement in the posterior pelvis now 6.2 x 3.5 cm. 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  Patient was taken back to the OR 6/11, but the bowel was too inflamed to mobilize for ostomy, drains were placed    -CT scan 6/17  2 surgical drains or tubes. The midline tube extends to the midline surgical wound is located immediately adjacent to the inferior wall of the transverse colon. Additional surgical  drain is noted in the right side of the abdomen and pelvis. Fluid collection in this area is nearly completely resolved. Minimal right paracolic gutter fluid and left paracolic gutter fluid remains. Pigtail catheter noted deep in the pelvis in the cul-de-sac area is noted with near complete evacuation of this fluid collection which also contained air on the prior CT. Decrease in pleural effusions bilaterally. No new fluid collections are identified.    -Continue IV Unasyn 3 g every 6 hours and IV micafungin 100 mg every 24 hours. 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 Advance diet per surgery    FU ID clinic after discharge

## 2021-06-21 NOTE — CARE PLAN
The patient is Stable - Low risk of patient condition declining or worsening    Shift Goals  Clinical Goals: Patient will walk to the bathroom when toileting    Progress made toward(s) clinical / shift goals:  Patient has been calling whenever she needs to use the toilet. She ambulated around the unit with CNA and been walking to the bathroom whenever she voiding and defecating.      Problem: Fall Risk  Goal: Patient will remain free from falls  Outcome: Progressing

## 2021-06-21 NOTE — PROGRESS NOTES
Received report from day shift RN. Pt laying in bed comfortably. Drains in place. Pt educated on POC. Fall precautions in place. Call light in reach. Rounding in place

## 2021-06-21 NOTE — PROGRESS NOTES
Discontinued patients midline catheter per physicians order. Reinforce it with dressing and tape. No bleeding noted. Also discontinued patients number 2 KRISTI drain located on her right buttocks. Emptied 3 mL serosanguenous  Fluid from the drain. Dressing and tegadern in place. Patient tolerated the procedure well.

## 2021-06-21 NOTE — DISCHARGE PLANNING
Received Transport Form @ 1111  Spoke to Manjinder @ [facility]    Transport is scheduled for 6/21/2021 @1800 going to [facility] via [employer].    Voalte message sent to care team regarding discharge transportation arrangements.

## 2021-06-21 NOTE — CARE PLAN
The patient is Watcher - Medium risk of patient condition declining or worsening    Shift Goals  Clinical Goals: Pt will be able to rest comfortably   Patient Goals: sleep comfortably    Progress made toward(s) clinical / shift goals:  Pt is able to sleep through the night and rest without discomfort     Patient is not progressing towards the following goals: n/a       Problem: Fall Risk  Goal: Patient will remain free from falls  Outcome: Progressing     Problem: Skin Integrity  Goal: Skin integrity is maintained or improved  Outcome: Progressing     Problem: Respiratory  Goal: Patient will achieve/maintain optimum respiratory ventilation and gas exchange  Outcome: Progressing     Problem: Discharge Barriers/Planning  Goal: Patient's continuum of care needs are met  Outcome: Progressing

## 2021-06-21 NOTE — PROGRESS NOTES
Pharmacy TPN Day # 10       2021    Dosing Weight   66 kg TPN currently providing 100% of goal      TPN goal: 1825 kcal/day including 1.5 gm/kg/day Protein       TPN indication: Bowel Resection                                                              Pertinent PMH:  50 y.o. 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 Returned to OR - bowel too inflammed to mobilize for ostomy. Drain placed. NPO.  Unasyn, Diflucan per ID.  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  Patient was taken back to the OR , but the bowel was too inflamed to mobilize for ostomy, drain was placed  -Drain cultures from  also growing a Candida krusei.  Will change fluconazole to IV micafungin 100 mg every 24 hours, continue IV Unasyn 3 g every 6 hours     Temp (24hrs), Av.8 °C (98.3 °F), Min:36.7 °C (98 °F), Max:37.2 °C (98.9 °F)  .  Recent Labs     21  0800 21  0421   SODIUM 136 136   POTASSIUM 3.4* 3.8   CHLORIDE 103 104   CO2 21 23   BUN 11 11   CREATININE 0.40* 0.35*   GLUCOSE 108* 107*   CALCIUM 8.2* 8.2*   ASTSGOT 19 17   ALTSGPT 13 13   ALBUMIN 2.9* 2.8*   TBILIRUBIN <0.2 <0.2   PHOSPHORUS  --  3.0   MAGNESIUM  --  1.9     Accu-Checks  Recent Labs     21  1702 21  0436 21  2342   POCGLUCOSE 98 142* 116*       Vitals:    21 1659 21 2350 21 0500 21 0900   BP: 156/75 149/62 146/64 155/70   Weight:       Height:           Intake/Output Summary (Last 24 hours) at 2021 0912  Last data filed at 2021 0614  Gross per 24 hour   Intake 2570 ml   Output 1948 ml   Net 622 ml        Orders Placed This Encounter   Procedures   • Diet Order Diet: Full Liquid     Standing Status:   Standing     Number of Occurrences:   1     Order Specific Question:   Diet:     Answer:   Full Liquid [11]         TPN for past 72 hours (Show up to 3 orders; newest on the left. Changes between the two most recent orders are indicated.)     Start date and time   06/21/2021 2000 06/20/2021 2000 06/19/2021 2000      TPN Central Line Formulation [[id number]] TPN Central Line Formulation [[id number]] TPN Central Line Formulation [[id number]]    Order Status  Active Last Dose in Progress Completed    Last Admin   New Bag at 06/20/2021 2008 by Sharlene Gomes R.N. New Bag at 06/19/2021 2033 by Heather Ordaz R.N.       Base    Clinisol 15%  110 g 110 g 110 g    dextrose 70%  275 g 275 g 275 g    fat emulsions 20%  -- 45 g 45 g       Additives    potassium phosphate  15 mmol 15 mmol 15 mmol    potassium chloride  60 mEq 40 mEq 40 mEq    sodium acetate  150 mEq 150 mEq 150 mEq    sodium chloride  150 mEq 150 mEq 150 mEq    magnesium sulfate  8 mEq 8 mEq 8 mEq    calcium GLUConate  9.4 mEq 9.4 mEq 9.4 mEq    M.T.E.-4 Adult  1 mL 1 mL 1 mL    M.V.I. Adult  10 mL 10 mL 10 mL    famotidine  40 mg 40 mg 40 mg       QS Base    sterile water  681.08 mL 466.08 mL 466.08 mL       Energy Contribution    Proteins  -- -- --    Dextrose  -- -- --    Lipids  -- -- --    Total  -- -- --       Electrolyte Ion Calculated Amount    Sodium  300 mEq 300 mEq 300 mEq    Potassium  82 mEq 62 mEq 62 mEq    Calcium  9.4 mEq 9.4 mEq 9.4 mEq    Magnesium  8 mEq 8 mEq 8 mEq    Aluminum  -- -- --    Phosphate  15 mmol 15 mmol 15 mmol    Chloride  210 mEq 190 mEq 190 mEq    Acetate  243.13 mEq 243.13 mEq 243.13 mEq       Other    Total Protein  110 g 110 g 110 g    Total Protein/kg  1.5 g/kg 1.5 g/kg 1.5 g/kg    Total Amino Acid  -- -- --    Total Amino Acid/kg  -- -- --    Glucose Infusion Rate  2.6 mg/kg/min 2.6 mg/kg/min 2.6 mg/kg/min     Osmolarity (Estimated)  -- -- --    Volume  1,992 mL 1,992 mL 1,992 mL    Rate  83 mL/hr 83 mL/hr 83 mL/hr    Dosing Weight  73.5 kg 73.5 kg 73.5 kg    Infusion Site  [facility]            This formula provides:  % kcal as lipids = holding for triglycerides > 200  Grams protein/kg = 1.5  Non-protein calories = 1385  Kcals/kg = 24.8  Total daily calories = 1825 (1825-450 = 1375 kcal today to account for absence of lipids today).    Comments:  Will hold lipids today for elevated triglycerides elevated at 215. Serum K+ has been trending down the last few days, so will increase KCl to 60 mEq. Labs ordered for tomorrow's assessment including CMP, Mg, Phos, and Trygl. Per surgery, patient advanced FLD. Spoke with provider, will continue TPN today, possible d/c of TPN soon if patient tolerates diet. Patient has been accepted to a facility and per case mgmt, awaiting bed at this time. RERE.        Mitch Muhammad PharmD.

## 2021-06-21 NOTE — PROGRESS NOTES
Trauma / Surgical Daily Progress Note    Date of Service  6/21/2021    Chief Complaint  50 y.o. female admitted 5/22/2021 with cecal volvulus    Interval Events  5/22 Exploratory celiotomy and right hemicolectomy with a side-to-side   ileocolic anastomosis  5/28 Exploratory celiotomy, resection of ileocolic anastomosis with re-do  side-to-side ileocolic anastomosis  6/4 Ex celiotomy washout  6/11 Ex celiotomy, drain placement    POD #10 Afebrile. Normal WBC, feel smuch improved, mohinder CLD.    Review of Systems  Review of Systems   Gastrointestinal: Positive for abdominal pain.        Passing  Flatus and stool   Genitourinary: Negative.         Vital Signs for last 24 hours  Temp:  [36.7 °C (98.1 °F)-37.2 °C (98.9 °F)] 36.8 °C (98.2 °F)  Pulse:  [70-87] 87  Resp:  [17-18] 17  BP: (146-156)/(62-75) 146/64  SpO2:  [94 %-98 %] 98 %    Hemodynamic parameters for last 24 hours       Respiratory Data     Respiration: 17, Pulse Oximetry: 98 %     Work Of Breathing / Effort: Within Normal Limits  RUL Breath Sounds: Clear, RML Breath Sounds: Diminished, RLL Breath Sounds: Diminished, LUCITA Breath Sounds: Clear, LLL Breath Sounds: Diminished    Physical Exam  Physical Exam  Cardiovascular:      Rate and Rhythm: Normal rate.      Pulses: Normal pulses.   Pulmonary:      Effort: Pulmonary effort is normal.   Abdominal:      General: There is no distension.      Palpations: Abdomen is soft.      Tenderness: There is abdominal tenderness.      Comments: Wound vac in place.  Drain purulent  Drain serous   Genitourinary:     Comments: diuresing  Musculoskeletal:         General: Normal range of motion.      Cervical back: Neck supple.      Comments: Generalized edema   Skin:     General: Skin is warm and dry.   Neurological:      General: No focal deficit present.      Mental Status: She is alert.   Psychiatric:      Comments:           Laboratory  Recent Results (from the past 24 hour(s))   CBC WITH DIFFERENTIAL    Collection Time:  06/20/21  8:00 AM   Result Value Ref Range    WBC 10.2 4.8 - 10.8 K/uL    RBC 2.76 (L) 4.20 - 5.40 M/uL    Hemoglobin 7.9 (L) 12.0 - 16.0 g/dL    Hematocrit 25.1 (L) 37.0 - 47.0 %    MCV 90.9 81.4 - 97.8 fL    MCH 28.6 27.0 - 33.0 pg    MCHC 31.5 (L) 33.6 - 35.0 g/dL    RDW 55.7 (H) 35.9 - 50.0 fL    Platelet Count 267 164 - 446 K/uL    MPV 9.9 9.0 - 12.9 fL    Neutrophils-Polys 84.60 (H) 44.00 - 72.00 %    Lymphocytes 8.40 (L) 22.00 - 41.00 %    Monocytes 3.90 0.00 - 13.40 %    Eosinophils 1.40 0.00 - 6.90 %    Basophils 0.40 0.00 - 1.80 %    Immature Granulocytes 1.30 (H) 0.00 - 0.90 %    Nucleated RBC 0.20 /100 WBC    Neutrophils (Absolute) 8.62 (H) 2.00 - 7.15 K/uL    Lymphs (Absolute) 0.86 (L) 1.00 - 4.80 K/uL    Monos (Absolute) 0.40 0.00 - 0.85 K/uL    Eos (Absolute) 0.14 0.00 - 0.51 K/uL    Baso (Absolute) 0.04 0.00 - 0.12 K/uL    Immature Granulocytes (abs) 0.13 (H) 0.00 - 0.11 K/uL    NRBC (Absolute) 0.02 K/uL   Comp Metabolic Panel    Collection Time: 06/20/21  8:00 AM   Result Value Ref Range    Sodium 136 135 - 145 mmol/L    Potassium 3.4 (L) 3.6 - 5.5 mmol/L    Chloride 103 96 - 112 mmol/L    Co2 21 20 - 33 mmol/L    Anion Gap 12.0 7.0 - 16.0    Glucose 108 (H) 65 - 99 mg/dL    Bun 11 8 - 22 mg/dL    Creatinine 0.40 (L) 0.50 - 1.40 mg/dL    Calcium 8.2 (L) 8.4 - 10.2 mg/dL    AST(SGOT) 19 12 - 45 U/L    ALT(SGPT) 13 2 - 50 U/L    Alkaline Phosphatase 90 30 - 99 U/L    Total Bilirubin <0.2 0.1 - 1.5 mg/dL    Albumin 2.9 (L) 3.2 - 4.9 g/dL    Total Protein 6.3 6.0 - 8.2 g/dL    Globulin 3.4 1.9 - 3.5 g/dL    A-G Ratio 0.9 g/dL   ESTIMATED GFR    Collection Time: 06/20/21  8:00 AM   Result Value Ref Range    GFR If African American >60 >60 mL/min/1.73 m 2    GFR If Non African American >60 >60 mL/min/1.73 m 2   POCT glucose device results    Collection Time: 06/20/21 11:42 PM   Result Value Ref Range    Glucose - Accu-Ck 116 (H) 65 - 99 mg/dL   CBC WITH DIFFERENTIAL    Collection Time: 06/21/21  4:21 AM    Result Value Ref Range    WBC 10.6 4.8 - 10.8 K/uL    RBC 2.67 (L) 4.20 - 5.40 M/uL    Hemoglobin 7.5 (L) 12.0 - 16.0 g/dL    Hematocrit 24.0 (L) 37.0 - 47.0 %    MCV 89.9 81.4 - 97.8 fL    MCH 28.1 27.0 - 33.0 pg    MCHC 31.3 (L) 33.6 - 35.0 g/dL    RDW 55.5 (H) 35.9 - 50.0 fL    Platelet Count 293 164 - 446 K/uL    MPV 10.1 9.0 - 12.9 fL    Neutrophils-Polys 77.30 (H) 44.00 - 72.00 %    Lymphocytes 12.00 (L) 22.00 - 41.00 %    Monocytes 5.60 0.00 - 13.40 %    Eosinophils 3.60 0.00 - 6.90 %    Basophils 0.40 0.00 - 1.80 %    Immature Granulocytes 1.10 (H) 0.00 - 0.90 %    Nucleated RBC 0.00 /100 WBC    Neutrophils (Absolute) 8.17 (H) 2.00 - 7.15 K/uL    Lymphs (Absolute) 1.27 1.00 - 4.80 K/uL    Monos (Absolute) 0.59 0.00 - 0.85 K/uL    Eos (Absolute) 0.38 0.00 - 0.51 K/uL    Baso (Absolute) 0.04 0.00 - 0.12 K/uL    Immature Granulocytes (abs) 0.12 (H) 0.00 - 0.11 K/uL    NRBC (Absolute) 0.00 K/uL   Phosphorus: Every Monday and Thursday AM    Collection Time: 06/21/21  4:21 AM   Result Value Ref Range    Phosphorus 3.0 2.5 - 4.5 mg/dL   Magnesium: Every Monday and Thursday AM    Collection Time: 06/21/21  4:21 AM   Result Value Ref Range    Magnesium 1.9 1.5 - 2.5 mg/dL   Comp Metabolic Panel    Collection Time: 06/21/21  4:21 AM   Result Value Ref Range    Sodium 136 135 - 145 mmol/L    Potassium 3.8 3.6 - 5.5 mmol/L    Chloride 104 96 - 112 mmol/L    Co2 23 20 - 33 mmol/L    Anion Gap 9.0 7.0 - 16.0    Glucose 107 (H) 65 - 99 mg/dL    Bun 11 8 - 22 mg/dL    Creatinine 0.35 (L) 0.50 - 1.40 mg/dL    Calcium 8.2 (L) 8.4 - 10.2 mg/dL    AST(SGOT) 17 12 - 45 U/L    ALT(SGPT) 13 2 - 50 U/L    Alkaline Phosphatase 80 30 - 99 U/L    Total Bilirubin <0.2 0.1 - 1.5 mg/dL    Albumin 2.8 (L) 3.2 - 4.9 g/dL    Total Protein 6.2 6.0 - 8.2 g/dL    Globulin 3.4 1.9 - 3.5 g/dL    A-G Ratio 0.8 g/dL   Triglyceride    Collection Time: 06/21/21  4:21 AM   Result Value Ref Range    Triglycerides 215 (H) 0 - 149 mg/dL   ESTIMATED  GFR    Collection Time: 06/21/21  4:21 AM   Result Value Ref Range    GFR If African American >60 >60 mL/min/1.73 m 2    GFR If Non African American >60 >60 mL/min/1.73 m 2       Fluids    Intake/Output Summary (Last 24 hours) at 6/21/2021 0721  Last data filed at 6/21/2021 0614  Gross per 24 hour   Intake 2546.67 ml   Output 1948 ml   Net 598.67 ml       Core Measures & Quality Metrics  Labs reviewed and Medications reviewed  Young catheter: No Young      DVT Prophylaxis: Enoxaparin (Lovenox)  DVT prophylaxis - mechanical: SCDs  Ulcer prophylaxis: Yes  Antibiotics: Treating active infection/contamination beyond 24 hours perioperative coverage  Assessed for rehab: Patient returned to prior level of function, rehabilitation not indicated at this time    ALICIA Score  ETOH Screening    Assessment/Plan  * Bacterial peritonitis (HCC)  Assessment & Plan  6/2 Zosyn stopped.  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 Passing flatus - start clears  5/27 - Stooled - will adv to full liquids  5/27 - thrombocytopenia, bandemia, PM CT with ascites no freeair  5/28 - Exploratory celiotomy, resection of ileocolic anastomosis with re-do  side-to-side ileocolic anastomosis.  6/3  Stooling, advance to regular diet  6/4 Small wound dehiscence - returned to OR for washout and reclosure  6/9 Repeat CT showed two fluid collections - IR drains placed  6/11 BE shows leak - Ex lap, drain  6/16 - plan CT in AM  6/21 - doing well, minimal drain output, normal WBC, awaiting placement  Remains on unasyn, diflucan    DVT of axillary vein, acute right (HCC)  Assessment &  Plan  Noted on US  On heparin gtt  6/6  Switched to lovenox BID    Doing well, awaiting placement. Can DC right Midline, have bedside staff DC drain #2. Start Full liquid diet. outpt FU in 2 weeks.

## 2021-06-21 NOTE — CARE PLAN
The patient is Stable - Low risk of patient condition declining or worsening    Shift Goals  Clinical Goals: Patient will be able to walk to the bathroom with pain to tolerable pain level pain    Progress made toward(s) clinical / shift goals:  Patient did not complain of any pain today. She is comfortable throughout the shift.      Problem: Depression  Goal: Patient and family/caregiver will verbalize accurate information about at least two of the possible causes of depression, three-four of the signs and symptoms of depression  Outcome: Progressing

## 2021-06-22 NOTE — PROGRESS NOTES
Reviewed discharge instructions with patient. Patient show understanding of discharge instructions, all questions answered. Patient is picked up by [facility] on a gurney going to post acute medical facility. Patient is discharging with PICC line double lumen. Stopped TPN. Total volume given 36626 mL. There was 237 mL left in the bag. KRISTI drain and and drain bag is still with the patient. Patient meet criteria  for discharge. Discharge paper signed.

## 2021-06-22 NOTE — DISCHARGE INSTRUCTIONS
Discharge Instructions    Discharged to facility by medical transportation with escort. Discharged via ambulance, hospital escort: Yes.  Special equipment needed: wound vac    Be sure to schedule a follow-up appointment with your primary care doctor or any specialists as instructed.     Discharge Plan:        I understand that a diet low in cholesterol, fat, and sodium is recommended for good health. Unless I have been given specific instructions below for another diet, I accept this instruction as my diet prescription.   Other diet: full liquid diet    Special Instructions: None    · Is patient discharged on Warfarin / Coumadin?   No       Peritonitis    Peritonitis is inflammation of the tissue that lines the abdomen and covers the internal organs (peritoneum). Certain conditions or injuries can cause organs to leak stool, bacteria, fungi, blood, or chemicals, such as bile or other digestive fluids, into the abdomen. When these substances come into contact with the peritoneum, they may cause irritation or infection.  Peritonitis can be a life-threatening infection if not treated promptly. The infection can spread through the bloodstream and affect the whole body (sepsis).  What are the causes?  This condition may be caused by:  · Infection of:  ? The appendix (appendicitis).  ? The pancreas (pancreatitis).  ? Diverticula (diverticulitis). These are small pouches that form in the lining of the digestive tract.  ? The lungs (tuberculosis).  · Ulcers.  · Crohn's disease or ulcerative colitis.  · Cancer.  · Liver disease.  Other possible causes are:  · Injury, such as injury to:  ? The abdomen.  ? The stomach.  ? The esophagus.  · Other infections inside the abdomen or pelvis.  · Pregnancy outside the uterus (tubal pregnancy).  · A procedure that is used to cleanse the blood when the kidneys have stopped working correctly (peritoneal dialysis).  What are the signs or symptoms?  Symptoms of this condition  include:  · Severe pain in the abdomen.  · Hard-feeling abdomen.  · Swelling in the abdomen.  · Fever and chills.  · Nausea and vomiting.  · Poor appetite or no appetite.  · Being unable to pass gas or stool (constipation).  · Diarrhea.  · Passing urine less often.  How is this diagnosed?  This condition may be diagnosed based on the results of a physical exam and medical history. Your health care provider may also do:  · A test on fluid removed from the infected area (paracentesis).  · Lab tests such as blood, urine, or stool tests.  · Imaging tests, such as X-ray, ultrasound, CT scan.  How is this treated?  Treatment for this condition includes treating the symptoms and treating the underlying cause. Usually this condition is treated in the hospital. Treatment may include:  · Antibiotic medicines.  · Surgery to remove infected fluid and tissue.  · Surgery to treat the condition that caused peritonitis, such as removing the appendix to treat appendicitis (appendectomy).  Follow these instructions at home:  Medicines  · Take over-the-counter and prescription medicines only as told by your health care provider.  · If you were prescribed an antibiotic medicine, take it as told by your health care provider. Do not stop taking the antibiotic even if you start to feel better.  · If you were taking prescription medicines for other problems before you developed peritonitis, ask your health care provider when you should start taking these medicines again.  · If told by your health care provider, use a stool softener or laxative.  General instructions  · Do not use any products that contain nicotine or tobacco, such as cigarettes and e-cigarettes. If you need help quitting, ask your health care provider.  · Do not drink alcohol.  · Follow your health care provider's instructions for diet and activity.  · Drink enough fluid to keep your urine pale yellow.  · Rest as much as possible.  · Keep all follow-up visits as told by  your health care provider. This is important.  Contact a health care provider if:  · You develop a fever or chills.  · You are constipated.  · You have nausea, vomiting, or diarrhea.  · Your symptoms change or get worse.  Get help right away if you:  · Have new or worse pain in your abdomen.  · Have new problems with passing urine.  · Develop chest pains or shortness of breath.  · Become very confused or drowsy.  · Have jerky movements that you cannot control (seizures).  Summary  · Peritonitis is inflammation of the tissue that lines the abdomen and covers the internal organs (peritoneum).  · Symptoms include pain and swelling in the abdomen, fever and chills, nausea and vomiting, poor appetite, constipation, diarrhea, or passing urine less often.  · Treatment includes treating the symptoms that you have and treating the underlying cause with medicine or surgery.  This information is not intended to replace advice given to you by your health care provider. Make sure you discuss any questions you have with your health care provider.  Document Released: 11/30/2009 Document Revised: 02/22/2019 Document Reviewed: 01/22/2019  [employer] Patient Education © 2020 [employer]      Depression / Suicide Risk    As you are discharged from this [facility] facility, it is important to learn how to keep safe from harming yourself.    Recognize the warning signs:  · Abrupt changes in personality, positive or negative- including increase in energy   · Giving away possessions  · Change in eating patterns- significant weight changes-  positive or negative  · Change in sleeping patterns- unable to sleep or sleeping all the time   · Unwillingness or inability to communicate  · Depression  · Unusual sadness, discouragement and loneliness  · Talk of wanting to die  · Neglect of personal appearance   · Rebelliousness- reckless behavior  · Withdrawal from people/activities they love  · Confusion- inability to concentrate     If you or  a loved one observes any of these behaviors or has concerns about self-harm, here's what you can do:  · Talk about it- your feelings and reasons for harming yourself  · Remove any means that you might use to hurt yourself (examples: pills, rope, extension cords, firearm)  · Get professional help from the community (Mental Health, Substance Abuse, psychological counseling)  · Do not be alone:Call your Safe Contact- someone whom you trust who will be there for you.  · Call your local CRISIS HOTLINE 856-5632 or 760-903-1128  · Call your local Children's Mobile Crisis Response Team [place] (986) 801-3602 or www.Little1  · Call the toll free National Suicide Prevention Hotlines   · National Suicide Prevention Lifeline 416-824-RWLE (2430)  · National Hope Line Network 800-SUICIDE (342-7840)

## 2021-06-23 ENCOUNTER — TELEPHONE (OUTPATIENT)
Dept: INFECTIOUS DISEASES | Facility: MEDICAL CENTER | Age: 51
End: 2021-06-23

## 2021-06-24 LAB
BACTERIA BLD CULT: NORMAL
BACTERIA BLD CULT: NORMAL
SIGNIFICANT IND 70042: NORMAL
SIGNIFICANT IND 70042: NORMAL
SITE SITE: NORMAL
SITE SITE: NORMAL
SOURCE SOURCE: NORMAL
SOURCE SOURCE: NORMAL

## 2021-06-30 ENCOUNTER — DOCUMENTATION (OUTPATIENT)
Dept: INFECTIOUS DISEASES | Facility: MEDICAL CENTER | Age: 51
End: 2021-06-30

## 2021-06-30 NOTE — PROGRESS NOTES
Pt is currently at [facility]. [facility] did confirm that patient is now being treated by [facility].

## 2021-07-12 NOTE — PROGRESS NOTES
Trauma / Surgical Daily Progress Note    Date of Service  6/16/2021    Chief Complaint  50 y.o. female admitted 5/22/2021 with cecal volvulus    Interval Events  5/22 Exploratory celiotomy and right hemicolectomy with a side-to-side   ileocolic anastomosis  5/28 Exploratory celiotomy, resection of ileocolic anastomosis with re-do  side-to-side ileocolic anastomosis  6/4 Ex celiotomy washout  6/11 Ex celiotomy, drain placement    POD #5 Afebrile.  Tolerating clears and TPN. On Unasyn, micofungin per ID. Drains working.  Stooling.  Needs to mobilize. OOB more then in bed    Review of Systems  Review of Systems   Gastrointestinal: Positive for abdominal pain.        Passing some flatus        Vital Signs for last 24 hours  Temp:  [36.5 °C (97.7 °F)-36.7 °C (98.1 °F)] 36.5 °C (97.7 °F)  Pulse:  [77-90] 77  Resp:  [17] 17  BP: (131-142)/(67-73) 138/68  SpO2:  [96 %-100 %] 96 %    Hemodynamic parameters for last 24 hours       Respiratory Data     Respiration: 17, Pulse Oximetry: 96 %     Work Of Breathing / Effort: Within Normal Limits  RUL Breath Sounds: Clear, RML Breath Sounds: Clear, RLL Breath Sounds: Diminished, LUCITA Breath Sounds: Clear, LLL Breath Sounds: Diminished    Physical Exam  Physical Exam  Cardiovascular:      Rate and Rhythm: Normal rate.      Pulses: Normal pulses.   Pulmonary:      Effort: Pulmonary effort is normal.   Abdominal:      General: There is no distension.      Palpations: Abdomen is soft.      Tenderness: There is abdominal tenderness.      Comments: Wound vac in place.  Drains bilious   Genitourinary:     Comments: diuresing  Musculoskeletal:         General: Normal range of motion.      Cervical back: Neck supple.      Comments: Generalized edema   Skin:     General: Skin is warm and dry.   Neurological:      General: No focal deficit present.      Mental Status: She is alert.   Psychiatric:      Comments:           Laboratory  Recent Results (from the past 24 hour(s))   CBC WITH  DIFFERENTIAL    Collection Time: 06/16/21  3:59 AM   Result Value Ref Range    WBC 16.7 (H) 4.8 - 10.8 K/uL    RBC 2.87 (L) 4.20 - 5.40 M/uL    Hemoglobin 8.0 (L) 12.0 - 16.0 g/dL    Hematocrit 25.6 (L) 37.0 - 47.0 %    MCV 89.2 81.4 - 97.8 fL    MCH 27.9 27.0 - 33.0 pg    MCHC 31.3 (L) 33.6 - 35.0 g/dL    RDW 49.6 35.9 - 50.0 fL    Platelet Count 417 164 - 446 K/uL    MPV 9.1 9.0 - 12.9 fL    Neutrophils-Polys 69.00 44.00 - 72.00 %    Lymphocytes 15.00 (L) 22.00 - 41.00 %    Monocytes 4.00 0.00 - 13.40 %    Eosinophils 3.00 0.00 - 6.90 %    Basophils 0.00 0.00 - 1.80 %    Nucleated RBC 1.00 /100 WBC    Neutrophils (Absolute) 11.86 (H) 2.00 - 7.15 K/uL    Lymphs (Absolute) 2.51 1.00 - 4.80 K/uL    Monos (Absolute) 0.67 0.00 - 0.85 K/uL    Eos (Absolute) 0.50 0.00 - 0.51 K/uL    Baso (Absolute) 0.00 0.00 - 0.12 K/uL    NRBC (Absolute) 0.17 K/uL    Hypochromia 1+    Basic Metabolic Panel    Collection Time: 06/16/21  3:59 AM   Result Value Ref Range    Sodium 134 (L) 135 - 145 mmol/L    Potassium 4.0 3.6 - 5.5 mmol/L    Chloride 104 96 - 112 mmol/L    Co2 18 (L) 20 - 33 mmol/L    Glucose 149 (H) 65 - 99 mg/dL    Bun 11 8 - 22 mg/dL    Creatinine 0.35 (L) 0.50 - 1.40 mg/dL    Calcium 8.2 (L) 8.4 - 10.2 mg/dL    Anion Gap 12.0 7.0 - 16.0   ESTIMATED GFR    Collection Time: 06/16/21  3:59 AM   Result Value Ref Range    GFR If African American >60 >60 mL/min/1.73 m 2    GFR If Non African American >60 >60 mL/min/1.73 m 2   DIFFERENTIAL MANUAL    Collection Time: 06/16/21  3:59 AM   Result Value Ref Range    Bands-Stabs 2.00 0.00 - 10.00 %    Metamyelocytes 4.00 %    Myelocytes 3.00 %    Manual Diff Status PERFORMED    PLATELET ESTIMATE    Collection Time: 06/16/21  3:59 AM   Result Value Ref Range    Plt Estimation Normal    MORPHOLOGY    Collection Time: 06/16/21  3:59 AM   Result Value Ref Range    RBC Morphology Present     Polychromia 1+        Fluids    Intake/Output Summary (Last 24 hours) at 6/16/2021 0701  Last  5 data filed at 6/15/2021 1900  Gross per 24 hour   Intake --   Output 1040 ml   Net -1040 ml       Core Measures & Quality Metrics  Labs reviewed and Medications reviewed  Young catheter: No Young      DVT Prophylaxis: Enoxaparin (Lovenox)  DVT prophylaxis - mechanical: SCDs  Ulcer prophylaxis: Yes  Antibiotics: Treating active infection/contamination beyond 24 hours perioperative coverage  Assessed for rehab: Patient returned to prior level of function, rehabilitation not indicated at this time    ALICIA Score  ETOH Screening    Assessment/Plan  * Bacterial peritonitis (HCC)  Assessment & Plan  6/2 Zosyn stopped.  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 Passing flatus - start clears  5/27 - Stooled - will adv to full liquids  5/27 - thrombocytopenia, bandemia, PM CT with ascites no freeair  5/28 - Exploratory celiotomy, resection of ileocolic anastomosis with re-do  side-to-side ileocolic anastomosis.  6/3  Stooling, advance to regular diet  6/4 Small wound dehiscence - returned to OR for washout and reclosure  6/9 Repeat CT showed two fluid collections - IR drains placed  6/11 BE shows leak - Ex lap, drain  6/16 - plan CT in AM  Remains on unasyn, diflucan    DVT of axillary vein, acute right (HCC)  Assessment & Plan  Noted on US  On heparin gtt  6/6  Switched to lovenox BID       Discussed patient condition with RN and Patient.    CRITICAL CARE TIME EXCLUDING PROCEDURES: 20  minutes

## 2021-10-07 ENCOUNTER — APPOINTMENT (RX ONLY)
Dept: URBAN - METROPOLITAN AREA CLINIC 4 | Facility: CLINIC | Age: 51
Setting detail: DERMATOLOGY
End: 2021-10-07

## 2021-10-07 DIAGNOSIS — L82.1 OTHER SEBORRHEIC KERATOSIS: ICD-10-CM

## 2021-10-07 DIAGNOSIS — D18.0 HEMANGIOMA: ICD-10-CM

## 2021-10-07 DIAGNOSIS — Z86.007 PERSONAL HISTORY OF IN-SITU NEOPLASM OF SKIN: ICD-10-CM

## 2021-10-07 DIAGNOSIS — D22 MELANOCYTIC NEVI: ICD-10-CM

## 2021-10-07 DIAGNOSIS — L81.4 OTHER MELANIN HYPERPIGMENTATION: ICD-10-CM

## 2021-10-07 PROBLEM — D22.61 MELANOCYTIC NEVI OF RIGHT UPPER LIMB, INCLUDING SHOULDER: Status: ACTIVE | Noted: 2021-10-07

## 2021-10-07 PROBLEM — D22.72 MELANOCYTIC NEVI OF LEFT LOWER LIMB, INCLUDING HIP: Status: ACTIVE | Noted: 2021-10-07

## 2021-10-07 PROBLEM — D22.5 MELANOCYTIC NEVI OF TRUNK: Status: ACTIVE | Noted: 2021-10-07

## 2021-10-07 PROBLEM — D22.71 MELANOCYTIC NEVI OF RIGHT LOWER LIMB, INCLUDING HIP: Status: ACTIVE | Noted: 2021-10-07

## 2021-10-07 PROBLEM — D18.01 HEMANGIOMA OF SKIN AND SUBCUTANEOUS TISSUE: Status: ACTIVE | Noted: 2021-10-07

## 2021-10-07 PROBLEM — Z85.828 PERSONAL HISTORY OF OTHER MALIGNANT NEOPLASM OF SKIN: Status: ACTIVE | Noted: 2021-10-07

## 2021-10-07 PROBLEM — D22.62 MELANOCYTIC NEVI OF LEFT UPPER LIMB, INCLUDING SHOULDER: Status: ACTIVE | Noted: 2021-10-07

## 2021-10-07 PROCEDURE — 99213 OFFICE O/P EST LOW 20 MIN: CPT

## 2021-10-07 PROCEDURE — ? OBSERVATION

## 2021-10-07 PROCEDURE — ? COUNSELING

## 2021-10-07 ASSESSMENT — LOCATION SIMPLE DESCRIPTION DERM
LOCATION SIMPLE: LEFT ELBOW
LOCATION SIMPLE: RIGHT ZYGOMA
LOCATION SIMPLE: ABDOMEN
LOCATION SIMPLE: RIGHT POPLITEAL SKIN
LOCATION SIMPLE: LEFT POPLITEAL SKIN
LOCATION SIMPLE: RIGHT POSTERIOR UPPER ARM
LOCATION SIMPLE: RIGHT POSTERIOR THIGH
LOCATION SIMPLE: RIGHT FOREARM
LOCATION SIMPLE: LEFT PRETIBIAL REGION
LOCATION SIMPLE: LEFT POSTERIOR THIGH
LOCATION SIMPLE: RIGHT THIGH
LOCATION SIMPLE: CHEST
LOCATION SIMPLE: UPPER BACK
LOCATION SIMPLE: LEFT POSTERIOR UPPER ARM
LOCATION SIMPLE: RIGHT UPPER BACK
LOCATION SIMPLE: RIGHT EYEBROW
LOCATION SIMPLE: LEFT FOREARM
LOCATION SIMPLE: RIGHT PRETIBIAL REGION

## 2021-10-07 ASSESSMENT — LOCATION DETAILED DESCRIPTION DERM
LOCATION DETAILED: LOWER STERNUM
LOCATION DETAILED: RIGHT PROXIMAL PRETIBIAL REGION
LOCATION DETAILED: SUBXIPHOID
LOCATION DETAILED: LEFT VENTRAL LATERAL PROXIMAL FOREARM
LOCATION DETAILED: RIGHT PROXIMAL DORSAL FOREARM
LOCATION DETAILED: RIGHT LATERAL ZYGOMA
LOCATION DETAILED: LEFT PROXIMAL PRETIBIAL REGION
LOCATION DETAILED: RIGHT PROXIMAL POSTERIOR UPPER ARM
LOCATION DETAILED: RIGHT DISTAL POSTERIOR THIGH
LOCATION DETAILED: LEFT DISTAL POSTERIOR THIGH
LOCATION DETAILED: RIGHT LATERAL SUPERIOR CHEST
LOCATION DETAILED: RIGHT ANTERIOR DISTAL THIGH
LOCATION DETAILED: RIGHT CENTRAL EYEBROW
LOCATION DETAILED: RIGHT POPLITEAL SKIN
LOCATION DETAILED: RIGHT VENTRAL PROXIMAL FOREARM
LOCATION DETAILED: RIGHT SUPERIOR UPPER BACK
LOCATION DETAILED: RIGHT MEDIAL SUPERIOR CHEST
LOCATION DETAILED: LEFT DISTAL POSTERIOR UPPER ARM
LOCATION DETAILED: LEFT LATERAL ELBOW
LOCATION DETAILED: RIGHT DISTAL POSTERIOR UPPER ARM
LOCATION DETAILED: LEFT POPLITEAL SKIN
LOCATION DETAILED: LEFT DISTAL DORSAL FOREARM
LOCATION DETAILED: INFERIOR THORACIC SPINE
LOCATION DETAILED: MIDDLE STERNUM

## 2021-10-07 ASSESSMENT — LOCATION ZONE DERM
LOCATION ZONE: FACE
LOCATION ZONE: LEG
LOCATION ZONE: ARM
LOCATION ZONE: TRUNK

## 2021-10-11 ENCOUNTER — HOSPITAL ENCOUNTER (OUTPATIENT)
Dept: RADIOLOGY | Facility: MEDICAL CENTER | Age: 51
End: 2021-10-11
Attending: INTERNAL MEDICINE
Payer: COMMERCIAL

## 2021-10-11 DIAGNOSIS — Z12.31 SCREENING MAMMOGRAM, ENCOUNTER FOR: ICD-10-CM

## 2021-10-11 PROCEDURE — 77063 BREAST TOMOSYNTHESIS BI: CPT

## 2021-10-18 ENCOUNTER — HOSPITAL ENCOUNTER (OUTPATIENT)
Dept: RADIOLOGY | Facility: MEDICAL CENTER | Age: 51
End: 2021-10-18
Attending: INTERNAL MEDICINE
Payer: COMMERCIAL

## 2021-10-18 DIAGNOSIS — R92.8 ABNORMAL MAMMOGRAM: ICD-10-CM

## 2021-10-18 PROCEDURE — 77065 DX MAMMO INCL CAD UNI: CPT | Mod: RT

## 2021-10-22 ENCOUNTER — HOSPITAL ENCOUNTER (OUTPATIENT)
Dept: RADIOLOGY | Facility: MEDICAL CENTER | Age: 51
End: 2021-10-22
Attending: FAMILY MEDICINE
Payer: COMMERCIAL

## 2021-10-22 ENCOUNTER — HOSPITAL ENCOUNTER (OUTPATIENT)
Facility: MEDICAL CENTER | Age: 51
End: 2021-10-22
Attending: FAMILY MEDICINE
Payer: COMMERCIAL

## 2021-10-22 ENCOUNTER — OFFICE VISIT (OUTPATIENT)
Dept: URGENT CARE | Facility: CLINIC | Age: 51
End: 2021-10-22
Payer: COMMERCIAL

## 2021-10-22 VITALS
DIASTOLIC BLOOD PRESSURE: 66 MMHG | HEART RATE: 88 BPM | OXYGEN SATURATION: 98 % | TEMPERATURE: 97.9 F | SYSTOLIC BLOOD PRESSURE: 110 MMHG | RESPIRATION RATE: 16 BRPM

## 2021-10-22 DIAGNOSIS — N83.201 CYST OF RIGHT OVARY: ICD-10-CM

## 2021-10-22 DIAGNOSIS — N20.0 KIDNEY STONE: ICD-10-CM

## 2021-10-22 DIAGNOSIS — R10.9 ABDOMINAL PAIN, UNSPECIFIED ABDOMINAL LOCATION: ICD-10-CM

## 2021-10-22 DIAGNOSIS — R30.0 DYSURIA: ICD-10-CM

## 2021-10-22 LAB
APPEARANCE UR: NORMAL
BILIRUB UR STRIP-MCNC: NEGATIVE MG/DL
COLOR UR AUTO: NORMAL
GLUCOSE UR STRIP.AUTO-MCNC: NEGATIVE MG/DL
KETONES UR STRIP.AUTO-MCNC: NEGATIVE MG/DL
LEUKOCYTE ESTERASE UR QL STRIP.AUTO: NEGATIVE
NITRITE UR QL STRIP.AUTO: NEGATIVE
PH UR STRIP.AUTO: 5.5 [PH] (ref 5–8)
PROT UR QL STRIP: NEGATIVE MG/DL
RBC UR QL AUTO: NORMAL
SP GR UR STRIP.AUTO: 1.03
UROBILINOGEN UR STRIP-MCNC: 0.2 MG/DL

## 2021-10-22 PROCEDURE — 87186 SC STD MICRODIL/AGAR DIL: CPT

## 2021-10-22 PROCEDURE — 74176 CT ABD & PELVIS W/O CONTRAST: CPT

## 2021-10-22 PROCEDURE — 99214 OFFICE O/P EST MOD 30 MIN: CPT | Performed by: FAMILY MEDICINE

## 2021-10-22 PROCEDURE — 87077 CULTURE AEROBIC IDENTIFY: CPT

## 2021-10-22 PROCEDURE — 87086 URINE CULTURE/COLONY COUNT: CPT

## 2021-10-22 PROCEDURE — 81002 URINALYSIS NONAUTO W/O SCOPE: CPT | Performed by: FAMILY MEDICINE

## 2021-10-22 NOTE — PROGRESS NOTES
Subjective:         Chief Complaint   Patient presents with   • Urinary Pain     x1wk, urinary pain and frequency                 Dysuria   This is a new problem. The current episode started in the past 7 days. The problem occurs every urination. The problem has been unchanged. The quality of the pain is described as burning. There has been no fever.      She does have a history of pyelonephritis. Associated symptoms include frequency and urgency. Pertinent negatives include no chills, discharge, flank pain, nausea or vomiting. Pt has tried nothing for the symptoms. There is no history of recurrent UTIs.     Social History     Socioeconomic History   • Marital status: Single     Spouse name: Not on file   • Number of children: Not on file   • Years of education: Not on file   • Highest education level: Not on file   Occupational History   • Not on file   Tobacco Use   • Smoking status: Former Smoker     Years: 15.00     Quit date: 2005     Years since quittin.8   • Smokeless tobacco: Never Used   • Tobacco comment: 3-4 years ago   Vaping Use   • Vaping Use: Never used   Substance and Sexual Activity   • Alcohol use: No     Alcohol/week: 0.0 oz   • Drug use: Yes     Types: Inhaled, Marijuana     Comment: Marijuana daily   • Sexual activity: Yes     Partners: Male   Other Topics Concern   • Not on file   Social History Narrative    ** Merged History Encounter **          Social Determinants of Health     Financial Resource Strain:    • Difficulty of Paying Living Expenses:    Food Insecurity:    • Worried About Running Out of Food in the Last Year:    • Ran Out of Food in the Last Year:    Transportation Needs:    • Lack of Transportation (Medical):    • Lack of Transportation (Non-Medical):    Physical Activity:    • Days of Exercise per Week:    • Minutes of Exercise per Session:    Stress:    • Feeling of Stress :    Social Connections:    • Frequency of Communication with Friends and Family:    •  Frequency of Social Gatherings with Friends and Family:    • Attends Religion Services:    • Active Member of Clubs or Organizations:    • Attends Club or Organization Meetings:    • Marital Status:    Intimate Partner Violence:    • Fear of Current or Ex-Partner:    • Emotionally Abused:    • Physically Abused:    • Sexually Abused:          Family History   Problem Relation Age of Onset   • Hypertension Mother    • Diabetes Mother    • Cancer Maternal Grandmother         breast         No Known Allergies        Current Outpatient Medications on File Prior to Visit   Medication Sig Dispense Refill   • NS SOLN 100 mL with micafungin 100 MG SOLR 100 mg Infuse 100 mg into a venous catheter every 24 hours. (Patient not taking: Reported on 10/22/2021)     • NS SOLN 100 mL with ampicillin/sulbactam 3 (2-1) g SOLR 3 g Infuse 3 g into a venous catheter every 6 hours. (Patient not taking: Reported on 10/22/2021)     • DULoxetine (CYMBALTA) 60 MG Cap DR Particles delayed-release capsule Take 1 Cap by mouth every day. TAKE 1 CAP BY MOUTH EVERY DAY. (Patient not taking: Reported on 10/22/2021) 90 Cap 3   • topiramate (TOPAMAX) 100 MG Tab Take 1 Tab by mouth every day. (Patient not taking: Reported on 10/22/2021) 90 Tab 3     No current facility-administered medications on file prior to visit.       Review of Systems   Constitutional: Negative for chills.   Respiratory: Negative for shortness of breath.    Cardiovascular: Negative for chest pain.   Gastrointestinal: Negative for nausea, vomiting and abdominal pain.   Genitourinary: Positive for dysuria, urgency and frequency. Negative for flank pain.   Skin: Negative for rash.   Neurological: Negative for dizziness and headaches.   All other systems reviewed and are negative.         Objective:      /66 (BP Location: Left arm, Patient Position: Sitting, BP Cuff Size: Adult)   Pulse 88   Temp 36.6 °C (97.9 °F) (Temporal)   Resp 16   SpO2 98%       Physical Exam    Constitutional: pt is oriented to person, place, and time. Pt appears well-developed and well-nourished. No distress.   HENT:   Head: Normocephalic and atraumatic.   Mouth/Throat: Mucous membranes are normal.   Eyes: Conjunctivae and EOM are normal. Pupils are equal, round, and reactive to light. Right eye exhibits no discharge. Left eye exhibits no discharge. No scleral icterus.   Neck: Normal range of motion. Neck supple.   Cardiovascular: Normal rate, regular rhythm, normal heart sounds and intact distal pulses.    No murmur heard.  Pulmonary/Chest: Effort normal and breath sounds normal. No respiratory distress. Pt has no wheezes,  rales.   Abdominal: Bowel sounds are normal. Pt exhibits no distension and no mass. There is no tenderness. There is no rebound, no guarding and no CVA tenderness.   Neurological: pt is alert and oriented to person, place, and time.   Skin: Skin is warm and dry.   Psychiatric: behavior is normal.   Nursing note and vitals reviewed.           Lab Results   Component Value Date/Time    POCCOLOR dark yellow 10/22/2021 12:42 PM    POCAPPEAR turbid 10/22/2021 12:42 PM    POCLEUKEST negative 10/22/2021 12:42 PM    POCNITRITE negative 10/22/2021 12:42 PM    POCUROBILIGE 0.2 10/22/2021 12:42 PM    POCPROTEIN negative 10/22/2021 12:42 PM    POCURPH 5.5 10/22/2021 12:42 PM    POCBLOOD large 10/22/2021 12:42 PM    POCSPGRV 1.030 10/22/2021 12:42 PM    POCKETONES negative 10/22/2021 12:42 PM    POCBILIRUBIN negative 10/22/2021 12:42 PM    POCGLUCUA negative 10/22/2021 12:42 PM        CT-RENAL COLIC EVALUATION(A/P W/O)  Order: [id number]  Status:  Final result   Visible to patient:  No (scheduled for 10/23/2021  3:04 PM) Next appt:  None Dx:  Abdominal pain, unspecified abdominal...   0 Result Notes  Details    Reading Physician Reading Date Result Priority   Partha Naik M.D.  [phone] 10/22/2021 Stat-Call Report   Narrative & Impression     10/22/2021 3:45 PM     HISTORY/REASON FOR EXAM:   Flank pain, kidney stone suspected; hematuria.        TECHNIQUE/EXAM DESCRIPTION AND NUMBER OF VIEWS:  CT scan renal/colic without contrast.     5 mm helical images of the abdomen and pelvis were obtained from the diaphragmatic domes through the pubic symphysis.     Low dose optimization technique was utilized for this CT exam including automated exposure control and adjustment of the mA and/or kV according to patient size.     COMPARISON: 6/17/2021     FINDINGS:  No lung base consolidation or pleural effusion.  Small hiatal hernia.     There is a 5.4 mm stone proximal left ureter. It measures 902 Hounsfield units in density.  There is mild left pelvocaliectasis and dilatation proximal left ureter.     There is a punctate nonobstructing right renal stone. No right hydronephrosis and no right ureteral stones identified.     No focal urinary bladder wall thickening identified.     Uterus is enlarged and lobulated. There is a 2.5 x 3.1 cm complicated right ovarian cyst.     Small free fluid collections are located within the pelvis.     The liver and spleen are within normal limits in size.  No calcified gallstones identified.  No pancreatic or adrenal gland masses.     The proximal colon has been resected. There is an upper abdomen ileocolic anastomosis.     There are postoperative changes involving the midline ventral abdominal wall.     No loculated fluid collections are identified although evaluation is limited without intravenous contrast.     No evidence of bowel obstruction.     Normal caliber aorta.  No enlarged lymph nodes are identified.     Lumbar spine degenerative changes.        IMPRESSION:     1.  5.4 mm stone proximal left ureter mild left pelvocaliectasis.     2.  Punctate nonobstructing right renal stone. No right hydronephrosis.     3.  Small pelvic free fluid. No loculated fluid identified although evaluation limited without intravenous contrast.     4.  3.1 cm complicated right ovarian cyst. Large  lobulated uterus.     Findings were discussed with VINICIUS PAEZ on 10/22/2021 5:01 PM.        Exam Ended: 10/22/21  3:49 PM               Assessment/Plan:                1. Dysuria     UA significant for moderate blood.   She has hx kidney stones, therefore CT was ordered and personally reviewed.    There is a 5mm rt kidney stone.    Advised to strain urine.          Referred to urology for further treatment.        - URINE CULTURE(NEW); Future       2. Uterine fibroid, rt ovarian cyst - found incidentally on CT.    Advised pt to f/u with gyn.         My total time spent caring for the patient on the day of the encounter was 40  minutes.   This does not include time spent on separately billable procedures/tests.

## 2021-10-23 DIAGNOSIS — R30.0 DYSURIA: ICD-10-CM

## 2021-10-24 DIAGNOSIS — N30.00 ACUTE CYSTITIS WITHOUT HEMATURIA: ICD-10-CM

## 2021-10-24 RX ORDER — NITROFURANTOIN 25; 75 MG/1; MG/1
100 CAPSULE ORAL 2 TIMES DAILY
Qty: 10 CAPSULE | Refills: 0 | Status: SHIPPED | OUTPATIENT
Start: 2021-10-24 | End: 2021-10-29

## 2021-10-25 LAB
BACTERIA UR CULT: ABNORMAL
BACTERIA UR CULT: ABNORMAL
SIGNIFICANT IND 70042: ABNORMAL
SITE SITE: ABNORMAL
SOURCE SOURCE: ABNORMAL

## 2021-11-19 ENCOUNTER — ANESTHESIA (OUTPATIENT)
Dept: SURGERY | Facility: MEDICAL CENTER | Age: 51
DRG: 661 | End: 2021-11-19
Payer: COMMERCIAL

## 2021-11-19 ENCOUNTER — HOSPITAL ENCOUNTER (INPATIENT)
Facility: MEDICAL CENTER | Age: 51
LOS: 1 days | DRG: 661 | End: 2021-11-19
Attending: EMERGENCY MEDICINE | Admitting: INTERNAL MEDICINE
Payer: COMMERCIAL

## 2021-11-19 ENCOUNTER — APPOINTMENT (OUTPATIENT)
Dept: RADIOLOGY | Facility: MEDICAL CENTER | Age: 51
DRG: 661 | End: 2021-11-19
Attending: EMERGENCY MEDICINE
Payer: COMMERCIAL

## 2021-11-19 ENCOUNTER — ANESTHESIA EVENT (OUTPATIENT)
Dept: SURGERY | Facility: MEDICAL CENTER | Age: 51
DRG: 661 | End: 2021-11-19
Payer: COMMERCIAL

## 2021-11-19 ENCOUNTER — APPOINTMENT (OUTPATIENT)
Dept: RADIOLOGY | Facility: MEDICAL CENTER | Age: 51
DRG: 661 | End: 2021-11-19
Attending: UROLOGY
Payer: COMMERCIAL

## 2021-11-19 VITALS
BODY MASS INDEX: 21.22 KG/M2 | TEMPERATURE: 97.5 F | OXYGEN SATURATION: 100 % | RESPIRATION RATE: 16 BRPM | SYSTOLIC BLOOD PRESSURE: 128 MMHG | DIASTOLIC BLOOD PRESSURE: 77 MMHG | HEART RATE: 72 BPM | HEIGHT: 69 IN | WEIGHT: 143.3 LBS

## 2021-11-19 DIAGNOSIS — N23 RENAL COLIC: ICD-10-CM

## 2021-11-19 DIAGNOSIS — R10.12 LEFT UPPER QUADRANT ABDOMINAL PAIN: ICD-10-CM

## 2021-11-19 DIAGNOSIS — N83.202 CYST OF LEFT OVARY: ICD-10-CM

## 2021-11-19 PROBLEM — N20.0 LEFT RENAL STONE: Status: ACTIVE | Noted: 2021-11-19

## 2021-11-19 PROBLEM — R11.2 NAUSEA AND VOMITING: Status: ACTIVE | Noted: 2021-11-19

## 2021-11-19 LAB
ALBUMIN SERPL BCP-MCNC: 4 G/DL (ref 3.2–4.9)
ALBUMIN/GLOB SERPL: 1.3 G/DL
ALP SERPL-CCNC: 76 U/L (ref 30–99)
ALT SERPL-CCNC: 13 U/L (ref 2–50)
ANION GAP SERPL CALC-SCNC: 13 MMOL/L (ref 7–16)
APPEARANCE UR: CLEAR
AST SERPL-CCNC: 16 U/L (ref 12–45)
BASOPHILS # BLD AUTO: 0.7 % (ref 0–1.8)
BASOPHILS # BLD: 0.05 K/UL (ref 0–0.12)
BILIRUB SERPL-MCNC: 0.4 MG/DL (ref 0.1–1.5)
BILIRUB UR QL STRIP.AUTO: NEGATIVE
BUN SERPL-MCNC: 10 MG/DL (ref 8–22)
CALCIUM SERPL-MCNC: 9.2 MG/DL (ref 8.4–10.2)
CHLORIDE SERPL-SCNC: 104 MMOL/L (ref 96–112)
CO2 SERPL-SCNC: 21 MMOL/L (ref 20–33)
COLOR UR: YELLOW
CREAT SERPL-MCNC: 0.73 MG/DL (ref 0.5–1.4)
EOSINOPHIL # BLD AUTO: 0.11 K/UL (ref 0–0.51)
EOSINOPHIL NFR BLD: 1.5 % (ref 0–6.9)
ERYTHROCYTE [DISTWIDTH] IN BLOOD BY AUTOMATED COUNT: 58.3 FL (ref 35.9–50)
GLOBULIN SER CALC-MCNC: 3.1 G/DL (ref 1.9–3.5)
GLUCOSE SERPL-MCNC: 90 MG/DL (ref 65–99)
GLUCOSE UR STRIP.AUTO-MCNC: NEGATIVE MG/DL
HCG UR QL: NEGATIVE
HCT VFR BLD AUTO: 40.4 % (ref 37–47)
HGB BLD-MCNC: 12.9 G/DL (ref 12–16)
IMM GRANULOCYTES # BLD AUTO: 0.02 K/UL (ref 0–0.11)
IMM GRANULOCYTES NFR BLD AUTO: 0.3 % (ref 0–0.9)
KETONES UR STRIP.AUTO-MCNC: 15 MG/DL
LACTATE BLD-SCNC: 1.8 MMOL/L (ref 0.5–2)
LEUKOCYTE ESTERASE UR QL STRIP.AUTO: NEGATIVE
LIPASE SERPL-CCNC: 24 U/L (ref 7–58)
LYMPHOCYTES # BLD AUTO: 1.28 K/UL (ref 1–4.8)
LYMPHOCYTES NFR BLD: 17.4 % (ref 22–41)
MCH RBC QN AUTO: 26.5 PG (ref 27–33)
MCHC RBC AUTO-ENTMCNC: 31.9 G/DL (ref 33.6–35)
MCV RBC AUTO: 83.1 FL (ref 81.4–97.8)
MICRO URNS: ABNORMAL
MONOCYTES # BLD AUTO: 0.53 K/UL (ref 0–0.85)
MONOCYTES NFR BLD AUTO: 7.2 % (ref 0–13.4)
NEUTROPHILS # BLD AUTO: 5.38 K/UL (ref 2–7.15)
NEUTROPHILS NFR BLD: 72.9 % (ref 44–72)
NITRITE UR QL STRIP.AUTO: NEGATIVE
NRBC # BLD AUTO: 0 K/UL
NRBC BLD-RTO: 0 /100 WBC
PATHOLOGY CONSULT NOTE: NORMAL
PH UR STRIP.AUTO: 6.5 [PH] (ref 5–8)
PLATELET # BLD AUTO: 314 K/UL (ref 164–446)
PMV BLD AUTO: 9.8 FL (ref 9–12.9)
POTASSIUM SERPL-SCNC: 3.9 MMOL/L (ref 3.6–5.5)
PROT SERPL-MCNC: 7.1 G/DL (ref 6–8.2)
PROT UR QL STRIP: NEGATIVE MG/DL
RBC # BLD AUTO: 4.86 M/UL (ref 4.2–5.4)
RBC UR QL AUTO: NEGATIVE
SARS-COV+SARS-COV-2 AG RESP QL IA.RAPID: NOTDETECTED
SODIUM SERPL-SCNC: 138 MMOL/L (ref 135–145)
SP GR UR STRIP.AUTO: 1.02
SPECIMEN SOURCE: NORMAL
WBC # BLD AUTO: 7.4 K/UL (ref 4.8–10.8)

## 2021-11-19 PROCEDURE — 88300 SURGICAL PATH GROSS: CPT

## 2021-11-19 PROCEDURE — 700105 HCHG RX REV CODE 258: Performed by: UROLOGY

## 2021-11-19 PROCEDURE — 700117 HCHG RX CONTRAST REV CODE 255: Performed by: UROLOGY

## 2021-11-19 PROCEDURE — 700117 HCHG RX CONTRAST REV CODE 255: Performed by: EMERGENCY MEDICINE

## 2021-11-19 PROCEDURE — 81003 URINALYSIS AUTO W/O SCOPE: CPT

## 2021-11-19 PROCEDURE — 36415 COLL VENOUS BLD VENIPUNCTURE: CPT

## 2021-11-19 PROCEDURE — 99223 1ST HOSP IP/OBS HIGH 75: CPT | Performed by: INTERNAL MEDICINE

## 2021-11-19 PROCEDURE — 87426 SARSCOV CORONAVIRUS AG IA: CPT

## 2021-11-19 PROCEDURE — 83605 ASSAY OF LACTIC ACID: CPT

## 2021-11-19 PROCEDURE — 160028 HCHG SURGERY MINUTES - 1ST 30 MINS LEVEL 3: Performed by: UROLOGY

## 2021-11-19 PROCEDURE — 700105 HCHG RX REV CODE 258: Performed by: INTERNAL MEDICINE

## 2021-11-19 PROCEDURE — A9270 NON-COVERED ITEM OR SERVICE: HCPCS | Performed by: UROLOGY

## 2021-11-19 PROCEDURE — 306637 HCHG MISC ORTHO ITEM RC 0274

## 2021-11-19 PROCEDURE — 700111 HCHG RX REV CODE 636 W/ 250 OVERRIDE (IP): Performed by: ANESTHESIOLOGY

## 2021-11-19 PROCEDURE — 700102 HCHG RX REV CODE 250 W/ 637 OVERRIDE(OP): Performed by: ANESTHESIOLOGY

## 2021-11-19 PROCEDURE — 500879 HCHG PACK, CYSTO: Performed by: UROLOGY

## 2021-11-19 PROCEDURE — C2617 STENT, NON-COR, TEM W/O DEL: HCPCS | Performed by: UROLOGY

## 2021-11-19 PROCEDURE — 160002 HCHG RECOVERY MINUTES (STAT): Performed by: UROLOGY

## 2021-11-19 PROCEDURE — 501329 HCHG SET, CYSTO IRRIG Y TUR: Performed by: UROLOGY

## 2021-11-19 PROCEDURE — 700102 HCHG RX REV CODE 250 W/ 637 OVERRIDE(OP): Performed by: UROLOGY

## 2021-11-19 PROCEDURE — 81025 URINE PREGNANCY TEST: CPT

## 2021-11-19 PROCEDURE — 160035 HCHG PACU - 1ST 60 MINS PHASE I: Performed by: UROLOGY

## 2021-11-19 PROCEDURE — 82365 CALCULUS SPECTROSCOPY: CPT

## 2021-11-19 PROCEDURE — 74177 CT ABD & PELVIS W/CONTRAST: CPT

## 2021-11-19 PROCEDURE — 160048 HCHG OR STATISTICAL LEVEL 1-5: Performed by: UROLOGY

## 2021-11-19 PROCEDURE — 500062 HCHG BASKET: Performed by: UROLOGY

## 2021-11-19 PROCEDURE — 160009 HCHG ANES TIME/MIN: Performed by: UROLOGY

## 2021-11-19 PROCEDURE — 160036 HCHG PACU - EA ADDL 30 MINS PHASE I: Performed by: UROLOGY

## 2021-11-19 PROCEDURE — 83690 ASSAY OF LIPASE: CPT

## 2021-11-19 PROCEDURE — 99291 CRITICAL CARE FIRST HOUR: CPT

## 2021-11-19 PROCEDURE — 700101 HCHG RX REV CODE 250: Performed by: ANESTHESIOLOGY

## 2021-11-19 PROCEDURE — A9270 NON-COVERED ITEM OR SERVICE: HCPCS | Performed by: ANESTHESIOLOGY

## 2021-11-19 PROCEDURE — 160039 HCHG SURGERY MINUTES - EA ADDL 1 MIN LEVEL 3: Performed by: UROLOGY

## 2021-11-19 PROCEDURE — 85025 COMPLETE CBC W/AUTO DIFF WBC: CPT

## 2021-11-19 PROCEDURE — 700111 HCHG RX REV CODE 636 W/ 250 OVERRIDE (IP): Performed by: INTERNAL MEDICINE

## 2021-11-19 PROCEDURE — 76856 US EXAM PELVIC COMPLETE: CPT

## 2021-11-19 PROCEDURE — 0TC78ZZ EXTIRPATION OF MATTER FROM LEFT URETER, VIA NATURAL OR ARTIFICIAL OPENING ENDOSCOPIC: ICD-10-PCS | Performed by: UROLOGY

## 2021-11-19 PROCEDURE — C1769 GUIDE WIRE: HCPCS | Performed by: UROLOGY

## 2021-11-19 PROCEDURE — 0T778DZ DILATION OF LEFT URETER WITH INTRALUMINAL DEVICE, VIA NATURAL OR ARTIFICIAL OPENING ENDOSCOPIC: ICD-10-PCS | Performed by: UROLOGY

## 2021-11-19 PROCEDURE — 80053 COMPREHEN METABOLIC PANEL: CPT

## 2021-11-19 DEVICE — STENT UROLOGICAL POLARIS 6X24  ULTRA: Type: IMPLANTABLE DEVICE | Site: URETER | Status: FUNCTIONAL

## 2021-11-19 RX ORDER — SODIUM CHLORIDE 9 MG/ML
INJECTION, SOLUTION INTRAVENOUS CONTINUOUS
Status: DISCONTINUED | OUTPATIENT
Start: 2021-11-19 | End: 2021-11-19 | Stop reason: HOSPADM

## 2021-11-19 RX ORDER — HYDROMORPHONE HYDROCHLORIDE 1 MG/ML
0.5 INJECTION, SOLUTION INTRAMUSCULAR; INTRAVENOUS; SUBCUTANEOUS
Status: DISCONTINUED | OUTPATIENT
Start: 2021-11-19 | End: 2021-11-19 | Stop reason: HOSPADM

## 2021-11-19 RX ORDER — CLONIDINE HYDROCHLORIDE 0.1 MG/1
0.1 TABLET ORAL EVERY 6 HOURS PRN
Status: DISCONTINUED | OUTPATIENT
Start: 2021-11-19 | End: 2021-11-19 | Stop reason: HOSPADM

## 2021-11-19 RX ORDER — OXYCODONE HYDROCHLORIDE 5 MG/1
5 TABLET ORAL
Status: DISCONTINUED | OUTPATIENT
Start: 2021-11-19 | End: 2021-11-19 | Stop reason: HOSPADM

## 2021-11-19 RX ORDER — HYDROCODONE BITARTRATE AND ACETAMINOPHEN 10; 325 MG/1; MG/1
0.5 TABLET ORAL EVERY 6 HOURS PRN
COMMUNITY
Start: 2021-10-18

## 2021-11-19 RX ORDER — PROMETHAZINE HYDROCHLORIDE 25 MG/1
12.5-25 TABLET ORAL EVERY 4 HOURS PRN
Status: DISCONTINUED | OUTPATIENT
Start: 2021-11-19 | End: 2021-11-19 | Stop reason: HOSPADM

## 2021-11-19 RX ORDER — PHENAZOPYRIDINE HYDROCHLORIDE 200 MG/1
200 TABLET, FILM COATED ORAL
Status: CANCELLED | OUTPATIENT
Start: 2021-11-19

## 2021-11-19 RX ORDER — ONDANSETRON 2 MG/ML
4 INJECTION INTRAMUSCULAR; INTRAVENOUS EVERY 4 HOURS PRN
Status: DISCONTINUED | OUTPATIENT
Start: 2021-11-19 | End: 2021-11-19 | Stop reason: HOSPADM

## 2021-11-19 RX ORDER — DEXAMETHASONE SODIUM PHOSPHATE 4 MG/ML
INJECTION, SOLUTION INTRA-ARTICULAR; INTRALESIONAL; INTRAMUSCULAR; INTRAVENOUS; SOFT TISSUE PRN
Status: DISCONTINUED | OUTPATIENT
Start: 2021-11-19 | End: 2021-11-19 | Stop reason: SURG

## 2021-11-19 RX ORDER — ENALAPRILAT 1.25 MG/ML
1.25 INJECTION INTRAVENOUS EVERY 6 HOURS PRN
Status: DISCONTINUED | OUTPATIENT
Start: 2021-11-19 | End: 2021-11-19 | Stop reason: HOSPADM

## 2021-11-19 RX ORDER — HALOPERIDOL 5 MG/ML
1 INJECTION INTRAMUSCULAR
Status: DISCONTINUED | OUTPATIENT
Start: 2021-11-19 | End: 2021-11-19 | Stop reason: HOSPADM

## 2021-11-19 RX ORDER — ACETAMINOPHEN 325 MG/1
650 TABLET ORAL EVERY 6 HOURS PRN
Status: DISCONTINUED | OUTPATIENT
Start: 2021-11-19 | End: 2021-11-19 | Stop reason: HOSPADM

## 2021-11-19 RX ORDER — OXYCODONE HYDROCHLORIDE AND ACETAMINOPHEN 5; 325 MG/1; MG/1
1 TABLET ORAL
Status: COMPLETED | OUTPATIENT
Start: 2021-11-19 | End: 2021-11-19

## 2021-11-19 RX ORDER — PHENAZOPYRIDINE HYDROCHLORIDE 200 MG/1
200 TABLET, FILM COATED ORAL
Status: DISCONTINUED | OUTPATIENT
Start: 2021-11-19 | End: 2021-11-19 | Stop reason: HOSPADM

## 2021-11-19 RX ORDER — PROMETHAZINE HYDROCHLORIDE 25 MG/1
12.5-25 SUPPOSITORY RECTAL EVERY 4 HOURS PRN
Status: DISCONTINUED | OUTPATIENT
Start: 2021-11-19 | End: 2021-11-19 | Stop reason: HOSPADM

## 2021-11-19 RX ORDER — AMOXICILLIN 250 MG
2 CAPSULE ORAL 2 TIMES DAILY
Status: DISCONTINUED | OUTPATIENT
Start: 2021-11-19 | End: 2021-11-19 | Stop reason: HOSPADM

## 2021-11-19 RX ORDER — HYDROMORPHONE HYDROCHLORIDE 1 MG/ML
0.4 INJECTION, SOLUTION INTRAMUSCULAR; INTRAVENOUS; SUBCUTANEOUS
Status: DISCONTINUED | OUTPATIENT
Start: 2021-11-19 | End: 2021-11-19 | Stop reason: HOSPADM

## 2021-11-19 RX ORDER — DIPHENHYDRAMINE HYDROCHLORIDE 50 MG/ML
12.5 INJECTION INTRAMUSCULAR; INTRAVENOUS
Status: DISCONTINUED | OUTPATIENT
Start: 2021-11-19 | End: 2021-11-19 | Stop reason: HOSPADM

## 2021-11-19 RX ORDER — BISACODYL 10 MG
10 SUPPOSITORY, RECTAL RECTAL
Status: DISCONTINUED | OUTPATIENT
Start: 2021-11-19 | End: 2021-11-19 | Stop reason: HOSPADM

## 2021-11-19 RX ORDER — PROCHLORPERAZINE EDISYLATE 5 MG/ML
5-10 INJECTION INTRAMUSCULAR; INTRAVENOUS EVERY 4 HOURS PRN
Status: DISCONTINUED | OUTPATIENT
Start: 2021-11-19 | End: 2021-11-19 | Stop reason: HOSPADM

## 2021-11-19 RX ORDER — SODIUM CHLORIDE, SODIUM LACTATE, POTASSIUM CHLORIDE, CALCIUM CHLORIDE 600; 310; 30; 20 MG/100ML; MG/100ML; MG/100ML; MG/100ML
INJECTION, SOLUTION INTRAVENOUS CONTINUOUS
Status: ACTIVE | OUTPATIENT
Start: 2021-11-19 | End: 2021-11-19

## 2021-11-19 RX ORDER — ONDANSETRON 2 MG/ML
INJECTION INTRAMUSCULAR; INTRAVENOUS PRN
Status: DISCONTINUED | OUTPATIENT
Start: 2021-11-19 | End: 2021-11-19 | Stop reason: SURG

## 2021-11-19 RX ORDER — ATROPA BELLADONNA AND OPIUM 16.2; 6 MG/1; MG/1
60 SUPPOSITORY RECTAL
Status: COMPLETED | OUTPATIENT
Start: 2021-11-19 | End: 2021-11-19

## 2021-11-19 RX ORDER — ONDANSETRON 4 MG/1
4 TABLET, ORALLY DISINTEGRATING ORAL EVERY 4 HOURS PRN
Status: DISCONTINUED | OUTPATIENT
Start: 2021-11-19 | End: 2021-11-19 | Stop reason: HOSPADM

## 2021-11-19 RX ORDER — LIDOCAINE HYDROCHLORIDE 20 MG/ML
INJECTION, SOLUTION EPIDURAL; INFILTRATION; INTRACAUDAL; PERINEURAL PRN
Status: DISCONTINUED | OUTPATIENT
Start: 2021-11-19 | End: 2021-11-19 | Stop reason: SURG

## 2021-11-19 RX ORDER — OXYBUTYNIN CHLORIDE 10 MG/1
10 TABLET, EXTENDED RELEASE ORAL
Qty: 10 TABLET | Refills: 1 | OUTPATIENT
Start: 2021-11-19

## 2021-11-19 RX ORDER — CEFAZOLIN SODIUM 1 G/3ML
INJECTION, POWDER, FOR SOLUTION INTRAMUSCULAR; INTRAVENOUS PRN
Status: DISCONTINUED | OUTPATIENT
Start: 2021-11-19 | End: 2021-11-19 | Stop reason: SURG

## 2021-11-19 RX ORDER — LORAZEPAM 2 MG/ML
0.5 INJECTION INTRAMUSCULAR
Status: DISCONTINUED | OUTPATIENT
Start: 2021-11-19 | End: 2021-11-19 | Stop reason: HOSPADM

## 2021-11-19 RX ORDER — PHENAZOPYRIDINE HYDROCHLORIDE 200 MG/1
200 TABLET, FILM COATED ORAL
Status: DISCONTINUED | OUTPATIENT
Start: 2021-11-20 | End: 2021-11-19 | Stop reason: CLARIF

## 2021-11-19 RX ORDER — HYDROMORPHONE HYDROCHLORIDE 1 MG/ML
0.1 INJECTION, SOLUTION INTRAMUSCULAR; INTRAVENOUS; SUBCUTANEOUS
Status: DISCONTINUED | OUTPATIENT
Start: 2021-11-19 | End: 2021-11-19 | Stop reason: HOSPADM

## 2021-11-19 RX ORDER — OXYCODONE HYDROCHLORIDE 10 MG/1
10 TABLET ORAL
Status: DISCONTINUED | OUTPATIENT
Start: 2021-11-19 | End: 2021-11-19 | Stop reason: HOSPADM

## 2021-11-19 RX ORDER — HYDROMORPHONE HYDROCHLORIDE 1 MG/ML
0.2 INJECTION, SOLUTION INTRAMUSCULAR; INTRAVENOUS; SUBCUTANEOUS
Status: DISCONTINUED | OUTPATIENT
Start: 2021-11-19 | End: 2021-11-19 | Stop reason: HOSPADM

## 2021-11-19 RX ORDER — ONDANSETRON 2 MG/ML
4 INJECTION INTRAMUSCULAR; INTRAVENOUS
Status: DISCONTINUED | OUTPATIENT
Start: 2021-11-19 | End: 2021-11-19 | Stop reason: HOSPADM

## 2021-11-19 RX ORDER — POLYETHYLENE GLYCOL 3350 17 G/17G
1 POWDER, FOR SOLUTION ORAL
Status: DISCONTINUED | OUTPATIENT
Start: 2021-11-19 | End: 2021-11-19 | Stop reason: HOSPADM

## 2021-11-19 RX ORDER — LABETALOL HYDROCHLORIDE 5 MG/ML
10 INJECTION, SOLUTION INTRAVENOUS EVERY 4 HOURS PRN
Status: DISCONTINUED | OUTPATIENT
Start: 2021-11-19 | End: 2021-11-19 | Stop reason: HOSPADM

## 2021-11-19 RX ORDER — MEPERIDINE HYDROCHLORIDE 25 MG/ML
6.25 INJECTION INTRAMUSCULAR; INTRAVENOUS; SUBCUTANEOUS
Status: DISCONTINUED | OUTPATIENT
Start: 2021-11-19 | End: 2021-11-19 | Stop reason: HOSPADM

## 2021-11-19 RX ORDER — OXYCODONE HYDROCHLORIDE AND ACETAMINOPHEN 5; 325 MG/1; MG/1
2 TABLET ORAL
Status: COMPLETED | OUTPATIENT
Start: 2021-11-19 | End: 2021-11-19

## 2021-11-19 RX ORDER — TAMSULOSIN HYDROCHLORIDE 0.4 MG/1
0.4 CAPSULE ORAL DAILY
Qty: 10 CAPSULE | Refills: 0 | OUTPATIENT
Start: 2021-11-19

## 2021-11-19 RX ADMIN — DEXAMETHASONE SODIUM PHOSPHATE 4 MG: 4 INJECTION, SOLUTION INTRAMUSCULAR; INTRAVENOUS at 18:23

## 2021-11-19 RX ADMIN — ATROPA BELLADONNA AND OPIUM 60 MG: 16.2; 6 SUPPOSITORY RECTAL at 19:28

## 2021-11-19 RX ADMIN — ONDANSETRON 4 MG: 2 INJECTION INTRAMUSCULAR; INTRAVENOUS at 17:11

## 2021-11-19 RX ADMIN — SODIUM CHLORIDE, POTASSIUM CHLORIDE, SODIUM LACTATE AND CALCIUM CHLORIDE: 600; 310; 30; 20 INJECTION, SOLUTION INTRAVENOUS at 16:07

## 2021-11-19 RX ADMIN — FENTANYL CITRATE 50 MCG: 50 INJECTION, SOLUTION INTRAMUSCULAR; INTRAVENOUS at 19:58

## 2021-11-19 RX ADMIN — FENTANYL CITRATE 50 MCG: 50 INJECTION, SOLUTION INTRAMUSCULAR; INTRAVENOUS at 18:19

## 2021-11-19 RX ADMIN — PROPOFOL 180 MG: 10 INJECTION, EMULSION INTRAVENOUS at 18:23

## 2021-11-19 RX ADMIN — LIDOCAINE HYDROCHLORIDE 100 MG: 20 INJECTION, SOLUTION EPIDURAL; INFILTRATION; INTRACAUDAL; PERINEURAL at 18:23

## 2021-11-19 RX ADMIN — HYDROMORPHONE HYDROCHLORIDE 0.4 MG: 1 INJECTION, SOLUTION INTRAMUSCULAR; INTRAVENOUS; SUBCUTANEOUS at 20:23

## 2021-11-19 RX ADMIN — OXYCODONE AND ACETAMINOPHEN 2 TABLET: 5; 325 TABLET ORAL at 19:56

## 2021-11-19 RX ADMIN — FENTANYL CITRATE 50 MCG: 50 INJECTION, SOLUTION INTRAMUSCULAR; INTRAVENOUS at 19:42

## 2021-11-19 RX ADMIN — FENTANYL CITRATE 25 MCG: 50 INJECTION, SOLUTION INTRAMUSCULAR; INTRAVENOUS at 18:37

## 2021-11-19 RX ADMIN — IOHEXOL 100 ML: 350 INJECTION, SOLUTION INTRAVENOUS at 09:59

## 2021-11-19 RX ADMIN — SODIUM CHLORIDE: 9 INJECTION, SOLUTION INTRAVENOUS at 13:33

## 2021-11-19 RX ADMIN — PHENAZOPYRIDINE HYDROCHLORIDE 200 MG: 200 TABLET ORAL at 19:29

## 2021-11-19 RX ADMIN — FENTANYL CITRATE 25 MCG: 50 INJECTION, SOLUTION INTRAMUSCULAR; INTRAVENOUS at 18:35

## 2021-11-19 RX ADMIN — PROPOFOL 50 MG: 10 INJECTION, EMULSION INTRAVENOUS at 18:40

## 2021-11-19 RX ADMIN — ONDANSETRON 4 MG: 2 INJECTION INTRAMUSCULAR; INTRAVENOUS at 18:58

## 2021-11-19 RX ADMIN — CEFAZOLIN 2 G: 330 INJECTION, POWDER, FOR SOLUTION INTRAMUSCULAR; INTRAVENOUS at 18:23

## 2021-11-19 ASSESSMENT — ENCOUNTER SYMPTOMS
FLANK PAIN: 1
DEPRESSION: 0
CARDIOVASCULAR NEGATIVE: 1
INSOMNIA: 0
FEVER: 0
SPEECH CHANGE: 0
BLURRED VISION: 0
COUGH: 0
ABDOMINAL PAIN: 1
NAUSEA: 1
NERVOUS/ANXIOUS: 0
SENSORY CHANGE: 0
PSYCHIATRIC NEGATIVE: 1
VOMITING: 1
CLAUDICATION: 0
RESPIRATORY NEGATIVE: 1
PHOTOPHOBIA: 0
DIARRHEA: 0
DIZZINESS: 0
EYES NEGATIVE: 1
CHILLS: 0
CONSTIPATION: 0
NEUROLOGICAL NEGATIVE: 1
HEARTBURN: 0
GASTROINTESTINAL NEGATIVE: 1
SHORTNESS OF BREATH: 0
HEADACHES: 0
MYALGIAS: 0
WEAKNESS: 0
CONSTITUTIONAL NEGATIVE: 1
SORE THROAT: 0

## 2021-11-19 ASSESSMENT — PAIN SCALES - GENERAL: PAIN_LEVEL: 0

## 2021-11-19 ASSESSMENT — FIBROSIS 4 INDEX: FIB4 SCORE: 0.82

## 2021-11-19 NOTE — ED NOTES
0902:  PIV placed in LAC, blood drawn and sent to lab.  Urine collected and sent to lab.  Pt updated on POC including pending tests and chart review by ERP.

## 2021-11-19 NOTE — H&P
Hospital Medicine History & Physical Note    Date of Service  11/19/2021    Primary Care Physician  Brandan Prieto M.D.    Consultants  urology    Specialist Names: Shahram    Code Status  Full Code    Chief Complaint  Chief Complaint   Patient presents with   • Nausea     2 days non stop, but not feeling well for a week, surgery on her intestines May and June.       History of Presenting Illness  Mari Dillon is a 51 y.o. female who presented 11/19/2021 with persistent nausea and mild LLQ pain since diagnosis of 6mm left proximal ureter stone diagnosed 1 week prior.  She initially had UTI with e coli and was prescribed augmentin which she has completed but continues to feel unwell so presented back to the ED.  Urology was contacted, Dr Omalley who recommends taking the patient to the OR today - is on the schedule for 1700.  Current UA is not consistent with acute infection thus antibiotics not initiated at this time.  Patient is NPO for surgery later today.    I discussed the plan of care with patient.    Review of Systems  Review of Systems   Constitutional: Positive for malaise/fatigue. Negative for chills and fever.   HENT: Negative for congestion and sore throat.    Eyes: Negative for blurred vision and photophobia.   Respiratory: Negative for cough and shortness of breath.    Cardiovascular: Negative for chest pain, claudication and leg swelling.   Gastrointestinal: Positive for abdominal pain (mild LLQ pain), nausea and vomiting. Negative for constipation, diarrhea and heartburn.   Genitourinary: Negative for dysuria and hematuria.   Musculoskeletal: Negative for joint pain and myalgias.   Skin: Negative for itching and rash.   Neurological: Negative for dizziness, sensory change, speech change, weakness and headaches.   Psychiatric/Behavioral: Negative for depression. The patient is not nervous/anxious and does not have insomnia.        Past Medical History   has a past medical history of Anxiety  (2009).    Surgical History   has a past surgical history that includes other (); appendectomy (); pr  delivery only (); shoulder arthroscopy w/ bicipital tenodesis repair (Right, 6/15/2015); clavicle distal excision (Right, 6/15/2015); shoulder decompression (Right, 6/15/2015); shoulder decompression arthroscopic (Right, 2018); clavicle distal excision (Left, 2018); shoulder arthroscopy w/ bicipital tenodesis repair (Left, 2018); shoulder manipulation (Left, 2018); pr exploratory of abdomen (2021); hemicolectomy, right (2021); pr exploratory of abdomen (N/A, 2021); pr exploratory of abdomen (N/A, 2021); pr exploratory of abdomen (2021); and ileostomy (2021).     Family History  family history includes Cancer in her maternal grandmother; Diabetes in her mother; Hypertension in her mother.   Family history reviewed with patient. There is no family history that is pertinent to the chief complaint.     Social History   reports that she quit smoking about 16 years ago. She quit after 15.00 years of use. She has never used smokeless tobacco. She reports current drug use. Drugs: Inhaled and Marijuana. She reports that she does not drink alcohol.    Allergies  No Known Allergies    Medications  Prior to Admission Medications   Prescriptions Last Dose Informant Patient Reported? Taking?   HYDROcodone/acetaminophen (NORCO)  MG Tab 2021 at Unknown time  Yes No   Sig: Take 0.5 Tablets by mouth one time.      Facility-Administered Medications: None       Physical Exam  Temp:  [36.2 °C (97.2 °F)] 36.2 °C (97.2 °F)  Pulse:  [] 75  Resp:  [16] 16  BP: (124-140)/(63-91) 124/72  SpO2:  [97 %-100 %] 97 %  Blood Pressure: 124/72   Temperature: 36.2 °C (97.2 °F)   Pulse: 75   Respiration: 16   Pulse Oximetry: 97 %       Physical Exam  Vitals and nursing note reviewed.   Constitutional:       General: She is not in acute distress.      Appearance: Normal appearance. She is not ill-appearing.   HENT:      Head: Normocephalic and atraumatic.      Nose: Nose normal.   Eyes:      General: No scleral icterus.  Cardiovascular:      Rate and Rhythm: Normal rate and regular rhythm.      Heart sounds: Normal heart sounds. No murmur heard.      Pulmonary:      Effort: Pulmonary effort is normal.      Breath sounds: Normal breath sounds.   Abdominal:      General: Bowel sounds are normal. There is no distension.      Palpations: Abdomen is soft.      Tenderness: There is abdominal tenderness (LLQ).   Musculoskeletal:         General: No swelling or tenderness.      Cervical back: Neck supple.   Skin:     General: Skin is warm and dry.   Neurological:      General: No focal deficit present.      Mental Status: She is alert and oriented to person, place, and time.   Psychiatric:         Mood and Affect: Mood normal.         Laboratory:  Recent Labs     11/19/21  0902   WBC 7.4   RBC 4.86   HEMOGLOBIN 12.9   HEMATOCRIT 40.4   MCV 83.1   MCH 26.5*   MCHC 31.9*   RDW 58.3*   PLATELETCT 314   MPV 9.8     Recent Labs     11/19/21  0902   SODIUM 138   POTASSIUM 3.9   CHLORIDE 104   CO2 21   GLUCOSE 90   BUN 10   CREATININE 0.73   CALCIUM 9.2     Recent Labs     11/19/21  0902   ALTSGPT 13   ASTSGOT 16   ALKPHOSPHAT 76   TBILIRUBIN 0.4   LIPASE 24   GLUCOSE 90         No results for input(s): NTPROBNP in the last 72 hours.      No results for input(s): TROPONINT in the last 72 hours.    Imaging:  CT-ABDOMEN-PELVIS WITH   Final Result      1.  No evidence of bowel obstruction or focal inflammatory change.      2.  Complex left ovarian cyst which measures 5 x 2 cm in size.      3.  Again seen 6 mm left proximal ureteral stone with minimal dilatation of the left ureter proximal to that area.      4.  Fatty liver.      US-PELVIC COMPLETE (TRANSABDOMINAL/TRANSVAGINAL) (COMBO)    (Results Pending)       CT shows persistent 6mm proximal stone    Assessment/Plan:  I  anticipate this patient is appropriate for observation status at this time.    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

## 2021-11-19 NOTE — ASSESSMENT & PLAN NOTE
Urology consulted, Dr Omalley  Plan for L CULTS today - on OR schedule at 1700  No evidence of UTI at this time, no empiric antibiotics initiated (completed outpatient augmenting as prescribed by urology)

## 2021-11-19 NOTE — ED NOTES
Pt resting quietly at this time, aware waiting for chart review by ERP.  Provided w/ new warm blankets.

## 2021-11-19 NOTE — ED PROVIDER NOTES
ED Provider Note    CHIEF COMPLAINT  Chief Complaint   Patient presents with   • Nausea     2 days non stop, but not feeling well for a week, surgery on her intestines May and .       HPI  Mari Dillon is a 51 y.o. female who presents to the emergency department complaining of abdominal pain, nausea and not feeling well.  The patient was hospitalized last May for cecal volvulus with resultant surgery.  She has been doing fairly well.  For the last several weeks she has been having intermittent pass a left ureteral stone.  This is been managed by the urologist and outpatient.  She now comes in complaining of nausea and tightness sensation across her abdomen for the last several days and now pain in the left upper and left lower quadrant.  She is not vomited.  She had some soft stools.  Denies any dysuria hematuria urinary frequency.  Denies any fevers or chills.  Denies any chest pain cough or shortness of breath.  Denies any other aggravating or alleviating factors.          REVIEW OF SYSTEMS  See HPI for further details. All other systems are negative.    PAST MEDICAL HISTORY  Past Medical History:   Diagnosis Date   • Anxiety 2009       FAMILY HISTORY  Family History   Problem Relation Age of Onset   • Hypertension Mother    • Diabetes Mother    • Cancer Maternal Grandmother         breast       SOCIAL HISTORY  Social History     Socioeconomic History   • Marital status: Single     Spouse name: Not on file   • Number of children: Not on file   • Years of education: Not on file   • Highest education level: Not on file   Occupational History   • Not on file   Tobacco Use   • Smoking status: Former Smoker     Years: 15.00     Quit date: 2005     Years since quittin.8   • Smokeless tobacco: Never Used   • Tobacco comment: 3-4 years ago   Vaping Use   • Vaping Use: Never used   Substance and Sexual Activity   • Alcohol use: No     Alcohol/week: 0.0 oz   • Drug use: Yes     Types: Inhaled,  Marijuana     Comment: Marijuana daily   • Sexual activity: Yes     Partners: Male   Other Topics Concern   • Not on file   Social History Narrative    ** Merged History Encounter **          Social Determinants of Health     Financial Resource Strain:    • Difficulty of Paying Living Expenses: Not on file   Food Insecurity:    • Worried About Running Out of Food in the Last Year: Not on file   • Ran Out of Food in the Last Year: Not on file   Transportation Needs:    • Lack of Transportation (Medical): Not on file   • Lack of Transportation (Non-Medical): Not on file   Physical Activity:    • Days of Exercise per Week: Not on file   • Minutes of Exercise per Session: Not on file   Stress:    • Feeling of Stress : Not on file   Social Connections:    • Frequency of Communication with Friends and Family: Not on file   • Frequency of Social Gatherings with Friends and Family: Not on file   • Attends Yarsani Services: Not on file   • Active Member of Clubs or Organizations: Not on file   • Attends Club or Organization Meetings: Not on file   • Marital Status: Not on file   Intimate Partner Violence:    • Fear of Current or Ex-Partner: Not on file   • Emotionally Abused: Not on file   • Physically Abused: Not on file   • Sexually Abused: Not on file   Housing Stability:    • Unable to Pay for Housing in the Last Year: Not on file   • Number of Places Lived in the Last Year: Not on file   • Unstable Housing in the Last Year: Not on file       SURGICAL HISTORY  Past Surgical History:   Procedure Laterality Date   • PB EXPLORATORY OF ABDOMEN  6/11/2021    Procedure: LAPAROTOMY, EXPLORATORY;  Surgeon: Maryam Wood M.D.;  Location: SURGERY [facility];  Service: General   • ILEOSTOMY  6/11/2021    Procedure: WOUND VAC PLACEMENT ;  Surgeon: Maryam Wood M.D.;  Location: SURGERY [facility];  Service: General   • PB EXPLORATORY OF ABDOMEN N/A 6/4/2021    Procedure: LAPAROTOMY, EXPLORATORY, WITH WASH OUT;  Surgeon:  Maryam Wood M.D.;  Location: [facility];  Service: General   • PB EXPLORATORY OF ABDOMEN N/A 5/28/2021    Procedure: LAPAROTOMY, EXPLORATORY- RESECTION OF ILEOCECAL ANASTATMOSIS.;  Surgeon: Maryam Wood M.D.;  Location: [facility];  Service: General   • PB EXPLORATORY OF ABDOMEN  5/22/2021    Procedure: LAPAROTOMY, EXPLORATORY;  Surgeon: Maryam Wood M.D.;  Location: [facility];  Service: General   • HEMICOLECTOMY, RIGHT  5/22/2021    Procedure: HEMICOLECTOMY, RIGHT, SIDE TO SIDE ANASTOMOSIS;  Surgeon: Maryam Wood M.D.;  Location: [facility];  Service: General   • SHOULDER DECOMPRESSION ARTHROSCOPIC Right 12/24/2018    Procedure: SHOULDER DECOMPRESSION ARTHROSCOPIC - SUBACROMIAL;  Surgeon: Tristian Garcia M.D.;  Location: [facility];  Service: Orthopedics   • CLAVICLE DISTAL EXCISION Left 12/24/2018    Procedure: CLAVICLE DISTAL EXCISION;  Surgeon: Tristian Garcia M.D.;  Location: [facility];  Service: Orthopedics   • SHOULDER ARTHROSCOPY W/ BICIPITAL TENODESIS REPAIR Left 12/24/2018    Procedure: SHOULDER ARTHROSCOPY W/ BICIPITAL TENODESIS REPAIR - AND PACKER;  Surgeon: Tristian Garcia M.D.;  Location: [facility];  Service: Orthopedics   • SHOULDER MANIPULATION Left 12/24/2018    Procedure: SHOULDER MANIPULATION;  Surgeon: Tristian Garcia M.D.;  Location: [facility];  Service: Orthopedics   • SHOULDER ARTHROSCOPY W/ BICIPITAL TENODESIS REPAIR Right 6/15/2015    Procedure: SHOULDER ARTHROSCOPY W/ BICIPITAL TENODESIS, SYNOVECTOMY;  Surgeon: Tristian Garcia M.D.;  Location: [facility];  Service:    • CLAVICLE DISTAL EXCISION Right 6/15/2015    Procedure: CLAVICLE DISTAL EXCISION;  Surgeon: Tristian Garcia M.D.;  Location: [facility];  Service:    • SHOULDER DECOMPRESSION Right 6/15/2015    Procedure: SHOULDER DECOMPRESSION MINI INCISION ;  Surgeon: Tristian Garcia  "M.D.;  Location: SURGERY [facility];  Service:    • OTHER      nose   • APPENDECTOMY     • PB  DELIVERY ONLY         CURRENT MEDICATIONS  Home Medications    **Home medications have not yet been reviewed for this encounter**         ALLERGIES  No Known Allergies    PHYSICAL EXAM  VITAL SIGNS: /91   Pulse (!) 118   Temp 36.2 °C (97.2 °F) (Temporal)   Resp 16   Ht 1.753 m (5' 9\")   Wt 65 kg (143 lb 4.8 oz)   LMP 2021 (Exact Date)   SpO2 97%   BMI 21.16 kg/m²    Constitutional: Well developed, Well nourished, No acute distress, Non-toxic appearance.   HENT: Normocephalic, Atraumatic, Bilateral external ears normal, Oropharynx moist, No oral exudates, Nose normal.   Eyes: PERRL, EOMI, Conjunctiva normal, No discharge.   Neck: Normal range of motion  Cardiovascular: Normal heart rate, Normal rhythm, No murmurs, No rubs, No gallops.   Thorax & Lungs: Normal breath sounds, No respiratory distress, No wheezing  Abdomen: Bowel sounds normal, Soft, tender in left upper and left lower quadrant no peritonitis  Skin: Warm, Dry, No erythema, No rash.   Back: No tenderness, No CVA tenderness.   Musculoskeletal: Good range of motion in all major joints.  Neurologic: Alert, No focal deficits noted.   Psychiatric: Affect normal,        Results for orders placed or performed during the hospital encounter of 21   CBC WITH DIFFERENTIAL   Result Value Ref Range    WBC 7.4 4.8 - 10.8 K/uL    RBC 4.86 4.20 - 5.40 M/uL    Hemoglobin 12.9 12.0 - 16.0 g/dL    Hematocrit 40.4 37.0 - 47.0 %    MCV 83.1 81.4 - 97.8 fL    MCH 26.5 (L) 27.0 - 33.0 pg    MCHC 31.9 (L) 33.6 - 35.0 g/dL    RDW 58.3 (H) 35.9 - 50.0 fL    Platelet Count 314 164 - 446 K/uL    MPV 9.8 9.0 - 12.9 fL    Neutrophils-Polys 72.90 (H) 44.00 - 72.00 %    Lymphocytes 17.40 (L) 22.00 - 41.00 %    Monocytes 7.20 0.00 - 13.40 %    Eosinophils 1.50 0.00 - 6.90 %    Basophils 0.70 0.00 - 1.80 %    Immature Granulocytes 0.30 0.00 - " 0.90 %    Nucleated RBC 0.00 /100 WBC    Neutrophils (Absolute) 5.38 2.00 - 7.15 K/uL    Lymphs (Absolute) 1.28 1.00 - 4.80 K/uL    Monos (Absolute) 0.53 0.00 - 0.85 K/uL    Eos (Absolute) 0.11 0.00 - 0.51 K/uL    Baso (Absolute) 0.05 0.00 - 0.12 K/uL    Immature Granulocytes (abs) 0.02 0.00 - 0.11 K/uL    NRBC (Absolute) 0.00 K/uL   COMP METABOLIC PANEL   Result Value Ref Range    Sodium 138 135 - 145 mmol/L    Potassium 3.9 3.6 - 5.5 mmol/L    Chloride 104 96 - 112 mmol/L    Co2 21 20 - 33 mmol/L    Anion Gap 13.0 7.0 - 16.0    Glucose 90 65 - 99 mg/dL    Bun 10 8 - 22 mg/dL    Creatinine 0.73 0.50 - 1.40 mg/dL    Calcium 9.2 8.4 - 10.2 mg/dL    AST(SGOT) 16 12 - 45 U/L    ALT(SGPT) 13 2 - 50 U/L    Alkaline Phosphatase 76 30 - 99 U/L    Total Bilirubin 0.4 0.1 - 1.5 mg/dL    Albumin 4.0 3.2 - 4.9 g/dL    Total Protein 7.1 6.0 - 8.2 g/dL    Globulin 3.1 1.9 - 3.5 g/dL    A-G Ratio 1.3 g/dL   LIPASE   Result Value Ref Range    Lipase 24 7 - 58 U/L   URINALYSIS CULTURE, IF INDICATED    Specimen: Urine   Result Value Ref Range    Color Yellow     Character Clear     Specific Gravity 1.020 <1.035    Ph 6.5 5.0 - 8.0    Glucose Negative Negative mg/dL    Ketones 15 (A) Negative mg/dL    Protein Negative Negative mg/dL    Bilirubin Negative Negative    Nitrite Negative Negative    Leukocyte Esterase Negative Negative    Occult Blood Negative Negative    Micro Urine Req see below    LACTIC ACID   Result Value Ref Range    Lactic Acid 1.8 0.5 - 2.0 mmol/L   ESTIMATED GFR   Result Value Ref Range    GFR If African American >60 >60 mL/min/1.73 m 2    GFR If Non African American >60 >60 mL/min/1.73 m 2   SARS-COV Antigen ARPIT: Collect dry nasal swab   Result Value Ref Range    SARS-CoV-2 Source Nasal Swab     SARS-COV ANTIGEN ARPIT NotDetected NotDetected          RADIOLOGY/PROCEDURES  US-PELVIC COMPLETE (TRANSABDOMINAL/TRANSVAGINAL) (COMBO)   Final Result      Complex cystic foci with septation and at least one mural echogenic  nodule involving the left ovary. This involves the majority of the left ovary. Differential diagnosis includes multiple adjacent hemorrhagic cyst as well as neoplastic process. Follow-up    ultrasound in 4-6 weeks is recommended.      CT-ABDOMEN-PELVIS WITH   Final Result      1.  No evidence of bowel obstruction or focal inflammatory change.      2.  Complex left ovarian cyst which measures 5 x 2 cm in size.      3.  Again seen 6 mm left proximal ureteral stone with minimal dilatation of the left ureter proximal to that area.      4.  Fatty liver.          COURSE & MEDICAL DECISION MAKING  Pertinent Labs & Imaging studies reviewed. (See chart for details)  Patient presents with nausea, left-sided abdominal discomfort for several days.  Her chart was reviewed for previous work-up and baseline labs for comparison.    Broad differential diagnosis was considered including but not limited to pain from renal colic, infected kidney stone, diverticulitis, bowel obstruction, or other possible complication from previous surgery.  She does not have peritonitis.    She is worked up for the above differential diagnosis labs and imaging.      CT shows a kidney stone.  This is compared to kidney stone CT about a month ago.  This looks unchanged.  She is no signs of sepsis or infection is no inflammatory change in the CT and her labs and vital signs are reassuring.  She does have an incidental ovarian cyst.  I do not think this is causing her pain I suspect it is the kidney stone.    The patient did get an ultrasound.  There is no evidence of torsion.  Her ultrasound does show concerning septated cyst.  This could be hemorrhagic or could be neoplastic.  She is given a copy of the ultrasound results and told to follow-up with her GYN doctor.  She states she understands and she will.  She is made aware this could be a neoplastic process.    I do think her abdominal pain is likely to be related to her renal stone.  She had made a  plan Dr. Petty at this surgically removed for some intervention to help this pass around this time.  I discussed the case with the neurologist on-call Dr. Omalley.  He recommends hospitalization for treatment of the stone because of her pain.  He will see the patient in consultation.  Requested I talk to hospitalist.  I discussed case with the hospitalist and care transfer at that time.      FINAL IMPRESSION  1. Renal colic     2. Cyst of left ovary     3. Abdominal pain    3.         Electronically signed by: Marco Flores M.D., 11/19/2021 9:12 AM

## 2021-11-19 NOTE — ED TRIAGE NOTES
Chief Complaint   Patient presents with   • Nausea     2 days non stop, but not feeling well for a week, surgery on her intestines May and June.   She feels cramping and a tightness mid upper abd.

## 2021-11-19 NOTE — ED NOTES
Patient updated on plan of care. Reports mild nausea but denies need for medication. Call light in reach.

## 2021-11-20 PROCEDURE — 99239 HOSP IP/OBS DSCHRG MGMT >30: CPT | Performed by: INTERNAL MEDICINE

## 2021-11-20 NOTE — OR NURSING
"1904 Pt to PACU sp cystoscopy, lithotripsy and stent placement. String in place. Pt fully awake and c/o \"I need to pee\" Educated about this normal sensation following her procedure. Bedpan placed after monitoring established. 50 ml bloody urine emptied from bedpan. Respirations unlabored. VSS, see flowsheet.    1915 Dr Omalley at , verbal orders received.  1930- See MAR for med admin.   1935- Update to pt's son who will be picking pt up. Instructions given for pickup    1950 Pt onto BSC, states she wants to try to void.   1956- See MAR for med admin.  2020 Pt's son to pt's station.   2028 Pt returned to bed, given juice and crackers.  2039- Pt reports readiness to go home. Denies pain, c/o discomfort r/t urinary urgency.     "

## 2021-11-20 NOTE — OR NURSING
1515: Rcvd report from KLAUDIA Vasquez. Will bring pt to pre-op holding in time for her procedure.  1545: Pt brought to pre-op holding via gurney by RN, tolerated the transfer well. No pain, nausea is tolerable. Awake and alert, on room air.   1605: Patient allergies and NPO status verified, home medication reconciliation completed and belongings secured. Patient verbalizes understanding of pain scale, expected course of stay and plan of care. Surgical site verified with patient. IV patency verified. Sequentials placed on legs.

## 2021-11-20 NOTE — ANESTHESIA POSTPROCEDURE EVALUATION
Patient: Mari Dillon    Procedure Summary     Date: 11/19/21 Room / Location:  OR  / [facility]    Anesthesia Start: 1816 Anesthesia Stop: 1909    Procedures:       CYSTOSCOPY (N/A Bladder)      URETEROSCOPY (Left Ureter)      LITHOTRIPSY, USING LASER (Left Ureter) Diagnosis: (Kidney stones)    Surgeons: Jose Omalley M.D. Responsible Provider: Stacey Kaur M.D.    Anesthesia Type: general ASA Status: 2          Final Anesthesia Type: general  Last vitals  BP   Blood Pressure: 124/72    Temp   36.2 °C (97.2 °F)    Pulse   75   Resp   16    SpO2   97 %      Anesthesia Post Evaluation    Patient location during evaluation: PACU  Patient participation: complete - patient participated  Level of consciousness: awake and alert  Pain score: 0    Airway patency: patent  Anesthetic complications: no  Cardiovascular status: hemodynamically stable  Respiratory status: acceptable  Hydration status: euvolemic    PONV: none          No complications documented.     Nurse Pain Score: 4 (NPRS)

## 2021-11-20 NOTE — ANESTHESIA PREPROCEDURE EVALUATION
Case: [id number] Date/Time: 11/19/21 1718    Procedures:       CYSTOSCOPY (N/A Bladder)      URETEROSCOPY (Left Ureter)      LITHOTRIPSY, USING LASER (Left Ureter)    Anesthesia type: General    Location:  OR  / SURGERY [facility]    Surgeons: Jose Omalley M.D.          Relevant Problems   ANESTHESIA   (negative) History of anesthesia complications   (negative) PONV (postoperative nausea and vomiting)      NEURO   (positive) History of kidney stones   (positive) Intractable migraine with aura without status migrainosus      CARDIAC   (positive) DVT of axillary vein, acute right (HCC)   (positive) Intractable migraine with aura without status migrainosus         (positive) Left renal stone       Physical Exam    Airway   Mallampati: II  TM distance: >3 FB  Neck ROM: full       Cardiovascular - normal exam  Rhythm: regular  Rate: normal  (-) murmur     Dental - normal exam           Pulmonary - normal exam  Breath sounds clear to auscultation     Abdominal    Neurological - normal exam                 Anesthesia Plan    ASA 2       Plan - general       Airway plan will be LMA        Plan Factors:   Patient did not smoke on day of procedure.      Induction: intravenous    Postoperative Plan: Postoperative administration of opioids is intended.    Pertinent diagnostic labs and testing reviewed    Informed Consent:    Anesthetic plan and risks discussed with patient.

## 2021-11-20 NOTE — ANESTHESIA TIME REPORT
Anesthesia Start and Stop Event Times     Date Time Event    11/19/2021 1806 Ready for Procedure     1816 Anesthesia Start     1909 Anesthesia Stop        Responsible Staff  11/19/21    Name Role Begin End    Stacey Kaur M.D. Anesth 1816 1909        Preop Diagnosis (Free Text):  Pre-op Diagnosis     Kidney stones        Preop Diagnosis (Codes):    Premium Reason  A. 3PM - 7AM    Comments:

## 2021-11-20 NOTE — DISCHARGE SUMMARY
Discharge Summary    CHIEF COMPLAINT ON ADMISSION  Chief Complaint   Patient presents with   • Nausea     2 days non stop, but not feeling well for a week, surgery on her intestines May and June.       Reason for Admission  Abdominal pain; nausea      Admission Date  11/19/2021    CODE STATUS  Prior    HPI & HOSPITAL COURSE  Mari Dillon is a 51 y.o. female who presented 11/19/2021 with persistent nausea and mild LLQ pain since diagnosis of 6mm left proximal ureter stone diagnosed 1 week prior.  She initially had UTI with e coli and was prescribed augmentin which she has completed but continues to feel unwell so presented back to the ED.  Urology was contacted, Dr Omalley who recommends taking the patient to the OR.  She had cystoscopy, left ureteroscopy with laser lithotripsy and JJ stent placed.  Stone was broken up and patient was discharged from the PACU home per urology.  She will follow up with her PCP and urology as recommended.     Therefore, she is discharged in good and stable condition to home with close outpatient follow-up.    The patient recovered much more quickly than anticipated on admission.    Discharge Date  11/19/2021    FOLLOW UP ITEMS POST DISCHARGE  PCP and urology    DISCHARGE DIAGNOSES  Principal Problem:    Left renal stone POA: Yes  Active Problems:    Cecal volvulus (HCC) POA: Yes    Left ovarian cyst POA: Unknown    Nausea and vomiting POA: Unknown  Resolved Problems:    * No resolved hospital problems. *      FOLLOW UP  No future appointments.  Brandan Prieto M.D.  [address] [place] [id number]  [phone]            MEDICATIONS ON DISCHARGE     Medication List      ASK your doctor about these medications      Instructions   HYDROcodone/acetaminophen  MG Tabs  Commonly known as: NORCO   Take 0.5 Tablets by mouth one time.  Dose: 0.5 Tablet            Allergies  No Known Allergies    DIET  No orders of the defined types were placed in this  encounter.      ACTIVITY  As tolerated.  Weight bearing as tolerated    CONSULTATIONS  Urology - Dr Omalley    PROCEDURES  11/19: [facility]   1. Cystoscopy, Left Ureteroscopy w/ laser lithotripsy, JJ stent: 66482   2. Left retrograde pyelogram: with interpretation [id number]          LABORATORY  Lab Results   Component Value Date    SODIUM 138 11/19/2021    POTASSIUM 3.9 11/19/2021    CHLORIDE 104 11/19/2021    CO2 21 11/19/2021    GLUCOSE 90 11/19/2021    BUN 10 11/19/2021    CREATININE 0.73 11/19/2021    CREATININE 0.8 01/04/2007        Lab Results   Component Value Date    WBC 7.4 11/19/2021    HEMOGLOBIN 12.9 11/19/2021    HEMATOCRIT 40.4 11/19/2021    PLATELETCT 314 11/19/2021        Total time of the discharge process exceeds 35 minutes.

## 2021-11-20 NOTE — OP REPORT
Urologic Surgery Operative Report     Date of Procedure: 11/19/2021    Pre-op Diagnosis: 1. Left urinary stone  2. Left hydronephrosis  3. Left renal colic   Post-op Diagnosis: Same as above   Procedure: 1. Cystoscopy, Left Ureteroscopy w/ laser lithotripsy, JJ stent: 05274   2. Left retrograde pyelogram: with interpretation 53328    Surgeon: Surgeon(s) and Role:     * Jose Omalley M.D. - Primary   Assistant: Circulator: Cheo Saxena R.N.  Scrub Person: Partha Gutierres   Anesthesia: General,   Anesthesiologist: Stacey Kaur M.D.   Estimated Blood Loss: minimal   IV fluids <1L Crystalloid    Specimens: 1. Left Stone for chemical analysis    Complications: None   Condition: Stable, procedure well tolerated    Drains: 1. Left [id number] JJ stent(on strings strings)  2. No david   Disposition:  1. PACU, discharge after voiding  2. f/u 5-7d nursing stent removal at [facility]. 8 weeks for PA appointment and renal ultrasound same day at [facility].   Findings 1. 4 mm stone at Left proximal ureter  2.  Cystourethroscopy demonstrated: Normal urethra, bilateral ureter in the normal anatomic location within the trigone.  No masses appreciated within the bladder.  No significant cystitis.  3.  Ureteroscopy demonstrated: Normal caliber ureter.  The level of stone impaction in the proximal ureter there was significant edema and evidence of severe impaction.  Stone was light yellow and brittle.  4.  Retrograde ureteropyelogram: Moderate hydroureter nephrosis, some wasting at the proximal left ureter consistent with filling defect and known proximal ureteral stone seen on CT scan.  Fluoroscopy was used to confirm adequate placement of left ureteral stent with good curl within the renal pelvis.  No other filling defects.      Indication:   Mari Dillon is a 51 y.o. female patient who presents with flank pain that started approximately 1 month ago, and is associated with nausea/vomiting. In the ER imaging/CT  demonstrated 4 mm left proximal ureteral stone. There are no other stones appreciated.  She was originally seen 1 month ago in the ER at that time treated for an E. coli urinary tract infection.  Stone was in a similar location.   After a full discussion of alternatives, risks, and benefits the patient consented to proceeding with Ureteroscopy, laser lithotripsy and possible JJ stent placement on the respective side.  She understands the risk of inability to access ureter, the need for second procedures, the possibility of negative ureteroscopy, that she may have stent discomfort until this is removed, bleeding, infection, ureteral injury or stricture, bladder injury, post op urinary retention requiring david catheter, and the general pulmonary and cardiovascular risks associated with anesthesia.     Procedure Details:   The patient was taken to operating room and placed on table in supine position.  Ancef was administered prior to the start of the procedure based on previous urine cultures. Sequential compression devices were placed for deep venous thrombosis prophylaxis. After induction of general anesthesia, both legs were placed in Helder stirrups in the standard lithotomy position.  A timeout was held confirming the correct patient, procedure and laterality.   The perineal area was prepped and draped in a sterile fashion. A 20 Bahamian rigid cystoscope was inserted into the urethra and the bladder was emptied and then distended. See above cystoscopic findings.     Using a 6 Bahamian open-ended ureteral catheter retrograde ureteropyelogram was performed.  Please see above radiographic findings.     The wire was moved to the side and a semirigid ureteroscope was placed into the urethra and passed up the Right ureter until the stone was visualized in the left ureter. We did  need a Santi ureteral dilator to pass the scope into the ureter. The holmium laser was then used to fracture the stone until it was sub  millimeter fragments, and using a basket all large fragments were fully removed from the ureter.  Repeat ureteroscopy showed no significant residual stone fragments.     Using the wire and fluoroscopy we then placed the 624 stent under fluoroscopy.    We then saw that the JJ stent was in appropriate position, with a good curl visualized in the renal pelvis fluoroscopically and a good curl in the bladder f;iprpscopically.  The bladder was then emptied.  The patient was awoken from anesthesia and brought to recovery in satisfactory condition.  Of note the stent strings were  into 2 strands and tied at the level of the stent, these were then shortened and tucked into the vagina.       Jose Omalley M.D.   [address] [place] [address]   [phone]

## 2021-11-20 NOTE — DISCHARGE INSTRUCTIONS
DR. Omalley DISCHARGE INSTRUCTIONS FOLLOWING   URETEROSCOPY AND LASER LITHOTRIPSY      DIET:  You can resume your regular diet. We encourage you to eat well-balanced and nutritious meals.      ACTIVITY:  Please restrain from strenuous activity or heavy lifting (more than 20 pounds) for the next week.  Please walk daily as much as tolerated, making exercise a part of your daily life. Do not drive while using narcotics for pain control. You may resumes showering and bathing without any restrictions.     WOUND CARE:  1. You have no dressing or open wounds to manage.   2. You do have a internal ureteral stent on strings.  Please do not pull these strings as they will be used at your follow up visit to remove the stent.     MEDICATIONS:  1. Please use an over the counter antiinflammatory (ie Ibuprofen, naproxen, Ketoralac) and/or Tylenol for pain control as needed.  2. You may take 3 days of pyridium as prescribed for numbing the bladder and burning with urination.  3a. You have been prescribed oxybutynin prn to help with bladder spasms or burning with urination. Please hold this if having difficulty urinating however.   3b. If you have severe pain refractory to Ibuprofen you may use (home norco) for pain control as well as prescribed. If taking a narcotic, please use a stool softener like miralax or colace to prevent constipation.  3c. Please use flomax daily as prescribed while you have a stent in place to lessen stone pain.   4. If you are using aspirin, Plavix, or coumadin, please don’t restart these medications until two days after your discharge if you are not having large amounts of blood in the urine.    FOLLOW-UP:  We will call you to schedule follow up for stent removal in 5-7 days.     We recommend an ultrasound at 6-8 weeks to confirm the swelling (hydronephrosis) of the kidney(s) has resolved.     We will plan to discuss stone metabolic work-up at your follow-up in 6 to 8 weeks, this includes urine and  blood tests that can help us determine why you form kidney stones.    WARNING SIGNS:  Fever greater than 101 degrees Fahrenheit, chills, nausea or vomiting, Large amount of clots in urine that make it difficult to urinate or for urine to drain from david, increasing pain, or abdominal swelling. If you are experiencing these symptoms, call the Urology Clinic or go to your local PCP or emergency room.    It is normal to see blood in your urine for up to 2 weeks even from surgery. The urine may clear up entirely, and then turn bloody again a few days later depending on your activity level; do not be alarmed. However, if you experience severe pain or tenderness, have a lot of increased bleeding, or find that you are unable to urinate because of large clots, please notify your doctor immediately           Jose Omalley MD  [address] [place] [address]   [phone]           ACTIVITY: Rest and take it easy for the first 24 hours.  A responsible adult is recommended to remain with you during that time.  It is normal to feel sleepy.  We encourage you to not do anything that requires balance, judgment or coordination.    MILD FLU-LIKE SYMPTOMS ARE NORMAL. YOU MAY EXPERIENCE GENERALIZED MUSCLE ACHES, THROAT IRRITATION, HEADACHE AND/OR SOME NAUSEA.    FOR 24 HOURS DO NOT:  Drive, operate machinery or run household appliances.  Drink beer or alcoholic beverages.   Make important decisions or sign legal documents.    SPECIAL INSTRUCTIONS: Refer to instructions above.    DIET: To avoid nausea, slowly advance diet as tolerated, avoiding spicy or greasy foods for the first day.  Add more substantial food to your diet according to your physician's instructions.  INCREASE FLUIDS AND FIBER TO AVOID CONSTIPATION.    SURGICAL DRESSING/BATHING: No restrictions    FOLLOW-UP APPOINTMENT:  A follow-up appointment should be arranged with your doctor in 5-7 days; call to schedule.    You should CALL YOUR PHYSICIAN if you develop:  Fever  greater than 101 degrees F.  Pain not relieved by medication, or persistent nausea or vomiting.  Excessive bleeding (blood soaking through dressing) or unexpected drainage from the wound.  Extreme redness or swelling around the incision site, drainage of pus or foul smelling drainage.  Inability to urinate or empty your bladder within 8 hours.  Problems with breathing or chest pain.    You should call 911 if you develop problems with breathing or chest pain.  If you are unable to contact your doctor or surgical center, you should go to the nearest emergency room or urgent care center.  Physician's telephone #: [phone]    If any questions arise, call your doctor.  If your doctor is not available, please feel free to call the Surgical Center at [phone]. The Contact Center is open Monday through Friday 7AM to 5PM and may speak to a nurse at [phone], or toll free at [phone].     A registered nurse may call you a few days after your surgery to see how you are doing after your procedure.    MEDICATIONS: Resume taking daily medication.  Take prescribed pain medication with food.  If no medication is prescribed, you may take non-aspirin pain medication if needed.  PAIN MEDICATION CAN BE VERY CONSTIPATING.  Take a stool softener or laxative such as senokot, pericolace, or milk of magnesia if needed.    Prescription given for N/a  Last pain medication given at 7:58 pm Percocet 5/325 x 2.    If your physician has prescribed pain medication that includes Acetaminophen (Tylenol), do not take additional Acetaminophen (Tylenol) while taking the prescribed medication.    Depression / Suicide Risk    As you are discharged from this [facility] facility, it is important to learn how to keep safe from harming yourself.    Recognize the warning signs:  · Abrupt changes in personality, positive or negative- including increase in energy   · Giving away possessions  · Change in eating patterns- significant weight  changes-  positive or negative  · Change in sleeping patterns- unable to sleep or sleeping all the time   · Unwillingness or inability to communicate  · Depression  · Unusual sadness, discouragement and loneliness  · Talk of wanting to die  · Neglect of personal appearance   · Rebelliousness- reckless behavior  · Withdrawal from people/activities they love  · Confusion- inability to concentrate     If you or a loved one observes any of these behaviors or has concerns about self-harm, here's what you can do:  · Talk about it- your feelings and reasons for harming yourself  · Remove any means that you might use to hurt yourself (examples: pills, rope, extension cords, firearm)  · Get professional help from the community (Mental Health, Substance Abuse, psychological counseling)  · Do not be alone:Call your Safe Contact- someone whom you trust who will be there for you.  · Call your local CRISIS HOTLINE 515-6738 or 623-731-1045  · Call your local Children's Mobile Crisis Response Team [place] (788) 465-1585 or www.YABUY  · Call the toll free National Suicide Prevention Hotlines   · National Suicide Prevention Lifeline 949-125-GGQR (2029)  · National Hope Line Network 800-SUICIDE (986-4047)

## 2021-11-20 NOTE — OR NURSING
0807: Pt's pregnancy test is (-). The results from the POC testing have not shown up in Epic yet, results taped to a sheet and placed in the chart.

## 2021-11-20 NOTE — ANESTHESIA PROCEDURE NOTES
Airway    Date/Time: 11/19/2021 6:23 PM  Performed by: Stacey Kaur M.D.  Authorized by: Stacey Kaur M.D.     Location:  OR  Urgency:  Elective  Indications for Airway Management:  Anesthesia      Spontaneous Ventilation: absent    Sedation Level:  Deep  Preoxygenated: Yes    Mask Difficulty Assessment:  0 - not attempted  Final Airway Type:  Supraglottic airway  Final Supraglottic Airway:  Standard LMA    SGA Size:  4  Number of Attempts at Approach:  1

## 2021-11-27 LAB
APPEARANCE STONE: NORMAL
COMPN STONE: NORMAL
SPECIMEN WT: 30 MG

## 2022-02-12 ENCOUNTER — OFFICE VISIT (OUTPATIENT)
Dept: URGENT CARE | Facility: CLINIC | Age: 52
End: 2022-02-12
Payer: COMMERCIAL

## 2022-02-12 ENCOUNTER — HOSPITAL ENCOUNTER (OUTPATIENT)
Dept: RADIOLOGY | Facility: MEDICAL CENTER | Age: 52
DRG: 372 | End: 2022-02-12
Attending: NURSE PRACTITIONER
Payer: COMMERCIAL

## 2022-02-12 ENCOUNTER — TELEPHONE (OUTPATIENT)
Dept: URGENT CARE | Facility: CLINIC | Age: 52
End: 2022-02-12

## 2022-02-12 ENCOUNTER — HOSPITAL ENCOUNTER (EMERGENCY)
Facility: MEDICAL CENTER | Age: 52
End: 2022-02-12
Payer: COMMERCIAL

## 2022-02-12 ENCOUNTER — HOSPITAL ENCOUNTER (INPATIENT)
Facility: MEDICAL CENTER | Age: 52
LOS: 5 days | DRG: 372 | End: 2022-02-17
Attending: EMERGENCY MEDICINE | Admitting: HOSPITALIST
Payer: COMMERCIAL

## 2022-02-12 VITALS
WEIGHT: 146.2 LBS | SYSTOLIC BLOOD PRESSURE: 124 MMHG | OXYGEN SATURATION: 99 % | TEMPERATURE: 97.1 F | RESPIRATION RATE: 18 BRPM | HEART RATE: 111 BPM | BODY MASS INDEX: 21.66 KG/M2 | HEIGHT: 69 IN | DIASTOLIC BLOOD PRESSURE: 80 MMHG

## 2022-02-12 DIAGNOSIS — R10.9 ACUTE RIGHT FLANK PAIN: ICD-10-CM

## 2022-02-12 DIAGNOSIS — R10.11 RIGHT UPPER QUADRANT ABDOMINAL PAIN: ICD-10-CM

## 2022-02-12 DIAGNOSIS — D50.8 IRON DEFICIENCY ANEMIA SECONDARY TO INADEQUATE DIETARY IRON INTAKE: ICD-10-CM

## 2022-02-12 DIAGNOSIS — K65.1 INTRA-ABDOMINAL ABSCESS (HCC): ICD-10-CM

## 2022-02-12 PROBLEM — N20.0 KIDNEY STONE: Status: ACTIVE | Noted: 2022-01-14

## 2022-02-12 LAB
ALBUMIN SERPL BCP-MCNC: 3.8 G/DL (ref 3.2–4.9)
ALBUMIN/GLOB SERPL: 1.2 G/DL
ALP SERPL-CCNC: 84 U/L (ref 30–99)
ALT SERPL-CCNC: 10 U/L (ref 2–50)
ANION GAP SERPL CALC-SCNC: 10 MMOL/L (ref 7–16)
APPEARANCE UR: CLEAR
APPEARANCE UR: CLEAR
AST SERPL-CCNC: 12 U/L (ref 12–45)
BACTERIA #/AREA URNS HPF: ABNORMAL /HPF
BASOPHILS # BLD AUTO: 0.3 % (ref 0–1.8)
BASOPHILS # BLD: 0.04 K/UL (ref 0–0.12)
BILIRUB SERPL-MCNC: 0.2 MG/DL (ref 0.1–1.5)
BILIRUB UR QL STRIP.AUTO: ABNORMAL
BILIRUB UR STRIP-MCNC: NEGATIVE MG/DL
BUN SERPL-MCNC: 9 MG/DL (ref 8–22)
CALCIUM SERPL-MCNC: 8.8 MG/DL (ref 8.4–10.2)
CHLORIDE SERPL-SCNC: 104 MMOL/L (ref 96–112)
CO2 SERPL-SCNC: 23 MMOL/L (ref 20–33)
COLOR UR AUTO: YELLOW
COLOR UR: YELLOW
CREAT SERPL-MCNC: 0.66 MG/DL (ref 0.5–1.4)
EOSINOPHIL # BLD AUTO: 0.3 K/UL (ref 0–0.51)
EOSINOPHIL NFR BLD: 2.5 % (ref 0–6.9)
EPI CELLS #/AREA URNS HPF: ABNORMAL /HPF
ERYTHROCYTE [DISTWIDTH] IN BLOOD BY AUTOMATED COUNT: 41.1 FL (ref 35.9–50)
GLOBULIN SER CALC-MCNC: 3.2 G/DL (ref 1.9–3.5)
GLUCOSE SERPL-MCNC: 112 MG/DL (ref 65–99)
GLUCOSE UR STRIP.AUTO-MCNC: NEGATIVE MG/DL
GLUCOSE UR STRIP.AUTO-MCNC: NEGATIVE MG/DL
HCT VFR BLD AUTO: 35.3 % (ref 37–47)
HGB BLD-MCNC: 11.5 G/DL (ref 12–16)
HGB RETIC QN AUTO: 30 PG/CELL (ref 29–35)
HYALINE CASTS #/AREA URNS LPF: ABNORMAL /LPF
IMM GRANULOCYTES # BLD AUTO: 0.05 K/UL (ref 0–0.11)
IMM GRANULOCYTES NFR BLD AUTO: 0.4 % (ref 0–0.9)
IMM RETICS NFR: 14.7 % (ref 9.3–17.4)
KETONES UR STRIP.AUTO-MCNC: ABNORMAL MG/DL
KETONES UR STRIP.AUTO-MCNC: NEGATIVE MG/DL
LEUKOCYTE ESTERASE UR QL STRIP.AUTO: NEGATIVE
LEUKOCYTE ESTERASE UR QL STRIP.AUTO: NEGATIVE
LIPASE SERPL-CCNC: 39 U/L (ref 7–58)
LYMPHOCYTES # BLD AUTO: 1.77 K/UL (ref 1–4.8)
LYMPHOCYTES NFR BLD: 14.9 % (ref 22–41)
MAGNESIUM SERPL-MCNC: 1.8 MG/DL (ref 1.5–2.5)
MCH RBC QN AUTO: 28.1 PG (ref 27–33)
MCHC RBC AUTO-ENTMCNC: 32.6 G/DL (ref 33.6–35)
MCV RBC AUTO: 86.3 FL (ref 81.4–97.8)
MICRO URNS: ABNORMAL
MONOCYTES # BLD AUTO: 1.14 K/UL (ref 0–0.85)
MONOCYTES NFR BLD AUTO: 9.6 % (ref 0–13.4)
MUCOUS THREADS #/AREA URNS HPF: ABNORMAL /HPF
NEUTROPHILS # BLD AUTO: 8.56 K/UL (ref 2–7.15)
NEUTROPHILS NFR BLD: 72.3 % (ref 44–72)
NITRITE UR QL STRIP.AUTO: NEGATIVE
NITRITE UR QL STRIP.AUTO: NEGATIVE
NRBC # BLD AUTO: 0 K/UL
NRBC BLD-RTO: 0 /100 WBC
PH UR STRIP.AUTO: 5.5 [PH] (ref 5–8)
PH UR STRIP.AUTO: 6.5 [PH] (ref 5–8)
PLATELET # BLD AUTO: 407 K/UL (ref 164–446)
PMV BLD AUTO: 9.7 FL (ref 9–12.9)
POTASSIUM SERPL-SCNC: 3 MMOL/L (ref 3.6–5.5)
PROT SERPL-MCNC: 7 G/DL (ref 6–8.2)
PROT UR QL STRIP: 30 MG/DL
PROT UR QL STRIP: NEGATIVE MG/DL
RBC # BLD AUTO: 4.09 M/UL (ref 4.2–5.4)
RBC # URNS HPF: ABNORMAL /HPF
RBC UR QL AUTO: NEGATIVE
RBC UR QL AUTO: NEGATIVE
RETICS # AUTO: 0.08 M/UL (ref 0.04–0.06)
RETICS/RBC NFR: 1.8 % (ref 0.8–2.1)
SODIUM SERPL-SCNC: 137 MMOL/L (ref 135–145)
SP GR UR STRIP.AUTO: >=1.03
SP GR UR STRIP.AUTO: >=1.03
UROBILINOGEN UR STRIP-MCNC: NORMAL MG/DL
WBC # BLD AUTO: 11.9 K/UL (ref 4.8–10.8)
WBC #/AREA URNS HPF: ABNORMAL /HPF

## 2022-02-12 PROCEDURE — 87040 BLOOD CULTURE FOR BACTERIA: CPT | Mod: 91

## 2022-02-12 PROCEDURE — 74176 CT ABD & PELVIS W/O CONTRAST: CPT

## 2022-02-12 PROCEDURE — 82728 ASSAY OF FERRITIN: CPT

## 2022-02-12 PROCEDURE — 83735 ASSAY OF MAGNESIUM: CPT

## 2022-02-12 PROCEDURE — 96365 THER/PROPH/DIAG IV INF INIT: CPT

## 2022-02-12 PROCEDURE — 83540 ASSAY OF IRON: CPT

## 2022-02-12 PROCEDURE — 700105 HCHG RX REV CODE 258: Performed by: EMERGENCY MEDICINE

## 2022-02-12 PROCEDURE — 83550 IRON BINDING TEST: CPT

## 2022-02-12 PROCEDURE — 81002 URINALYSIS NONAUTO W/O SCOPE: CPT | Performed by: NURSE PRACTITIONER

## 2022-02-12 PROCEDURE — 83690 ASSAY OF LIPASE: CPT

## 2022-02-12 PROCEDURE — 770001 HCHG ROOM/CARE - MED/SURG/GYN PRIV*

## 2022-02-12 PROCEDURE — 85046 RETICYTE/HGB CONCENTRATE: CPT

## 2022-02-12 PROCEDURE — 85025 COMPLETE CBC W/AUTO DIFF WBC: CPT

## 2022-02-12 PROCEDURE — 80053 COMPREHEN METABOLIC PANEL: CPT

## 2022-02-12 PROCEDURE — 700111 HCHG RX REV CODE 636 W/ 250 OVERRIDE (IP): Performed by: EMERGENCY MEDICINE

## 2022-02-12 PROCEDURE — 700105 HCHG RX REV CODE 258: Performed by: HOSPITALIST

## 2022-02-12 PROCEDURE — 700101 HCHG RX REV CODE 250: Performed by: HOSPITALIST

## 2022-02-12 PROCEDURE — 96375 TX/PRO/DX INJ NEW DRUG ADDON: CPT

## 2022-02-12 PROCEDURE — 700105 HCHG RX REV CODE 258

## 2022-02-12 PROCEDURE — 700111 HCHG RX REV CODE 636 W/ 250 OVERRIDE (IP): Performed by: HOSPITALIST

## 2022-02-12 PROCEDURE — 81001 URINALYSIS AUTO W/SCOPE: CPT

## 2022-02-12 PROCEDURE — 96367 TX/PROPH/DG ADDL SEQ IV INF: CPT

## 2022-02-12 PROCEDURE — 99214 OFFICE O/P EST MOD 30 MIN: CPT | Performed by: NURSE PRACTITIONER

## 2022-02-12 PROCEDURE — 99285 EMERGENCY DEPT VISIT HI MDM: CPT

## 2022-02-12 PROCEDURE — 99223 1ST HOSP IP/OBS HIGH 75: CPT | Performed by: HOSPITALIST

## 2022-02-12 RX ORDER — POLYETHYLENE GLYCOL 3350 17 G/17G
1 POWDER, FOR SOLUTION ORAL
Status: DISCONTINUED | OUTPATIENT
Start: 2022-02-12 | End: 2022-02-17 | Stop reason: HOSPADM

## 2022-02-12 RX ORDER — MORPHINE SULFATE 4 MG/ML
2 INJECTION INTRAVENOUS
Status: DISCONTINUED | OUTPATIENT
Start: 2022-02-12 | End: 2022-02-17 | Stop reason: HOSPADM

## 2022-02-12 RX ORDER — PROMETHAZINE HYDROCHLORIDE 25 MG/1
12.5-25 TABLET ORAL EVERY 4 HOURS PRN
Status: DISCONTINUED | OUTPATIENT
Start: 2022-02-12 | End: 2022-02-17 | Stop reason: HOSPADM

## 2022-02-12 RX ORDER — MORPHINE SULFATE 4 MG/ML
4 INJECTION INTRAVENOUS ONCE
Status: COMPLETED | OUTPATIENT
Start: 2022-02-12 | End: 2022-02-12

## 2022-02-12 RX ORDER — BISACODYL 10 MG
10 SUPPOSITORY, RECTAL RECTAL
Status: DISCONTINUED | OUTPATIENT
Start: 2022-02-12 | End: 2022-02-17 | Stop reason: HOSPADM

## 2022-02-12 RX ORDER — ONDANSETRON 4 MG/1
4 TABLET, ORALLY DISINTEGRATING ORAL EVERY 4 HOURS PRN
Status: DISCONTINUED | OUTPATIENT
Start: 2022-02-12 | End: 2022-02-17 | Stop reason: HOSPADM

## 2022-02-12 RX ORDER — PROCHLORPERAZINE EDISYLATE 5 MG/ML
5-10 INJECTION INTRAMUSCULAR; INTRAVENOUS EVERY 4 HOURS PRN
Status: DISCONTINUED | OUTPATIENT
Start: 2022-02-12 | End: 2022-02-17 | Stop reason: HOSPADM

## 2022-02-12 RX ORDER — SODIUM CHLORIDE, SODIUM LACTATE, POTASSIUM CHLORIDE, CALCIUM CHLORIDE 600; 310; 30; 20 MG/100ML; MG/100ML; MG/100ML; MG/100ML
INJECTION, SOLUTION INTRAVENOUS CONTINUOUS
Status: DISCONTINUED | OUTPATIENT
Start: 2022-02-12 | End: 2022-02-15

## 2022-02-12 RX ORDER — LABETALOL HYDROCHLORIDE 5 MG/ML
10 INJECTION, SOLUTION INTRAVENOUS EVERY 4 HOURS PRN
Status: DISCONTINUED | OUTPATIENT
Start: 2022-02-12 | End: 2022-02-17 | Stop reason: HOSPADM

## 2022-02-12 RX ORDER — ONDANSETRON 2 MG/ML
4 INJECTION INTRAMUSCULAR; INTRAVENOUS ONCE
Status: COMPLETED | OUTPATIENT
Start: 2022-02-12 | End: 2022-02-12

## 2022-02-12 RX ORDER — PROMETHAZINE HYDROCHLORIDE 25 MG/1
12.5-25 SUPPOSITORY RECTAL EVERY 4 HOURS PRN
Status: DISCONTINUED | OUTPATIENT
Start: 2022-02-12 | End: 2022-02-17 | Stop reason: HOSPADM

## 2022-02-12 RX ORDER — AMOXICILLIN 250 MG
2 CAPSULE ORAL 2 TIMES DAILY
Status: DISCONTINUED | OUTPATIENT
Start: 2022-02-13 | End: 2022-02-17 | Stop reason: HOSPADM

## 2022-02-12 RX ORDER — ONDANSETRON 2 MG/ML
4 INJECTION INTRAMUSCULAR; INTRAVENOUS EVERY 4 HOURS PRN
Status: DISCONTINUED | OUTPATIENT
Start: 2022-02-12 | End: 2022-02-17 | Stop reason: HOSPADM

## 2022-02-12 RX ORDER — KETOROLAC TROMETHAMINE 30 MG/ML
30 INJECTION, SOLUTION INTRAMUSCULAR; INTRAVENOUS ONCE
Status: COMPLETED | OUTPATIENT
Start: 2022-02-12 | End: 2022-02-12

## 2022-02-12 RX ORDER — OXYCODONE HYDROCHLORIDE 5 MG/1
5 TABLET ORAL EVERY 4 HOURS PRN
Status: DISCONTINUED | OUTPATIENT
Start: 2022-02-12 | End: 2022-02-17 | Stop reason: HOSPADM

## 2022-02-12 RX ORDER — SODIUM CHLORIDE 9 MG/ML
INJECTION, SOLUTION INTRAVENOUS
Status: COMPLETED
Start: 2022-02-12 | End: 2022-02-12

## 2022-02-12 RX ORDER — ACETAMINOPHEN 325 MG/1
650 TABLET ORAL EVERY 6 HOURS PRN
Status: DISCONTINUED | OUTPATIENT
Start: 2022-02-12 | End: 2022-02-17 | Stop reason: HOSPADM

## 2022-02-12 RX ADMIN — SODIUM CHLORIDE: 900 INJECTION INTRAVENOUS at 19:45

## 2022-02-12 RX ADMIN — MORPHINE SULFATE 2 MG: 4 INJECTION INTRAVENOUS at 22:41

## 2022-02-12 RX ADMIN — ONDANSETRON 4 MG: 2 INJECTION INTRAMUSCULAR; INTRAVENOUS at 19:54

## 2022-02-12 RX ADMIN — MORPHINE SULFATE 4 MG: 4 INJECTION INTRAVENOUS at 19:54

## 2022-02-12 RX ADMIN — SODIUM CHLORIDE, POTASSIUM CHLORIDE, SODIUM LACTATE AND CALCIUM CHLORIDE: 600; 310; 30; 20 INJECTION, SOLUTION INTRAVENOUS at 21:20

## 2022-02-12 RX ADMIN — PIPERACILLIN AND TAZOBACTAM 3.38 G: 3; .375 INJECTION, POWDER, LYOPHILIZED, FOR SOLUTION INTRAVENOUS; PARENTERAL at 19:54

## 2022-02-12 RX ADMIN — VANCOMYCIN HYDROCHLORIDE 1750 MG: 500 INJECTION, POWDER, LYOPHILIZED, FOR SOLUTION INTRAVENOUS at 21:10

## 2022-02-12 RX ADMIN — KETOROLAC TROMETHAMINE 30 MG: 30 INJECTION, SOLUTION INTRAMUSCULAR; INTRAVENOUS at 14:30

## 2022-02-12 ASSESSMENT — ENCOUNTER SYMPTOMS
FOCAL WEAKNESS: 0
SEIZURES: 0
WHEEZING: 0
BACK PAIN: 0
FEVER: 0
MYALGIAS: 0
FEVER: 0
STRIDOR: 0
COUGH: 0
ORTHOPNEA: 0
DIARRHEA: 0
DEPRESSION: 0
POLYDIPSIA: 0
CHILLS: 0
INSOMNIA: 0
NAUSEA: 0
PND: 0
ABDOMINAL PAIN: 0
EYE DISCHARGE: 0
SPUTUM PRODUCTION: 0
EYES NEGATIVE: 1
CONSTITUTIONAL NEGATIVE: 1
DIZZINESS: 0
FLANK PAIN: 1
NEUROLOGICAL NEGATIVE: 1
TINGLING: 0
BLOOD IN STOOL: 0
FLANK PAIN: 0
CARDIOVASCULAR NEGATIVE: 1
PSYCHIATRIC NEGATIVE: 1
SINUS PAIN: 0
DOUBLE VISION: 0
PHOTOPHOBIA: 0
EYE REDNESS: 0
FALLS: 0
SHORTNESS OF BREATH: 0
DIAPHORESIS: 0
MUSCULOSKELETAL NEGATIVE: 1
VOMITING: 0
SENSORY CHANGE: 0
BRUISES/BLEEDS EASILY: 0
HEARTBURN: 0
RESPIRATORY NEGATIVE: 1
ABDOMINAL PAIN: 1
BACK PAIN: 1
NAUSEA: 1
CHILLS: 0

## 2022-02-12 ASSESSMENT — COGNITIVE AND FUNCTIONAL STATUS - GENERAL
SUGGESTED CMS G CODE MODIFIER DAILY ACTIVITY: CH
MOBILITY SCORE: 24
DAILY ACTIVITIY SCORE: 24
SUGGESTED CMS G CODE MODIFIER MOBILITY: CH

## 2022-02-12 ASSESSMENT — LIFESTYLE VARIABLES
AVERAGE NUMBER OF DAYS PER WEEK YOU HAVE A DRINK CONTAINING ALCOHOL: 0
TOTAL SCORE: 0
EVER HAD A DRINK FIRST THING IN THE MORNING TO STEADY YOUR NERVES TO GET RID OF A HANGOVER: NO
SUBSTANCE_ABUSE: 0
ON A TYPICAL DAY WHEN YOU DRINK ALCOHOL HOW MANY DRINKS DO YOU HAVE: 0
TOTAL SCORE: 0
TOTAL SCORE: 0
EVER FELT BAD OR GUILTY ABOUT YOUR DRINKING: NO
ALCOHOL_USE: NO
HOW MANY TIMES IN THE PAST YEAR HAVE YOU HAD 5 OR MORE DRINKS IN A DAY: 0
HAVE YOU EVER FELT YOU SHOULD CUT DOWN ON YOUR DRINKING: NO
HAVE PEOPLE ANNOYED YOU BY CRITICIZING YOUR DRINKING: NO
CONSUMPTION TOTAL: NEGATIVE

## 2022-02-12 ASSESSMENT — PATIENT HEALTH QUESTIONNAIRE - PHQ9
2. FEELING DOWN, DEPRESSED, IRRITABLE, OR HOPELESS: NOT AT ALL
SUM OF ALL RESPONSES TO PHQ9 QUESTIONS 1 AND 2: 0
1. LITTLE INTEREST OR PLEASURE IN DOING THINGS: NOT AT ALL

## 2022-02-12 ASSESSMENT — PAIN DESCRIPTION - PAIN TYPE
TYPE: ACUTE PAIN

## 2022-02-12 ASSESSMENT — FIBROSIS 4 INDEX
FIB4 SCORE: 0.72
FIB4 SCORE: 0.72

## 2022-02-12 ASSESSMENT — PAIN DESCRIPTION - DESCRIPTORS: DESCRIPTORS: ACHING

## 2022-02-12 ASSESSMENT — VISUAL ACUITY: OU: 1

## 2022-02-12 NOTE — PROGRESS NOTES
Subjective:     Mari Dillon is a 51 y.o. female who presents for GI Problem (Kidney Stones Maybe )      Back Pain  This is a new problem. The current episode started in the past 7 days (Mari is a pleasant 51 year old female who presents to  today with complaints of right sided flank pain that started 2 days ago. ). The problem occurs constantly. The problem has been gradually worsening since onset. Pain location: right flank. The pain does not radiate. The pain is at a severity of 6/10. The symptoms are aggravated by bending, sitting and position. Pertinent negatives include no abdominal pain, dysuria or fever. Risk factors: History of renal calculi that was surgically removed 1/2022. She has tried analgesics, NSAIDs and bed rest (lying down improves pain, Norco 5/325) for the symptoms. The treatment provided mild relief.         Review of Systems   Constitutional: Negative for chills and fever.   Gastrointestinal: Negative for abdominal pain, diarrhea, nausea and vomiting.   Genitourinary: Positive for flank pain. Negative for dysuria, frequency, hematuria and urgency.   Musculoskeletal: Positive for back pain.       PMH:   Past Medical History:   Diagnosis Date   • Anxiety 6/9/2009     ALLERGIES: No Known Allergies  SURGHX:   Past Surgical History:   Procedure Laterality Date   • NE CYSTOURETHROSCOPY N/A 11/19/2021    Procedure: CYSTOSCOPY;  Surgeon: Jose Omalley M.D.;  Location: [facility];  Service: Urology   • NE CYSTO/URETERO/PYELOSCOPY, DX Left 11/19/2021    Procedure: URETEROSCOPY;  Surgeon: Jose Omalley M.D.;  Location: [facility];  Service: Urology   • LASERTRIPSY Left 11/19/2021    Procedure: LITHOTRIPSY, USING LASER;  Surgeon: Jose Omalley M.D.;  Location: [facility];  Service: Urology   • NE EXPLORATORY OF ABDOMEN  6/11/2021    Procedure: LAPAROTOMY, EXPLORATORY;  Surgeon: Maryam Wood M.D.;  Location: [facility];  Service: General   •  ILEOSTOMY  6/11/2021    Procedure: WOUND VAC PLACEMENT ;  Surgeon: Maryam Wood M.D.;  Location: [facility];  Service: General   • NH EXPLORATORY OF ABDOMEN N/A 6/4/2021    Procedure: LAPAROTOMY, EXPLORATORY, WITH WASH OUT;  Surgeon: Maryam Wood M.D.;  Location: [facility];  Service: General   • NH EXPLORATORY OF ABDOMEN N/A 5/28/2021    Procedure: LAPAROTOMY, EXPLORATORY- RESECTION OF ILEOCECAL ANASTATMOSIS.;  Surgeon: Maryam Wood M.D.;  Location: [facility];  Service: General   • NH EXPLORATORY OF ABDOMEN  5/22/2021    Procedure: LAPAROTOMY, EXPLORATORY;  Surgeon: Maryam Wood M.D.;  Location: [facility];  Service: General   • HEMICOLECTOMY, RIGHT  5/22/2021    Procedure: HEMICOLECTOMY, RIGHT, SIDE TO SIDE ANASTOMOSIS;  Surgeon: Maryam Wood M.D.;  Location: [facility];  Service: General   • SHOULDER DECOMPRESSION ARTHROSCOPIC Right 12/24/2018    Procedure: SHOULDER DECOMPRESSION ARTHROSCOPIC - SUBACROMIAL;  Surgeon: Trisitan Garcia M.D.;  Location: [facility];  Service: Orthopedics   • CLAVICLE DISTAL EXCISION Left 12/24/2018    Procedure: CLAVICLE DISTAL EXCISION;  Surgeon: Tristian Garcia M.D.;  Location: [facility];  Service: Orthopedics   • SHOULDER ARTHROSCOPY W/ BICIPITAL TENODESIS REPAIR Left 12/24/2018    Procedure: SHOULDER ARTHROSCOPY W/ BICIPITAL TENODESIS REPAIR - AND PACKER;  Surgeon: Tristian Garcia M.D.;  Location: [facility];  Service: Orthopedics   • SHOULDER MANIPULATION Left 12/24/2018    Procedure: SHOULDER MANIPULATION;  Surgeon: Tristian Garcia M.D.;  Location: [facility];  Service: Orthopedics   • SHOULDER ARTHROSCOPY W/ BICIPITAL TENODESIS REPAIR Right 6/15/2015    Procedure: SHOULDER ARTHROSCOPY W/ BICIPITAL TENODESIS, SYNOVECTOMY;  Surgeon: Tristian Garcia M.D.;  Location: [facility];  Service:    • CLAVICLE DISTAL EXCISION Right 6/15/2015     Procedure: CLAVICLE DISTAL EXCISION;  Surgeon: Tristian Garcia M.D.;  Location: [facility];  Service:    • SHOULDER DECOMPRESSION Right 6/15/2015    Procedure: SHOULDER DECOMPRESSION MINI INCISION ;  Surgeon: Tristian Garcia M.D.;  Location: [facility];  Service:    • OTHER      nose   • APPENDECTOMY     • ME  DELIVERY ONLY       SOCHX:   Social History     Socioeconomic History   • Marital status: Single     Spouse name: Not on file   • Number of children: Not on file   • Years of education: Not on file   • Highest education level: Not on file   Occupational History   • Not on file   Tobacco Use   • Smoking status: Former Smoker     Years: 15.     Quit date: 2005     Years since quittin.1   • Smokeless tobacco: Never Used   • Tobacco comment: 3-4 years ago   Vaping Use   • Vaping Use: Never used   Substance and Sexual Activity   • Alcohol use: No     Alcohol/week: 0.0 oz   • Drug use: Yes     Types: Inhaled, Marijuana     Comment: Marijuana daily   • Sexual activity: Yes     Partners: Male   Other Topics Concern   • Not on file   Social History Narrative    ** Merged History Encounter **          Social Determinants of Health     Financial Resource Strain:    • Difficulty of Paying Living Expenses: Not on file   Food Insecurity:    • Worried About Running Out of Food in the Last Year: Not on file   • Ran Out of Food in the Last Year: Not on file   Transportation Needs:    • Lack of Transportation (Medical): Not on file   • Lack of Transportation (Non-Medical): Not on file   Physical Activity:    • Days of Exercise per Week: Not on file   • Minutes of Exercise per Session: Not on file   Stress:    • Feeling of Stress : Not on file   Social Connections:    • Frequency of Communication with Friends and Family: Not on file   • Frequency of Social Gatherings with Friends and Family: Not on file   • Attends Taoist Services: Not on file   • Active Member  "of Clubs or Organizations: Not on file   • Attends Club or Organization Meetings: Not on file   • Marital Status: Not on file   Intimate Partner Violence:    • Fear of Current or Ex-Partner: Not on file   • Emotionally Abused: Not on file   • Physically Abused: Not on file   • Sexually Abused: Not on file   Housing Stability:    • Unable to Pay for Housing in the Last Year: Not on file   • Number of Places Lived in the Last Year: Not on file   • Unstable Housing in the Last Year: Not on file     FH:   Family History   Problem Relation Age of Onset   • Hypertension Mother    • Diabetes Mother    • Cancer Maternal Grandmother         breast         Objective:   /80 (BP Location: Right arm, Patient Position: Sitting, BP Cuff Size: Adult)   Pulse (!) 111   Temp 36.2 °C (97.1 °F) (Temporal)   Resp 18   Ht 1.753 m (5' 9\")   Wt 66.3 kg (146 lb 3.2 oz)   SpO2 99%   BMI 21.59 kg/m²     Physical Exam  Vitals and nursing note reviewed.   Constitutional:       General: She is not in acute distress.     Appearance: Normal appearance. She is not ill-appearing.   HENT:      Head: Normocephalic and atraumatic.      Right Ear: External ear normal.      Left Ear: External ear normal.      Nose: No congestion or rhinorrhea.      Mouth/Throat:      Mouth: Mucous membranes are moist.   Eyes:      Extraocular Movements: Extraocular movements intact.      Pupils: Pupils are equal, round, and reactive to light.   Cardiovascular:      Rate and Rhythm: Normal rate and regular rhythm.      Pulses: Normal pulses.      Heart sounds: Normal heart sounds.   Pulmonary:      Effort: Pulmonary effort is normal.      Breath sounds: Normal breath sounds.   Abdominal:      General: Abdomen is flat. Bowel sounds are normal.      Palpations: Abdomen is soft.      Tenderness: There is abdominal tenderness. There is guarding. There is no right CVA tenderness or left CVA tenderness. Positive signs include Burgess's sign.       Musculoskeletal:   "       General: Normal range of motion.      Cervical back: Normal range of motion and neck supple.   Skin:     General: Skin is warm and dry.      Capillary Refill: Capillary refill takes less than 2 seconds.   Neurological:      General: No focal deficit present.      Mental Status: She is alert and oriented to person, place, and time. Mental status is at baseline.   Psychiatric:         Mood and Affect: Mood normal.         Behavior: Behavior normal.         Thought Content: Thought content normal.         Judgment: Judgment normal.       POCT urine: negative    Assessment/Plan:   Assessment    1. Acute right flank pain  ketorolac (TORADOL) injection 30 mg    POCT Urinalysis   2. Right upper quadrant abdominal pain  CT-RENAL COLIC EVALUATION(A/P W/O)     Differentials include renal calculi, back strain or cholecystitis/cholelithiasis.  At this time I am most concerned for renal calculi as her pain does start in posterior right flank and travels to right anterior flank and right lower quadrant.  CT renal colic ordered stat for evaluation.  If negative will consider evaluation of gallbladder.  Patient to seek care in emergency room if she develops worsening pain, fever, chills, nausea/vomiting.  She verbalizes agreement and understanding.  She does have Norco 10/325 at home for pain control.  She was given Toradol in the clinic today for further relief of pain.  She was educated to refrain from additional NSAIDs for the next 48 hours following use of this medication.  She verbalizes agreement and understanding to plan of care.  She is established with urology as she recently had left renal calculi removal with stent placement last month.  AVS handout given and reviewed with patient. Pt educated on red flags and when to seek treatment back in ER or UC.

## 2022-02-13 LAB
ALBUMIN SERPL BCP-MCNC: 3.1 G/DL (ref 3.2–4.9)
ALBUMIN/GLOB SERPL: 1.1 G/DL
ALP SERPL-CCNC: 71 U/L (ref 30–99)
ALT SERPL-CCNC: 8 U/L (ref 2–50)
ANION GAP SERPL CALC-SCNC: 10 MMOL/L (ref 7–16)
AST SERPL-CCNC: 11 U/L (ref 12–45)
BASOPHILS # BLD AUTO: 0.5 % (ref 0–1.8)
BASOPHILS # BLD: 0.06 K/UL (ref 0–0.12)
BILIRUB SERPL-MCNC: 0.3 MG/DL (ref 0.1–1.5)
BUN SERPL-MCNC: 8 MG/DL (ref 8–22)
CALCIUM SERPL-MCNC: 8.1 MG/DL (ref 8.4–10.2)
CHLORIDE SERPL-SCNC: 108 MMOL/L (ref 96–112)
CO2 SERPL-SCNC: 21 MMOL/L (ref 20–33)
CREAT SERPL-MCNC: 0.72 MG/DL (ref 0.5–1.4)
EOSINOPHIL # BLD AUTO: 0.35 K/UL (ref 0–0.51)
EOSINOPHIL NFR BLD: 2.9 % (ref 0–6.9)
ERYTHROCYTE [DISTWIDTH] IN BLOOD BY AUTOMATED COUNT: 41.8 FL (ref 35.9–50)
FERRITIN SERPL-MCNC: 38.1 NG/ML (ref 10–291)
GLOBULIN SER CALC-MCNC: 2.9 G/DL (ref 1.9–3.5)
GLUCOSE SERPL-MCNC: 114 MG/DL (ref 65–99)
HCT VFR BLD AUTO: 33.3 % (ref 37–47)
HGB BLD-MCNC: 10.8 G/DL (ref 12–16)
IMM GRANULOCYTES # BLD AUTO: 0.07 K/UL (ref 0–0.11)
IMM GRANULOCYTES NFR BLD AUTO: 0.6 % (ref 0–0.9)
IRON SATN MFR SERPL: 5 % (ref 15–55)
IRON SERPL-MCNC: 15 UG/DL (ref 40–170)
LYMPHOCYTES # BLD AUTO: 1.08 K/UL (ref 1–4.8)
LYMPHOCYTES NFR BLD: 9 % (ref 22–41)
MCH RBC QN AUTO: 28.3 PG (ref 27–33)
MCHC RBC AUTO-ENTMCNC: 32.4 G/DL (ref 33.6–35)
MCV RBC AUTO: 87.4 FL (ref 81.4–97.8)
MONOCYTES # BLD AUTO: 0.95 K/UL (ref 0–0.85)
MONOCYTES NFR BLD AUTO: 8 % (ref 0–13.4)
NEUTROPHILS # BLD AUTO: 9.43 K/UL (ref 2–7.15)
NEUTROPHILS NFR BLD: 79 % (ref 44–72)
NRBC # BLD AUTO: 0 K/UL
NRBC BLD-RTO: 0 /100 WBC
PLATELET # BLD AUTO: 365 K/UL (ref 164–446)
PMV BLD AUTO: 10 FL (ref 9–12.9)
POTASSIUM SERPL-SCNC: 3.2 MMOL/L (ref 3.6–5.5)
PROT SERPL-MCNC: 6 G/DL (ref 6–8.2)
RBC # BLD AUTO: 3.81 M/UL (ref 4.2–5.4)
SCCMEC + MECA PNL NOSE NAA+PROBE: NEGATIVE
SODIUM SERPL-SCNC: 139 MMOL/L (ref 135–145)
TIBC SERPL-MCNC: 287 UG/DL (ref 250–450)
UIBC SERPL-MCNC: 272 UG/DL (ref 110–370)
WBC # BLD AUTO: 11.9 K/UL (ref 4.8–10.8)

## 2022-02-13 PROCEDURE — 87641 MR-STAPH DNA AMP PROBE: CPT

## 2022-02-13 PROCEDURE — 700102 HCHG RX REV CODE 250 W/ 637 OVERRIDE(OP): Performed by: HOSPITALIST

## 2022-02-13 PROCEDURE — 80053 COMPREHEN METABOLIC PANEL: CPT

## 2022-02-13 PROCEDURE — A9270 NON-COVERED ITEM OR SERVICE: HCPCS | Performed by: HOSPITALIST

## 2022-02-13 PROCEDURE — 99232 SBSQ HOSP IP/OBS MODERATE 35: CPT | Performed by: INTERNAL MEDICINE

## 2022-02-13 PROCEDURE — 700105 HCHG RX REV CODE 258: Performed by: HOSPITALIST

## 2022-02-13 PROCEDURE — 770001 HCHG ROOM/CARE - MED/SURG/GYN PRIV*

## 2022-02-13 PROCEDURE — 700111 HCHG RX REV CODE 636 W/ 250 OVERRIDE (IP): Performed by: INTERNAL MEDICINE

## 2022-02-13 PROCEDURE — 700111 HCHG RX REV CODE 636 W/ 250 OVERRIDE (IP): Performed by: HOSPITALIST

## 2022-02-13 PROCEDURE — 36415 COLL VENOUS BLD VENIPUNCTURE: CPT

## 2022-02-13 PROCEDURE — 700101 HCHG RX REV CODE 250: Performed by: HOSPITALIST

## 2022-02-13 PROCEDURE — 85025 COMPLETE CBC W/AUTO DIFF WBC: CPT

## 2022-02-13 RX ORDER — POTASSIUM CHLORIDE 7.45 MG/ML
10 INJECTION INTRAVENOUS
Status: COMPLETED | OUTPATIENT
Start: 2022-02-13 | End: 2022-02-13

## 2022-02-13 RX ADMIN — OXYCODONE HYDROCHLORIDE 5 MG: 5 TABLET ORAL at 10:11

## 2022-02-13 RX ADMIN — OXYCODONE HYDROCHLORIDE 5 MG: 5 TABLET ORAL at 13:26

## 2022-02-13 RX ADMIN — WATER 750 MG: 1000 INJECTION, SOLUTION INTRAVENOUS at 20:26

## 2022-02-13 RX ADMIN — PROCHLORPERAZINE EDISYLATE 10 MG: 5 INJECTION INTRAMUSCULAR; INTRAVENOUS at 22:43

## 2022-02-13 RX ADMIN — MORPHINE SULFATE 2 MG: 4 INJECTION INTRAVENOUS at 08:21

## 2022-02-13 RX ADMIN — WATER 750 MG: 1000 INJECTION, SOLUTION INTRAVENOUS at 08:56

## 2022-02-13 RX ADMIN — PIPERACILLIN AND TAZOBACTAM 3.38 G: 3; .375 INJECTION, POWDER, LYOPHILIZED, FOR SOLUTION INTRAVENOUS; PARENTERAL at 04:40

## 2022-02-13 RX ADMIN — ONDANSETRON 4 MG: 2 INJECTION INTRAMUSCULAR; INTRAVENOUS at 20:31

## 2022-02-13 RX ADMIN — PIPERACILLIN AND TAZOBACTAM 3.38 G: 3; .375 INJECTION, POWDER, LYOPHILIZED, FOR SOLUTION INTRAVENOUS; PARENTERAL at 22:36

## 2022-02-13 RX ADMIN — POTASSIUM CHLORIDE 10 MEQ: 7.46 INJECTION, SOLUTION INTRAVENOUS at 08:11

## 2022-02-13 RX ADMIN — ONDANSETRON 4 MG: 2 INJECTION INTRAMUSCULAR; INTRAVENOUS at 10:29

## 2022-02-13 RX ADMIN — POTASSIUM CHLORIDE 10 MEQ: 7.46 INJECTION, SOLUTION INTRAVENOUS at 10:08

## 2022-02-13 RX ADMIN — OXYCODONE HYDROCHLORIDE 5 MG: 5 TABLET ORAL at 17:38

## 2022-02-13 RX ADMIN — ONDANSETRON 4 MG: 2 INJECTION INTRAMUSCULAR; INTRAVENOUS at 06:27

## 2022-02-13 RX ADMIN — MORPHINE SULFATE 2 MG: 4 INJECTION INTRAVENOUS at 22:36

## 2022-02-13 RX ADMIN — SODIUM CHLORIDE, POTASSIUM CHLORIDE, SODIUM LACTATE AND CALCIUM CHLORIDE: 600; 310; 30; 20 INJECTION, SOLUTION INTRAVENOUS at 00:29

## 2022-02-13 RX ADMIN — PIPERACILLIN AND TAZOBACTAM 3.38 G: 3; .375 INJECTION, POWDER, LYOPHILIZED, FOR SOLUTION INTRAVENOUS; PARENTERAL at 00:30

## 2022-02-13 RX ADMIN — PIPERACILLIN AND TAZOBACTAM 3.38 G: 3; .375 INJECTION, POWDER, LYOPHILIZED, FOR SOLUTION INTRAVENOUS; PARENTERAL at 13:22

## 2022-02-13 RX ADMIN — POTASSIUM CHLORIDE 10 MEQ: 7.46 INJECTION, SOLUTION INTRAVENOUS at 09:02

## 2022-02-13 RX ADMIN — MORPHINE SULFATE 2 MG: 4 INJECTION INTRAVENOUS at 04:40

## 2022-02-13 ASSESSMENT — ENCOUNTER SYMPTOMS
DOUBLE VISION: 0
WEAKNESS: 0
ABDOMINAL PAIN: 1
BLOOD IN STOOL: 0
DEPRESSION: 0
CLAUDICATION: 0
HEADACHES: 0
DIZZINESS: 0
NAUSEA: 1
SPUTUM PRODUCTION: 0
FLANK PAIN: 0
FEVER: 0
HEARTBURN: 0
MYALGIAS: 0
SORE THROAT: 0
ORTHOPNEA: 0
BLURRED VISION: 0
BACK PAIN: 0
VOMITING: 0
HEMOPTYSIS: 0
SENSORY CHANGE: 0
PHOTOPHOBIA: 0
EYE DISCHARGE: 0
SPEECH CHANGE: 0
PALPITATIONS: 0
SHORTNESS OF BREATH: 0
BRUISES/BLEEDS EASILY: 0
CHILLS: 0
COUGH: 0
DIARRHEA: 0

## 2022-02-13 ASSESSMENT — PAIN DESCRIPTION - PAIN TYPE
TYPE: ACUTE PAIN

## 2022-02-13 NOTE — ED PROVIDER NOTES
ED Provider Note    CHIEF COMPLAINT  Chief Complaint   Patient presents with   • RUQ Pain       HPI  Mari Dillon is a 51 y.o. female here for evaluation of right upper quadrant abdominal pain.  Patient states that she has had this for a few days, and was seen and evaluated at the urgent care for the same.  They told the patient to come in because of an outpatient CT that was ordered.  The patient states that she was having continued pain, which prompted her to be seen in the first place.  She does have history of surgery last year, for bowel resection.  Patient states that she has no fever chills or vomiting, and states that the pain stays in the right upper abdomen.  Does not radiate to the back.  Patient states that things seem to leave exacerbate her symptoms.  She does not take any anticoagulants.  She has no chest pain or shortness of breath, no other issues.      ROS  See HPI for further details, o/w negative.     PAST MEDICAL HISTORY   has a past medical history of Anxiety (2009).    SOCIAL HISTORY  Social History     Tobacco Use   • Smoking status: Former Smoker     Years: 15.00     Quit date: 2005     Years since quittin.1   • Smokeless tobacco: Never Used   • Tobacco comment: 3-4 years ago   Vaping Use   • Vaping Use: Never used   Substance and Sexual Activity   • Alcohol use: No     Alcohol/week: 0.0 oz   • Drug use: Yes     Types: Inhaled, Marijuana     Comment: Marijuana daily   • Sexual activity: Yes     Partners: Male       Family History  No bleeding disorders    SURGICAL HISTORY   has a past surgical history that includes other (); appendectomy ();  delivery only (); shoulder arthroscopy w/ bicipital tenodesis repair (Right, 6/15/2015); clavicle distal excision (Right, 6/15/2015); shoulder decompression (Right, 6/15/2015); shoulder decompression arthroscopic (Right, 2018); clavicle distal excision (Left, 2018); shoulder arthroscopy w/ bicipital  tenodesis repair (Left, 12/24/2018); shoulder manipulation (Left, 12/24/2018); exploratory of abdomen (5/22/2021); hemicolectomy, right (5/22/2021); exploratory of abdomen (N/A, 5/28/2021); exploratory of abdomen (N/A, 6/4/2021); exploratory of abdomen (6/11/2021); ileostomy (6/11/2021); cystourethroscopy (N/A, 11/19/2021); cysto/uretero/pyeloscopy, dx (Left, 11/19/2021); and lasertripsy (Left, 11/19/2021).    CURRENT MEDICATIONS  Home Medications    **Home medications have not yet been reviewed for this encounter**         ALLERGIES  No Known Allergies    REVIEW OF SYSTEMS  See HPI for further details. Review of systems as above, otherwise all other systems are negative.     PHYSICAL EXAM  Constitutional: Well developed, well nourished. No acute distress.  HEENT: Normocephalic, atraumatic. Posterior pharynx clear and moist.  Eyes:  EOMI. Normal sclera.  Neck: Supple, Full range of motion, nontender.  Chest/Pulmonary: clear to ausculation. Symmetrical expansion.   Cardio: Regular rate and rhythm with no murmur.   Abdomen: Soft, mild right upper tenderness, no guarding.  No peritoneal signs. No guarding. No palpable masses.  Back: No CVA tenderness, nontender midline, no step offs.  Musculoskeletal: No deformity, no edema, neurovascular intact.   Neuro: Clear speech, appropriate, cooperative, cranial nerves II-XII grossly intact.  Psych: Normal mood and affect    PROCEDURES     MEDICAL RECORD  I have reviewed patient's medical record and pertinent results are listed.    COURSE & MEDICAL DECISION MAKING  I have reviewed any medical record information, laboratory studies and radiographic results as noted above.    Results for orders placed or performed during the hospital encounter of 02/12/22   CBC WITH DIFFERENTIAL   Result Value Ref Range    WBC 11.9 (H) 4.8 - 10.8 K/uL    RBC 4.09 (L) 4.20 - 5.40 M/uL    Hemoglobin 11.5 (L) 12.0 - 16.0 g/dL    Hematocrit 35.3 (L) 37.0 - 47.0 %    MCV 86.3 81.4 - 97.8 fL    MCH 28.1  27.0 - 33.0 pg    MCHC 32.6 (L) 33.6 - 35.0 g/dL    RDW 41.1 35.9 - 50.0 fL    Platelet Count 407 164 - 446 K/uL    MPV 9.7 9.0 - 12.9 fL    Neutrophils-Polys 72.30 (H) 44.00 - 72.00 %    Lymphocytes 14.90 (L) 22.00 - 41.00 %    Monocytes 9.60 0.00 - 13.40 %    Eosinophils 2.50 0.00 - 6.90 %    Basophils 0.30 0.00 - 1.80 %    Immature Granulocytes 0.40 0.00 - 0.90 %    Nucleated RBC 0.00 /100 WBC    Neutrophils (Absolute) 8.56 (H) 2.00 - 7.15 K/uL    Lymphs (Absolute) 1.77 1.00 - 4.80 K/uL    Monos (Absolute) 1.14 (H) 0.00 - 0.85 K/uL    Eos (Absolute) 0.30 0.00 - 0.51 K/uL    Baso (Absolute) 0.04 0.00 - 0.12 K/uL    Immature Granulocytes (abs) 0.05 0.00 - 0.11 K/uL    NRBC (Absolute) 0.00 K/uL   COMP METABOLIC PANEL   Result Value Ref Range    Sodium 137 135 - 145 mmol/L    Potassium 3.0 (L) 3.6 - 5.5 mmol/L    Chloride 104 96 - 112 mmol/L    Co2 23 20 - 33 mmol/L    Anion Gap 10.0 7.0 - 16.0    Glucose 112 (H) 65 - 99 mg/dL    Bun 9 8 - 22 mg/dL    Creatinine 0.66 0.50 - 1.40 mg/dL    Calcium 8.8 8.4 - 10.2 mg/dL    AST(SGOT) 12 12 - 45 U/L    ALT(SGPT) 10 2 - 50 U/L    Alkaline Phosphatase 84 30 - 99 U/L    Total Bilirubin 0.2 0.1 - 1.5 mg/dL    Albumin 3.8 3.2 - 4.9 g/dL    Total Protein 7.0 6.0 - 8.2 g/dL    Globulin 3.2 1.9 - 3.5 g/dL    A-G Ratio 1.2 g/dL   LIPASE   Result Value Ref Range    Lipase 39 7 - 58 U/L   URINALYSIS    Specimen: Urine   Result Value Ref Range    Color Yellow     Character Clear     Specific Gravity >=1.030 <1.035    Ph 6.5 5.0 - 8.0    Glucose Negative Negative mg/dL    Ketones Trace (A) Negative mg/dL    Protein 30 (A) Negative mg/dL    Bilirubin Small (A) Negative    Nitrite Negative Negative    Leukocyte Esterase Negative Negative    Occult Blood Negative Negative    Micro Urine Req Microscopic    URINE MICROSCOPIC (W/UA)   Result Value Ref Range    WBC 2-5 /hpf    RBC 0-2 /hpf    Bacteria Few (A) None /hpf    Epithelial Cells Moderate (A) Few /hpf    Mucous Threads Few /hpf     Hyaline Cast 0-2 /lpf   ESTIMATED GFR   Result Value Ref Range    GFR If African American >60 >60 mL/min/1.73 m 2    GFR If Non African American >60 >60 mL/min/1.73 m 2     No orders to display         HYDRATION: Based on the patient's presentation of Dehydration the patient was given IV fluids. IV Hydration was used because oral hydration was not adequate alone. Upon recheck following hydration, the patient was improved.    8:15 PM  I spoke to Dr. Wood who was paged instead of Cherrie. She states to admit the pt to the hospitalist, call Dr. Grier. Abx.      Dr. Grier will see as consult. Pt will be admitted to the hospitalist group.      FINAL IMPRESSION  Abdominal pain  Abdominal abscess         Electronically signed by: Juan Joés Tipton D.O., 2/12/2022 7:25 PM

## 2022-02-13 NOTE — CARE PLAN
The patient is Stable - Low risk of patient condition declining or worsening    Shift Goals  Clinical Goals: pain management, comfort   Patient Goals: comfort, rest     Progress made toward(s) clinical / shift goals:  Pt has been medicated this shift per MAR for pain control and comfort measures provided. Progressing on other goals at this time.     Patient is not progressing towards the following goals:

## 2022-02-13 NOTE — PROGRESS NOTES
Received report from night shift RN, assumed care of pt. TRINA Collins. Pt states pain 7/10 to abdomen, medicated per MAR. Denies nausea or SOB. Updated on plan of care for the day, answered any questions. Safety precautions in place, pt educated to call for assistance.

## 2022-02-13 NOTE — ED TRIAGE NOTES
"Presents with RUQ abdominal pain escalating since this past Thursday.  She denies N/V/D, or any fever.    A Renal/Colic evaluation CT scan obtained today reveals the following:  \"A 5.2 cm interloop fluid collection located between the clustered small bowel loops in the right hemiabdomen. It may represent an interloop abscess and may be responsible for patient's pain.\"  Chief Complaint   Patient presents with   • RUQ Pain     /81   Pulse 100   Temp 36.3 °C (97.3 °F) (Temporal)   Resp 20   Ht 1.753 m (5' 9\")   Wt 66 kg (145 lb 8.1 oz)   LMP 11/13/2021 (Exact Date)   SpO2 100%   BMI 21.49 kg/m²   Has this patient been vaccinated for COVID NO  If not, would they like to be vaccinated while in the ER if eligible?  NO  Would the patient like to speak with the ERP about the possibility of receiving the COVID vaccine today before making a decision? NO    "

## 2022-02-13 NOTE — PROGRESS NOTES
Pt is awake in bed. VSS. Pt c/o 8/10 abdominal pain. Medicated per MAR. No n/v. Discussed POC with pt. All questions answered. Fall precautions in place.

## 2022-02-13 NOTE — ASSESSMENT & PLAN NOTE
Leukocytosis secondary to current infection  Monitor white blood cell count  Should improve with antibiotic management  Obtain blood cultures

## 2022-02-13 NOTE — H&P
Hospital Medicine History & Physical Note    Date of Service  2/12/2022    Primary Care Physician  Brandan Prieto M.D.    Consultants  general surgery    Specialist Names: Dr. Grier    Code Status  Full Code    Chief Complaint  Chief Complaint   Patient presents with   • RUQ Pain       History of Presenting Illness  Mari Dillon is a 51 y.o. female who presented 2/12/2022 with abdominal pain for the past 48 hours.  Patient initially went to be seen at urgent care due to the pain.  An urgent care CT of the abdomen was done and it shows a 5.2 x 3.6 x 3.7 cm abscess.  It is in between the bowel loops.  The patient will need at this point evaluation with surgery.  She will be kept n.p.o.  We will put her on fluid resuscitation.  Blood cultures have been ordered.  Antibiotics with vancomycin and Zosyn will be initiated after blood cultures are collected.  Pain management will be initiated as needed..    I discussed the plan of care with patient, family, bedside RN and Emergency room physician.    Review of Systems  Review of Systems   Constitutional: Negative.  Negative for chills, diaphoresis and fever.   HENT: Negative.  Negative for nosebleeds and sinus pain.    Eyes: Negative.  Negative for double vision, photophobia, discharge and redness.   Respiratory: Negative.  Negative for cough, sputum production, shortness of breath, wheezing and stridor.    Cardiovascular: Negative.  Negative for chest pain, orthopnea, leg swelling and PND.   Gastrointestinal: Positive for abdominal pain and nausea. Negative for blood in stool and heartburn.   Genitourinary: Negative.  Negative for dysuria, flank pain and frequency.   Musculoskeletal: Negative.  Negative for back pain, falls and myalgias.   Skin: Negative.  Negative for itching.   Neurological: Negative.  Negative for dizziness, tingling, sensory change, focal weakness and seizures.   Endo/Heme/Allergies: Negative.  Negative for polydipsia. Does not bruise/bleed  easily.   Psychiatric/Behavioral: Negative.  Negative for depression, substance abuse and suicidal ideas. The patient does not have insomnia.    All other systems reviewed and are negative.      Past Medical History   has a past medical history of Anxiety (2009).    Surgical History   has a past surgical history that includes other (); appendectomy (); pr  delivery only (); shoulder arthroscopy w/ bicipital tenodesis repair (Right, 6/15/2015); clavicle distal excision (Right, 6/15/2015); shoulder decompression (Right, 6/15/2015); shoulder decompression arthroscopic (Right, 2018); clavicle distal excision (Left, 2018); shoulder arthroscopy w/ bicipital tenodesis repair (Left, 2018); shoulder manipulation (Left, 2018); pr exploratory of abdomen (2021); hemicolectomy, right (2021); pr exploratory of abdomen (N/A, 2021); pr exploratory of abdomen (N/A, 2021); pr exploratory of abdomen (2021); ileostomy (2021); pr cystourethroscopy (N/A, 2021); pr cysto/uretero/pyeloscopy, dx (Left, 2021); and lasertripsy (Left, 2021).     Family History  family history includes Cancer in her maternal grandmother; Diabetes in her mother; Hypertension in her mother.   Family history reviewed with patient. There is no family history that is pertinent to the chief complaint.     Social History   reports that she quit smoking about 17 years ago. She quit after 15.00 years of use. She has never used smokeless tobacco. She reports current drug use. Drugs: Inhaled and Marijuana. She reports that she does not drink alcohol.    Allergies  No Known Allergies    Medications  Prior to Admission Medications   Prescriptions Last Dose Informant Patient Reported? Taking?   HYDROcodone/acetaminophen (NORCO)  MG Tab   Yes No   Sig: Take 0.5 Tablets by mouth one time.      Facility-Administered Medications: None       Physical Exam  Temp:  [36.2 °C (97.1  °F)-36.3 °C (97.3 °F)] 36.3 °C (97.3 °F)  Pulse:  [100-111] 100  Resp:  [18-20] 20  BP: (116-124)/(80-81) 116/81  SpO2:  [99 %-100 %] 100 %  Blood Pressure: 116/81   Temperature: 36.3 °C (97.3 °F)   Pulse: 100   Respiration: 20   Pulse Oximetry: 100 %       Physical Exam  Vitals and nursing note reviewed. Exam conducted with a chaperone present.   Constitutional:       General: She is awake.      Appearance: Normal appearance. She is well-developed, well-groomed and normal weight. She is ill-appearing.   HENT:      Head: Normocephalic and atraumatic.      Jaw: There is normal jaw occlusion. No trismus.      Salivary Glands: Right salivary gland is not tender. Left salivary gland is not tender.      Right Ear: External ear normal.      Left Ear: External ear normal.      Nose: Nose normal.      Mouth/Throat:      Mouth: Mucous membranes are dry.      Pharynx: Oropharynx is clear.   Eyes:      General: Lids are normal. Vision grossly intact.      Extraocular Movements: Extraocular movements intact.      Conjunctiva/sclera: Conjunctivae normal.      Right eye: Right conjunctiva is not injected. No exudate.     Left eye: Left conjunctiva is not injected. No exudate.     Pupils: Pupils are equal, round, and reactive to light.   Neck:      Thyroid: No thyroid mass.      Vascular: No hepatojugular reflux or JVD.      Trachea: No abnormal tracheal secretions or tracheal deviation.   Cardiovascular:      Rate and Rhythm: Normal rate and regular rhythm. Occasional extrasystoles are present.     Pulses: Normal pulses.      Heart sounds: Normal heart sounds. No murmur heard.  No friction rub.   Pulmonary:      Effort: Pulmonary effort is normal.      Breath sounds: Examination of the right-lower field reveals decreased breath sounds. Examination of the left-lower field reveals decreased breath sounds. Decreased breath sounds present. No wheezing or rhonchi.   Chest:   Breasts:      Right: No supraclavicular adenopathy.       Left: No supraclavicular adenopathy.       Abdominal:      General: Abdomen is flat.      Palpations: Abdomen is soft.      Tenderness: There is abdominal tenderness in the right lower quadrant. There is no right CVA tenderness or left CVA tenderness.      Hernia: No hernia is present.       Musculoskeletal:         General: Normal range of motion.      Cervical back: Full passive range of motion without pain, normal range of motion and neck supple. No rigidity. No muscular tenderness.      Right lower leg: No edema.      Left lower leg: No edema.   Lymphadenopathy:      Head:      Right side of head: No submental adenopathy.      Left side of head: No submental adenopathy.      Cervical:      Right cervical: No superficial cervical adenopathy.     Left cervical: No superficial cervical adenopathy.      Upper Body:      Right upper body: No supraclavicular adenopathy.      Left upper body: No supraclavicular adenopathy.   Skin:     General: Skin is warm and dry.      Capillary Refill: Capillary refill takes less than 2 seconds.      Coloration: Skin is not cyanotic or pale.      Findings: No abrasion or bruising.   Neurological:      General: No focal deficit present.      Mental Status: She is alert and oriented to person, place, and time. Mental status is at baseline.      GCS: GCS eye subscore is 4. GCS verbal subscore is 5. GCS motor subscore is 6.      Cranial Nerves: No cranial nerve deficit.      Sensory: No sensory deficit.      Motor: Motor function is intact.      Deep Tendon Reflexes:      Reflex Scores:       Tricep reflexes are 2+ on the right side and 2+ on the left side.       Bicep reflexes are 2+ on the right side and 2+ on the left side.       Brachioradialis reflexes are 2+ on the right side and 2+ on the left side.       Patellar reflexes are 2+ on the right side and 2+ on the left side.       Achilles reflexes are 2+ on the right side and 2+ on the left side.  Psychiatric:         Attention and  Perception: Attention and perception normal.         Mood and Affect: Mood normal.         Speech: Speech normal.         Behavior: Behavior normal. Behavior is cooperative.         Thought Content: Thought content normal.         Cognition and Memory: Cognition and memory normal.         Judgment: Judgment normal.         Laboratory:  Recent Labs     02/12/22 1925   WBC 11.9*   RBC 4.09*   HEMOGLOBIN 11.5*   HEMATOCRIT 35.3*   MCV 86.3   MCH 28.1   MCHC 32.6*   RDW 41.1   PLATELETCT 407   MPV 9.7     Recent Labs     02/12/22 1925   SODIUM 137   POTASSIUM 3.0*   CHLORIDE 104   CO2 23   GLUCOSE 112*   BUN 9   CREATININE 0.66   CALCIUM 8.8     Recent Labs     02/12/22 1925   ALTSGPT 10   ASTSGOT 12   ALKPHOSPHAT 84   TBILIRUBIN 0.2   LIPASE 39   GLUCOSE 112*         No results for input(s): NTPROBNP in the last 72 hours.      No results for input(s): TROPONINT in the last 72 hours.    Imaging:  No orders to display       X-Ray:  I have personally reviewed the images and compared with prior images.  EKG:  I have personally reviewed the images and compared with prior images.    Assessment/Plan:  I anticipate this patient will require at least two midnights for appropriate medical management, necessitating inpatient admission.    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  This required 5 different surgeries as the patient began getting wound dehiscence and reaccumulation of intra-abdominal abscess.  Patient suddenly started to develop abdominal pain over the past 48 hours on her right lower quadrant.  She initially went to urgent care for evaluation and CT scan of the abdomen showed a 5.2 x 3.6 x 3.7 cm abscess.  She was then referred over to the emergency room  Start vancomycin and Zosyn  Surgical consultation  Pain management  Fluid resuscitation  Blood cultures and if patient does go to surgery will need intraoperative  cultures    Nausea and vomiting- (present on admission)  Assessment & Plan  Nausea and vomiting control    Leukocytosis- (present on admission)  Assessment & Plan  Leukocytosis secondary to current infection  Monitor white blood cell count  Should improve with antibiotic management  Obtain blood cultures    Protein-calorie malnutrition, moderate (HCC)- (present on admission)  Assessment & Plan  Give nutritional support patient has not been eating well ever since her abdominal surgeries.    Anemia- (present on admission)  Assessment & Plan  H&H slightly down 11.5 and 35.3  Monitor H&H if drops below 7 or 21 transfuse  Currently not in transfusion status  Patient was iron deficient in May 2021  Repeat iron levels    Anxiety- (present on admission)  Assessment & Plan  Moderate degree of anxiety, continue anxiolytic management as needed      VTE prophylaxis: SCDs/TEDs

## 2022-02-13 NOTE — PROGRESS NOTES
"Pharmacy Vancomycin Kinetics Note for 2/12/2022     51 y.o. female on Vancomycin day # 1     Vancomycin Indication (AUC Dosing):  (Intraabdominal abcess)    Provider specified end date: 02/22/22    Active Antibiotics (From admission, onward)    Ordered     Ordering Provider       Sat Feb 12, 2022  9:01 PM    02/12/22 2101  vancomycin 750 mg in 250 mL NS IVPB Premix  (vancomycin (VANCOCIN) IV (LD + Maintenance))  EVERY 12 HOURS         Mamta Marsh M.D.       Sat Feb 12, 2022  8:46 PM    02/12/22 2046  vancomycin 1750 mg/500mL NS IVPB premix  (vancomycin (VANCOCIN) IV (LD + Maintenance))  ONCE         Mamta Marsh M.D.       Sat Feb 12, 2022  8:40 PM    02/12/22 2040  MD Alert...Vancomycin per Pharmacy  PRN        Question:  Indication(s) for vancomycin?  Answer:  Intra-abdominal infection    Mamta Marsh M.D.    02/12/22 2040  piperacillin-tazobactam (ZOSYN) 3.375 g in  mL IVPB  (piperacillin-tazobactam (ZOSYN) IV (Extended-infusion) PANEL )  EVERY 8 HOURS        \"And\" Linked Group Details    Mamta Marsh M.D.       Sat Feb 12, 2022  7:25 PM    02/12/22 1925  piperacillin-tazobactam (ZOSYN) 3.375 g in  mL IVPB  (Intra-Abdominal Infection)  ONCE         Juan José Tipton D.O.        Dosing Weight: 66 kg (145 lb 8.1 oz)    Admission History: Admitted on 2/12/2022 for Intra-abdominal abscess (HCC) [K65.1]  Pertinent history: 50 yo female with complicated PMH presents w abdominal pain and is found to have an intra-abdominal abcess.    Allergies: Patient has no known allergies.     Pertinent cultures to date:   Results     Procedure Component Value Units Date/Time    BLOOD CULTURE [[id number]]     Order Status: Sent Specimen: Blood from Peripheral     BLOOD CULTURE [[id number]]     Order Status: Sent Specimen: Blood from Peripheral     URINALYSIS [[id number]]  (Abnormal) Collected: 02/12/22 1911    Order Status: Completed Specimen: Urine Updated: 02/12/22 1950     Color Yellow     Character Clear     " "Specific Gravity >=1.030     Ph 6.5     Glucose Negative mg/dL      Ketones Trace mg/dL      Protein 30 mg/dL      Bilirubin Small     Nitrite Negative     Leukocyte Esterase Negative     Occult Blood Negative     Micro Urine Req Microscopic    BLOOD CULTURE x2 [[id number]] Collected: 22    Order Status: Completed Specimen: Blood from Peripheral Updated: 22    Narrative:      Per Hospital Policy: Only change Specimen Src: to \"Line\" if  specified by physician order.    BLOOD CULTURE x2 [[id number]] Collected: 22    Order Status: Completed Specimen: Blood from Peripheral Updated: 22    Narrative:      Per Hospital Policy: Only change Specimen Src: to \"Line\" if  specified by physician order.        Labs: Estimated Creatinine Clearance: 105.1 mL/min (by C-G formula based on SCr of 0.66 mg/dL).     Recent Labs     22   WBC 11.9*   NEUTSPOLYS 72.30*     Recent Labs     22   BUN 9   CREATININE 0.66   ALBUMIN 3.8     No intake or output data in the 24 hours ending 22 2101   /81   Pulse 100   Temp 36.3 °C (97.3 °F) (Temporal)   Resp 20   Ht 1.753 m (5' 9\")   Wt 66 kg (145 lb 8.1 oz)   SpO2 100%  Temp (24hrs), Av.3 °C (97.3 °F), Min:36.3 °C (97.3 °F), Max:36.3 °C (97.3 °F)      List concerns for Vancomycin clearance: None    Pharmacokinetics:  AUC kinetics:   Ke (hr ^-1): 0.0857 hr^-1  Half life: 8.09 hr  Clearance: 3.677  Estimated TDD: 1838.5  Estimated Dose: 774  Estimated interval: 10.1    Trough kinetics:   No results for input(s): VANCOTROUGH, VANCOPEAK, VANCORANDOM in the last 72 hours.    A/P:   -  Vancomycin dose: 1750mg loading dose then 750mg q12h (dosed conservatively due to chronic illness)    -  Next vancomycin level(s): not ordered.    -  Predicted vancomycin AUC from initial AUC test calculator: 408 mg·hr/L    -  Comments: if intra-abdominal abscess is believed to be of enteric origin recommend d/c vancomycin as monotherapy " with zosyn will provide adequate empiric coverage.    Omari Roberson, PharmD, BCPS

## 2022-02-13 NOTE — PROGRESS NOTES
"Hospital Medicine Daily Progress Note    Date of Service  2/13/2022    Chief Complaint  Mari Dillon is a 51 y.o. female admitted 2/12/2022 with abdominal pain    Hospital Course    Per HPI, \"Mari Dillon is a 51 y.o. female who presented 2/12/2022 with abdominal pain for the past 48 hours.  Patient initially went to be seen at urgent care due to the pain.  An urgent care CT of the abdomen was done and it shows a 5.2 x 3.6 x 3.7 cm abscess.  It is in between the bowel loops.  The patient will need at this point evaluation with surgery.  She will be kept n.p.o.  We will put her on fluid resuscitation.  Blood cultures have been ordered.  Antibiotics with vancomycin and Zosyn will be initiated after blood cultures are collected.  Pain management will be initiated as needed.\"    Interval Problem Update    2/13/2022: The patient was admitted for further evaluation and management of abdominal pain.  On CT imaging of the abdomen there was a abscess identified.  Patient does have a history of abdominal surgeries in the recent past.  She was evaluated by general surgery who recommends medical management at this time.  She is on appropriate antibiotics and receiving IV volume resuscitation.  At the time of my examination, patient still with right-sided abdominal pain with minimal control.  Pain medications have been adjusted.  She denies any chest pains, palpitations, shortness of breath.  No nausea or vomiting noted.  No other overnight events reported.    I have personally seen and examined the patient at bedside. I discussed the plan of care with patient.    Consultants/Specialty  general surgery    Code Status  Full Code    Disposition  Patient is not medically cleared for discharge.   Anticipate discharge to to home with close outpatient follow-up.  I have placed the appropriate orders for post-discharge needs.    Review of Systems  Review of Systems   Constitutional: Negative for chills, fever and " malaise/fatigue.   HENT: Negative for congestion, hearing loss, sore throat and tinnitus.    Eyes: Negative for blurred vision, double vision, photophobia and discharge.   Respiratory: Negative for cough, hemoptysis, sputum production and shortness of breath.    Cardiovascular: Negative for chest pain, palpitations, orthopnea, claudication and leg swelling.   Gastrointestinal: Positive for abdominal pain and nausea. Negative for blood in stool, diarrhea, heartburn, melena and vomiting.   Genitourinary: Negative for dysuria, flank pain, hematuria and urgency.   Musculoskeletal: Negative for back pain, joint pain and myalgias.   Skin: Negative for itching and rash.   Neurological: Negative for dizziness, sensory change, speech change, weakness and headaches.   Endo/Heme/Allergies: Does not bruise/bleed easily.   Psychiatric/Behavioral: Negative for depression and suicidal ideas.        Physical Exam  Temp:  [36.2 °C (97.1 °F)-37.1 °C (98.8 °F)] 37.1 °C (98.8 °F)  Pulse:  [] 90  Resp:  [16-20] 16  BP: (114-124)/(60-81) 114/66  SpO2:  [97 %-100 %] 100 %    Physical Exam  Vitals and nursing note reviewed.   Constitutional:       General: She is not in acute distress.     Appearance: Normal appearance.   HENT:      Head: Normocephalic and atraumatic.      Mouth/Throat:      Mouth: Mucous membranes are moist.   Eyes:      Extraocular Movements: Extraocular movements intact.      Conjunctiva/sclera: Conjunctivae normal.      Pupils: Pupils are equal, round, and reactive to light.   Cardiovascular:      Rate and Rhythm: Normal rate and regular rhythm.      Pulses: Normal pulses.      Heart sounds: Normal heart sounds. No murmur heard.  No friction rub.   Pulmonary:      Effort: Pulmonary effort is normal. No respiratory distress.      Breath sounds: Normal breath sounds. No wheezing.   Abdominal:      General: Abdomen is flat. Bowel sounds are normal.      Palpations: Abdomen is soft.      Tenderness: There is  abdominal tenderness. There is guarding.   Musculoskeletal:         General: No swelling or tenderness. Normal range of motion.      Cervical back: Normal range of motion and neck supple.   Skin:     General: Skin is warm and dry.      Findings: No rash.   Neurological:      General: No focal deficit present.      Mental Status: She is alert and oriented to person, place, and time.      Cranial Nerves: No cranial nerve deficit.      Motor: No weakness.   Psychiatric:         Mood and Affect: Mood normal.         Behavior: Behavior normal.         Fluids    Intake/Output Summary (Last 24 hours) at 2/13/2022 1117  Last data filed at 2/13/2022 0030  Gross per 24 hour   Intake 995.83 ml   Output 400 ml   Net 595.83 ml       Laboratory  Recent Labs     02/12/22 1925 02/13/22  0444   WBC 11.9* 11.9*   RBC 4.09* 3.81*   HEMOGLOBIN 11.5* 10.8*   HEMATOCRIT 35.3* 33.3*   MCV 86.3 87.4   MCH 28.1 28.3   MCHC 32.6* 32.4*   RDW 41.1 41.8   PLATELETCT 407 365   MPV 9.7 10.0     Recent Labs     02/12/22 1925 02/13/22  0444   SODIUM 137 139   POTASSIUM 3.0* 3.2*   CHLORIDE 104 108   CO2 23 21   GLUCOSE 112* 114*   BUN 9 8   CREATININE 0.66 0.72   CALCIUM 8.8 8.1*                   Imaging  No orders to display        Assessment/Plan  * Intra-abdominal abscess (HCC)- (present on admission)  Assessment & Plan  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  This required 5 different surgeries as the patient began getting wound dehiscence and reaccumulation of intra-abdominal abscess.  Patient suddenly started to develop abdominal pain over the past 48 hours on her right lower quadrant.  She initially went to urgent care for evaluation and CT scan of the abdomen showed a 5.2 x 3.6 x 3.7 cm abscess.  She was then referred over to the emergency room  Start vancomycin and Zosyn  Surgical consultation  Pain management  Fluid resuscitation  Blood cultures and if patient does go to surgery will need intraoperative  cultures    Nausea and vomiting- (present on admission)  Assessment & Plan  Nausea and vomiting control    Leukocytosis- (present on admission)  Assessment & Plan  Leukocytosis secondary to current infection  Monitor white blood cell count  Should improve with antibiotic management  Obtain blood cultures    Protein-calorie malnutrition, moderate (HCC)- (present on admission)  Assessment & Plan  Give nutritional support patient has not been eating well ever since her abdominal surgeries.    Anemia- (present on admission)  Assessment & Plan  H&H slightly down 11.5 and 35.3  Monitor H&H if drops below 7 or 21 transfuse  Currently not in transfusion status  Patient was iron deficient in May 2021  Repeat iron levels    Anxiety- (present on admission)  Assessment & Plan  Moderate degree of anxiety, continue anxiolytic management as needed       VTE prophylaxis: SCDs/TEDs    I have performed a physical exam and reviewed and updated ROS and Plan today (2/13/2022). In review of yesterday's note (2/12/2022), there are no changes except as documented above.

## 2022-02-13 NOTE — ASSESSMENT & PLAN NOTE
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  This required 5 different surgeries as the patient began getting wound dehiscence and reaccumulation of intra-abdominal abscess.  Patient suddenly started to develop abdominal pain over the past 48 hours on her right lower quadrant.  She initially went to urgent care for evaluation and CT scan of the abdomen showed a 5.2 x 3.6 x 3.7 cm abscess.  She was then referred over to the emergency room  Continue Zosyn  Surgical consultation, plan for IR drain placement today   Pain management  Blood cultures ntd

## 2022-02-13 NOTE — ASSESSMENT & PLAN NOTE
H&H slightly down 11.5 and 35.3  Monitor H&H if drops below 7 or 21 transfuse  Repeat iron levels show mild iron deficiency   Daily iron supplement

## 2022-02-13 NOTE — CONSULTS
Surgical Consultation    Date: 2/13/2022    Requesting Physician: Dr. Tipton  PCP: Brandan Prieto M.D.  Attending Physician: Riki Grier M.D.    CC: Abdominal pain    HPI: This is a 51 y.o. female who is presenting presenting with right-sided abdominal discomfort.  She has a history of cecal volvulus requiring right hemicolectomy which was complicated and required multiple subsequent operations back in June 2021.  She has a mild leukocytosis on admission, and CT abdomen pelvis showing an approximately 5 cm interloop fluid collection in the right hemiabdomen.  She feels a little bit better this morning and is very thirsty.  No fever or chills.  No nausea, vomiting at this time.  No hematemesis, hemoptysis, hematochezia, melena, neurologic changes.  She is very anxious about any possibility of needing surgery in the future.    Past Medical History:   Diagnosis Date   • Anxiety 6/9/2009       Past Surgical History:   Procedure Laterality Date   • CO CYSTOURETHROSCOPY N/A 11/19/2021    Procedure: CYSTOSCOPY;  Surgeon: Jose Omalley M.D.;  Location: [facility];  Service: Urology   • CO CYSTO/URETERO/PYELOSCOPY, DX Left 11/19/2021    Procedure: URETEROSCOPY;  Surgeon: Jose Omalley M.D.;  Location: [facility];  Service: Urology   • LASERTRIPSY Left 11/19/2021    Procedure: LITHOTRIPSY, USING LASER;  Surgeon: Jose Omalley M.D.;  Location: [facility];  Service: Urology   • CO EXPLORATORY OF ABDOMEN  6/11/2021    Procedure: LAPAROTOMY, EXPLORATORY;  Surgeon: Maryam Wood M.D.;  Location: [facility];  Service: General   • ILEOSTOMY  6/11/2021    Procedure: WOUND VAC PLACEMENT ;  Surgeon: Maryam Wood M.D.;  Location: [facility];  Service: General   • CO EXPLORATORY OF ABDOMEN N/A 6/4/2021    Procedure: LAPAROTOMY, EXPLORATORY, WITH WASH OUT;  Surgeon: Maryam Wood M.D.;  Location: [facility];  Service: General   • CO EXPLORATORY OF  ABDOMEN N/A 5/28/2021    Procedure: LAPAROTOMY, EXPLORATORY- RESECTION OF ILEOCECAL ANASTATMOSIS.;  Surgeon: Maryam Wood M.D.;  Location: [facility];  Service: General   • VA EXPLORATORY OF ABDOMEN  5/22/2021    Procedure: LAPAROTOMY, EXPLORATORY;  Surgeon: Maryam Wood M.D.;  Location: [facility];  Service: General   • HEMICOLECTOMY, RIGHT  5/22/2021    Procedure: HEMICOLECTOMY, RIGHT, SIDE TO SIDE ANASTOMOSIS;  Surgeon: Maryam Wood M.D.;  Location: [facility];  Service: General   • SHOULDER DECOMPRESSION ARTHROSCOPIC Right 12/24/2018    Procedure: SHOULDER DECOMPRESSION ARTHROSCOPIC - SUBACROMIAL;  Surgeon: Tristian Garcia M.D.;  Location: [facility];  Service: Orthopedics   • CLAVICLE DISTAL EXCISION Left 12/24/2018    Procedure: CLAVICLE DISTAL EXCISION;  Surgeon: Tristian Garcia M.D.;  Location: [facility];  Service: Orthopedics   • SHOULDER ARTHROSCOPY W/ BICIPITAL TENODESIS REPAIR Left 12/24/2018    Procedure: SHOULDER ARTHROSCOPY W/ BICIPITAL TENODESIS REPAIR - AND PACKER;  Surgeon: Tristian Garcia M.D.;  Location: [facility];  Service: Orthopedics   • SHOULDER MANIPULATION Left 12/24/2018    Procedure: SHOULDER MANIPULATION;  Surgeon: Tristian Garcia M.D.;  Location: [facility];  Service: Orthopedics   • SHOULDER ARTHROSCOPY W/ BICIPITAL TENODESIS REPAIR Right 6/15/2015    Procedure: SHOULDER ARTHROSCOPY W/ BICIPITAL TENODESIS, SYNOVECTOMY;  Surgeon: Tristian Garcia M.D.;  Location: [facility];  Service:    • CLAVICLE DISTAL EXCISION Right 6/15/2015    Procedure: CLAVICLE DISTAL EXCISION;  Surgeon: Tristian Garcia M.D.;  Location: [facility];  Service:    • SHOULDER DECOMPRESSION Right 6/15/2015    Procedure: SHOULDER DECOMPRESSION MINI INCISION ;  Surgeon: Tristian Gacria M.D.;  Location: [facility];  Service:    • OTHER  2015    nose   • APPENDECTOMY      • CT  DELIVERY ONLY         Current Facility-Administered Medications   Medication Dose Route Frequency Provider Last Rate Last Admin   • potassium chloride (KCL) ivpb 10 mEq  10 mEq Intravenous Q HOUR Dar Howard M.D. 100 mL/hr at 22 10 mEq at 22   • senna-docusate (PERICOLACE or SENOKOT S) 8.6-50 MG per tablet 2 Tablet  2 Tablet Oral BID Mamta Marsh M.D.        And   • polyethylene glycol/lytes (MIRALAX) PACKET 1 Packet  1 Packet Oral QDAY PRN Mamta Marsh M.D.        And   • magnesium hydroxide (MILK OF MAGNESIA) suspension 30 mL  30 mL Oral QDAY PRN Mamta Marsh M.D.        And   • bisacodyl (DULCOLAX) suppository 10 mg  10 mg Rectal QDAY PRN Mamta Marsh M.D.       • lactated ringers infusion   Intravenous Continuous Mamta Marsh M.D. 125 mL/hr at 229 New Bag at 22 002   • acetaminophen (Tylenol) tablet 650 mg  650 mg Oral Q6HRS PRN Mamta Marsh M.D.       • labetalol (NORMODYNE/TRANDATE) injection 10 mg  10 mg Intravenous Q4HRS PRN Mamta Marsh M.D.       • ondansetron (ZOFRAN) syringe/vial injection 4 mg  4 mg Intravenous Q4HRS PRN Mamta Marsh M.D.   4 mg at 22 0627   • ondansetron (ZOFRAN ODT) dispertab 4 mg  4 mg Oral Q4HRS PRN Mamta Marsh M.D.       • promethazine (PHENERGAN) tablet 12.5-25 mg  12.5-25 mg Oral Q4HRS PRN Mamta Marsh M.D.       • promethazine (PHENERGAN) suppository 12.5-25 mg  12.5-25 mg Rectal Q4HRS PRN Mamta Marsh M.D.       • prochlorperazine (COMPAZINE) injection 5-10 mg  5-10 mg Intravenous Q4HRS PRN Mamta Marsh M.D.       • MD Alert...Vancomycin per Pharmacy   Other PRN Mamta Marsh M.D.       • piperacillin-tazobactam (ZOSYN) 3.375 g in  mL IVPB  3.375 g Intravenous Q8HRS Mamta Marsh M.D.   Stopped at 22 0840   • vancomycin 750 mg in 250 mL NS IVPB Premix  750 mg Intravenous Q12HR Mamta Marsh M.D. 125 mL/hr at 22 0856 750 mg at 22 0856   • oxyCODONE  immediate-release (ROXICODONE) tablet 5 mg  5 mg Oral Q4HRS PRN Mamta Marsh M.D.       • morphine 4 MG/ML injection 2 mg  2 mg Intravenous Q2HRS PRN Mamta Marsh M.D.   2 mg at 22 0821       Social History     Socioeconomic History   • Marital status: Single     Spouse name: Not on file   • Number of children: Not on file   • Years of education: Not on file   • Highest education level: Not on file   Occupational History   • Not on file   Tobacco Use   • Smoking status: Former Smoker     Years: 15.00     Quit date: 2005     Years since quittin.1   • Smokeless tobacco: Never Used   • Tobacco comment: 3-4 years ago   Vaping Use   • Vaping Use: Never used   Substance and Sexual Activity   • Alcohol use: No     Alcohol/week: 0.0 oz   • Drug use: Yes     Types: Inhaled, Marijuana     Comment: Marijuana daily   • Sexual activity: Yes     Partners: Male   Other Topics Concern   • Not on file   Social History Narrative    ** Merged History Encounter **          Social Determinants of Health     Financial Resource Strain:    • Difficulty of Paying Living Expenses: Not on file   Food Insecurity:    • Worried About Running Out of Food in the Last Year: Not on file   • Ran Out of Food in the Last Year: Not on file   Transportation Needs:    • Lack of Transportation (Medical): Not on file   • Lack of Transportation (Non-Medical): Not on file   Physical Activity:    • Days of Exercise per Week: Not on file   • Minutes of Exercise per Session: Not on file   Stress:    • Feeling of Stress : Not on file   Social Connections:    • Frequency of Communication with Friends and Family: Not on file   • Frequency of Social Gatherings with Friends and Family: Not on file   • Attends Hoahaoism Services: Not on file   • Active Member of Clubs or Organizations: Not on file   • Attends Club or Organization Meetings: Not on file   • Marital Status: Not on file   Intimate Partner Violence:    • Fear of Current or Ex-Partner:  "Not on file   • Emotionally Abused: Not on file   • Physically Abused: Not on file   • Sexually Abused: Not on file   Housing Stability:    • Unable to Pay for Housing in the Last Year: Not on file   • Number of Places Lived in the Last Year: Not on file   • Unstable Housing in the Last Year: Not on file       Family History   Problem Relation Age of Onset   • Hypertension Mother    • Diabetes Mother    • Cancer Maternal Grandmother         breast       Allergies:  [place] meal    Review of Systems:  Negative except as noted above in the HPI on 10 point review    Physical Exam:  /60   Pulse 91   Temp 36.8 °C (98.2 °F) (Temporal)   Resp 18   Ht 1.753 m (5' 9\")   Wt 66 kg (145 lb 8.1 oz)   SpO2 97%     Constitutional: she is oriented to person, place, and time.  she appears well-developed and well-nourished. No acute distress.   Head: Normocephalic and atraumatic.   Neck: Normal range of motion. Neck supple. No JVD present. No tracheal deviation present.   Cardiovascular: Normal rate, regular rhythm  Pulmonary/Chest: Breathing is nonlabored, no evidence of respiratory distress  Abdominal: Soft, nondistended, mildly tender in the right mid abdomen without guarding.  Musculoskeletal: Normal range of motion. she exhibits no edema and no tenderness.   Neurological: she is alert and oriented to person, place, and time. she has normal reflexes.  No gross motor or sensory deficit noted  Skin: Skin is warm and dry. No rash noted. she is not diaphoretic. No erythema. No pallor.   Psychiatric: she has a normal mood and affect.  Behavior is appropriate under the circumstances.       Labs:  Recent Labs     02/12/22 1925 02/13/22 0444   WBC 11.9* 11.9*   RBC 4.09* 3.81*   HEMOGLOBIN 11.5* 10.8*   HEMATOCRIT 35.3* 33.3*   MCV 86.3 87.4   MCH 28.1 28.3   MCHC 32.6* 32.4*   RDW 41.1 41.8   PLATELETCT 407 365   MPV 9.7 10.0     Recent Labs     02/12/22 1925 02/13/22 0444   SODIUM 137 139   POTASSIUM 3.0* 3.2*   CHLORIDE " 104 108   CO2 23 21   GLUCOSE 112* 114*   BUN 9 8   CREATININE 0.66 0.72   CALCIUM 8.8 8.1*         Recent Labs     02/12/22  1925 02/13/22  0444   ASTSGOT 12 11*   ALTSGPT 10 8   TBILIRUBIN 0.2 0.3   ALKPHOSPHAT 84 71   GLOBULIN 3.2 2.9       Radiology:  CT abdomen pelvis 2/12/2022  IMPRESSION:     1.  Right hemicolectomy.  2.  A 5.2 cm interloop fluid collection located between the clustered small bowel loops in the right hemiabdomen. It may represent an interloop abscess and may be responsible for patient's pain. Please correlate with clinical symptoms and laboratory   findings.  3.  Nonobstructing right nephrolithiasis. No hydronephrosis.      Assessment: This is a 51 y.o. female with a history of cecal volvulus requiring right hemicolectomy and multiple other operations for complications related to the initial operation approximately 8 months ago.  She presents with a small interloop bowel fluid collection in the right mid abdomen without evidence of definite abscess.  No pneumoperitoneum or other signs of surgical emergency.  Relatively benign abdomen with mild leukocytosis.  Hemodynamically stable      Recommendations:   -No indication for acute surgical intervention at this time.  -Agree with recommendations of IV antibiotics and monitoring of abdominal exam.  -Okay for liquid diet as tolerated at this time from a surgical standpoint  -Ambulate  -Continue to follow clinically      Riki Grier M.D.  [facility]  [phone]

## 2022-02-13 NOTE — CARE PLAN
The patient is Stable - Low risk of patient condition declining or worsening    Shift Goals  Clinical Goals: pain management, prevent falls  Patient Goals: sleep comfortably    Progress made toward(s) clinical / shift goals:        Problem: Pain - Standard  Goal: Alleviation of pain or a reduction in pain to the patient’s comfort goal  Outcome: Progressing     Problem: Knowledge Deficit - Standard  Goal: Patient and family/care givers will demonstrate understanding of plan of care, disease process/condition, diagnostic tests and medications  Outcome: Progressing    Patient is not progressing towards the following goals:

## 2022-02-14 LAB
ANION GAP SERPL CALC-SCNC: 13 MMOL/L (ref 7–16)
BUN SERPL-MCNC: 7 MG/DL (ref 8–22)
CALCIUM SERPL-MCNC: 8.2 MG/DL (ref 8.4–10.2)
CHLORIDE SERPL-SCNC: 106 MMOL/L (ref 96–112)
CO2 SERPL-SCNC: 21 MMOL/L (ref 20–33)
CREAT SERPL-MCNC: 0.87 MG/DL (ref 0.5–1.4)
ERYTHROCYTE [DISTWIDTH] IN BLOOD BY AUTOMATED COUNT: 40.8 FL (ref 35.9–50)
GLUCOSE SERPL-MCNC: 92 MG/DL (ref 65–99)
HCT VFR BLD AUTO: 32.6 % (ref 37–47)
HGB BLD-MCNC: 10.6 G/DL (ref 12–16)
MAGNESIUM SERPL-MCNC: 1.7 MG/DL (ref 1.5–2.5)
MCH RBC QN AUTO: 28.2 PG (ref 27–33)
MCHC RBC AUTO-ENTMCNC: 32.5 G/DL (ref 33.6–35)
MCV RBC AUTO: 86.7 FL (ref 81.4–97.8)
PHOSPHATE SERPL-MCNC: 3 MG/DL (ref 2.5–4.5)
PLATELET # BLD AUTO: 346 K/UL (ref 164–446)
PMV BLD AUTO: 9.9 FL (ref 9–12.9)
POTASSIUM SERPL-SCNC: 3.1 MMOL/L (ref 3.6–5.5)
RBC # BLD AUTO: 3.76 M/UL (ref 4.2–5.4)
SODIUM SERPL-SCNC: 140 MMOL/L (ref 135–145)
WBC # BLD AUTO: 12.7 K/UL (ref 4.8–10.8)

## 2022-02-14 PROCEDURE — 84100 ASSAY OF PHOSPHORUS: CPT

## 2022-02-14 PROCEDURE — A9270 NON-COVERED ITEM OR SERVICE: HCPCS | Performed by: INTERNAL MEDICINE

## 2022-02-14 PROCEDURE — 700105 HCHG RX REV CODE 258: Performed by: HOSPITALIST

## 2022-02-14 PROCEDURE — 85027 COMPLETE CBC AUTOMATED: CPT

## 2022-02-14 PROCEDURE — 80048 BASIC METABOLIC PNL TOTAL CA: CPT

## 2022-02-14 PROCEDURE — 700102 HCHG RX REV CODE 250 W/ 637 OVERRIDE(OP): Performed by: INTERNAL MEDICINE

## 2022-02-14 PROCEDURE — 770001 HCHG ROOM/CARE - MED/SURG/GYN PRIV*

## 2022-02-14 PROCEDURE — 700102 HCHG RX REV CODE 250 W/ 637 OVERRIDE(OP): Performed by: HOSPITALIST

## 2022-02-14 PROCEDURE — 700111 HCHG RX REV CODE 636 W/ 250 OVERRIDE (IP): Performed by: HOSPITALIST

## 2022-02-14 PROCEDURE — 36415 COLL VENOUS BLD VENIPUNCTURE: CPT

## 2022-02-14 PROCEDURE — A9270 NON-COVERED ITEM OR SERVICE: HCPCS | Performed by: HOSPITALIST

## 2022-02-14 PROCEDURE — 99232 SBSQ HOSP IP/OBS MODERATE 35: CPT | Performed by: INTERNAL MEDICINE

## 2022-02-14 PROCEDURE — 83735 ASSAY OF MAGNESIUM: CPT

## 2022-02-14 RX ORDER — POTASSIUM CHLORIDE 20 MEQ/1
40 TABLET, EXTENDED RELEASE ORAL 2 TIMES DAILY
Status: DISCONTINUED | OUTPATIENT
Start: 2022-02-14 | End: 2022-02-17 | Stop reason: HOSPADM

## 2022-02-14 RX ADMIN — SODIUM CHLORIDE, POTASSIUM CHLORIDE, SODIUM LACTATE AND CALCIUM CHLORIDE: 600; 310; 30; 20 INJECTION, SOLUTION INTRAVENOUS at 17:54

## 2022-02-14 RX ADMIN — PIPERACILLIN AND TAZOBACTAM 3.38 G: 3; .375 INJECTION, POWDER, LYOPHILIZED, FOR SOLUTION INTRAVENOUS; PARENTERAL at 20:26

## 2022-02-14 RX ADMIN — POTASSIUM CHLORIDE 40 MEQ: 1500 TABLET, EXTENDED RELEASE ORAL at 08:25

## 2022-02-14 RX ADMIN — MORPHINE SULFATE 2 MG: 4 INJECTION INTRAVENOUS at 08:25

## 2022-02-14 RX ADMIN — Medication 400 MG: at 17:54

## 2022-02-14 RX ADMIN — MORPHINE SULFATE 2 MG: 4 INJECTION INTRAVENOUS at 20:26

## 2022-02-14 RX ADMIN — PROCHLORPERAZINE EDISYLATE 10 MG: 5 INJECTION INTRAMUSCULAR; INTRAVENOUS at 20:26

## 2022-02-14 RX ADMIN — PIPERACILLIN AND TAZOBACTAM 3.38 G: 3; .375 INJECTION, POWDER, LYOPHILIZED, FOR SOLUTION INTRAVENOUS; PARENTERAL at 12:16

## 2022-02-14 RX ADMIN — PIPERACILLIN AND TAZOBACTAM 3.38 G: 3; .375 INJECTION, POWDER, LYOPHILIZED, FOR SOLUTION INTRAVENOUS; PARENTERAL at 04:59

## 2022-02-14 RX ADMIN — Medication 400 MG: at 08:25

## 2022-02-14 RX ADMIN — OXYCODONE HYDROCHLORIDE 5 MG: 5 TABLET ORAL at 12:16

## 2022-02-14 RX ADMIN — POTASSIUM CHLORIDE 40 MEQ: 1500 TABLET, EXTENDED RELEASE ORAL at 17:54

## 2022-02-14 RX ADMIN — MORPHINE SULFATE 2 MG: 4 INJECTION INTRAVENOUS at 16:17

## 2022-02-14 ASSESSMENT — ENCOUNTER SYMPTOMS
DIARRHEA: 0
COUGH: 0
SENSORY CHANGE: 0
SORE THROAT: 0
ABDOMINAL PAIN: 0
MYALGIAS: 0
BRUISES/BLEEDS EASILY: 0
VOMITING: 0
DEPRESSION: 0
BLURRED VISION: 0
FEVER: 0
CHILLS: 0
SPEECH CHANGE: 0
DOUBLE VISION: 0
HEARTBURN: 0
BLOOD IN STOOL: 0
HEADACHES: 0
NAUSEA: 0
BACK PAIN: 0
PHOTOPHOBIA: 0
CLAUDICATION: 0
HEMOPTYSIS: 0
DIZZINESS: 0
PALPITATIONS: 0

## 2022-02-14 ASSESSMENT — PAIN DESCRIPTION - PAIN TYPE
TYPE: ACUTE PAIN

## 2022-02-14 NOTE — PROGRESS NOTES
"Hospital Medicine Daily Progress Note    Date of Service  2/14/2022    Chief Complaint  Mari Dillon is a 51 y.o. female admitted 2/12/2022 with abdominal pain    Hospital Course    Per HPI, \"Mari Dillon is a 51 y.o. female who presented 2/12/2022 with abdominal pain for the past 48 hours.  Patient initially went to be seen at urgent care due to the pain.  An urgent care CT of the abdomen was done and it shows a 5.2 x 3.6 x 3.7 cm abscess.  It is in between the bowel loops.  The patient will need at this point evaluation with surgery.  She will be kept n.p.o.  We will put her on fluid resuscitation.  Blood cultures have been ordered.  Antibiotics with vancomycin and Zosyn will be initiated after blood cultures are collected.  Pain management will be initiated as needed.\"    Interval Problem Update    2/13/2022: The patient was admitted for further evaluation and management of abdominal pain.  On CT imaging of the abdomen there was a abscess identified.  Patient does have a history of abdominal surgeries in the recent past.  She was evaluated by general surgery who recommends medical management at this time.  She is on appropriate antibiotics and receiving IV volume resuscitation.  At the time of my examination, patient still with right-sided abdominal pain with minimal control.  Pain medications have been adjusted.  She denies any chest pains, palpitations, shortness of breath.  No nausea or vomiting noted.  No other overnight events reported.    2/14/2022: The patient was seen and evaluated at bedside and appears comfortable.  She states that she tolerated a clear liquid diet overnight without episodes of nausea/vomiting or abdominal pain.  She denies any fevers or chills.  Her pain has significantly improved overnight.  She was evaluated by general surgery with current recommendations for possible IR drainage which is pending.  She denies any bowel/bladder disturbances or worsening shortness of " breath.  No other overnight events reported.    I have personally seen and examined the patient at bedside. I discussed the plan of care with patient.    Consultants/Specialty  general surgery     Code Status  Full Code    Disposition  Patient is not medically cleared for discharge.   Anticipate discharge to to home with close outpatient follow-up.  I have placed the appropriate orders for post-discharge needs.    Review of Systems  Review of Systems   Constitutional: Negative for chills, fever and malaise/fatigue.   HENT: Negative for congestion, hearing loss, sore throat and tinnitus.    Eyes: Negative for blurred vision, double vision and photophobia.   Respiratory: Negative for cough and hemoptysis.    Cardiovascular: Negative for chest pain, palpitations and claudication.   Gastrointestinal: Negative for abdominal pain, blood in stool, diarrhea, heartburn, melena, nausea and vomiting.   Genitourinary: Negative for dysuria and urgency.   Musculoskeletal: Negative for back pain and myalgias.   Skin: Negative for itching and rash.   Neurological: Negative for dizziness, sensory change, speech change and headaches.   Endo/Heme/Allergies: Does not bruise/bleed easily.   Psychiatric/Behavioral: Negative for depression and suicidal ideas.        Physical Exam  Temp:  [36.6 °C (97.8 °F)-36.7 °C (98.1 °F)] 36.7 °C (98 °F)  Pulse:  [79-94] 79  Resp:  [16-18] 18  BP: (111-129)/(58-84) 114/64  SpO2:  [96 %-99 %] 99 %    Physical Exam  Vitals and nursing note reviewed.   Constitutional:       General: She is not in acute distress.     Appearance: Normal appearance.   HENT:      Head: Normocephalic and atraumatic.      Mouth/Throat:      Mouth: Mucous membranes are moist.   Eyes:      Extraocular Movements: Extraocular movements intact.      Conjunctiva/sclera: Conjunctivae normal.      Pupils: Pupils are equal, round, and reactive to light.   Cardiovascular:      Rate and Rhythm: Normal rate and regular rhythm.      Pulses:  Normal pulses.      Heart sounds: Normal heart sounds.   Pulmonary:      Effort: Pulmonary effort is normal.      Breath sounds: Normal breath sounds.   Abdominal:      General: Abdomen is flat. Bowel sounds are normal.      Palpations: Abdomen is soft.      Tenderness: There is no abdominal tenderness. There is no guarding or rebound.      Hernia: No hernia is present.   Musculoskeletal:         General: No swelling or tenderness. Normal range of motion.      Cervical back: Normal range of motion and neck supple.   Skin:     General: Skin is warm and dry.      Findings: No rash.   Neurological:      General: No focal deficit present.      Mental Status: She is alert and oriented to person, place, and time.   Psychiatric:         Mood and Affect: Mood normal.         Behavior: Behavior normal.         Fluids    Intake/Output Summary (Last 24 hours) at 2/14/2022 1135  Last data filed at 2/14/2022 1002  Gross per 24 hour   Intake 1575 ml   Output 0 ml   Net 1575 ml       Laboratory  Recent Labs     02/12/22 1925 02/13/22 0444 02/14/22 0429   WBC 11.9* 11.9* 12.7*   RBC 4.09* 3.81* 3.76*   HEMOGLOBIN 11.5* 10.8* 10.6*   HEMATOCRIT 35.3* 33.3* 32.6*   MCV 86.3 87.4 86.7   MCH 28.1 28.3 28.2   MCHC 32.6* 32.4* 32.5*   RDW 41.1 41.8 40.8   PLATELETCT 407 365 346   MPV 9.7 10.0 9.9     Recent Labs     02/12/22 1925 02/13/22 0444 02/14/22 0429   SODIUM 137 139 140   POTASSIUM 3.0* 3.2* 3.1*   CHLORIDE 104 108 106   CO2 23 21 21   GLUCOSE 112* 114* 92   BUN 9 8 7*   CREATININE 0.66 0.72 0.87   CALCIUM 8.8 8.1* 8.2*                   Imaging  No orders to display        Assessment/Plan  * Intra-abdominal abscess (HCC)- (present on admission)  Assessment & Plan  Patient is a history of cecal volvulus back in May 2021.  This required 5 different surgeries as the patient began getting wound dehiscence and reaccumulation of intra-abdominal abscess.  Patient suddenly started to develop abdominal pain over the past 48 hours  on her right lower quadrant.  She initially went to urgent care for evaluation and CT scan of the abdomen showed a 5.2 x 3.6 x 3.7 cm abscess.  She was then referred over to the emergency room  Start vancomycin and Zosyn  Surgical consultation  Pain management  Fluid resuscitation  Blood cultures and if patient does go to surgery will need intraoperative cultures    Nausea and vomiting- (present on admission)  Assessment & Plan  Nausea and vomiting control    Leukocytosis- (present on admission)  Assessment & Plan  Leukocytosis secondary to current infection  Monitor white blood cell count  Should improve with antibiotic management  Obtain blood cultures    Protein-calorie malnutrition, moderate (HCC)- (present on admission)  Assessment & Plan  Give nutritional support patient has not been eating well ever since her abdominal surgeries.    Anemia- (present on admission)  Assessment & Plan  H&H slightly down 11.5 and 35.3  Monitor H&H if drops below 7 or 21 transfuse  Currently not in transfusion status  Patient was iron deficient in May 2021  Repeat iron levels    Anxiety- (present on admission)  Assessment & Plan  Moderate degree of anxiety, continue anxiolytic management as needed       VTE prophylaxis: SCDs/TEDs    I have performed a physical exam and reviewed and updated ROS and Plan today (2/14/2022). In review of yesterday's note (2/13/2022), there are no changes except as documented above.

## 2022-02-14 NOTE — PROGRESS NOTES
Pt awake in bed. C/o 4/10 pain in abodomen. Declines pain interventions at this time. Medicated per MAR for n/v. Pt is requesting to rest at this time. Fall precautions in place.

## 2022-02-14 NOTE — CARE PLAN
The patient is Stable - Low risk of patient condition declining or worsening    Shift Goals  Clinical Goals: pain management, prevent falls, NPO at midnight  Patient Goals: rest comfortably    Progress made toward(s) clinical / shift goals:        Problem: Pain - Standard  Goal: Alleviation of pain or a reduction in pain to the patient’s comfort goal  Outcome: Progressing     Problem: Knowledge Deficit - Standard  Goal: Patient and family/care givers will demonstrate understanding of plan of care, disease process/condition, diagnostic tests and medications  Outcome: Progressing       Patient is not progressing towards the following goals:

## 2022-02-14 NOTE — DISCHARGE PLANNING
Anticipated Discharge Disposition:   Home    Action:   Chart review complete     Discussed patient during rounds. Patient anticipated to discharge tomorrow.  Possible IR drainage.    RN CM will continue to follow.        Barriers to Discharge:   Medical clearance     Plan:   HCM will continue to follow and assist with discharge needs.

## 2022-02-14 NOTE — PROGRESS NOTES
Progress Note    Author:  Tonya Grover MD    Date & Time:   2/14/2022   9:32 AM          Patient ID:             Name:             Mari Dillon   YOB: 1970  Age:                 51 y.o.  female   MRN:               [id number]    ________________________________________________________________________        Interval Events:  ----------   Mild abdominal pain--unchanged  Tolerating PO    Exam:       Vitals:    02/14/22 0800   BP: 126/65   Pulse: 86   Resp: 18   Temp:    SpO2: 97%     SpO2  Min: 96 %  Max: 100 %  O2 (LPM)  Min: 0  Max: 0  Temp  Min: 36.3 °C (97.3 °F)  Max: 37.1 °C (98.8 °F)    Intake/Output Summary (Last 24 hours) at 2/14/2022 0932  Last data filed at 2/14/2022 0815  Gross per 24 hour   Intake 1455 ml   Output 0 ml   Net 1455 ml     Output by Drain (mL) 02/12/22 0700 - 02/12/22 1859 02/12/22 1900 - 02/13/22 0659 02/13/22 0700 - 02/13/22 1859 02/13/22 1900 - 02/14/22 0659 02/14/22 0700 - 02/14/22 0932   Patient has no LDAs of requested type attached.       DIET ORDERS (From admission to next 24h)     Start     Ordered    02/14/22 0851  Diet Order Diet: Full Liquid  ALL MEALS        Question:  Diet:  Answer:  Full Liquid    02/14/22 0850                        Physical Exam  Nursing note reviewed.   Constitutional:       Appearance: Normal appearance. Alert, oriented x 4.NAD    Cardiovascular:      Rate and Rhythm: Normal rate and regular rhythm. Extremities warm, well perfused no edema.   Pulmonary:      Effort: Pulmonary effort is normal.      Breath sounds: Normal breath sounds. No wheezes or stridor  Abdominal:      General: Abdomen is soft, mild tenderness Right mid abdomen           Recent Labs     02/12/22  1925 02/13/22  0444 02/14/22  0429   SODIUM 137 139 140   POTASSIUM 3.0* 3.2* 3.1*   CHLORIDE 104 108 106   CO2 23 21 21   BUN 9 8 7*   CREATININE 0.66 0.72 0.87   MAGNESIUM 1.8  --  1.7   PHOSPHORUS  --   --  3.0   CALCIUM 8.8 8.1* 8.2*       Recent Labs     02/12/22  1925  02/13/22 0444 02/14/22 0429   ALTSGPT 10 8  --    ASTSGOT 12 11*  --    ALKPHOSPHAT 84 71  --    TBILIRUBIN 0.2 0.3  --    LIPASE 39  --   --    GLUCOSE 112* 114* 92       Recent Labs     02/12/22 1925 02/13/22 0444 02/14/22 0429   RBC 4.09* 3.81* 3.76*   HEMOGLOBIN 11.5* 10.8* 10.6*   HEMATOCRIT 35.3* 33.3* 32.6*   PLATELETCT 407 365 346   IRON 15*  --   --    FERRITIN 38.1  --   --    TOTIRONBC 287  --   --        Recent Labs     02/12/22 1925 02/13/22 0444 02/14/22 0429   WBC 11.9* 11.9* 12.7*   NEUTSPOLYS 72.30* 79.00*  --    LYMPHOCYTES 14.90* 9.00*  --    MONOCYTES 9.60 8.00  --    EOSINOPHILS 2.50 2.90  --    BASOPHILS 0.30 0.50  --    ASTSGOT 12 11*  --    ALTSGPT 10 8  --    ALKPHOSPHAT 84 71  --    TBILIRUBIN 0.2 0.3  --          ________________________________________________________________________      Patient Active Problem List   Diagnosis   • Anxiety   • Pain in joint, shoulder region   • Intractable migraine with aura without status migrainosus   • History of kidney stones   • Cecal volvulus (HCC)   • Bacterial peritonitis (HCC)   • Thrombocytopenia (HCC)   • DVT of axillary vein, acute right (HCC)   • Anemia   • Acute hypoxic respiratory failure (HCC)   • Protein-calorie malnutrition, moderate (HCC)   • Leukocytosis   • Left renal stone   • Left ovarian cyst   • Nausea and vomiting   • Kidney stone   • Intra-abdominal abscess (HCC)       Acute Issues/Plan:  Interloop fluid collection, possible abscess  Not likely amendable to percutaneous drainage but will consult with IR    CPM  Surgery will follow

## 2022-02-15 ENCOUNTER — APPOINTMENT (OUTPATIENT)
Dept: RADIOLOGY | Facility: MEDICAL CENTER | Age: 52
DRG: 372 | End: 2022-02-15
Attending: SURGERY
Payer: COMMERCIAL

## 2022-02-15 LAB
ANION GAP SERPL CALC-SCNC: 10 MMOL/L (ref 7–16)
BUN SERPL-MCNC: 6 MG/DL (ref 8–22)
CALCIUM SERPL-MCNC: 8.6 MG/DL (ref 8.4–10.2)
CHLORIDE SERPL-SCNC: 105 MMOL/L (ref 96–112)
CO2 SERPL-SCNC: 23 MMOL/L (ref 20–33)
CREAT SERPL-MCNC: 0.83 MG/DL (ref 0.5–1.4)
ERYTHROCYTE [DISTWIDTH] IN BLOOD BY AUTOMATED COUNT: 40 FL (ref 35.9–50)
GLUCOSE SERPL-MCNC: 102 MG/DL (ref 65–99)
HCT VFR BLD AUTO: 32.8 % (ref 37–47)
HGB BLD-MCNC: 10.7 G/DL (ref 12–16)
MAGNESIUM SERPL-MCNC: 1.9 MG/DL (ref 1.5–2.5)
MCH RBC QN AUTO: 27.9 PG (ref 27–33)
MCHC RBC AUTO-ENTMCNC: 32.6 G/DL (ref 33.6–35)
MCV RBC AUTO: 85.4 FL (ref 81.4–97.8)
PHOSPHATE SERPL-MCNC: 2.9 MG/DL (ref 2.5–4.5)
PLATELET # BLD AUTO: 332 K/UL (ref 164–446)
PMV BLD AUTO: 9.8 FL (ref 9–12.9)
POTASSIUM SERPL-SCNC: 3.8 MMOL/L (ref 3.6–5.5)
RBC # BLD AUTO: 3.84 M/UL (ref 4.2–5.4)
SODIUM SERPL-SCNC: 138 MMOL/L (ref 135–145)
WBC # BLD AUTO: 11.9 K/UL (ref 4.8–10.8)

## 2022-02-15 PROCEDURE — 85027 COMPLETE CBC AUTOMATED: CPT

## 2022-02-15 PROCEDURE — 74177 CT ABD & PELVIS W/CONTRAST: CPT

## 2022-02-15 PROCEDURE — 770001 HCHG ROOM/CARE - MED/SURG/GYN PRIV*

## 2022-02-15 PROCEDURE — 80048 BASIC METABOLIC PNL TOTAL CA: CPT

## 2022-02-15 PROCEDURE — 36415 COLL VENOUS BLD VENIPUNCTURE: CPT

## 2022-02-15 PROCEDURE — 99232 SBSQ HOSP IP/OBS MODERATE 35: CPT | Performed by: INTERNAL MEDICINE

## 2022-02-15 PROCEDURE — 83735 ASSAY OF MAGNESIUM: CPT

## 2022-02-15 PROCEDURE — 700105 HCHG RX REV CODE 258: Performed by: HOSPITALIST

## 2022-02-15 PROCEDURE — 84100 ASSAY OF PHOSPHORUS: CPT

## 2022-02-15 PROCEDURE — 700111 HCHG RX REV CODE 636 W/ 250 OVERRIDE (IP): Performed by: HOSPITALIST

## 2022-02-15 PROCEDURE — 700117 HCHG RX CONTRAST REV CODE 255: Performed by: SURGERY

## 2022-02-15 PROCEDURE — 700102 HCHG RX REV CODE 250 W/ 637 OVERRIDE(OP): Performed by: INTERNAL MEDICINE

## 2022-02-15 PROCEDURE — A9270 NON-COVERED ITEM OR SERVICE: HCPCS | Performed by: INTERNAL MEDICINE

## 2022-02-15 RX ORDER — FERROUS SULFATE 325(65) MG
325 TABLET ORAL
Status: DISCONTINUED | OUTPATIENT
Start: 2022-02-16 | End: 2022-02-17 | Stop reason: HOSPADM

## 2022-02-15 RX ADMIN — PROCHLORPERAZINE EDISYLATE 10 MG: 5 INJECTION INTRAMUSCULAR; INTRAVENOUS at 12:36

## 2022-02-15 RX ADMIN — MORPHINE SULFATE 2 MG: 4 INJECTION INTRAVENOUS at 18:22

## 2022-02-15 RX ADMIN — IOHEXOL 25 ML: 240 INJECTION, SOLUTION INTRATHECAL; INTRAVASCULAR; INTRAVENOUS; ORAL at 14:30

## 2022-02-15 RX ADMIN — POTASSIUM CHLORIDE 40 MEQ: 1500 TABLET, EXTENDED RELEASE ORAL at 17:12

## 2022-02-15 RX ADMIN — PIPERACILLIN AND TAZOBACTAM 3.38 G: 3; .375 INJECTION, POWDER, LYOPHILIZED, FOR SOLUTION INTRAVENOUS; PARENTERAL at 05:21

## 2022-02-15 RX ADMIN — Medication 400 MG: at 08:10

## 2022-02-15 RX ADMIN — MORPHINE SULFATE 2 MG: 4 INJECTION INTRAVENOUS at 15:03

## 2022-02-15 RX ADMIN — ONDANSETRON 4 MG: 2 INJECTION INTRAMUSCULAR; INTRAVENOUS at 05:23

## 2022-02-15 RX ADMIN — POTASSIUM CHLORIDE 40 MEQ: 1500 TABLET, EXTENDED RELEASE ORAL at 08:10

## 2022-02-15 RX ADMIN — PIPERACILLIN AND TAZOBACTAM 3.38 G: 3; .375 INJECTION, POWDER, LYOPHILIZED, FOR SOLUTION INTRAVENOUS; PARENTERAL at 12:36

## 2022-02-15 RX ADMIN — Medication 400 MG: at 17:12

## 2022-02-15 RX ADMIN — PROCHLORPERAZINE EDISYLATE 10 MG: 5 INJECTION INTRAMUSCULAR; INTRAVENOUS at 18:22

## 2022-02-15 RX ADMIN — IOHEXOL 100 ML: 350 INJECTION, SOLUTION INTRAVENOUS at 14:30

## 2022-02-15 RX ADMIN — MORPHINE SULFATE 2 MG: 4 INJECTION INTRAVENOUS at 05:24

## 2022-02-15 RX ADMIN — PIPERACILLIN AND TAZOBACTAM 3.38 G: 3; .375 INJECTION, POWDER, LYOPHILIZED, FOR SOLUTION INTRAVENOUS; PARENTERAL at 20:41

## 2022-02-15 ASSESSMENT — ENCOUNTER SYMPTOMS
HEADACHES: 0
FEVER: 0
VOMITING: 0
CHILLS: 0
ABDOMINAL PAIN: 1
SHORTNESS OF BREATH: 0
MYALGIAS: 0
DIZZINESS: 0
NAUSEA: 0
DEPRESSION: 0

## 2022-02-15 ASSESSMENT — PAIN DESCRIPTION - PAIN TYPE
TYPE: ACUTE PAIN
TYPE: ACUTE PAIN

## 2022-02-15 NOTE — PROGRESS NOTES
"Hospital Medicine Daily Progress Note    Date of Service  2/15/2022    Chief Complaint  Mair Dillon is a 51 y.o. female admitted 2/12/2022 with abdominal pain    Hospital Course    Per HPI, \"Mari Dillon is a 51 y.o. female who presented 2/12/2022 with abdominal pain for the past 48 hours.  Patient initially went to be seen at urgent care due to the pain.  An urgent care CT of the abdomen was done and it shows a 5.2 x 3.6 x 3.7 cm abscess.  It is in between the bowel loops.  The patient will need at this point evaluation with surgery.  She will be kept n.p.o.  We will put her on fluid resuscitation.  Blood cultures have been ordered.  Antibiotics with vancomycin and Zosyn will be initiated after blood cultures are collected.  Pain management will be initiated as needed.\"    Interval Problem Update  2/13: The patient was admitted for further evaluation and management of abdominal pain.  On CT imaging of the abdomen there was a abscess identified.  Patient does have a history of abdominal surgeries in the recent past.  She was evaluated by general surgery who recommends medical management at this time.  She is on appropriate antibiotics and receiving IV volume resuscitation.  At the time of my examination, patient still with right-sided abdominal pain with minimal control.  Pain medications have been adjusted.  She denies any chest pains, palpitations, shortness of breath.  No nausea or vomiting noted.  No other overnight events reported.    2/14: The patient was seen and evaluated at bedside and appears comfortable.  She states that she tolerated a clear liquid diet overnight without episodes of nausea/vomiting or abdominal pain.  She denies any fevers or chills.  Her pain has significantly improved overnight.  She was evaluated by general surgery with current recommendations for possible IR drainage which is pending.  She denies any bowel/bladder disturbances or worsening shortness of breath.  No other " overnight events reported.    2/15: Vitals stable on room air. Labs stable, still with mild leukocytosis. Patient reports she is feeling much better, still with mild pain but hasn't needed pain meds. Per surgery plan to repeat CT today to eval if fluid collection amenable to percutaneous drainage.     I have personally seen and examined the patient at bedside. I discussed the plan of care with patient, bedside RN, charge RN and .    Consultants/Specialty  general surgery     Code Status  Full Code    Disposition  Patient is not medically cleared for discharge.   Anticipate discharge to to home with close outpatient follow-up.  I have placed the appropriate orders for post-discharge needs.    Review of Systems  Review of Systems   Constitutional: Negative for chills and fever.   Respiratory: Negative for shortness of breath.    Cardiovascular: Negative for chest pain.   Gastrointestinal: Positive for abdominal pain (mild). Negative for nausea and vomiting.   Genitourinary: Negative for dysuria.   Musculoskeletal: Negative for myalgias.   Skin: Negative for rash.   Neurological: Negative for dizziness and headaches.   Psychiatric/Behavioral: Negative for depression.   All other systems reviewed and are negative.       Physical Exam  Temp:  [36.3 °C (97.3 °F)-37.4 °C (99.3 °F)] 36.3 °C (97.3 °F)  Pulse:  [84-98] 84  Resp:  [16-18] 16  BP: (116-125)/(61-67) 122/61  SpO2:  [96 %-100 %] 100 %    Physical Exam  Vitals and nursing note reviewed.   Constitutional:       General: She is not in acute distress.     Appearance: Normal appearance.   HENT:      Head: Normocephalic and atraumatic.   Eyes:      Conjunctiva/sclera: Conjunctivae normal.   Cardiovascular:      Rate and Rhythm: Normal rate and regular rhythm.      Pulses: Normal pulses.      Heart sounds: Normal heart sounds.   Pulmonary:      Effort: Pulmonary effort is normal.      Breath sounds: Normal breath sounds.   Abdominal:      General: Abdomen is  flat. There is no distension.      Palpations: Abdomen is soft.      Tenderness: There is abdominal tenderness (mild). There is no guarding.      Comments: Vertical midline surgical scar noted   Musculoskeletal:         General: No swelling or tenderness. Normal range of motion.      Cervical back: Normal range of motion and neck supple.   Skin:     General: Skin is warm and dry.   Neurological:      General: No focal deficit present.      Mental Status: She is alert and oriented to person, place, and time.   Psychiatric:         Mood and Affect: Mood normal.         Behavior: Behavior normal.         Fluids    Intake/Output Summary (Last 24 hours) at 2/15/2022 1340  Last data filed at 2/15/2022 0800  Gross per 24 hour   Intake 3897.09 ml   Output --   Net 3897.09 ml       Laboratory  Recent Labs     02/13/22  0444 02/14/22  0429 02/15/22  0300   WBC 11.9* 12.7* 11.9*   RBC 3.81* 3.76* 3.84*   HEMOGLOBIN 10.8* 10.6* 10.7*   HEMATOCRIT 33.3* 32.6* 32.8*   MCV 87.4 86.7 85.4   MCH 28.3 28.2 27.9   MCHC 32.4* 32.5* 32.6*   RDW 41.8 40.8 40.0   PLATELETCT 365 346 332   MPV 10.0 9.9 9.8     Recent Labs     02/13/22 0444 02/14/22  0429 02/15/22  0300   SODIUM 139 140 138   POTASSIUM 3.2* 3.1* 3.8   CHLORIDE 108 106 105   CO2 21 21 23   GLUCOSE 114* 92 102*   BUN 8 7* 6*   CREATININE 0.72 0.87 0.83   CALCIUM 8.1* 8.2* 8.6                   Imaging  CT-ABDOMEN-PELVIS WITH    (Results Pending)        Assessment/Plan  * Intra-abdominal abscess (HCC)- (present on admission)  Assessment & Plan  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  This required 5 different surgeries as the patient began getting wound dehiscence and reaccumulation of intra-abdominal abscess.  Patient suddenly started to develop abdominal pain over the past 48 hours on her right lower quadrant.  She initially went to urgent care for evaluation and CT scan of the abdomen showed a 5.2 x 3.6 x 3.7 cm abscess.  She was then referred over to the  emergency room  Continue Zon  Surgical consultation, repeat CT abd/pelvis for possible IR drainage  Pain management  Blood cultures ntd    Nausea and vomiting- (present on admission)  Assessment & Plan  Nausea and vomiting control    Leukocytosis- (present on admission)  Assessment & Plan  Leukocytosis secondary to current infection  Monitor white blood cell count  Should improve with antibiotic management  Obtain blood cultures    Protein-calorie malnutrition, moderate (HCC)- (present on admission)  Assessment & Plan  Give nutritional support patient has not been eating well ever since her abdominal surgeries.    Anemia- (present on admission)  Assessment & Plan  H&H slightly down 11.5 and 35.3  Monitor H&H if drops below 7 or 21 transfuse  Repeat iron levels show mild iron deficiency   Daily iron supplement    Anxiety- (present on admission)  Assessment & Plan  Moderate degree of anxiety, continue anxiolytic management as needed       VTE prophylaxis: SCDs/TEDs    I have performed a physical exam and reviewed and updated ROS and Plan today (2/15/2022). In review of yesterday's note (2/14/2022), there are no changes except as documented above.

## 2022-02-15 NOTE — CARE PLAN
Problem: Pain - Standard  Goal: Alleviation of pain or a reduction in pain to the patient’s comfort goal  Outcome: Progressing  Note: Pt educated to call for pain medication when needed.    The patient is Stable - Low risk of patient condition declining or worsening    Shift Goals  Clinical Goals: Pain and nausea control  Patient Goals: Pain control and shower today  Family Goals:  (None present at this time)    Progress made toward(s) clinical / shift goals:  Pt will take shower after Zosyn is finished.

## 2022-02-15 NOTE — CARE PLAN
The patient is Stable - Low risk of patient condition declining or worsening    Shift Goals  Clinical Goals: Pain and nausea management. Pt reports that she is usually more nauseated at night time  Patient Goals: Nausea management and no surgery  Family Goals:  (None present at this time)    Progress made toward(s) clinical / shift goals: Patient was able to sleep throughout a majority of the night, nausea and pain reported intermittently throughout the night. Medicated at start of shift and towards end for pain and nausea.    Patient is not progressing towards the following goals: N/A

## 2022-02-15 NOTE — DIETARY
Nutrition Services:    Pt with high nutrition risk dx of moderate protein calorie malnutrition (PCM). No report of wt loss or poor PO PTA based on nutrition screen upon admit. Pt is currently on a full liquid diet and per chart, pt's current PO intake has been adequate at %.     Per Surgery note, plan to repeat CT abdomen/pelvis today with oral/IV contrast to evaluate whether intra abdominal fluid collection would be amendable to percutaneous drainage.     Ht: 175.3 cm, Wt: 66 kg, BMI 21.49. Weights reviewed in Epic. Weight on 11/19/21 was 65 kg. Weight has been stable.     Consult RD as needed. RD will re-screen weekly.      RD available prn

## 2022-02-15 NOTE — PROGRESS NOTES
Received report from day shift RN. Assumed care of patient. Patient is currently AOx4, reporting 7/10 pain and nausea, medicated per MAR. Bowel sounds hypoactive x4 at this time, reports BM today, loose. POC reviewed with patient and plan for possible IR placement, pt verbalized understanding. Bed locked and in lowest position, call light and belongings within reach. Chart check complete.

## 2022-02-15 NOTE — PROGRESS NOTES
Plan to repeat CT abdomen/pelvis today with oral/IV contrast to evaluate whether intra abdominal fluid collection  would be amendable to percutaneous drainage, Discussed with Dr. Tejada -IR

## 2022-02-15 NOTE — CARE PLAN
The patient is Stable - Low risk of patient condition declining or worsening    Shift Goals  Clinical Goals: Pain management  Patient Goals: pain and nausea control    Progress made toward(s) clinical / shift goals:  Pain controlled with PRN medications, no nausea medications needed this shift.     Problem: Pain - Standard  Goal: Alleviation of pain or a reduction in pain to the patient’s comfort goal  Outcome: Progressing     Problem: Knowledge Deficit - Standard  Goal: Patient and family/care givers will demonstrate understanding of plan of care, disease process/condition, diagnostic tests and medications  Outcome: Progressing       Patient is not progressing towards the following goals:

## 2022-02-16 ENCOUNTER — APPOINTMENT (OUTPATIENT)
Dept: RADIOLOGY | Facility: MEDICAL CENTER | Age: 52
DRG: 372 | End: 2022-02-16
Attending: SURGERY
Payer: COMMERCIAL

## 2022-02-16 LAB
ANION GAP SERPL CALC-SCNC: 13 MMOL/L (ref 7–16)
BUN SERPL-MCNC: 6 MG/DL (ref 8–22)
CALCIUM SERPL-MCNC: 9.1 MG/DL (ref 8.4–10.2)
CHLORIDE SERPL-SCNC: 100 MMOL/L (ref 96–112)
CO2 SERPL-SCNC: 21 MMOL/L (ref 20–33)
CREAT SERPL-MCNC: 0.94 MG/DL (ref 0.5–1.4)
ERYTHROCYTE [DISTWIDTH] IN BLOOD BY AUTOMATED COUNT: 40.4 FL (ref 35.9–50)
GLUCOSE SERPL-MCNC: 99 MG/DL (ref 65–99)
HCT VFR BLD AUTO: 34.4 % (ref 37–47)
HGB BLD-MCNC: 11.3 G/DL (ref 12–16)
MCH RBC QN AUTO: 28 PG (ref 27–33)
MCHC RBC AUTO-ENTMCNC: 32.8 G/DL (ref 33.6–35)
MCV RBC AUTO: 85.4 FL (ref 81.4–97.8)
PLATELET # BLD AUTO: 370 K/UL (ref 164–446)
PMV BLD AUTO: 9.7 FL (ref 9–12.9)
POTASSIUM SERPL-SCNC: 4.2 MMOL/L (ref 3.6–5.5)
RBC # BLD AUTO: 4.03 M/UL (ref 4.2–5.4)
SODIUM SERPL-SCNC: 134 MMOL/L (ref 135–145)
WBC # BLD AUTO: 11.1 K/UL (ref 4.8–10.8)

## 2022-02-16 PROCEDURE — 87186 SC STD MICRODIL/AGAR DIL: CPT

## 2022-02-16 PROCEDURE — 87076 CULTURE ANAEROBE IDENT EACH: CPT

## 2022-02-16 PROCEDURE — 85027 COMPLETE CBC AUTOMATED: CPT

## 2022-02-16 PROCEDURE — 700105 HCHG RX REV CODE 258: Performed by: HOSPITALIST

## 2022-02-16 PROCEDURE — C1729 CATH, DRAINAGE: HCPCS

## 2022-02-16 PROCEDURE — 700111 HCHG RX REV CODE 636 W/ 250 OVERRIDE (IP): Performed by: HOSPITALIST

## 2022-02-16 PROCEDURE — 36415 COLL VENOUS BLD VENIPUNCTURE: CPT

## 2022-02-16 PROCEDURE — 770001 HCHG ROOM/CARE - MED/SURG/GYN PRIV*

## 2022-02-16 PROCEDURE — 99232 SBSQ HOSP IP/OBS MODERATE 35: CPT | Performed by: INTERNAL MEDICINE

## 2022-02-16 PROCEDURE — 80048 BASIC METABOLIC PNL TOTAL CA: CPT

## 2022-02-16 PROCEDURE — 700111 HCHG RX REV CODE 636 W/ 250 OVERRIDE (IP): Performed by: RADIOLOGY

## 2022-02-16 PROCEDURE — 87077 CULTURE AEROBIC IDENTIFY: CPT

## 2022-02-16 PROCEDURE — A9270 NON-COVERED ITEM OR SERVICE: HCPCS | Performed by: INTERNAL MEDICINE

## 2022-02-16 PROCEDURE — 700111 HCHG RX REV CODE 636 W/ 250 OVERRIDE (IP)

## 2022-02-16 PROCEDURE — 87070 CULTURE OTHR SPECIMN AEROBIC: CPT

## 2022-02-16 PROCEDURE — 0W9G30Z DRAINAGE OF PERITONEAL CAVITY WITH DRAINAGE DEVICE, PERCUTANEOUS APPROACH: ICD-10-PCS | Performed by: RADIOLOGY

## 2022-02-16 PROCEDURE — 700102 HCHG RX REV CODE 250 W/ 637 OVERRIDE(OP): Performed by: INTERNAL MEDICINE

## 2022-02-16 PROCEDURE — 87205 SMEAR GRAM STAIN: CPT

## 2022-02-16 RX ORDER — MIDAZOLAM HYDROCHLORIDE 1 MG/ML
INJECTION INTRAMUSCULAR; INTRAVENOUS
Status: COMPLETED
Start: 2022-02-16 | End: 2022-02-16

## 2022-02-16 RX ORDER — ONDANSETRON 2 MG/ML
4 INJECTION INTRAMUSCULAR; INTRAVENOUS PRN
Status: ACTIVE | OUTPATIENT
Start: 2022-02-16 | End: 2022-02-16

## 2022-02-16 RX ORDER — NALOXONE HYDROCHLORIDE 0.4 MG/ML
INJECTION, SOLUTION INTRAMUSCULAR; INTRAVENOUS; SUBCUTANEOUS
Status: COMPLETED
Start: 2022-02-16 | End: 2022-02-16

## 2022-02-16 RX ORDER — MIDAZOLAM HYDROCHLORIDE 1 MG/ML
.5-2 INJECTION INTRAMUSCULAR; INTRAVENOUS PRN
Status: ACTIVE | OUTPATIENT
Start: 2022-02-16 | End: 2022-02-16

## 2022-02-16 RX ORDER — SODIUM CHLORIDE 9 MG/ML
500 INJECTION, SOLUTION INTRAVENOUS
Status: ACTIVE | OUTPATIENT
Start: 2022-02-16 | End: 2022-02-16

## 2022-02-16 RX ORDER — ONDANSETRON 2 MG/ML
INJECTION INTRAMUSCULAR; INTRAVENOUS
Status: COMPLETED
Start: 2022-02-16 | End: 2022-02-16

## 2022-02-16 RX ADMIN — MIDAZOLAM 1 MG: 1 INJECTION INTRAMUSCULAR; INTRAVENOUS at 14:41

## 2022-02-16 RX ADMIN — PROCHLORPERAZINE EDISYLATE 10 MG: 5 INJECTION INTRAMUSCULAR; INTRAVENOUS at 11:37

## 2022-02-16 RX ADMIN — MORPHINE SULFATE 2 MG: 4 INJECTION INTRAVENOUS at 21:50

## 2022-02-16 RX ADMIN — FENTANYL CITRATE 25 MCG: 50 INJECTION INTRAMUSCULAR; INTRAVENOUS at 14:45

## 2022-02-16 RX ADMIN — PROCHLORPERAZINE EDISYLATE 10 MG: 5 INJECTION INTRAMUSCULAR; INTRAVENOUS at 20:09

## 2022-02-16 RX ADMIN — MORPHINE SULFATE 2 MG: 4 INJECTION INTRAVENOUS at 19:17

## 2022-02-16 RX ADMIN — PIPERACILLIN AND TAZOBACTAM 3.38 G: 3; .375 INJECTION, POWDER, LYOPHILIZED, FOR SOLUTION INTRAVENOUS; PARENTERAL at 05:56

## 2022-02-16 RX ADMIN — MIDAZOLAM 1 MG: 1 INJECTION INTRAMUSCULAR; INTRAVENOUS at 14:45

## 2022-02-16 RX ADMIN — FENTANYL CITRATE 25 MCG: 50 INJECTION INTRAMUSCULAR; INTRAVENOUS at 14:49

## 2022-02-16 RX ADMIN — MORPHINE SULFATE 2 MG: 4 INJECTION INTRAVENOUS at 15:36

## 2022-02-16 RX ADMIN — FENTANYL CITRATE 25 MCG: 50 INJECTION INTRAMUSCULAR; INTRAVENOUS at 14:51

## 2022-02-16 RX ADMIN — FENTANYL CITRATE 50 MCG: 50 INJECTION INTRAMUSCULAR; INTRAVENOUS at 14:41

## 2022-02-16 RX ADMIN — Medication 400 MG: at 16:06

## 2022-02-16 RX ADMIN — PIPERACILLIN AND TAZOBACTAM 3.38 G: 3; .375 INJECTION, POWDER, LYOPHILIZED, FOR SOLUTION INTRAVENOUS; PARENTERAL at 16:07

## 2022-02-16 RX ADMIN — MORPHINE SULFATE 2 MG: 4 INJECTION INTRAVENOUS at 09:40

## 2022-02-16 RX ADMIN — POTASSIUM CHLORIDE 40 MEQ: 1500 TABLET, EXTENDED RELEASE ORAL at 16:06

## 2022-02-16 RX ADMIN — MORPHINE SULFATE 2 MG: 4 INJECTION INTRAVENOUS at 05:53

## 2022-02-16 RX ADMIN — MIDAZOLAM 1 MG: 1 INJECTION INTRAMUSCULAR; INTRAVENOUS at 14:49

## 2022-02-16 RX ADMIN — PIPERACILLIN AND TAZOBACTAM 3.38 G: 3; .375 INJECTION, POWDER, LYOPHILIZED, FOR SOLUTION INTRAVENOUS; PARENTERAL at 22:43

## 2022-02-16 ASSESSMENT — PAIN DESCRIPTION - PAIN TYPE
TYPE: ACUTE PAIN

## 2022-02-16 ASSESSMENT — ENCOUNTER SYMPTOMS
VOMITING: 0
DIZZINESS: 0
HEADACHES: 0
CHILLS: 0
DEPRESSION: 0
ABDOMINAL PAIN: 1
FEVER: 0
MYALGIAS: 0
NAUSEA: 0
SHORTNESS OF BREATH: 0

## 2022-02-16 NOTE — PROGRESS NOTES
Progress Note    Author:  Tonya Grover MD    Date & Time:   2/16/2022   7:59 AM          Patient ID:             Name:             Mari Dillon   YOB: 1970  Age:                 51 y.o.  female   MRN:               [id number]    ________________________________________________________________________        Interval Events:  ----------   CT abdomen/pelvis with PO contrast reviewed. RLQ fluid collection slightly larger, adjacent to small bowel loops  Patient having mild abdominal pain, no significant change     Exam:       Vitals:    02/16/22 0449   BP: 103/62   Pulse: 82   Resp: 18   Temp: 36.9 °C (98.5 °F)   SpO2: 97%     SpO2  Min: 96 %  Max: 100 %  O2 (LPM)  Min: 0  Max: 0  Temp  Min: 36.3 °C (97.3 °F)  Max: 37.4 °C (99.3 °F)    Intake/Output Summary (Last 24 hours) at 2/16/2022 0759  Last data filed at 2/15/2022 0800  Gross per 24 hour   Intake 240 ml   Output --   Net 240 ml     Output by Drain (mL) 02/14/22 0700 - 02/14/22 1859 02/14/22 1900 - 02/15/22 0659 02/15/22 0700 - 02/15/22 1859 02/15/22 1900 - 02/16/22 0659 02/16/22 0700 - 02/16/22 0759   Patient has no LDAs of requested type attached.       DIET ORDERS (From admission to next 24h)     Start     Ordered    02/16/22 0759  Diet NPO Restrict to: Strict  ALL MEALS        Question:  Diet NPO Restrict to:  Answer:  Strict    02/16/22 0758                        Physical Exam  Nursing note reviewed.   Constitutional:       Appearance: Normal appearance. Alert, oriented x 4.NAD  HENT:      Head: Normocephalic and atraumatic.      Mouth/Throat:      Mouth: Mucous membranes are moist.   Eyes:      Pupils: Pupils are equal, round, and reactive to light.      No scleral Icterus present  Cardiovascular:      Rate and Rhythm: Normal rate and regular rhythm. Extremities warm, well perfused no edema.   Pulmonary:      Effort: Pulmonary effort is normal.      Breath sounds: Normal breath sounds. No wheezes or stridor  Abdominal:      General:  Abdomen is soft,  Non-distended  Mild Tenderness R. Mid abdomen           Recent Labs     02/14/22  0429 02/15/22  0300 02/16/22  0425   SODIUM 140 138 134*   POTASSIUM 3.1* 3.8 4.2   CHLORIDE 106 105 100   CO2 21 23 21   BUN 7* 6* 6*   CREATININE 0.87 0.83 0.94   MAGNESIUM 1.7 1.9  --    PHOSPHORUS 3.0 2.9  --    CALCIUM 8.2* 8.6 9.1       Recent Labs     02/14/22  0429 02/15/22  0300 02/16/22  0425   GLUCOSE 92 102* 99       Recent Labs     02/14/22  0429 02/15/22  0300 02/16/22  0425   RBC 3.76* 3.84* 4.03*   HEMOGLOBIN 10.6* 10.7* 11.3*   HEMATOCRIT 32.6* 32.8* 34.4*   PLATELETCT 346 332 370       Recent Labs     02/14/22  0429 02/15/22  0300 02/16/22  0425   WBC 12.7* 11.9* 11.1*         ________________________________________________________________________      Patient Active Problem List   Diagnosis   • Anxiety   • Pain in joint, shoulder region   • Intractable migraine with aura without status migrainosus   • History of kidney stones   • Cecal volvulus (HCC)   • Bacterial peritonitis (HCC)   • Thrombocytopenia (HCC)   • DVT of axillary vein, acute right (HCC)   • Anemia   • Acute hypoxic respiratory failure (HCC)   • Protein-calorie malnutrition, moderate (HCC)   • Leukocytosis   • Left renal stone   • Left ovarian cyst   • Nausea and vomiting   • Kidney stone   • Intra-abdominal abscess (HCC)       Acute Issues/Plan:  Order placed for percutaneous drainage RLQ fluid collection  Patient made NPO  Will follow up

## 2022-02-16 NOTE — PROGRESS NOTES
Report received from Montse RUDOLPH. Patient is resting in bed at time of report. No signs of labored breathing or discomfort. No needs at this time. Safety measures in place and call light/water within reach.

## 2022-02-16 NOTE — PROGRESS NOTES
"Hospital Medicine Daily Progress Note    Date of Service  2/16/2022    Chief Complaint  Mari Dillon is a 51 y.o. female admitted 2/12/2022 with abdominal pain    Hospital Course    Per HPI, \"Mari Dillon is a 51 y.o. female who presented 2/12/2022 with abdominal pain for the past 48 hours.  Patient initially went to be seen at urgent care due to the pain.  An urgent care CT of the abdomen was done and it shows a 5.2 x 3.6 x 3.7 cm abscess.  It is in between the bowel loops.  The patient will need at this point evaluation with surgery.  She will be kept n.p.o.  We will put her on fluid resuscitation.  Blood cultures have been ordered.  Antibiotics with vancomycin and Zosyn will be initiated after blood cultures are collected.  Pain management will be initiated as needed.\"    Interval Problem Update  2/13: The patient was admitted for further evaluation and management of abdominal pain.  On CT imaging of the abdomen there was a abscess identified.  Patient does have a history of abdominal surgeries in the recent past.  She was evaluated by general surgery who recommends medical management at this time.  She is on appropriate antibiotics and receiving IV volume resuscitation.  At the time of my examination, patient still with right-sided abdominal pain with minimal control.  Pain medications have been adjusted.  She denies any chest pains, palpitations, shortness of breath.  No nausea or vomiting noted.  No other overnight events reported.    2/14: The patient was seen and evaluated at bedside and appears comfortable.  She states that she tolerated a clear liquid diet overnight without episodes of nausea/vomiting or abdominal pain.  She denies any fevers or chills.  Her pain has significantly improved overnight.  She was evaluated by general surgery with current recommendations for possible IR drainage which is pending.  She denies any bowel/bladder disturbances or worsening shortness of breath.  No other " overnight events reported.    2/15: Vitals stable on room air. Labs stable, still with mild leukocytosis. Patient reports she is feeling much better, still with mild pain but hasn't needed pain meds. Per surgery plan to repeat CT today to eval if fluid collection amenable to percutaneous drainage.     2/16: Repeat CT shows RLQ 7 cm fluid collection. Plan for drain placement by IR today. Patient still with dull pain in the RLQ but improving.     I have personally seen and examined the patient at bedside. I discussed the plan of care with patient, bedside RN, charge RN and .    Consultants/Specialty  general surgery     Code Status  Full Code    Disposition  Patient is not medically cleared for discharge.   Anticipate discharge to to home with close outpatient follow-up.  I have placed the appropriate orders for post-discharge needs.    Review of Systems  Review of Systems   Constitutional: Negative for chills and fever.   Respiratory: Negative for shortness of breath.    Cardiovascular: Negative for chest pain.   Gastrointestinal: Positive for abdominal pain (mild). Negative for nausea and vomiting.   Genitourinary: Negative for dysuria.   Musculoskeletal: Negative for myalgias.   Skin: Negative for rash.   Neurological: Negative for dizziness and headaches.   Psychiatric/Behavioral: Negative for depression.   All other systems reviewed and are negative.       Physical Exam  Temp:  [36.9 °C (98.5 °F)-37.2 °C (99 °F)] 37 °C (98.6 °F)  Pulse:  [81-88] 81  Resp:  [16-18] 16  BP: (103-119)/(54-76) 119/76  SpO2:  [96 %-100 %] 97 %    Physical Exam  Vitals and nursing note reviewed.   Constitutional:       General: She is not in acute distress.     Appearance: Normal appearance.   HENT:      Head: Normocephalic and atraumatic.   Eyes:      Conjunctiva/sclera: Conjunctivae normal.   Cardiovascular:      Rate and Rhythm: Normal rate and regular rhythm.      Pulses: Normal pulses.      Heart sounds: Normal heart  sounds.   Pulmonary:      Effort: Pulmonary effort is normal. No respiratory distress.      Breath sounds: Normal breath sounds. No wheezing.   Abdominal:      General: Abdomen is flat. There is no distension.      Palpations: Abdomen is soft.      Tenderness: There is abdominal tenderness (mild RLQ ). There is no guarding.      Comments: Vertical midline surgical scar noted   Musculoskeletal:         General: No swelling.      Cervical back: Neck supple.   Skin:     General: Skin is warm and dry.   Neurological:      General: No focal deficit present.      Mental Status: She is alert and oriented to person, place, and time.   Psychiatric:         Mood and Affect: Mood normal.         Behavior: Behavior normal.         Fluids    Intake/Output Summary (Last 24 hours) at 2/16/2022 1222  Last data filed at 2/16/2022 0829  Gross per 24 hour   Intake 120 ml   Output --   Net 120 ml       Laboratory  Recent Labs     02/14/22  0429 02/15/22  0300 02/16/22  0425   WBC 12.7* 11.9* 11.1*   RBC 3.76* 3.84* 4.03*   HEMOGLOBIN 10.6* 10.7* 11.3*   HEMATOCRIT 32.6* 32.8* 34.4*   MCV 86.7 85.4 85.4   MCH 28.2 27.9 28.0   MCHC 32.5* 32.6* 32.8*   RDW 40.8 40.0 40.4   PLATELETCT 346 332 370   MPV 9.9 9.8 9.7     Recent Labs     02/14/22  0429 02/15/22  0300 02/16/22  0425   SODIUM 140 138 134*   POTASSIUM 3.1* 3.8 4.2   CHLORIDE 106 105 100   CO2 21 23 21   GLUCOSE 92 102* 99   BUN 7* 6* 6*   CREATININE 0.87 0.83 0.94   CALCIUM 8.2* 8.6 9.1                   Imaging  CT-ABDOMEN-PELVIS WITH   Final Result      1.  Peripherally enhancing irregular fluid collection in the RIGHT lower quadrant most consistent with abscess measuring 7 cm in greatest dimension, likely diverticular abscess although underlying etiology is uncertain.  Prior RIGHT hemicolectomy by    history.   2.  No evidence of bowel obstruction or perforation.   3.  Trace pelvic free fluid.         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  This required 5 different surgeries as the patient began getting wound dehiscence and reaccumulation of intra-abdominal abscess.  Patient suddenly started to develop abdominal pain over the past 48 hours on her right lower quadrant.  She initially went to urgent care for evaluation and CT scan of the abdomen showed a 5.2 x 3.6 x 3.7 cm abscess.  She was then referred over to the emergency room  Continue Zosyn  Surgical consultation, plan for IR drain placement today   Pain management  Blood cultures ntd    Nausea and vomiting- (present on admission)  Assessment & Plan  Nausea and vomiting control    Leukocytosis- (present on admission)  Assessment & Plan  Leukocytosis secondary to current infection  Monitor white blood cell count  Should improve with antibiotic management  Obtain blood cultures    Protein-calorie malnutrition, moderate (HCC)- (present on admission)  Assessment & Plan  Give nutritional support patient has not been eating well ever since her abdominal surgeries.    Anemia- (present on admission)  Assessment & Plan  H&H slightly down 11.5 and 35.3  Monitor H&H if drops below 7 or 21 transfuse  Repeat iron levels show mild iron deficiency   Daily iron supplement    Anxiety- (present on admission)  Assessment & Plan  Moderate degree of anxiety, continue anxiolytic management as needed       VTE prophylaxis: SCDs/TEDs    I have performed a physical exam and reviewed and updated ROS and Plan today (2/16/2022). In review of yesterday's note (2/15/2022), there are no changes except as documented above.

## 2022-02-16 NOTE — CARE PLAN
Problem: Pain - Standard  Goal: Alleviation of pain or a reduction in pain to the patient’s comfort goal  Outcome: Progressing  Note: Pt denies pain at this time. Pt educated to call for pain medication when needed.    The patient is Stable - Low risk of patient condition declining or worsening    Shift Goals  Clinical Goals: Pain control and IR procedure today  Patient Goals: Pain control and dreain placement  Family Goals:  (None present at this time)    Progress made toward(s) clinical / shift goals:  Pt given pain medication when needed. IR drain to be placed this afternoon.

## 2022-02-16 NOTE — OR SURGEON
Immediate Post- Operative Note        Findings: RLQ abscess      Procedure(s): CT drain of same  40 mL pus to lab      Estimated Blood Loss: Less than 5 ml        Complications: None            2/16/2022     3:00 PM     Fredy Tejada M.D.

## 2022-02-16 NOTE — CARE PLAN
The patient is Stable - Low risk of patient condition declining or worsening    Shift Goals  Clinical Goals: pain control  Patient Goals: pain control  Family Goals:  (None present at this time)    Progress made toward(s) clinical / shift goals:  Patient has been able to rest throughout the night with minimal pain.       Problem: Pain - Standard  Goal: Alleviation of pain or a reduction in pain to the patient’s comfort goal  Outcome: Progressing     Problem: Knowledge Deficit - Standard  Goal: Patient and family/care givers will demonstrate understanding of plan of care, disease process/condition, diagnostic tests and medications  Outcome: Progressing     Problem: Fall Risk  Goal: Patient will remain free from falls  Outcome: Progressing       Patient is not progressing towards the following goals: none

## 2022-02-17 VITALS
HEIGHT: 69 IN | OXYGEN SATURATION: 100 % | DIASTOLIC BLOOD PRESSURE: 66 MMHG | WEIGHT: 145.5 LBS | BODY MASS INDEX: 21.55 KG/M2 | HEART RATE: 69 BPM | SYSTOLIC BLOOD PRESSURE: 115 MMHG | RESPIRATION RATE: 16 BRPM | TEMPERATURE: 98.5 F

## 2022-02-17 LAB
BACTERIA BLD CULT: NORMAL
BACTERIA BLD CULT: NORMAL
ERYTHROCYTE [DISTWIDTH] IN BLOOD BY AUTOMATED COUNT: 41 FL (ref 35.9–50)
GRAM STN SPEC: NORMAL
HCT VFR BLD AUTO: 36.3 % (ref 37–47)
HGB BLD-MCNC: 11.6 G/DL (ref 12–16)
MCH RBC QN AUTO: 27.8 PG (ref 27–33)
MCHC RBC AUTO-ENTMCNC: 32 G/DL (ref 33.6–35)
MCV RBC AUTO: 86.8 FL (ref 81.4–97.8)
PLATELET # BLD AUTO: 399 K/UL (ref 164–446)
PMV BLD AUTO: 10.1 FL (ref 9–12.9)
RBC # BLD AUTO: 4.18 M/UL (ref 4.2–5.4)
SIGNIFICANT IND 70042: NORMAL
SITE SITE: NORMAL
SOURCE SOURCE: NORMAL
WBC # BLD AUTO: 10.2 K/UL (ref 4.8–10.8)

## 2022-02-17 PROCEDURE — 700102 HCHG RX REV CODE 250 W/ 637 OVERRIDE(OP): Performed by: INTERNAL MEDICINE

## 2022-02-17 PROCEDURE — 700111 HCHG RX REV CODE 636 W/ 250 OVERRIDE (IP): Performed by: HOSPITALIST

## 2022-02-17 PROCEDURE — 700102 HCHG RX REV CODE 250 W/ 637 OVERRIDE(OP): Performed by: HOSPITALIST

## 2022-02-17 PROCEDURE — A9270 NON-COVERED ITEM OR SERVICE: HCPCS | Performed by: INTERNAL MEDICINE

## 2022-02-17 PROCEDURE — 99239 HOSP IP/OBS DSCHRG MGMT >30: CPT | Performed by: INTERNAL MEDICINE

## 2022-02-17 PROCEDURE — 700105 HCHG RX REV CODE 258: Performed by: HOSPITALIST

## 2022-02-17 PROCEDURE — A9270 NON-COVERED ITEM OR SERVICE: HCPCS | Performed by: HOSPITALIST

## 2022-02-17 PROCEDURE — 85027 COMPLETE CBC AUTOMATED: CPT

## 2022-02-17 PROCEDURE — 36415 COLL VENOUS BLD VENIPUNCTURE: CPT

## 2022-02-17 RX ORDER — FERROUS SULFATE 325(65) MG
325 TABLET ORAL
Qty: 30 TABLET | Refills: 0 | Status: SHIPPED | OUTPATIENT
Start: 2022-02-18

## 2022-02-17 RX ORDER — CIPROFLOXACIN 500 MG/1
500 TABLET, FILM COATED ORAL 2 TIMES DAILY
Qty: 14 TABLET | Refills: 0 | Status: SHIPPED | OUTPATIENT
Start: 2022-02-17 | End: 2022-02-24

## 2022-02-17 RX ORDER — METRONIDAZOLE 500 MG/1
500 TABLET ORAL EVERY 8 HOURS
Qty: 21 TABLET | Refills: 0 | Status: SHIPPED | OUTPATIENT
Start: 2022-02-17 | End: 2022-02-24

## 2022-02-17 RX ADMIN — ONDANSETRON 4 MG: 2 INJECTION INTRAMUSCULAR; INTRAVENOUS at 11:27

## 2022-02-17 RX ADMIN — OXYCODONE HYDROCHLORIDE 5 MG: 5 TABLET ORAL at 11:28

## 2022-02-17 RX ADMIN — POTASSIUM CHLORIDE 40 MEQ: 1500 TABLET, EXTENDED RELEASE ORAL at 08:59

## 2022-02-17 RX ADMIN — MORPHINE SULFATE 2 MG: 4 INJECTION INTRAVENOUS at 07:36

## 2022-02-17 RX ADMIN — PIPERACILLIN AND TAZOBACTAM 3.38 G: 3; .375 INJECTION, POWDER, LYOPHILIZED, FOR SOLUTION INTRAVENOUS; PARENTERAL at 04:50

## 2022-02-17 RX ADMIN — MORPHINE SULFATE 2 MG: 4 INJECTION INTRAVENOUS at 04:51

## 2022-02-17 RX ADMIN — Medication 400 MG: at 09:00

## 2022-02-17 RX ADMIN — FERROUS SULFATE TAB 325 MG (65 MG ELEMENTAL FE) 325 MG: 325 (65 FE) TAB at 09:04

## 2022-02-17 ASSESSMENT — PAIN DESCRIPTION - PAIN TYPE
TYPE: SURGICAL PAIN
TYPE: ACUTE PAIN

## 2022-02-17 NOTE — DISCHARGE INSTRUCTIONS
Discharge Instructions    Discharged to home by car with relative. Discharged via wheelchair, hospital escort: Yes.  Special equipment needed: Not Applicable    Be sure to schedule a follow-up appointment with your primary care doctor or any specialists as instructed.     Discharge Plan:   Diet Plan: Discussed  Activity Level: Discussed  Confirmed Follow up Appointment: Patient to Call and Schedule Appointment  Confirmed Symptoms Management: Discussed  Medication Reconciliation Updated: Yes  Influenza Vaccine Indication: Not indicated: Previously immunized this influenza season and > 8 years of age    I understand that a diet low in cholesterol, fat, and sodium is recommended for good health. Unless I have been given specific instructions below for another diet, I accept this instruction as my diet prescription.   Other diet: Regular    Special Instructions: None    · Is patient discharged on Warfarin / Coumadin?   No     Depression / Suicide Risk    As you are discharged from this [facility], it is important to learn how to keep safe from harming yourself.    Recognize the warning signs:  · Abrupt changes in personality, positive or negative- including increase in energy   · Giving away possessions  · Change in eating patterns- significant weight changes-  positive or negative  · Change in sleeping patterns- unable to sleep or sleeping all the time   · Unwillingness or inability to communicate  · Depression  · Unusual sadness, discouragement and loneliness  · Talk of wanting to die  · Neglect of personal appearance   · Rebelliousness- reckless behavior  · Withdrawal from people/activities they love  · Confusion- inability to concentrate     If you or a loved one observes any of these behaviors or has concerns about self-harm, here's what you can do:  · Talk about it- your feelings and reasons for harming yourself  · Remove any means that you might use to hurt yourself (examples: pills, rope, extension  cords, firearm)  · Get professional help from the community (Mental Health, Substance Abuse, psychological counseling)  · Do not be alone:Call your Safe Contact- someone whom you trust who will be there for you.  · Call your local CRISIS HOTLINE 766-2407 or 978-437-0345  · Call your local Children's Mobile Crisis Response Team [place] (824) 508-4136 or www.Edvert  · Call the toll free National Suicide Prevention Hotlines   · National Suicide Prevention Lifeline 733-315-XRMF (4948)  · [facility] Hope Line Network 800-SUICIDE (663-4592)              Surgical Drain Home Care  Surgical drains are used to remove extra fluid that normally builds up in a surgical wound after surgery. A surgical drain helps to heal a surgical wound. Different kinds of surgical drains include:  · Active drains. These drains use suction to pull drainage away from the surgical wound. Drainage flows through a tube to a container outside of the body. With these drains, you need to keep the bulb or the drainage container flat (compressed) at all times, except while you empty it. Flattening the bulb or container creates suction.  · Passive drains. These drains allow fluid to drain naturally, by gravity. Drainage flows through a tube to a bandage (dressing) or a container outside of the body. Passive drains do not need to be emptied.  A drain is placed during surgery. Right after surgery, drainage is usually bright red and a little thicker than water. The drainage may gradually turn yellow or pink and become thinner. It is likely that your health care provider will remove the drain when the drainage stops or when the amount decreases to 1-2 Tbsp (15-30 mL) during a 24-hour period.  Supplies needed:  · Tape.  · Germ-free cleaning solution (sterile saline).  · Cotton swabs.  · Split gauze drain sponge: 4 x 4 inches (10 x 10 cm).  · Gauze square: 4 x 4 inches (10 x 10 cm).  How to care for your surgical drain  Care for your drain as told  by your health care provider. This is important to help prevent infection. If your drain is placed at your back, or any other hard-to-reach area, ask another person to assist you in performing the following tasks:  General care  · Keep the skin around the drain dry and covered with a dressing at all times.  · Check your drain area every day for signs of infection. Check for:  ? Redness, swelling, or pain.  ? Pus or a bad smell.  ? Cloudy drainage.  ? Tenderness or pressure at the drain exit site.  Changing the dressing  Follow instructions from your health care provider about how to change your dressing. Change your dressing at least once a day. Change it more often if needed to keep the dressing dry. Make sure you:  1. Gather your supplies.  2. Wash your hands with soap and water before you change your dressing. If soap and water are not available, use hand .  3. Remove the old dressing. Avoid using scissors to do that.  4. Wash your hands with soap and water again after removing the old dressing.  5. Use sterile saline to clean your skin around the drain. You may need to use a cotton swab to clean the skin.  6. Place the tube through the slit in a drain sponge. Place the drain sponge so that it covers your wound.  7. Place the gauze square or another drain sponge on top of the drain sponge that is on the wound. Make sure the tube is between those layers.  8. Tape the dressing to your skin.  9. Tape the drainage tube to your skin 1-2 inches (2.5-5 cm) below the place where the tube enters your body. Taping keeps the tube from pulling on any stitches (sutures) that you have.  10. Wash your hands with soap and water.  11. Write down the color of your drainage and how often you change your dressing.  How to empty your active drain    1. Make sure that you have a measuring cup that you can empty your drainage into.  2. Wash your hands with soap and water. If soap and water are not available, use hand  .  3. Loosen any pins or clips that hold the tube in place.  4. If your health care provider tells you to strip the tube to prevent clots and tube blockages:  ? Hold the tube at the skin with one hand. Use your other hand to pinch the tubing with your thumb and first finger.  ? Gently move your fingers down the tube while squeezing very lightly. This clears any drainage, clots, or tissue from the tube.  ? You may need to do this several times each day to keep the tube clear. Do not pull on the tube.  5. Open the bulb cap or the drain plug. Do not touch the inside of the cap or the bottom of the plug.  6. Turn the device upside down and gently squeeze.  7. Empty all of the drainage into the measuring cup.  8. Compress the bulb or the container and replace the cap or the plug. To compress the bulb or the container, squeeze it firmly in the middle while you close the cap or plug the container.  9. Write down the amount of drainage that you have in each 24-hour period. If you have less than 2 Tbsp (30 mL) of drainage during 24 hours, contact your health care provider.  10. Flush the drainage down the toilet.  11. Wash your hands with soap and water.  Contact a health care provider if:  · You have redness, swelling, or pain around your drain area.  · You have pus or a bad smell coming from your drain area.  · You have a fever or chills.  · The skin around your drain is warm to the touch.  · The amount of drainage that you have is increasing instead of decreasing.  · You have drainage that is cloudy.  · There is a sudden stop or a sudden decrease in the amount of drainage that you have.  · Your drain tube falls out.  · Your active drain does not stay compressed after you empty it.  Summary  · Surgical drains are used to remove extra fluid that normally builds up in a surgical wound after surgery.  · Different kinds of surgical drains include active drains and passive drains. Active drains use suction to pull  drainage away from the surgical wound, and passive drains allow fluid to drain naturally.  · It is important to care for your drain to prevent infection. If your drain is placed at your back, or any other hard-to-reach area, ask another person to assist you.  · Contact your health care provider if you have redness, swelling, or pain around your drain area.  This information is not intended to replace advice given to you by your health care provider. Make sure you discuss any questions you have with your health care provider.  Document Released: 12/15/2001 Document Revised: 01/22/2020 Document Reviewed: 01/22/2020  Elsevier Patient Education © 2020 [employer]              Surgical Drain Record  Empty your surgical drain as told by your health care provider. Use this form to write down the amount of fluid that has collected in the drainage container. Bring this form with you to your follow-up visits.  Surgical drain #1 location: ___________________    Date __________ Time __________ Amount __________  Date __________ Time __________ Amount __________  Date __________ Time __________ Amount __________  Date __________ Time __________ Amount __________  Date __________ Time __________ Amount __________  Date __________ Time __________ Amount __________  Date __________ Time __________ Amount __________  Date __________ Time __________ Amount __________  Date __________ Time __________ Amount __________  Date __________ Time __________ Amount __________  Date __________ Time __________ Amount __________  Date __________ Time __________ Amount __________  Date __________ Time __________ Amount __________  Date __________ Time __________ Amount __________  Date __________ Time __________ Amount __________  Date __________ Time __________ Amount __________  Date __________ Time __________ Amount __________  Date __________ Time __________ Amount __________  Date __________ Time __________ Amount __________  Date  __________ Time __________ Amount __________  Date __________ Time __________ Amount __________  Surgical drain #2 location: ___________________  Date __________ Time __________ Amount __________  Date __________ Time __________ Amount __________  Date __________ Time __________ Amount __________  Date __________ Time __________ Amount __________  Date __________ Time __________ Amount __________  Date __________ Time __________ Amount __________  Date __________ Time __________ Amount __________  Date __________ Time __________ Amount __________  Date __________ Time __________ Amount __________  Date __________ Time __________ Amount __________  Date __________ Time __________ Amount __________  Date __________ Time __________ Amount __________  Date __________ Time __________ Amount __________  Date __________ Time __________ Amount __________  Date __________ Time __________ Amount __________  Date __________ Time __________ Amount __________  Date __________ Time __________ Amount __________  Date __________ Time __________ Amount __________  Date __________ Time __________ Amount __________  Date __________ Time __________ Amount __________  Date __________ Time __________ Amount __________  This information is not intended to replace advice given to you by your health care provider. Make sure you discuss any questions you have with your health care provider.  Document Released: 09/24/2018 Document Revised: 09/24/2018 Document Reviewed: 09/24/2018  Elsevier Patient Education © 2020 [employer] Inc[employer]

## 2022-02-17 NOTE — CARE PLAN
Problem: Pain - Standard  Goal: Alleviation of pain or a reduction in pain to the patient’s comfort goal  Outcome: Met     Problem: Knowledge Deficit - Standard  Goal: Patient and family/care givers will demonstrate understanding of plan of care, disease process/condition, diagnostic tests and medications  Outcome: Met     Problem: Fall Risk  Goal: Patient will remain free from falls  Outcome: Met   The patient is Stable - Low risk of patient condition declining or worsening    Shift Goals  Clinical Goals: KRISTI molina and discharge today  Patient Goals: Discharge  Family Goals:  (None present at this time)    Progress made toward(s) clinical / shift goals:  Discharge today

## 2022-02-17 NOTE — PROGRESS NOTES
Report received from Radiology. Pt arrived to unit via hospital bed. Pt awake and alert. Pt states pain is 7/10. Pain medication given per MAR.

## 2022-02-17 NOTE — CARE PLAN
The patient is Stable - Low risk of patient condition declining or worsening    Shift Goals  Clinical Goals: Observe KRISTI drain  Patient Goals: Monitor abdominal pain  Family Goals:  (None present at this time)    Progress made toward(s) clinical / shift goals:    Problem: Pain - Standard  Goal: Alleviation of pain or a reduction in pain to the patient’s comfort goal  Outcome: Progressing  Note: Prn Morphine 2mg given per MAR for c/o RLQ abd pain  Pt sleeping comfortably and soundly in bed at this moment  Will continue to monitor and medicate as needed      Problem: Knowledge Deficit - Standard  Goal: Patient and family/care givers will demonstrate understanding of plan of care, disease process/condition, diagnostic tests and medications  Outcome: Progressing  Note: Pt had IR drain with KRISTI placed yesterday   Pt is afebrile 97.9*F with recent WBCs of 11.1  Blood culture and fluid culture in process   Pt on IV- Zosyn 3.375g q8 hours   Will continue to monitor

## 2022-02-17 NOTE — PROCEDURES
? IR Nursing Note    Site Marked RLQ, Procedure Confirmed with DR MAN, RN, and patient pre procedure.     Sedation Start Time: 1441   Ptime Out/Procedure Start Time: 1442   Procedure Stop Time: 1515   Sedation End Time: 1456   ?   End Tidal CO2 range 25-30 throughout procedure.   Locking loop cath inserted by Dr man w/o incident. 45ml light brown/green thick fluid obtained, specimen sent to lab.   RLQ access site, secured  with sutures, covered with guaze, tegaderm.   ?     + pulses pre procedure     10 Fr REF#[id number] LOT#[id number] EXP 11/17/24   + pulses post procedure   ?   Patient tolerated procedure well with some residual pain post procedure, 5-6/10 RLQ.  hemodynamically stable; report given to KLAUDIA anderson. Patient transported to room via IR RN, transferred care to report RN.   ?   :

## 2022-02-17 NOTE — PROGRESS NOTES
RLQ fluid collection s/p R. Hemicolectomy 5/2021 complicated by anastomotic leak  Patient underwent percutaneous drainage yesterday   Feels good, sore around drain site    Drain; cloudy serosanguinous  Abdomen soft, non-distended. No tenderness    F/u cultures  Recommend home with PO antibiotics and drain  F/u in my office next week for drain management/removal

## 2022-02-17 NOTE — DISCHARGE SUMMARY
Discharge Summary    CHIEF COMPLAINT ON ADMISSION  Chief Complaint   Patient presents with   • RUQ Pain       Reason for Admission  post op comp     Admission Date  2/12/2022    CODE STATUS  Full Code    HPI & HOSPITAL COURSE  This is a 51 y.o. female here with abdominal pain.    51 y.o. female who presented 2/12/2022 with abdominal pain for the past 48 hours.  Patient initially went to be seen at urgent care due to the pain.  At urgent care CT of the abdomen was done and it shows a 5.2 x 3.6 x 3.7 cm abscess. Patient was sent to the ER and surgery was consulted.  Patient started on IV abx and cultures done. Surgery initially recommended medical management with abx. IR was consulted for possible drain placement, repeat CT showed RLQ 7 cm fluid collection and IR drain was placed 2/16.   Patient's vital signs are stable and her abdominal pain has improved.  General surgery has cleared the patient for discharge home with the drain in place and oral antibiotics.  Patient is to follow-up in that surgery outpatient office next week for drain management/removal.  Patient was also noted to be anemic and iron studies consistent with iron deficiency anemia, started on oral iron replacement.  Patient is to return to the ER if her condition worsens.    Therefore, she is discharged in good and stable condition to home with close outpatient follow-up.    The patient met 2-midnight criteria for an inpatient stay at the time of discharge.    Discharge Date  2/17/2022    FOLLOW UP ITEMS POST DISCHARGE  Follow-up with general surgery  Follow-up with primary care    DISCHARGE DIAGNOSES  Principal Problem:    Intra-abdominal abscess (HCC) POA: Yes  Active Problems:    Anxiety (Chronic) POA: Yes    Anemia POA: Yes    Protein-calorie malnutrition, moderate (HCC) POA: Yes    Leukocytosis POA: Yes    Nausea and vomiting POA: Yes  Resolved Problems:    * No resolved hospital problems. *      FOLLOW UP  No future appointments.  Tonya Grover,  "M.D.  [address]  [address]  [facility] [id number] [phone]      Call and make an appointment to follow up with surgery in 1 week for drain management    Brandan Prieto M.D.  [address]  [address]  [facility] [id number] [phone]      Follow up with primary care physician in 1-2 weeks.      MEDICATIONS ON DISCHARGE     Medication List      START taking these medications      Instructions   ciprofloxacin 500 MG Tabs  Commonly known as: CIPRO   Take 1 Tablet by mouth 2 times a day for 7 days.  Dose: 500 mg     ferrous sulfate 325 (65 Fe) MG tablet  Start taking on: February 18, 2022   Take 1 Tablet by mouth every morning with breakfast.  Dose: 325 mg     metroNIDAZOLE 500 MG Tabs  Commonly known as: FLAGYL   Take 1 Tablet by mouth every 8 hours for 7 days.  Dose: 500 mg        CONTINUE taking these medications      Instructions   ADVIL PM PO   Take 2 Tablets by mouth at bedtime.  Dose: 2 Tablet     HYDROcodone/acetaminophen  MG Tabs  Commonly known as: NORCO   Take 0.5 Tablets by mouth every 6 hours as needed for Severe Pain.  Dose: 0.5 Tablet     MULTIVITAMIN PO   Take 1 Package by mouth every day.  Dose: 1 Package            Allergies  Allergies   Allergen Reactions   • Convoy Meal Swelling     \"lips swell up\"       DIET  Orders Placed This Encounter   Procedures   • Diet Order Diet: Low Fiber(GI Soft)     Standing Status:   Standing     Number of Occurrences:   1     Order Specific Question:   Diet:     Answer:   Low Fiber(GI Soft) [2]       ACTIVITY  As tolerated.  Weight bearing as tolerated    CONSULTATIONS  General surgery  Interventional radiology    PROCEDURES  2/16/22  CT drain of right lower quadrant abscess    LABORATORY  Lab Results   Component Value Date    SODIUM 134 (L) 02/16/2022    POTASSIUM 4.2 02/16/2022    CHLORIDE 100 02/16/2022    CO2 21 02/16/2022    GLUCOSE 99 02/16/2022    BUN 6 (L) 02/16/2022    CREATININE 0.94 02/16/2022    CREATININE 0.8 01/04/2007        Lab " Results   Component Value Date    WBC 10.2 02/17/2022    HEMOGLOBIN 11.6 (L) 02/17/2022    HEMATOCRIT 36.3 (L) 02/17/2022    PLATELETCT 399 02/17/2022        Total time of the discharge process exceeds 40 minutes.

## 2022-02-17 NOTE — PROGRESS NOTES
Received report from day shift nurse.   Assumed pt care  Pt is A&Ox4, resting comfortably in bed.   Pt on r.a.. No signs of SOB/respiratory distress.   Labs noted, VSS.   Pt reported of tenderness to RLQ; last Morphine given at 1917; will return to medicate when it is due   Assessment completed; KRISTI drain to RLQ, CDI; pt able to pass flatus and had BM today   Fall precautions in place.   Call light and personal belongings within reach.   Continue to monitor

## 2022-02-19 LAB
BACTERIA FLD AEROBE CULT: ABNORMAL
GRAM STN SPEC: ABNORMAL
SIGNIFICANT IND 70042: ABNORMAL
SITE SITE: ABNORMAL
SOURCE SOURCE: ABNORMAL

## 2022-03-17 ENCOUNTER — APPOINTMENT (OUTPATIENT)
Dept: RADIOLOGY | Facility: MEDICAL CENTER | Age: 52
End: 2022-03-17
Attending: SURGERY
Payer: COMMERCIAL

## 2022-03-24 ENCOUNTER — APPOINTMENT (OUTPATIENT)
Dept: RADIOLOGY | Facility: MEDICAL CENTER | Age: 52
End: 2022-03-24
Attending: SURGERY
Payer: COMMERCIAL

## 2022-04-02 ENCOUNTER — HOSPITAL ENCOUNTER (OUTPATIENT)
Dept: RADIOLOGY | Facility: MEDICAL CENTER | Age: 52
End: 2022-04-02
Attending: SURGERY
Payer: COMMERCIAL

## 2022-04-02 DIAGNOSIS — K65.1 PERITONEAL ABSCESS (HCC): ICD-10-CM

## 2022-04-02 PROCEDURE — 74177 CT ABD & PELVIS W/CONTRAST: CPT

## 2022-04-02 PROCEDURE — 700117 HCHG RX CONTRAST REV CODE 255: Performed by: SURGERY

## 2022-04-02 RX ADMIN — IOHEXOL 25 ML: 240 INJECTION, SOLUTION INTRATHECAL; INTRAVASCULAR; INTRAVENOUS; ORAL at 14:02

## 2022-04-02 RX ADMIN — IOHEXOL 80 ML: 350 INJECTION, SOLUTION INTRAVENOUS at 14:01

## 2022-09-09 ENCOUNTER — APPOINTMENT (RX ONLY)
Dept: URBAN - METROPOLITAN AREA CLINIC 4 | Facility: CLINIC | Age: 52
Setting detail: DERMATOLOGY
End: 2022-09-09

## 2022-09-09 DIAGNOSIS — L82.1 OTHER SEBORRHEIC KERATOSIS: ICD-10-CM

## 2022-09-09 DIAGNOSIS — D22 MELANOCYTIC NEVI: ICD-10-CM

## 2022-09-09 DIAGNOSIS — D18.0 HEMANGIOMA: ICD-10-CM

## 2022-09-09 DIAGNOSIS — L81.4 OTHER MELANIN HYPERPIGMENTATION: ICD-10-CM

## 2022-09-09 PROBLEM — D22.72 MELANOCYTIC NEVI OF LEFT LOWER LIMB, INCLUDING HIP: Status: ACTIVE | Noted: 2022-09-09

## 2022-09-09 PROBLEM — D22.5 MELANOCYTIC NEVI OF TRUNK: Status: ACTIVE | Noted: 2022-09-09

## 2022-09-09 PROBLEM — D22.71 MELANOCYTIC NEVI OF RIGHT LOWER LIMB, INCLUDING HIP: Status: ACTIVE | Noted: 2022-09-09

## 2022-09-09 PROBLEM — D22.61 MELANOCYTIC NEVI OF RIGHT UPPER LIMB, INCLUDING SHOULDER: Status: ACTIVE | Noted: 2022-09-09

## 2022-09-09 PROBLEM — D22.62 MELANOCYTIC NEVI OF LEFT UPPER LIMB, INCLUDING SHOULDER: Status: ACTIVE | Noted: 2022-09-09

## 2022-09-09 PROBLEM — D18.01 HEMANGIOMA OF SKIN AND SUBCUTANEOUS TISSUE: Status: ACTIVE | Noted: 2022-09-09

## 2022-09-09 PROCEDURE — ? LIQUID NITROGEN

## 2022-09-09 PROCEDURE — ? COUNSELING

## 2022-09-09 PROCEDURE — 99213 OFFICE O/P EST LOW 20 MIN: CPT

## 2022-09-09 ASSESSMENT — LOCATION SIMPLE DESCRIPTION DERM
LOCATION SIMPLE: SCALP
LOCATION SIMPLE: RIGHT POSTERIOR THIGH
LOCATION SIMPLE: RIGHT POSTERIOR UPPER ARM
LOCATION SIMPLE: RIGHT PRETIBIAL REGION
LOCATION SIMPLE: LEFT POSTERIOR THIGH
LOCATION SIMPLE: RIGHT EYEBROW
LOCATION SIMPLE: LEFT ELBOW
LOCATION SIMPLE: LEFT POPLITEAL SKIN
LOCATION SIMPLE: RIGHT THIGH
LOCATION SIMPLE: LEFT PRETIBIAL REGION
LOCATION SIMPLE: RIGHT FOREARM
LOCATION SIMPLE: RIGHT POPLITEAL SKIN
LOCATION SIMPLE: LEFT POSTERIOR UPPER ARM
LOCATION SIMPLE: CHEST
LOCATION SIMPLE: ABDOMEN
LOCATION SIMPLE: RIGHT UPPER BACK
LOCATION SIMPLE: LEFT FOREARM
LOCATION SIMPLE: UPPER BACK
LOCATION SIMPLE: LEFT FOREHEAD

## 2022-09-09 ASSESSMENT — LOCATION DETAILED DESCRIPTION DERM
LOCATION DETAILED: LEFT LATERAL ABDOMEN
LOCATION DETAILED: RIGHT PROXIMAL POSTERIOR UPPER ARM
LOCATION DETAILED: RIGHT DISTAL POSTERIOR THIGH
LOCATION DETAILED: RIGHT DISTAL POSTERIOR UPPER ARM
LOCATION DETAILED: RIGHT LATERAL SUPERIOR CHEST
LOCATION DETAILED: RIGHT VENTRAL PROXIMAL FOREARM
LOCATION DETAILED: LEFT LATERAL ELBOW
LOCATION DETAILED: LEFT POPLITEAL SKIN
LOCATION DETAILED: SUBXIPHOID
LOCATION DETAILED: LEFT MEDIAL SUPERIOR CHEST
LOCATION DETAILED: RIGHT POPLITEAL SKIN
LOCATION DETAILED: PERIUMBILICAL SKIN
LOCATION DETAILED: RIGHT CENTRAL EYEBROW
LOCATION DETAILED: INFERIOR THORACIC SPINE
LOCATION DETAILED: RIGHT SUPERIOR UPPER BACK
LOCATION DETAILED: RIGHT PROXIMAL PRETIBIAL REGION
LOCATION DETAILED: RIGHT ANTERIOR DISTAL THIGH
LOCATION DETAILED: LEFT INFERIOR FOREHEAD
LOCATION DETAILED: RIGHT INFERIOR FRONTAL SCALP
LOCATION DETAILED: LEFT VENTRAL LATERAL PROXIMAL FOREARM
LOCATION DETAILED: LEFT PROXIMAL PRETIBIAL REGION
LOCATION DETAILED: LEFT DISTAL DORSAL FOREARM
LOCATION DETAILED: MIDDLE STERNUM
LOCATION DETAILED: LOWER STERNUM
LOCATION DETAILED: RIGHT PROXIMAL DORSAL FOREARM
LOCATION DETAILED: LEFT DISTAL POSTERIOR THIGH
LOCATION DETAILED: LEFT DISTAL POSTERIOR UPPER ARM

## 2022-09-09 ASSESSMENT — LOCATION ZONE DERM
LOCATION ZONE: FACE
LOCATION ZONE: LEG
LOCATION ZONE: ARM
LOCATION ZONE: TRUNK
LOCATION ZONE: SCALP

## 2022-09-09 NOTE — PROCEDURE: LIQUID NITROGEN
Show Spray Paint Technique Variable?: Yes
Detail Level: Detailed
Add 52 Modifier (Optional): no
Medical Necessity Information: It is in your best interest to select a reason for this procedure from the list below. All of these items fulfill various CMS LCD requirements except the new and changing color options.
Post-Care Instructions: I reviewed with the patient in detail post-care instructions. Patient is to wear sunprotection, and avoid picking at any of the treated lesions. Pt may apply Vaseline to crusted or scabbing areas.
Duration Of Freeze Thaw-Cycle (Seconds): 0
Spray Paint Text: The liquid nitrogen was applied to the skin utilizing a spray paint frosting technique.
Consent: The patient's consent was obtained including but not limited to risks of crusting, scabbing, blistering, scarring, darker or lighter pigmentary change, recurrence, incomplete removal and infection.
Medical Necessity Clause: This procedure was medically necessary because the lesions that were treated were:  If lesion does not resolve, bx is needed.

## 2022-10-24 ENCOUNTER — HOSPITAL ENCOUNTER (OUTPATIENT)
Dept: LAB | Facility: MEDICAL CENTER | Age: 52
End: 2022-10-24
Attending: OBSTETRICS & GYNECOLOGY
Payer: COMMERCIAL

## 2022-10-24 LAB — CANCER AG125 SERPL-ACNC: 32 U/ML (ref 0–35)

## 2022-10-24 PROCEDURE — 86304 IMMUNOASSAY TUMOR CA 125: CPT

## 2022-10-24 PROCEDURE — 36415 COLL VENOUS BLD VENIPUNCTURE: CPT

## 2022-10-25 ENCOUNTER — HOSPITAL ENCOUNTER (OUTPATIENT)
Dept: RADIOLOGY | Facility: MEDICAL CENTER | Age: 52
End: 2022-10-25
Attending: NURSE PRACTITIONER
Payer: COMMERCIAL

## 2022-10-25 DIAGNOSIS — N83.202 BILATERAL OVARIAN CYSTS: ICD-10-CM

## 2022-10-25 DIAGNOSIS — N83.201 BILATERAL OVARIAN CYSTS: ICD-10-CM

## 2022-10-25 PROCEDURE — 76830 TRANSVAGINAL US NON-OB: CPT

## 2022-10-29 ENCOUNTER — HOSPITAL ENCOUNTER (OUTPATIENT)
Dept: LAB | Facility: MEDICAL CENTER | Age: 52
End: 2022-10-29
Attending: INTERNAL MEDICINE
Payer: COMMERCIAL

## 2022-10-29 LAB
HIV 1+2 AB+HIV1 P24 AG SERPL QL IA: NORMAL
T PALLIDUM AB SER QL IA: NORMAL

## 2022-10-29 PROCEDURE — 36415 COLL VENOUS BLD VENIPUNCTURE: CPT

## 2022-10-29 PROCEDURE — 87591 N.GONORRHOEAE DNA AMP PROB: CPT

## 2022-10-29 PROCEDURE — 87491 CHLMYD TRACH DNA AMP PROBE: CPT

## 2022-10-29 PROCEDURE — 87389 HIV-1 AG W/HIV-1&-2 AB AG IA: CPT

## 2022-10-29 PROCEDURE — 86780 TREPONEMA PALLIDUM: CPT

## 2022-11-04 ENCOUNTER — HOSPITAL ENCOUNTER (OUTPATIENT)
Dept: RADIOLOGY | Facility: MEDICAL CENTER | Age: 52
End: 2022-11-04
Attending: OBSTETRICS & GYNECOLOGY
Payer: COMMERCIAL

## 2022-11-04 DIAGNOSIS — Z12.31 ENCOUNTER FOR MAMMOGRAM TO ESTABLISH BASELINE MAMMOGRAM: ICD-10-CM

## 2022-11-04 PROCEDURE — 77063 BREAST TOMOSYNTHESIS BI: CPT

## 2022-11-05 LAB
C TRACH DNA SPEC QL NAA+PROBE: NEGATIVE
N GONORRHOEA DNA SPEC QL NAA+PROBE: NEGATIVE
SPECIMEN SOURCE: NORMAL

## 2022-12-15 ENCOUNTER — APPOINTMENT (RX ONLY)
Dept: URBAN - METROPOLITAN AREA CLINIC 4 | Facility: CLINIC | Age: 52
Setting detail: DERMATOLOGY
End: 2022-12-15

## 2022-12-15 DIAGNOSIS — L82.1 OTHER SEBORRHEIC KERATOSIS: ICD-10-CM

## 2022-12-15 DIAGNOSIS — D22 MELANOCYTIC NEVI: ICD-10-CM

## 2022-12-15 DIAGNOSIS — L70.8 OTHER ACNE: ICD-10-CM

## 2022-12-15 DIAGNOSIS — L81.4 OTHER MELANIN HYPERPIGMENTATION: ICD-10-CM

## 2022-12-15 PROBLEM — D48.5 NEOPLASM OF UNCERTAIN BEHAVIOR OF SKIN: Status: ACTIVE | Noted: 2022-12-15

## 2022-12-15 PROCEDURE — ? LIQUID NITROGEN

## 2022-12-15 PROCEDURE — ? BIOPSY BY SHAVE METHOD

## 2022-12-15 PROCEDURE — ? COSMETIC EXTRACTIONS

## 2022-12-15 PROCEDURE — ? COUNSELING

## 2022-12-15 PROCEDURE — 99212 OFFICE O/P EST SF 10 MIN: CPT | Mod: 25

## 2022-12-15 PROCEDURE — 11102 TANGNTL BX SKIN SINGLE LES: CPT

## 2022-12-15 ASSESSMENT — LOCATION ZONE DERM
LOCATION ZONE: SCALP
LOCATION ZONE: EYELID
LOCATION ZONE: FACE
LOCATION ZONE: NECK

## 2022-12-15 ASSESSMENT — LOCATION SIMPLE DESCRIPTION DERM
LOCATION SIMPLE: POSTERIOR SCALP
LOCATION SIMPLE: LEFT EYELID
LOCATION SIMPLE: LEFT FOREHEAD
LOCATION SIMPLE: NECK
LOCATION SIMPLE: RIGHT FOREHEAD

## 2022-12-15 ASSESSMENT — LOCATION DETAILED DESCRIPTION DERM
LOCATION DETAILED: RIGHT INFERIOR MEDIAL FOREHEAD
LOCATION DETAILED: LEFT LATERAL CANTHUS
LOCATION DETAILED: RIGHT SUPERIOR LATERAL NECK
LOCATION DETAILED: RIGHT POSTAURICULAR SKIN
LOCATION DETAILED: LEFT FOREHEAD

## 2022-12-15 NOTE — PROCEDURE: BIOPSY BY SHAVE METHOD
Detail Level: Detailed
Depth Of Biopsy: dermis
Was A Bandage Applied: Yes
Size Of Lesion In Cm: 0.5
X Size Of Lesion In Cm: 0
Biopsy Type: H and E
Biopsy Method: Personna blade
Anesthesia Type: 0.5% lidocaine without epinephrine
Anesthesia Volume In Cc: 1.5
Hemostasis: Electrocautery
Wound Care: Vaseline
Dressing: Band-Aid
Destruction After The Procedure: No
Type Of Destruction Used: Electrodesiccation
Curettage Text: The wound bed was treated with curettage after the biopsy was performed.
Cryotherapy Text: The wound bed was treated with cryotherapy after the biopsy was performed.
Electrodesiccation Text: The wound bed was treated with electrodesiccation after the biopsy was performed.
Electrodesiccation And Curettage Text: The wound bed was treated with electrodesiccation and curettage after the biopsy was performed.
Silver Nitrate Text: The wound bed was treated with silver nitrate after the biopsy was performed.
Lab: [id number]
Lab Facility: 
Consent: Verbal consent was obtained and risks were reviewed including but not limited to scarring, infection, bleeding, scabbing, incomplete removal, nerve damage and allergy to anesthesia.
Post-Care Instructions: I reviewed with the patient in detail post-care instructions. Patient is to keep the biopsy site dry overnight, and then apply bacitracin/petroleum  twice daily until healed. Patient may apply hydrogen peroxide soaks to remove any crusting.
Notification Instructions: Patient will be notified of biopsy results. However, patient instructed to call the office if not contacted within 2 weeks.
Billing Type: Third-Party Bill
Information: Selecting Yes will display possible errors in your note based on the variables you have selected. This validation is only offered as a suggestion for you. PLEASE NOTE THAT THE VALIDATION TEXT WILL BE REMOVED WHEN YOU FINALIZE YOUR NOTE. IF YOU WANT TO FAX A PRELIMINARY NOTE YOU WILL NEED TO TOGGLE THIS TO 'NO' IF YOU DO NOT WANT IT IN YOUR FAXED NOTE.

## 2022-12-15 NOTE — PROCEDURE: LIQUID NITROGEN
Duration Of Freeze Thaw-Cycle (Seconds): 0
Bill Insurance (You Assume Risk Of Denial Or Audit By Selecting Yes): No
Show Spray Paint Technique Variable?: Yes
Detail Level: Detailed
Post-Care Instructions: I reviewed with the patient in detail post-care instructions. Patient is to wear sunprotection, and avoid picking at any of the treated lesions. Pt may apply Vaseline to crusted or scabbing areas.
Medical Necessity Information: It is in your best interest to select a reason for this procedure from the list below. All of these items fulfill various CMS LCD requirements except the new and changing color options.
Medical Necessity Clause: This procedure was medically necessary because the lesions that were treated were:  If lesion does not resolve, bx is needed.
Consent: The patient's consent was obtained including but not limited to risks of crusting, scabbing, blistering, scarring, darker or lighter pigmentary change, recurrence, incomplete removal and infection.
Spray Paint Text: The liquid nitrogen was applied to the skin utilizing a spray paint frosting technique.

## 2023-05-16 ENCOUNTER — APPOINTMENT (OUTPATIENT)
Dept: RADIOLOGY | Facility: MEDICAL CENTER | Age: 53
End: 2023-05-16
Attending: OBSTETRICS & GYNECOLOGY
Payer: COMMERCIAL

## 2023-05-16 ENCOUNTER — HOSPITAL ENCOUNTER (OUTPATIENT)
Dept: LAB | Facility: MEDICAL CENTER | Age: 53
End: 2023-05-16
Attending: NURSE PRACTITIONER
Payer: COMMERCIAL

## 2023-05-16 DIAGNOSIS — N83.291 COMPLEX CYST OF RIGHT OVARY: ICD-10-CM

## 2023-05-16 LAB
BASOPHILS # BLD AUTO: 0.8 % (ref 0–1.8)
BASOPHILS # BLD: 0.06 K/UL (ref 0–0.12)
EOSINOPHIL # BLD AUTO: 0.2 K/UL (ref 0–0.51)
EOSINOPHIL NFR BLD: 2.5 % (ref 0–6.9)
ERYTHROCYTE [DISTWIDTH] IN BLOOD BY AUTOMATED COUNT: 45.2 FL (ref 35.9–50)
ESTRADIOL SERPL-MCNC: 45.4 PG/ML
FSH SERPL-ACNC: 60.4 MIU/ML
HCT VFR BLD AUTO: 41 % (ref 37–47)
HGB BLD-MCNC: 13.4 G/DL (ref 12–16)
IMM GRANULOCYTES # BLD AUTO: 0.04 K/UL (ref 0–0.11)
IMM GRANULOCYTES NFR BLD AUTO: 0.5 % (ref 0–0.9)
LH SERPL-ACNC: 47.2 IU/L
LYMPHOCYTES # BLD AUTO: 1.77 K/UL (ref 1–4.8)
LYMPHOCYTES NFR BLD: 22.5 % (ref 22–41)
MCH RBC QN AUTO: 29.6 PG (ref 27–33)
MCHC RBC AUTO-ENTMCNC: 32.7 G/DL (ref 33.6–35)
MCV RBC AUTO: 90.5 FL (ref 81.4–97.8)
MONOCYTES # BLD AUTO: 0.92 K/UL (ref 0–0.85)
MONOCYTES NFR BLD AUTO: 11.7 % (ref 0–13.4)
NEUTROPHILS # BLD AUTO: 4.89 K/UL (ref 2–7.15)
NEUTROPHILS NFR BLD: 62 % (ref 44–72)
NRBC # BLD AUTO: 0 K/UL
NRBC BLD-RTO: 0 /100 WBC
PLATELET # BLD AUTO: 312 K/UL (ref 164–446)
PMV BLD AUTO: 9.5 FL (ref 9–12.9)
PROGEST SERPL-MCNC: 0.29 NG/ML
RBC # BLD AUTO: 4.53 M/UL (ref 4.2–5.4)
T3FREE SERPL-MCNC: 3.67 PG/ML (ref 2–4.4)
T4 FREE SERPL-MCNC: 1.64 NG/DL (ref 0.93–1.7)
TSH SERPL DL<=0.005 MIU/L-ACNC: 1.19 UIU/ML (ref 0.38–5.33)
WBC # BLD AUTO: 7.9 K/UL (ref 4.8–10.8)

## 2023-05-16 PROCEDURE — 84439 ASSAY OF FREE THYROXINE: CPT

## 2023-05-16 PROCEDURE — 76856 US EXAM PELVIC COMPLETE: CPT

## 2023-05-16 PROCEDURE — 82670 ASSAY OF TOTAL ESTRADIOL: CPT

## 2023-05-16 PROCEDURE — 36415 COLL VENOUS BLD VENIPUNCTURE: CPT

## 2023-05-16 PROCEDURE — 85025 COMPLETE CBC W/AUTO DIFF WBC: CPT

## 2023-05-16 PROCEDURE — 83001 ASSAY OF GONADOTROPIN (FSH): CPT

## 2023-05-16 PROCEDURE — 83002 ASSAY OF GONADOTROPIN (LH): CPT

## 2023-05-16 PROCEDURE — 84144 ASSAY OF PROGESTERONE: CPT

## 2023-05-16 PROCEDURE — 84443 ASSAY THYROID STIM HORMONE: CPT

## 2023-05-16 PROCEDURE — 84481 FREE ASSAY (FT-3): CPT

## 2024-03-28 ENCOUNTER — APPOINTMENT (RX ONLY)
Dept: URBAN - METROPOLITAN AREA CLINIC 15 | Facility: CLINIC | Age: 54
Setting detail: DERMATOLOGY
End: 2024-03-28

## 2024-03-28 DIAGNOSIS — L81.4 OTHER MELANIN HYPERPIGMENTATION: ICD-10-CM

## 2024-03-28 DIAGNOSIS — D22 MELANOCYTIC NEVI: ICD-10-CM

## 2024-03-28 DIAGNOSIS — D18.0 HEMANGIOMA: ICD-10-CM

## 2024-03-28 DIAGNOSIS — L82.1 OTHER SEBORRHEIC KERATOSIS: ICD-10-CM

## 2024-03-28 DIAGNOSIS — L57.0 ACTINIC KERATOSIS: ICD-10-CM

## 2024-03-28 PROBLEM — D22.61 MELANOCYTIC NEVI OF RIGHT UPPER LIMB, INCLUDING SHOULDER: Status: ACTIVE | Noted: 2024-03-28

## 2024-03-28 PROBLEM — D22.5 MELANOCYTIC NEVI OF TRUNK: Status: ACTIVE | Noted: 2024-03-28

## 2024-03-28 PROBLEM — D22.62 MELANOCYTIC NEVI OF LEFT UPPER LIMB, INCLUDING SHOULDER: Status: ACTIVE | Noted: 2024-03-28

## 2024-03-28 PROBLEM — D22.72 MELANOCYTIC NEVI OF LEFT LOWER LIMB, INCLUDING HIP: Status: ACTIVE | Noted: 2024-03-28

## 2024-03-28 PROBLEM — D18.01 HEMANGIOMA OF SKIN AND SUBCUTANEOUS TISSUE: Status: ACTIVE | Noted: 2024-03-28

## 2024-03-28 PROBLEM — D22.71 MELANOCYTIC NEVI OF RIGHT LOWER LIMB, INCLUDING HIP: Status: ACTIVE | Noted: 2024-03-28

## 2024-03-28 PROCEDURE — 99213 OFFICE O/P EST LOW 20 MIN: CPT | Mod: 25

## 2024-03-28 PROCEDURE — 17003 DESTRUCT PREMALG LES 2-14: CPT

## 2024-03-28 PROCEDURE — 17000 DESTRUCT PREMALG LESION: CPT

## 2024-03-28 PROCEDURE — ? LIQUID NITROGEN

## 2024-03-28 PROCEDURE — ? COUNSELING

## 2024-03-28 ASSESSMENT — LOCATION SIMPLE DESCRIPTION DERM
LOCATION SIMPLE: RIGHT POSTERIOR UPPER ARM
LOCATION SIMPLE: LEFT ELBOW
LOCATION SIMPLE: RIGHT POPLITEAL SKIN
LOCATION SIMPLE: LEFT POSTERIOR UPPER ARM
LOCATION SIMPLE: LEFT POPLITEAL SKIN
LOCATION SIMPLE: ABDOMEN
LOCATION SIMPLE: RIGHT POSTERIOR THIGH
LOCATION SIMPLE: LEFT POSTERIOR THIGH
LOCATION SIMPLE: RIGHT THIGH
LOCATION SIMPLE: LEFT PRETIBIAL REGION
LOCATION SIMPLE: LEFT FOREARM
LOCATION SIMPLE: RIGHT UPPER BACK
LOCATION SIMPLE: RIGHT EYEBROW
LOCATION SIMPLE: RIGHT PRETIBIAL REGION
LOCATION SIMPLE: RIGHT FOREARM
LOCATION SIMPLE: UPPER BACK
LOCATION SIMPLE: CHEST

## 2024-03-28 ASSESSMENT — LOCATION DETAILED DESCRIPTION DERM
LOCATION DETAILED: RIGHT PROXIMAL PRETIBIAL REGION
LOCATION DETAILED: RIGHT PROXIMAL DORSAL FOREARM
LOCATION DETAILED: LEFT DISTAL DORSAL FOREARM
LOCATION DETAILED: LEFT LATERAL ELBOW
LOCATION DETAILED: RIGHT LATERAL SUPERIOR CHEST
LOCATION DETAILED: RIGHT CENTRAL EYEBROW
LOCATION DETAILED: SUBXIPHOID
LOCATION DETAILED: RIGHT DISTAL POSTERIOR THIGH
LOCATION DETAILED: RIGHT DISTAL POSTERIOR UPPER ARM
LOCATION DETAILED: RIGHT VENTRAL PROXIMAL FOREARM
LOCATION DETAILED: LEFT VENTRAL LATERAL PROXIMAL FOREARM
LOCATION DETAILED: LEFT PROXIMAL PRETIBIAL REGION
LOCATION DETAILED: RIGHT SUPERIOR UPPER BACK
LOCATION DETAILED: UPPER STERNUM
LOCATION DETAILED: LEFT DISTAL POSTERIOR THIGH
LOCATION DETAILED: INFERIOR THORACIC SPINE
LOCATION DETAILED: RIGHT PROXIMAL POSTERIOR UPPER ARM
LOCATION DETAILED: RIGHT ANTERIOR DISTAL THIGH
LOCATION DETAILED: LEFT POPLITEAL SKIN
LOCATION DETAILED: RIGHT MEDIAL SUPERIOR CHEST
LOCATION DETAILED: LEFT DISTAL POSTERIOR UPPER ARM
LOCATION DETAILED: LOWER STERNUM
LOCATION DETAILED: MIDDLE STERNUM
LOCATION DETAILED: RIGHT POPLITEAL SKIN

## 2024-03-28 ASSESSMENT — LOCATION ZONE DERM
LOCATION ZONE: ARM
LOCATION ZONE: FACE
LOCATION ZONE: TRUNK
LOCATION ZONE: LEG

## 2024-04-02 ENCOUNTER — RX ONLY (OUTPATIENT)
Age: 54
Setting detail: RX ONLY
End: 2024-04-02

## 2024-04-02 RX ORDER — CLINDAMYCIN PHOSPHATE 10 MG/ML
SOLUTION TOPICAL
Qty: 60 | Refills: 6 | Status: ERX | COMMUNITY
Start: 2024-04-02

## 2024-08-05 NOTE — PROGRESS NOTES
Assessment/Plan: Patient presents with a ventral as well as umbilical hernia.  She is having progressive symptoms.  Has been noticeable over the last 18 years.  Is now worse with activity and improved with rest.    I recommended operative repair.  Risks and benefits explained.  All questions answered.    She has a history for hereditary spherocytosis.  She has known splenomegaly.    Diagnoses and all orders for this visit:    Umbilical hernia without obstruction and without gangrene  -     Ambulatory Referral to General Surgery        Subjective:      Patient ID: Janiya Valle is a 50 y.o. female.    Patient presents for umbilical hernia consult.  States she has had a bulge and intermittent discomfort at her umbilicus for 18 years.  Does not limit her activities.        The following portions of the patient's history were reviewed and updated as appropriate:     She  has a past medical history of Allergic, Anemia, Anemia due to hereditary spherocytosis (HCC), and Mitral valve prolapse.  She  has a past surgical history that includes Dental surgery; US guided breast biopsy left complete (Left, 7/31/2017); and Breast biopsy (Left, 2017).  Her family history includes Coronary artery disease in her family; Gout in her family; Heart disease in her mother; Hereditary spherocytosis in her family; Hypertension in her family; No Known Problems in her daughter, daughter, father, maternal grandfather, maternal grandmother, paternal aunt, paternal grandfather, paternal grandmother, sister, and sister.  She  reports that she quit smoking about 20 years ago. Her smoking use included cigarettes. She started smoking about 30 years ago. She has never used smokeless tobacco. She reports current alcohol use of about 2.0 standard drinks of alcohol per week. She reports that she does not use drugs.  Current Outpatient Medications   Medication Sig Dispense Refill    Ascorbic Acid (VITAMIN C PO) Take by mouth      bisacodyl (DULCOLAX) 5  Infectious Disease Progress Note    Author: Rossi Casitllo M.D. Date & Time of service: 2021  2:07 PM    Chief Complaint:  Follow-up for intra-abdominal abscesses     Interval History:   patient is afebrile, white count 13.4.  Patient had worsening abdominal pain yesterday, noted purulent output during wound VAC change, repeat CT with multiple intra-abdominal abscesses, taken back to the OR this morning.   AF, O2 RA, doing well overall and states she ambulated twice this morning with normal bowel movement.  She has some abdominal pain with ambulation but otherwise minimal.    patient remains afebrile, had drains placed, white count 12.9, tolerating antimicrobials thus far.  Resting comfortably this morning.   patient remains afebrile, white count 15.8, additional procedures and change to antifungal as below   afebrile WBC 17.3 drain output decreasing.  Recorded 65 cc on . Ongoing abdoominal pain   afebrile, WBC 16.7.  Patient remains weak.  Has ongoing abdominal pain.  Eager to go home   afebrile WBC 16.3.  Plan for repeat CT scan today.  Drain output decreasing overall.  Friend at bedside visiting.  No new symptoms to report.  Work with physical therapy yesterday   AF WBC 14.1 CT scan reviwed No acute events overnight      Review of Systems:  Review of Systems   Unable to perform ROS: Other   Constitutional: Negative for fever.     Hemodynamics:  Temp (24hrs), Av.6 °C (97.8 °F), Min:36.3 °C (97.4 °F), Max:36.7 °C (98.1 °F)  Temperature: 36.7 °C (98.1 °F)  Pulse  Av.3  Min: 60  Max: 156   Blood Pressure: 142/66       Physical Exam:  Physical Exam  Vitals and nursing note reviewed.   Cardiovascular:      Rate and Rhythm: Normal rate.   Abdominal:      Comments: drains   Musculoskeletal:      Comments: PICC LUE         Meds:    Current Facility-Administered Medications:   •  TPN Central Line Formulation  •  TPN Central Line Formulation  •  ibuprofen  •   "mg EC tablet Take 2 tablets (10 mg total) by mouth 1 (one) time for 1 dose 2 tablet 0    Cholecalciferol (Vitamin D) 50 MCG (2000 UT) tablet Take 2,000 Units by mouth daily      Ferrous Sulfate (IRON PO) Take by mouth      folic acid (FOLVITE) 1 mg tablet Take 1 tablet (1 mg total) by mouth daily 90 tablet 3    metoprolol tartrate (LOPRESSOR) 25 mg tablet Take one tablet daily as needed for palpitations 30 tablet 6    Multiple Vitamins-Minerals (multivitamin with minerals) tablet Take 1 tablet by mouth daily      NON FORMULARY Dim plus estrogen metabolism      polyethylene glycol (MiraLax) 17 GM/SCOOP powder Take 238 g by mouth once for 1 dose Take 238 g my mouth. Use as directed 238 g 0    traZODone (DESYREL) 50 mg tablet TAKE ONE TABLET BY MOUTH AT BEDTIME AS NEEDED FOR SLEEP 30 tablet 0     No current facility-administered medications for this visit.     She is allergic to penicillins and cephalexin..    Review of Systems   Constitutional: Negative.  Negative for activity change.   HENT: Negative.     Eyes: Negative.    Respiratory: Negative.     Cardiovascular: Negative.    Gastrointestinal:  Positive for abdominal pain.   Endocrine: Negative.    Genitourinary: Negative.    Musculoskeletal: Negative.    Skin: Negative.    Allergic/Immunologic: Negative.    Neurological: Negative.    Psychiatric/Behavioral:  Negative for agitation, behavioral problems and confusion. The patient is not nervous/anxious.          Objective:      /59   Pulse 60   Ht 5' 7\" (1.702 m)   Wt 59 kg (130 lb)   LMP 02/15/2024 (Approximate)   BMI 20.36 kg/m²          Physical Exam  Constitutional:       Appearance: Normal appearance. She is well-developed.   HENT:      Head: Atraumatic.   Eyes:      Extraocular Movements: Extraocular movements intact.   Cardiovascular:      Rate and Rhythm: Normal rate and regular rhythm.      Heart sounds: Normal heart sounds.   Pulmonary:      Effort: Pulmonary effort is normal.      Breath " acetaminophen  •  diphenhydrAMINE  •  lidocaine **OR** lidocaine  •  micafungin (MYCAMINE) ivpb  •  NS  •  MD Alert...TPN per Pharmacy  •  morphine injection  •  DULoxetine  •  topiramate  •  enoxaparin (LOVENOX) injection  •  oxyCODONE immediate-release  •  oxyCODONE immediate-release  •  morphine injection  •  ampicillin-sulbactam (UNASYN) IV  •  [DISCONTINUED] insulin regular **AND** [CANCELED] POC blood glucose manual result **AND** NOTIFY MD and PharmD **AND** glucose **AND** dextrose 50%  •  Metoprolol Tartrate  •  LORazepam  •  diazePAM  •  Respiratory Therapy Consult  •  Pharmacy Consult Request  •  [Held by provider] senna-docusate  •  [Held by provider] bisacodyl  •  ondansetron    Labs:  Recent Labs     06/16/21 0359 06/17/21 0333 06/18/21  0630   WBC 16.7* 16.3* 14.1*   RBC 2.87* 2.87* 3.17*   HEMOGLOBIN 8.0* 7.9* 8.8*   HEMATOCRIT 25.6* 25.2* 28.6*   MCV 89.2 87.8 90.2   MCH 27.9 27.5 27.8   RDW 49.6 50.0 52.1*   PLATELETCT 417 379 378   MPV 9.1 9.4 9.2   NEUTSPOLYS 69.00 70.50 77.00*   LYMPHOCYTES 15.00* 11.00* 7.00*   MONOCYTES 4.00 9.00 7.00   EOSINOPHILS 3.00 3.50 6.00   BASOPHILS 0.00 0.50 0.00   RBCMORPHOLO Present Present Present     Recent Labs     06/16/21 0359 06/17/21  0333   SODIUM 134* 135   POTASSIUM 4.0 4.2   CHLORIDE 104 103   CO2 18* 20   GLUCOSE 149* 89   BUN 11 15     Recent Labs     06/16/21 0359 06/17/21  0333   ALBUMIN  --  2.8*   TBILIRUBIN  --  <0.2   ALKPHOSPHAT  --  77   TOTPROTEIN  --  6.6   ALTSGPT  --  9   ASTSGOT  --  17   CREATININE 0.35* 0.37*       Imaging:  CT-ABDOMEN-PELVIS WITH    Result Date: 6/8/2021 6/8/2021 4:22 PM HISTORY/REASON FOR EXAM:  Peritonitis or perforation suspected; 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 Most recent abdominal surgery for washout of the peritoneal cavity performed on 6/4/2021. Ongoing generalized abdominal pain.  TECHNIQUE/EXAM DESCRIPTION: CT scan of the abdomen and pelvis with contrast. Contrast-enhanced helical scanning was obtained from the diaphragmatic domes through the pubic symphysis following the bolus administration of 100 mL of Omnipaque 350 nonionic contrast without complication. Low dose optimization technique was utilized for this CT exam including automated exposure control and adjustment of the mA and/or kV according to patient size. COMPARISON: 6/3/2021 FINDINGS: The visualized lung bases demonstrate a small right and moderate left dependent pleural effusion with dependent airspace disease, likely atelectasis similar to the previous exam. There is no acute bony process. CT Abdomen: There is postoperative change in the anterior abdominal wall midline. There are a few tiny air lucencies in the subcutaneous tissues with abdominal wall defect seen with the previous skin staples removed. Tiny amounts of intraperitoneal air identified within the anterior abdominal fluid. There is loculated intra-abdominal fluid with some seen in the left lateral subphrenic space anterior to the spleen measuring 4.4 x 2.1 cm (previously 4.6 x 2.7 cm). There is a small amount of loculated fluid in the right anterior upper quadrant also similar to the prior study. This appears to communicate with a more confluent area of fluid anteriorly within the peritoneal cavity beginning at the level of the iliac crest and extending into the upper pelvis. The largest component is in the right lower  quadrant measuring 4.9 cm in AP diameter, down from 6.5 cm. The fluid collection with rim enhancement and air lucencies in the posterior pelvis measures 6.2 x 3.5 cm (previously 7.2 x 3.9 cm). The liver is unremarkable. The spleen is unremarkable. The pancreas is unremarkable. The gallbladder demonstrates no stones. The adrenal glands are normal in size. The kidneys enhance symmetrically. The abdominal aorta is normal in caliber. There is no  sounds: Normal breath sounds.   Abdominal:      General: Abdomen is flat.      Hernia: A hernia (Ventral as well as umbilical hernia.  These are reducible.) is present.   Musculoskeletal:      Right lower leg: No edema.      Left lower leg: No edema.   Skin:     General: Skin is warm and dry.   Neurological:      Mental Status: She is alert and oriented to person, place, and time.   Psychiatric:         Mood and Affect: Mood normal.          lymphadenopathy. The stomach is distended with some fluid and contrast material. There is postoperative change consistent with a right hemicolectomy. There is minimal dilatation of the left transverse colon at the anastomosis. CT Pelvis: There are no new pelvic fluid collections. There is mild diffuse body wall edema.     1.  Loculated rim-enhancing peritoneal fluid collections are again seen in the upper and lower quadrants as well as the posterior pelvis, all of which are very minimally decreased in size. 2.  There is postoperative change in the anterior abdominal wall with removal of the skin staples. 3.  There are postoperative changes of right hemicolectomy with very minimal dilatation of the transverse colon at the anastomosis. There is no bowel obstruction. 4.  Stable moderate left and small right dependent pleural effusions with dependent atelectasis.    CT-ABDOMEN-PELVIS WITH    Result Date: 6/3/2021  6/3/2021 4:55 PM HISTORY/REASON FOR EXAM:  Abdominal abscess/infection suspected; wound dehiscense, r/o abscess. Postoperative. Recent volvulus with right hemicolectomy TECHNIQUE/EXAM DESCRIPTION:  CT scan of the abdomen and pelvis with contrast. Contrast-enhanced helical scanning was obtained from the diaphragmatic domes through the pubic symphysis following the bolus administration of nonionic contrast without complication. 100 mL of Omnipaque 350 nonionic contrast was administered without complication. Low dose optimization technique was utilized for this CT exam including automated exposure control and adjustment of the mA and/or kV according to patient size. COMPARISON: CT abdomen and pelvis 5/27/2021 FINDINGS: Osseous structures: There is bilateral atelectasis and there are small to moderate-sized bilateral pleural effusion. Lungs: Clear ABDOMEN: There is peritoneal fluid present posterior to the abdominal wall and in both paracolic gutter. This has some degree of loculation especially in the left  paracolic gutter and could indicate infected fluid. Additionally, in the right lower quadrant there is a loculated fluid collection present measuring approximately 13.8 x 4.2 x 6.5 cm and this connects to the peritoneal fluid in the right paracolic gutter. There is loculated rim-enhancing fluid in the left subphrenic space measuring 8.4 cm in length and approximately 4.6 x 2.7 cm transversely. This connects to the fluid within the left paracolic gutter. There are recent postoperative changes with skin staple present and intraperitoneal air and fluid. LIVER: is normal in appearance. GALLBLADDER and BILIARY SYSTEM Gallbladder and biliary system are normal by CT assessment SPLEEN: Normal in appearance.. PANCREAS: Normal in appearance. The splenic vein and portal vein enhance normally. ADRENAL glands: Normal in appearance. RIGHT kidney: There is a nonobstructive 1 to 2 mm right medial lower pole calculus. LEFT kidney: Normal in appearance. There is no hydronephrosis. BOWEL and MESENTERY: Their has been right hemicolectomy. AORTA and VASCULATURE: is normal in appearance. Pelvis: In the recto uterine space there is a loculated fluid collection measuring 6.6 x 7.2 x 5.1 cm consistent with an abscess. Uterus and adnexa appear normal.     1.  Multiple rim enhancing peritoneal fluid collection suspicious for abscess 2.  These include the rectouterine space measuring 6.6 x 7.2 x 5.1 cm, right lower quadrant measuring 13.8 x 4.2 x 6.5 cm, left subphrenic space measuring 8.4 x 4.6 x 2.7 cm, and contiguous loculated fluid within the right and left paracolic gutter 3.  Interval right colectomy 4.  Small-moderate-sized bilateral pleural effusion and atelectasis    CT-ABDOMEN-PELVIS WITH    Result Date: 5/27/2021 5/27/2021 5:48 PM HISTORY/REASON FOR EXAM: Diffuse abdominal pain and distention. TECHNIQUE/EXAM DESCRIPTION: CT scan of the abdomen and pelvis with contrast. Contrast-enhanced helical scanning was obtained from the  diaphragmatic domes through the pubic symphysis following the bolus administration of 100 mL of Omnipaque 350 nonionic contrast without complication. Low dose optimization technique was utilized for this CT exam including automated exposure control and adjustment of the mA and/or kV according to patient size. COMPARISON: 5/22/2021 FINDINGS: CT Abdomen: There is new bibasilar atelectasis and small bilateral pleural effusions, left greater than right. There is fatty change of the liver. There is a small amount of free intraperitoneal air tracking along the surface of the liver. Gallbladder is distended with dense material present within the gallbladder. The spleen is normal. There is moderate right-sided hydronephrosis extending to the level of the upper sacrum. The right ureter is not identified below that level. No definite ureteral stone on the right. There is left ureteral pelvic junction stone which measures 5 mm in size and results in minimal left renal pelvic dilatation. The adrenal glands are normal. The pancreas is normal. The aorta is normal in caliber. No evidence of retroperitoneal adenopathy. CT Pelvis: There is multifocal ascites seen which is likely loculated in nature. There are punctate areas of free air seen within those areas of ascites. There are surgical changes involving the cecum. The colon is diffusely dilated. There is a large amount of free fluid which contains pockets of gas within the pelvis. There is a recent midline incision.     1.  Moderate to large amount of ascites within the abdomen and pelvis some areas of which are loculated in nature. There are also punctate areas of gas within those areas of ascites as well as free intraperitoneal air. This may be related to recent surgical procedure however the degree of volume of ascites would be unusual for normal postoperative course. Consideration should be given for leakage of bowel content or other process. 2.  Diffuse colonic distention.  3.  Bibasilar atelectasis and pleural effusions. 4.  5 mm left renal pelvic stone which results in mild hydronephrosis above that level. 5.  Moderate right-sided hydronephrosis extending to the level of the upper sacrum. No ureteral stone. The ureter is not identified below that level. 6.  This was discussed with NEVA KAY at 6:44 PM on 5/27/2021.    CT-ABDOMEN-PELVIS WITH    Result Date: 5/22/2021 5/22/2021 11:19 AM HISTORY/REASON FOR EXAM:  Abdominal distension; IV contrast only. TECHNIQUE/EXAM DESCRIPTION:   CT scan of the abdomen and pelvis with contrast. Contrast-enhanced helical scanning was obtained from the diaphragmatic domes through the pubic symphysis following the bolus administration of nonionic contrast without complication. 100 mL of Omnipaque 350 nonionic contrast was administered without complication. Low dose optimization technique was utilized for this CT exam including automated exposure control and adjustment of the mA and/or kV according to patient size. COMPARISON: 4/22/2020 FINDINGS: Chest Base: Lung bases are clear. Liver:  Normal. Gallbladder: Normal Biliary tract: Nondilated. Pancreas: Normal. Spleen: Normal. Adrenals: Normal. Kidneys and Collecting Systems:  Cannot exclude a punctate nonobstructing stone in the lower right renal collecting system. There is a small nonobstructing left renal stone measuring about 5 mm. Gastrointestinal tract:  The cecum is distended and displaced into the left upper quadrant. The ascending colon proximally is narrowed in the midline and there is a somewhat twisted appearance.   Mildly fluid distended distal small bowel without significant small bowel obstruction. The appendix is nonvisualized. Peritoneum: No free air or free fluid. Reproductive organs:  2.3 cm left adnexal hypodense lesion is indeterminate, possibly a dominant follicle/small cyst in a menstruating patient. Correlate clinically. Probable intramural fibroid in the fundus. Bladder:   Normal. Vessels:  Normal caliber. Lymph  Nodes:  No lymphadenopathy. Abdominal wall: Within normal limits. Bones:  No acute or aggressive abnormality.     1.  Findings keeping with cecal volvulus. 2.  Mildly fluid distended distal small bowel. 3.  No free air. 4.  Small nonobstructing renal stones. These findings were discussed with ESHA STERLING on 5/22/2021 11:45 AM.     [id number] VIEW    Result Date: 5/27/2021 5/27/2021 7:01 AM HISTORY/REASON FOR EXAM:  Distention. TECHNIQUE/EXAM DESCRIPTION AND NUMBER OF VIEWS:  1 view(s) of the abdomen. COMPARISON: 5/24/2021 FINDINGS: Gaseous distended  small bowel and colon is similar to prior study. There is some stool within the right colon. There is no significant interval change. No suspicious calcifications. No acute osseous abnormality.     Gaseous distended small bowel and colon is similar to prior, likely ileus.    [id number] VIEW    Result Date: 5/24/2021 5/24/2021 11:28 AM HISTORY/REASON FOR EXAM:  Distention; s/p right hemicolectomy Lower abdominal pain s/p right hemicolectomy 05/22/21 TECHNIQUE/EXAM DESCRIPTION AND NUMBER OF VIEWS:  1 view(s) of the abdomen. COMPARISON: 5/22/2021 FINDINGS: Gaseous distention of the small bowel and colon No portal venous gas or pneumatosis. No definite free intraperitoneal air but evaluation is limited on supine radiograph.     Gaseous distention of the small bowel and colon, likely postoperative ileus.    DX-CHEST-FOR LINE PLACEMENT Perform procedure in: Other(comment f6 below): (PACU)    Result Date: 5/28/2021 5/28/2021 10:10 AM HISTORY/REASON FOR EXAM:  Central line placement. TECHNIQUE/EXAM DESCRIPTION AND NUMBER OF VIEWS: Single AP view of the chest. COMPARISON: None FINDINGS: There is a left IJ central line with the tip high in position above the superior vena cava within the area of the left brachiocephalic vein. No pneumothorax. NG tube extends into the stomach. There are patchy bilateral pulmonary infiltrates.  The heart is mildly enlarged. There is no pleural effusion.     1.  Left IJ central line tip high in position located above the superior vena cava are within the left brachiocephalic vein. 2.  Patchy bilateral infiltrates. 3.  NG tube tip extends into the stomach.    US-EXTREMITY VENOUS UPPER BILAT    Result Date: 2021   Upper Extremity  Venous Duplex Report  Vascular Laboratory  CONCLUSIONS  Small thrombus seen around the catheter line at the RIGHT axillary vein and  proximal basilic vein.  No left upper extremity superficial and deep venous thrombosis.  HOANG EMERY  Exam Date:     2021 14:29  Room #:     Inpatient  Priority:     Routine  Ht (in):             Wt (lb):  Ordering Physician:        DEIDRA SNIDER  Referring Physician:       [id number] Licea  Sonographer:               Sharlene Morrell RVT, RDMS  Study Type:                Complete Bilateral  Technical Quality:         Fair  Age:    50    Gender:     F  MRN:    [id number]  :    1970      BSA:  Indications:     Edema  CPT Codes:       81275  ICD Codes:       782.3  History:         Swelling. No prior study  Limitations:  PROCEDURES:  Bilateral upper extremity venous duplex imaging.  The following venous structures were evaluated: internal jugular,  subclavian, axillary, brachial, cephalic and basilic veins.  FINDINGS:  Right upper extremity.  Small thrombus seen around the central line at the axillary vein and  proximal basilic vein.  All other veins of the right upper extremity demonstrate normal flow  dynamics with no evidence of deep vein thrombosis.  Left upper extremity.  Limited visualization of the internal jugular vein due to bandages.  All other veins of the left upper extremity demonstrate normal flow  dynamics with no evidence of deep vein thrombosis.  Doug Rossi MD  (Electronically Signed)  Final Date:      2021                   18:16    IR-MIDLINE CATHETER  INSERTION WO GUIDANCE > AGE 3    Result Date: 6/1/2021  HISTORY/REASON FOR EXAM:  Midline Placement   TECHNIQUE/EXAM DESCRIPTION AND NUMBER OF VIEWS: Midline insertion with ultrasound guidance.  FINDINGS: Midline insertion with Ultrasound Guidance was performed by qualified nursing staff without the assistance of a Radiologist. Midline positioning as measured by RN or as appropriate length of catheter selected.              Ultrasound-guided midline placement performed by qualified nursing staff as above.       Micro:  Results     Procedure Component Value Units Date/Time    CULTURE WOUND W/ GRAM STAIN [[id number]]  (Abnormal) Collected: 06/09/21 1630    Order Status: Completed Specimen: Wound from KRISTI Drain Updated: 06/12/21 1100     Significant Indicator POS     Source WND     Site KRISTI DRAIN     Culture Result -     Gram Stain Result Moderate WBCs.  Few Yeast.       Culture Result Candida albicans  Light growth        Candida krusei  Light growth        Bacteroides fragilis  Heavy growth      Narrative:      Collected By:[id number] ROBBIE GAINES  Please collect culture when drain is placed to abdomen today  Collected By:[id number] ROBBIE GAINES            Assessment/Hospital course:  This is a very pleasant 50-year-old female patient, originally admitted on 5/22/2021 with cecal volvulus for which she underwent a right hemicolectomy on 5/22.  This was complicated by intra-abdominal abscesses and free air concerning for leak.  She was taken to the OR for exploratory laparotomy and found to have necrosis of the ileocolonic anastomosis site, bowel is eviscerated and redo side-to-side ileocolic anastomosis was done.    Patient went back to the OR on 6/4 due to drainage from wound and concern for fascial dehiscence.  Per op note there is loosening of the fascia but no yessy dehiscence.  Abdomen was washed out including pockets noted on last CT.  Anastomosis was not evaluated as was scarred and no evidence of  leak.     Pertinent Diagnoses:  Sepsis, improving  Intra-abdominal abscesses, persistent  Large midline incision, with significant feculent drainage   Feculent peritonitis  Anastomotic leak, ongoing  Cecal volvulus, sequelae  Leukocytosis, persistent  TPN dependent     Plan:  -OR cultures from 5/28 growing Bacteroides, Actinomyces, E faecalis  -Patient had worsening abdominal pain and a repeat CT scan obtained 6/3 showed multiple rim-enhancing peritoneal fluid collections several quite large and loculated.    -s/p OR exploratory laparotomy on 6/4 -no new cultures were obtained.    -CT abdomen and pelvis 6/8 : postop change in anterior abdominal wall, few tiny air lucencies and tiny amounts of intraperitoneal air identified within the anterior abdominal fluid.  Loculated intra-abdominal fluid in the left lateral splenic space measuring 4.4 x 2.1 cm, small amount of loculated fluid in the right anterior upper quadrant also similar to prior study.  This appears to communicate with fluid anterior to the peritoneal cavity measuring 4.9 x 6.5 cm.  Fluid collection with rim enhancement in the posterior pelvis now 6.2 x 3.5 cm.  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  Patient was taken back to the OR 6/11, but the bowel was too inflamed to mobilize for ostomy, drains were placed    -CT scan 6/17  2 surgical drains or tubes. The midline tube extends to the midline surgical wound is located immediately adjacent to the inferior wall of the transverse colon. Additional surgical drain is noted in the right side of the abdomen and pelvis. Fluid collection in this area is nearly completely resolved. Minimal right paracolic gutter fluid and left  paracolic gutter fluid remains. Pigtail catheter noted deep in the pelvis in the cul-de-sac area is noted with near complete evacuation of this fluid collection which also contained air on the prior CT. Decrease in pleural effusions bilaterally. No new fluid collections are identified.    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 Rojo.  ID will follow.

## 2024-10-16 ENCOUNTER — OFFICE VISIT (OUTPATIENT)
Dept: URGENT CARE | Facility: CLINIC | Age: 54
End: 2024-10-16
Payer: COMMERCIAL

## 2024-10-16 VITALS
DIASTOLIC BLOOD PRESSURE: 84 MMHG | OXYGEN SATURATION: 100 % | HEIGHT: 69 IN | SYSTOLIC BLOOD PRESSURE: 126 MMHG | BODY MASS INDEX: 21.48 KG/M2 | WEIGHT: 145 LBS | TEMPERATURE: 97.6 F | HEART RATE: 89 BPM | RESPIRATION RATE: 18 BRPM

## 2024-10-16 DIAGNOSIS — H10.32 ACUTE BACTERIAL CONJUNCTIVITIS OF LEFT EYE: ICD-10-CM

## 2024-10-16 PROCEDURE — 3074F SYST BP LT 130 MM HG: CPT | Performed by: NURSE PRACTITIONER

## 2024-10-16 PROCEDURE — 3079F DIAST BP 80-89 MM HG: CPT | Performed by: NURSE PRACTITIONER

## 2024-10-16 PROCEDURE — 99213 OFFICE O/P EST LOW 20 MIN: CPT | Performed by: NURSE PRACTITIONER

## 2024-10-16 RX ORDER — POLYMYXIN B SULFATE AND TRIMETHOPRIM 1; 10000 MG/ML; [USP'U]/ML
1 SOLUTION OPHTHALMIC EVERY 4 HOURS
Qty: 10 ML | Refills: 0 | Status: SHIPPED | OUTPATIENT
Start: 2024-10-16 | End: 2024-10-23

## 2024-10-16 ASSESSMENT — VISUAL ACUITY: OU: 1

## 2025-03-27 ENCOUNTER — APPOINTMENT (OUTPATIENT)
Dept: URBAN - METROPOLITAN AREA CLINIC 15 | Facility: CLINIC | Age: 55
Setting detail: DERMATOLOGY
End: 2025-03-27

## 2025-03-27 DIAGNOSIS — D18.0 HEMANGIOMA: ICD-10-CM

## 2025-03-27 DIAGNOSIS — L81.4 OTHER MELANIN HYPERPIGMENTATION: ICD-10-CM

## 2025-03-27 DIAGNOSIS — L82.1 OTHER SEBORRHEIC KERATOSIS: ICD-10-CM

## 2025-03-27 DIAGNOSIS — L70.0 ACNE VULGARIS: ICD-10-CM

## 2025-03-27 DIAGNOSIS — D22 MELANOCYTIC NEVI: ICD-10-CM

## 2025-03-27 PROBLEM — D22.39 MELANOCYTIC NEVI OF OTHER PARTS OF FACE: Status: ACTIVE | Noted: 2025-03-27

## 2025-03-27 PROBLEM — D22.61 MELANOCYTIC NEVI OF RIGHT UPPER LIMB, INCLUDING SHOULDER: Status: ACTIVE | Noted: 2025-03-27

## 2025-03-27 PROBLEM — D22.62 MELANOCYTIC NEVI OF LEFT UPPER LIMB, INCLUDING SHOULDER: Status: ACTIVE | Noted: 2025-03-27

## 2025-03-27 PROBLEM — C44.529 SQUAMOUS CELL CARCINOMA OF SKIN OF OTHER PART OF TRUNK: Status: ACTIVE | Noted: 2025-03-27

## 2025-03-27 PROBLEM — D18.01 HEMANGIOMA OF SKIN AND SUBCUTANEOUS TISSUE: Status: ACTIVE | Noted: 2025-03-27

## 2025-03-27 PROBLEM — D22.72 MELANOCYTIC NEVI OF LEFT LOWER LIMB, INCLUDING HIP: Status: ACTIVE | Noted: 2025-03-27

## 2025-03-27 PROBLEM — D48.5 NEOPLASM OF UNCERTAIN BEHAVIOR OF SKIN: Status: ACTIVE | Noted: 2025-03-27

## 2025-03-27 PROBLEM — D22.5 MELANOCYTIC NEVI OF TRUNK: Status: ACTIVE | Noted: 2025-03-27

## 2025-03-27 PROBLEM — D22.71 MELANOCYTIC NEVI OF RIGHT LOWER LIMB, INCLUDING HIP: Status: ACTIVE | Noted: 2025-03-27

## 2025-03-27 PROCEDURE — ? PRESCRIPTION

## 2025-03-27 PROCEDURE — 99213 OFFICE O/P EST LOW 20 MIN: CPT | Mod: 25

## 2025-03-27 PROCEDURE — ? BIOPSY BY SHAVE METHOD

## 2025-03-27 PROCEDURE — 11102 TANGNTL BX SKIN SINGLE LES: CPT

## 2025-03-27 PROCEDURE — ? COUNSELING

## 2025-03-27 RX ORDER — CLINDAMYCIN PHOSPHATE 10 MG/ML
SOLUTION TOPICAL
Qty: 60 | Refills: 12 | Status: ERX | COMMUNITY
Start: 2025-03-27

## 2025-03-27 RX ORDER — FLUOROURACIL 5 MG/G
CREAM TOPICAL
Qty: 40 | Refills: 0 | Status: ERX | COMMUNITY
Start: 2025-03-27

## 2025-03-27 RX ADMIN — CLINDAMYCIN PHOSPHATE: 10 SOLUTION TOPICAL at 00:00

## 2025-03-27 RX ADMIN — FLUOROURACIL: 5 CREAM TOPICAL at 00:00

## 2025-03-27 ASSESSMENT — LOCATION DETAILED DESCRIPTION DERM
LOCATION DETAILED: LEFT DISTAL DORSAL FOREARM
LOCATION DETAILED: LEFT VENTRAL LATERAL PROXIMAL FOREARM
LOCATION DETAILED: RIGHT PROXIMAL PRETIBIAL REGION
LOCATION DETAILED: RIGHT CENTRAL EYEBROW
LOCATION DETAILED: RIGHT LATERAL SUPERIOR CHEST
LOCATION DETAILED: RIGHT POPLITEAL SKIN
LOCATION DETAILED: RIGHT PROXIMAL POSTERIOR UPPER ARM
LOCATION DETAILED: RIGHT ANTERIOR DISTAL THIGH
LOCATION DETAILED: RIGHT SUPERIOR UPPER BACK
LOCATION DETAILED: LEFT DISTAL POSTERIOR UPPER ARM
LOCATION DETAILED: SUBXIPHOID
LOCATION DETAILED: LEFT LATERAL ELBOW
LOCATION DETAILED: INFERIOR THORACIC SPINE
LOCATION DETAILED: RIGHT DISTAL POSTERIOR THIGH
LOCATION DETAILED: RIGHT DISTAL POSTERIOR UPPER ARM
LOCATION DETAILED: LEFT PROXIMAL PRETIBIAL REGION
LOCATION DETAILED: RIGHT PROXIMAL DORSAL FOREARM
LOCATION DETAILED: LEFT SUPERIOR MEDIAL BUCCAL CHEEK
LOCATION DETAILED: LEFT DISTAL POSTERIOR THIGH
LOCATION DETAILED: LOWER STERNUM
LOCATION DETAILED: LEFT INFERIOR LATERAL NECK
LOCATION DETAILED: LEFT POPLITEAL SKIN
LOCATION DETAILED: MIDDLE STERNUM
LOCATION DETAILED: RIGHT VENTRAL PROXIMAL FOREARM

## 2025-03-27 ASSESSMENT — LOCATION SIMPLE DESCRIPTION DERM
LOCATION SIMPLE: RIGHT UPPER BACK
LOCATION SIMPLE: CHEST
LOCATION SIMPLE: LEFT ELBOW
LOCATION SIMPLE: LEFT PRETIBIAL REGION
LOCATION SIMPLE: LEFT ANTERIOR NECK
LOCATION SIMPLE: ABDOMEN
LOCATION SIMPLE: RIGHT PRETIBIAL REGION
LOCATION SIMPLE: UPPER BACK
LOCATION SIMPLE: LEFT POSTERIOR THIGH
LOCATION SIMPLE: RIGHT FOREARM
LOCATION SIMPLE: LEFT POSTERIOR UPPER ARM
LOCATION SIMPLE: RIGHT POSTERIOR UPPER ARM
LOCATION SIMPLE: RIGHT THIGH
LOCATION SIMPLE: RIGHT POSTERIOR THIGH
LOCATION SIMPLE: LEFT POPLITEAL SKIN
LOCATION SIMPLE: LEFT FOREARM
LOCATION SIMPLE: LEFT CHEEK
LOCATION SIMPLE: RIGHT EYEBROW
LOCATION SIMPLE: RIGHT POPLITEAL SKIN

## 2025-03-27 ASSESSMENT — LOCATION ZONE DERM
LOCATION ZONE: FACE
LOCATION ZONE: LEG
LOCATION ZONE: ARM
LOCATION ZONE: TRUNK
LOCATION ZONE: NECK

## 2025-03-27 NOTE — PROCEDURE: COUNSELING
NOTIFICATION OF A  PATIENT  EMAIL THIS COMPLETED INFORMATION TO THE APPROPRIATE ENTITY:    [place]: [email or web address]   [facility]:  [email or web address]   [facility]: [email or web address]   [facility]:  [email or web address]     PATIENT INFORMATION   Last name: Tabby First name: Daniel   Medical Record Number: [id number] County of Death: [place]   YOB: 1929 Date of Death: 2021   PARENT (FOR PATIENTS UNDER 18) OR LEGAL GUARDIAN (IF APPLICABLE):   Relationship to  patient:   Last name: First name:   Date of Birth: Telephone Number:   Address:     City: State: Zip Code:   SURVIVING SPOUSE INFORMATION (IF THERE IS A SURVIVING SPOUSE)   Last name:CALI VERA First name:   Date of Birth: Telephone number:   Address:     City: State: Zip Code:   PERSON THAT INFORMED US OF PATIENT'S DEATH   Last name:[facility] verified [place] [employer]    First name:   Relationship to  patient: Telephone number:       
Detail Level: Zone
Detail Level: Detailed
High Dose Vitamin A Pregnancy And Lactation Text: High dose vitamin A therapy is contraindicated during pregnancy and breast feeding.
Dapsone Counseling: I discussed with the patient the risks of dapsone including but not limited to hemolytic anemia, agranulocytosis, rashes, methemoglobinemia, kidney failure, peripheral neuropathy, headaches, GI upset, and liver toxicity.  Patients who start dapsone require monitoring including baseline LFTs and weekly CBCs for the first month, then every month thereafter.  The patient verbalized understanding of the proper use and possible adverse effects of dapsone.  All of the patient's questions and concerns were addressed.
Tazorac Pregnancy And Lactation Text: This medication is not safe during pregnancy. It is unknown if this medication is excreted in breast milk.
Azelaic Acid Counseling: Patient counseled that medicine may cause skin irritation and to avoid applying near the eyes.  In the event of skin irritation, the patient was advised to reduce the amount of the drug applied or use it less frequently.   The patient verbalized understanding of the proper use and possible adverse effects of azelaic acid.  All of the patient's questions and concerns were addressed.
Minocycline Pregnancy And Lactation Text: This medication is Pregnancy Category D and not consider safe during pregnancy. It is also excreted in breast milk.
Doxycycline Counseling:  Patient counseled regarding possible photosensitivity and increased risk for sunburn.  Patient instructed to avoid sunlight, if possible.  When exposed to sunlight, patients should wear protective clothing, sunglasses, and sunscreen.  The patient was instructed to call the office immediately if the following severe adverse effects occur:  hearing changes, easy bruising/bleeding, severe headache, or vision changes.  The patient verbalized understanding of the proper use and possible adverse effects of doxycycline.  All of the patient's questions and concerns were addressed.
Topical Retinoid Pregnancy And Lactation Text: This medication is Pregnancy Category C. It is unknown if this medication is excreted in breast milk.
Bactrim Counseling:  I discussed with the patient the risks of sulfa antibiotics including but not limited to GI upset, allergic reaction, drug rash, diarrhea, dizziness, photosensitivity, and yeast infections.  Rarely, more serious reactions can occur including but not limited to aplastic anemia, agranulocytosis, methemoglobinemia, blood dyscrasias, liver or kidney failure, lung infiltrates or desquamative/blistering drug rashes.
Winlevi Counseling:  I discussed with the patient the risks of topical clascoterone including but not limited to erythema, scaling, itching, and stinging. Patient voiced their understanding.
Birth Control Pills Counseling: Birth Control Pill Counseling: I discussed with the patient the potential side effects of OCPs including but not limited to increased risk of stroke, heart attack, thrombophlebitis, deep venous thrombosis, hepatic adenomas, breast changes, GI upset, headaches, and depression.  The patient verbalized understanding of the proper use and possible adverse effects of OCPs. All of the patient's questions and concerns were addressed.
Isotretinoin Pregnancy And Lactation Text: This medication is Pregnancy Category X and is considered extremely dangerous during pregnancy. It is unknown if it is excreted in breast milk.
Aklief counseling:  Patient advised to apply a pea-sized amount only at bedtime and wait 30 minutes after washing their face before applying.  If too drying, patient may add a non-comedogenic moisturizer.  The most commonly reported side effects including irritation, redness, scaling, dryness, stinging, burning, itching, and increased risk of sunburn.  The patient verbalized understanding of the proper use and possible adverse effects of retinoids.  All of the patient's questions and concerns were addressed.
Erythromycin Pregnancy And Lactation Text: This medication is Pregnancy Category B and is considered safe during pregnancy. It is also excreted in breast milk.
Spironolactone Counseling: Patient advised regarding risks of diarrhea, abdominal pain, hyperkalemia, birth defects (for female patients), liver toxicity and renal toxicity. The patient may need blood work to monitor liver and kidney function and potassium levels while on therapy. The patient verbalized understanding of the proper use and possible adverse effects of spironolactone.  All of the patient's questions and concerns were addressed.
Tetracycline Counseling: Patient counseled regarding possible photosensitivity and increased risk for sunburn.  Patient instructed to avoid sunlight, if possible.  When exposed to sunlight, patients should wear protective clothing, sunglasses, and sunscreen.  The patient was instructed to call the office immediately if the following severe adverse effects occur:  hearing changes, easy bruising/bleeding, severe headache, or vision changes.  The patient verbalized understanding of the proper use and possible adverse effects of tetracycline.  All of the patient's questions and concerns were addressed. Patient understands to avoid pregnancy while on therapy due to potential birth defects.
Benzoyl Peroxide Pregnancy And Lactation Text: This medication is Pregnancy Category C. It is unknown if benzoyl peroxide is excreted in breast milk.
Sarecycline Counseling: Patient advised regarding possible photosensitivity and discoloration of the teeth, skin, lips, tongue and gums.  Patient instructed to avoid sunlight, if possible.  When exposed to sunlight, patients should wear protective clothing, sunglasses, and sunscreen.  The patient was instructed to call the office immediately if the following severe adverse effects occur:  hearing changes, easy bruising/bleeding, severe headache, or vision changes.  The patient verbalized understanding of the proper use and possible adverse effects of sarecycline.  All of the patient's questions and concerns were addressed.
Topical Clindamycin Counseling: Patient counseled that this medication may cause skin irritation or allergic reactions.  In the event of skin irritation, the patient was advised to reduce the amount of the drug applied or use it less frequently.   The patient verbalized understanding of the proper use and possible adverse effects of clindamycin.  All of the patient's questions and concerns were addressed.
Azelaic Acid Pregnancy And Lactation Text: This medication is considered safe during pregnancy and breast feeding.
Azithromycin Counseling:  I discussed with the patient the risks of azithromycin including but not limited to GI upset, allergic reaction, drug rash, diarrhea, and yeast infections.
Doxycycline Pregnancy And Lactation Text: This medication is Pregnancy Category D and not consider safe during pregnancy. It is also excreted in breast milk but is considered safe for shorter treatment courses.
Topical Sulfur Applications Counseling: Topical Sulfur Counseling: Patient counseled that this medication may cause skin irritation or allergic reactions.  In the event of skin irritation, the patient was advised to reduce the amount of the drug applied or use it less frequently.   The patient verbalized understanding of the proper use and possible adverse effects of topical sulfur application.  All of the patient's questions and concerns were addressed.
Birth Control Pills Pregnancy And Lactation Text: This medication should be avoided if pregnant and for the first 30 days post-partum.
Use Enhanced Medication Counseling?: No
Aklief Pregnancy And Lactation Text: It is unknown if this medication is safe to use during pregnancy.  It is unknown if this medication is excreted in breast milk.  Breastfeeding women should use the topical cream on the smallest area of the skin for the shortest time needed while breastfeeding.  Do not apply to nipple and areola.
Dapsone Pregnancy And Lactation Text: This medication is Pregnancy Category C and is not considered safe during pregnancy or breast feeding.
Minocycline Counseling: Patient advised regarding possible photosensitivity and discoloration of the teeth, skin, lips, tongue and gums.  Patient instructed to avoid sunlight, if possible.  When exposed to sunlight, patients should wear protective clothing, sunglasses, and sunscreen.  The patient was instructed to call the office immediately if the following severe adverse effects occur:  hearing changes, easy bruising/bleeding, severe headache, or vision changes.  The patient verbalized understanding of the proper use and possible adverse effects of minocycline.  All of the patient's questions and concerns were addressed.
Bactrim Pregnancy And Lactation Text: This medication is Pregnancy Category D and is known to cause fetal risk.  It is also excreted in breast milk.
Topical Retinoid counseling:  Patient advised to apply a pea-sized amount only at bedtime and wait 30 minutes after washing their face before applying.  If too drying, patient may add a non-comedogenic moisturizer. The patient verbalized understanding of the proper use and possible adverse effects of retinoids.  All of the patient's questions and concerns were addressed.
High Dose Vitamin A Counseling: Side effects reviewed, pt to contact office should one occur.
Winlevi Pregnancy And Lactation Text: This medication is considered safe during pregnancy and breastfeeding.
Tazorac Counseling:  Patient advised that medication is irritating and drying.  Patient may need to apply sparingly and wash off after an hour before eventually leaving it on overnight.  The patient verbalized understanding of the proper use and possible adverse effects of tazorac.  All of the patient's questions and concerns were addressed.
Azithromycin Pregnancy And Lactation Text: This medication is considered safe during pregnancy and is also secreted in breast milk.
Erythromycin Counseling:  I discussed with the patient the risks of erythromycin including but not limited to GI upset, allergic reaction, drug rash, diarrhea, increase in liver enzymes, and yeast infections.
Topical Sulfur Applications Pregnancy And Lactation Text: This medication is Pregnancy Category C and has an unknown safety profile during pregnancy. It is unknown if this topical medication is excreted in breast milk.
Spironolactone Pregnancy And Lactation Text: This medication can cause feminization of the male fetus and should be avoided during pregnancy. The active metabolite is also found in breast milk.
Isotretinoin Counseling: Patient should get monthly blood tests, not donate blood, not drive at night if vision affected, not share medication, and not undergo elective surgery for 6 months after tx completed. Side effects reviewed, pt to contact office should one occur.
Benzoyl Peroxide Counseling: Patient counseled that medicine may cause skin irritation and bleach clothing.  In the event of skin irritation, the patient was advised to reduce the amount of the drug applied or use it less frequently.   The patient verbalized understanding of the proper use and possible adverse effects of benzoyl peroxide.  All of the patient's questions and concerns were addressed.
Topical Clindamycin Pregnancy And Lactation Text: This medication is Pregnancy Category B and is considered safe during pregnancy. It is unknown if it is excreted in breast milk.

## 2025-04-29 ENCOUNTER — RX ONLY (RX ONLY)
Age: 55
End: 2025-04-29

## 2025-04-29 RX ORDER — IMIQUIMOD 12.5 MG/.25G
CREAM TOPICAL
Qty: 24 | Refills: 2 | Status: ERX | COMMUNITY
Start: 2025-04-29

## 2025-05-23 ENCOUNTER — OFFICE VISIT (OUTPATIENT)
Dept: URGENT CARE | Facility: CLINIC | Age: 55
End: 2025-05-23
Payer: COMMERCIAL

## 2025-05-23 VITALS
OXYGEN SATURATION: 98 % | HEIGHT: 69 IN | SYSTOLIC BLOOD PRESSURE: 124 MMHG | DIASTOLIC BLOOD PRESSURE: 78 MMHG | HEART RATE: 106 BPM | WEIGHT: 149 LBS | TEMPERATURE: 98.3 F | BODY MASS INDEX: 22.07 KG/M2 | RESPIRATION RATE: 18 BRPM

## 2025-05-23 DIAGNOSIS — R21 RASH: Primary | ICD-10-CM

## 2025-05-23 DIAGNOSIS — L50.9 URTICARIA: ICD-10-CM

## 2025-05-23 PROCEDURE — 99214 OFFICE O/P EST MOD 30 MIN: CPT

## 2025-05-23 PROCEDURE — 3074F SYST BP LT 130 MM HG: CPT

## 2025-05-23 PROCEDURE — 3078F DIAST BP <80 MM HG: CPT

## 2025-05-23 RX ORDER — HYDROXYZINE HYDROCHLORIDE 25 MG/1
25 TABLET, FILM COATED ORAL
Qty: 30 TABLET | Refills: 1 | Status: SHIPPED | OUTPATIENT
Start: 2025-05-23 | End: 2025-05-23

## 2025-05-23 RX ORDER — IMIQUIMOD 12.5 MG/.25G
CREAM TOPICAL
COMMUNITY

## 2025-05-23 RX ORDER — HYDROXYZINE HYDROCHLORIDE 25 MG/1
25 TABLET, FILM COATED ORAL
Qty: 30 TABLET | Refills: 1 | Status: SHIPPED | OUTPATIENT
Start: 2025-05-23 | End: 2025-06-22

## 2025-05-23 ASSESSMENT — ENCOUNTER SYMPTOMS
DIARRHEA: 0
BRUISES/BLEEDS EASILY: 0
EYE DISCHARGE: 0
CONSTIPATION: 0
URTICARIA: 1
VOMITING: 0
WHEEZING: 0
FEVER: 0
COUGH: 0
BLOOD IN STOOL: 0
EYE REDNESS: 0

## 2025-05-23 NOTE — LETTER
May 23, 2025    To Whom It May Concern:         This is confirmation that Mari Gamboa Wilma attended her scheduled appointment with Neeru Avalos MD, PhD on 5/23/25.         If you have any questions please do not hesitate to call me at the phone number listed below.    Sincerely,          Neeru Avalos MD, PhD  [phone]

## 2025-05-24 NOTE — PROGRESS NOTES
Subjective:     Mari Dillon is a 54 y.o. female who presents for Urticaria (Chest area, starting to hurt, worsening, using a cancer lotion(imiquimod 5%): unsure if not cleaning/stress may be causing x 5 days) and Letter for School/Work      Urticaria  This is a new problem. The current episode started today. Pertinent negatives include no congestion, cough, diarrhea, fever or vomiting.       Review of Systems   Constitutional:  Negative for fever.   HENT:  Negative for congestion and ear discharge.    Eyes:  Negative for discharge and redness.   Respiratory:  Negative for cough and wheezing.    Gastrointestinal:  Negative for blood in stool, constipation, diarrhea and vomiting.   Genitourinary:  Negative for frequency and hematuria.   Skin:  Positive for itching and rash.   Endo/Heme/Allergies:  Does not bruise/bleed easily.        CURRENT MEDICATIONS:  ADVIL PM PO  ferrous sulfate  HYDROcodone/acetaminophen Tabs  imiquimod  MULTIVITAMIN PO    Allergies:   Allergies[1]    Current Problems: Mari Dillon does not have any pertinent problems on file.  Past Surgical Hx:    Past Surgical History:   Procedure Laterality Date    OH CYSTOURETHROSCOPY N/A 11/19/2021    Procedure: CYSTOSCOPY;  Surgeon: Jose Omalley M.D.;  Location: [facility];  Service: Urology    OH CYSTO/URETERO/PYELOSCOPY, DX Left 11/19/2021    Procedure: URETEROSCOPY;  Surgeon: Jose Omalley M.D.;  Location: [facility];  Service: Urology    LASERTRIPSY Left 11/19/2021    Procedure: LITHOTRIPSY, USING LASER;  Surgeon: Jose Omalley M.D.;  Location: [facility];  Service: Urology    OH EXPLORATORY OF ABDOMEN  6/11/2021    Procedure: LAPAROTOMY, EXPLORATORY;  Surgeon: Maryam Wood M.D.;  Location: [facility];  Service: General    ILEOSTOMY  6/11/2021    Procedure: WOUND VAC PLACEMENT ;  Surgeon: Maryam Wood M.D.;  Location: [facility];  Service: General    OH EXPLORATORY OF  ABDOMEN N/A 6/4/2021    Procedure: LAPAROTOMY, EXPLORATORY, WITH WASH OUT;  Surgeon: Maryam Wood M.D.;  Location: [facility];  Service: General    IA EXPLORATORY OF ABDOMEN N/A 5/28/2021    Procedure: LAPAROTOMY, EXPLORATORY- RESECTION OF ILEOCECAL ANASTATMOSIS.;  Surgeon: Maryam Wood M.D.;  Location: [facility];  Service: General    IA EXPLORATORY OF ABDOMEN  5/22/2021    Procedure: LAPAROTOMY, EXPLORATORY;  Surgeon: Maryam Wood M.D.;  Location: [facility];  Service: General    HEMICOLECTOMY, RIGHT  5/22/2021    Procedure: HEMICOLECTOMY, RIGHT, SIDE TO SIDE ANASTOMOSIS;  Surgeon: Maryam Wood M.D.;  Location: [facility];  Service: General    SHOULDER DECOMPRESSION ARTHROSCOPIC Right 12/24/2018    Procedure: SHOULDER DECOMPRESSION ARTHROSCOPIC - SUBACROMIAL;  Surgeon: Tristian Garcia M.D.;  Location: [facility];  Service: Orthopedics    CLAVICLE DISTAL EXCISION Left 12/24/2018    Procedure: CLAVICLE DISTAL EXCISION;  Surgeon: Tristian Garcia M.D.;  Location: [facility];  Service: Orthopedics    SHOULDER ARTHROSCOPY W/ BICIPITAL TENODESIS REPAIR Left 12/24/2018    Procedure: SHOULDER ARTHROSCOPY W/ BICIPITAL TENODESIS REPAIR - AND PACKER;  Surgeon: Tristian Garcia M.D.;  Location: [facility];  Service: Orthopedics    SHOULDER MANIPULATION Left 12/24/2018    Procedure: SHOULDER MANIPULATION;  Surgeon: Tristian Garcia M.D.;  Location: [facility];  Service: Orthopedics    SHOULDER ARTHROSCOPY W/ BICIPITAL TENODESIS REPAIR Right 6/15/2015    Procedure: SHOULDER ARTHROSCOPY W/ BICIPITAL TENODESIS, SYNOVECTOMY;  Surgeon: Tristian Garcia M.D.;  Location: [facility];  Service:     CLAVICLE DISTAL EXCISION Right 6/15/2015    Procedure: CLAVICLE DISTAL EXCISION;  Surgeon: Tristian Garcia M.D.;  Location: [facility];  Service:     SHOULDER DECOMPRESSION Right 6/15/2015     "Procedure: SHOULDER DECOMPRESSION MINI INCISION ;  Surgeon: Tristian Garcia M.D.;  Location: SURGERY [facility];  Service:     OTHER  2015    nose    APPENDECTOMY      [place]  DELIVERY ONLY        Past Social Hx:  reports that she quit smoking about 20 years ago. Her smoking use included cigarettes. She started smoking about 35 years ago. She has never been exposed to tobacco smoke. She has never used smokeless tobacco. She reports current drug use. Drugs: Inhaled and Marijuana. She reports that she does not drink alcohol.   Past Family Hx:  Mari Dillon family history includes Cancer in her maternal grandmother; Diabetes in her mother; Hypertension in her mother.     (Allergies, Medications, & Tobacco/Substance Use were reconciled by the Medical Assistant and reviewed by myself. The family history is prepopulated)       Objective:     /78 (BP Location: Left arm, Patient Position: Sitting, BP Cuff Size: Adult)   Pulse (!) 106   Temp 36.8 °C (98.3 °F) (Temporal)   Resp 18   Ht 1.753 m (5' 9\")   Wt 67.6 kg (149 lb)   LMP 2021 (Exact Date)   SpO2 98%   BMI 22.00 kg/m²     Physical Exam  Constitutional:       General: She is not in acute distress.     Appearance: Normal appearance. She is not ill-appearing, toxic-appearing or diaphoretic.   HENT:      Head: Normocephalic and atraumatic.      Nose: Nose normal.   Eyes:      General: No scleral icterus.        Right eye: No discharge.         Left eye: No discharge.   Cardiovascular:      Rate and Rhythm: Regular rhythm. Tachycardia present.      Heart sounds: Normal heart sounds. No murmur heard.     No friction rub. No gallop.   Pulmonary:      Effort: Pulmonary effort is normal. No respiratory distress.      Breath sounds: No stridor. No wheezing, rhonchi or rales.   Chest:      Chest wall: No tenderness.   Abdominal:      General: Abdomen is flat.      Palpations: Abdomen is soft.   Musculoskeletal:         General: Normal " range of motion.      Cervical back: No rigidity.   Skin:     General: Skin is warm and dry.      Findings: Rash present. Rash is papular, purpuric and urticarial.          Neurological:      General: No focal deficit present.      Mental Status: She is alert and oriented to person, place, and time. Mental status is at baseline.   Psychiatric:         Behavior: Behavior normal.         Judgment: Judgment normal.         Assessment/Plan:     Mari was seen today for urticaria and letter for school/work.    Diagnoses and all orders for this visit:    Rash  -     hydrOXYzine HCl (ATARAX) 25 MG Tab; Take 1 Tablet by mouth at bedtime as needed for Anxiety (allergies) for up to 30 days.    Urticaria     Based on history of presenting illness, review of systems and physical exam findings, most likely etiology of skin rash appears to be inflammatory /reactive versus infectious.  Notably patient has been using retinol, and certain different types of new ointments on the area.  Advised to discontinue.  Counseled on importance of sunscreen and sun protection.  Discussed symptomatic management with pharmacotherapy and over-the-counter medications.  On my exam I do not see any signs or symptoms consistent with a bacterial infection currently requiring oral antibiotics.  Discussed the risks the benefits and the indications of all new medications prescribed today.  Strict return and ED precautions were discussed and all questions were answered.     Differential diagnosis, natural history, supportive care, and indications for immediate follow-up discussed.    Advised the patient to follow-up with the primary care physician for recheck, reevaluation, and consideration of further management.    Please note that this dictation was created using voice recognition software. I have made reasonable attempt to correct obvious errors, but I expect that there are errors of grammar and possibly content that I did not discover before finalizing  "the note.    This note was electronically signed by Neeru Avalos MD PhD         [1]   Allergies  Allergen Reactions    Strong Meal Swelling     \"lips swell up\"     "

## 2025-08-18 ENCOUNTER — HOSPITAL ENCOUNTER (OUTPATIENT)
Dept: RADIOLOGY | Facility: MEDICAL CENTER | Age: 55
End: 2025-08-18
Attending: INTERNAL MEDICINE
Payer: COMMERCIAL

## 2025-08-18 VITALS — WEIGHT: 149 LBS | BODY MASS INDEX: 22.07 KG/M2 | HEIGHT: 69 IN

## 2025-08-18 DIAGNOSIS — Z12.31 VISIT FOR SCREENING MAMMOGRAM: ICD-10-CM

## 2025-08-18 PROCEDURE — 77067 SCR MAMMO BI INCL CAD: CPT

## (undated) DEVICE — SYRINGE ASEPTO - (50EA/CA

## (undated) DEVICE — GLOVE BIOGEL SZ 7.5 SURGICAL PF LTX - (50PR/BX 4BX/CA)

## (undated) DEVICE — SODIUM CHL. IRRIGATION 0.9% 3000ML (4EA/CA 65CA/PF)

## (undated) DEVICE — GLOVE BIOGEL SZ 6.5 SURGICAL PF LTX (50PR/BX 4BX/CA)

## (undated) DEVICE — PACK MAJOR BASIN - (2EA/CA)

## (undated) DEVICE — SUTURE 0 VICRYL PLUS CT-1 - 36 INCH (36/BX)

## (undated) DEVICE — LASER TRAC TIP 200 MIRCON FOR 100 WATT LASER

## (undated) DEVICE — HEAD HOLDER JUNIOR/ADULT

## (undated) DEVICE — LIGASURE TISSUE FUSION  - SINGLE USE (6/CA)

## (undated) DEVICE — MASK ANESTHESIA ADULT  - (100/CA)

## (undated) DEVICE — LACTATED RINGERS INJ 1000 ML - (14EA/CA 60CA/PF)

## (undated) DEVICE — WATER IRRIG. STER 3000 ML - (4/CA)

## (undated) DEVICE — PROTECTOR ULNA NERVE - (36PR/CA)

## (undated) DEVICE — SUCTION TIP STRAIGHT ARGYLE - 50EA/CA

## (undated) DEVICE — KIT ANESTHESIA W/CIRCUIT & 3/LT BAG W/FILTER (20EA/CA)

## (undated) DEVICE — TOWELS CLOTH SURGICAL - (4/PK 20PK/CA)

## (undated) DEVICE — DRAPE SURGICAL U 77X120 - (10/CA)

## (undated) DEVICE — STAPLE 75MM LINEAR (12EA/BX)

## (undated) DEVICE — SODIUM CHL IRRIGATION 0.9% 1000ML (12EA/CA)

## (undated) DEVICE — GLOVE, LITE (PAIR)

## (undated) DEVICE — NEPTUNE 4 PORT MANIFOLD - (20/PK)

## (undated) DEVICE — BAG, SPONGE COUNT 50600

## (undated) DEVICE — STAPLER SKIN DISP - (6/BX 10BX/CA) VISISTAT

## (undated) DEVICE — GLOVE SZ 7.5 LF PROTEXIS (50PR/BX)

## (undated) DEVICE — SPONGE GAUZESTER 4 X 4 4PLY - (128PK/CA)

## (undated) DEVICE — TUBING PUMP WITH CONNECTOR REDEUCE (1EA)

## (undated) DEVICE — SUTURE 1 VICRYL PLUS CTX - 36 INCH (36/BX)

## (undated) DEVICE — CHLORAPREP 26 ML APPLICATOR - ORANGE TINT(25/CA)

## (undated) DEVICE — GLOVE SURGICAL PROTEXIS 8 1/2 - (50PR/BX)

## (undated) DEVICE — KIT ROOM DECONTAMINATION

## (undated) DEVICE — BOVIE NEEDLE TIP INSULATD NON-SAFETY 2CM (50/PK)

## (undated) DEVICE — SUTURE PASSER SM CONCEPT - 6/BX

## (undated) DEVICE — GLOVE BIOGEL PI INDICATOR SZ 6.5 SURGICAL PF LF - (50/BX 4BX/CA)

## (undated) DEVICE — GOWN WARMING STANDARD FLEX - (30/CA)

## (undated) DEVICE — SUTURE 4-0 ETHILON FS-2 18 (36PK/BX)"

## (undated) DEVICE — VAC CANISTER W/GEL 500ML - FITS NEW MACHINES (10EA/CA)

## (undated) DEVICE — CANNULA KIT UNIV GREY - (10/BX)

## (undated) DEVICE — SUTURE 0 COATED VICRYL PLUS - CTX CR(12/BX)18 IN

## (undated) DEVICE — BLADE SURGICAL #15 - (50/BX 3BX/CA)

## (undated) DEVICE — SENSOR SPO2 NEO LNCS ADHESIVE (20/BX) SEE USER NOTES

## (undated) DEVICE — WATER IRRIGATION STERILE 1000ML (12EA/CA)

## (undated) DEVICE — SET LEADWIRE 5 LEAD BEDSIDE DISPOSABLE ECG (1SET OF 5/EA)

## (undated) DEVICE — SLEEVE, VASO, THIGH, MED

## (undated) DEVICE — GLOVE PROTEXIS PI MICRO SZ 8.5 (200PR/CA)

## (undated) DEVICE — GLOVE BIOGEL INDICATOR SZ 7.5 SURGICAL PF LTX - (50PR/BX 4BX/CA)

## (undated) DEVICE — GLOVE BIOGEL INDICATOR SZ 6.5 SURGICAL PF LTX - (50PR/BX 4BX/CA)

## (undated) DEVICE — TOWEL STOP TIMEOUT SAFETY FLAG (40EA/CA)

## (undated) DEVICE — SUCTION INSTRUMENT YANKAUER BULBOUS TIP W/O VENT (50EA/CA)

## (undated) DEVICE — SUTURE 3-0 VICRYL PLUS SH - 8X 18 INCH (12/BX)

## (undated) DEVICE — SET IRRIGATION CYSTOSCOPY Y-TYPE L81 IN (20EA/CA)

## (undated) DEVICE — SET EXTENSION WITH 2 PORTS (48EA/CA) ***PART #2C8610 IS A SUBSTITUTE*****

## (undated) DEVICE — CANISTER SUCTION RIGID RED 1500CC (40EA/CA)

## (undated) DEVICE — SUTURE 0 VICRYL PLUS CT-1 - 8 X 18 INCH (12/BX)

## (undated) DEVICE — TUBE E-T HI-LO CUFF 7.0MM (10EA/PK)

## (undated) DEVICE — BLADE SAGITTAL SAW 9.4MM X 25.5MM X .4MM FINE TOOTH (1/EA)

## (undated) DEVICE — SUTURE 2-0 VICRYL PLUS CT-1 - 8 X 18 INCH(12/BX)

## (undated) DEVICE — HUMID-VENT HEAT AND MOISTURE EXCHANGE- (50/BX)

## (undated) DEVICE — CONTAINER SPECIMEN BAG OR - STERILE 4 OZ W/LID (100EA/CA)

## (undated) DEVICE — FIBERWIRE 2.0 (12EA/BX)

## (undated) DEVICE — TUBE CONNECTING SUCTION - CLEAR PLASTIC STERILE 72 IN (50EA/CA)

## (undated) DEVICE — SPIDER SHOULDER HOLDER (12EA/BX)

## (undated) DEVICE — PAD LAP STERILE 18 X 18 - (5/PK 40PK/CA)

## (undated) DEVICE — BURR ROUND 66MM

## (undated) DEVICE — BASKET ZERO 1.9FR X 120CM

## (undated) DEVICE — GLOVE BIOGEL SZ 8 SURGICAL PF LTX - (50PR/BX 4BX/CA)

## (undated) DEVICE — ELECTRODE DUAL RETURN W/ CORD - (50/PK)

## (undated) DEVICE — COVER MAYO STAND X-LG - (22EA/CA)

## (undated) DEVICE — TRAY CATHETER FOLEY URINE METER W/STATLOCK 350ML (10EA/CA)

## (undated) DEVICE — TUBE CONNECT SUCTION CLEAR 120 X 1/4" (50EA/CA)"

## (undated) DEVICE — DRESSING TEGADERM 8 X 12 - (10/BX 8BX/CA)

## (undated) DEVICE — PACK SHOULDER ARTHROSCOPY SM - (2EA/CA)

## (undated) DEVICE — BINDER ABDOMINAL FITS WAIST 36 IN-65IN MED/LRG (1/EA)

## (undated) DEVICE — GLOVE BIOGEL SZ 7 SURGICAL PF LTX - (50PR/BX 4BX/CA)

## (undated) DEVICE — STERI STRIP COMPOUND BENZOIN - TINCTURE 0.6ML WITH APPLICATOR (40EA/BX)

## (undated) DEVICE — SHAVER4.0 AGGRESSIVE + FORMLA (5EA/BX)

## (undated) DEVICE — SYSTEM PREVENA INCISION MNGM - (1/EA)

## (undated) DEVICE — PACK CYSTOSCOPY III - (8/CA)

## (undated) DEVICE — DRAPE LAPAROTOMY T SHEET - (12EA/CA)

## (undated) DEVICE — ELECTRODE 850 FOAM ADHESIVE - HYDROGEL RADIOTRNSPRNT (50/PK)

## (undated) DEVICE — DRAPE LARGE 3 QUARTER - (20/CA)

## (undated) DEVICE — BOVIE  BLADE 6 EXTENDED - (50/PK)

## (undated) DEVICE — DRAPE TABLE UROLOGY DRAINAGE (20EA/BX)

## (undated) DEVICE — DRAPETIBURON SHOULDER W/POUCH - (5EA/CA)

## (undated) DEVICE — SUTURE 3-0 SILK SH C/R 18 IN - (12/BX)

## (undated) DEVICE — SUTURE 3-0 MONOCRYL PLUS PS-1 - 27 INCH (36/BX)

## (undated) DEVICE — GLOVE BIOGEL PI INDICATOR SZ 7.0 SURGICAL PF LF - (50/BX 4BX/CA)

## (undated) DEVICE — CANISTER SUCTION 3000ML MECHANICAL FILTER AUTO SHUTOFF MEDI-VAC NONSTERILE LF DISP  (40EA/CA)

## (undated) DEVICE — FOAM FACEHOLDER SPIDER (8EA/BX)

## (undated) DEVICE — PENCIL ELECTSURG 10FT BTN SWH - (50/CA)

## (undated) DEVICE — DRESSING, WOUND VAC MED.

## (undated) DEVICE — TUBING CLEARLINK DUO-VENT - C-FLO (48EA/CA)

## (undated) DEVICE — SUTURE GENERAL

## (undated) DEVICE — GRAFT MESH SEPRAFILM PRO PACK - 5/BX CONTAINS 6 3X5 PIECES

## (undated) DEVICE — SLEEVE VASO CALF MED - (10PR/CA)

## (undated) DEVICE — CLOSURE SKIN STRIP 1/2 X 4 IN - (STERI STRIP) (50/BX 4BX/CA)

## (undated) DEVICE — STAPLER 75MM LINEAR OPEN (3EA/BX)

## (undated) DEVICE — WIRE GUIDE SENSOR DUAL FLEX - 5/BX